# Patient Record
Sex: FEMALE | Race: WHITE | Employment: OTHER | URBAN - METROPOLITAN AREA
[De-identification: names, ages, dates, MRNs, and addresses within clinical notes are randomized per-mention and may not be internally consistent; named-entity substitution may affect disease eponyms.]

---

## 2017-06-01 ENCOUNTER — APPOINTMENT (OUTPATIENT)
Dept: GENERAL RADIOLOGY | Age: 82
DRG: 644 | End: 2017-06-01
Attending: EMERGENCY MEDICINE
Payer: MEDICARE

## 2017-06-01 ENCOUNTER — APPOINTMENT (OUTPATIENT)
Dept: CT IMAGING | Age: 82
DRG: 644 | End: 2017-06-01
Attending: EMERGENCY MEDICINE
Payer: MEDICARE

## 2017-06-01 ENCOUNTER — HOSPITAL ENCOUNTER (INPATIENT)
Age: 82
LOS: 4 days | Discharge: HOME HEALTH | DRG: 644 | End: 2017-06-05
Attending: EMERGENCY MEDICINE | Admitting: INTERNAL MEDICINE
Payer: MEDICARE

## 2017-06-01 DIAGNOSIS — J96.01 ACUTE RESPIRATORY FAILURE WITH HYPOXIA (HCC): ICD-10-CM

## 2017-06-01 DIAGNOSIS — R53.1 WEAKNESS: ICD-10-CM

## 2017-06-01 DIAGNOSIS — R53.81 DEBILITY: ICD-10-CM

## 2017-06-01 DIAGNOSIS — M54.50 ACUTE MIDLINE LOW BACK PAIN WITHOUT SCIATICA: ICD-10-CM

## 2017-06-01 DIAGNOSIS — K59.09 OTHER CONSTIPATION: ICD-10-CM

## 2017-06-01 DIAGNOSIS — S32.010S COMPRESSION FRACTURE OF L1 LUMBAR VERTEBRA, SEQUELA: ICD-10-CM

## 2017-06-01 DIAGNOSIS — M25.551 RIGHT HIP PAIN: ICD-10-CM

## 2017-06-01 DIAGNOSIS — N39.0 URINARY TRACT INFECTION WITHOUT HEMATURIA, SITE UNSPECIFIED: Primary | ICD-10-CM
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
## 2017-06-01 PROBLEM — K59.00 CONSTIPATION: Status: ACTIVE | Noted: 2017-06-01

## 2017-06-01 PROBLEM — S32.010A COMPRESSION FRACTURE OF L1 LUMBAR VERTEBRA (HCC): Status: ACTIVE | Noted: 2017-06-01

## 2017-06-01 LAB
ALBUMIN SERPL BCP-MCNC: 3.3 G/DL (ref 3.5–5)
ALBUMIN/GLOB SERPL: 0.8 {RATIO} (ref 1.1–2.2)
ALP SERPL-CCNC: 83 U/L (ref 45–117)
ALT SERPL-CCNC: 18 U/L (ref 12–78)
ANION GAP BLD CALC-SCNC: 9 MMOL/L (ref 5–15)
APPEARANCE UR: ABNORMAL
AST SERPL W P-5'-P-CCNC: 16 U/L (ref 15–37)
ATRIAL RATE: 69 BPM
BACTERIA URNS QL MICRO: ABNORMAL /HPF
BASOPHILS # BLD AUTO: 0 K/UL (ref 0–0.1)
BASOPHILS # BLD: 0 % (ref 0–1)
BILIRUB SERPL-MCNC: 0.9 MG/DL (ref 0.2–1)
BILIRUB UR QL: NEGATIVE
BNP SERPL-MCNC: 5151 PG/ML (ref 0–450)
BUN SERPL-MCNC: 18 MG/DL (ref 6–20)
BUN/CREAT SERPL: 23 (ref 12–20)
CALCIUM SERPL-MCNC: 8.6 MG/DL (ref 8.5–10.1)
CALCULATED P AXIS, ECG09: 43 DEGREES
CALCULATED R AXIS, ECG10: 28 DEGREES
CALCULATED T AXIS, ECG11: 49 DEGREES
CHLORIDE SERPL-SCNC: 89 MMOL/L (ref 97–108)
CK MB CFR SERPL CALC: 3.9 % (ref 0–2.5)
CK MB SERPL-MCNC: 1.8 NG/ML (ref 5–25)
CK SERPL-CCNC: 46 U/L (ref 26–192)
CO2 SERPL-SCNC: 26 MMOL/L (ref 21–32)
COLOR UR: ABNORMAL
CREAT SERPL-MCNC: 0.77 MG/DL (ref 0.55–1.02)
DIAGNOSIS, 93000: NORMAL
EOSINOPHIL # BLD: 0 K/UL (ref 0–0.4)
EOSINOPHIL NFR BLD: 0 % (ref 0–7)
EPITH CASTS URNS QL MICRO: ABNORMAL /LPF
ERYTHROCYTE [DISTWIDTH] IN BLOOD BY AUTOMATED COUNT: 13.3 % (ref 11.5–14.5)
GLOBULIN SER CALC-MCNC: 4.3 G/DL (ref 2–4)
GLUCOSE SERPL-MCNC: 122 MG/DL (ref 65–100)
GLUCOSE UR STRIP.AUTO-MCNC: NEGATIVE MG/DL
HCT VFR BLD AUTO: 34.5 % (ref 35–47)
HGB BLD-MCNC: 11.8 G/DL (ref 11.5–16)
HGB UR QL STRIP: ABNORMAL
INR PPP: 1.7 (ref 0.9–1.1)
KETONES UR QL STRIP.AUTO: ABNORMAL MG/DL
LEUKOCYTE ESTERASE UR QL STRIP.AUTO: ABNORMAL
LYMPHOCYTES # BLD AUTO: 13 % (ref 12–49)
LYMPHOCYTES # BLD: 1.2 K/UL (ref 0.8–3.5)
MAGNESIUM SERPL-MCNC: 2.4 MG/DL (ref 1.6–2.4)
MCH RBC QN AUTO: 30.3 PG (ref 26–34)
MCHC RBC AUTO-ENTMCNC: 34.2 G/DL (ref 30–36.5)
MCV RBC AUTO: 88.7 FL (ref 80–99)
MONOCYTES # BLD: 0.7 K/UL (ref 0–1)
MONOCYTES NFR BLD AUTO: 8 % (ref 5–13)
NEUTS SEG # BLD: 7.2 K/UL (ref 1.8–8)
NEUTS SEG NFR BLD AUTO: 79 % (ref 32–75)
NITRITE UR QL STRIP.AUTO: NEGATIVE
OTHER,OTHU: ABNORMAL
P-R INTERVAL, ECG05: 190 MS
PH UR STRIP: 6 [PH] (ref 5–8)
PLATELET # BLD AUTO: 139 K/UL (ref 150–400)
POTASSIUM SERPL-SCNC: 3.7 MMOL/L (ref 3.5–5.1)
PROT SERPL-MCNC: 7.6 G/DL (ref 6.4–8.2)
PROT UR STRIP-MCNC: 30 MG/DL
PROTHROMBIN TIME: 17 SEC (ref 9–11.1)
Q-T INTERVAL, ECG07: 438 MS
QRS DURATION, ECG06: 86 MS
QTC CALCULATION (BEZET), ECG08: 469 MS
RBC # BLD AUTO: 3.89 M/UL (ref 3.8–5.2)
RBC #/AREA URNS HPF: ABNORMAL /HPF (ref 0–5)
SODIUM SERPL-SCNC: 124 MMOL/L (ref 136–145)
SP GR UR REFRACTOMETRY: 1.02 (ref 1–1.03)
TROPONIN I SERPL-MCNC: <0.04 NG/ML
UA: UC IF INDICATED,UAUC: ABNORMAL
UROBILINOGEN UR QL STRIP.AUTO: 1 EU/DL (ref 0.2–1)
VENTRICULAR RATE, ECG03: 69 BPM
WBC # BLD AUTO: 9.2 K/UL (ref 3.6–11)
WBC URNS QL MICRO: >100 /HPF (ref 0–4)

## 2017-06-01 PROCEDURE — 74011250636 HC RX REV CODE- 250/636: Performed by: EMERGENCY MEDICINE

## 2017-06-01 PROCEDURE — 74011250636 HC RX REV CODE- 250/636: Performed by: INTERNAL MEDICINE

## 2017-06-01 PROCEDURE — 93005 ELECTROCARDIOGRAM TRACING: CPT

## 2017-06-01 PROCEDURE — 87186 SC STD MICRODIL/AGAR DIL: CPT | Performed by: EMERGENCY MEDICINE

## 2017-06-01 PROCEDURE — 82550 ASSAY OF CK (CPK): CPT | Performed by: EMERGENCY MEDICINE

## 2017-06-01 PROCEDURE — 65270000029 HC RM PRIVATE

## 2017-06-01 PROCEDURE — G8979 MOBILITY GOAL STATUS: HCPCS

## 2017-06-01 PROCEDURE — 83735 ASSAY OF MAGNESIUM: CPT | Performed by: EMERGENCY MEDICINE

## 2017-06-01 PROCEDURE — 74022 RADEX COMPL AQT ABD SERIES: CPT

## 2017-06-01 PROCEDURE — 84484 ASSAY OF TROPONIN QUANT: CPT | Performed by: EMERGENCY MEDICINE

## 2017-06-01 PROCEDURE — 74011250637 HC RX REV CODE- 250/637: Performed by: INTERNAL MEDICINE

## 2017-06-01 PROCEDURE — 96375 TX/PRO/DX INJ NEW DRUG ADDON: CPT

## 2017-06-01 PROCEDURE — 96365 THER/PROPH/DIAG IV INF INIT: CPT

## 2017-06-01 PROCEDURE — 72131 CT LUMBAR SPINE W/O DYE: CPT

## 2017-06-01 PROCEDURE — 74011000258 HC RX REV CODE- 258: Performed by: INTERNAL MEDICINE

## 2017-06-01 PROCEDURE — 74011000250 HC RX REV CODE- 250: Performed by: INTERNAL MEDICINE

## 2017-06-01 PROCEDURE — 97530 THERAPEUTIC ACTIVITIES: CPT

## 2017-06-01 PROCEDURE — 87086 URINE CULTURE/COLONY COUNT: CPT | Performed by: EMERGENCY MEDICINE

## 2017-06-01 PROCEDURE — 97116 GAIT TRAINING THERAPY: CPT

## 2017-06-01 PROCEDURE — 74011000258 HC RX REV CODE- 258: Performed by: EMERGENCY MEDICINE

## 2017-06-01 PROCEDURE — 85610 PROTHROMBIN TIME: CPT | Performed by: EMERGENCY MEDICINE

## 2017-06-01 PROCEDURE — G8978 MOBILITY CURRENT STATUS: HCPCS

## 2017-06-01 PROCEDURE — 87077 CULTURE AEROBIC IDENTIFY: CPT | Performed by: EMERGENCY MEDICINE

## 2017-06-01 PROCEDURE — 83880 ASSAY OF NATRIURETIC PEPTIDE: CPT | Performed by: EMERGENCY MEDICINE

## 2017-06-01 PROCEDURE — 81001 URINALYSIS AUTO W/SCOPE: CPT | Performed by: EMERGENCY MEDICINE

## 2017-06-01 PROCEDURE — 97161 PT EVAL LOW COMPLEX 20 MIN: CPT

## 2017-06-01 PROCEDURE — 80053 COMPREHEN METABOLIC PANEL: CPT | Performed by: EMERGENCY MEDICINE

## 2017-06-01 PROCEDURE — 85025 COMPLETE CBC W/AUTO DIFF WBC: CPT | Performed by: EMERGENCY MEDICINE

## 2017-06-01 PROCEDURE — 99285 EMERGENCY DEPT VISIT HI MDM: CPT

## 2017-06-01 PROCEDURE — 36415 COLL VENOUS BLD VENIPUNCTURE: CPT | Performed by: EMERGENCY MEDICINE

## 2017-06-01 RX ORDER — AMITRIPTYLINE HYDROCHLORIDE 10 MG/1
10 TABLET, FILM COATED ORAL
Status: DISCONTINUED | OUTPATIENT
Start: 2017-06-01 | End: 2017-06-05 | Stop reason: HOSPADM

## 2017-06-01 RX ORDER — LATANOPROST 50 UG/ML
SOLUTION/ DROPS OPHTHALMIC EVERY EVENING
Status: DISCONTINUED | OUTPATIENT
Start: 2017-06-01 | End: 2017-06-05 | Stop reason: HOSPADM

## 2017-06-01 RX ORDER — NAPROXEN 250 MG/1
250 TABLET ORAL 2 TIMES DAILY WITH MEALS
Status: DISCONTINUED | OUTPATIENT
Start: 2017-06-01 | End: 2017-06-02

## 2017-06-01 RX ORDER — SODIUM CHLORIDE 0.9 % (FLUSH) 0.9 %
5-10 SYRINGE (ML) INJECTION AS NEEDED
Status: DISCONTINUED | OUTPATIENT
Start: 2017-06-01 | End: 2017-06-05 | Stop reason: HOSPADM

## 2017-06-01 RX ORDER — POLYETHYLENE GLYCOL 3350 17 G/17G
17 POWDER, FOR SOLUTION ORAL DAILY
Status: DISCONTINUED | OUTPATIENT
Start: 2017-06-02 | End: 2017-06-02 | Stop reason: SDUPTHER

## 2017-06-01 RX ORDER — SODIUM CHLORIDE 0.9 % (FLUSH) 0.9 %
5-10 SYRINGE (ML) INJECTION EVERY 8 HOURS
Status: DISCONTINUED | OUTPATIENT
Start: 2017-06-01 | End: 2017-06-05 | Stop reason: HOSPADM

## 2017-06-01 RX ORDER — AMOXICILLIN 250 MG
1 CAPSULE ORAL DAILY
Status: DISCONTINUED | OUTPATIENT
Start: 2017-06-02 | End: 2017-06-02

## 2017-06-01 RX ORDER — POLYETHYLENE GLYCOL 3350 17 G/17G
17 POWDER, FOR SOLUTION ORAL DAILY
Status: DISCONTINUED | OUTPATIENT
Start: 2017-06-02 | End: 2017-06-05 | Stop reason: HOSPADM

## 2017-06-01 RX ORDER — WARFARIN SODIUM 5 MG/1
5 TABLET ORAL ONCE
Status: COMPLETED | OUTPATIENT
Start: 2017-06-01 | End: 2017-06-01

## 2017-06-01 RX ORDER — DORZOLAMIDE HCL 20 MG/ML
2 SOLUTION/ DROPS OPHTHALMIC 2 TIMES DAILY
Status: DISCONTINUED | OUTPATIENT
Start: 2017-06-01 | End: 2017-06-03

## 2017-06-01 RX ORDER — FACIAL-BODY WIPES
10 EACH TOPICAL DAILY PRN
Status: DISCONTINUED | OUTPATIENT
Start: 2017-06-01 | End: 2017-06-05 | Stop reason: HOSPADM

## 2017-06-01 RX ORDER — ONDANSETRON 2 MG/ML
4 INJECTION INTRAMUSCULAR; INTRAVENOUS
Status: DISCONTINUED | OUTPATIENT
Start: 2017-06-01 | End: 2017-06-05 | Stop reason: HOSPADM

## 2017-06-01 RX ORDER — LEVETIRACETAM 500 MG/1
500 TABLET ORAL 2 TIMES DAILY
Status: DISCONTINUED | OUTPATIENT
Start: 2017-06-01 | End: 2017-06-05 | Stop reason: HOSPADM

## 2017-06-01 RX ORDER — AMIODARONE HYDROCHLORIDE 200 MG/1
200 TABLET ORAL EVERY OTHER DAY
Status: DISCONTINUED | OUTPATIENT
Start: 2017-06-03 | End: 2017-06-02

## 2017-06-01 RX ORDER — CARVEDILOL 12.5 MG/1
12.5 TABLET ORAL 2 TIMES DAILY WITH MEALS
Status: DISCONTINUED | OUTPATIENT
Start: 2017-06-01 | End: 2017-06-05 | Stop reason: HOSPADM

## 2017-06-01 RX ORDER — KETOROLAC TROMETHAMINE 30 MG/ML
15 INJECTION, SOLUTION INTRAMUSCULAR; INTRAVENOUS
Status: COMPLETED | OUTPATIENT
Start: 2017-06-01 | End: 2017-06-01

## 2017-06-01 RX ORDER — ONDANSETRON 2 MG/ML
4 INJECTION INTRAMUSCULAR; INTRAVENOUS
Status: COMPLETED | OUTPATIENT
Start: 2017-06-01 | End: 2017-06-01

## 2017-06-01 RX ORDER — VALSARTAN 40 MG/1
80 TABLET ORAL DAILY
Status: DISCONTINUED | OUTPATIENT
Start: 2017-06-02 | End: 2017-06-02

## 2017-06-01 RX ORDER — OXYCODONE AND ACETAMINOPHEN 5; 325 MG/1; MG/1
1 TABLET ORAL
Status: DISCONTINUED | OUTPATIENT
Start: 2017-06-01 | End: 2017-06-02

## 2017-06-01 RX ORDER — FUROSEMIDE 10 MG/ML
20 INJECTION INTRAMUSCULAR; INTRAVENOUS DAILY
Status: DISCONTINUED | OUTPATIENT
Start: 2017-06-02 | End: 2017-06-02

## 2017-06-01 RX ORDER — LIDOCAINE 50 MG/G
1 PATCH TOPICAL EVERY 24 HOURS
Status: DISCONTINUED | OUTPATIENT
Start: 2017-06-01 | End: 2017-06-02

## 2017-06-01 RX ORDER — FUROSEMIDE 10 MG/ML
40 INJECTION INTRAMUSCULAR; INTRAVENOUS
Status: COMPLETED | OUTPATIENT
Start: 2017-06-01 | End: 2017-06-01

## 2017-06-01 RX ADMIN — CEFTRIAXONE 1 G: 1 INJECTION, POWDER, FOR SOLUTION INTRAMUSCULAR; INTRAVENOUS at 18:38

## 2017-06-01 RX ADMIN — AMITRIPTYLINE HYDROCHLORIDE 10 MG: 10 TABLET, FILM COATED ORAL at 20:54

## 2017-06-01 RX ADMIN — CEFTRIAXONE 1 G: 1 INJECTION, POWDER, FOR SOLUTION INTRAMUSCULAR; INTRAVENOUS at 11:29

## 2017-06-01 RX ADMIN — Medication 10 ML: at 20:59

## 2017-06-01 RX ADMIN — KETOROLAC TROMETHAMINE 15 MG: 30 INJECTION, SOLUTION INTRAMUSCULAR at 10:19

## 2017-06-01 RX ADMIN — LEVETIRACETAM 500 MG: 500 TABLET, FILM COATED ORAL at 18:38

## 2017-06-01 RX ADMIN — Medication 10 ML: at 18:39

## 2017-06-01 RX ADMIN — CARVEDILOL 12.5 MG: 12.5 TABLET, FILM COATED ORAL at 18:38

## 2017-06-01 RX ADMIN — FUROSEMIDE 40 MG: 10 INJECTION, SOLUTION INTRAMUSCULAR; INTRAVENOUS at 13:43

## 2017-06-01 RX ADMIN — ONDANSETRON HYDROCHLORIDE 4 MG: 2 INJECTION, SOLUTION INTRAMUSCULAR; INTRAVENOUS at 10:01

## 2017-06-01 RX ADMIN — SODIUM CHLORIDE 5 MG: 9 INJECTION INTRAMUSCULAR; INTRAVENOUS; SUBCUTANEOUS at 20:54

## 2017-06-01 RX ADMIN — WARFARIN SODIUM 5 MG: 5 TABLET ORAL at 18:39

## 2017-06-01 RX ADMIN — ONDANSETRON HYDROCHLORIDE 4 MG: 2 INJECTION, SOLUTION INTRAMUSCULAR; INTRAVENOUS at 19:02

## 2017-06-01 NOTE — PROGRESS NOTES
Problem: Mobility Impaired (Adult and Pediatric)  Goal: *Acute Goals and Plan of Care (Insert Text)  Physical Therapy Goals  Initiated 6/1/2017  1. Patient will move from supine to sit and sit to supine , scoot up and down and roll side to side in bed with minimal assistance/contact guard assist within 7 day(s). 2. Patient will transfer from bed to chair and chair to bed with modified independence using the least restrictive device within 7 day(s). 3. Patient will perform sit to stand with modified independence within 7 day(s). 4. Patient will ambulate with modified independence for 100 feet with the least restrictive device within 7 day(s). 5. Patient will ascend/descend 5 stairs with handrail(s) with minimal assistance/contact guard assist within 7 day(s). PHYSICAL THERAPY EMERGENCY DEPARTMENT EVALUATION WITH RECOMMENDED ADMISSION  Patient: Giles Osgood (57 y.o. female)  Date: 6/1/2017  Primary Diagnosis: Compression fracture of L1 lumbar vertebra (HCC)  Fall at home  UTI (urinary tract infection)  Acute respiratory failure with hypoxia (HCC)  Constipation        Precautions:  Fall, Back (L1 fracture)  ASSESSMENT :  Based on the objective data described below, the patient presents with increased back pain, generalized weakness, impaired balance, and decreased activity tolerance all limiting patients functional mobility. Patient with 10/10 pain during transitional movements, bed mobility and sit<>stand transfers, but pain tolerable during ambulation. During ambulation noted patient to have increased dyspnea and SpO2 desaturated to 86% on RA. Unable to recover with rest on RA and required returning to 2L nasal O2. Not on supplemental O2 at baseline. Safety concerns with patient returning due to pain management and O2 needs/decline. Patient would benefit from admission for continued medical management/workup and continued skilled PT. Rounded with ED physician who is in agreement.      Admission for this patient is recommended with continued acute therapy. PLAN :  Frequency/Duration: Pending admission, the patient will be followed by physical therapy 5 times a week to address goals. If admitted, Recommendations and Planned Interventions:  [X]           Bed Mobility Training             [ ]    Neuromuscular Re-Education  [X]           Transfer Training                   [ ]    Orthotic/Prosthetic Training  [X]           Gait Training                         [ ]    Modalities  [X]           Therapeutic Exercises           [ ]    Edema Management/Control  [X]           Therapeutic Activities            [X]    Patient and Family Training/Education  [ ]           Other (comment):     Discharge Recommendations:   [ ]   Home with family  [ ]   Skilled nursing facility  [X]   Admission to hospital with [facility] PT likely needed  [ ]   Inpatient rehab referral  [ ]   Outpatient physical therapy referral  [ ]   Other:     Further Equipment Recommendations for Discharge: TBD  [ ]   Rolling walker with 5\" wheels  [ ]   Crutches   [ ]   Lupie Batres   [ ]   Wheelchair   [ ]   Other:       COMMUNICATION/EDUCATION:   Communication/Collaboration:  [X]   Fall prevention education was provided and the patient/caregiver indicated understanding. [X]   Patient/family have participated as able and agree with findings and recommendations. [ ]   Patient is unable to participate in plan of care at this time. Findings and recommendations were discussed with: ED physician and Registered Nurse       SUBJECTIVE:   Patient stated I am okay until I move.       OBJECTIVE DATA SUMMARY:   HISTORY:    Past Medical History:   Diagnosis Date    Alopecia      Arthritis      Benign essential HTN      Benign neoplasm of vulva      Bronchitis      CAD (coronary artery disease)      Cancer ([facility] [address]      Cholelithiases      Colon cancer ([facility] [address]      Constipation      Dyspareunia      Glaucoma      History of mixed connective tissue disease      Hx of viral pericarditis      Hyperactivity of bladder      Hypercholesterolemia      Insomnia      Macular degeneration      Neck pain      Palpitations      Psoriasis      PVT (paroxysmal ventricular tachycardia) (HCC)      RA (rheumatoid arthritis) (HCC)      Rectocele      Renal cyst      Seizure disorder (HCC)      Sinusitis      [facility]'s disease ([facility] [address]      Thrombophlebitis      TIA (transient ischemic attack)      Vaginal vault prolapse, posthysterectomy      Varicose veins       Past Surgical History:   Procedure Laterality Date    HX BREAST LUMPECTOMY         left breast    HX CATARACT REMOVAL        HX COLECTOMY   1955     partial    HX HEMORRHOIDECTOMY        HX HYSTERECTOMY        HX SHOULDER ARTHROSCOPY         Prior Level of Function/Home Situation: prior independence to modified independence, use of rollator walker at baseline. History of falls, lives with family who assists as needed   Personal factors and/or comorbidities impacting plan of care:      Home Situation  Home Environment: Private residence  # Steps to Enter: 5  Rails to Enter: Yes  Hand Rails : Bilateral  One/Two Story Residence: Two story, live on 1st floor  Living Alone: No  Support Systems: Child(jaiden)  Patient Expects to be Discharged to[de-identified] Private residence  Current DME Used/Available at Home: Raised toilet seat, Walker, rollator (bed rails)  Tub or Shower Type: Shower     EXAMINATION/PRESENTATION/DECISION MAKING:   Critical Behavior:   Patient alert      Hearing: Auditory  Auditory Impairment: Hard of hearing, bilateral, Hearing aid(s)  Hearing Aids/Status: With patient  Range Of Motion:  AROM: Generally decreased, functional (back pain limiting ROM)     Strength:    Strength: Generally decreased, functional     Tone & Sensation:   Tone: Normal  Sensation: Intact        Coordination:  Coordination: Within functional limits     Functional Mobility:  Bed Mobility:  Rolling: Moderate assistance; Additional time  Supine to Sit: Moderate assistance; Additional time  Sit to Supine: Moderate assistance; Additional time  Scooting: Minimum assistance; Additional time  Transfers:  Sit to Stand: Minimum assistance; Additional time  Stand to Sit: Minimum assistance; Additional time     Balance:   Sitting: Impaired  Sitting - Static: Good (unsupported)  Sitting - Dynamic: Fair (occasional) (due to back pain)  Standing: Impaired  Standing - Static: Fair  Standing - Dynamic : Fair  Ambulation/Gait Training:  Distance (ft): 80 Feet (ft)  Assistive Device: Walker, rollator;Gait belt  Ambulation - Level of Assistance: Contact guard assistance  Gait Abnormalities: Decreased step clearance (mild forward flexed posture)  Base of Support: Widened                             Patient initially slow due to pain but able to improve carlos. Increased pain with distance. Dyspnea with activity and noted SpO2 decreased. Special Tests:  Barthel Index:      Bathin  Bladder: 5  Bowels: 10  Groomin  Dressin  Feedin  Mobility: 0  Stairs: 0  Toilet Use: 5  Transfer (Bed to Chair and Back): 5  Total: 35      Barthel and G-code impairment scale:  Percentage of impairment CH  0% CI  1-19% CJ  20-39% CK  40-59% CL  60-79% CM  80-99% CN  100%   Barthel Score 0-100 100 99-80 79-60 59-40 20-39 1-19    0   Barthel Score 0-20 20 17-19 13-16 9-12 5-8 1-4 0      The Barthel ADL Index: Guidelines  1. The index should be used as a record of what a patient does, not as a record of what a patient could do. 2. The main aim is to establish degree of independence from any help, physical or verbal, however minor and for whatever reason. 3. The need for supervision renders the patient not independent. 4. A patient's performance should be established using the best available evidence. Asking the patient, friends/relatives and nurses are the usual sources, but direct observation and common sense are also important. However direct testing is not needed.   5. Usually the patient's performance over the preceding 24-48 hours is important, but occasionally longer periods will be relevant. 6. Middle categories imply that the patient supplies over 50 per cent of the effort. 7. Use of aids to be independent is allowed. Tracy Pham., Barthel, D.W. (3980). Functional evaluation: the Barthel Index. [facility] (14)2. KASHIF Flood, Abigail Awad, [facility] Betzaida [facility], [address] [facility] (1999). Measuring the change indisability after inpatient rehabilitation; comparison of the responsiveness of the Barthel Index and Functional [place] Measure. Journal of Neurology, Neurosurgery, and Psychiatry, 66(4), 219-971. NARCISA Conn, CARISSA Pascual, & Abhishek Mccoy M.A. (2004.) Assessment of post-stroke quality of life in cost-effectiveness studies: The usefulness of the Barthel Index and the EuroQoL-5D. Quality of Life Research, 13, 427-43                 In compliance with CMSs Claims Based Outcome Reporting, the following G-code set was chosen for this patient based on their primary functional limitation being treated: The outcome measure chosen to determine the severity of the functional limitation was the Barthel Index with a score of 35/100 which was correlated with the impairment scale.       · Mobility - Walking and Moving Around:               - CURRENT STATUS:    CL - 60%-79% impaired, limited or restricted               - GOAL STATUS:           CK - 40%-59% impaired, limited or restricted               - D/C STATUS:              ---------------To be determined---------------       Physical Therapy Evaluation Charge Determination   History Examination Presentation Decision-Making   LOW Complexity : Zero comorbidities / personal factors that will impact the outcome / POC LOW Complexity : 1-2 Standardized tests and measures addressing body structure, function, activity limitation and / or participation in recreation  LOW Complexity : Stable, uncomplicated  Other outcome measures Barthel Index LOW       Based on the above components, the patient evaluation is determined to be of the following complexity level: LOW            Pain:  Pain Scale 1: Numeric (0 - 10)  Pain Intensity 1: 0     Activity Tolerance:   SpO2 decreased to 86% on RA during activity   Please refer to the flowsheet for vital signs taken during this treatment.   After treatment:   [ ]   Patient left in no apparent distress sitting up in chair  [X]   Patient left in no apparent distress in bed  [X]   Call bell left within reach  [X]   Nursing notified  [X]   Caregiver present  [ ]   Bed alarm activated     Thank you for this referral.  Meredith Christian, PT, DPT   Time Calculation: 28 mins

## 2017-06-01 NOTE — PROGRESS NOTES
Paged on call physician at [facility] [phone], provided callback number [phone]. Dr. [facility] states he will see her tomorrow. [address] [place] [address] Paged Dr. Berta Peralta regarding patient being nauseous when she gets up and the patient wanting miralax. Received a telephone order for miralax 17 g, and Zofran 4 mg IV every 6 hours PRN.

## 2017-06-01 NOTE — PROGRESS NOTES
Pharmacy Initial Dosing of Warfarin    Pharmacy consulted to dose warfarin for this  80 y.o. female ordered warfarin for AF    Goal INR (2-3, 2.5-3.5, 3-4): 2-3    Concurrent anticoagulants & Platelet Inhibitors: None  Home Warfarin Dose: 5 mg day  Major Interacting Medications (Dose/Frequency): Amiodarone    INR (0.9-1.1) > 5 discuss with MD:   Recent Labs      06/01/17   0957   INR  1.7*   HGB  11.8   PLT  139*   ALB  3.3*   SGOT  16   ALT  18   TBILI  0.9     Warfarin Sensitivity & Sensitivity Factors Present: Normal sensitivity       Impression/Plan: Warfarin 5 mg tonight then daily dose by INR     Pharmacy will follow daily and adjust the dose as appropriate.     Thanks for the consult    Vidal Mattson, [facility]

## 2017-06-01 NOTE — IP AVS SNAPSHOT
[address] [facility]. 
[phone] Patient: Gavin Gaitan MRN: [id number] You are allergic to the following Allergen Reactions Codeine Unknown (comments) Pcn (Penicillins) Unknown (comments) Tetanus Vaccines And Toxoid Unknown (comments) Vibramycin (Doxycycline Calcium) Unknown (comments) Recent Documentation Height Weight BMI OB Status Smoking Status 1.626 m 68.5 kg 25.92 kg/m2 Hysterectomy Never Smoker Emergency Contacts Name Discharge Info Relation Home Work Mobile Ruchi Manriquez  Child [2] [phone] About your hospitalization You were admitted on:  2017 You last received care in the:  [facility] 3 ORTHOPEDICS You were discharged on:  2017 Unit phone number:  [phone] Why you were hospitalized Your primary diagnosis was:  Compression Fracture Of L1 Lumbar Vertebra (Hcc) Your diagnoses also included:  Uti (Urinary Tract Infection), Constipation, Acute Respiratory Failure With Hypoxia (Hcc), Fall At Home, Back Pain, Right Hip Pain, Falls, Weakness, Debility, Hyponatremia Providers Seen During Your Hospitalizations Provider Role Specialty Primary office phone Pallavi Cope DO Attending Provider Emergency Medicine [phone] Jennifer Taylor MD Attending Provider Internal Medicine [phone] Your Primary Care Physician (PCP) Primary Care Physician Office Phone Office Fax Charo Cano [phone] Follow-up Information Follow up With Details Comments Contact Info [address] This is your home health provider. If you do not hear from them with in 24 hours please contact them [phone].  
[facility] [place] [address] [facility] 
 
phone [phone] Your Appointments  Thursday Sabine 15, 2017  8:40 AM EDT  
 Follow Up with Bette Cárdenas MD  
[facility] [address] [facility]) [address] [facility], 
[address] [place] [address], [address] [place]  
[phone] Current Discharge Medication List  
  
START taking these medications Dose & Instructions Dispensing Information Comments Morning Noon Evening Bedtime  
 nitrofurantoin (macrocrystal-monohydrate) 100 mg capsule Commonly known as:  MACROBID Your last dose was: Your next dose is:    
   
   
 Dose:  100 mg Take 1 Cap by mouth two (2) times a day. Quantity:  6 Cap Refills:  0  
     
   
   
   
  
 oxyCODONE IR 5 mg immediate release tablet Commonly known as:  Michaell Lunch Your last dose was: Your next dose is:    
   
   
 Dose:  5 mg Take 1 Tab by mouth every four (4) hours as needed. Max Daily Amount: 30 mg.  
 Quantity:  30 Tab Refills:  0 CONTINUE these medications which have NOT CHANGED Dose & Instructions Dispensing Information Comments Morning Noon Evening Bedtime  
 amiodarone 200 mg tablet Commonly known as:  CORDARONE Your last dose was: Your next dose is:    
   
   
 Dose:  200 mg Take 200 mg by mouth every other day. Refills:  0 AMITRIPTYLINE PO Your last dose was: Your next dose is: Take  by mouth. Alternate nightly 10 mg & 15 mg Refills:  0 Azo-Standard 95 mg tab Generic drug:  phenazopyridine Your last dose was: Your next dose is: Take  by mouth. Refills:  0  
     
   
   
   
  
 carvedilol 12.5 mg tablet Commonly known as:  Pennie Bosworth Your last dose was: Your next dose is:    
   
   
 Dose:  12.5 mg Take 1 Tab by mouth two (2) times daily (with meals). Quantity:  60 Tab Refills:  5 COZAAR 50 mg tablet Generic drug:  losartan Your last dose was: Your next dose is: Take  by mouth daily. Refills:  0 Iron 325 mg (65 mg iron) tablet Generic drug:  ferrous sulfate Your last dose was: Your next dose is: Take  by mouth Daily (before breakfast). Refills:  0  
     
   
   
   
  
 levETIRAcetam 500 mg tablet Commonly known as:  KEPPRA Your last dose was: Your next dose is: TAKE ONE TABLET BY MOUTH TWICE DAILY. PATIENT TO KEEP 6/15/16 FOR FURTHER FILLS Quantity:  180 Tab Refills:  5 LUMIGAN 0.03 % ophthalmic drops Generic drug:  bimatoprost  
   
Your last dose was: Your next dose is:    
   
   
 Dose:  1 Drop Administer 1 Drop to both eyes every evening. Refills:  0  
     
   
   
   
  
 multivitamin tablet Commonly known as:  ONE A DAY Your last dose was: Your next dose is:    
   
   
 Dose:  1 Tab Take 1 Tab by mouth two (2) times a day. Refills:  0  
     
   
   
   
  
 TRUSOPT 2 % ophthalmic solution Generic drug:  dorzolamide Your last dose was: Your next dose is:    
   
   
 Dose:  2 Drop Administer 2 Drops to both eyes two (2) times a day. Refills:  0  
     
   
   
   
  
 warfarin 2.5 mg tablet Commonly known as:  COUMADIN Your last dose was: Your next dose is:    
   
   
 Dose:  5 mg Take 5 mg by mouth daily (with dinner). Dr Darrel Julian to be new cardiologist  Indications: Afib Refills:  0 STOP taking these medications   
 hydroCHLOROthiazide 25 mg tablet Commonly known as:  HYDRODIURIL Where to Get Your Medications Information on where to get these meds will be given to you by the nurse or doctor. ! Ask your nurse or doctor about these medications  
  nitrofurantoin (macrocrystal-monohydrate) 100 mg capsule  
 oxyCODONE IR 5 mg immediate release tablet Discharge Instructions Compression Fracture of the Spine: Care Instructions Your Care Instructions A compression fracture happens when the front part of a spinal bone breaks and collapses. A fall or other accident can cause it. A minor injury or moving the wrong way can cause a break if you have thin or brittle bones (osteoporosis). These types of breaks will heal in 8 to 10 weeks. You will need rest and pain medicines. Your doctor may recommend physical therapy. Some doctors recommend that certain people with compression fractures wear braces. Your doctor also may treat thin or brittle bones. You may need surgery if you have lasting pain or if the bone presses on the spinal cord or nerves. You heal best when you take good care of yourself. Eat a variety of healthy foods, and don't smoke. Follow-up care is a key part of your treatment and safety. Be sure to make and go to all appointments, and call your doctor if you are having problems. It's also a good idea to know your test results and keep a list of the medicines you take. How can you care for yourself at home? · Be safe with medicines. Read and follow all instructions on the label. ¨ If the doctor gave you a prescription medicine for pain, take it as prescribed. ¨ If you are not taking a prescription pain medicine, ask your doctor if you can take an over-the-counter medicine. · Talk to your doctor about how to make your bones stronger. You may need medicines or a change in what you eat. · Be active only as directed by your doctor. When should you call for help? Call 911 anytime you think you may need emergency care. For example, call if: 
· You are unable to move an arm or a leg at all. Call your doctor now or seek immediate medical care if: 
· You have new or worse symptoms in your arms, chest, legs, belly, or buttocks. Symptoms may include: ¨ Numbness or tingling. ¨ Weakness. ¨ Pain. · You lose bladder or bowel control. · You have belly pain, bloating, vomiting, or nausea. Watch closely for changes in your health, and be sure to contact your doctor if: 
· You are not getting better as expected. Where can you learn more? Go to http://zackery-jessica.info/. Enter P445 in the search box to learn more about \"Compression Fracture of the Spine: Care Instructions. \" Current as of: August 10, 2016 Content Version: 11.2 © 5678-1965 [employer]. Care instructions adapted under license by [employer] (which disclaims liability or warranty for this information). If you have questions about a medical condition or this instruction, always ask your healthcare professional. [place] [address] any warranty or liability for your use of this information. Discharge Instructions Attachments/References WARFARIN: LONG-TERM USE (ENGLISH) Discharge Orders None ACO Transitions of Care Introducing [employer] [address] Paola Harmon offers a voluntary care coordination program to provide high quality service and care to [facility] fee-for-service beneficiaries. Kristopher Jack was designed to help you enhance your health and well-being through the following services: ? Transitions of Care  support for individuals who are transitioning from one care setting to another (example: Hospital to home). ? Chronic and Complex Care Coordination  support for individuals and caregivers of those with serious or chronic illnesses or with more than one chronic (ongoing) condition and those who take a number of different medications. If you meet specific medical criteria, a [address] [place] [address] Rd may call you directly to coordinate your care with your primary care physician and your other care providers.  
 
For questions about the [facility] programs, please, contact your physicians office. For general questions or additional information about Accountable Care Organizations: 
Please visit www.medicare.gov/acos. html or call 1-800-MEDICARE (0-326.659.9390) TTY users should call 1-221.994.1568. [employer] Announcement We are excited to announce that we are making your provider's discharge notes available to you in [employer]. You will see these notes when they are completed and signed by the physician that discharged you from your recent hospital stay. If you have any questions or concerns about any information you see in [employer], please call the Health Information Department where you were seen or reach out to your Primary Care Provider for more information about your plan of care. Introducing [facility]! Dear Heidy Rawls: Thank you for requesting a [employer] account. Our records indicate that you already have an active [employer] account. You can access your account anytime at [email or web address] Did you know that you can access your hospital and ER discharge instructions at any time in [employer]? You can also review all of your test results from your hospital stay or ER visit. Additional Information If you have questions, please visit the Frequently Asked Questions section of the [employer] website at [email or web address] Remember, [employer] is NOT to be used for urgent needs. For medical emergencies, dial 911. Now available from your iPhone and Android! General Information Please provide this summary of care documentation to your next provider. Patient Signature:  ____________________________________________________________ Date:  ____________________________________________________________  
  
[employer] Provider Signature:  ____________________________________________________________ Date:  ____________________________________________________________ More Information Taking Warfarin Safely: Care Instructions Your Care Instructions Warfarin is a medicine that you take to prevent blood clots. It is often called a blood thinner. Doctors give warfarin (such as Coumadin) to reduce the risk of blood clots. You may be at risk for blood clots if you have atrial fibrillation or deep vein thrombosis. Some other health problems may also put you at risk. Warfarin slows the amount of time it takes for your blood to clot. It can cause bleeding problems. Even if you've been taking warfarin for a while, it's important to know how to take it safely. Foods and other medicines can affect the way warfarin works. Some can make warfarin work too well. This can cause bleeding problems. And some can make it work poorly, so that it does not prevent blood clots very well. You will need regular blood tests to check how long it takes for your blood to form a clot. This test is called a PT or prothrombin time test. The result of the test is called an INR level. Depending on the test results, your doctor or anticoagulation clinic may adjust your dose of warfarin. Follow-up care is a key part of your treatment and safety. Be sure to make and go to all appointments, and call your doctor if you are having problems. It's also a good idea to know your test results and keep a list of the medicines you take. How can you care for yourself at home? Take warfarin safely · Take your warfarin at the same time each day. · If you miss a dose of warfarin, don't take an extra dose to make up for it. Your doctor can tell you exactly what to do so you don't take too much or too little. · Wear medical alert jewelry that lets others know that you take warfarin. You can buy this at most drugstores. · Don't take warfarin if you are pregnant or planning to get pregnant. Talk to your doctor about how you can prevent getting pregnant while you are taking it. · Don't change your dose or stop taking warfarin unless your doctor tells you to. Effects of medicines and food on warfarin · Don't start or stop taking any medicines, vitamins, or natural remedies unless you first talk to your doctor. Many medicines can affect how warfarin works. These include aspirin and other pain relievers, over-the-counter medicines, multivitamins, dietary supplements, and herbal products. · Tell all of your doctors and pharmacists that you take warfarin. Some prescription medicines can affect how warfarin works. · Keep the amount of vitamin K in your diet about the same from day to day. Do not suddenly eat a lot more or a lot less food that is rich in vitamin K than you usually do. Vitamin K affects how warfarin works and how your blood clots. Talk with your doctor before making big changes in your diet. Foods that have a lot of vitamin K include cooked green vegetables, such as: ¨ Kale, spinach, turnip greens, yesica greens, Swiss chard, and mustard greens. ¨ Hill sprouts, broccoli, and asparagus. · Limit your use of alcohol. Avoid bleeding by preventing falls and injuries · Wear slippers or shoes with nonskid soles. · Remove throw rugs and clutter. · Rearrange furniture and electrical cords to keep them out of walking paths. · Keep stairways, porches, and outside walkways well lit. Use night-lights in hallways and bathrooms. · Be extra careful when you work with sharp tools or knives. When should you call for help? Call 911 anytime you think you may need emergency care. For example, call if: 
· You have a sudden, severe headache that is different from past headaches. Call your doctor now or seek immediate medical care if: 
· You have any abnormal bleeding, such as: 
¨ Nosebleeds. ¨ Vaginal bleeding that is different (heavier, more frequent, at a different time of the month) than what you are used to. ¨ Bloody or black stools, or rectal bleeding. ¨ Bloody or pink urine. Watch closely for changes in your health, and be sure to contact your doctor if you have any problems. Where can you learn more? Go to http://zackery-jessica.info/. Enter L369 in the search box to learn more about \"Taking Warfarin Safely: Care Instructions. \" Current as of: November 15, 2016 Content Version: 11.2 © 3012-8853 [employer]. Care instructions adapted under license by [employer] (which disclaims liability or warranty for this information). If you have questions about a medical condition or this instruction, always ask your healthcare professional. [place] [address] any warranty or liability for your use of this information.

## 2017-06-01 NOTE — ED NOTES
Patient resting in bed at this time. States that PT came into see her and that her o2 sat was dropping on room air with ambulation. Will notify MD. Patients daughter states that patients HR went up to 147 earlier.  Strip printed will notify MD.

## 2017-06-01 NOTE — ED NOTES
Patient states that she fell on Sunday evening and that her lower back has hurt ever since. But patient states that the main reason she is she felt her self go into afib last night and again this morning. Patient and her daughter states that she does have  History of afib.

## 2017-06-01 NOTE — ED NOTES
Bedside shift change report given to Sawyer Smith.  (oncoming nurse) by Bharti Adhikari (offgoing nurse). Report included the following information SBAR and ED Summary.

## 2017-06-01 NOTE — ED NOTES
TRANSFER - OUT REPORT:    Verbal report given to Neema Francis (name) on Macy Gone  being transferred to [place] [id number](unit) for routine progression of care       Report consisted of patients Situation, Background, Assessment and   Recommendations(SBAR). Information from the following report(s) SBAR and ED Summary was reviewed with the receiving nurse. Lines:   Peripheral IV 06/01/17 Left Antecubital (Active)   Site Assessment Clean, dry, & intact 6/1/2017 10:03 AM   Phlebitis Assessment 0 6/1/2017 10:03 AM   Infiltration Assessment 0 6/1/2017 10:03 AM   Dressing Status Clean, dry, & intact 6/1/2017 10:03 AM   Hub Color/Line Status Pink 6/1/2017 10:03 AM        Opportunity for questions and clarification was provided.       Patient transported with:   O2 @ 2 liters

## 2017-06-01 NOTE — H&P
Hospitalist Admission Note    NAME: Gavin Gaitan   :  1924   MRN:  [id number]     Date/Time:  2017 5:29 PM    Patient PCP: Gino Valentine MD Cardio= Dr Casandra Gamboa  ________________________________________________________________________    My assessment of this patient's clinical condition and my plan of care is as follows. Assessment / Plan:  Acute Resp failure with Hypoxia POA- not on oxygen at home  Likely due to Bilateral Pleural effusions POA- only trace on CXR  Paroxsymal AFib/SVT POA- had runs of Palpitations yesterday as per pt, now resolved  INR= 1.7    Admit to telemetry bed for now  Cont amiodarone Q 48 hrs as at home  Cont Coumadin as no intervention planned as per pt's wishes  IV lasix trial, Is & Os  Cardiology consult Dr Mar Spivey    Probable UTI POA    Follow Urine Cx  Empiric Rocephin for now    L1 compression Fracture POA  S/p fall at home on   Chronic constipation    Pain management consult as per family request  Will try lidoderm patch  NSAIDs preferred over opioids as pt is constipated  Percocet prn for severe pain only  Aggressive bowel regimen  PT/OT eval   CM consult for DC planning    Seizure disorder- has remained seizure free for years now  Glaucoma  Iron deficiency Anemia    Cont keppra as per Neurology plan as per pt  Cont eyedrops- pt may take her own if wishes  Holding PO Iron due to constipation , Hb OK          Code Status: DNR as per pt's wishes in ER  Surrogate Decision Maker: Daughter Meredith # [phone] (works at nephrology Dr Beau Zamudios office)    DVT Prophylaxis: on coumadin  GI Prophylaxis: not indicated    Baseline: pt lives with daughter at home        Subjective:   CHIEF COMPLAINT: Generalized weakness x 1 day    HISTORY OF PRESENT ILLNESS:     Gavin Gaitan is a 80 y.o.  female who presents with above complains from home ambulatory.   Pt presents with CC of Worsening Generalized weakness x 1 day  H/o palpitations x 2 episodes yesterday- now gone, h/o PAFib/PSVT in past- is on Amiodarone & coumadin as per Dr Foley Sample (cardio)  H/o Fall on Sunday at home- low back pain since- found to have L1 compression fracture on CT today in ER & Xray as OP few days ago  H/o constipation x 2 days  H/o associated nausea    Pt was found to have ? UTI on UA in ER , with L1 compression Frac on CT, has declined any aggressive intervention for the pain & opted for pain management & PT trial at this time. We were asked to admit for work up and evaluation of the above problems.      Past Medical History:   Diagnosis Date    Alopecia     Arthritis     Benign essential HTN     Benign neoplasm of vulva     Bronchitis     CAD (coronary artery disease)     Cancer ([facility] [address]     Cholelithiases     Colon cancer (HCC)     Constipation     Dyspareunia     Glaucoma     History of mixed connective tissue disease     Hx of viral pericarditis     Hyperactivity of bladder     Hypercholesterolemia     Insomnia     Macular degeneration     Neck pain     Palpitations     Psoriasis     PVT (paroxysmal ventricular tachycardia) (HCC)     RA (rheumatoid arthritis) (HCC)     Rectocele     Renal cyst     Seizure disorder (HCC)     Sinusitis     Sjogren's disease ([facility] [address]     Thrombophlebitis     TIA (transient ischemic attack)     Vaginal vault prolapse, posthysterectomy     Varicose veins         Past Surgical History:   Procedure Laterality Date    HX BREAST LUMPECTOMY      left breast    HX CATARACT REMOVAL      HX COLECTOMY  1955    partial    HX HEMORRHOIDECTOMY      HX HYSTERECTOMY      HX SHOULDER ARTHROSCOPY         Social History   Substance Use Topics    Smoking status: Never Smoker    Smokeless tobacco: Not on file    Alcohol use No        Family History   Problem Relation Age of Onset    Cancer Father     Inflammatory Bowel Dz Mother     Heart Disease Brother     Diabetes Brother      Allergies   Allergen Reactions    Codeine Unknown (comments)    Pcn [Penicillins] Unknown (comments)    Tetanus Vaccines And Toxoid Unknown (comments)    Vibramycin [Doxycycline Calcium] Unknown (comments)        Prior to Admission medications    Medication Sig Start Date End Date Taking? Authorizing Provider   levETIRAcetam (KEPPRA) 500 mg tablet TAKE ONE TABLET BY MOUTH TWICE DAILY. PATIENT TO KEEP 6/15/16 FOR FURTHER FILLS 6/15/16   Henok Gtz MD   AMITRIPTYLINE HCL (AMITRIPTYLINE PO) Take  by mouth. Alternate nightly 10 mg & 15 mg    Historical Provider   phenazopyridine (AZO-STANDARD) 95 mg tab Take  by mouth. Historical Provider   hydrochlorothiazide (HYDRODIURIL) 25 mg tablet Take 25 mg by mouth as needed. Historical Provider   ferrous sulfate (IRON) 325 mg (65 mg iron) tablet Take  by mouth Daily (before breakfast). Historical Provider   carvedilol (COREG) 12.5 mg tablet Take 1 Tab by mouth two (2) times daily (with meals). 10/3/12   Meredith Zepeda MD   losartan (COZAAR) 50 mg tablet Take  by mouth daily. Historical Provider   dorzolamide (TRUSOPT) 2 % ophthalmic solution Administer 2 Drops to both eyes two (2) times a day. Historical Provider   bimatoprost (LUMIGAN) 0.03 % ophthalmic drops Administer 1 Drop to both eyes every evening. Historical Provider   warfarin (COUMADIN) 2.5 mg tablet Take 5 mg by mouth daily (with dinner). Dr Cortés Sue to be new cardiologist  Indications: Afib    Historical Provider   amiodarone (CORDARONE) 200 mg tablet Take 200 mg by mouth every other day.     Historical Provider       REVIEW OF SYSTEMS:       Total of 12 systems reviewed as follows:       POSITIVE= underlined text  Negative = text not underlined  General:  fever, chills, sweats, generalized weakness, weight loss/gain,      loss of appetite   Eyes:    blurred vision, eye pain, loss of vision, double vision  ENT:    rhinorrhea, pharyngitis   Respiratory:   cough, sputum production, SOB, SANTOS, wheezing, pleuritic pain   Cardiology:   chest pain, palpitations yesterday, orthopnea, PND, edema, syncope   Gastrointestinal:  abdominal pain , Nausea , diarrhea, dysphagia, constipation, bleeding   Genitourinary:  frequency, urgency, dysuria, hematuria, incontinence   Muskuloskeletal :  arthralgia, myalgia, low back pain s/p fall at home  Hematology:  easy bruising, nose or gum bleeding, lymphadenopathy   Dermatological: rash, ulceration, pruritis, color change / jaundice  Endocrine:   hot flashes or polydipsia   Neurological:  headache, dizziness, confusion, focal weakness, paresthesia,     Speech difficulties, memory loss, gait difficulty  Psychological: Feelings of anxiety, depression, agitation    Objective:   VITALS:    Visit Vitals    /63    Pulse 69    Temp 97.5 °F (36.4 °C)    Resp 21    Ht 5' 4\" (1.626 m)    Wt 68 kg (150 lb)    SpO2 96%    BMI 25.75 kg/m2       PHYSICAL EXAM:    General:    Alert, cooperative, no distress, appears stated age. HEENT: Atraumatic, anicteric sclerae, pink conjunctivae     No oral ulcers, mucosa moist, throat clear, dentition fair  Neck:  Supple, symmetrical,  thyroid: non tender  Lungs:   Bibasilar Crackles noted +  Chest wall:  No tenderness  No Accessory muscle use. Heart:   Regular  rhythm,  No  murmur   No edema  Abdomen:   Soft, non-tender. Not distended. Bowel sounds normal  Back/Extremities: No cyanosis. No clubbing,  , Lower back tenderness noted +    Skin turgor normal, Capillary refill normal, Radial dial pulse 2+  Skin:     Not pale. Not Jaundiced  No rashes   Psych:  Good insight. Not depressed. Not anxious or agitated. Neurologic: EOMs intact. No facial asymmetry. No aphasia or slurred speech. Symmetrical strength, Sensation grossly intact.  Alert and oriented X 4.     _______________________________________________________________________  Care Plan discussed with:    Comments   Patient x    Family  x Daughter at bedside in ER   RN x    Care Manager Consultant:      _______________________________________________________________________  Expected  Disposition:   Home with Family x   HH/PT/OT/RN x   SNF/LTC ? LOW    ________________________________________________________________________  TOTAL TIME:  72 Minutes    Critical Care Provided     Minutes non procedure based      Comments    x Reviewed previous records   >50% of visit spent in counseling and coordination of care x Discussion with patient and/or family and questions answered       ________________________________________________________________________  Signed: Vin August MD    Procedures: see electronic medical records for all procedures/Xrays and details which were not copied into this note but were reviewed prior to creation of Plan.     LAB DATA REVIEWED:    Recent Results (from the past 24 hour(s))   EKG, 12 LEAD, INITIAL    Collection Time: 06/01/17  8:16 AM   Result Value Ref Range    Ventricular Rate 69 BPM    Atrial Rate 69 BPM    P-R Interval 190 ms    QRS Duration 86 ms    Q-T Interval 438 ms    QTC Calculation (Bezet) 469 ms    Calculated P Axis 43 degrees    Calculated R Axis 28 degrees    Calculated T Axis 49 degrees    Diagnosis       Normal sinus rhythm  Minimal voltage criteria for LVH, may be normal variant  When compared with ECG of 26-OCT-2016 20:54,  WV interval has decreased  Confirmed by Mary Gupta ([id number]) on 6/1/2017 3:98:69 AM     METABOLIC PANEL, COMPREHENSIVE    Collection Time: 06/01/17  9:57 AM   Result Value Ref Range    Sodium 124 (L) 136 - 145 mmol/L    Potassium 3.7 3.5 - 5.1 mmol/L    Chloride 89 (L) 97 - 108 mmol/L    CO2 26 21 - 32 mmol/L    Anion gap 9 5 - 15 mmol/L    Glucose 122 (H) 65 - 100 mg/dL    BUN 18 6 - 20 MG/DL    Creatinine 0.77 0.55 - 1.02 MG/DL    BUN/Creatinine ratio 23 (H) 12 - 20      GFR est AA >60 >60 ml/min/1.73m2    GFR est non-AA >60 >60 ml/min/1.73m2    Calcium 8.6 8.5 - 10.1 MG/DL    Bilirubin, total 0.9 0.2 - 1.0 MG/DL    ALT (SGPT) 18 12 - 78 U/L    AST (SGOT) 16 15 - 37 U/L    Alk. phosphatase 83 45 - 117 U/L    Protein, total 7.6 6.4 - 8.2 g/dL    Albumin 3.3 (L) 3.5 - 5.0 g/dL    Globulin 4.3 (H) 2.0 - 4.0 g/dL    A-G Ratio 0.8 (L) 1.1 - 2.2     CK W/ CKMB & INDEX    Collection Time: 06/01/17  9:57 AM   Result Value Ref Range    CK 46 26 - 192 U/L    CK - MB 1.8 <3.6 NG/ML    CK-MB Index 3.9 (H) 0 - 2.5     CBC WITH AUTOMATED DIFF    Collection Time: 06/01/17  9:57 AM   Result Value Ref Range    WBC 9.2 3.6 - 11.0 K/uL    RBC 3.89 3.80 - 5.20 M/uL    HGB 11.8 11.5 - 16.0 g/dL    HCT 34.5 (L) 35.0 - 47.0 %    MCV 88.7 80.0 - 99.0 FL    MCH 30.3 26.0 - 34.0 PG    MCHC 34.2 30.0 - 36.5 g/dL    RDW 13.3 11.5 - 14.5 %    PLATELET 241 (L) 363 - 400 K/uL    NEUTROPHILS 79 (H) 32 - 75 %    LYMPHOCYTES 13 12 - 49 %    MONOCYTES 8 5 - 13 %    EOSINOPHILS 0 0 - 7 %    BASOPHILS 0 0 - 1 %    ABS. NEUTROPHILS 7.2 1.8 - 8.0 K/UL    ABS. LYMPHOCYTES 1.2 0.8 - 3.5 K/UL    ABS. MONOCYTES 0.7 0.0 - 1.0 K/UL    ABS. EOSINOPHILS 0.0 0.0 - 0.4 K/UL    ABS.  BASOPHILS 0.0 0.0 - 0.1 K/UL   TROPONIN I    Collection Time: 06/01/17  9:57 AM   Result Value Ref Range    Troponin-I, Qt. <0.04 <0.05 ng/mL   MAGNESIUM    Collection Time: 06/01/17  9:57 AM   Result Value Ref Range    Magnesium 2.4 1.6 - 2.4 mg/dL   PROTHROMBIN TIME + INR    Collection Time: 06/01/17  9:57 AM   Result Value Ref Range    INR 1.7 (H) 0.9 - 1.1      Prothrombin time 17.0 (H) 9.0 - 11.1 sec   PRO-BNP    Collection Time: 06/01/17  9:57 AM   Result Value Ref Range    NT pro-BNP 5151 (H) 0 - 450 PG/ML   URINALYSIS W/ REFLEX CULTURE    Collection Time: 06/01/17  9:58 AM   Result Value Ref Range    Color DARK YELLOW      Appearance TURBID (A) CLEAR      Specific gravity 1.023 1.003 - 1.030      pH (UA) 6.0 5.0 - 8.0      Protein 30 (A) NEG mg/dL    Glucose NEGATIVE  NEG mg/dL    Ketone TRACE (A) NEG mg/dL    Bilirubin NEGATIVE  NEG      Blood SMALL (A) NEG      Urobilinogen 1.0 0.2 - 1.0 EU/dL    Nitrites NEGATIVE  NEG      Leukocyte Esterase LARGE (A) NEG      WBC >100 (H) 0 - 4 /hpf    RBC 5-10 0 - 5 /hpf    Epithelial cells FEW FEW /lpf    Bacteria 3+ (A) NEG /hpf    UA:UC IF INDICATED URINE CULTURE ORDERED (A) CNI      Other: Renal Epithelial cells Present

## 2017-06-02 PROBLEM — M54.9 BACK PAIN: Status: ACTIVE | Noted: 2017-06-02

## 2017-06-02 PROBLEM — M25.551 RIGHT HIP PAIN: Status: ACTIVE | Noted: 2017-06-02

## 2017-06-02 PROBLEM — R53.1 WEAKNESS: Status: ACTIVE | Noted: 2017-06-02
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
## 2017-06-02 PROBLEM — R53.81 DEBILITY: Status: ACTIVE | Noted: 2017-06-02

## 2017-06-02 LAB
ANION GAP BLD CALC-SCNC: 4 MMOL/L (ref 5–15)
ANION GAP BLD CALC-SCNC: 6 MMOL/L (ref 5–15)
BUN SERPL-MCNC: 21 MG/DL (ref 6–20)
BUN SERPL-MCNC: 21 MG/DL (ref 6–20)
BUN/CREAT SERPL: 26 (ref 12–20)
BUN/CREAT SERPL: 29 (ref 12–20)
CALCIUM SERPL-MCNC: 8.1 MG/DL (ref 8.5–10.1)
CALCIUM SERPL-MCNC: 8.2 MG/DL (ref 8.5–10.1)
CHLORIDE SERPL-SCNC: 89 MMOL/L (ref 97–108)
CHLORIDE SERPL-SCNC: 90 MMOL/L (ref 97–108)
CHLORIDE UR-SCNC: 20 MMOL/L
CO2 SERPL-SCNC: 27 MMOL/L (ref 21–32)
CO2 SERPL-SCNC: 32 MMOL/L (ref 21–32)
CORTIS SERPL-MCNC: 15.9 UG/DL
CREAT SERPL-MCNC: 0.72 MG/DL (ref 0.55–1.02)
CREAT SERPL-MCNC: 0.82 MG/DL (ref 0.55–1.02)
CREAT UR-MCNC: 79.6 MG/DL
ERYTHROCYTE [DISTWIDTH] IN BLOOD BY AUTOMATED COUNT: 13.3 % (ref 11.5–14.5)
GLUCOSE BLD STRIP.AUTO-MCNC: 121 MG/DL (ref 65–100)
GLUCOSE SERPL-MCNC: 105 MG/DL (ref 65–100)
GLUCOSE SERPL-MCNC: 98 MG/DL (ref 65–100)
HCT VFR BLD AUTO: 31.7 % (ref 35–47)
HGB BLD-MCNC: 10.9 G/DL (ref 11.5–16)
INR PPP: 2.1 (ref 0.9–1.1)
MCH RBC QN AUTO: 30.2 PG (ref 26–34)
MCHC RBC AUTO-ENTMCNC: 34.4 G/DL (ref 30–36.5)
MCV RBC AUTO: 87.8 FL (ref 80–99)
OSMOLALITY SERPL: 272 MOSM/KG H2O
OSMOLALITY UR: 388 MOSM/KG H2O
PLATELET # BLD AUTO: 142 K/UL (ref 150–400)
POTASSIUM SERPL-SCNC: 3.6 MMOL/L (ref 3.5–5.1)
POTASSIUM SERPL-SCNC: 4.6 MMOL/L (ref 3.5–5.1)
POTASSIUM UR-SCNC: 26 MMOL/L
PROTHROMBIN TIME: 21.8 SEC (ref 9–11.1)
RBC # BLD AUTO: 3.61 M/UL (ref 3.8–5.2)
SERVICE CMNT-IMP: ABNORMAL
SODIUM SERPL-SCNC: 123 MMOL/L (ref 136–145)
SODIUM SERPL-SCNC: 125 MMOL/L (ref 136–145)
SODIUM UR-SCNC: 13 MMOL/L
TSH SERPL DL<=0.05 MIU/L-ACNC: 6.44 UIU/ML (ref 0.36–3.74)
URATE SERPL-MCNC: 2.9 MG/DL (ref 2.6–6)
WBC # BLD AUTO: 6.4 K/UL (ref 3.6–11)

## 2017-06-02 PROCEDURE — 74011250637 HC RX REV CODE- 250/637: Performed by: INTERNAL MEDICINE

## 2017-06-02 PROCEDURE — 65270000029 HC RM PRIVATE

## 2017-06-02 PROCEDURE — 84300 ASSAY OF URINE SODIUM: CPT | Performed by: INTERNAL MEDICINE

## 2017-06-02 PROCEDURE — 74011250637 HC RX REV CODE- 250/637: Performed by: PHYSICAL MEDICINE & REHABILITATION

## 2017-06-02 PROCEDURE — 97116 GAIT TRAINING THERAPY: CPT

## 2017-06-02 PROCEDURE — G8988 SELF CARE GOAL STATUS: HCPCS | Performed by: OCCUPATIONAL THERAPIST

## 2017-06-02 PROCEDURE — 82436 ASSAY OF URINE CHLORIDE: CPT | Performed by: INTERNAL MEDICINE

## 2017-06-02 PROCEDURE — 83930 ASSAY OF BLOOD OSMOLALITY: CPT | Performed by: INTERNAL MEDICINE

## 2017-06-02 PROCEDURE — 74011000250 HC RX REV CODE- 250: Performed by: INTERNAL MEDICINE

## 2017-06-02 PROCEDURE — 80048 BASIC METABOLIC PNL TOTAL CA: CPT | Performed by: INTERNAL MEDICINE

## 2017-06-02 PROCEDURE — G8987 SELF CARE CURRENT STATUS: HCPCS | Performed by: OCCUPATIONAL THERAPIST

## 2017-06-02 PROCEDURE — 74011250636 HC RX REV CODE- 250/636: Performed by: INTERNAL MEDICINE

## 2017-06-02 PROCEDURE — 82962 GLUCOSE BLOOD TEST: CPT

## 2017-06-02 PROCEDURE — 84550 ASSAY OF BLOOD/URIC ACID: CPT | Performed by: INTERNAL MEDICINE

## 2017-06-02 PROCEDURE — 97530 THERAPEUTIC ACTIVITIES: CPT | Performed by: OCCUPATIONAL THERAPIST

## 2017-06-02 PROCEDURE — 97165 OT EVAL LOW COMPLEX 30 MIN: CPT | Performed by: OCCUPATIONAL THERAPIST

## 2017-06-02 PROCEDURE — 77010033678 HC OXYGEN DAILY

## 2017-06-02 PROCEDURE — 82533 TOTAL CORTISOL: CPT | Performed by: INTERNAL MEDICINE

## 2017-06-02 PROCEDURE — 82570 ASSAY OF URINE CREATININE: CPT | Performed by: INTERNAL MEDICINE

## 2017-06-02 PROCEDURE — 85610 PROTHROMBIN TIME: CPT | Performed by: INTERNAL MEDICINE

## 2017-06-02 PROCEDURE — 83935 ASSAY OF URINE OSMOLALITY: CPT | Performed by: INTERNAL MEDICINE

## 2017-06-02 PROCEDURE — 36415 COLL VENOUS BLD VENIPUNCTURE: CPT | Performed by: INTERNAL MEDICINE

## 2017-06-02 PROCEDURE — 76450000000

## 2017-06-02 PROCEDURE — 84443 ASSAY THYROID STIM HORMONE: CPT | Performed by: INTERNAL MEDICINE

## 2017-06-02 PROCEDURE — 84133 ASSAY OF URINE POTASSIUM: CPT | Performed by: INTERNAL MEDICINE

## 2017-06-02 PROCEDURE — 85027 COMPLETE CBC AUTOMATED: CPT | Performed by: INTERNAL MEDICINE

## 2017-06-02 RX ORDER — OXYCODONE AND ACETAMINOPHEN 5; 325 MG/1; MG/1
1 TABLET ORAL
Status: DISCONTINUED | OUTPATIENT
Start: 2017-06-02 | End: 2017-06-04

## 2017-06-02 RX ORDER — BISMUTH SUBSALICYLATE 262 MG
1 TABLET,CHEWABLE ORAL 2 TIMES DAILY
COMMUNITY
End: 2017-10-15

## 2017-06-02 RX ORDER — AMIODARONE HYDROCHLORIDE 200 MG/1
200 TABLET ORAL 2 TIMES DAILY
Status: DISCONTINUED | OUTPATIENT
Start: 2017-06-02 | End: 2017-06-03

## 2017-06-02 RX ORDER — POTASSIUM CHLORIDE 1.5 G/1.77G
40 POWDER, FOR SOLUTION ORAL
Status: COMPLETED | OUTPATIENT
Start: 2017-06-02 | End: 2017-06-02

## 2017-06-02 RX ORDER — NITROFURANTOIN 25; 75 MG/1; MG/1
100 CAPSULE ORAL 2 TIMES DAILY
Status: DISCONTINUED | OUTPATIENT
Start: 2017-06-02 | End: 2017-06-05 | Stop reason: HOSPADM

## 2017-06-02 RX ORDER — AMOXICILLIN 250 MG
1 CAPSULE ORAL 2 TIMES DAILY
Status: DISCONTINUED | OUTPATIENT
Start: 2017-06-02 | End: 2017-06-05 | Stop reason: HOSPADM

## 2017-06-02 RX ORDER — SODIUM CHLORIDE 9 MG/ML
75 INJECTION, SOLUTION INTRAVENOUS CONTINUOUS
Status: DISCONTINUED | OUTPATIENT
Start: 2017-06-02 | End: 2017-06-03

## 2017-06-02 RX ORDER — VALSARTAN 80 MG/1
80 TABLET ORAL DAILY
Status: DISCONTINUED | OUTPATIENT
Start: 2017-06-02 | End: 2017-06-05 | Stop reason: HOSPADM

## 2017-06-02 RX ADMIN — LATANOPROST: 50 SOLUTION OPHTHALMIC at 18:59

## 2017-06-02 RX ADMIN — DOCUSATE SODIUM AND SENNOSIDES 1 TABLET: 8.6; 5 TABLET, FILM COATED ORAL at 09:35

## 2017-06-02 RX ADMIN — LACTULOSE 30 G: 20 SOLUTION ORAL at 13:29

## 2017-06-02 RX ADMIN — SODIUM CHLORIDE 75 ML/HR: 900 INJECTION, SOLUTION INTRAVENOUS at 17:22

## 2017-06-02 RX ADMIN — AMITRIPTYLINE HYDROCHLORIDE 10 MG: 10 TABLET, FILM COATED ORAL at 23:09

## 2017-06-02 RX ADMIN — CARVEDILOL 12.5 MG: 12.5 TABLET, FILM COATED ORAL at 17:23

## 2017-06-02 RX ADMIN — LEVETIRACETAM 500 MG: 500 TABLET, FILM COATED ORAL at 09:35

## 2017-06-02 RX ADMIN — Medication 10 ML: at 13:29

## 2017-06-02 RX ADMIN — SENNOSIDES AND DOCUSATE SODIUM 1 TABLET: 8.6; 5 TABLET ORAL at 17:24

## 2017-06-02 RX ADMIN — OXYCODONE HYDROCHLORIDE AND ACETAMINOPHEN 1 TABLET: 5; 325 TABLET ORAL at 11:54

## 2017-06-02 RX ADMIN — AMIODARONE HYDROCHLORIDE 200 MG: 200 TABLET ORAL at 09:36

## 2017-06-02 RX ADMIN — POLYETHYLENE GLYCOL 3350 17 G: 17 POWDER, FOR SOLUTION ORAL at 09:34

## 2017-06-02 RX ADMIN — ONDANSETRON HYDROCHLORIDE 4 MG: 2 INJECTION, SOLUTION INTRAMUSCULAR; INTRAVENOUS at 19:02

## 2017-06-02 RX ADMIN — WARFARIN SODIUM 3 MG: 2 TABLET ORAL at 17:22

## 2017-06-02 RX ADMIN — Medication 10 ML: at 06:43

## 2017-06-02 RX ADMIN — POTASSIUM CHLORIDE 40 MEQ: 1.5 POWDER, FOR SOLUTION ORAL at 11:49

## 2017-06-02 RX ADMIN — OXYCODONE HYDROCHLORIDE AND ACETAMINOPHEN 1 TABLET: 5; 325 TABLET ORAL at 23:09

## 2017-06-02 RX ADMIN — NITROFURANTOIN MONOHYDRATE/MACROCRYSTALLINE 100 MG: 25; 75 CAPSULE ORAL at 17:23

## 2017-06-02 RX ADMIN — LEVETIRACETAM 500 MG: 500 TABLET, FILM COATED ORAL at 17:23

## 2017-06-02 RX ADMIN — OXYCODONE HYDROCHLORIDE AND ACETAMINOPHEN 1 TABLET: 5; 325 TABLET ORAL at 03:21

## 2017-06-02 RX ADMIN — Medication 10 ML: at 23:09

## 2017-06-02 RX ADMIN — OXYCODONE HYDROCHLORIDE AND ACETAMINOPHEN 1 TABLET: 5; 325 TABLET ORAL at 19:02

## 2017-06-02 RX ADMIN — CARVEDILOL 12.5 MG: 12.5 TABLET, FILM COATED ORAL at 09:36

## 2017-06-02 RX ADMIN — AMIODARONE HYDROCHLORIDE 200 MG: 200 TABLET ORAL at 17:29

## 2017-06-02 RX ADMIN — VALSARTAN 80 MG: 80 TABLET, FILM COATED ORAL at 11:48

## 2017-06-02 NOTE — PROGRESS NOTES
Na slightly better  125  Fluid restriction 3318-3997 ml per day    Waiting for urine studies. No need for 3 % nacl   If urine osmo low. Give nacl  If urine osmo high give tolvaptan.     Shawna Hogan MD

## 2017-06-02 NOTE — CARDIO/PULMONARY
Cardiopulmonary Rehab Nursing Entry:    Chart reviewed secondary to in-basket referral.  Pt 81 yo admitted with acute respiratory failure, probable UTI, a-fib with long history of episodic atrial fibrillation. Per cardiologist entry small pleural effusion on CT not appearing to be in heart failure. CHF teaching deferred.

## 2017-06-02 NOTE — PROGRESS NOTES
Pharmacy Initial Dosing of Warfarin    Pharmacy consulted to dose warfarin for this  80 y.o. female ordered warfarin for AF    Goal INR (2-3, 2.5-3.5, 3-4): 2-3    Concurrent anticoagulants & Platelet Inhibitors: None  Home Warfarin Dose: 5 mg PO daily  Major Interacting Medications (Dose/Frequency): Amiodarone (home med) can increase INR    INR (0.9-1.1) > 5 discuss with MD:   Recent Labs      06/02/17   1057  06/02/17   0324  06/01/17   0957   INR   --   2.1*  1.7*   HGB   --   10.9*  11.8   PLT   --   142*  139*   ALB   --    --   3.3*   SGOT   --    --   16   ALT   --    --   18   TBILI   --    --   0.9   TSH  6.44*   --    --      Warfarin Sensitivity & Sensitivity Factors Present: Normal sensitivity       Impression/Plan: INR up to 2.1 from 1.7 after 5mg last night. Amiodarone dose has also been increased by cardiology. Will give 3mg tonight. Pharmacy will follow daily and adjust the dose as appropriate.     Thanks for the consult    Mendy Silva, JUAND

## 2017-06-02 NOTE — PROGRESS NOTES
Dr. Dev Adams on call for dr. Vincenzo Dumont notified of urine osmolality of 388 and urine sodium of 13. Call back number [phone] given. No new orders given at this time.

## 2017-06-02 NOTE — PROGRESS NOTES
Dr. Jaymie Borjas notified per earlier verbal request for new lab results of blood and urine. No new orders at this time. MD stated he would like to be notified of urine osmolality results. If results have not been processed by end of shift, will pass off MD's wishes for notification to night shift RN.

## 2017-06-02 NOTE — PROGRESS NOTES
Dual skin assessment performed with Yina Ash. Sutures behind R knee, 2 bruises on sacrum noted. Scattered bruises on upper extremities.

## 2017-06-02 NOTE — PROGRESS NOTES
Occupational Therapy Goals  Initiated 6/2/2017  1. Patient will perform grooming standing at sink with independence within 7 day(s). 2.  Patient will perform tub transfer with supervision/set-up within 7 day(s). 3.  Patient will perform lower body dressing with supervision/set-up within 7 day(s). 4.  Patient will perform toilet transfers with modified independence within 7 day(s). 5.  Patient will perform all aspects of toileting with modified independence within 7 day(s). Occupational Therapy EVALUATION  Patient: Akbar De Leon (89 y.o. female)  Date: 6/2/2017  Primary Diagnosis: Compression fracture of L1 lumbar vertebra (HCC)  Fall at home  UTI (urinary tract infection)  Acute respiratory failure with hypoxia (HCC)  Constipation        Precautions:  Fall, Back (L1 fracture)    ASSESSMENT :  Based on the objective data described below, the patient presents with lumbar pain, GW, decreased activity tolerance, decreased safety awareness and decreased balance which is impairing her functional independence. She is below her independent to mod I baseline, now supervision to mod A for ADLs, and is CGA for transfers and ambulation with her rollator. Patient will benefit from skilled intervention to address the above impairments.   Patients rehabilitation potential is considered to be Good  Factors which may influence rehabilitation potential include:   []             None noted  []             Mental ability/status  []             Medical condition  []             Home/family situation and support systems  [x]             Safety awareness  []             Pain tolerance/management  []             Other:      PLAN :  Recommendations and Planned Interventions:  [x]               Self Care Training                  []        Therapeutic Activities  [x]               Functional Mobility Training    []        Cognitive Retraining  []               Therapeutic Exercises           [x]        Endurance Activities  [x] Balance Training                   []        Neuromuscular Re-Education  []               Visual/Perceptual Training     [x]   Home Safety Training  [x]               Patient Education                 [x]        Family Training/Education  [x]               Other (comment):    Frequency/Duration: Patient will be followed by occupational therapy 4 times a week to address goals. Discharge Recommendations: Home Health OT, PT and supervision  Further Equipment Recommendations for Discharge: none       OBJECTIVE DATA SUMMARY:     Past Medical History:   Diagnosis Date    Alopecia     Arthritis     Benign essential HTN     Benign neoplasm of vulva     Bronchitis     CAD (coronary artery disease)     [facility] ([facility] [address]     Cholelithiases     Colon cancer ([facility] [address]     Constipation     Dyspareunia     Glaucoma     History of mixed connective tissue disease     Hx of viral pericarditis     Hyperactivity of bladder     Hypercholesterolemia     Insomnia     Macular degeneration     Neck pain     Palpitations     Psoriasis     PVT (paroxysmal ventricular tachycardia) (HCC)     RA (rheumatoid arthritis) (HCC)     Rectocele     Renal cyst     Seizure disorder (HCC)     Sinusitis     Sjogren's disease ([facility] [address]     Thrombophlebitis     TIA (transient ischemic attack)     Vaginal vault prolapse, posthysterectomy     Varicose veins      Past Surgical History:   Procedure Laterality Date    HX BREAST LUMPECTOMY      left breast    HX CATARACT REMOVAL      HX COLECTOMY  1955    partial    HX HEMORRHOIDECTOMY      HX HYSTERECTOMY      HX SHOULDER ARTHROSCOPY       Prior Level of Function/Home Situation: independent to mod I for ADLs and functional mobility, ambulating with rollator.   Expanded or extensive additional review of patient history:     Home Situation  Home Environment: Private residence  # Steps to Enter: 5  Rails to Enter: Yes  Hand Rails : Bilateral  One/Two Story Residence: Two story, live on 1st floor  Living Alone: No  Support Systems: Child(jaiden)  Patient Expects to be Discharged to[de-identified] Private residence  Current DME Used/Available at Home: Lunette Leghorn, rollator, Raised toilet seat, Grab bars  Tub or Shower Type: Tub/shower  [x]  Right hand dominant   []  Left hand dominant  Cognitive/Behavioral Status:  Neurologic State: Alert  Orientation Level: Oriented X4  Cognition: Follows commands        Safety/Judgement: Decreased awareness of need for safety    Vision/Perceptual:    Impaired vision 2/2 macular degeneration. Range of Motion:  AROM: Generally decreased, functional     Strength:  Strength: Generally decreased, functional  Coordination:  Coordination: Within functional limits          Tone & Sensation:  Tone: Normal  Balance:  Sitting: Intact  Standing: Impaired  Standing - Static: Good (with rollator)  Standing - Dynamic : Fair  Functional Mobility and Transfers for ADLs:        Transfers:  Sit to Stand: Contact guard assistance; Additional time     Bed to Chair: Contact guard assistance          Toilet Transfer : Contact guard assistance              ADL Assessment:  Feeding: Independent    Oral Facial Hygiene/Grooming: Supervision    Bathing: Minimum assistance    Upper Body Dressing: Supervision;Setup    Lower Body Dressing: Moderate assistance    Toileting: Contact guard assistance                Functional Measure:  Barthel Index:    Bathin  Bladder: 5  Bowels: 10  Groomin  Dressin  Feeding: 10  Mobility: 0  Stairs: 0  Toilet Use: 5  Transfer (Bed to Chair and Back): 10  Total: 50       Barthel and G-code impairment scale:  Percentage of impairment CH  0% CI  1-19% CJ  20-39% CK  40-59% CL  60-79% CM  80-99% CN  100%   Barthel Score 0-100 100 99-80 79-60 59-40 20-39 1-19   0   Barthel Score 0-20 20 17-19 13-16 9-12 5-8 1-4 0      The Barthel ADL Index: Guidelines  1. The index should be used as a record of what a patient does, not as a record of what a patient could do.   2. The main aim is to establish degree of independence from any help, physical or verbal, however minor and for whatever reason. 3. The need for supervision renders the patient not independent. 4. A patient's performance should be established using the best available evidence. Asking the patient, friends/relatives and nurses are the usual sources, but direct observation and common sense are also important. However direct testing is not needed. 5. Usually the patient's performance over the preceding 24-48 hours is important, but occasionally longer periods will be relevant. 6. Middle categories imply that the patient supplies over 50 per cent of the effort. 7. Use of aids to be independent is allowed. Daljit Sotelo., Barthel, DCarsonW. (0820). Functional evaluation: the Barthel Index. [facility] [address] [facility] (14)2. KASHIF Castro, Andrea Jaime., Debbie Piña., [place], [address] [place] [address] (1999). Measuring the change indisability after inpatient rehabilitation; comparison of the responsiveness of the Barthel Index and Functional [place] Measure. Journal of Neurology, Neurosurgery, and Psychiatry, 66(4), 498-170. Jhon Roberts, N.J.A, CARISSA Pascual, & Regina Hankins MCRYS. (2004.) Assessment of post-stroke quality of life in cost-effectiveness studies: The usefulness of the Barthel Index and the EuroQoL-5D. Quality of Life Research, 13, 427-43       In compliance with CMSs Claims Based Outcome Reporting, the following G-code set was chosen for this patient based on their primary functional limitation being treated: The outcome measure chosen to determine the severity of the functional limitation was the Barthel Index with a score of 50/100 which was correlated with the impairment scale. ?  Self Care:     - CURRENT STATUS: CK - 40%-59% impaired, limited or restricted    - GOAL STATUS:  CJ - 20%-39% impaired, limited or restricted    - D/C STATUS:  ---------------To be determined--------------- ADL Intervention and task modifications:  Initiated bathroom mobility, transfer, toileting, standing grooming and safety training     Pain:  Pain Scale 1: Numeric (0 - 10)  Pain Intensity 1: 3  Pain Location 1: Back  Pain Orientation 1: Posterior  Pain Description 1: Aching  Pain Intervention(s) 1: Repositioned; Rest    After treatment:   [x] Patient left in no apparent distress sitting up in chair  [] Patient left in no apparent distress in bed  [x] Call bell left within reach  [x] Nursing notified  [] Caregiver present  [] Bed alarm activated    COMMUNICATION/EDUCATION:   The patients plan of care was discussed with: Physical Therapist.  [x] Home safety education was provided and the patient/caregiver indicated understanding. [x] Patient/family have participated as able in goal setting and plan of care. [x] Patient/family agree to work toward stated goals and plan of care. [] Patient understands intent and goals of therapy, but is neutral about his/her participation. [] Patient is unable to participate in goal setting and plan of care. This patients plan of care is appropriate for delegation to [facility].     Thank you for this referral.  Shai Bose, OTR/L  Time Calculation: 19 mins

## 2017-06-02 NOTE — ROUTINE PROCESS
Bedside and Verbal shift change report given to [employer] (oncoming nurse) by Yohannes Mclaughlin RN (offgoing nurse). Report included the following information SBAR, Kardex, Intake/Output, MAR and Recent Results.

## 2017-06-02 NOTE — CONSULTS
[address]    [facility]       Name:  Trung Wing   MR#:  [id number]   :  1924   Account #:  [de-identified]    Date of Consultation:  2017   Date of Adm:  2017       DATE OF CONSULTATION:     HISTORY OF PRESENT ILLNESS: The patient is a 20-year-old lady   seen in reference to recurrent atrial fibrillation and \"congestive heart   failure. \" She presented to the emergency room with atrial fibrillation   with rapid ventricular response. She had no chest pain. She has had   dyspnea on exertion. She has not felt well. She has had some nausea   particularly when she gets up. She has a long history of episodic atrial fibrillation and has been on   anticoagulation for this. She did fall and had a laceration in the right retropatellar area. This   was sutured. She has not fainted. SOCIAL HISTORY: She is cared for by her daughter who is very   attentive. PAST MEDICAL HISTORY: Positive for atrial fibrillation. Positive for   seizure disorder . Positive for macular degeneration. Positive for   hypertension. There is some history of lupus. FAMILY HISTORY: Mother  at 58. Father  of cancer at 54. One brother  cardiac causes at 80. One still living. HOME MEDICATIONS: I believe include the following      1. Levetiracetam 500 mg twice daily for seizures. 2. Carvedilol 25 mg half tablet twice daily. 3. Amiodarone 200 mg every other day. 4. Stool softener. 5. MaxiVision. 6. Losartan 50 mg daily for hypertension   7. Coumadin 2.5 mg daily with most recent INR 2.1.   8. Tylenol. REVIEW OF SYSTEMS: She has some dyspnea on exertion. She is   relatively sedentary. In general she has felt well without fever, loss of   appetite until recent illness. No genitourinary complaints, although   there has been some question of urinary tract infection.  No difficulty   with heartburn, indigestion, blood in the stools or black stools. PHYSICAL EXAMINATION   VITAL SIGNS: Weight in September 2016 was 148. HEAD, EARS, EYES, NOSE AND THROAT: No neck vein distention. No bruits. No adenopathy. LUNGS: Clear. HEART: Reveals regular rhythm. ABDOMEN: Nontender. EXTREMITIES: No edema. EKG: Regular sinus rhythm. Nonspecific ST changes (report and no   real change since 09/27/2016.)    PROBLEMS    1. Atrial fibrillation with rapid ventricular response - now in sinus   rhythm. 2. \"Congestive heart failure. \"   3. History of hypertension. 4. History of seizure disorder - remote history. DISCUSSION AND RECOMMENDATIONS: This lady has been   adequately anticoagulated. At this point, I would check her for   orthostatic changes. She does have small pleural effusions by CT of   the abdomen. This may be related to a prior history of heart failure. She does not appear to be in heart failure at this time. At this point, I would increase her amiodarone to 200 mg twice daily to   try to control her atrial fibrillation. I do not believe she is in heart failure   and do not believe that additional diuretics are needed. I would also   check her orthostatics to see if we can find out why she becomes   nauseated when she stands up. If falling becomes a recurrent issue, then certainly Coumadin will need   to be addressed. Thank you for asking me to see this pleasant lady.         MD FARA Mckinney / MS   D:  06/02/2017   10:57   T:  06/02/2017   11:18   Job #:  [id number]

## 2017-06-02 NOTE — ROUTINE PROCESS
Bedside and Verbal shift change report given to Lidia (oncoming nurse) by Kori Peguero RN (offgoing nurse). Report included the following information SBAR, Kardex, ED Summary, OR Summary, Procedure Summary, Intake/Output, MAR, Accordion, Recent Results, Med Rec Status and Cardiac Rhythm NSR.

## 2017-06-02 NOTE — PROGRESS NOTES
CM met with patient for CM initial assessment and to discuss discharge planning needs. Confirmed contact information and emergency contact information. Pt daughter was present at pt room at the time of evaluation. Patient stated that she is living with her daughter in a two story house however pt lives in first level has access to bedroom and bathroom. Pt owns a rollator. As per pt daughter Pt is independent with ADLs and IADLS with minimum family support except driving. Pt patient states she has never used HH/SNF services in the past. However they would like to start personal care services through [employer]  at [address] [place] [address] prior to inpatient admission. As per ED PT eval recommended HHPT. Pt daughter will transport pt upon discharge in a private vehicle. CM will continuously assist pt with d/c planning. 11.02 AM  CM spoke with pt daughter and she stated that they would like to use [facility] [phone], Fax [phone] for pt HHPT. CM will send referral to Elizabeth Torres upon [facility] order availability. AQUILINO episode send to pt PCP office [facility] through [facility]. Care Management Interventions  PCP Verified by CM: Yes (pt met PCP in last year. however pt uses urgentcare for  immediate HC needs)  Mode of Transport at Discharge: Self (pt daughter will transport her upon discharge)  Transition of Care Consult (CM Consult): [address]: No  Reason Outside [place]: Patient already serviced by other home care/hospice agency (Pt requested for [employer]. [phone] )  Discharge Durable Medical Equipment:  (pt owns rollator,bed side rails,monitor,fall call bell)  Health Maintenance Reviewed: Yes  Physical Therapy Consult: Yes  Occupational Therapy Consult: Yes  Speech Therapy Consult: No  Current Support Network: Lives with Caregiver (pt lives with daughter in a two story home however pt  lives in first floor.  5 steps at entrance to get in with double rails attached.)  Confirm Follow Up Transport: Family  Plan discussed with Pt/Family/Caregiver: Yes  Freedom of Choice Offered: Yes  Discharge Location  Discharge Placement: Home with home health    Marvin Leach  Ext [phone]

## 2017-06-02 NOTE — CONSULTS
[address]   [address] [place] [address]       Name:  Keira Fairchild   MR#:  [id number]   :  1924   Account #:  [de-identified]    Date of Consultation:  2017   Date of Adm:  2017       REFERRING PHYSICIAN: Dr. Jaye Gallego. REASON FOR CONSULTATION: Hyponatremia. HISTORY OF PRESENT ILLNESS: The patient is a 80-year-old    female with history of hypertension, atrial fibrillation, seizure   disorder, who has been admitted with UTI in atrial fibrillation. The   patient was found to have a low sodium. Sodium level was 124 on   admission, has dropped to 123 today. Her BUN was 21 and creatinine   0.72 with potassium of 3.6. 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    history from family. The patient had a fall on  and she has   been diagnosed with lumbar spine fracture. She has been having a   significant amount of pain. The patient has been using tramadol and   Tylenol for pain. She has not been using any nonsteroidal anti-  inflammatory drugs. She has been on Cozaar for her hypertension   along with HCTZ, although she has been using HCTZ on a p.r.n. basis   as per Kely Hitchcock. The patient has not taken any HCTZ for the last 3   weeks. The patient has been drinking 8 cups of liquid day, although   she is not eating much for the last 6 days. She has constipation, no   bowel movement for the last few days. The patient denies any   vomiting. She has been diagnosed with UTI. She has a history of hip   osteoarthritis and she is having significant pain from that also along   with her back pain. The patient has been seen by Dr. Iram Meyers. She has   fibrillation with RVR and her medication doses have been adjusted per   Dr. Iram Meyers. She is not in any CHF. The patient has been asked having   some nausea along with her constipation.     PAST MEDICAL HISTORY: Arthritis, hypertension, bronchitis,   coronary artery disease, , colon cancer, hyperactive   bladder, macular degeneration, seizure disorder. PAST SURGICAL HISTORY: Left breast lumpectomy, cataract   surgery, partial colectomy, hemorrhoidectomy, hysterectomy, shoulder   arthroscopy. SOCIAL HISTORY: Nonsmoker. No alcohol abuse. Lives with   daughter. She is a . FAMILY HISTORY: Positive for heart disease to the brother, diabetes   in brother, and cancer to father. ALLERGIES:   1. CODEINE   2. TETANUS. 3. PENICILLIN. REVIEW OF SYSTEMS: As per HPI. Complains of poor appetite,   constipation, palpitation, nausea, back pain and hip pain. Denies any headache, dizziness. No chest pain, no shortness of breath with activity. A total of 11   systems reviewed, they are essentially negative. PHYSICAL EXAMINATION   VITAL SIGNS: Temperature 98.1, pulse 72, blood pressure 157/66,   respiratory rate 18/minute. GENERAL: Sitting up in the chair, not in apparent distress, alert,   awake, oriented x3. HEENT: Oral mucosa is dry. NECK: Supple. No palpable mass. EYES: No conjunctival pallor or scleral icterus   NECK: Supple. No mass. LUNGS: Clear to auscultation bilaterally. Decreased air entry at the   base. No wheezing. CARDIAC: Normal S1, S2, irregularly irregular. No rub. ABDOMEN: Soft, nontender. Bowel sounds present. No palpable   mass. EXTREMITIES: No edema. NEUROLOGIC: Nonfocal.   SKIN: No rash. JOINTS: No effusion or tenderness. BACK: Tenderness in the lower part. NEUROLOGICAL: Normal. Speech normal. Nonfocal.   PSYCHOLOGICAL: Normal affect and mood. LABORATORY AND DIAGNOSTIC DATA: Sodium 123, potassium   3.6, BUN 21, creatinine 0.72, calcium 8.1, UA positive for leukocyte   esterase, WBC more than 100, hemoglobin 10. 9. X-ray abdomen and   chest: Pleural effusion. No lung mass. IMPRESSION:   1. Hyponatremia, likely chronic multiple possible etiology SIADH due to   pain and nausea is most likely etiology.  Other possible etiologies:   HCTZ use, although she has not used hydrochlorothiazide for the last   3 months. Other possibility is dehydration from poor p.o. intake for the   last 6 days. Hyponatremia due to keppra is also possible etiology. 2. Atrial fibrillation. 3. Back pain and hip pain. 4. History of hypertension. 5. Hypokalemia. 6. History of seizure disorder on Keppra. 7. UTI. PLAN:   1. Check serum osmolarity, urine osmolarity, urine sodium and   potassium. Depending on her urine osmolarity, we will decide if she   needs any IV fluids or not. If she has very high urine osmolarity   with normal urine sodium, she most likely will have excess ADH and   will give her tolvaptan. Her BUN and creatinine ratio is slightly   elevated, although as per daughter, she has been drinking about 8   cups full liquids today with minimal amount of solid intake. Continue   Rocephin for her UTI. Okay to continue with Cozaar for hypertension. Did not give her any NSAIDs. 2. Hold HCTZ. 3. Repeat BMP now and after 6 hours. 4. Treatment plan discussed with the patient and her daughter, Martha Adhikari.          MD Jennifer Oro / Kirby.Push   D:  06/02/2017   10:02   T:  06/02/2017   10:43   Job #:  [id number]

## 2017-06-02 NOTE — CONSULTS
Salty Ryann     Valdez Whitmore  MGX:[id number]   :1924   -                  [id number]    Pt seen and examined. hypona   Likely due to siadh due to pain/nausea +/- tevin Whitmore, [address] [facility]  [facility]  [facility], [address], [address]  Phone - [phone]         Fax - [phone] [facility] Office  [address] [place] [address]  Phone - [phone]        Fax - [phone]     [email or web address]

## 2017-06-02 NOTE — CONSULTS
Palliative Medicine Consult  [place]: [phone] ([phone])    Patient Name: Mg Mcnally  YOB: 1924    Date of Initial Consult: 06/02/2017  Reason for Consult: pain mgmt  Requesting Provider: Dr. Surendra Dove   Primary Care Physician: Lei Amado MD      SUMMARY:   Mg Mcnally is a 80 y.o. woman with a past history of PAF, PVT, HTN, CAD, h/o colon CA, mixed connective tissue dz, RA, glaucoma, macular degeneration, h/o seizure d/o, h/o TIA, hypercholesterolemia who was admitted on 6/1/2017 from home for generalized weakness and acute hypoxic respiratory failure    Current medical issues leading to Palliative Medicine involvement include: pain mgmt     PALLIATIVE DIAGNOSES:   1. Back pain  2. Constipation  3. H/o falls  4. Weakness  5. Debility     PLAN:   1. Back pain- currently ordered is oxycodone-acetaminophen 5-325 mg tabs #1 PO q6h prn severe pain. This was ordered at [phone] on 06/01/2017. Pt has taken 2 doses (0300, 1100 today). -change oxycodone-acetaminophen 5-325 mg tabs #1 PO q6h prn severe pain to oxycodone-acetaminophen 5-325 mg tabs #1 PO q4h prn moderate or severe pain  -d/c lidocaine patch order given dx of seizure d/o  -start K-pad    2. Constipation- pt reports having chronic constipation  -change senna-docusate #1 PO once daily to senna-docusate #1 PO bid    3. Discussed Rubia pt's bedside RN. Time and input appreciated. 4. Discussed Dr. Andie Luciano, pt's primary medical team attending. Time and input appreciated. 5. Communicated plan of care with: Palliative IDT     GOALS OF CARE / TREATMENT PREFERENCES:   [====Goals of Care====]  GOALS OF CARE:  Patient / health care proxy stated goals: better pain control  Other goals of care not discussed    TREATMENT PREFERENCES:   Code Status: DNR  Pt's code status is DO NOT ATTEMPT RESUSCITATION (DNAR) / ALLOW NATURAL DEATH (AND).     Advance Care Planning:  Advance Care Planning 6/2/2017   Patient's Healthcare Decision Maker is: Verbal statement (Legal Next of Kin remains as decision maker)   Primary Decision Maker Name Nadine Farrell   Primary Decision Maker Phone Number [phone]   Primary Decision Maker Relationship to Patient Adult child   Confirm Advance Directive Yes, on file     AMD on file. Reviewed. Other:    The palliative care team has discussed with patient / health care proxy about goals of care / treatment preferences for patient.  [====Goals of Care====]     HISTORY:     History obtained from: pt, chart    CHIEF COMPLAINT: low back pain, weakness    HPI/SUBJECTIVE:    The patient is:   [x] Verbal and participatory  [] Non-participatory due to:     Pt is a 81 y/o WF who presented to our ED w/ generalized weakness x 1d. She came in from home ambulatory. Pt reported having 2 episodes of palpitations. Not present at time of arrival to our ED. Pt had a mechanical fall on Sun. Low back pain since then. Pt is confirmed to have L1 compression fx on CT in the ED (also noted on XR from a few days before). Pt also reported to have constipation x2d as well as recent h/o nausea.     As noted in admission H&P, pt declined aggressive intervention for compression fx and wished for medical pain mgmt and PT trial    Clinical Pain Assessment (nonverbal scale for severity on nonverbal patients):   [++++ Clinical Pain Assessment++++]  [++++Pain Severity++++]: Pain: 3  [++++Pain Character++++]: aching  [++++Pain Duration++++]: constant  [++++Pain Effect++++]: functional  [++++Pain Factors++++]: percocet does well to control pain  [++++Pain Frequency++++]: constant but intensity varies  [++++Pain Location++++]: R hip and low back  [++++ Clinical Pain Assessment++++]     FUNCTIONAL ASSESSMENT:     Palliative Performance Scale (PPS):  PPS: 70       PSYCHOSOCIAL/SPIRITUAL SCREENING:     Advance Care Planning:  Advance Care Planning 6/2/2017   Patient's Healthcare Decision Maker is: Verbal statement (Legal Next of Kin remains as decision maker) Primary Decision Maker Name Christiana Soto   Primary Decision Maker Phone Number [phone]   Primary Decision Maker Relationship to Patient Adult child   Confirm Advance Directive Yes, on file        Any spiritual / Jain concerns:  [] Yes /  [x] No    Caregiver Burnout:  [] Yes /  [x] No /  [] No Caregiver Present      Anticipatory grief assessment:   [x] Normal  / [] Maladaptive       ESAS Anxiety: Anxiety: 0    ESAS Depression: Depression: 0        REVIEW OF SYSTEMS:     Positive and pertinent negative findings in ROS are noted above in HPI. The following systems were [x] reviewed / [] unable to be reviewed as noted in HPI  Other findings are noted below. Systems: constitutional, ears/nose/mouth/throat, respiratory, gastrointestinal, genitourinary, musculoskeletal, integumentary, neurologic, psychiatric, endocrine. Positive findings noted below. Modified ESAS Completed by: provider   Fatigue: 0 Drowsiness: 0   Depression: 0 Pain: 3   Anxiety: 0 Nausea: 0   Anorexia: 0 Dyspnea: 0     Constipation: Yes               PHYSICAL EXAM:     From RN flowsheet:  Wt Readings from Last 3 Encounters:   06/02/17 152 lb 1.9 oz (69 kg)   07/10/16 148 lb 13 oz (67.5 kg)   06/15/16 145 lb 3.2 oz (65.9 kg)     Blood pressure 167/62, pulse 73, temperature 98.1 °F (36.7 °C), resp. rate 18, height 5' 4\" (1.626 m), weight 152 lb 1.9 oz (69 kg), SpO2 92 %.     Pain Scale 1: Numeric (0 - 10)  Pain Intensity 1: 3     Pain Location 1: Back  Pain Orientation 1: Posterior  Pain Description 1: Aching  Pain Intervention(s) 1: Repositioned, Rest    General:  WDWN, in NAD  HEENT: NC/AT, MMM  CV: RRR, nl S1 and S2, no BLE edema  Pulm: crackles in bilateral bases, good air exchange, no increased WOB  GI: abd SNTND, +bowel sounds throughout  MSK: TTP over lower back  Skin: warm, dry  Psych: no obvious signs of anxiety or agitation  Appropriate  Neurologic: grossly afocal  Able to ambulate slowly from BR to bedside chair  Awake, alert HISTORY:     Active Problems:    UTI (urinary tract infection) (6/1/2017)      Constipation (6/1/2017)      Compression fracture of L1 lumbar vertebra (HCC) (6/1/2017)      Acute respiratory failure with hypoxia (HCC) (6/1/2017)      Fall at home (6/1/2017)      Past Medical History:   Diagnosis Date    Alopecia     Arthritis     Benign essential HTN     Benign neoplasm of vulva     Bronchitis     CAD (coronary artery disease)     Cancer (HCC)     Cholelithiases     Colon cancer (HCC)     Constipation     Dyspareunia     Glaucoma     History of mixed connective tissue disease     Hx of viral pericarditis     Hyperactivity of bladder     Hypercholesterolemia     Insomnia     Macular degeneration     Neck pain     Palpitations     Psoriasis     PVT (paroxysmal ventricular tachycardia) (HCC)     RA (rheumatoid arthritis) (HCC)     Rectocele     Renal cyst     Seizure disorder ([facility])     Sinusitis     Sjogren's disease ([facility] [address]     Thrombophlebitis     TIA (transient ischemic attack)     Vaginal vault prolapse, posthysterectomy     Varicose veins       Past Surgical History:   Procedure Laterality Date    HX BREAST LUMPECTOMY      left breast    HX CATARACT REMOVAL      HX COLECTOMY  1955    partial    HX HEMORRHOIDECTOMY      HX HYSTERECTOMY      HX SHOULDER ARTHROSCOPY        Family History   Problem Relation Age of Onset    Cancer Father     Inflammatory Bowel Dz Mother     Heart Disease Brother     Diabetes Brother       History reviewed, no pertinent family history.   Social History   Substance Use Topics    Smoking status: Never Smoker    Smokeless tobacco: Not on file    Alcohol use No     Allergies   Allergen Reactions    Codeine Unknown (comments)    Pcn [Penicillins] Unknown (comments)    Tetanus Vaccines And Toxoid Unknown (comments)    Vibramycin [Doxycycline Calcium] Unknown (comments)      Current Facility-Administered Medications   Medication Dose Route Frequency    amiodarone (CORDARONE) tablet 200 mg  200 mg Oral BID    lactulose (CHRONULAC) solution 30 g  30 g Oral DAILY PRN    valsartan (DIOVAN) tablet 80 mg  80 mg Oral DAILY    oxyCODONE-acetaminophen (PERCOCET) 5-325 mg per tablet 1 Tab  1 Tab Oral Q4H PRN    senna-docusate (PERICOLACE) 8.6-50 mg per tablet 1 Tab  1 Tab Oral BID    nitrofurantoin (macrocrystal-monohydrate) (MACROBID) capsule 100 mg  100 mg Oral BID    warfarin (COUMADIN) tablet 3 mg  3 mg Oral ONCE    amitriptyline (ELAVIL) tablet 10 mg  10 mg Oral QHS    latanoprost (XALATAN) 0.005 % ophthalmic solution   Both Eyes QPM    carvedilol (COREG) tablet 12.5 mg  12.5 mg Oral BID WITH MEALS    dorzolamide (TRUSOPT) 2 % ophthalmic solution 2 Drop  2 Drop Both Eyes BID    levETIRAcetam (KEPPRA) tablet 500 mg  500 mg Oral BID    bisacodyl (DULCOLAX) suppository 10 mg  10 mg Rectal DAILY PRN    sodium chloride (NS) flush 5-10 mL  5-10 mL IntraVENous Q8H    sodium chloride (NS) flush 5-10 mL  5-10 mL IntraVENous PRN    WARFARIN INFORMATION NOTE (COUMADIN)   Other QPM    polyethylene glycol (MIRALAX) packet 17 g  17 g Oral DAILY    ondansetron (ZOFRAN) injection 4 mg  4 mg IntraVENous Q6H PRN    prochlorperazine (COMPAZINE) with saline injection 5 mg  5 mg IntraVENous Q6H PRN          LAB AND IMAGING FINDINGS:     Lab Results   Component Value Date/Time    WBC 6.4 06/02/2017 03:24 AM    HGB 10.9 06/02/2017 03:24 AM    PLATELET 395 02/61/6979 03:24 AM     Lab Results   Component Value Date/Time    Sodium 125 06/02/2017 10:57 AM    Potassium 4.6 06/02/2017 10:57 AM    Chloride 89 06/02/2017 10:57 AM    CO2 32 06/02/2017 10:57 AM    BUN 21 06/02/2017 10:57 AM    Creatinine 0.82 06/02/2017 10:57 AM    Calcium 8.2 06/02/2017 10:57 AM    Magnesium 2.4 06/01/2017 09:57 AM      Lab Results   Component Value Date/Time    AST (SGOT) 16 06/01/2017 09:57 AM    Alk.  phosphatase 83 06/01/2017 09:57 AM    Protein, total 7.6 06/01/2017 09:57 AM Albumin 3.3 06/01/2017 09:57 AM    Globulin 4.3 06/01/2017 09:57 AM     Lab Results   Component Value Date/Time    INR 2.1 06/02/2017 03:24 AM    Prothrombin time 21.8 06/02/2017 03:24 AM      No results found for: IRON, FE, TIBC, IBCT, PSAT, FERR   No results found for: PH, PCO2, PO2  No components found for: [place]   Lab Results   Component Value Date/Time    CK 46 06/01/2017 09:57 AM    CK - MB 1.8 06/01/2017 09:57 AM                Total time:   Counseling / coordination time, spent as noted above:   > 50% counseling / coordination?:     Prolonged service was provided for  []30 min   []75 min in face to face time in the presence of the patient, spent as noted above. Time Start:   Time End:   Note: this can only be billed with 88677 (initial) or 92181 (follow up). If multiple start / stop times, list each separately.

## 2017-06-02 NOTE — CDMP QUERY
1)The diagnosis of acute respiratory failure has been documented for your patient. Currently, the documentation does not meet criteria for this diagnosis, and may be challenged by an external reviewer. Please remember to include the clinical indicators to support this diagnosis. Current Documentation: RR 22-30  with O2 sats 90%  and O2 sats 86% x 1 on room air  with no RR documented at that time. ---------------------------------------------------------------------  Acute Respiratory Failure indicators include:   - Respirations <12 or >25   - Air hunger   - Use of accessory muscles of respiration   - Inability to speak in full sentences   - Cyanosis   AND  - Pulse ox <90% RA or <95% on O2   - pH <7.35 or >7.45   - pO2 < 60 mm Hg (or 10mm below COPD patient's baseline)   - pCO2 >50mm Hg (or 10mm above COPD patient's baseline)     Please clarify and document your clinical opinion in the progress notes and discharge summary including the definitive and/or presumptive diagnosis, (suspected or probable), related to the above clinical findings.  Please include clinical findings supporting your diagnosis    Thank you for your time   [address]  Desk:   [phone]   Other:  [phone]

## 2017-06-02 NOTE — PROGRESS NOTES
Problem: Mobility Impaired (Adult and Pediatric)  Goal: *Acute Goals and Plan of Care (Insert Text)  Physical Therapy Goals  Initiated 6/1/2017  1. Patient will move from supine to sit and sit to supine , scoot up and down and roll side to side in bed with minimal assistance/contact guard assist within 7 day(s). 2. Patient will transfer from bed to chair and chair to bed with modified independence using the least restrictive device within 7 day(s). 3. Patient will perform sit to stand with modified independence within 7 day(s). 4. Patient will ambulate with modified independence for 100 feet with the least restrictive device within 7 day(s). 5. Patient will ascend/descend 5 stairs with handrail(s) with minimal assistance/contact guard assist within 7 day(s). PHYSICAL THERAPY TREATMENT  Patient: Mg Mcnally (17 y.o. female)  Date: 6/2/2017  Diagnosis: Compression fracture of L1 lumbar vertebra (HCC)  Fall at home  UTI (urinary tract infection)  Acute respiratory failure with hypoxia (HCC)  Constipation <principal problem not specified>       Precautions: Fall, Back (L1 fracture)      ASSESSMENT:  Patient received supine in bed and agreeable to therapy. Patient reported improved pain in low back since receiving pain medication. Patient tolerated session well and making good progress towards goals. Patient required less assistance for bed mobility and sit<>stand. Patient continues to require increased time to complete transfers and CGA. Patient demonstrated safe techniques managing rollator and reaching for arm rests prior to return to seated position. Patient ambulated increased gait distance with RW with CGA. Patient will continue to benefit from PT to progress mobility as tolerated and reach highest level of independence. D/C rec: home with HHPT. Patient is encouraged to remain OOB and ambulate with RN staff to prevent deconditioning and promote increased mobility.    Progression toward goals:  [X] Improving appropriately and progressing toward goals  [ ]    Improving slowly and progressing toward goals  [ ]    Not making progress toward goals and plan of care will be adjusted       PLAN:  Patient continues to benefit from skilled intervention to address the above impairments. Continue treatment per established plan of care. Discharge Recommendations:  Home Health  Further Equipment Recommendations for Discharge:  Pt owns equipment needed       SUBJECTIVE:   Patient stated I am feeling better and having less pain since I started getting pain meds.       OBJECTIVE DATA SUMMARY:         Functional Mobility Training:  Bed Mobility:     Supine to Sit: Stand-by asssistance; Adaptive equipment (use of bed rails to assist)      Transfers:  Sit to Stand: Contact guard assistance; Additional time  Stand to Sit: Contact guard assistance; Additional time          Balance:  Sitting: Intact  Standing: Impaired  Standing - Static: Good (with rollator)  Standing - Dynamic : Fair  Ambulation/Gait Training:  Distance (ft): 100 Feet (ft)  Assistive Device: Gait belt;Walker, rollator  Ambulation - Level of Assistance: Contact guard assistance        Gait Abnormalities: Decreased step clearance (mild forward flexed trunk)        Base of Support: Widened     Speed/Samantha: Pace decreased (<100 feet/min)  Step Length: Right shortened;Left shortened        Pain:  Pain Scale 1: Numeric (0 - 10)  Pain Intensity 1: 3  Pain Location 1: Back  Pain Orientation 1: Posterior  Pain Description 1: Aching  Pain Intervention(s) 1: Repositioned; Rest  Activity Tolerance:   Good. VSS. SpO2 :95% on room air during activity  Please refer to the flowsheet for vital signs taken during this treatment.   After treatment:   [X]    Patient left in no apparent distress sitting up in chair  [ ]    Patient left in no apparent distress in bed  [X]    Call bell left within reach  [X]    Nursing notified  [X]    Caregiver present  [ ]    Bed alarm activated COMMUNICATION/COLLABORATION:   The patients plan of care was discussed with: Occupational Therapist and Registered Nurse     Jose Martinez PT, DPT   Time Calculation: 10 mins

## 2017-06-02 NOTE — PROGRESS NOTES
Hospitalist Progress Note    NAME: Sotero Barrientos   :  1924   MRN:  [id number]   LOS:   1      Assessment / Plan:  Hyponatremia  -Na 124 on admission, initially received Lasix due to concern for pulmonary edema/effusion, however Na decreased to 123  -appreciate nephrology evaluation - possibly SIADH, f/u urine studies  -consider hypovolemic patient has not been eating well  -hold HCTZ    L1 compression fracture  -patient opted for conservative management  -continue pain medication PRN, lidoderm patch, PT  -family requested pain management assistance    Paroxysmal Afib, SVT, poa  -tachycardia resolved  -amiodarone increased per Dr. Mary Pierce    Enterococcus UTI  -will switch to nitrofurantoin    Acute respiratory failure - ruled out patient had episode of hypoxia which improved with O2, no off of O2  Small pleural effusion - unclear etiology, per Dr. Mary Pierce patient has a history of CHF but does not currently appear overloaded    Chronic constipation    Seizure d/o  -keppra    Iron def anemia  -holding iron due to constipation    Glaucoma    Code status: DNR  Prophylaxis: Coumadin  Recommended Disposition:  PT, OT, RN     Subjective:     Chief Complaint: back pain  Up and walking, feeling well, breathing fine  Has not been eating well last few days        Review of Systems:  Symptom Y/N Comments  Symptom Y/N Comments   Fever/Chills    Chest Pain     Poor Appetite    Edema     Cough    Abdominal Pain     Sputum    Joint Pain     SOB/SANTOS    Pruritis/Rash     Nausea/vomit    Tolerating PT/OT     Diarrhea    Tolerating Diet     Constipation    Other       Could NOT obtain due to:      Objective:     VITALS:   Last 24hrs VS reviewed since prior progress note.  Most recent are:  Patient Vitals for the past 24 hrs:   Temp Pulse Resp BP SpO2   17 1115 98.1 °F (36.7 °C) 73 18 167/62 92 %   17 0754 98.1 °F (36.7 °C) 72 18 157/66 96 %   17 0322 98.1 °F (36.7 °C) 74 16 147/67 95 %   17 2343 97.8 °F (36.6 °C) 74 18 129/60 96 %   06/01/17 1940 97.7 °F (36.5 °C) 75 20 137/52 100 %   06/01/17 1724 97.6 °F (36.4 °C) 72 21 140/68 99 %   06/01/17 1430 - 69 21 170/63 96 %   06/01/17 1415 97.5 °F (36.4 °C) - - - -   06/01/17 1400 - 69 19 135/62 97 %   06/01/17 1345 - 93 19 176/75 98 %   06/01/17 1330 - 95 18 165/83 97 %   06/01/17 1300 - 96 17 172/75 98 %   06/01/17 1245 - 96 19 159/79 97 %   06/01/17 1239 - 97 - 173/80 97 %   06/01/17 1238 - 94 19 173/80 95 %   06/01/17 1237 - - - - 91 %   06/01/17 1235 - 97 - - (!) 86 %       Intake/Output Summary (Last 24 hours) at 06/02/17 1232  Last data filed at 06/02/17 1214   Gross per 24 hour   Intake              290 ml   Output              790 ml   Net             -500 ml        PHYSICAL EXAM:  General: Alert, cooperative, no acute distress    EENT:  EOMI. Anicteric sclerae. Mucous membranes moist  Resp:  ?faint crackles at bases L>R no wheezing  No accessory muscle use  CV:  Regular rhythm, no edema  GI:  Soft, non distended, non tender. +Bowel sounds  Neurologic:  Alert and oriented X 3, normal speech  Psych:   Good insight, not anxious nor agitated  Skin:  No rashes, no jaundice  ________________________________________________________________________  Care Plan discussed with:    Comments   Patient x    Family  x    RN x    Care Manager     Consultant  x                      Multidiciplinary team rounds were held today with , nursing, pharmacist and clinical coordinator. Patient's plan of care was discussed; medications were reviewed and discharge planning was addressed. ________________________________________________________________________  Total NON critical care TIME:  35   Minutes  ________________________________________________________________________  Florence Mckeon MD     Procedures: see electronic medical records for all procedures/Xrays and details which were not copied into this note but were reviewed prior to creation of Plan.       LABS:  I reviewed today's most current labs and imaging studies.   Pertinent labs include:  Recent Labs      06/02/17 0324 06/01/17   0957   WBC  6.4  9.2   HGB  10.9*  11.8   HCT  31.7*  34.5*   PLT  142*  139*     Recent Labs      06/02/17   1057  06/02/17 0324 06/01/17   0957   NA  125*  123*  124*   K  4.6  3.6  3.7   CL  89*  90*  89*   CO2  32  27  26   GLU  105*  98  122*   BUN  21*  21*  18   CREA  0.82  0.72  0.77   CA  8.2*  8.1*  8.6   MG   --    --   2.4   ALB   --    --   3.3*   TBILI   --    --   0.9   SGOT   --    --   16   ALT   --    --   18   INR   --   2.1*  1.7*       Signed: Inder Waldrop MD

## 2017-06-03 LAB
ALBUMIN SERPL BCP-MCNC: 2.6 G/DL (ref 3.5–5)
ANION GAP BLD CALC-SCNC: 7 MMOL/L (ref 5–15)
BACTERIA SPEC CULT: ABNORMAL
BUN SERPL-MCNC: 15 MG/DL (ref 6–20)
BUN/CREAT SERPL: 26 (ref 12–20)
CALCIUM SERPL-MCNC: 7.7 MG/DL (ref 8.5–10.1)
CC UR VC: ABNORMAL
CHLORIDE SERPL-SCNC: 95 MMOL/L (ref 97–108)
CO2 SERPL-SCNC: 25 MMOL/L (ref 21–32)
CREAT SERPL-MCNC: 0.57 MG/DL (ref 0.55–1.02)
GLUCOSE BLD STRIP.AUTO-MCNC: 123 MG/DL (ref 65–100)
GLUCOSE SERPL-MCNC: 106 MG/DL (ref 65–100)
INR PPP: 2.6 (ref 0.9–1.1)
PHOSPHATE SERPL-MCNC: 2.8 MG/DL (ref 2.6–4.7)
POTASSIUM SERPL-SCNC: 4.3 MMOL/L (ref 3.5–5.1)
PROTHROMBIN TIME: 27.3 SEC (ref 9–11.1)
SERVICE CMNT-IMP: ABNORMAL
SERVICE CMNT-IMP: ABNORMAL
SODIUM SERPL-SCNC: 127 MMOL/L (ref 136–145)

## 2017-06-03 PROCEDURE — 74011250637 HC RX REV CODE- 250/637: Performed by: INTERNAL MEDICINE

## 2017-06-03 PROCEDURE — 80069 RENAL FUNCTION PANEL: CPT | Performed by: INTERNAL MEDICINE

## 2017-06-03 PROCEDURE — 85610 PROTHROMBIN TIME: CPT | Performed by: INTERNAL MEDICINE

## 2017-06-03 PROCEDURE — 65270000029 HC RM PRIVATE

## 2017-06-03 PROCEDURE — 74011250637 HC RX REV CODE- 250/637: Performed by: PHYSICAL MEDICINE & REHABILITATION

## 2017-06-03 PROCEDURE — 74011250636 HC RX REV CODE- 250/636: Performed by: INTERNAL MEDICINE

## 2017-06-03 PROCEDURE — 36415 COLL VENOUS BLD VENIPUNCTURE: CPT | Performed by: INTERNAL MEDICINE

## 2017-06-03 PROCEDURE — 74011000250 HC RX REV CODE- 250: Performed by: INTERNAL MEDICINE

## 2017-06-03 PROCEDURE — 82962 GLUCOSE BLOOD TEST: CPT

## 2017-06-03 PROCEDURE — 51798 US URINE CAPACITY MEASURE: CPT

## 2017-06-03 RX ORDER — WARFARIN 2 MG/1
2 TABLET ORAL ONCE
Status: COMPLETED | OUTPATIENT
Start: 2017-06-03 | End: 2017-06-03

## 2017-06-03 RX ORDER — AMIODARONE HYDROCHLORIDE 200 MG/1
200 TABLET ORAL DAILY
Status: DISCONTINUED | OUTPATIENT
Start: 2017-06-04 | End: 2017-06-05 | Stop reason: HOSPADM

## 2017-06-03 RX ORDER — SORBITOL SOLUTION 70 %
30 SOLUTION, ORAL MISCELLANEOUS DAILY PRN
Status: DISCONTINUED | OUTPATIENT
Start: 2017-06-03 | End: 2017-06-05 | Stop reason: HOSPADM

## 2017-06-03 RX ADMIN — Medication 10 ML: at 05:55

## 2017-06-03 RX ADMIN — WARFARIN SODIUM 2 MG: 2 TABLET ORAL at 17:19

## 2017-06-03 RX ADMIN — CARVEDILOL 12.5 MG: 12.5 TABLET, FILM COATED ORAL at 08:05

## 2017-06-03 RX ADMIN — BISACODYL 10 MG: 10 SUPPOSITORY RECTAL at 14:14

## 2017-06-03 RX ADMIN — OXYCODONE HYDROCHLORIDE AND ACETAMINOPHEN 1 TABLET: 5; 325 TABLET ORAL at 21:27

## 2017-06-03 RX ADMIN — LEVETIRACETAM 500 MG: 500 TABLET, FILM COATED ORAL at 08:05

## 2017-06-03 RX ADMIN — Medication 10 ML: at 21:35

## 2017-06-03 RX ADMIN — SORBITOL SOLUTION (BULK) 30 ML: 70 SOLUTION at 09:56

## 2017-06-03 RX ADMIN — CARVEDILOL 12.5 MG: 12.5 TABLET, FILM COATED ORAL at 17:19

## 2017-06-03 RX ADMIN — NITROFURANTOIN MONOHYDRATE/MACROCRYSTALLINE 100 MG: 25; 75 CAPSULE ORAL at 17:18

## 2017-06-03 RX ADMIN — SODIUM CHLORIDE 5 MG: 9 INJECTION INTRAMUSCULAR; INTRAVENOUS; SUBCUTANEOUS at 15:12

## 2017-06-03 RX ADMIN — AMIODARONE HYDROCHLORIDE 200 MG: 200 TABLET ORAL at 08:05

## 2017-06-03 RX ADMIN — OXYCODONE HYDROCHLORIDE AND ACETAMINOPHEN 1 TABLET: 5; 325 TABLET ORAL at 04:00

## 2017-06-03 RX ADMIN — VALSARTAN 80 MG: 80 TABLET, FILM COATED ORAL at 08:05

## 2017-06-03 RX ADMIN — LACTULOSE 30 G: 20 SOLUTION ORAL at 13:01

## 2017-06-03 RX ADMIN — SODIUM CHLORIDE 75 ML/HR: 900 INJECTION, SOLUTION INTRAVENOUS at 05:54

## 2017-06-03 RX ADMIN — NITROFURANTOIN MONOHYDRATE/MACROCRYSTALLINE 100 MG: 25; 75 CAPSULE ORAL at 08:05

## 2017-06-03 RX ADMIN — POLYETHYLENE GLYCOL 3350 17 G: 17 POWDER, FOR SOLUTION ORAL at 08:05

## 2017-06-03 RX ADMIN — AMITRIPTYLINE HYDROCHLORIDE 10 MG: 10 TABLET, FILM COATED ORAL at 21:35

## 2017-06-03 RX ADMIN — SENNOSIDES AND DOCUSATE SODIUM 1 TABLET: 8.6; 5 TABLET ORAL at 08:05

## 2017-06-03 RX ADMIN — ONDANSETRON HYDROCHLORIDE 4 MG: 2 INJECTION, SOLUTION INTRAMUSCULAR; INTRAVENOUS at 14:09

## 2017-06-03 RX ADMIN — LEVETIRACETAM 500 MG: 500 TABLET, FILM COATED ORAL at 17:18

## 2017-06-03 RX ADMIN — OXYCODONE HYDROCHLORIDE AND ACETAMINOPHEN 1 TABLET: 5; 325 TABLET ORAL at 17:27

## 2017-06-03 RX ADMIN — LATANOPROST: 50 SOLUTION OPHTHALMIC at 17:21

## 2017-06-03 NOTE — PROGRESS NOTES
Pharmacy Daily Dosing of Warfarin  Indication (A Fib, CVA, DVT, PE, Ortho Surgery, Heart Valve): Atrial fibrillation  Goal INR (2-3, 2.5-3.5, 3-4): 2-3      Average Daily Warfarin Dose: 5 mg daily  Concurrent Anticoagulants/Antiplatelets none  Major Interacting Medications (Dose/Frequency): Amiodarone    INR (0.9-1.1) > 5 or Platelets (< 35T): discuss with MD:  Recent Labs      06/03/17   0513  06/02/17   0324  06/01/17   0957   INR  2.6*  2.1*  1.7*   HGB   --   10.9*  11.8   PLT   --   142*  139*     Date     INR        Plan  6/2        2.1         3 mg  6/3        2.6         2 mg    Impression/Plan:   - Will give a lower dose as patient had a significant increase in INR with 3 mg  - Also on amiodarone which could increase INR  - Will give 2 mg this evening and dose conservatively      Pharmacy will follow daily and adjust the dose as appropriate.     Thanks for the consult  DIALLO Mayorga Dosing and Monitoring Guidelines

## 2017-06-03 NOTE — PROGRESS NOTES
Progress Note      6/3/2017 11:02 AM  NAME: Trudy Montemayor   MRN:  [id number]   Admit Diagnosis: Compression fracture of L1 lumbar vertebra ([facility] [address]  Fall at home  UTI (urinary tract infection)  Acute respiratory failure with hypoxia ([facility] [address]  Constipation     Problem List:     Low sodium  PAF back in sinus        Assessment/Plan:     Sodium improving. Back in sinus mild yon. Cont warfarin  Decrease amio back to daily  - Cont coreg  - cont diovan  Hydration per renal, watch volume status. [x]       High complexity decision making was performed in this patient at high risk for decompensation with multiple organ involvement. Subjective:     Trudy Montemayor denies chest pain, dyspnea. Discussed with RN events overnight. Review of Systems:    Symptom Y/N Comments  Symptom Y/N Comments   Fever/Chills N   Chest Pain N    Poor Appetite N   Edema N    Cough N   Abdominal Pain N    Sputum N   Joint Pain N    SOB/SANTOS N   Pruritis/Rash N    Nausea/vomit N   Tolerating PT/OT Y    Diarrhea N   Tolerating Diet Y    Constipation N   Other       Could NOT obtain due to:      Objective:      Physical Exam:    Last 24hrs VS reviewed since prior progress note. Most recent are:    Visit Vitals    /56    Pulse 64    Temp 97.5 °F (36.4 °C)    Resp 18    Ht 5' 4\" (1.626 m)    Wt 69 kg (152 lb 1.9 oz)    SpO2 99%    BMI 26.11 kg/m2       Intake/Output Summary (Last 24 hours) at 06/03/17 1102  Last data filed at 06/03/17 0909   Gross per 24 hour   Intake          1433.75 ml   Output             1250 ml   Net           183.75 ml        General Appearance: Well developed, well nourished, alert & oriented x 3,    no acute distress. Ears/Nose/Mouth/Throat: Hearing grossly normal.  Neck: Supple. Chest: Lungs clear to auscultation bilaterally. Cardiovascular: Regular rate and rhythm, S1S2 normal, no murmur. Abdomen: Soft, non-tender, bowel sounds are active. Extremities: No edema bilaterally.   Skin: Warm and dry.    PMH/ reviewed - no change compared to H&P    Data Review    Telemetry: normal sinus rhythm     Lab Data Personally Reviewed:    Recent Labs      06/02/17   0324  06/01/17   0957   WBC  6.4  9.2   HGB  10.9*  11.8   HCT  31.7*  34.5*   PLT  142*  139*     Recent Labs      06/03/17   0513  06/02/17   0324  06/01/17   0957   INR  2.6*  2.1*  1.7*   PTP  27.3*  21.8*  17.0*      Recent Labs      06/03/17   0513  06/02/17   1057  06/02/17   0324  06/01/17   0957   NA  127*  125*  123*  124*   K  4.3  4.6  3.6  3.7   CL  95*  89*  90*  89*   CO2  25  32  27  26   BUN  15  21*  21*  18   CREA  0.57  0.82  0.72  0.77   GLU  106*  105*  98  122*   CA  7.7*  8.2*  8.1*  8.6   MG   --    --    --   2.4     Recent Labs      06/01/17   0957   CPK  46   CKNDX  3.9*   TROIQ  <0.04     Lab Results   Component Value Date/Time    Cholesterol, total 196 09/12/2012 12:00 AM    HDL Cholesterol 64 09/12/2012 12:00 AM    LDL, calculated 104 09/12/2012 12:00 AM    Triglyceride 142 09/12/2012 12:00 AM       Recent Labs      06/03/17   0513  06/01/17   0957   SGOT   --   16   AP   --   83   TP   --   7.6   ALB  2.6*  3.3*   GLOB   --   4.3*     No results for input(s): PH, PCO2, PO2 in the last 72 hours.     Medications Personally Reviewed:    Current Facility-Administered Medications   Medication Dose Route Frequency    sorbitol 70 % solution 30 mL  30 mL Oral DAILY PRN    [START ON 6/4/2017] amiodarone (CORDARONE) tablet 200 mg  200 mg Oral DAILY    lactulose (CHRONULAC) solution 30 g  30 g Oral DAILY PRN    valsartan (DIOVAN) tablet 80 mg  80 mg Oral DAILY    oxyCODONE-acetaminophen (PERCOCET) 5-325 mg per tablet 1 Tab  1 Tab Oral Q4H PRN    senna-docusate (PERICOLACE) 8.6-50 mg per tablet 1 Tab  1 Tab Oral BID    nitrofurantoin (macrocrystal-monohydrate) (MACROBID) capsule 100 mg  100 mg Oral BID    0.9% sodium chloride infusion  75 mL/hr IntraVENous CONTINUOUS    amitriptyline (ELAVIL) tablet 10 mg  10 mg Oral QHS    latanoprost (XALATAN) 0.005 % ophthalmic solution   Both Eyes QPM    carvedilol (COREG) tablet 12.5 mg  12.5 mg Oral BID WITH MEALS    levETIRAcetam (KEPPRA) tablet 500 mg  500 mg Oral BID    bisacodyl (DULCOLAX) suppository 10 mg  10 mg Rectal DAILY PRN    sodium chloride (NS) flush 5-10 mL  5-10 mL IntraVENous Q8H    sodium chloride (NS) flush 5-10 mL  5-10 mL IntraVENous PRN    WARFARIN INFORMATION NOTE (COUMADIN)   Other QPM    polyethylene glycol (MIRALAX) packet 17 g  17 g Oral DAILY    ondansetron (ZOFRAN) injection 4 mg  4 mg IntraVENous Q6H PRN    prochlorperazine (COMPAZINE) with saline injection 5 mg  5 mg IntraVENous Q6H PRN         Shady Velez MD

## 2017-06-03 NOTE — PROGRESS NOTES
Hospitalist Progress Note    NAME: Jelena Ulrich   :  1924   MRN:  [id number]   LOS:   2      Assessment / Plan:  Hyponatremia  -sodium increasing to 127 today  -nephrology initially placed patient on fluids initially, urine osm high, will hold fluids, fluid restriction  -appreciate nephrology evaluation - possibly SIADH-consider hypovolemic patient has not been eating well  -hold HCTZ    L1 compression fracture  -patient opted for conservative management  -continue pain medication PRN, PT  -family requested pain management assistance    Paroxysmal Afib, SVT, poa  Elevated INR from warfarin  -tachycardia resolved  -amiodarone per cardiology    Enterococcus UTI  -nitrofurantoin    Acute respiratory failure - ruled out patient had episode of hypoxia which improved with O2, no off of O2  Small pleural effusion - unclear etiology, per Dr. Shanelle Easley patient has a history of CHF but does not currently appear overloaded    Chronic constipation    Seizure d/o  -keppra    Iron def anemia  -holding iron due to constipation    Glaucoma    Code status: DNR  Prophylaxis: Coumadin  Recommended Disposition:  PT, OT, RN     Subjective:     Chief Complaint: back pain  Doing okay, complaining of constipation    Review of Systems:  Symptom Y/N Comments  Symptom Y/N Comments   Fever/Chills    Chest Pain     Poor Appetite    Edema     Cough    Abdominal Pain     Sputum    Joint Pain     SOB/SANTOS    Pruritis/Rash     Nausea/vomit    Tolerating PT/OT     Diarrhea    Tolerating Diet     Constipation    Other       Could NOT obtain due to:      Objective:     VITALS:   Last 24hrs VS reviewed since prior progress note.  Most recent are:  Patient Vitals for the past 24 hrs:   Temp Pulse Resp BP SpO2   17 1138 97.8 °F (36.6 °C) 63 18 120/63 98 %   17 0728 97.5 °F (36.4 °C) 64 18 147/56 99 %   17 0404 98 °F (36.7 °C) 76 18 156/76 93 %   17 2305 97.5 °F (36.4 °C) 73 18 175/85 92 %   17 2013 97.5 °F (36.4 °C) 72 18 153/67 93 %   06/02/17 1600 98.5 °F (36.9 °C) 69 18 138/54 94 %   06/02/17 1534 - - - 150/70 -   06/02/17 1533 - - - 140/70 -       Intake/Output Summary (Last 24 hours) at 06/03/17 1215  Last data filed at 06/03/17 0909   Gross per 24 hour   Intake          1433.75 ml   Output             1000 ml   Net           433.75 ml        PHYSICAL EXAM:  General: Alert, cooperative, no acute distress    EENT:  EOMI. Anicteric sclerae. Mucous membranes moist  Resp:  ?faint crackles at bases L>R no wheezing  No accessory muscle use  CV:  Regular rhythm, no edema  GI:  Soft, non distended, non tender. +Bowel sounds  Neurologic:  Alert and oriented X 3, normal speech  Psych:   Good insight, not anxious nor agitated  Skin:  No rashes, no jaundice  ________________________________________________________________________  Care Plan discussed with:    Comments   Patient x    Family  x    RN x    Care Manager     Consultant  x                      Multidiciplinary team rounds were held today with , nursing, pharmacist and clinical coordinator. Patient's plan of care was discussed; medications were reviewed and discharge planning was addressed. ________________________________________________________________________  Total NON critical care TIME:  35   Minutes  ________________________________________________________________________  Jennifer Taylor MD     Procedures: see electronic medical records for all procedures/Xrays and details which were not copied into this note but were reviewed prior to creation of Plan. LABS:  I reviewed today's most current labs and imaging studies.   Pertinent labs include:  Recent Labs      06/02/17   0324  06/01/17   0957   WBC  6.4  9.2   HGB  10.9*  11.8   HCT  31.7*  34.5*   PLT  142*  139*     Recent Labs      06/03/17   0513  06/02/17   1057  06/02/17   0324  06/01/17   0957   NA  127*  125*  123*  124*   K  4.3  4.6  3.6  3.7   CL  95*  89*  90*  89*   CO2  25  32  27  26 GLU  106*  105*  98  122*   BUN  15  21*  21*  18   CREA  0.57  0.82  0.72  0.77   CA  7.7*  8.2*  8.1*  8.6   MG   --    --    --   2.4   PHOS  2.8   --    --    --    ALB  2.6*   --    --   3.3*   TBILI   --    --    --   0.9   SGOT   --    --    --   16   ALT   --    --    --   18   INR  2.6*   --   2.1*  1.7*       Signed: Sanam Pérez MD

## 2017-06-03 NOTE — ROUTINE PROCESS
Bedside and Verbal shift change report given to Jorge Carlton (oncoming nurse) by Mekhi Ramos RN (offgoing nurse). Report included the following information SBAR, Kardex, Intake/Output, MAR and Recent Results.

## 2017-06-03 NOTE — PROGRESS NOTES
Bedside and Verbal shift change report given to YOLANDA Barkley (oncoming nurse) by Cinthya Rosas (offgoing nurse). Report included the following information SBAR, Kardex, ED Summary, Procedure Summary, Intake/Output, MAR, Recent Results and Med Rec Status.

## 2017-06-04 LAB
ALBUMIN SERPL BCP-MCNC: 3 G/DL (ref 3.5–5)
ANION GAP BLD CALC-SCNC: 5 MMOL/L (ref 5–15)
BUN SERPL-MCNC: 14 MG/DL (ref 6–20)
BUN/CREAT SERPL: 19 (ref 12–20)
CALCIUM SERPL-MCNC: 8.4 MG/DL (ref 8.5–10.1)
CHLORIDE SERPL-SCNC: 95 MMOL/L (ref 97–108)
CO2 SERPL-SCNC: 27 MMOL/L (ref 21–32)
CREAT SERPL-MCNC: 0.73 MG/DL (ref 0.55–1.02)
ERYTHROCYTE [DISTWIDTH] IN BLOOD BY AUTOMATED COUNT: 13.3 % (ref 11.5–14.5)
GLUCOSE SERPL-MCNC: 96 MG/DL (ref 65–100)
HCT VFR BLD AUTO: 32.4 % (ref 35–47)
HGB BLD-MCNC: 11.2 G/DL (ref 11.5–16)
INR PPP: 3.3 (ref 0.9–1.1)
MCH RBC QN AUTO: 30.9 PG (ref 26–34)
MCHC RBC AUTO-ENTMCNC: 34.6 G/DL (ref 30–36.5)
MCV RBC AUTO: 89.5 FL (ref 80–99)
PHOSPHATE SERPL-MCNC: 3.1 MG/DL (ref 2.6–4.7)
PLATELET # BLD AUTO: 149 K/UL (ref 150–400)
POTASSIUM SERPL-SCNC: 5.2 MMOL/L (ref 3.5–5.1)
PROTHROMBIN TIME: 34.5 SEC (ref 9–11.1)
RBC # BLD AUTO: 3.62 M/UL (ref 3.8–5.2)
SODIUM SERPL-SCNC: 127 MMOL/L (ref 136–145)
WBC # BLD AUTO: 7.4 K/UL (ref 3.6–11)

## 2017-06-04 PROCEDURE — 80069 RENAL FUNCTION PANEL: CPT | Performed by: INTERNAL MEDICINE

## 2017-06-04 PROCEDURE — 36415 COLL VENOUS BLD VENIPUNCTURE: CPT | Performed by: INTERNAL MEDICINE

## 2017-06-04 PROCEDURE — 85610 PROTHROMBIN TIME: CPT | Performed by: INTERNAL MEDICINE

## 2017-06-04 PROCEDURE — 74011250637 HC RX REV CODE- 250/637: Performed by: INTERNAL MEDICINE

## 2017-06-04 PROCEDURE — 65270000029 HC RM PRIVATE

## 2017-06-04 PROCEDURE — 85027 COMPLETE CBC AUTOMATED: CPT | Performed by: INTERNAL MEDICINE

## 2017-06-04 PROCEDURE — 74011250637 HC RX REV CODE- 250/637: Performed by: PHYSICAL MEDICINE & REHABILITATION

## 2017-06-04 RX ORDER — ACETAMINOPHEN 500 MG
1000 TABLET ORAL
Status: DISCONTINUED | OUTPATIENT
Start: 2017-06-04 | End: 2017-06-05 | Stop reason: HOSPADM

## 2017-06-04 RX ORDER — OXYCODONE HYDROCHLORIDE 5 MG/1
2.5 TABLET ORAL
Status: DISCONTINUED | OUTPATIENT
Start: 2017-06-04 | End: 2017-06-05 | Stop reason: HOSPADM

## 2017-06-04 RX ADMIN — NITROFURANTOIN MONOHYDRATE/MACROCRYSTALLINE 100 MG: 25; 75 CAPSULE ORAL at 08:12

## 2017-06-04 RX ADMIN — OXYCODONE HYDROCHLORIDE AND ACETAMINOPHEN 1 TABLET: 5; 325 TABLET ORAL at 02:02

## 2017-06-04 RX ADMIN — AMITRIPTYLINE HYDROCHLORIDE 10 MG: 10 TABLET, FILM COATED ORAL at 22:30

## 2017-06-04 RX ADMIN — LEVETIRACETAM 500 MG: 500 TABLET, FILM COATED ORAL at 17:55

## 2017-06-04 RX ADMIN — LEVETIRACETAM 500 MG: 500 TABLET, FILM COATED ORAL at 08:12

## 2017-06-04 RX ADMIN — Medication 10 ML: at 06:26

## 2017-06-04 RX ADMIN — VALSARTAN 80 MG: 80 TABLET, FILM COATED ORAL at 08:13

## 2017-06-04 RX ADMIN — SENNOSIDES AND DOCUSATE SODIUM 1 TABLET: 8.6; 5 TABLET ORAL at 17:55

## 2017-06-04 RX ADMIN — CARVEDILOL 12.5 MG: 12.5 TABLET, FILM COATED ORAL at 08:12

## 2017-06-04 RX ADMIN — ACETAMINOPHEN 1000 MG: 500 TABLET ORAL at 23:29

## 2017-06-04 RX ADMIN — NITROFURANTOIN MONOHYDRATE/MACROCRYSTALLINE 100 MG: 25; 75 CAPSULE ORAL at 17:55

## 2017-06-04 RX ADMIN — ACETAMINOPHEN 1000 MG: 500 TABLET ORAL at 17:55

## 2017-06-04 RX ADMIN — AMIODARONE HYDROCHLORIDE 200 MG: 200 TABLET ORAL at 08:13

## 2017-06-04 RX ADMIN — CARVEDILOL 12.5 MG: 12.5 TABLET, FILM COATED ORAL at 17:55

## 2017-06-04 RX ADMIN — Medication 10 ML: at 13:13

## 2017-06-04 RX ADMIN — LATANOPROST 1 DROP: 50 SOLUTION OPHTHALMIC at 19:03

## 2017-06-04 RX ADMIN — Medication 10 ML: at 23:31

## 2017-06-04 NOTE — PROGRESS NOTES
Na stable at 127  Cont fluid restriction  Daily Na levels  Will f/u in AM.        Sandra Mccarthy MD  [facility]   [address] , [facility]  Phone - [phone]   Fax - [phone]  www. [facility]

## 2017-06-04 NOTE — PROGRESS NOTES
Pharmacy Daily Dosing of Warfarin  Indication (A Fib, CVA, DVT, PE, Ortho Surgery, Heart Valve): Atrial fibrillation  Goal INR (2-3, 2.5-3.5, 3-4): 2-3      Average Daily Warfarin Dose: 5 mg daily  Concurrent Anticoagulants/Antiplatelets none  Major Interacting Medications (Dose/Frequency): Amiodarone    INR (0.9-1.1) > 5 or Platelets (< 86Y): discuss with MD:  Recent Labs      06/04/17 0514  06/03/17   0513 06/02/17   0324   INR  3.3*  2.6*  2.1*   HGB  11.2*   --   10.9*   PLT  149*   --   142*     Date     INR        Plan  6/2        2.1         3 mg  6/3        2.6         2 mg  6/4        3.3         HOLD    Impression/Plan:   - INR > 3 - [facility] will follow daily and adjust the dose as appropriate.     Thanks for the consult  JUAN ColemanD

## 2017-06-04 NOTE — ROUTINE PROCESS
Bedside and Verbal shift change report given to Portia Edouard (oncoming nurse) by Dianna Grullon (offgoing nurse). Report included the following information SBAR, Kardex, Intake/Output, MAR and Recent Results.

## 2017-06-04 NOTE — PROGRESS NOTES
Hospitalist Progress Note    NAME: Caitlin Darling   :  1924   MRN:  [id number]   LOS:   3      Assessment / Plan:  Hyponatremia  -sodium stabe at 127 today  -stopped fluids yesterday  -if Na stable tomorrow will d/c  -appreciate nephrology evaluation - possibly SIADH-consider hypovolemic patient has not been eating well  -continue fluid restrict  -hold HCTZ, will hold on discharge    L1 compression fracture  -patient opted for conservative management  -will need HHPT  -feeling drowsy on narcotic, will start tylenol PRN with half dose of roxicodone    Paroxysmal Afib, SVT, poa  Elevated INR from warfarin  -tachycardia resolved  -amiodarone per cardiology    Enterococcus UTI  -nitrofurantoin    Acute respiratory failure - ruled out patient had episode of hypoxia which improved with O2, no off of O2  Small pleural effusion - unclear etiology, per Dr. Short  patient has a history of CHF but does not currently appear overloaded    Chronic constipation    Seizure d/o  -keppra    Iron def anemia  -holding iron due to constipation    Glaucoma    Code status: DNR  Prophylaxis: Coumadin  Recommended Disposition:  PT, OT, RN     Subjective:     Chief Complaint: back pain  Feeling sleepy, thinks pain meds are making her drowsy and not active  Had BM yesterday    Review of Systems:  Symptom Y/N Comments  Symptom Y/N Comments   Fever/Chills    Chest Pain     Poor Appetite    Edema     Cough    Abdominal Pain     Sputum    Joint Pain     SOB/SANTOS    Pruritis/Rash     Nausea/vomit    Tolerating PT/OT     Diarrhea    Tolerating Diet     Constipation    Other       Could NOT obtain due to:      Objective:     VITALS:   Last 24hrs VS reviewed since prior progress note.  Most recent are:  Patient Vitals for the past 24 hrs:   Temp Pulse Resp BP SpO2   17 0755 97.7 °F (36.5 °C) 93 16 140/80 95 %   17 0454 97.3 °F (36.3 °C) 72 16 154/64 94 %   17 2306 97.5 °F (36.4 °C) 92 18 172/83 93 %   17 1951 97.4 °F (36.3 °C) 71 16 149/59 95 %   06/03/17 1617 97.4 °F (36.3 °C) 70 18 129/57 93 %   06/03/17 1138 97.8 °F (36.6 °C) 63 18 120/63 98 %       Intake/Output Summary (Last 24 hours) at 06/04/17 1134  Last data filed at 06/04/17 2142   Gross per 24 hour   Intake                0 ml   Output              650 ml   Net             -650 ml        PHYSICAL EXAM:  General: Alert, cooperative, no acute distress    EENT:  EOMI. Anicteric sclerae. Mucous membranes moist  Resp:  ?faint crackles at bases L>R no wheezing  No accessory muscle use  CV:  Regular rhythm, no edema  GI:  Soft, less distended today, non tender. +Bowel sounds  Neurologic:  Alert and oriented X 3, normal speech  Psych:   Good insight, not anxious nor agitated  Skin:  No rashes, no jaundice  ________________________________________________________________________  Care Plan discussed with:    Comments   Patient x    Family  x    RN     Care Manager     Consultant                        Multidiciplinary team rounds were held today with , nursing, pharmacist and clinical coordinator. Patient's plan of care was discussed; medications were reviewed and discharge planning was addressed. ________________________________________________________________________  Total NON critical care TIME:  20   Minutes  ________________________________________________________________________  Wayne Anderson MD     Procedures: see electronic medical records for all procedures/Xrays and details which were not copied into this note but were reviewed prior to creation of Plan. LABS:  I reviewed today's most current labs and imaging studies.   Pertinent labs include:  Recent Labs      06/04/17   0514  06/02/17   0324   WBC  7.4  6.4   HGB  11.2*  10.9*   HCT  32.4*  31.7*   PLT  149*  142*     Recent Labs      06/04/17   0514  06/03/17   0513  06/02/17   1057  06/02/17   0324   NA  127*  127*  125*  123*   K  5.2*  4.3  4.6  3.6   CL  95*  95*  89*  90*   CO2  27  25 32  27   GLU  96  106*  105*  98   BUN  14  15  21*  21*   CREA  0.73  0.57  0.82  0.72   CA  8.4*  7.7*  8.2*  8.1*   PHOS  3.1  2.8   --    --    ALB  3.0*  2.6*   --    --    INR  3.3*  2.6*   --   2.1*       Signed: Sadiq Segal MD

## 2017-06-04 NOTE — PROGRESS NOTES
CM saw that Pt would need HH when ready to discharge. CM saw Nate's note that Pt/family selected [facility]. CM sent referral to [facility] via [employer]. CM had a hard time uploading the order for [place] into [employer]. CM faxed the order directly to [facility] and put a note in [employer]. CM needs to follow up with [facility] to make sure that they have everything they need when Pt is ready to discharge. CM noted no discharge orders yet. CM will continue to monitor discharge plan.       Manny, [address]

## 2017-06-04 NOTE — PROGRESS NOTES
Progress Note      6/4/2017 11:02 AM  NAME: Hannah Cody   MRN:  [id number]   Admit Diagnosis: Compression fracture of L1 lumbar vertebra ([facility]  Fall at home  UTI (urinary tract infection)  Acute respiratory failure with hypoxia ([facility]  Constipation     Problem List:     Low sodium  PAF back in sinus        Assessment/Plan:     Sodium stable. Back in sinus mild yon. Cont warfarin  - cont amiodarone  - Cont coreg  - cont diovan  Fluids stopped. [x]       High complexity decision making was performed in this patient at high risk for decompensation with multiple organ involvement. Subjective:     Hannah Cody denies chest pain, dyspnea. Discussed with RN events overnight. Review of Systems:    Symptom Y/N Comments  Symptom Y/N Comments   Fever/Chills N   Chest Pain N    Poor Appetite N   Edema N    Cough N   Abdominal Pain N    Sputum N   Joint Pain N    SOB/SANTOS N   Pruritis/Rash N    Nausea/vomit N   Tolerating PT/OT Y    Diarrhea N   Tolerating Diet Y    Constipation N   Other       Could NOT obtain due to:      Objective:      Physical Exam:    Last 24hrs VS reviewed since prior progress note. Most recent are:    Visit Vitals    /80 (BP 1 Location: Right arm, BP Patient Position: Post activity)  Comment (BP Patient Position): just back from br, seated in chair    Pulse 93    Temp 97.7 °F (36.5 °C)    Resp 16    Ht 5' 4\" (1.626 m)    Wt 70 kg (154 lb 5.2 oz)    SpO2 95%    BMI 26.49 kg/m2       Intake/Output Summary (Last 24 hours) at 06/04/17 1107  Last data filed at 06/04/17 0508   Gross per 24 hour   Intake                0 ml   Output              650 ml   Net             -650 ml        General Appearance: Well developed, well nourished, alert & oriented x 3,    no acute distress. Ears/Nose/Mouth/Throat: Hearing grossly normal.  Neck: Supple. Chest: Lungs clear to auscultation bilaterally. Cardiovascular: Regular rate and rhythm, S1S2 normal, no murmur.   Abdomen: Soft, non-tender, bowel sounds are active. Extremities: No edema bilaterally. Skin: Warm and dry. PMH/SH reviewed - no change compared to H&P    Data Review    Telemetry: normal sinus rhythm     Lab Data Personally Reviewed:    Recent Labs      06/04/17   0514  06/02/17   0324   WBC  7.4  6.4   HGB  11.2*  10.9*   HCT  32.4*  31.7*   PLT  149*  142*     Recent Labs      06/04/17   0514  06/03/17   0513  06/02/17   0324   INR  3.3*  2.6*  2.1*   PTP  34.5*  27.3*  21.8*      Recent Labs      06/04/17   0514  06/03/17   0513  06/02/17   1057   NA  127*  127*  125*   K  5.2*  4.3  4.6   CL  95*  95*  89*   CO2  27  25  32   BUN  14  15  21*   CREA  0.73  0.57  0.82   GLU  96  106*  105*   CA  8.4*  7.7*  8.2*     No results for input(s): CPK, CKNDX, TROIQ in the last 72 hours. No lab exists for component: CPKMB  Lab Results   Component Value Date/Time    Cholesterol, total 196 09/12/2012 12:00 AM    HDL Cholesterol 64 09/12/2012 12:00 AM    LDL, calculated 104 09/12/2012 12:00 AM    Triglyceride 142 09/12/2012 12:00 AM       Recent Labs      06/04/17   0514  06/03/17   0513   ALB  3.0*  2.6*     No results for input(s): PH, PCO2, PO2 in the last 72 hours.     Medications Personally Reviewed:    Current Facility-Administered Medications   Medication Dose Route Frequency    acetaminophen (TYLENOL) tablet 1,000 mg  1,000 mg Oral Q6H PRN    oxyCODONE IR (ROXICODONE) tablet 2.5 mg  2.5 mg Oral Q4H PRN    sorbitol 70 % solution 30 mL  30 mL Oral DAILY PRN    amiodarone (CORDARONE) tablet 200 mg  200 mg Oral DAILY    lactulose (CHRONULAC) solution 30 g  30 g Oral DAILY PRN    valsartan (DIOVAN) tablet 80 mg  80 mg Oral DAILY    senna-docusate (PERICOLACE) 8.6-50 mg per tablet 1 Tab  1 Tab Oral BID    nitrofurantoin (macrocrystal-monohydrate) (MACROBID) capsule 100 mg  100 mg Oral BID    amitriptyline (ELAVIL) tablet 10 mg  10 mg Oral QHS    latanoprost (XALATAN) 0.005 % ophthalmic solution   Both Eyes QPM    carvedilol (COREG) tablet 12.5 mg  12.5 mg Oral BID WITH MEALS    levETIRAcetam (KEPPRA) tablet 500 mg  500 mg Oral BID    bisacodyl (DULCOLAX) suppository 10 mg  10 mg Rectal DAILY PRN    sodium chloride (NS) flush 5-10 mL  5-10 mL IntraVENous Q8H    sodium chloride (NS) flush 5-10 mL  5-10 mL IntraVENous PRN    WARFARIN INFORMATION NOTE (COUMADIN)   Other QPM    polyethylene glycol (MIRALAX) packet 17 g  17 g Oral DAILY    ondansetron (ZOFRAN) injection 4 mg  4 mg IntraVENous Q6H PRN    prochlorperazine (COMPAZINE) with saline injection 5 mg  5 mg IntraVENous Q6H PRN         Lex Mccartney MD

## 2017-06-05 VITALS
OXYGEN SATURATION: 90 % | SYSTOLIC BLOOD PRESSURE: 144 MMHG | WEIGHT: 151.01 LBS | HEART RATE: 72 BPM | RESPIRATION RATE: 16 BRPM | HEIGHT: 64 IN | DIASTOLIC BLOOD PRESSURE: 68 MMHG | TEMPERATURE: 97.6 F | BODY MASS INDEX: 25.78 KG/M2

## 2017-06-05 PROBLEM — E87.1 HYPONATREMIA: Status: ACTIVE | Noted: 2017-06-05

## 2017-06-05 LAB
ALBUMIN SERPL BCP-MCNC: 2.9 G/DL (ref 3.5–5)
ANION GAP BLD CALC-SCNC: 7 MMOL/L (ref 5–15)
BUN SERPL-MCNC: 16 MG/DL (ref 6–20)
BUN/CREAT SERPL: 23 (ref 12–20)
CALCIUM SERPL-MCNC: 8.3 MG/DL (ref 8.5–10.1)
CHLORIDE SERPL-SCNC: 97 MMOL/L (ref 97–108)
CO2 SERPL-SCNC: 25 MMOL/L (ref 21–32)
CREAT SERPL-MCNC: 0.7 MG/DL (ref 0.55–1.02)
GLUCOSE SERPL-MCNC: 106 MG/DL (ref 65–100)
INR PPP: 2.2 (ref 0.9–1.1)
OSMOLALITY UR: 353 MOSM/KG H2O
PHOSPHATE SERPL-MCNC: 2.7 MG/DL (ref 2.6–4.7)
POTASSIUM SERPL-SCNC: 4.2 MMOL/L (ref 3.5–5.1)
POTASSIUM UR-SCNC: 32 MMOL/L
PROTHROMBIN TIME: 22.2 SEC (ref 9–11.1)
SODIUM SERPL-SCNC: 129 MMOL/L (ref 136–145)
SODIUM UR-SCNC: 61 MMOL/L

## 2017-06-05 PROCEDURE — 80069 RENAL FUNCTION PANEL: CPT | Performed by: INTERNAL MEDICINE

## 2017-06-05 PROCEDURE — 74011250637 HC RX REV CODE- 250/637: Performed by: INTERNAL MEDICINE

## 2017-06-05 PROCEDURE — 36415 COLL VENOUS BLD VENIPUNCTURE: CPT | Performed by: INTERNAL MEDICINE

## 2017-06-05 PROCEDURE — 84300 ASSAY OF URINE SODIUM: CPT | Performed by: INTERNAL MEDICINE

## 2017-06-05 PROCEDURE — 74011250637 HC RX REV CODE- 250/637: Performed by: PHYSICAL MEDICINE & REHABILITATION

## 2017-06-05 PROCEDURE — 84133 ASSAY OF URINE POTASSIUM: CPT | Performed by: INTERNAL MEDICINE

## 2017-06-05 PROCEDURE — 83935 ASSAY OF URINE OSMOLALITY: CPT | Performed by: INTERNAL MEDICINE

## 2017-06-05 PROCEDURE — 85610 PROTHROMBIN TIME: CPT | Performed by: INTERNAL MEDICINE

## 2017-06-05 RX ORDER — OXYCODONE HYDROCHLORIDE 5 MG/1
5 TABLET ORAL
Qty: 30 TAB | Refills: 0 | Status: SHIPPED | OUTPATIENT
Start: 2017-06-05 | End: 2017-06-09 | Stop reason: SDUPTHER

## 2017-06-05 RX ORDER — NITROFURANTOIN 25; 75 MG/1; MG/1
100 CAPSULE ORAL 2 TIMES DAILY
Qty: 6 CAP | Refills: 0 | Status: SHIPPED | OUTPATIENT
Start: 2017-06-05 | End: 2017-06-09 | Stop reason: ALTCHOICE

## 2017-06-05 RX ORDER — WARFARIN SODIUM 5 MG/1
5 TABLET ORAL ONCE
Status: DISCONTINUED | OUTPATIENT
Start: 2017-06-05 | End: 2017-06-05 | Stop reason: HOSPADM

## 2017-06-05 RX ADMIN — AMIODARONE HYDROCHLORIDE 200 MG: 200 TABLET ORAL at 08:43

## 2017-06-05 RX ADMIN — NITROFURANTOIN MONOHYDRATE/MACROCRYSTALLINE 100 MG: 25; 75 CAPSULE ORAL at 08:43

## 2017-06-05 RX ADMIN — POLYETHYLENE GLYCOL 3350 17 G: 17 POWDER, FOR SOLUTION ORAL at 08:46

## 2017-06-05 RX ADMIN — Medication 10 ML: at 06:17

## 2017-06-05 RX ADMIN — CARVEDILOL 12.5 MG: 12.5 TABLET, FILM COATED ORAL at 08:43

## 2017-06-05 RX ADMIN — ACETAMINOPHEN 1000 MG: 500 TABLET ORAL at 08:43

## 2017-06-05 RX ADMIN — OXYCODONE HYDROCHLORIDE 2.5 MG: 5 TABLET ORAL at 08:43

## 2017-06-05 RX ADMIN — LEVETIRACETAM 500 MG: 500 TABLET, FILM COATED ORAL at 08:43

## 2017-06-05 RX ADMIN — SENNOSIDES AND DOCUSATE SODIUM 1 TABLET: 8.6; 5 TABLET ORAL at 08:43

## 2017-06-05 RX ADMIN — OXYCODONE HYDROCHLORIDE 2.5 MG: 5 TABLET ORAL at 01:59

## 2017-06-05 RX ADMIN — VALSARTAN 80 MG: 80 TABLET, FILM COATED ORAL at 08:43

## 2017-06-05 NOTE — PROGRESS NOTES
Pharmacy Daily Dosing of Warfarin  Indication (A Fib, CVA, DVT, PE, Ortho Surgery, Heart Valve): Atrial fibrillation  Goal INR (2-3, 2.5-3.5, 3-4): 2-3      Average Daily Warfarin Dose: 5 mg daily  Concurrent Anticoagulants/Antiplatelets none  Major Interacting Medications (Dose/Frequency): Amiodarone    INR (0.9-1.1) > 5 or Platelets (< 37L): discuss with MD:  Recent Labs      06/05/17 0524 06/04/17 0514 06/03/17   0513   INR  2.2*  3.3*  2.6*   HGB   --   11.2*   --    PLT   --   149*   --      Date     INR        Plan  6/2        2.1         3 mg  6/3        2.6         2 mg  6/4        3.3         HOLD  6/5        2.2         5mg    Impression/Plan:   - To be discharged, being sent home on 5mg po daily (2.5mg tabs). - Will also give 5mg today. Pharmacy will follow daily and adjust the dose as appropriate.     Thanks for the consult  ABDOUL Zendejas [facility]

## 2017-06-05 NOTE — PROGRESS NOTES
Reviewed discharge instructions, prescriptions, and side effects with patient and her daughter. Reviewed wound care, follow-up instructions, and medication instructions. Answered all questions and provided contact information for future questions. Took IV out per policy, Catheter tip intact. Going home in a car with daughter.

## 2017-06-05 NOTE — PROGRESS NOTES
CM made a follow up call at [address] [facility] regarding pt referral send through [facility] by weekend  Hedda Baumgarten . CM informed that pt need HH/PT/OT. They accepted pt through phone and said intake staff will accept pt.provided pt address and insurance info. provider info updated to pt AVS.Pt daughter will transport pt upon discharge. 10.15 AM   CM met pt and gave [facility] 2nd letter and copy attached to pt bedside chart. Notified pt daughter Awanda Duane regarding pt discharge plan. Awanda Duane informed that she worked [employer] and will able to transport pt by 4 PM.    Destinee Nunes  Ext [phone]

## 2017-06-05 NOTE — DISCHARGE SUMMARY
Hospitalist Discharge Summary     Patient ID:  Emma Mckeon  [id number]  70 y.o.  11/16/1924    PCP on record: Milka Chandler MD    Admit date: 6/1/2017  Discharge date: 6/5/2017       DISCHARGE DIAGNOSES  DISCHARGE SUMMARY/HOSPITAL COURSE: for full details see H&P, daily progress notes, labs, consult notes. 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 Pastor Pierson patient has a history of CHF but does not currently appear overloaded     Chronic constipation     Seizure d/o  -keppra     Iron def anemia  -holding iron due to constipation     Glaucoma      CONSULTATIONS:  IP CONSULT TO PALLIATIVE CARE - PROVIDER  IP CONSULT TO NEPHROLOGY  IP CONSULT TO CARDIOLOGY    Excerpted HPI from H&P of Nunu Leger MD:  Emma Mckeon is a 80 y.o.  female who presents with above complains from home ambulatory. Pt presents with CC of Worsening Generalized weakness x 1 day  H/o palpitations x 2 episodes yesterday- now gone, h/o PAFib/PSVT in past- is on Amiodarone & coumadin as per Dr Pastor Pierson (cardio)  H/o Fall on Sunday at home- low back pain since- found to have L1 compression fracture on CT today in ER & Xray as OP few days ago  H/o constipation x 2 days  H/o associated nausea     Pt was found to have ? UTI on UA in ER , with L1 compression Frac on CT, has declined any aggressive intervention for the pain & opted for pain management & PT trial at this time.       _______________________________________________________________________  Patient seen and examined by me on discharge day. Pertinent Findings:  Gen:    Not in distress, lying in recliner  Neuro:  Mildly drowsy, but normal speech, oriented  _______________________________________________________________________  DISCHARGE MEDICATIONS:   Current Discharge Medication List      START taking these medications    Details   nitrofurantoin, macrocrystal-monohydrate, (MACROBID) 100 mg capsule Take 1 Cap by mouth two (2) times a day. Qty: 6 Cap, Refills: 0      oxyCODONE IR (ROXICODONE) 5 mg immediate release tablet Take 1 Tab by mouth every four (4) hours as needed. Max Daily Amount: 30 mg.  Qty: 30 Tab, Refills: 0         CONTINUE these medications which have NOT CHANGED    Details   multivitamin (ONE A DAY) tablet Take 1 Tab by mouth two (2) times a day. levETIRAcetam (KEPPRA) 500 mg tablet TAKE ONE TABLET BY MOUTH TWICE DAILY. 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 Alternate nightly 10 mg & 15 mg      carvedilol (COREG) 12.5 mg tablet Take 1 Tab by mouth two (2) times daily (with meals). Qty: 60 Tab, Refills: 5      losartan (COZAAR) 50 mg tablet Take  by mouth daily. dorzolamide (TRUSOPT) 2 % ophthalmic solution Administer 2 Drops to both eyes two (2) times a day. warfarin (COUMADIN) 2.5 mg tablet Take 5 mg by mouth daily (with dinner). Dr Chika Donovan to be new cardiologist  Indications: Afib      amiodarone (CORDARONE) 200 mg tablet Take 200 mg by mouth every other day. phenazopyridine (AZO-STANDARD) 95 mg tab Take  by mouth. ferrous sulfate (IRON) 325 mg (65 mg iron) tablet Take  by mouth Daily (before breakfast).       bimatoprost (LUMIGAN) 0.03 % ophthalmic drops Administer 1 Drop to both eyes every evening. STOP taking these medications       hydrochlorothiazide (HYDRODIURIL) 25 mg tablet Comments:   Reason for Stopping:               My Recommended Diet, Activity, Wound Care, and follow-up labs are listed in the patient's Discharge Insturctions which I have personally completed and reviewed. _______________________________________________________________________  DISPOSITION:    Home with Family:    Home with HH/PT/OT/RN: x   SNF/LTC:    LOW:    OTHER:        Condition at Discharge:  Stable  _______________________________________________________________________  Follow up with:   PCP : Tracey Galindo MD  Follow-up Information     Follow up With Details Comments [address] [place] [address] On 6/5/2017 This is your home health provider.  If you do not hear from them with in 24 hours please contact them [phone].   [facility] [place], [address] [facility]    phone [phone]              Total time in minutes spent coordinating this discharge (includes going over instructions, follow-up, prescriptions, and preparing report for sign off to her PCP) :  40 minutes    Signed:  Alix Mejia MD

## 2017-06-05 NOTE — PROGRESS NOTES
Nephrology Progress Note     Salty Ryann       www. 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 Care Plan discussed with: patient and daughter and DR. [facility]  Review of Systems: Pertinent items are noted in HPI. Objective:   Vitals:    06/05/17 0010 06/05/17 0400 06/05/17 0728 06/05/17 0851   BP: 110/62 164/80 175/64    Pulse: 68 96 79    Resp: 18 16 18    Temp: 98.2 °F (36.8 °C) 98.2 °F (36.8 °C) 97.7 °F (36.5 °C)    SpO2: 92% 92% 91%    Weight:    68.5 kg (151 lb 0.2 oz)   Height:           Intake/Output Summary (Last 24 hours) at 06/05/17 0921  Last data filed at 06/05/17 0848   Gross per 24 hour   Intake              880 ml   Output              600 ml   Net              280 ml     Physical Exam:   GEN: NAD  NECK- Supple, no thyromegaly  RESP: Clear b/l, no wheezing, No accessory muscle use  CVS: RRR,S1,S2   NEURO: non focal, normal speech  SKIN: No Rash, No Jaundice  EXT: No Edema     Chart reviewed. Pertinent Notes reviewed.      Medications list  reviewed             Data Review :  Recent Labs      06/05/17   0524  06/04/17   0514  06/03/17   0513  06/02/17   1057   NA  129*  127*  127*  125*   K  4.2  5.2*  4.3  4.6   CL  97  95*  95*  89*   CO2  25  27  25  32   GLU  106*  96  106*  105*   BUN  16  14  15  21*   CREA  0.70  0.73  0.57  0.82   CA  8.3*  8.4*  7.7*  8.2*   PHOS  2.7  3.1  2.8   -- ALB  2.9*  3.0*  2.6*   --    INR  2.2*  3.3*  2.6*   --      Recent Labs      06/04/17   0514   WBC  7.4   HGB  11.2*   HCT  32.4*   PLT  149*     Current Facility-Administered Medications   Medication    acetaminophen (TYLENOL) tablet 1,000 mg    oxyCODONE IR (ROXICODONE) tablet 2.5 mg    sorbitol 70 % solution 30 mL    amiodarone (CORDARONE) tablet 200 mg    lactulose (CHRONULAC) solution 30 g    valsartan (DIOVAN) tablet 80 mg    senna-docusate (PERICOLACE) 8.6-50 mg per tablet 1 Tab    nitrofurantoin (macrocrystal-monohydrate) (MACROBID) capsule 100 mg    amitriptyline (ELAVIL) tablet 10 mg    latanoprost (XALATAN) 0.005 % ophthalmic solution    carvedilol (COREG) tablet 12.5 mg    levETIRAcetam (KEPPRA) tablet 500 mg    bisacodyl (DULCOLAX) suppository 10 mg    sodium chloride (NS) flush 5-10 mL    sodium chloride (NS) flush 5-10 mL    WARFARIN INFORMATION NOTE (COUMADIN)    polyethylene glycol (MIRALAX) packet 17 g    ondansetron (ZOFRAN) injection 4 mg    prochlorperazine (COMPAZINE) with saline injection 5 mg       Jose Perez MD  [address] [facility]  www. [email or web address]  [address], [address]  [address] [facility] [address], [address]  Phone - [phone]         Fax - [phone]  [facility] Office  [address] [address]  Phone - [phone]        Fax - [phone]

## 2017-06-05 NOTE — ROUTINE PROCESS
Bedside and Verbal shift change report given to Samara WHITTINGTON (oncoming nurse) by Darien Carter RN (offgoing nurse). Report included the following information SBAR, Kardex, Intake/Output, MAR, Recent Results and Med Rec Status.

## 2017-06-05 NOTE — PROGRESS NOTES
New PCP follow-up has been scheduled with Dr. Regina Rivera for June 6 at 1:00pm.  Appointment cancelled.

## 2017-06-05 NOTE — DISCHARGE INSTRUCTIONS
Compression Fracture of the Spine: Care Instructions  Your Care Instructions    A compression fracture happens when the front part of a spinal bone breaks and collapses. A fall or other accident can cause it. A minor injury or moving the wrong way can cause a break if you have thin or brittle bones (osteoporosis). These types of breaks will heal in 8 to 10 weeks. You will need rest and pain medicines. Your doctor may recommend physical therapy. Some doctors recommend that certain people with compression fractures wear braces. Your doctor also may treat thin or brittle bones. You may need surgery if you have lasting pain or if the bone presses on the spinal cord or nerves. You heal best when you take good care of yourself. Eat a variety of healthy foods, and don't smoke. Follow-up care is a key part of your treatment and safety. Be sure to make and go to all appointments, and call your doctor if you are having problems. It's also a good idea to know your test results and keep a list of the medicines you take. How can you care for yourself at home? · Be safe with medicines. Read and follow all instructions on the label. ¨ If the doctor gave you a prescription medicine for pain, take it as prescribed. ¨ If you are not taking a prescription pain medicine, ask your doctor if you can take an over-the-counter medicine. · Talk to your doctor about how to make your bones stronger. You may need medicines or a change in what you eat. · Be active only as directed by your doctor. When should you call for help? Call 911 anytime you think you may need emergency care. For example, call if:  · You are unable to move an arm or a leg at all. Call your doctor now or seek immediate medical care if:  · You have new or worse symptoms in your arms, chest, legs, belly, or buttocks. Symptoms may include:  ¨ Numbness or tingling. ¨ Weakness. ¨ Pain. · You lose bladder or bowel control.   · You have belly pain, bloating, vomiting, or nausea. Watch closely for changes in your health, and be sure to contact your doctor if:  · You are not getting better as expected. Where can you learn more? Go to http://zackery-jessica.info/. Enter P445 in the search box to learn more about \"Compression Fracture of the Spine: Care Instructions. \"  Current as of: August 10, 2016  Content Version: 11.2  © 8555-5008 [employer]. Care instructions adapted under license by [employer] (which disclaims liability or warranty for this information). If you have questions about a medical condition or this instruction, always ask your healthcare professional. [address] any warranty or liability for your use of this information.

## 2017-06-06 ENCOUNTER — TELEPHONE (OUTPATIENT)
Dept: INTERNAL MEDICINE CLINIC | Age: 82
End: 2017-06-06

## 2017-06-06 NOTE — TELEPHONE ENCOUNTER
NOTE - Patient's most recent Office Visit with Dr. Tanner Jiménez was on 10/2/14 to establish care; this was the patient's only office visit with Dr. Tanner Jiménez.

## 2017-06-06 NOTE — TELEPHONE ENCOUNTER
Daughter, Veronica Turpin requesting a call yet this morning about getting a HFU scheduled with Dr. Marissa Lancasetr.  Pt was discharged from [facility] on 6-6-17

## 2017-06-08 ENCOUNTER — TELEPHONE (OUTPATIENT)
Dept: INTERNAL MEDICINE CLINIC | Age: 82
End: 2017-06-08

## 2017-06-09 ENCOUNTER — HOSPITAL ENCOUNTER (OUTPATIENT)
Dept: LAB | Age: 82
Discharge: HOME | End: 2017-06-09
Payer: MEDICARE

## 2017-06-09 ENCOUNTER — OFFICE VISIT (OUTPATIENT)
Dept: INTERNAL MEDICINE CLINIC | Age: 82
End: 2017-06-09

## 2017-06-09 VITALS
RESPIRATION RATE: 18 BRPM | OXYGEN SATURATION: 99 % | SYSTOLIC BLOOD PRESSURE: 105 MMHG | HEIGHT: 64 IN | DIASTOLIC BLOOD PRESSURE: 65 MMHG | BODY MASS INDEX: 25.27 KG/M2 | WEIGHT: 148 LBS | TEMPERATURE: 97.9 F | HEART RATE: 69 BPM

## 2017-06-09 DIAGNOSIS — M85.88 OSTEOPENIA OF SPINE: ICD-10-CM

## 2017-06-09 DIAGNOSIS — R73.03 PRE-DIABETES: ICD-10-CM

## 2017-06-09 DIAGNOSIS — Z78.0 POSTMENOPAUSAL: ICD-10-CM

## 2017-06-09 DIAGNOSIS — T40.2X5A THERAPEUTIC OPIOID INDUCED CONSTIPATION: Primary | ICD-10-CM

## 2017-06-09 DIAGNOSIS — E87.1 HYPONATREMIA: ICD-10-CM

## 2017-06-09 DIAGNOSIS — K59.03 THERAPEUTIC OPIOID INDUCED CONSTIPATION: Primary | ICD-10-CM

## 2017-06-09 DIAGNOSIS — E87.5 HIGH POTASSIUM: ICD-10-CM

## 2017-06-09 DIAGNOSIS — I48.0 PAF (PAROXYSMAL ATRIAL FIBRILLATION) (HCC): ICD-10-CM

## 2017-06-09 DIAGNOSIS — E11.9 CONTROLLED TYPE 2 DIABETES MELLITUS WITHOUT COMPLICATION, WITHOUT LONG-TERM CURRENT USE OF INSULIN (HCC): ICD-10-CM

## 2017-06-09 DIAGNOSIS — S32.019D CLOSED FRACTURE OF FIRST LUMBAR VERTEBRA WITH ROUTINE HEALING, UNSPECIFIED FRACTURE MORPHOLOGY, SUBSEQUENT ENCOUNTER: ICD-10-CM

## 2017-06-09 DIAGNOSIS — R79.89 LOW VITAMIN D LEVEL: ICD-10-CM

## 2017-06-09 DIAGNOSIS — G40.209 COMPLEX PARTIAL SEIZURE WITH IMPAIRMENT OF CONSCIOUSNESS (HCC): ICD-10-CM

## 2017-06-09 PROBLEM — R53.81 DEBILITY: Status: RESOLVED | Noted: 2017-06-02 | Resolved: 2017-06-09

## 2017-06-09 PROBLEM — N39.0 UTI (URINARY TRACT INFECTION): Status: RESOLVED | Noted: 2017-06-01 | Resolved: 2017-06-09
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
## 2017-06-09 PROBLEM — R53.1 WEAKNESS: Status: RESOLVED | Noted: 2017-06-02 | Resolved: 2017-06-09

## 2017-06-09 PROBLEM — J96.01 ACUTE RESPIRATORY FAILURE WITH HYPOXIA (HCC): Status: RESOLVED | Noted: 2017-06-01 | Resolved: 2017-06-09

## 2017-06-09 PROCEDURE — 36415 COLL VENOUS BLD VENIPUNCTURE: CPT

## 2017-06-09 PROCEDURE — 80048 BASIC METABOLIC PNL TOTAL CA: CPT

## 2017-06-09 PROCEDURE — 83036 HEMOGLOBIN GLYCOSYLATED A1C: CPT

## 2017-06-09 PROCEDURE — 82306 VITAMIN D 25 HYDROXY: CPT

## 2017-06-09 RX ORDER — OXYCODONE HYDROCHLORIDE 5 MG/1
5 TABLET ORAL
Qty: 100 TAB | Refills: 0 | Status: SHIPPED | OUTPATIENT
Start: 2017-06-09 | End: 2017-07-04

## 2017-06-09 RX ORDER — CARVEDILOL 25 MG/1
12.5 TABLET ORAL DAILY
COMMUNITY
Start: 2017-04-13 | End: 2017-10-26

## 2017-06-09 RX ORDER — NALOXONE HYDROCHLORIDE 1 MG/ML
2 INJECTION INTRAMUSCULAR; INTRAVENOUS; SUBCUTANEOUS
Qty: 1 SYRINGE | Refills: 0 | Status: SHIPPED | OUTPATIENT
Start: 2017-06-09 | End: 2017-08-09

## 2017-06-09 RX ORDER — LATANOPROST 50 UG/ML
1 SOLUTION/ DROPS OPHTHALMIC
COMMUNITY
Start: 2017-06-05 | End: 2022-01-01

## 2017-06-09 NOTE — PROGRESS NOTES
Ms. Gavin Gaitan is a new patient who is here to establish care. CC:  Establish Care (new patient) and Hospital Follow Up (l1-1 fracture dc from hospital 6/5/17)  L1 fracture      HPI:    Ms. Jolanta Mensah is a pleasant 80-year-old woman with a history of atrial fibrillation, seizure disorder, colon cancer status post resection many years ago, iron deficiency anemia, and recent L1 fracture discharged from the hospital on June 5, 2017 presenting to establish care and follow-up on hospital visit       Fadi Bowles in the house house and witnessed by daughter no loss of consciousness. Patient missed her step on sitting down. Resulting in L1 compression fracture -I have reviewed the hospital course  -patient opted for conservative management but now is considering a kyphoplasty  -Currently on 5mg oxycodone taking it every 4 hours. discussed adding Tylenol to magnify pain control   -Receiving home health currently   -Using walker at all times  Stays at home-with care family    Reviewed CT of spine    1. L1 vertebral body fracture with mild loss of vertebral body height and  approximately 6 mm of osseous retropulsion. Seizure hx: Dr Clyde Blair  manages -last seizure was over 20 years- on keppra    HTN: Has been on Cozaar and coreg. Denies chest pain or shortness of breath. Blood pressure is slightly low today at 105/65. Atrial fibrillation: has ha diagnosed several years ago. . Dr Kavita Villalpando is cardiologist - on coreg and amiodarone. At times has high pulse occasionally 106 is max per daughter who is a nurse  - INR 2 tablets daily except Thursday ( 2.5mg)   - recheck on July 3rd     Eye: glaucoma followed by Dr Lior Myers    Hyponatremia: Noted 129 at discharge. Suspected SIADH  Dr Nighat Witt is nephrologist will is managing this evaluated patient in the hospital  -improved to 129 on discharge and repeat on 06/07 Na is 136 however potassium was 5.6. Was taking in carnation X3 daily and was limiting water intake.    No longer on fluid restriction per nephrologist    Constipation: already had constipation before fall. Currently having daily bowel movements but requiring 4 Colaces per day.   Also taking MiraLAX every morning  Also on iron for iron deficiency anemia  Walks daily with walker                Review of systems:  Constitutional: negative for fever, chills, weight loss, night sweats   Eyes : Has macular degeneration and glaucoma with decreased vision bilaterally  Nose and Throat: negative for tinnitus, sore throat   Cardiovascular: negative for chest pain, palpitations and shortness of breath  Respiratory: negative for shortness of breath, cough and wheezing   Gastroinstestinal: negative for abdominal pain, nausea, vomiting, diarrhea, constipation, and blood in the stool  Musculoskeletal: See HPI  Genitourinary: negative for dysuria, nocturia, polyuria and hematuria   Neurologic: Negative for focal weakness, numbness or incoordination  Skin: Laceration in the back of right leg had stitches already removed  Hematologic: negative for easy bruising      Past Medical History:   Diagnosis Date    Alopecia     Arthritis     Benign essential HTN     Benign neoplasm of vulva     CAD (coronary artery disease)     Cholelithiases     Colon cancer ([address]     Constipation     Dyspareunia     Glaucoma     History of mixed connective tissue disease     Hx of viral pericarditis     Hyperactivity of bladder     Hypercholesterolemia     Insomnia     Macular degeneration     Neck pain     Psoriasis     PVT (paroxysmal ventricular tachycardia) (HCC)     Rectocele     Renal cyst     Seizure disorder ([address]     Sjogren's disease ([address]     Thrombophlebitis     TIA (transient ischemic attack)     Vaginal vault prolapse, posthysterectomy     Varicose veins         Past Surgical History:   Procedure Laterality Date    HX BREAST LUMPECTOMY      left breast    HX CATARACT REMOVAL      HX COLECTOMY  1955    partial    HX HEMORRHOIDECTOMY      HX HYSTERECTOMY      HX SHOULDER ARTHROSCOPY         Allergies   Allergen Reactions    Codeine Unknown (comments)    Pcn [Penicillins] Unknown (comments)    Tetanus Vaccines And Toxoid Unknown (comments)    Vibramycin [Doxycycline Calcium] Unknown (comments)       Current Outpatient Prescriptions on File Prior to Visit   Medication Sig Dispense Refill    multivitamin (ONE A DAY) tablet Take 1 Tab by mouth two (2) times a day.  levETIRAcetam (KEPPRA) 500 mg tablet TAKE ONE TABLET BY MOUTH TWICE DAILY. PATIENT TO KEEP 6/15/16 FOR FURTHER FILLS 180 Tab 5    AMITRIPTYLINE HCL (AMITRIPTYLINE PO) Take  by mouth. Alternate nightly 10 mg & 15 mg      ferrous sulfate (IRON) 325 mg (65 mg iron) tablet Take  by mouth Daily (before breakfast).  losartan (COZAAR) 50 mg tablet Take  by mouth daily.  warfarin (COUMADIN) 2.5 mg tablet Take 5 mg by mouth daily (with dinner). Dr Blade Pittman to be new cardiologist  Indications: Afib      amiodarone (CORDARONE) 200 mg tablet Take 200 mg by mouth every other day. No current facility-administered medications on file prior to visit. family history includes Cancer in her father; Diabetes in her brother; Heart Disease in her brother; Inflammatory Bowel Dz in her mother. Social History     Social History    Marital status:      Spouse name: N/A    Number of children: N/A    Years of education: N/A     Occupational History    Not on file.      Social History Main Topics    Smoking status: Never Smoker    Smokeless tobacco: Not on file    Alcohol use No    Drug use: No    Sexual activity: No     Other Topics Concern    Not on file     Social History Narrative       Visit Vitals    /65 (BP 1 Location: Right arm, BP Patient Position: Sitting)    Pulse 69    Temp 97.9 °F (36.6 °C) (Oral)    Resp 18    Ht 5' 4\" (1.626 m)    Wt 148 lb (67.1 kg)    SpO2 99%    BMI 25.4 kg/m2     General:  Well appearing female no acute distress  HEENT:   PERRL,normal conjunctiva. External ear and canals normal, TMs normal.  Hearing normal to voice. Nose without edema or discharge, normal septum. Lips, teeth, gums normal.  Oropharynx: no erythema, no exudates, no lesions, normal tongue. Neck:  Supple. Thyroid normal size, nontender, without nodules. No carotid bruit. No masses or lymphadenopathy  Respiratory: no respiratory distress,  no wheezing, no rhonchi, no rales. No chest wall tenderness. Cardiovascular:  RRR, normal S1S2, no murmur. Gastrointestinal: normal bowel sounds, soft, nontender, without masses. No hepatosplenomegaly. Extremities +2 pulses, no edema, normal sensation   Musculoskeletal:  Normal gait. Normal digits and nails. Normal strength and tone, no atrophy, and no abnormal movement. Pain to palpation on L1  Skin:  No rash, no lesions, no ulcers. Skin warm, normal turgor, without induration or nodules. Neuro:  A and OX4, fluent speech, cranial nerves normal 2-12. Sensation normal to light touch.   DTR symmetrical  Psych:  Normal affect      Lab Results   Component Value Date/Time    WBC 7.4 06/04/2017 05:14 AM    HGB 11.2 06/04/2017 05:14 AM    HCT 32.4 06/04/2017 05:14 AM    PLATELET 259 93/19/0657 05:14 AM    MCV 89.5 06/04/2017 05:14 AM     Lab Results   Component Value Date/Time    Sodium 129 06/05/2017 05:24 AM    Potassium 4.2 06/05/2017 05:24 AM    Chloride 97 06/05/2017 05:24 AM    CO2 25 06/05/2017 05:24 AM    Anion gap 7 06/05/2017 05:24 AM    Glucose 106 06/05/2017 05:24 AM    BUN 16 06/05/2017 05:24 AM    Creatinine 0.70 06/05/2017 05:24 AM    BUN/Creatinine ratio 23 06/05/2017 05:24 AM    GFR est AA >60 06/05/2017 05:24 AM    GFR est non-AA >60 06/05/2017 05:24 AM    Calcium 8.3 06/05/2017 05:24 AM     Lab Results   Component Value Date/Time    Cholesterol, total 196 09/12/2012 12:00 AM    HDL Cholesterol 64 09/12/2012 12:00 AM    LDL, calculated 104 09/12/2012 12:00 AM    SELENA, calculated 28 09/12/2012 12:00 AM    Triglyceride 142 09/12/2012 12:00 AM     Lab Results   Component Value Date/Time    TSH 6.44 06/02/2017 10:57 AM     Lab Results   Component Value Date/Time    Hemoglobin A1c 6.0 07/01/2013 09:38 AM     Lab Results   Component Value Date/Time    VITAMIN D, 25-HYDROXY 24.8 09/12/2012 12:00 AM                   Assessment and Plan:        Ms. Ramakrishna Gates is a pleasant 80-year-old woman with a history of atrial fibrillation, seizure disorder, colon cancer status post resection many years ago, iron deficiency anemia, and recent L1 fracture discharged from the hospital on June 5, 2017 presenting to establish care and follow-up on hospitalization      Closed fracture of first lumbar vertebra with routine healing, unspecified fracture morphology, subsequent encounter  -Reviewed CT scan showing L1 fracture  -Currently requiring oxycodone 5 mg every 4 hours. I have discussed with patient states use of oxycodone and refilled prescription. Advised to not exceed 5 pills a day  - oxyCODONE IR (ROXICODONE) 5 mg immediate release tablet; Take 1 Tab by mouth every four (4) hours as needed. Max Daily Amount: 30 mg. Dispense: 100 Tab; Refill: 0  - REFERRAL TO ORTHOPEDIC SURGERY for possible kyphoplasty  - DEXA BONE DENSITY STUDY AXIAL; Future to evaluate for possible osteoporosis reports previous osteopenia-  -Checking vitamin D level today  - naloxone (NARCAN) 1 mg/mL injection; 2 mL by IntraMUSCular route once as needed for up to 1 dose. Dispense: 1 Syringe; Refill: 0  -Patient runs out of oxycodone okay to refill medication in 1 month    Therapeutic opioid induced constipation  -Currently taking docusate 4-5 times a day and MiraLAX daily. Advised to continue this regimen once Relistor is approved by insurance stent stop docusate and MiraLAX. - methylnaltrexone (RELISTOR) 150 mg tab tablet; Take 2 Tabs by mouth Daily (before breakfast). Dispense: 180 Tab;  Refill: 2     Complex partial seizure with impairment of consciousness ([facility]  -Stable  -Followed by Dr. Beatrice Pedro  -On Keppra     PAF (paroxysmal atrial fibrillation) ([facility]  -Today patient is sinus rhythm  -On Coreg amiodarone and warfarin   -PAF and INR is followed by Dr. Josue Gomez       Hyponatremia  -Noted in the hospital attributed to dehydration and SIADH due to pain  -Last sodium was 136  -Followed by nephrologist Dr. Ruth Calderon who liberated her p.o. intake       Pre-diabetes versus diabetes with neuropathy  -Obtain hemoglobin A1c.  -Currently patient is not on any medication for diabetes     Glaucoma and macular degeneration:   -  followed by Dr Tanvir Forde  -Requested records     High potassium  -Potassium is 5.6 noted on lab daughter brought in from June 8  - METABOLIC PANEL, BASIC    I spent greater than 40 minutes minutes with patient and daughter obtaining history physical exam reviewing medical records and counseling patient on plan of care    Follow-up Disposition:  Return in about 3 months (around 9/9/2017) for back fracture, and osteopenia.      Marta Torres MD

## 2017-06-09 NOTE — MR AVS SNAPSHOT
Visit Information Date & Time Provider Department Dept. Phone Encounter #  
 6/9/2017  8:00 AM Madhavi Lord [address] [place] [address],[address] [phone] [id number] Follow-up Instructions Return in about 3 months (around 9/9/2017) for back fracture, and osteopenia. Your Appointments 6/15/2017  8:40 AM  
Follow Up with Bette Cárdenas MD  
[facility] [address]) Appt Note: f/u pd  $0cp, adt,6/15/16  
 [address], [phone]  
[address] [facility], [address] [place] [address], [address] [phone] Upcoming Health Maintenance Date Due  
 FOOT EXAM Q1 1934 MICROALBUMIN Q1 1934 EYE EXAM RETINAL OR DILATED Q1 1934 DTaP/Tdap/Td series (1 - Tdap) 11/16/1945 ZOSTER VACCINE AGE 60> 11/16/1984 GLAUCOMA SCREENING Q2Y 11/16/1989 OSTEOPOROSIS SCREENING (DEXA) 11/16/1989 MEDICARE YEARLY EXAM 11/16/1989 Pneumococcal 65+ Low/Medium Risk (2 of 2 - PCV13) 10/2/2015 LIPID PANEL Q1 7/7/2017 INFLUENZA AGE 9 TO ADULT 8/1/2017 HEMOGLOBIN A1C Q6M 12/9/2017 Allergies as of 6/9/2017  Review Complete On: 6/9/2017 By: Babar Terrazas Severity Noted Reaction Type Reactions Codeine  07/11/2012    Unknown (comments) Pcn [Penicillins]  07/11/2012    Unknown (comments) Tetanus Vaccines And Toxoid  07/11/2012    Unknown (comments) Vibramycin [Doxycycline Calcium]  07/11/2012    Unknown (comments) Current Immunizations  Reviewed on 9/13/2012 Name Date Influenza Vaccine 10/2/2014 Influenza Vaccine Split 9/13/2012 Pneumococcal Polysaccharide (PPSV-23) 10/2/2014 Not reviewed this visit You Were Diagnosed With   
  
 Codes Comments Therapeutic opioid induced constipation    -  Primary ICD-10-CM: K59.03, T40.2X5A 
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 
ICD-9-CM: 790.29 High potassium     ICD-10-CM: E87.5 ICD-9-CM: 276.7 Osteopenia of spine     ICD-10-CM: M85.88 ICD-9-CM: 733.90 Postmenopausal     ICD-10-CM: Z78.0 ICD-9-CM: V49.81 Controlled type 2 diabetes mellitus without complication, without long-term current use of insulin ([facility]     ICD-10-CM: E11.9 ICD-9-CM: 250.00 Vitals BP Pulse Temp Resp Height(growth percentile) Weight(growth percentile) 105/65 (BP 1 Location: Right arm, BP Patient Position: Sitting) 69 97.9 °F (36.6 °C) (Oral) 18 5' 4\" (1.626 m) 148 lb (67.1 kg) SpO2 BMI OB Status Smoking Status 99% 25.4 kg/m2 Hysterectomy Never Smoker BMI and BSA Data Body Mass Index Body Surface Area  
 25.4 kg/m 2 1.74 m 2 Preferred Pharmacy Pharmacy Name Phone [facility] [address] [place], [place] - [address] [place] [address] [place] [phone] Your Updated Medication List  
  
   
This list is accurate as of: 6/9/17  8:54 AM.  Always use your most recent med list.  
  
  
  
  
 amiodarone 200 mg tablet Commonly known as:  CORDARONE Take 200 mg by mouth every other day. AMITRIPTYLINE PO Take  by mouth. Alternate nightly 10 mg & 15 mg  
  
 carvedilol 25 mg tablet Commonly known as:  COREG  
  
 COZAAR 50 mg tablet Generic drug:  losartan Take  by mouth daily. Iron 325 mg (65 mg iron) tablet Generic drug:  ferrous sulfate Take  by mouth Daily (before breakfast). latanoprost 0.005 % ophthalmic solution Commonly known as:  XALATAN  
  
 levETIRAcetam 500 mg tablet Commonly known as:  KEPPRA TAKE ONE TABLET BY MOUTH TWICE DAILY. PATIENT TO KEEP 6/15/16 FOR FURTHER FILLS  
  
 methylnaltrexone 150 mg Tab tablet Commonly known as:  RELISTOR Take 2 Tabs by mouth Daily (before breakfast). multivitamin tablet Commonly known as:  ONE A DAY Take 1 Tab by mouth two (2) times a day.  
  
 naloxone 1 mg/mL injection Commonly known as:  NARCAN  
2 mL by IntraMUSCular route once as needed for up to 1 dose. oxyCODONE IR 5 mg immediate release tablet Commonly known as:  Rajwinder Neighbor Take 1 Tab by mouth every four (4) hours as needed. Max Daily Amount: 30 mg.  
  
 warfarin 2.5 mg tablet Commonly known as:  COUMADIN Take 5 mg by mouth daily (with dinner). Dr Juan Ho to be new cardiologist  Indications: Afib Prescriptions Printed Refills  
 oxyCODONE IR (ROXICODONE) 5 mg immediate release tablet 0 Sig: Take 1 Tab by mouth every four (4) hours as needed. Max Daily Amount: 30 mg.  
 Class: Print Route: Oral  
  
Prescriptions Sent to Pharmacy Refills  
 methylnaltrexone (RELISTOR) 150 mg tab tablet 2 Sig: Take 2 Tabs by mouth Daily (before breakfast). Class: Normal  
 Pharmacy: [id number] [place] [address] Ph #: [phone] Route: Oral  
 naloxone (NARCAN) 1 mg/mL injection 0 Si mL by IntraMUSCular route once as needed for up to 1 dose. Class: Normal  
 Pharmacy: [id number] [place] [address] Ph #: [phone] Route: IntraMUSCular We Performed the Following HEMOGLOBIN A1C WITH EAG [08414 CPT(R)] METABOLIC PANEL, BASIC [95450 CPT(R)] REFERRAL TO ORTHOPEDIC SURGERY [REF62 Custom] Comments:  
 Please evaluate patient for L1 fracture for possible kyphoplasty VITAMIN D, 25 HYDROXY [id number] CPT(R)] Follow-up Instructions Return in about 3 months (around 9/9/2017) for back fracture, and osteopenia. To-Do List   
 06/09/2017 Imaging:  DEXA BONE DENSITY STUDY AXIAL Referral Information Referral ID Referred By Referred To  
  
 [id number] BLAZEJerzy STdallas Melendez   
   [facility] [address] [place] [address] [address] [facility] Phone: [phone] Visits Status Start Date End Date 1 New Request 6/9/17 6/9/18 If your referral has a status of pending review or denied, additional information will be sent to support the outcome of this decision. Patient Instructions Learning About Prescribed Opioid Medicine for Chronic Pain Introduction Opioids are medicines used to relieve moderate to severe pain. They may be used for pain that may go on for a long time, such as for cancer or arthritis. Opioids relieve pain by changing the way your body feels pain and the way you feel about pain. They don't cure a health problem. But they do help you manage the pain. Your doctor will talk with you about how they fit into your overall treatment plan. Doctors follow a strict set of rules for giving opioids to their patients who have long-term (chronic) pain. This is because these are powerful medicines. They can cause problems if they are not used correctly. They can also be misused. Getting a prescription for an opioid may seem hard to do. You may feel like your doctor doesn't trust you. Your doctor understands this. But the rules are the same for everyone. They help make sure that the medicine will be used safely. What happens before you get a prescription? Your doctor may talk with you about your pain history. He or she may ask about your family history. You may have a physical exam. You may also see a pain specialist. This helps your doctor decide if an opioid is right for you. Your doctor will also review your history of opioid use.  Most states have a program that tracks prescriptions. If your state does, your doctor will check it to see if you've had prescriptions for opioids before. You may have a urine test to check for opioids in your system. If you're a woman, you may have a pregnancy test. Opioids are not safe to take if you are pregnant. When your doctor prescribes the medicine, he or she will discuss your treatment plan with you. That includes the risks and benefits of opioid medicines. Your doctor will tell you how much pain relief and improved ability to function you can expect. The goal is to take the lowest dose that improves your pain for the shortest amount of time. Your doctor will also talk with you about other things you can do to help relieve pain. Your doctor may suggest different medicines or other options, such as steroid injections, ice or heat, physical therapy, or ways to reduce stress. You and your doctor will talk about your responsibilities. You'll both sign a written agreement. You will agree to take your medicine exactly as your doctor tells you to. Rubio Ibarra also agree to be careful with it and not to share it with others. What happens after you start to use your medicine? Regular follow-up visits to your doctor will help you and your doctor make sure that the medicine is the right treatment for your pain. At every visit, your doctor will check these things: · Is your pain being controlled? · Are you better able to function and be active? · Are you having any side effects, like constipation, nausea, or being too sleepy? · Are the non-opioid pain control measures working? Are there other things you can try? · Are there any signs that you are misusing your medicine? While you are taking an opioid, you may have drug tests and prescription history checks from time to time. Follow-up care is a key part of your treatment and safety.  Be sure to make and go to all appointments, and call your doctor if you are having problems. It's also a good idea to know your test results and keep a list of the medicines you take. Where can you learn more? Go to http://zackery-jessica.info/. Enter D374 in the search box to learn more about \"Learning About Prescribed Opioid Medicine for Chronic Pain. \" Current as of: November 15, 2016 Content Version: 11.2 © 1049-7709 [employer]. Care instructions adapted under license by [employer] (which disclaims liability or warranty for this information). If you have questions about a medical condition or this instruction, always ask your healthcare professional. [address] any warranty or liability for your use of this information. Continue the current bowel regimen until relistor gets approved. Relistor take 2-3 pills with every meal to help move bowels Introducing [facility] SERVICES! Dear Zainab Varma: Thank you for requesting a [employer] account. Our records indicate that you already have an active [employer] account. You can access your account anytime at [email or web address] Did you know that you can access your hospital and ER discharge instructions at any time in [employer]? You can also review all of your test results from your hospital stay or ER visit. Additional Information If you have questions, please visit the Frequently Asked Questions section of the [employer] website at [email or web address]/. Remember, [employer] is NOT to be used for urgent needs. For medical emergencies, dial 911. Now available from your iPhone and Android! Please provide this summary of care documentation to your next provider. Your primary care clinician is listed as WINNIE Hernandez. If you have any questions after today's visit, please call [phone].

## 2017-06-09 NOTE — PROGRESS NOTES
Chief Complaint   Patient presents with   [facility].     new patient   [facility] Follow Up     l-1 fracture dc from hospital 6/5/17     1. Have you been to the ER, urgent care clinic since your last visit? Hospitalized since your last visit? Yes When: 5/28/17 Where: ED [facility] Reason for visit: FAll, and heart issue    2. Have you seen or consulted any other health care providers outside of the [facility] since your last visit? Include any pap smears or colon screening.  No

## 2017-06-09 NOTE — PATIENT INSTRUCTIONS
Learning About Prescribed Opioid Medicine for Chronic Pain  Introduction  Opioids are medicines used to relieve moderate to severe pain. They may be used for pain that may go on for a long time, such as for cancer or arthritis. Opioids relieve pain by changing the way your body feels pain and the way you feel about pain. They don't cure a health problem. But they do help you manage the pain. Your doctor will talk with you about how they fit into your overall treatment plan. Doctors follow a strict set of rules for giving opioids to their patients who have long-term (chronic) pain. This is because these are powerful medicines. They can cause problems if they are not used correctly. They can also be misused. Getting a prescription for an opioid may seem hard to do. You may feel like your doctor doesn't trust you. Your doctor understands this. But the rules are the same for everyone. They help make sure that the medicine will be used safely. What happens before you get a prescription? Your doctor may talk with you about your pain history. He or she may ask about your family history. You may have a physical exam. You may also see a pain specialist. This helps your doctor decide if an opioid is right for you. Your doctor will also review your history of opioid use. Most states have a program that tracks prescriptions. If your state does, your doctor will check it to see if you've had prescriptions for opioids before. You may have a urine test to check for opioids in your system. If you're a woman, you may have a pregnancy test. Opioids are not safe to take if you are pregnant. When your doctor prescribes the medicine, he or she will discuss your treatment plan with you. That includes the risks and benefits of opioid medicines. Your doctor will tell you how much pain relief and improved ability to function you can expect.  The goal is to take the lowest dose that improves your pain for the shortest amount of time. Your doctor will also talk with you about other things you can do to help relieve pain. Your doctor may suggest different medicines or other options, such as steroid injections, ice or heat, physical therapy, or ways to reduce stress. You and your doctor will talk about your responsibilities. You'll both sign a written agreement. You will agree to take your medicine exactly as your doctor tells you to. Travis White also agree to be careful with it and not to share it with others. What happens after you start to use your medicine? Regular follow-up visits to your doctor will help you and your doctor make sure that the medicine is the right treatment for your pain. At every visit, your doctor will check these things:  · Is your pain being controlled? · Are you better able to function and be active? · Are you having any side effects, like constipation, nausea, or being too sleepy? · Are the non-opioid pain control measures working? Are there other things you can try? · Are there any signs that you are misusing your medicine? While you are taking an opioid, you may have drug tests and prescription history checks from time to time. Follow-up care is a key part of your treatment and safety. Be sure to make and go to all appointments, and call your doctor if you are having problems. It's also a good idea to know your test results and keep a list of the medicines you take. Where can you learn more? Go to http://zackery-jessica.info/. Enter D374 in the search box to learn more about \"Learning About Prescribed Opioid Medicine for Chronic Pain. \"  Current as of: November 15, 2016  Content Version: 11.2  © 3188-2091 [employer]. Care instructions adapted under license by [employer] (which disclaims liability or warranty for this information).  If you have questions about a medical condition or this instruction, always ask your healthcare professional. Cecilia Diaz disclaims any warranty or liability for your use of this information. Continue the current bowel regimen until relistor gets approved.  Relistor take 2-3 pills with every meal to help move bowels

## 2017-06-10 LAB
25(OH)D3+25(OH)D2 SERPL-MCNC: 24.9 NG/ML (ref 30–100)
BUN SERPL-MCNC: 22 MG/DL (ref 10–36)
BUN/CREAT SERPL: 25 (ref 12–28)
CALCIUM SERPL-MCNC: 9.1 MG/DL (ref 8.7–10.3)
CHLORIDE SERPL-SCNC: 94 MMOL/L (ref 96–106)
CO2 SERPL-SCNC: 23 MMOL/L (ref 18–29)
CREAT SERPL-MCNC: 0.88 MG/DL (ref 0.57–1)
EST. AVERAGE GLUCOSE BLD GHB EST-MCNC: 114 MG/DL
GLUCOSE SERPL-MCNC: 101 MG/DL (ref 65–99)
HBA1C MFR BLD: 5.6 % (ref 4.8–5.6)
POTASSIUM SERPL-SCNC: 5.2 MMOL/L (ref 3.5–5.2)
SODIUM SERPL-SCNC: 135 MMOL/L (ref 134–144)

## 2017-06-12 NOTE — TELEPHONE ENCOUNTER
Returned call to Lex Moreno. Verbal order given per Dr. Day Campbell. Per Lex Moreno, patient is being discharged from nursing this week as she does not need it. Patient's daughter is giving nursing care.

## 2017-06-12 NOTE — TELEPHONE ENCOUNTER
Briseida Hoyos would like verbal orders for nursing and home therapy.  Please call copied and forwarded to   Dr. Basilia Ponce nurse for review

## 2017-06-13 ENCOUNTER — TELEPHONE (OUTPATIENT)
Dept: INTERNAL MEDICINE CLINIC | Age: 82
End: 2017-06-13

## 2017-06-13 NOTE — TELEPHONE ENCOUNTER
Patients daughter Philipp Rodriguez called and said she needed to speak with someone regarding what she is supposed to do about an appointment for the Orthopaedists for her mothers back because she is in a lot of pain. She has locked herself out of [employer] so any messages sent thru there she will not see. Please give her a call sometime today in regards to this at [phone].

## 2017-06-13 NOTE — PROGRESS NOTES
Vitamin D is low 24.9 - patient should take vitamin D3 2000 units daily over the counter   Kidney function is showing slight dehydration - increase water intake  Hemoglobin A1C is normal - no diabetes

## 2017-06-14 ENCOUNTER — TELEPHONE (OUTPATIENT)
Dept: INTERNAL MEDICINE CLINIC | Age: 82
End: 2017-06-14

## 2017-06-14 ENCOUNTER — DOCUMENTATION ONLY (OUTPATIENT)
Dept: INTERNAL MEDICINE CLINIC | Age: 82
End: 2017-06-14

## 2017-06-14 DIAGNOSIS — N30.01 ACUTE CYSTITIS WITH HEMATURIA: Primary | ICD-10-CM

## 2017-06-14 NOTE — TELEPHONE ENCOUNTER
Returned call to patient's daughter, Francesca Brock on [employer]. Left message for return call.

## 2017-06-14 NOTE — PROGRESS NOTES
6/134/17 Called [facility] @ [phone] due to fax from office on Naloxone Hcl 2 mg/2ml. Amy pharmacist state no PA needed,patient able to fill if needed with co.pay $30.00. Sugest nasal spray for patient if preferred.

## 2017-06-14 NOTE — TELEPHONE ENCOUNTER
Occupational therapist calling to receive verbal orders for pt. She wants to see her 2 times a week for 2 weeks and once a week for 4 weeks and this would be starting next week. Please call back lupe back with the verbal order.  [phone]

## 2017-06-15 ENCOUNTER — OFFICE VISIT (OUTPATIENT)
Dept: NEUROLOGY | Age: 82
End: 2017-06-15

## 2017-06-15 VITALS
HEART RATE: 66 BPM | WEIGHT: 144 LBS | BODY MASS INDEX: 24.59 KG/M2 | SYSTOLIC BLOOD PRESSURE: 100 MMHG | OXYGEN SATURATION: 96 % | DIASTOLIC BLOOD PRESSURE: 50 MMHG | HEIGHT: 64 IN

## 2017-06-15 DIAGNOSIS — G40.209 COMPLEX PARTIAL SEIZURE WITH IMPAIRMENT OF CONSCIOUSNESS (HCC): ICD-10-CM

## 2017-06-15 RX ORDER — LEVETIRACETAM 500 MG/1
TABLET ORAL
Qty: 180 TAB | Refills: 5 | Status: SHIPPED | OUTPATIENT
Start: 2017-06-15 | End: 2018-06-12 | Stop reason: SDUPTHER

## 2017-06-15 NOTE — LETTER
6/15/2017 9:42 AM 
 
Patient:  Adriel Patrick YOB: 1924 Date of Visit: 6/15/2017 Dear No Recipients: Thank you for referring Ms. Adriel Patrick to me for evaluation/treatment. Below are the relevant portions of my assessment and plan of care. Consult Subjective:  
 
Adriel Patrick is a 80 y. o.right-handed  female seen for evaluation of seizures at the request of Dr Ira Hunt. Patient has also had a recent fall 1 or 2 weeks ago, and sustained a compression fracture to the lumbar spine which is very painful and she is going to see orthopedics about possible kyphoplasty tomorrow. She is on Coumadin will probably have to stop this first.  She is getting somewhat confused on her oxycodone, and I suggested to the daughter they reduce the dose to half a pill may be spaced out with some Tylenol. She cannot take anti-inflammatories because of her kidney disease. She has not had any further seizures in the last year since seen and is currently on Keppra 500 mg twice a day. She had no side effects from the medications, and is tolerating them well. . Her medications were refilled today a new prescription sent in for patient. She's had no other new neurological problems, no new focal weakness, her memory seems fairly good for her age of 80, she does have decreased vision and slightly unsteady gait because of the vision problem of macular degeneration and hip arthritis, and uses a walker at times when her daughters not at home. The patient had new onset of seizures in  and had apparently normal imaging of the brain and an EEG that showed a \"short circuit\" in her brain. She had no other clear cause for her seizures. There was no trauma, infection, stroke,  injury or family history of similar problems. She had several seizures and was treated with Neurontin and eventually started on Keppra 500 mg b.i.d which has controlled her seizures for years.  They didn't want to use Dilantin because of its side effects. She has remained free of seizures for 20+ years and is seizure-free since her last office visit 12 months ago and has had no side effects on the medication. We talked about coming off the medication and what needed to be done, the patient would prefer to stay on the medication because she is stable and having no problems and doesn't want to risk recurrent seizures her daughter agrees. No seizure since last visit in June 2016. Patient's seizures were mainly manifest by brief spells of staring off and having slight confusion for several minutes afterwards but no generalized tonic-clonic seizures were seen. 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 Patient has a new complaint of increasing unsteadiness in her gait and difficulty walking. He has slight coldness in her feet and loss of feeling in her feet. She had increased difficulty with right hip pain and radicular pain in the right leg. She had ankle problems and right hip problems and has taken several anti-inflammatories and steroids with mild improvement only. She does have a heel spur but cannot give an injection because they're afraid it might cause rupture of the Achilles tendon. She has had arthritis in her hip and gets a shot there in the as needed. She also had recent neck pain this January, that responded to physical therapy and conservative management. Patient has a history of multiple medical problems including atrial fibrillation and is on chronic Coumadin therapy. She also has degenerative arthritis, osteoporosis and osteoarthritis, glaucoma. She also has macular degeneration and an unsteady gait because she can't see and has more arthritis. Past Medical History:  
Diagnosis Date  Alopecia  Arthritis  Benign essential HTN  Benign neoplasm of vulva  CAD (coronary artery disease)  Cholelithiases  Colon cancer ([facility] [address]  Constipation  Dyspareunia  Glaucoma  History of mixed connective tissue disease  Hx of viral pericarditis  Hyperactivity of bladder  Hypercholesterolemia  Insomnia  Macular degeneration  Neck pain  Psoriasis  PVT (paroxysmal ventricular tachycardia) (HCC)  Rectocele  Renal cyst   
 Seizure disorder ([address]  Sjogren's disease ([facility] [address]  Thrombophlebitis  TIA (transient ischemic attack)  Vaginal vault prolapse, posthysterectomy  Varicose veins Past Surgical History:  
Procedure Laterality Date  HX BREAST LUMPECTOMY    
 left breast  
 HX CATARACT REMOVAL    
 HX COLECTOMY  1955  
 partial  
 HX HEMORRHOIDECTOMY  HX HYSTERECTOMY  HX SHOULDER ARTHROSCOPY Family History Problem Relation Age of Onset  Cancer Father  Inflammatory Bowel Dz Mother  Heart Disease Brother  Diabetes Brother Social History Substance Use Topics  Smoking status: Never Smoker  Smokeless tobacco: Not on file  Alcohol use No  
   
Current Outpatient Prescriptions Medication Sig Dispense Refill  DOCUSATE CALCIUM (STOOL SOFTENER PO) Take  by mouth.  ACETAMINOPHEN (TYLENOL PO) Take  by mouth.  ACETAMINOPHEN/CHLORPHENIRAMINE (CORICIDIN PO) Take  by mouth.  levETIRAcetam (KEPPRA) 500 mg tablet TAKE ONE TABLET BY MOUTH TWICE DAILY. PATIENT TO KEEP 6/15/16 FOR FURTHER FILLS 180 Tab 5  
 latanoprost (XALATAN) 0.005 % ophthalmic solution  carvedilol (COREG) 25 mg tablet  oxyCODONE IR (ROXICODONE) 5 mg immediate release tablet Take 1 Tab by mouth every four (4) hours as needed. Max Daily Amount: 30 mg. 100 Tab 0  
 naloxone (NARCAN) 1 mg/mL injection 2 mL by IntraMUSCular route once as needed for up to 1 dose. 1 Syringe 0  
 multivitamin (ONE A DAY) tablet Take 1 Tab by mouth two (2) times a day.  AMITRIPTYLINE HCL (AMITRIPTYLINE PO) Take  by mouth.  Alternate nightly 10 mg & 15 mg    
  losartan (COZAAR) 50 mg tablet Take  by mouth daily.  warfarin (COUMADIN) 2.5 mg tablet Take 5 mg by mouth daily (with dinner). Dr Bobby Sequeira to be new cardiologist  Indications: Afib    
 amiodarone (CORDARONE) 200 mg tablet Take 200 mg by mouth every other day. Allergies Allergen Reactions  Codeine Unknown (comments)  Pcn [Penicillins] Unknown (comments)  Tetanus Vaccines And Toxoid Unknown (comments)  Vibramycin [Doxycycline Calcium] Unknown (comments) Review of Systems: A comprehensive review of systems was negative except for: Constitutional: positive for fatigue and malaise Eyes: positive for glaucoma, visual disturbance and macular degeneration Cardiovascular: positive for dyspnea, palpitations, irregular heart beats, exertional chest pressure/discomfort Musculoskeletal: positive for myalgias, arthralgias, stiff joints, back pain and muscle weakness Neurological: positive for dizziness, seizures, coordination problems, gait problems and weakness Behvioral/Psych: positive for anxiety and depression Objective: I 
 
 
NEUROLOGICAL EXAM: 
 
Appearance: The patient is well developed, well nourished, provides a fair history and is in no acute distress. Mental Status: Oriented to time, place and person and the president. Her mental status is amazing for 80year-old. Mood and affect depressed. Cranial Nerves:   Intact visual fields but her visual acuity is markedly decreased but she does have count fingers vision because of her macular degeneration. Fundi are poorly seen. Pupils minimally react due to previous surgery, EOM's full, no nystagmus, no ptosis. Facial sensation is normal. Corneal reflexes are not tested. Facial movement is symmetric. Hearing is reduced bilaterally. Palate is midline with normal sternocleidomastoid and trapezius muscles are normal. Tongue is midline. Neck supple without bruits or meningismus Motor:  4/5 strength in upper and lower proximal and distal muscles. reduced bulk and tone. No fasciculations. Reflexes:   Deep tendon reflexes 1+/4 and symmetrical. No Babinski or clonus present Sensory:   Abnormal to touch, pinprick and vibration and temperature in the feet bilaterally. Gait:  Abnormal gait due to arthritis and poor vision and patient has to use assistive devices. Patient not ambulated today because of her back pain. Tremor:   No tremor noted. Cerebellar:  Mildly abnormal Romberg and tandem cerebellar signs present. Neurovascular:  Normal heart sounds and regular rhythm, peripheral pulses decreased, and no carotid bruits. Assessment:  
 
1 Plan:  
 
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 Well controlled on Keppra Ataxia of gait due to a combination of arthritis, poor vision and senile gait apraxia and possible neuropathy Possible neuropathy of unclear cause, rule out treatable causes, probably causing mild sensory ataxia Multiple medical problems including a fib on chronic Coumadin therapy as above. New compression fracture that is very painful in the lumbar spine, to see orthopedics tomorrow for kyphoplasty Severe visual loss secondary to macular degeneration Discussed with patient and daughter again, the possibility of coming off her medication but all agreed to stay on the medicine We'll continue the Keppra refills given the patient Patient advised to continue her vitamins and vitamin D every day and her B12 and other supplements and medications as prescribed. She was encouraged to remain mentally and physically active. We will see her again in 12 months time or earlier if needed. They will call if any problem. Signed By: Ruperto Vasquez MD   
 Sabine 15, 2017 This note will not be viewable in [address] If you have questions, please do not hesitate to call me.   I look forward to following Ms. Shanon Ross along with you. Sincerely, Gustavo Pina MD

## 2017-06-15 NOTE — PATIENT INSTRUCTIONS

## 2017-06-15 NOTE — MR AVS SNAPSHOT
Visit Information Date & Time Provider Department Dept. Phone Encounter #  
 6/15/2017  8:40 AM Xenia Dorman MD Neurology Clinic at [facility] [phone] [id number] Follow-up Instructions Return in about 1 year (around 6/15/2018). Your Appointments 9/28/2017  8:00 AM  
ROUTINE CARE with Amarilis Costa MD  
[facility]) Appt Note: 3 month f/u  
 [place] [address] [phone]  
[address]  [address] [address]  
  
    
 6/14/2018  9:40 AM  
Follow Up with Xenia Dorman MD  
Neurology Clinic at [facility]) Appt Note: f/u seizure,jrb 6/15/17  
 [address] [place] [address], 
[address], [address] [address]  
[address], [address], [address] [address] Upcoming Health Maintenance Date Due  
 FOOT EXAM Q1 1934 MICROALBUMIN Q1 1934 EYE EXAM RETINAL OR DILATED Q1 1934 DTaP/Tdap/Td series (1 - Tdap) 11/16/1945 ZOSTER VACCINE AGE 60> 11/16/1984 GLAUCOMA SCREENING Q2Y 11/16/1989 OSTEOPOROSIS SCREENING (DEXA) 11/16/1989 MEDICARE YEARLY EXAM 11/16/1989 Pneumococcal 65+ Low/Medium Risk (2 of 2 - PCV13) 10/2/2015 LIPID PANEL Q1 7/7/2017 INFLUENZA AGE 9 TO ADULT 8/1/2017 HEMOGLOBIN A1C Q6M 12/9/2017 Allergies as of 6/15/2017  Review Complete On: 6/15/2017 By: Xenia Dorman MD  
  
 Severity Noted Reaction Type Reactions Codeine  07/11/2012    Unknown (comments) Pcn [Penicillins]  07/11/2012    Unknown (comments) Tetanus Vaccines And Toxoid  07/11/2012    Unknown (comments) Vibramycin [Doxycycline Calcium]  07/11/2012    Unknown (comments) Current Immunizations  Reviewed on 9/13/2012 Name Date Influenza Vaccine 10/2/2014 Influenza Vaccine Split 9/13/2012 Pneumococcal Polysaccharide (PPSV-23) 10/2/2014 Not reviewed this visit You Were Diagnosed With   
  
 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  
 100/50 66 5' 4\" (1.626 m) 144 lb (65.3 kg) 96% 24.72 kg/m2 OB Status Smoking Status Hysterectomy Never Smoker BMI and BSA Data Body Mass Index Body Surface Area 24.72 kg/m 2 1.72 m 2 Preferred Pharmacy Pharmacy Name Phone [facility] [place], [place] - [address] [place], [place] [address] Manual [phone] Your Updated Medication List  
  
   
This list is accurate as of: 6/15/17  9:34 AM.  Always use your most recent med list.  
  
  
  
  
 amiodarone 200 mg tablet Commonly known as:  CORDARONE Take 200 mg by mouth every other day. AMITRIPTYLINE PO Take  by mouth. Alternate nightly 10 mg & 15 mg  
  
 carvedilol 25 mg tablet Commonly known as:  COREG  
  
 CORICIDIN PO Take  by mouth. COZAAR 50 mg tablet Generic drug:  losartan Take  by mouth daily. latanoprost 0.005 % ophthalmic solution Commonly known as:  XALATAN  
  
 levETIRAcetam 500 mg tablet Commonly known as:  KEPPRA TAKE ONE TABLET BY MOUTH TWICE DAILY. PATIENT TO KEEP 6/15/16 FOR FURTHER FILLS  
  
 multivitamin tablet Commonly known as:  ONE A DAY Take 1 Tab by mouth two (2) times a day.  
  
 naloxone 1 mg/mL injection Commonly known as:  NARCAN  
2 mL by IntraMUSCular route once as needed for up to 1 dose. oxyCODONE IR 5 mg immediate release tablet Commonly known as:  Demar Nj Take 1 Tab by mouth every four (4) hours as needed. Max Daily Amount: 30 mg. STOOL SOFTENER PO Take  by mouth. TYLENOL PO Take  by mouth.  
  
 warfarin 2.5 mg tablet Commonly known as:  COUMADIN  
 Take 5 mg by mouth daily (with dinner). Dr Cynthia Brown to be new cardiologist  Indications: Afib Prescriptions Sent to Pharmacy Refills  
 levETIRAcetam (KEPPRA) 500 mg tablet 5 Sig: TAKE ONE TABLET BY MOUTH TWICE DAILY. PATIENT TO KEEP 6/15/16 FOR FURTHER FILLS Class: Normal  
 Pharmacy: [address], [address] [place] [address] Ph #: [phone] Follow-up Instructions Return in about 1 year (around 6/15/2018). Patient Instructions A Healthy Lifestyle: Care Instructions Your Care Instructions A healthy lifestyle can help you feel good, stay at a healthy weight, and have plenty of energy for both work and play. A healthy lifestyle is something you can share with your whole family. A healthy lifestyle also can lower your risk for serious health problems, such as high blood pressure, heart disease, and diabetes. You can follow a few steps listed below to improve your health and the health of your family. Follow-up care is a key part of your treatment and safety. Be sure to make and go to all appointments, and call your doctor if you are having problems. Its also a good idea to know your test results and keep a list of the medicines you take. How can you care for yourself at home? · Do not eat too much sugar, fat, or fast foods. You can still have dessert and treats now and then. The goal is moderation. · Start small to improve your eating habits. Pay attention to portion sizes, drink less juice and soda pop, and eat more fruits and vegetables. ¨ Eat a healthy amount of food. A 3-ounce serving of meat, for example, is about the size of a deck of cards. Fill the rest of your plate with vegetables and whole grains. ¨ Limit the amount of soda and sports drinks you have every day. Drink more water when you are thirsty. ¨ Eat at least 5 servings of fruits and vegetables every day.  It may seem like a lot, but it is not hard to reach this goal. A serving or helping is 1 piece of fruit, 1 cup of vegetables, or 2 cups of leafy, raw vegetables. Have an apple or some carrot sticks as an afternoon snack instead of a candy bar. Try to have fruits and/or vegetables at every meal. 
· Make exercise part of your daily routine. You may want to start with simple activities, such as walking, bicycling, or slow swimming. Try to be active 30 to 60 minutes every day. You do not need to do all 30 to 60 minutes all at once. For example, you can exercise 3 times a day for 10 or 20 minutes. Moderate exercise is safe for most people, but it is always a good idea to talk to your doctor before starting an exercise program. 
· Keep moving. Tonjasd Cook the lawn, work in the garden, or doggyloot. Take the stairs instead of the elevator at work. · If you smoke, quit. People who smoke have an increased risk for heart attack, stroke, cancer, and other lung illnesses. Quitting is hard, but there are ways to boost your chance of quitting tobacco for good. ¨ Use nicotine gum, patches, or lozenges. ¨ Ask your doctor about stop-smoking programs and medicines. ¨ Keep trying. In addition to reducing your risk of diseases in the future, you will notice some benefits soon after you stop using tobacco. If you have shortness of breath or asthma symptoms, they will likely get better within a few weeks after you quit. · Limit how much alcohol you drink. Moderate amounts of alcohol (up to 2 drinks a day for men, 1 drink a day for women) are okay. But drinking too much can lead to liver problems, high blood pressure, and other health problems. Family health If you have a family, there are many things you can do together to improve your health. · Eat meals together as a family as often as possible. · Eat healthy foods. This includes fruits, vegetables, lean meats and dairy, and whole grains. · Include your family in your fitness plan. Most people think of activities such as jogging or tennis as the way to fitness, but there are many ways you and your family can be more active. Anything that makes you breathe hard and gets your heart pumping is exercise. Here are some tips: 
¨ Walk to do errands or to take your child to school or the bus. ¨ Go for a family bike ride after dinner instead of watching TV. Where can you learn more? Go to http://zackery-jessica.info/. Enter C570 in the search box to learn more about \"A Healthy Lifestyle: Care Instructions. \" Current as of: July 26, 2016 Content Version: 11.2 © 9118-4724 [employer]. Care instructions adapted under license by [employer] (which disclaims liability or warranty for this information). If you have questions about a medical condition or this instruction, always ask your healthcare professional. [place] [address] any warranty or liability for your use of this information. Introducing [facility] [place] [facility]! Dear Salima Her: Thank you for requesting a [employer] account. Our records indicate that you have previously registered for a [employer] account but its currently inactive. Please call our [employer] support line at [phone]. Additional Information If you have questions, please visit the Frequently Asked Questions section of the [employer] website at [email or web address] Remember, [employer] is NOT to be used for urgent needs. For medical emergencies, dial 911. Now available from your iPhone and Android! Please provide this summary of care documentation to your next provider. Your primary care clinician is listed as WINNIE Hernandez. If you have any questions after today's visit, please call [phone].

## 2017-06-15 NOTE — PROGRESS NOTES
Call to pt, spoke with daughter, Bonita Cadet verified. Results reviewed, she had no further questions or concerns.

## 2017-06-15 NOTE — PROGRESS NOTES
Consult    Subjective:     Rani Taylor is a 80 y. o.right-handed  female seen for evaluation of seizures at the request of Dr Chriss Andrew. Patient has also had a recent fall 1 or 2 weeks ago, and sustained a compression fracture to the lumbar spine which is very painful and she is going to see orthopedics about possible kyphoplasty tomorrow. She is on Coumadin will probably have to stop this first.  She is getting somewhat confused on her oxycodone, and I suggested to the daughter they reduce the dose to half a pill may be spaced out with some Tylenol. She cannot take anti-inflammatories because of her kidney disease. She has not had any further seizures in the last year since seen and is currently on Keppra 500 mg twice a day. She had no side effects from the medications, and is tolerating them well. . Her medications were refilled today a new prescription sent in for patient. She's had no other new neurological problems, no new focal weakness, her memory seems fairly good for her age of 80, she does have decreased vision and slightly unsteady gait because of the vision problem of macular degeneration and hip arthritis, and uses a walker at times when her daughters not at home. The patient had new onset of seizures in  and had apparently normal imaging of the brain and an EEG that showed a \"short circuit\" in her brain. She had no other clear cause for her seizures. There was no trauma, infection, stroke,  injury or family history of similar problems. She had several seizures and was treated with Neurontin and eventually started on Keppra 500 mg b.i.d which has controlled her seizures for years. They didn't want to use Dilantin because of its side effects. She has remained free of seizures for 20+ years and is seizure-free since her last office visit 12 months ago and has had no side effects on the medication.  We talked about coming off the medication and what needed to be done, the patient would prefer to stay on the medication because she is stable and having no problems and doesn't want to risk recurrent seizures her daughter agrees. No seizure since last visit in June 2016. Patient's seizures were mainly manifest by brief spells of staring off and having slight confusion for several minutes afterwards but no generalized tonic-clonic seizures were seen. 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 Patient has a new complaint of increasing unsteadiness in her gait and difficulty walking. He has slight coldness in her feet and loss of feeling in her feet. She had increased difficulty with right hip pain and radicular pain in the right leg. She had ankle problems and right hip problems and has taken several anti-inflammatories and steroids with mild improvement only. She does have a heel spur but cannot give an injection because they're afraid it might cause rupture of the Achilles tendon. She has had arthritis in her hip and gets a shot there in the as needed. She also had recent neck pain this January, that responded to physical therapy and conservative management. Patient has a history of multiple medical problems including atrial fibrillation and is on chronic Coumadin therapy. She also has degenerative arthritis, osteoporosis and osteoarthritis, glaucoma. She also has macular degeneration and an unsteady gait because she can't see and has more arthritis.         Past Medical History:   Diagnosis Date    Alopecia     Arthritis     Benign essential HTN     Benign neoplasm of vulva     CAD (coronary artery disease)     Cholelithiases     Colon cancer (HCC)     Constipation     Dyspareunia     Glaucoma     History of mixed connective tissue disease     Hx of viral pericarditis     Hyperactivity of bladder     Hypercholesterolemia     Insomnia     Macular degeneration     Neck pain     Psoriasis     PV[facility]ox[facility] tachycardia) ([facility])     Rectocele     Renal cyst  Seizure disorder ([facility] [address]     Sjogren's disease ([facility] [address]     Thrombophlebitis     TIA (transient ischemic attack)     Vaginal vault prolapse, posthysterectomy     Varicose veins       Past Surgical History:   Procedure Laterality Date    HX BREAST LUMPECTOMY      left breast    HX CATARACT REMOVAL      HX COLECTOMY  1955    partial    HX HEMORRHOIDECTOMY      HX HYSTERECTOMY      HX SHOULDER ARTHROSCOPY       Family History   Problem Relation Age of Onset    Cancer Father     Inflammatory Bowel Dz Mother     Heart Disease Brother     Diabetes Brother       Social History   Substance Use Topics    Smoking status: Never Smoker    Smokeless tobacco: Not on file    Alcohol use No       Current Outpatient Prescriptions   Medication Sig Dispense Refill    DOCUSATE CALCIUM (STOOL SOFTENER PO) Take  by mouth.  ACETAMINOPHEN (TYLENOL PO) Take  by mouth.  ACETAMINOPHEN/CHLORPHENIRAMINE (CORICIDIN PO) Take  by mouth.  levETIRAcetam (KEPPRA) 500 mg tablet TAKE ONE TABLET BY MOUTH TWICE DAILY. PATIENT TO KEEP 6/15/16 FOR FURTHER FILLS 180 Tab 5    latanoprost (XALATAN) 0.005 % ophthalmic solution       carvedilol (COREG) 25 mg tablet       oxyCODONE IR (ROXICODONE) 5 mg immediate release tablet Take 1 Tab by mouth every four (4) hours as needed. Max Daily Amount: 30 mg. 100 Tab 0    naloxone (NARCAN) 1 mg/mL injection 2 mL by IntraMUSCular route once as needed for up to 1 dose. 1 Syringe 0    multivitamin (ONE A DAY) tablet Take 1 Tab by mouth two (2) times a day.  AMITRIPTYLINE HCL (AMITRIPTYLINE PO) Take  by mouth. Alternate nightly 10 mg & 15 mg      losartan (COZAAR) 50 mg tablet Take  by mouth daily.  warfarin (COUMADIN) 2.5 mg tablet Take 5 mg by mouth daily (with dinner). Dr Anamika Valdez to be new cardiologist  Indications: Afib      amiodarone (CORDARONE) 200 mg tablet Take 200 mg by mouth every other day.           Allergies   Allergen Reactions    Codeine Unknown (comments)    Pcn [Penicillins] Unknown (comments)    Tetanus Vaccines And Toxoid Unknown (comments)    Vibramycin [Doxycycline Calcium] Unknown (comments)        Review of Systems:  A comprehensive review of systems was negative except for: Constitutional: positive for fatigue and malaise  Eyes: positive for glaucoma, visual disturbance and macular degeneration  Cardiovascular: positive for dyspnea, palpitations, irregular heart beats, exertional chest pressure/discomfort  Musculoskeletal: positive for myalgias, arthralgias, stiff joints, back pain and muscle weakness  Neurological: positive for dizziness, seizures, coordination problems, gait problems and weakness  Behvioral/Psych: positive for anxiety and depression     Objective:     I      NEUROLOGICAL EXAM:    Appearance: The patient is well developed, well nourished, provides a fair history and is in no acute distress. Mental Status: Oriented to time, place and person and the president. Her mental status is amazing for 80year-old. Mood and affect depressed. Cranial Nerves:   Intact visual fields but her visual acuity is markedly decreased but she does have count fingers vision because of her macular degeneration. Fundi are poorly seen. Pupils minimally react due to previous surgery, EOM's full, no nystagmus, no ptosis. Facial sensation is normal. Corneal reflexes are not tested. Facial movement is symmetric. Hearing is reduced bilaterally. Palate is midline with normal sternocleidomastoid and trapezius muscles are normal. Tongue is midline. Neck supple without bruits or meningismus    Motor:  4/5 strength in upper and lower proximal and distal muscles. reduced bulk and tone. No fasciculations. Reflexes:   Deep tendon reflexes 1+/4 and symmetrical. No Babinski or clonus present   Sensory:   Abnormal to touch, pinprick and vibration and temperature in the feet bilaterally.    Gait:  Abnormal gait due to arthritis and poor vision and patient has to use assistive devices. Patient not ambulated today because of her back pain. Tremor:   No tremor noted. Cerebellar:  Mildly abnormal Romberg and tandem cerebellar signs present. Neurovascular:  Normal heart sounds and regular rhythm, peripheral pulses decreased, and no carotid bruits. Assessment:     1  Plan:     History of seizures of probable partial complex and secondary generalized type. Well controlled on Keppra  Ataxia of gait due to a combination of arthritis, poor vision and senile gait apraxia and possible neuropathy  Possible neuropathy of unclear cause, rule out treatable causes, probably causing mild sensory ataxia  Multiple medical problems including a fib on chronic Coumadin therapy as above. New compression fracture that is very painful in the lumbar spine, to see orthopedics tomorrow for kyphoplasty  Severe visual loss secondary to macular degeneration  Discussed with patient and daughter again, the possibility of coming off her medication but all agreed to stay on the medicine  We'll continue the Keppra refills given the patient  Patient advised to continue her vitamins and vitamin D every day and her B12 and other supplements and medications as prescribed. She was encouraged to remain mentally and physically active. We will see her again in 12 months time or earlier if needed. They will call if any problem. Signed By: Tyler Bethea MD     Sabine 15, 2017       This note will not be viewable in [address]

## 2017-06-16 ENCOUNTER — TELEPHONE (OUTPATIENT)
Dept: INTERNAL MEDICINE CLINIC | Age: 82
End: 2017-06-16

## 2017-06-16 NOTE — TELEPHONE ENCOUNTER
Joanne Blackburn(Physical Therapist with [facility]) stated the pt's orders were for twice a wk, but was only able to see pt once this wk due to multiple doctors appts, and will resume twice a wk next wk.  Best contact number .        Message received & copied from [facility]

## 2017-06-27 RX ORDER — NITROFURANTOIN 25; 75 MG/1; MG/1
100 CAPSULE ORAL 2 TIMES DAILY
Qty: 14 CAP | Refills: 0 | Status: SHIPPED | OUTPATIENT
Start: 2017-06-27 | End: 2017-08-03

## 2017-06-27 NOTE — TELEPHONE ENCOUNTER
I reviewed her previous culture data and have prescribed Macrobid for her to take for 1 week   Please advise patient to stay hydrated  Order for urinalysis and culture placed

## 2017-06-27 NOTE — TELEPHONE ENCOUNTER
Daughter, Kely Hitchcock states pt woke with burning while voiding. Sterile specimen collected, was dipped for positive Sece=391, blood +1     Can you send Kely Hitchcock an order for a culture and she can send that off? Please call in script to [facility] on file.       Fax #[phone]  (works at [employer])

## 2017-06-29 ENCOUNTER — HOSPITAL ENCOUNTER (OUTPATIENT)
Dept: MRI IMAGING | Age: 82
Discharge: HOME | End: 2017-06-29
Attending: PHYSICAL MEDICINE & REHABILITATION
Payer: MEDICARE

## 2017-06-29 DIAGNOSIS — M54.50 ACUTE LOW BACK PAIN: ICD-10-CM

## 2017-06-29 DIAGNOSIS — S32.010A COMPRESSION FRACTURE OF L1 LUMBAR VERTEBRA (HCC): ICD-10-CM

## 2017-06-29 PROCEDURE — 72148 MRI LUMBAR SPINE W/O DYE: CPT

## 2017-06-29 NOTE — TELEPHONE ENCOUNTER
----- Message from Matt Haas RN sent at 6/7/2017 11:15 AM EDT -----  Regarding: appointment  Please contact patient to schedule new patient appointment; last office visit date was 10/2/14 therefore she is considered a new patient. See most recent telephone encounter; patient/family member contacted office in order to schedule a hospital follow-up appointment. She will not be Transitions of Care as she is now considered a new patient to this practice due to her most recent office visit being 10/2/14.     Also please ask patient/family/caregiver who patient has been seeing for primary care since her most recent office visit with Dr. May Nevarez    Thanks

## 2017-07-04 ENCOUNTER — HOSPITAL ENCOUNTER (EMERGENCY)
Age: 82
Discharge: HOME | End: 2017-07-04
Attending: EMERGENCY MEDICINE
Payer: MEDICARE

## 2017-07-04 ENCOUNTER — APPOINTMENT (OUTPATIENT)
Dept: CT IMAGING | Age: 82
End: 2017-07-04
Attending: EMERGENCY MEDICINE
Payer: MEDICARE

## 2017-07-04 VITALS
BODY MASS INDEX: 24.07 KG/M2 | TEMPERATURE: 97.7 F | RESPIRATION RATE: 16 BRPM | WEIGHT: 141 LBS | HEART RATE: 91 BPM | HEIGHT: 64 IN | DIASTOLIC BLOOD PRESSURE: 72 MMHG | SYSTOLIC BLOOD PRESSURE: 153 MMHG | OXYGEN SATURATION: 95 %

## 2017-07-04 DIAGNOSIS — S00.83XA CONTUSION OF OTHER PART OF HEAD, INITIAL ENCOUNTER: ICD-10-CM
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
## 2017-07-04 DIAGNOSIS — I95.1 ORTHOSTATIC HYPOTENSION: ICD-10-CM

## 2017-07-04 LAB
ALBUMIN SERPL BCP-MCNC: 3.3 G/DL (ref 3.5–5)
ALBUMIN/GLOB SERPL: 0.9 {RATIO} (ref 1.1–2.2)
ALP SERPL-CCNC: 101 U/L (ref 45–117)
ALT SERPL-CCNC: 18 U/L (ref 12–78)
ANION GAP BLD CALC-SCNC: 9 MMOL/L (ref 5–15)
APPEARANCE UR: CLEAR
AST SERPL W P-5'-P-CCNC: 15 U/L (ref 15–37)
BACTERIA URNS QL MICRO: NEGATIVE /HPF
BASOPHILS # BLD AUTO: 0 K/UL (ref 0–0.1)
BASOPHILS # BLD: 0 % (ref 0–1)
BILIRUB SERPL-MCNC: 0.5 MG/DL (ref 0.2–1)
BILIRUB UR QL: NEGATIVE
BUN SERPL-MCNC: 19 MG/DL (ref 6–20)
BUN/CREAT SERPL: 23 (ref 12–20)
CALCIUM SERPL-MCNC: 8.2 MG/DL (ref 8.5–10.1)
CHLORIDE SERPL-SCNC: 99 MMOL/L (ref 97–108)
CO2 SERPL-SCNC: 23 MMOL/L (ref 21–32)
COLOR UR: NORMAL
CREAT SERPL-MCNC: 0.82 MG/DL (ref 0.55–1.02)
EOSINOPHIL # BLD: 0.1 K/UL (ref 0–0.4)
EOSINOPHIL NFR BLD: 1 % (ref 0–7)
EPITH CASTS URNS QL MICRO: NORMAL /LPF
ERYTHROCYTE [DISTWIDTH] IN BLOOD BY AUTOMATED COUNT: 15 % (ref 11.5–14.5)
GLOBULIN SER CALC-MCNC: 3.5 G/DL (ref 2–4)
GLUCOSE BLD STRIP.AUTO-MCNC: 107 MG/DL (ref 65–100)
GLUCOSE SERPL-MCNC: 100 MG/DL (ref 65–100)
GLUCOSE UR STRIP.AUTO-MCNC: NEGATIVE MG/DL
HCT VFR BLD AUTO: 29.2 % (ref 35–47)
HGB BLD-MCNC: 10.1 G/DL (ref 11.5–16)
HGB UR QL STRIP: NEGATIVE
HYALINE CASTS URNS QL MICRO: NORMAL /LPF (ref 0–5)
INR PPP: 4.4 (ref 0.9–1.1)
KETONES UR QL STRIP.AUTO: NEGATIVE MG/DL
LEUKOCYTE ESTERASE UR QL STRIP.AUTO: NEGATIVE
LYMPHOCYTES # BLD AUTO: 15 % (ref 12–49)
LYMPHOCYTES # BLD: 1.1 K/UL (ref 0.8–3.5)
MAGNESIUM SERPL-MCNC: 2.2 MG/DL (ref 1.6–2.4)
MCH RBC QN AUTO: 30.9 PG (ref 26–34)
MCHC RBC AUTO-ENTMCNC: 34.6 G/DL (ref 30–36.5)
MCV RBC AUTO: 89.3 FL (ref 80–99)
MONOCYTES # BLD: 0.6 K/UL (ref 0–1)
MONOCYTES NFR BLD AUTO: 8 % (ref 5–13)
NEUTS SEG # BLD: 5.7 K/UL (ref 1.8–8)
NEUTS SEG NFR BLD AUTO: 76 % (ref 32–75)
NITRITE UR QL STRIP.AUTO: NEGATIVE
PH UR STRIP: 6 [PH] (ref 5–8)
PLATELET # BLD AUTO: 149 K/UL (ref 150–400)
POTASSIUM SERPL-SCNC: 3.6 MMOL/L (ref 3.5–5.1)
PROT SERPL-MCNC: 6.8 G/DL (ref 6.4–8.2)
PROT UR STRIP-MCNC: NEGATIVE MG/DL
PROTHROMBIN TIME: 47 SEC (ref 9–11.1)
RBC # BLD AUTO: 3.27 M/UL (ref 3.8–5.2)
RBC #/AREA URNS HPF: NORMAL /HPF (ref 0–5)
SERVICE CMNT-IMP: ABNORMAL
SODIUM SERPL-SCNC: 131 MMOL/L (ref 136–145)
SP GR UR REFRACTOMETRY: 1.01 (ref 1–1.03)
UROBILINOGEN UR QL STRIP.AUTO: 0.2 EU/DL (ref 0.2–1)
WBC # BLD AUTO: 7.5 K/UL (ref 3.6–11)
WBC URNS QL MICRO: NORMAL /HPF (ref 0–4)

## 2017-07-04 PROCEDURE — 94762 N-INVAS EAR/PLS OXIMTRY CONT: CPT

## 2017-07-04 PROCEDURE — 82962 GLUCOSE BLOOD TEST: CPT

## 2017-07-04 PROCEDURE — 36415 COLL VENOUS BLD VENIPUNCTURE: CPT | Performed by: EMERGENCY MEDICINE

## 2017-07-04 PROCEDURE — 96360 HYDRATION IV INFUSION INIT: CPT

## 2017-07-04 PROCEDURE — 70450 CT HEAD/BRAIN W/O DYE: CPT

## 2017-07-04 PROCEDURE — 85610 PROTHROMBIN TIME: CPT | Performed by: EMERGENCY MEDICINE

## 2017-07-04 PROCEDURE — 74011250636 HC RX REV CODE- 250/636: Performed by: EMERGENCY MEDICINE

## 2017-07-04 PROCEDURE — 77030011943

## 2017-07-04 PROCEDURE — 83735 ASSAY OF MAGNESIUM: CPT | Performed by: EMERGENCY MEDICINE

## 2017-07-04 PROCEDURE — 81001 URINALYSIS AUTO W/SCOPE: CPT | Performed by: EMERGENCY MEDICINE

## 2017-07-04 PROCEDURE — 51701 INSERT BLADDER CATHETER: CPT

## 2017-07-04 PROCEDURE — 85025 COMPLETE CBC W/AUTO DIFF WBC: CPT | Performed by: EMERGENCY MEDICINE

## 2017-07-04 PROCEDURE — 80053 COMPREHEN METABOLIC PANEL: CPT | Performed by: EMERGENCY MEDICINE

## 2017-07-04 PROCEDURE — 93005 ELECTROCARDIOGRAM TRACING: CPT

## 2017-07-04 PROCEDURE — 99285 EMERGENCY DEPT VISIT HI MDM: CPT

## 2017-07-04 RX ORDER — SODIUM CHLORIDE 0.9 % (FLUSH) 0.9 %
5-10 SYRINGE (ML) INJECTION EVERY 8 HOURS
Status: DISCONTINUED | OUTPATIENT
Start: 2017-07-04 | End: 2017-07-04 | Stop reason: HOSPADM

## 2017-07-04 RX ORDER — SODIUM CHLORIDE 0.9 % (FLUSH) 0.9 %
5-10 SYRINGE (ML) INJECTION AS NEEDED
Status: DISCONTINUED | OUTPATIENT
Start: 2017-07-04 | End: 2017-07-04 | Stop reason: HOSPADM

## 2017-07-04 RX ADMIN — SODIUM CHLORIDE 500 ML: 900 INJECTION, SOLUTION INTRAVENOUS at 08:23

## 2017-07-04 NOTE — ED NOTES
Patient reports going to the bathroom this morning with no success. She then reports walking back to the chair feeling weak and fell to he floor on her knees. Patient reports hitting her head as a result.

## 2017-07-04 NOTE — ED NOTES
Dr. Rubin Giron reviewed discharge instructions with the patient. The patient verbalized understanding. All questions and concerns were addressed. The patient declined a wheelchair and is discharged ambulatory in the care of family members with instructions and prescriptions in hand. Pt is alert and oriented x 4. Respirations are clear and unlabored.

## 2017-07-04 NOTE — DISCHARGE INSTRUCTIONS
Orthostatic Hypotension: Care Instructions  Your Care Instructions  Orthostatic hypotension is a quick drop in blood pressure. It happens when you get up from sitting or lying down. You may feel faint, lightheaded, or dizzy. When a person sits up or stands up, the body changes the way it pumps blood. This can slow the flow of blood to the brain for a very short time. And that can make you feel lightheaded. Many medicines can cause this problem, especially in older people. Lack of fluids (dehydration) or illnesses such as diabetes or heart disease also can cause it. Follow-up care is a key part of your treatment and safety. Be sure to make and go to all appointments, and call your doctor if you are having problems. It's also a good idea to know your test results and keep a list of the medicines you take. How can you care for yourself at home? · Tell your doctor about any problems you have with your medicines. · If your doctor prescribes medicine to help prevent a low blood pressure problem, take it exactly as prescribed. Call your doctor if you think you are having a problem with your medicine. · Drink plenty of fluids, enough so that your urine is light yellow or clear like water. Choose water and other caffeine-free clear liquids. If you have kidney, heart, or liver disease and have to limit fluids, talk with your doctor before you increase the amount of fluids you drink. · Limit or avoid alcohol and caffeine. · Get up slowly from bed or after sitting for a long time. If you are in bed, roll to your side and swing your legs over the edge of the bed and onto the floor. Push your body up to a sitting position. Wait for a while before you slowly stand up. If you are dizzy or lightheaded, sit or lie down. When should you call for help? Call 911 anytime you think you may need emergency care. For example, call if:  · You passed out (lost consciousness).   Watch closely for changes in your health, and be sure to contact your doctor if:  · You do not get better as expected. Where can you learn more? Go to http://zackery-jessica.info/. Enter F915 in the search box to learn more about \"Orthostatic Hypotension: Care Instructions. \"  Current as of: April 3, 2017  Content Version: 11.3  © 2026-2670 [employer]. Care instructions adapted under license by [employer] (which disclaims liability or warranty for this information). If you have questions about a medical condition or this instruction, always ask your healthcare professional. [address] any warranty or liability for your use of this information.

## 2017-07-04 NOTE — ED PROVIDER NOTES
HPI Comments: Thao Mayr is a 80 y.o. female, pmhx significant for HTN, cholelithiasis, colon CA, PVT, CAD, viral pericarditis, seizure disorder, TIA, thrombophlebitis, who presents ambulatory to the ED for evaluation of a witnessed GLF x this morning. Pt is without compliant of pain upon arrival to the ED, just stating that she cannot get comfortable. She states reports that she was going to sit in her chair early this morning when her \"legs became rubber\", causing her to fall on her knees and roll forward. Pt's daughter witnessed her fall and became concerned when she hit her head as she is currently on coumadin, so she brought her into the ED [facility] ED for evaluation. Pt states that this is the 6th time she has fallen in 5 years. Pt was seen by her cardiologist yesterday, who took her off of her losartan. Her last dose was last night. Of note, pt has been taking Nitrofurantoin for a UTI. Her daughter tested her urine 3 weeks ago and found bacteria, so she called into Dr. Rashi Steele who rx'd her the abx. She specifically denies dizziness, fevers, chills, nausea, vomiting, chest pain, shortness of breath, headache, rash, diarrhea, sweating or weight loss. PCP: Saumya Pond MD      Social Hx: -tobacco, -EtOH, -Illicit Drugs  FHx: no pertinent family hx   Medication Allergies: codeine, penicillins, vibramycin        There are no other complaints, changes, or physical findings at this time. The history is provided by the patient and a relative.         Past Medical History:   Diagnosis Date    Alopecia     Arthritis     Benign essential HTN     Benign neoplasm of vulva     CAD (coronary artery disease)     Cholelithiases     Colon cancer (HCC)     Constipation     Dyspareunia     Glaucoma     History of mixed connective tissue disease     Hx of viral pericarditis     Hyperactivity of bladder     Hypercholesterolemia     Insomnia     Macular degeneration     Neck pain     Psoriasis     PVT (paroxysmal ventricular tachycardia) (HCC)     Rectocele     Renal cyst     Seizure disorder (HCC)     [facility]ogren's disease ([facility] [address]     Thrombophlebitis     TIA (transient ischemic attack)     Vaginal vault prolapse, posthysterectomy     Varicose veins        Past Surgical History:   Procedure Laterality Date    HX BREAST LUMPECTOMY      left breast    HX CATARACT REMOVAL      HX COLECTOMY  1955    partial    HX HEMORRHOIDECTOMY      HX HYSTERECTOMY      HX SHOULDER ARTHROSCOPY           Family History:   Problem Relation Age of Onset    Cancer Father     Inflammatory Bowel Dz Mother     Heart Disease Brother     Diabetes Brother        Social History     Social History    Marital status:      Spouse name: N/A    Number of children: N/A    Years of education: N/A     Occupational History    Not on file. Social History Main Topics    Smoking status: Never Smoker    Smokeless tobacco: Never Used    Alcohol use No    Drug use: No    Sexual activity: No     Other Topics Concern    Not on file     Social History Narrative         ALLERGIES: Codeine; Pcn [penicillins]; Tetanus vaccines and toxoid; and Vibramycin [doxycycline calcium]    Review of Systems   Constitutional: Negative. Negative for activity change, appetite change, chills, fatigue, fever and unexpected weight change.        + GLF   HENT: Negative. Negative for congestion, hearing loss, rhinorrhea, sneezing and voice change. Eyes: Negative. Negative for pain and visual disturbance. Respiratory: Negative. Negative for apnea, cough, choking, chest tightness and shortness of breath. Cardiovascular: Negative. Negative for chest pain and palpitations. Gastrointestinal: Negative. Negative for abdominal distention, abdominal pain, blood in stool, diarrhea, nausea and vomiting. Genitourinary: Negative. Negative for difficulty urinating, flank pain, frequency and urgency.         No discharge   Musculoskeletal: Negative. Negative for arthralgias, back pain, myalgias and neck stiffness. Skin: Negative. Negative for color change and rash. Neurological: Negative. Negative for dizziness, seizures, syncope, speech difficulty, weakness, numbness and headaches. Hematological: Negative for adenopathy. Psychiatric/Behavioral: Negative. Negative for agitation, behavioral problems, dysphoric mood and suicidal ideas. The patient is not nervous/anxious. All other systems reviewed and are negative. Vitals:    07/04/17 0915 07/04/17 0930 07/04/17 0945 07/04/17 1000   BP: 162/80 131/75 153/77 137/84   Pulse: 94 77 90 90   Resp: 18 26 17 18   Temp:       SpO2: 93% 96% 93% 93%   Weight:       Height:                Physical Exam   Constitutional: She is oriented to person, place, and time. She appears well-developed and well-nourished. No distress. HENT:   Head: Normocephalic and atraumatic. Mouth/Throat: Oropharynx is clear and moist. No oropharyngeal exudate. Eyes: Conjunctivae and EOM are normal. Pupils are equal, round, and reactive to light. Right eye exhibits no discharge. Left eye exhibits no discharge. Neck: Normal range of motion. Neck supple. Cardiovascular: Normal rate, regular rhythm and intact distal pulses. Exam reveals no gallop and no friction rub. No murmur heard. Pulmonary/Chest: Effort normal and breath sounds normal. No respiratory distress. She has no wheezes. She has no rales. She exhibits no tenderness. Abdominal: Soft. Bowel sounds are normal. She exhibits no distension and no mass. There is no tenderness. There is no rebound and no guarding. Musculoskeletal: Normal range of motion. She exhibits no edema. Lymphadenopathy:     She has no cervical adenopathy. Neurological: She is alert and oriented to person, place, and time. No cranial nerve deficit. Coordination normal.   Skin: Skin is warm and dry. No rash noted. No erythema. Psychiatric: She has a normal mood and affect. Nursing note and vitals reviewed. MDM  Number of Diagnoses or Management Options  Diagnosis management comments: DDx: UTI, ACS, arrhythmia, medication overdose, dehydration. Will assess basic labs, cardiac labs, EKG, CXR, UA and orthostatics        Amount and/or Complexity of Data Reviewed  Clinical lab tests: ordered and reviewed  Tests in the radiology section of CPT®: ordered and reviewed  Tests in the medicine section of CPT®: reviewed and ordered  Obtain history from someone other than the patient: (Daughter )  Review and summarize past medical records: yes  Independent visualization of images, tracings, or specimens: yes    Patient Progress  Patient progress: stable    ED Course       Procedures  Chief Complaint   Patient presents with   [facility] Fall     Pt's daughter reports pt had a ground level fall and hit her head today. Pt denies getting dizzy before she fell. Pt reports her knees got weak and gave out when she fell. Pt reports hitting her head but denies losing consciousness. Pt is on coumadin,    Fatigue     Pt reports feeling weak since yesterday. Pt's daughter states yesterday in the cardiologist's office, she was weak and could barely stand. Pt's daughter states the cardiologist states he discontinued pt's losartan completely yesterday. 7:54 AM  The patients presenting problems have been discussed, and they are in agreement with the care plan formulated and outlined with them. I have encouraged them to ask questions as they arise throughout their visit.     MEDICATIONS GIVEN:  Medications   sodium chloride (NS) flush 5-10 mL (not administered)   sodium chloride (NS) flush 5-10 mL (not administered)   sodium chloride 0.9 % bolus infusion 500 mL (0 mL IntraVENous IV Completed 7/4/17 0859)       LABS REVIEWED:  Recent Results (from the past 24 hour(s))   GLUCOSE, POC    Collection Time: 07/04/17  7:02 AM   Result Value Ref Range    Glucose (POC) 107 (H) 65 - 100 mg/dL    Performed by Eddie Fitzpatrick    EKG, 12 LEAD, INITIAL    Collection Time: 07/04/17  7:05 AM   Result Value Ref Range    Ventricular Rate 71 BPM    Atrial Rate 72 BPM    P-R Interval 216 ms    QRS Duration 70 ms    Q-T Interval 412 ms    QTC Calculation (Bezet) 447 ms    Calculated P Axis 50 degrees    Calculated R Axis 28 degrees    Calculated T Axis 48 degrees    Diagnosis       Sinus rhythm with 1st degree AV block with premature atrial complexes  When compared with ECG of 01-JUN-2017 08:16,  premature atrial complexes are now present     CBC WITH AUTOMATED DIFF    Collection Time: 07/04/17  7:26 AM   Result Value Ref Range    WBC 7.5 3.6 - 11.0 K/uL    RBC 3.27 (L) 3.80 - 5.20 M/uL    HGB 10.1 (L) 11.5 - 16.0 g/dL    HCT 29.2 (L) 35.0 - 47.0 %    MCV 89.3 80.0 - 99.0 FL    MCH 30.9 26.0 - 34.0 PG    MCHC 34.6 30.0 - 36.5 g/dL    RDW 15.0 (H) 11.5 - 14.5 %    PLATELET 234 (L) 045 - 400 K/uL    NEUTROPHILS 76 (H) 32 - 75 %    LYMPHOCYTES 15 12 - 49 %    MONOCYTES 8 5 - 13 %    EOSINOPHILS 1 0 - 7 %    BASOPHILS 0 0 - 1 %    ABS. NEUTROPHILS 5.7 1.8 - 8.0 K/UL    ABS. LYMPHOCYTES 1.1 0.8 - 3.5 K/UL    ABS. MONOCYTES 0.6 0.0 - 1.0 K/UL    ABS. EOSINOPHILS 0.1 0.0 - 0.4 K/UL    ABS. BASOPHILS 0.0 0.0 - 0.1 K/UL   METABOLIC PANEL, COMPREHENSIVE    Collection Time: 07/04/17  7:26 AM   Result Value Ref Range    Sodium 131 (L) 136 - 145 mmol/L    Potassium 3.6 3.5 - 5.1 mmol/L    Chloride 99 97 - 108 mmol/L    CO2 23 21 - 32 mmol/L    Anion gap 9 5 - 15 mmol/L    Glucose 100 65 - 100 mg/dL    BUN 19 6 - 20 MG/DL    Creatinine 0.82 0.55 - 1.02 MG/DL    BUN/Creatinine ratio 23 (H) 12 - 20      GFR est AA >60 >60 ml/min/1.73m2    GFR est non-AA >60 >60 ml/min/1.73m2    Calcium 8.2 (L) 8.5 - 10.1 MG/DL    Bilirubin, total 0.5 0.2 - 1.0 MG/DL    ALT (SGPT) 18 12 - 78 U/L    AST (SGOT) 15 15 - 37 U/L    Alk.  phosphatase 101 45 - 117 U/L    Protein, total 6.8 6.4 - 8.2 g/dL    Albumin 3.3 (L) 3.5 - 5.0 g/dL    Globulin 3.5 2.0 - 4.0 g/dL A-G Ratio 0.9 (L) 1.1 - 2.2     MAGNESIUM    Collection Time: 07/04/17  7:26 AM   Result Value Ref Range    Magnesium 2.2 1.6 - 2.4 mg/dL   PROTHROMBIN TIME + INR    Collection Time: 07/04/17  7:26 AM   Result Value Ref Range    INR 4.4 (H) 0.9 - 1.1      Prothrombin time 47.0 (H) 9.0 - 11.1 sec   URINALYSIS W/MICROSCOPIC    Collection Time: 07/04/17  7:26 AM   Result Value Ref Range    Color YELLOW/STRAW      Appearance CLEAR CLEAR      Specific gravity 1.013 1.003 - 1.030      pH (UA) 6.0 5.0 - 8.0      Protein NEGATIVE  NEG mg/dL    Glucose NEGATIVE  NEG mg/dL    Ketone NEGATIVE  NEG mg/dL    Bilirubin NEGATIVE  NEG      Blood NEGATIVE  NEG      Urobilinogen 0.2 0.2 - 1.0 EU/dL    Nitrites NEGATIVE  NEG      Leukocyte Esterase NEGATIVE  NEG      WBC 0-4 0 - 4 /hpf    RBC 0-5 0 - 5 /hpf    Epithelial cells FEW FEW /lpf    Bacteria NEGATIVE  NEG /hpf    Hyaline cast 2-5 0 - 5 /lpf       VITAL SIGNS:  Patient Vitals for the past 12 hrs:   Temp Pulse Resp BP SpO2   07/04/17 1000 - 90 18 137/84 93 %   07/04/17 0945 - 90 17 153/77 93 %   07/04/17 0930 - 77 26 131/75 96 %   07/04/17 0915 - 94 18 162/80 93 %   07/04/17 0900 - 96 16 162/86 97 %   07/04/17 0845 - 95 17 153/83 94 %   07/04/17 0830 - 94 19 140/73 94 %   07/04/17 0820 - 73 19 153/79 95 %   07/04/17 0800 - 90 20 - 94 %   07/04/17 0729 - 94 - (!) 77/47 -   07/04/17 0728 - 92 - 145/73 -   07/04/17 0727 - 91 - 147/69 -   07/04/17 0648 97.7 °F (36.5 °C) 80 20 156/70 95 %       RADIOLOGY RESULTS:  The following have been ordered and reviewed:  CT Results  (Last 48 hours)               07/04/17 0752  CT HEAD WO CONT Final result    Impression:  IMPRESSION: No evidence of acute intracranial abnormality. Moderate white matter   disease likely to chronic small vessel ischemic disease. Narrative:  EXAM:  CT HEAD WO CONT       INDICATION:   fall head injury       COMPARISON: None.        TECHNIQUE: Unenhanced CT of the head was performed using 5 mm images. Brain and   bone windows were generated. CT dose reduction was achieved through use of a   standardized protocol tailored for this examination and automatic exposure   control for dose modulation. FINDINGS:   The ventricles and sulci are normal in size, shape and configuration and   midline. There is moderate white matter disease likely to chronic small vessel   ischemic disease. Incidental bilateral basal ganglia calcifications are   present. . There is no intracranial hemorrhage, extra-axial collection, mass,   mass effect or midline shift. The basilar cisterns are open. No acute infarct   is identified. The bone windows demonstrate no abnormalities. The visualized   portions of the paranasal sinuses and mastoid air cells are clear. EKG interpretation: (Preliminary)  Rhythm: sinus rhythm with 1st degree AV block with PAC's; and regular . Rate (approx.): 71 bpm; Axis: normal; P wave: normal; QRS interval: normal ; ST/T wave: normal;     PROGRESS NOTES:  7:59 AM  I have just reevaluated the patient. I have reviewed Her vital signs and determined there is currently no worsening in their condition or physical exam.  Results have been reviewed with them and their questions have been answered. We will continue to review further results as they come available. Written by Micki Heaton as dictated by Luz Roche. Rubin Giron MD    8:19 AM  Pt was orthostatic, so will give a 500cc bolus of fluids. Written by Micki Heaton as dictated by Luz Roche. Rubin Giron MD    10:19 AM  Pt is ambulatory and has urinated multiple times. Written by Micki Heaton as dictated by Luz Roche. Rubin Giron MD    DIAGNOSIS:    1. Fall, initial encounter    2. Contusion of other part of head, initial encounter    3.  Orthostatic hypotension        PLAN:  Follow-up Information     Follow up With Details Comments Contact Oren Claude, MD Call in 2 days As needed, If symptoms worsen [address] [facility]  [phone]          Current Discharge Medication List            ED COURSE: The patients hospital course has been uncomplicated. Thank you for allowing us to provide you with excellent care today. We hope we addressed all of your concerns and needs. We strive to provide excellent quality care in the Emergency Department. Please rate us as excellent, as anything less than excellent does not meet our expectations. If you feel that you have not received excellent quality care or timely care, please ask to speak to the nurse manager. Please choose us in the future for your continued health care needs. The exam and treatment you received in the Emergency Department were for an urgent problem and are not intended as complete care. It is important that you follow-up with a doctor, nurse practitioner, or physician assistant to:  (1) confirm your diagnosis,  (2) re-evaluation of changes in your illness and treatment, and  (3) for ongoing care. If your symptoms become worse or you do not improve as expected and you are unable to reach your usual health care provider, you should return to the Emergency Department. We are available 24 hours a day. Take this sheet with you when you go to your follow-up visit. If you have any problem arranging the follow-up visit, contact [address] [place] [address] 21 [phone])    Make an appointment with your Primary Care doctor for follow up of this visit. Return to the ER if you are unable to be seen in the time recommended on your discharge instructions. This note is prepared by Lynette Bland acting as scribe for [employer] IncManuel Huber MD : The scribe's documentation has been prepared under my direction and personally reviewed by me in its entirety.  I confirm that the note above accurately reflects all work, treatment, procedures, and medical decision making performed by me.

## 2017-07-04 NOTE — ED NOTES
Pt's daughter states pt has hx of a-fib, but yesterday at the cardiologist's office, pt was in a-fib. Pt in NSR upon arrival to ED room. Pt states she has an L1 compression fracture. Pt to triage via wheelchair. Per pt's daughter, pt normally uses a walker. Pt is A&O x 4 upon arrival to triage.

## 2017-07-05 LAB
ATRIAL RATE: 72 BPM
CALCULATED P AXIS, ECG09: 50 DEGREES
CALCULATED R AXIS, ECG10: 28 DEGREES
CALCULATED T AXIS, ECG11: 48 DEGREES
DIAGNOSIS, 93000: NORMAL
P-R INTERVAL, ECG05: 216 MS
Q-T INTERVAL, ECG07: 412 MS
QRS DURATION, ECG06: 70 MS
QTC CALCULATION (BEZET), ECG08: 447 MS
VENTRICULAR RATE, ECG03: 71 BPM

## 2017-08-04 ENCOUNTER — HOSPITAL ENCOUNTER (INPATIENT)
Age: 82
LOS: 4 days | Discharge: HOME HEALTH | DRG: 309 | End: 2017-08-09
Attending: EMERGENCY MEDICINE | Admitting: INTERNAL MEDICINE
Payer: MEDICARE

## 2017-08-04 ENCOUNTER — APPOINTMENT (OUTPATIENT)
Dept: GENERAL RADIOLOGY | Age: 82
DRG: 309 | End: 2017-08-04
Attending: EMERGENCY MEDICINE
Payer: MEDICARE

## 2017-08-04 DIAGNOSIS — I48.91 ATRIAL FIBRILLATION WITH RVR (HCC): Primary | ICD-10-CM

## 2017-08-04 DIAGNOSIS — R79.1 SUPRATHERAPEUTIC INR: ICD-10-CM

## 2017-08-04 LAB
ALBUMIN SERPL BCP-MCNC: 3.3 G/DL (ref 3.5–5)
ALBUMIN/GLOB SERPL: 0.9 {RATIO} (ref 1.1–2.2)
ALP SERPL-CCNC: 104 U/L (ref 45–117)
ALT SERPL-CCNC: 21 U/L (ref 12–78)
ANION GAP BLD CALC-SCNC: 7 MMOL/L (ref 5–15)
AST SERPL W P-5'-P-CCNC: 17 U/L (ref 15–37)
BASOPHILS # BLD AUTO: 0 K/UL (ref 0–0.1)
BASOPHILS # BLD: 0 % (ref 0–1)
BILIRUB SERPL-MCNC: 0.3 MG/DL (ref 0.2–1)
BUN SERPL-MCNC: 21 MG/DL (ref 6–20)
BUN/CREAT SERPL: 19 (ref 12–20)
CALCIUM SERPL-MCNC: 8.5 MG/DL (ref 8.5–10.1)
CHLORIDE SERPL-SCNC: 99 MMOL/L (ref 97–108)
CK SERPL-CCNC: 32 U/L (ref 26–192)
CO2 SERPL-SCNC: 26 MMOL/L (ref 21–32)
CREAT SERPL-MCNC: 1.1 MG/DL (ref 0.55–1.02)
EOSINOPHIL # BLD: 0.1 K/UL (ref 0–0.4)
EOSINOPHIL NFR BLD: 2 % (ref 0–7)
ERYTHROCYTE [DISTWIDTH] IN BLOOD BY AUTOMATED COUNT: 14.8 % (ref 11.5–14.5)
GLOBULIN SER CALC-MCNC: 3.8 G/DL (ref 2–4)
GLUCOSE SERPL-MCNC: 124 MG/DL (ref 65–100)
HCT VFR BLD AUTO: 33 % (ref 35–47)
HGB BLD-MCNC: 11 G/DL (ref 11.5–16)
LYMPHOCYTES # BLD AUTO: 27 % (ref 12–49)
LYMPHOCYTES # BLD: 1.9 K/UL (ref 0.8–3.5)
MCH RBC QN AUTO: 30.6 PG (ref 26–34)
MCHC RBC AUTO-ENTMCNC: 33.3 G/DL (ref 30–36.5)
MCV RBC AUTO: 91.9 FL (ref 80–99)
MONOCYTES # BLD: 0.4 K/UL (ref 0–1)
MONOCYTES NFR BLD AUTO: 5 % (ref 5–13)
NEUTS SEG # BLD: 4.7 K/UL (ref 1.8–8)
NEUTS SEG NFR BLD AUTO: 66 % (ref 32–75)
PLATELET # BLD AUTO: 166 K/UL (ref 150–400)
POTASSIUM SERPL-SCNC: 4.2 MMOL/L (ref 3.5–5.1)
PROT SERPL-MCNC: 7.1 G/DL (ref 6.4–8.2)
RBC # BLD AUTO: 3.59 M/UL (ref 3.8–5.2)
SODIUM SERPL-SCNC: 132 MMOL/L (ref 136–145)
TROPONIN I SERPL-MCNC: <0.04 NG/ML
WBC # BLD AUTO: 7.1 K/UL (ref 3.6–11)

## 2017-08-04 PROCEDURE — 80053 COMPREHEN METABOLIC PANEL: CPT | Performed by: EMERGENCY MEDICINE

## 2017-08-04 PROCEDURE — 84484 ASSAY OF TROPONIN QUANT: CPT | Performed by: EMERGENCY MEDICINE

## 2017-08-04 PROCEDURE — 71020 XR CHEST PA LAT: CPT

## 2017-08-04 PROCEDURE — 96374 THER/PROPH/DIAG INJ IV PUSH: CPT

## 2017-08-04 PROCEDURE — 96361 HYDRATE IV INFUSION ADD-ON: CPT

## 2017-08-04 PROCEDURE — 36415 COLL VENOUS BLD VENIPUNCTURE: CPT | Performed by: EMERGENCY MEDICINE

## 2017-08-04 PROCEDURE — 99285 EMERGENCY DEPT VISIT HI MDM: CPT

## 2017-08-04 PROCEDURE — 85610 PROTHROMBIN TIME: CPT | Performed by: EMERGENCY MEDICINE

## 2017-08-04 PROCEDURE — 82550 ASSAY OF CK (CPK): CPT | Performed by: EMERGENCY MEDICINE

## 2017-08-04 PROCEDURE — 85025 COMPLETE CBC W/AUTO DIFF WBC: CPT | Performed by: EMERGENCY MEDICINE

## 2017-08-04 PROCEDURE — 94761 N-INVAS EAR/PLS OXIMETRY MLT: CPT

## 2017-08-04 PROCEDURE — 93005 ELECTROCARDIOGRAM TRACING: CPT

## 2017-08-04 NOTE — IP AVS SNAPSHOT
[address] [facility] 
[phone] Patient: Parul Lovett MRN: [id number] :[id number] Current Discharge Medication List  
  
CONTINUE these medications which have CHANGED Dose & Instructions Dispensing Information Comments Morning Noon Evening Bedtime  
 amiodarone 200 mg tablet Commonly known as:  CORDARONE What changed:  when to take this Your last dose was: Your next dose is:    
   
   
 Dose:  200 mg Take 1 Tab by mouth two (2) times a day. Quantity:  60 Tab Refills:  0 CONTINUE these medications which have NOT CHANGED Dose & Instructions Dispensing Information Comments Morning Noon Evening Bedtime AMITRIPTYLINE PO Your last dose was: Your next dose is: Take  by mouth. nightly 10 mg to 15 mg Refills:  0  
     
   
   
   
  
 carvedilol 25 mg tablet Commonly known as:  Nataliia Finder Your last dose was: Your next dose is:    
   
   
 Dose:  12.5 mg  
12.5 mg two (2) times daily (with meals). Refills:  0  
     
   
   
   
  
 latanoprost 0.005 % ophthalmic solution Commonly known as:  Kinjal Cam Your last dose was: Your next dose is:    
   
   
  Refills:  0  
     
   
   
   
  
 levETIRAcetam 500 mg tablet Commonly known as:  KEPPRA Your last dose was: Your next dose is: TAKE ONE TABLET BY MOUTH TWICE DAILY. PATIENT TO KEEP 6/15/16 FOR FURTHER FILLS Quantity:  180 Tab Refills:  5  
     
   
   
   
  
 multivitamin tablet Commonly known as:  ONE A DAY Your last dose was: Your next dose is:    
   
   
 Dose:  1 Tab Take 1 Tab by mouth two (2) times a day. Refills:  0 STOOL SOFTENER PO Your last dose was: Your next dose is:    
   
   
 Dose:  100 mg Take 100 mg by mouth two (2) times a day. Refills:  0 TYLENOL PO Your last dose was: Your next dose is:    
   
   
 Dose:  300 mg Take 300 mg by mouth two (2) times a day. Refills:  0  
     
   
   
   
  
 warfarin 2.5 mg tablet Commonly known as:  COUMADIN Your last dose was: Your next dose is:    
   
   
 Dose:  5 mg Take 5 mg by mouth daily (with dinner). Dr Konrad Patrick to be new cardiologist ,2 tabs except tues and thurs  Indications: Afib Refills:  0 STOP taking these medications   
 naloxone 1 mg/mL injection Commonly known as:  [employer] Where to Get Your Medications Information on where to get these meds will be given to you by the nurse or doctor. ! Ask your nurse or doctor about these medications  
  amiodarone 200 mg tablet

## 2017-08-04 NOTE — IP AVS SNAPSHOT
[address] 
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  
    
 Vibramycin (Doxycycline Calcium) Rash Recent Documentation Height Weight BMI OB Status Smoking Status 1.626 m 65 kg 24.6 kg/m2 Hysterectomy Never Smoker Emergency Contacts Name Discharge Info Relation Home Work Mobile Ruchi Manriquez DISCHARGE CAREGIVER [3] Child [2] [phone] About your hospitalization You were admitted on:  2017 You last received care in the:  [facility] [place] [facility]UL[facility] You were discharged on:  2017 Unit phone number:  [phone] Why you were hospitalized Your primary diagnosis was:  Not on File Your diagnoses also included:  Atrial Fibrillation With Rvr (Hcc) Providers Seen During Your Hospitalizations Provider Role Specialty Primary office phone Jacob Campos MD Attending Provider Emergency Medicine [phone] Radha Peck MD Attending Provider Internal Medicine [phone] Pradeep Cruz MD Attending Provider Hospitalist [phone] Your Primary Care Physician (PCP) Primary Care Physician Office Phone Office Fax Mesa Amadou [phone] Follow-up Information Follow up With Details Comments Contact Info Toyin Goel MD On 8/15/2017 11:00am  hospital follow-up  Please have INR checked and results sent to Dr. Malorie Baker [facility] [address] [phone]  MD Dr. Sidra Newell office will contact the patient with a follow-up appointment [address] [address] [phone] Your Appointments Tuesday August 15, 2017 11:00 AM T HOSPITAL FOLLOW-UP with Ancelmo Lord [address]) [address] [place] [address]  
[phone] Current Discharge Medication List  
  
CONTINUE these medications which have CHANGED Dose & Instructions Dispensing Information Comments Morning Noon Evening Bedtime  
 amiodarone 200 mg tablet Commonly known as:  CORDARONE What changed:  when to take this Your last dose was: Your next dose is:    
   
   
 Dose:  200 mg Take 1 Tab by mouth two (2) times a day. Quantity:  60 Tab Refills:  0 CONTINUE these medications which have NOT CHANGED Dose & Instructions Dispensing Information Comments Morning Noon Evening Bedtime AMITRIPTYLINE PO Your last dose was: Your next dose is: Take  by mouth. nightly 10 mg to 15 mg Refills:  0  
     
   
   
   
  
 carvedilol 25 mg tablet Commonly known as:  [place] Your last dose was: Your next dose is:    
   
   
 Dose:  12.5 mg  
12.5 mg two (2) times daily (with meals). Refills:  0  
     
   
   
   
  
 latanoprost 0.005 % ophthalmic solution Commonly known as:  [place] Your last dose was: Your next dose is:    
   
   
  Refills:  0  
     
   
   
   
  
 levETIRAcetam 500 mg tablet Commonly known as:  KEPPRA Your last dose was: Your next dose is: TAKE ONE TABLET BY MOUTH TWICE DAILY. PATIENT TO KEEP 6/15/16 FOR FURTHER FILLS Quantity:  180 Tab Refills:  5  
     
   
   
   
  
 multivitamin tablet Commonly known as:  ONE A DAY Your last dose was: Your next dose is:    
   
   
 Dose:  1 Tab Take 1 Tab by mouth two (2) times a day. Refills:  0 STOOL SOFTENER PO Your last dose was: Your next dose is:    
   
   
 Dose:  100 mg Take 100 mg by mouth two (2) times a day. Refills:  0  
     
   
   
   
  
 TYLENOL PO Your last dose was: Your next dose is:    
   
   
 Dose:  300 mg Take 300 mg by mouth two (2) times a day. Refills:  0  
     
   
   
   
  
 warfarin 2.5 mg tablet Commonly known as:  COUMADIN Your last dose was: Your next dose is:    
   
   
 Dose:  5 mg Take 5 mg by mouth daily (with dinner). Dr Carli Adams to be new cardiologist ,2 tabs except tues and thurs  Indications: Afib Refills:  0 STOP taking these medications   
 naloxone 1 mg/mL injection Commonly known as:  [employer] Where to Get Your Medications Information on where to get these meds will be given to you by the nurse or doctor. ! Ask your nurse or doctor about these medications  
  amiodarone 200 mg tablet Discharge Instructions Atrial Fibrillation: Care Instructions Your Care Instructions Atrial fibrillation is an irregular and often fast heartbeat. Treating this condition is important for several reasons. It can cause blood clots, which can travel from your heart to your brain and cause a stroke. If you have a fast heartbeat, you may feel lightheaded, dizzy, and weak. An irregular heartbeat can also increase your risk for heart failure. Atrial fibrillation is often the result of another heart condition, such as high blood pressure or coronary artery disease. Making changes to improve your heart condition will help you stay healthy and active. Follow-up care is a key part of your treatment and safety. Be sure to make and go to all appointments, and call your doctor if you are having problems. It's also a good idea to know your test results and keep a list of the medicines you take. How can you care for yourself at home? Medicines · Take your medicines exactly as prescribed. Call your doctor if you think you are having a problem with your medicine. You will get more details on the specific medicines your doctor prescribes. · If your doctor has given you a blood thinner to prevent a stroke, be sure you get instructions about how to take your medicine safely. Blood thinners can cause serious bleeding problems. · Do not take any vitamins, over-the-counter drugs, or herbal products without talking to your doctor first. 
Lifestyle changes · Do not smoke. Smoking can increase your chance of a stroke and heart attack. If you need help quitting, talk to your doctor about stop-smoking programs and medicines. These can increase your chances of quitting for good. · Eat a heart-healthy diet. · Stay at a healthy weight. Lose weight if you need to. · Limit alcohol to 2 drinks a day for men and 1 drink a day for women. Too much alcohol can cause health problems. · Avoid colds and flu. Get a pneumococcal vaccine shot. If you have had one before, ask your doctor whether you need another dose. Get a flu shot every year. If you must be around people with colds or flu, wash your hands often. Activity · If your doctor recommends it, get more exercise. Walking is a good choice. Bit by bit, increase the amount you walk every day. Try for at least 30 minutes on most days of the week. You also may want to swim, bike, or do other activities. Your doctor may suggest that you join a cardiac rehabilitation program so that you can have help increasing your physical activity safely. · Start light exercise if your doctor says it is okay. Even a small amount will help you get stronger, have more energy, and manage stress. Walking is an easy way to get exercise. Start out by walking a little more than you did in the hospital. Gradually increase the amount you walk. · When you exercise, watch for signs that your heart is working too hard. You are pushing too hard if you cannot talk while you are exercising. If you become short of breath or dizzy or have chest pain, sit down and rest immediately. · Check your pulse regularly. Place two fingers on the artery at the palm side of your wrist, in line with your thumb. If your heartbeat seems uneven or fast, talk to your doctor. When should you call for help? Call 911 anytime you think you may need emergency care. For example, call if: 
· You have symptoms of a heart attack. These may include: ¨ Chest pain or pressure, or a strange feeling in the chest. 
¨ Sweating. ¨ Shortness of breath. ¨ Nausea or vomiting. ¨ Pain, pressure, or a strange feeling in the back, neck, jaw, or upper belly or in one or both shoulders or arms. ¨ Lightheadedness or sudden weakness. ¨ A fast or irregular heartbeat. After you call 911, the  may tell you to chew 1 adult-strength or 2 to 4 low-dose aspirin. Wait for an ambulance. Do not try to drive yourself. · You have symptoms of a stroke. These may include: 
¨ Sudden numbness, tingling, weakness, or loss of movement in your face, arm, or leg, especially on only one side of your body. ¨ Sudden vision changes. ¨ Sudden trouble speaking. ¨ Sudden confusion or trouble understanding simple statements. ¨ Sudden problems with walking or balance. ¨ A sudden, severe headache that is different from past headaches. · You passed out (lost consciousness). Call your doctor now or seek immediate medical care if: 
· You have new or increased shortness of breath. · You feel dizzy or lightheaded, or you feel like you may faint. · Your heart rate becomes irregular. · You can feel your heart flutter in your chest or skip heartbeats. Tell your doctor if these symptoms are new or worse. Watch closely for changes in your health, and be sure to contact your doctor if you have any problems. Where can you learn more? Go to http://zackery-jessica.info/. Enter U020 in the search box to learn more about \"Atrial Fibrillation: Care Instructions. \" Current as of: September 21, 2016 Content Version: 11.3 © 3118-1525 [employer]. Care instructions adapted under license by [employer] (which disclaims liability or warranty for this information). If you have questions about a medical condition or this instruction, always ask your healthcare professional. Daryl Gallardo any warranty or liability for your use of this information. DC instructions 
-Continue coumadin as instructed by Cardiology, Dr. Ciera Mejia. Repeat INR check on Monday. 
-Increase amiodarone to twice daily 
-Resume HH PT Discharge Instructions Attachments/References WARFARIN: LONG-TERM USE (ENGLISH) Discharge Orders None Introducing [facility] SERVICES! Dear Trisha Mike: Thank you for requesting a [employer] account. Our records indicate that you already have an active [employer] account. You can access your account anytime at [email or web address] Did you know that you can access your hospital and ER discharge instructions at any time in [employer]? You can also review all of your test results from your hospital stay or ER visit. Additional Information If you have questions, please visit the Frequently Asked Questions section of the [employer] website at [email or web address] Remember, [employer] is NOT to be used for urgent needs. For medical emergencies, dial 911. Now available from your iPhone and Android! General Information Please provide this summary of care documentation to your next provider. Patient Signature:  ____________________________________________________________ Date:  ____________________________________________________________  
  
[facility]  Provider Signature: ____________________________________________________________ Date:  ____________________________________________________________ More Information Taking Warfarin Safely: Care Instructions Your Care Instructions Warfarin is a medicine that you take to prevent blood clots. It is often called a blood thinner. Doctors give warfarin (such as Coumadin) to reduce the risk of blood clots. You may be at risk for blood clots if you have atrial fibrillation or deep vein thrombosis. Some other health problems may also put you at risk. Warfarin slows the amount of time it takes for your blood to clot. It can cause bleeding problems. Even if you've been taking warfarin for a while, it's important to know how to take it safely. Foods and other medicines can affect the way warfarin works. Some can make warfarin work too well. This can cause bleeding problems. And some can make it work poorly, so that it does not prevent blood clots very well. You will need regular blood tests to check how long it takes for your blood to form a clot. This test is called a PT or prothrombin time test. The result of the test is called an INR level. Depending on the test results, your doctor or anticoagulation clinic may adjust your dose of warfarin. Follow-up care is a key part of your treatment and safety. Be sure to make and go to all appointments, and call your doctor if you are having problems. It's also a good idea to know your test results and keep a list of the medicines you take. How can you care for yourself at home? Take warfarin safely · Take your warfarin at the same time each day. · If you miss a dose of warfarin, don't take an extra dose to make up for it. Your doctor can tell you exactly what to do so you don't take too much or too little. · Wear medical alert jewelry that lets others know that you take warfarin. You can buy this at most [employer]. · Don't take warfarin if you are pregnant or planning to get pregnant. Talk to your doctor about how you can prevent getting pregnant while you are taking it. · Don't change your dose or stop taking warfarin unless your doctor tells you to. Effects of medicines and food on warfarin · Don't start or stop taking any medicines, vitamins, or natural remedies unless you first talk to your doctor. Many medicines can affect how warfarin works. These include aspirin and other pain relievers, over-the-counter medicines, multivitamins, dietary supplements, and herbal products. · Tell all of your doctors and pharmacists that you take warfarin. Some prescription medicines can affect how warfarin works. · Keep the amount of vitamin K in your diet about the same from day to day. Do not suddenly eat a lot more or a lot less food that is rich in vitamin K than you usually do. Vitamin K affects how warfarin works and how your blood clots. Talk with your doctor before making big changes in your diet. Foods that have a lot of vitamin K include cooked green vegetables, such as: ¨ Kale, spinach, turnip greens, yesica greens, Swiss chard, and mustard greens. ¨ [place] sprouts, broccoli, and asparagus. · Limit your use of alcohol. Avoid bleeding by preventing falls and injuries · Wear slippers or shoes with nonskid soles. · Remove throw rugs and clutter. · Rearrange furniture and electrical cords to keep them out of walking paths. · Keep stairways, porches, and outside walkways well lit. Use night-lights in hallways and bathrooms. · Be extra careful when you work with sharp tools or knives. When should you call for help? Call 911 anytime you think you may need emergency care. For example, call if: 
· You have a sudden, severe headache that is different from past headaches. Call your doctor now or seek immediate medical care if: 
· You have any abnormal bleeding, such as: 
¨ Nosebleeds. ¨ Vaginal bleeding that is different (heavier, more frequent, at a different time of the month) than what you are used to. ¨ Bloody or black stools, or rectal bleeding. ¨ Bloody or pink urine. Watch closely for changes in your health, and be sure to contact your doctor if you have any problems. Where can you learn more? Go to http://zackery-jessica.info/. Enter W316 in the search box to learn more about \"Taking Warfarin Safely: Care Instructions. \" Current as of: November 15, 2016 Content Version: 11.3 © 2088-4896 [employer]. Care instructions adapted under license by [employer] (which disclaims liability or warranty for this information). If you have questions about a medical condition or this instruction, always ask your healthcare professional. [address] any warranty or liability for your use of this information.

## 2017-08-05 ENCOUNTER — APPOINTMENT (OUTPATIENT)
Dept: CT IMAGING | Age: 82
DRG: 309 | End: 2017-08-05
Attending: EMERGENCY MEDICINE
Payer: MEDICARE

## 2017-08-05 PROBLEM — I48.91 ATRIAL FIBRILLATION WITH RVR (HCC): Status: ACTIVE | Noted: 2017-08-05

## 2017-08-05 LAB
APPEARANCE UR: ABNORMAL
ATRIAL RATE: 277 BPM
BACTERIA URNS QL MICRO: NEGATIVE /HPF
BILIRUB UR QL: NEGATIVE
CALCULATED R AXIS, ECG10: 44 DEGREES
CALCULATED T AXIS, ECG11: 62 DEGREES
COLOR UR: ABNORMAL
DIAGNOSIS, 93000: NORMAL
EPITH CASTS URNS QL MICRO: ABNORMAL /LPF
GLUCOSE UR STRIP.AUTO-MCNC: NEGATIVE MG/DL
HGB UR QL STRIP: ABNORMAL
HYALINE CASTS URNS QL MICRO: ABNORMAL /LPF (ref 0–5)
INR PPP: 5.1 (ref 0.9–1.1)
KETONES UR QL STRIP.AUTO: NEGATIVE MG/DL
LEUKOCYTE ESTERASE UR QL STRIP.AUTO: ABNORMAL
NITRITE UR QL STRIP.AUTO: NEGATIVE
PH UR STRIP: 5 [PH] (ref 5–8)
PROT UR STRIP-MCNC: NEGATIVE MG/DL
PROTHROMBIN TIME: 54.3 SEC (ref 9–11.1)
Q-T INTERVAL, ECG07: 332 MS
QRS DURATION, ECG06: 86 MS
QTC CALCULATION (BEZET), ECG08: 441 MS
RBC #/AREA URNS HPF: ABNORMAL /HPF (ref 0–5)
SP GR UR REFRACTOMETRY: 1.01 (ref 1–1.03)
UA: UC IF INDICATED,UAUC: ABNORMAL
UROBILINOGEN UR QL STRIP.AUTO: 0.2 EU/DL (ref 0.2–1)
VENTRICULAR RATE, ECG03: 106 BPM
WBC URNS QL MICRO: ABNORMAL /HPF (ref 0–4)

## 2017-08-05 PROCEDURE — 74011000250 HC RX REV CODE- 250: Performed by: INTERNAL MEDICINE

## 2017-08-05 PROCEDURE — 70450 CT HEAD/BRAIN W/O DYE: CPT

## 2017-08-05 PROCEDURE — 74011000250 HC RX REV CODE- 250: Performed by: EMERGENCY MEDICINE

## 2017-08-05 PROCEDURE — 74011250636 HC RX REV CODE- 250/636: Performed by: EMERGENCY MEDICINE

## 2017-08-05 PROCEDURE — 74011250637 HC RX REV CODE- 250/637: Performed by: INTERNAL MEDICINE

## 2017-08-05 PROCEDURE — 81001 URINALYSIS AUTO W/SCOPE: CPT | Performed by: EMERGENCY MEDICINE

## 2017-08-05 PROCEDURE — B246ZZZ ULTRASONOGRAPHY OF RIGHT AND LEFT HEART: ICD-10-PCS | Performed by: INTERNAL MEDICINE

## 2017-08-05 PROCEDURE — 87086 URINE CULTURE/COLONY COUNT: CPT | Performed by: EMERGENCY MEDICINE

## 2017-08-05 PROCEDURE — 65660000000 HC RM CCU STEPDOWN

## 2017-08-05 RX ORDER — ACETAMINOPHEN 325 MG/1
300 TABLET ORAL 2 TIMES DAILY
Status: DISCONTINUED | OUTPATIENT
Start: 2017-08-05 | End: 2017-08-09 | Stop reason: HOSPADM

## 2017-08-05 RX ORDER — SODIUM CHLORIDE 0.9 % (FLUSH) 0.9 %
5-10 SYRINGE (ML) INJECTION EVERY 8 HOURS
Status: DISCONTINUED | OUTPATIENT
Start: 2017-08-05 | End: 2017-08-09 | Stop reason: HOSPADM

## 2017-08-05 RX ORDER — LATANOPROST 50 UG/ML
1 SOLUTION/ DROPS OPHTHALMIC EVERY EVENING
Status: DISCONTINUED | OUTPATIENT
Start: 2017-08-05 | End: 2017-08-09 | Stop reason: HOSPADM

## 2017-08-05 RX ORDER — SODIUM CHLORIDE 0.9 % (FLUSH) 0.9 %
5-10 SYRINGE (ML) INJECTION AS NEEDED
Status: DISCONTINUED | OUTPATIENT
Start: 2017-08-05 | End: 2017-08-09 | Stop reason: HOSPADM

## 2017-08-05 RX ORDER — DOCUSATE SODIUM 100 MG/1
100 CAPSULE, LIQUID FILLED ORAL 2 TIMES DAILY
Status: DISCONTINUED | OUTPATIENT
Start: 2017-08-05 | End: 2017-08-09 | Stop reason: HOSPADM

## 2017-08-05 RX ORDER — CARVEDILOL 12.5 MG/1
12.5 TABLET ORAL 2 TIMES DAILY WITH MEALS
Status: DISCONTINUED | OUTPATIENT
Start: 2017-08-05 | End: 2017-08-09 | Stop reason: HOSPADM

## 2017-08-05 RX ORDER — LEVETIRACETAM 500 MG/1
500 TABLET ORAL 2 TIMES DAILY
Status: DISCONTINUED | OUTPATIENT
Start: 2017-08-05 | End: 2017-08-09 | Stop reason: HOSPADM

## 2017-08-05 RX ORDER — AMIODARONE HYDROCHLORIDE 200 MG/1
200 TABLET ORAL DAILY
Status: DISCONTINUED | OUTPATIENT
Start: 2017-08-05 | End: 2017-08-06

## 2017-08-05 RX ORDER — AMITRIPTYLINE HYDROCHLORIDE 10 MG/1
15 TABLET, FILM COATED ORAL
Status: DISCONTINUED | OUTPATIENT
Start: 2017-08-05 | End: 2017-08-09 | Stop reason: HOSPADM

## 2017-08-05 RX ORDER — POLYETHYLENE GLYCOL 3350 17 G/17G
17 POWDER, FOR SOLUTION ORAL DAILY
Status: DISCONTINUED | OUTPATIENT
Start: 2017-08-05 | End: 2017-08-07

## 2017-08-05 RX ORDER — ACETAMINOPHEN 325 MG/1
325 TABLET ORAL
Status: DISCONTINUED | OUTPATIENT
Start: 2017-08-05 | End: 2017-08-09 | Stop reason: HOSPADM

## 2017-08-05 RX ORDER — DILTIAZEM HYDROCHLORIDE 5 MG/ML
5 INJECTION INTRAVENOUS
Status: COMPLETED | OUTPATIENT
Start: 2017-08-05 | End: 2017-08-05

## 2017-08-05 RX ORDER — THERA TABS 400 MCG
1 TAB ORAL DAILY
Status: DISCONTINUED | OUTPATIENT
Start: 2017-08-05 | End: 2017-08-09 | Stop reason: HOSPADM

## 2017-08-05 RX ORDER — ONDANSETRON 2 MG/ML
4 INJECTION INTRAMUSCULAR; INTRAVENOUS
Status: DISCONTINUED | OUTPATIENT
Start: 2017-08-05 | End: 2017-08-09 | Stop reason: HOSPADM

## 2017-08-05 RX ADMIN — DOCUSATE SODIUM 100 MG: 100 CAPSULE, LIQUID FILLED ORAL at 08:48

## 2017-08-05 RX ADMIN — LEVETIRACETAM 500 MG: 500 TABLET ORAL at 09:33

## 2017-08-05 RX ADMIN — Medication 10 ML: at 23:42

## 2017-08-05 RX ADMIN — DILTIAZEM HYDROCHLORIDE 5 MG: 5 INJECTION INTRAVENOUS at 04:13

## 2017-08-05 RX ADMIN — LEVETIRACETAM 500 MG: 500 TABLET ORAL at 17:17

## 2017-08-05 RX ADMIN — CARVEDILOL 12.5 MG: 12.5 TABLET, FILM COATED ORAL at 08:49

## 2017-08-05 RX ADMIN — LATANOPROST 1 DROP: 50 SOLUTION OPHTHALMIC at 17:26

## 2017-08-05 RX ADMIN — DEXTROSE MONOHYDRATE 10 MG/HR: 50 INJECTION, SOLUTION INTRAVENOUS at 08:51

## 2017-08-05 RX ADMIN — THERA TABS 1 TABLET: TAB at 09:33

## 2017-08-05 RX ADMIN — DOCUSATE SODIUM 100 MG: 100 CAPSULE, LIQUID FILLED ORAL at 17:17

## 2017-08-05 RX ADMIN — DEXTROSE MONOHYDRATE 10 MG/HR: 50 INJECTION, SOLUTION INTRAVENOUS at 04:20

## 2017-08-05 RX ADMIN — ACETAMINOPHEN 325 MG: 325 TABLET, FILM COATED ORAL at 09:33

## 2017-08-05 RX ADMIN — Medication 10 ML: at 17:20

## 2017-08-05 RX ADMIN — AMITRIPTYLINE HYDROCHLORIDE 15 MG: 10 TABLET, FILM COATED ORAL at 23:41

## 2017-08-05 RX ADMIN — SODIUM CHLORIDE 500 ML: 900 INJECTION, SOLUTION INTRAVENOUS at 03:59

## 2017-08-05 RX ADMIN — DEXTROSE MONOHYDRATE 5 MG/HR: 50 INJECTION, SOLUTION INTRAVENOUS at 23:41

## 2017-08-05 RX ADMIN — AMIODARONE HYDROCHLORIDE 200 MG: 200 TABLET ORAL at 08:49

## 2017-08-05 NOTE — ED NOTES
Care assumed of patient @ this time & intro with rounding done, with report from ___Carolyn Arreola, RN________________. Patient resting quietly on stretcher without verbal complaints.

## 2017-08-05 NOTE — CONSULTS
Consult    NAME: Khadar Hi   :  1924   MRN:  [id number]     Date/Time:  2017 1:49 PM    Patient PCP: Toro Resendiz MD  ________________________________________________________________________     Assessment:     1. Elderly, Compression fracture, recurrent afib with RVR, increased INR, dyspnea  2. PAF on anticoagulation  3. HTN  4. Hx of low sodium  5. Hx sz disorder  6. Attentive daughter  9. DNR  8. Cardiologist:  Dr. Daniel Quezada:     Afib with RVR. Hold warfarin for now. Cont amiodarone, coreg, low dose cardizem ggt. CXR suggests pulm edema, on edema on exam, but some dyspnea. Check BNP, prn lasix if bp allows, currently low. Check Echo. Consult Dr. Saurabh Copeland on Monday. 1. Hold warfarin for now  2. Cont amiodarone 200mg daily  3. Decrease cardizem ggt to 5 due to low bp  4. Cont coreg  5. PRN lasix          [x]        High complexity decision making was performed        Subjective:   CHIEF COMPLAINT: palpitations    HISTORY OF PRESENT ILLNESS:       Khadar Hi is a 80 y.o.  female who presents with Palpitations. She has a history of paroxysmal afib but has been in afib persistently for the last 5 days. Dr. Marie Taylor increased her amiodarone but still this did not make a difference. Patient denies and CP or SOB. She did have a Kyphoplasty planned but this delayed due to IR wanting a repeat MRI. After this coumadin was restarted (and repeat MRI canceled with plans for Dr. Saurabh Copeland to do the Kyphoplasty instead of IR). This was suppose to take place on 17. Patient had a BP of 88 systolic before leaving home. Cardiology was consulted and recommended diltiazem gtt. She has had acceptable control at a rate of 10 mg/kg. She states that she does feel better at this time. The only other new medication she has had is Myrbetriq which was recently started for overactive bladder. No chest pain. Some dyspnea. No clear orthopnea, but does sleep in a recliner. No syncope.  No other complaints. We were asked to consult for work up and evaluation of the above problems. Past Medical History:   Diagnosis Date    Alopecia     Arrhythmia     atrial fib,?svt    Arthritis     Atrial fibrillation (HCC)     Benign essential HTN     Benign neoplasm of vulva     CAD (coronary artery disease)     Cholelithiases     Colon cancer (HCC)     Constipation     Dyspareunia     Glaucoma     History of mixed connective tissue disease     Hx of viral pericarditis     Hyperactivity of bladder     Hypercholesterolemia     Ill-defined condition     macular degeneration    Insomnia     Macular degeneration     Neck pain     Psoriasis     Rectocele     Renal cyst     Seizure disorder (HCC)     Sjogren's disease ([facility] [address]     Thrombophlebitis     TIA (transient ischemic attack)     Vaginal vault prolapse, posthysterectomy     Varicose veins       Past Surgical History:   Procedure Laterality Date    HX BREAST LUMPECTOMY      left breast    HX CATARACT REMOVAL      HX COLECTOMY  1955    partial    HX HEMORRHOIDECTOMY      HX HYSTERECTOMY      HX SHOULDER ARTHROSCOPY      left     Allergies   Allergen Reactions    Codeine Nausea Only    Pcn [Penicillins] Unknown (comments)     Cant remember reaction as a child    Tetanus Vaccines And Toxoid Unknown (comments)     Does not take tetanus due to tb hx    Vibramycin [Doxycycline Calcium] Rash      Meds:  See below  Social History   Substance Use Topics    Smoking status: Never Smoker    Smokeless tobacco: Never Used    Alcohol use No      Family History   Problem Relation Age of Onset    Cancer Father     Inflammatory Bowel Dz Mother     Heart Disease Brother     Diabetes Brother        REVIEW OF SYSTEMS:     []         Unable to obtain  ROS due to ---   [x]         Total of 12 systems reviewed as follows:     Total of 12 systems reviewed as follows:       POSITIVE= Bold text  Negative = normal text  General:  fever, chills, sweats, generalized weakness, weight loss/gain,      loss of appetite   Eyes:    blurred vision, eye pain, loss of vision, double vision  ENT:    rhinorrhea, pharyngitis   Respiratory:   cough, sputum production, SOB, SANTOS, wheezing, pleuritic pain   Cardiology:   chest pain, palpitations, orthopnea, PND, edema, syncope   Gastrointestinal:  abdominal pain , N/V, diarrhea, dysphagia, constipation, bleeding   Genitourinary:  frequency, urgency, dysuria, hematuria, incontinence   Muskuloskeletal :  arthralgia, myalgia, back pain  Hematology:  easy bruising, nose or gum bleeding, lymphadenopathy   Dermatological: rash, ulceration, pruritis, color change / jaundice  Endocrine:   hot flashes or polydipsia   Neurological:  headache, dizziness, confusion, focal weakness, paresthesia,     Speech difficulties, memory loss, gait difficulty  Psychological: Feelings of anxiety, depression, agitation    Objective:      Physical Exam:    Last 24hrs VS reviewed since prior progress note. Most recent are:    Visit Vitals    BP (!) 90/34 (BP 1 Location: Right arm, BP Patient Position: At rest)    Pulse 67    Temp 97.4 °F (36.3 °C)    Resp 20    Ht 5' 4\" (1.626 m)    Wt 64.4 kg (142 lb)    SpO2 93%    BMI 24.37 kg/m2       Intake/Output Summary (Last 24 hours) at 08/05/17 1349  Last data filed at 08/05/17 1300   Gross per 24 hour   Intake           507.67 ml   Output                0 ml   Net           507.67 ml        General Appearance: Well developed, elderly, alert & oriented x 3,    no acute distress. Ears/Nose/Mouth/Throat: Pupils equal and round, Hearing grossly normal.  Neck: Supple. JVP within normal limits. Carotids good upstrokes, with no bruit. Chest: Lungs clear to auscultation bilaterally. Cardiovascular: Irregular rate and rhythm, S1S2 normal, no murmur, rubs, gallops. Abdomen: Soft, non-tender, bowel sounds are active. No organomegaly. Extremities: No edema bilaterally.  Femoral pulses +2, Distal Pulses +1.  Skin: Warm and dry. Neuro: CN II-XII grossly intact, Strength and sensation grossly intact. Data:      Prior to Admission medications    Medication Sig Start Date End Date Taking? Authorizing Provider   DOCUSATE CALCIUM (STOOL SOFTENER PO) Take 100 mg by mouth two (2) times a day. Historical Provider   ACETAMINOPHEN (TYLENOL PO) Take 300 mg by mouth two (2) times a day. Historical Provider   levETIRAcetam (KEPPRA) 500 mg tablet TAKE ONE TABLET BY MOUTH TWICE DAILY. PATIENT TO KEEP 6/15/16 FOR FURTHER FILLS 6/15/17   Christian Henriquez MD   latanoprost (XALATAN) 0.005 % ophthalmic solution  17   Historical Provider   carvedilol (COREG) 25 mg tablet 12.5 mg two (2) times daily (with meals). 17   Historical Provider   naloxone (NARCAN) 1 mg/mL injection 2 mL by IntraMUSCular route once as needed for up to 1 dose. 17   Raiza Neves MD   multivitamin (ONE A DAY) tablet Take 1 Tab by mouth two (2) times a day. Historical Provider   AMITRIPTYLINE HCL (AMITRIPTYLINE PO) Take  by mouth. Alternate nightly 10 mg & 15 mg    Historical Provider   warfarin (COUMADIN) 2.5 mg tablet Take 5 mg by mouth daily (with dinner). Dr Vandana Chang to be new cardiologist ,2 tabs except tues and thurs  Indications: Afib    Historical Provider   amiodarone (CORDARONE) 200 mg tablet Take 200 mg by mouth daily. Historical Provider       Recent Results (from the past 24 hour(s))   EKG, 12 LEAD, INITIAL    Collection Time: 17  9:55 PM   Result Value Ref Range    Ventricular Rate 106 BPM    Atrial Rate 277 BPM    QRS Duration 86 ms    Q-T Interval 332 ms    QTC Calculation (Bezet) 441 ms    Calculated R Axis 44 degrees    Calculated T Axis 62 degrees    Diagnosis       Atrial flutter with variable AV block  When compared with ECG of 2017 07:05,  Atrial flutter has replaced Sinus rhythm  Vent.  rate has increased BY  35 BPM     CBC WITH AUTOMATED DIFF    Collection Time: 17 10:19 PM   Result Value Ref Range    WBC 7.1 3.6 - 11.0 K/uL    RBC 3.59 (L) 3.80 - 5.20 M/uL    HGB 11.0 (L) 11.5 - 16.0 g/dL    HCT 33.0 (L) 35.0 - 47.0 %    MCV 91.9 80.0 - 99.0 FL    MCH 30.6 26.0 - 34.0 PG    MCHC 33.3 30.0 - 36.5 g/dL    RDW 14.8 (H) 11.5 - 14.5 %    PLATELET 902 777 - 805 K/uL    NEUTROPHILS 66 32 - 75 %    LYMPHOCYTES 27 12 - 49 %    MONOCYTES 5 5 - 13 %    EOSINOPHILS 2 0 - 7 %    BASOPHILS 0 0 - 1 %    ABS. NEUTROPHILS 4.7 1.8 - 8.0 K/UL    ABS. LYMPHOCYTES 1.9 0.8 - 3.5 K/UL    ABS. MONOCYTES 0.4 0.0 - 1.0 K/UL    ABS. EOSINOPHILS 0.1 0.0 - 0.4 K/UL    ABS. BASOPHILS 0.0 0.0 - 0.1 K/UL   METABOLIC PANEL, COMPREHENSIVE    Collection Time: 08/04/17 10:19 PM   Result Value Ref Range    Sodium 132 (L) 136 - 145 mmol/L    Potassium 4.2 3.5 - 5.1 mmol/L    Chloride 99 97 - 108 mmol/L    CO2 26 21 - 32 mmol/L    Anion gap 7 5 - 15 mmol/L    Glucose 124 (H) 65 - 100 mg/dL    BUN 21 (H) 6 - 20 MG/DL    Creatinine 1.10 (H) 0.55 - 1.02 MG/DL    BUN/Creatinine ratio 19 12 - 20      GFR est AA 56 (L) >60 ml/min/1.73m2    GFR est non-AA 46 (L) >60 ml/min/1.73m2    Calcium 8.5 8.5 - 10.1 MG/DL    Bilirubin, total 0.3 0.2 - 1.0 MG/DL    ALT (SGPT) 21 12 - 78 U/L    AST (SGOT) 17 15 - 37 U/L    Alk.  phosphatase 104 45 - 117 U/L    Protein, total 7.1 6.4 - 8.2 g/dL    Albumin 3.3 (L) 3.5 - 5.0 g/dL    Globulin 3.8 2.0 - 4.0 g/dL    A-G Ratio 0.9 (L) 1.1 - 2.2     CK W/ REFLX CKMB    Collection Time: 08/04/17 10:19 PM   Result Value Ref Range    CK 32 26 - 192 U/L   TROPONIN I    Collection Time: 08/04/17 10:19 PM   Result Value Ref Range    Troponin-I, Qt. <0.04 <0.05 ng/mL   PROTHROMBIN TIME + INR    Collection Time: 08/04/17 10:20 PM   Result Value Ref Range    INR 5.1 (HH) 0.9 - 1.1      Prothrombin time 54.3 (H) 9.0 - 11.1 sec   URINALYSIS W/ REFLEX CULTURE    Collection Time: 08/05/17  1:09 AM   Result Value Ref Range    Color YELLOW/STRAW      Appearance CLOUDY (A) CLEAR      Specific gravity 1.011 1.003 - 1.030 pH (UA) 5.0 5.0 - 8.0      Protein NEGATIVE  NEG mg/dL    Glucose NEGATIVE  NEG mg/dL    Ketone NEGATIVE  NEG mg/dL    Bilirubin NEGATIVE  NEG      Blood SMALL (A) NEG      Urobilinogen 0.2 0.2 - 1.0 EU/dL    Nitrites NEGATIVE  NEG      Leukocyte Esterase LARGE (A) NEG      WBC  0 - 4 /hpf    RBC 5-10 0 - 5 /hpf    Epithelial cells FEW FEW /lpf    Bacteria NEGATIVE  NEG /hpf    UA:UC IF INDICATED URINE CULTURE ORDERED (A) CNI      Hyaline cast 0-2 0 - 5 /lpf

## 2017-08-05 NOTE — ED NOTES
Assumed care of patient who presents to ED with c/o irregular HR. Patient shows A-fib with RVR on cardiac monitor. She c/o fatigue and pressure in her her head. Patient is alert and oriented x 4 and denies CP and SOB. BP stable at this time. Daughter at bedside and call bell in reach. Will continue to monitor.

## 2017-08-05 NOTE — PROGRESS NOTES
[address] SHIFT NURSING NOTE    SHIFT SUMMARY:  0730: Primary Nurse Keith Neumann, RN and Cyndee Gaitan, RN performed a dual skin assessment on this patient No impairment noted  Magdaleno score is 19.  0800: PTA meds incomplete, unable to provide list of home meds  1050: Patient had a 2.6 second pause. Patient resting quietly in bed with no complaint voiced. 1100: Paged Dr. Randall Gay awaiting call  [phone]: Dr. Randall Gay called and inform RN - aware of consult          Admission Date 8/4/2017   Admission Diagnosis Atrial fibrillation with RVR ([facility] [address]   Consults IP CONSULT TO CARDIOLOGY        Consults   [] PT   [] OT   [] Speech   [] Palliative      [] Hospice    [] Case Management   [] None   Cardiac Monitoring   [x] Yes   [] No     Antibiotics   [] Yes   [x] No   GI Prophylaxis  (Ex: Protonix, Pepcid, etc,.)   [] Yes   [x] No          DVT Prophylaxis   SCDs:             Zackery stockings:         [x] Medication (Ex: Lovenox, Eliquis, Brilinta, Coumadin,  Heparin, etc..)   [] Contraindicated   [] No VTE needed       Urinary Catheter             LDAs               Peripheral IV 08/05/17 Right Hand (Active)   Site Assessment Clean, dry, & intact 8/5/2017  7:00 AM   Phlebitis Assessment 0 8/5/2017  7:00 AM   Infiltration Assessment 0 8/5/2017  7:00 AM   Dressing Status Clean, dry, & intact 8/5/2017  7:00 AM   Dressing Type Transparent 8/5/2017  7:00 AM   Hub Color/Line Status Pink; Infusing;Flushed 8/5/2017  7:00 AM                      I/Os   Intake/Output Summary (Last 24 hours) at 08/05/17 1111  Last data filed at 08/05/17 0700   Gross per 24 hour   Intake            27.67 ml   Output                0 ml   Net            27.67 ml         Activity Level Activity Level: Up with Assistance     Activity Assistance: Partial (one person)   Diet Active Orders   Diet    DIET CARDIAC Regular      Purposeful Rounding every 1-2 hour?    [x] Yes    Corey Score  Total Score: 4   Bed Alarm (If score 3 or >)   [x] Yes    [] Refused (See signed refusal form in chart)   Magdaleno Score  Magdaleno Score: 19       Magdaleno Score (if score 14 or less)   [] PMT consult   [] Nutrition consult   [] Wound Care consult      []  Specialty bed         Influenza Vaccine Received Flu Vaccine for Current Season (usually Sept-March): Not Flu Season               Needs prior to discharge:   Home O2 required:    [] Yes   [x] No     If yes, how much O2 required? Other:    Last Bowel Movement Date: 08/04/17 (per pt)   Readmission Risk Assessment Tool Score High Risk            25       Total Score        3 Has Seen PCP in Last 6 Months (Yes=3, No=0)    4 IP Visits Last 12 Months (1-3=4, 4=9, >4=11)    5 Pt. Coverage (Medicare=5 , Medicaid, or Self-Pay=4)    13 Charlson Comorbidity Score (Age + Comorbid Conditions)        Criteria that do not apply:    . Living with Significant Other. Assisted Living. LTAC. SNF.  or   Rehab    Patient Length of Stay (>5 days = 3)       Expected Length of Stay - - -   Actual Length of Stay 0

## 2017-08-05 NOTE — ED NOTES
Dr. Jossy Cardoso in to update patient and family on test results and plan for admission. Patient in NAD but remains in A-fib. VSS.

## 2017-08-05 NOTE — PROGRESS NOTES
08/05/17 1531   Vital Signs   Temp 97.2 °F (36.2 °C)   Temp Source Oral   Pulse (Heart Rate) 60   Heart Rate Source Monitor   Resp Rate 24   O2 Sat (%) 96 %   Level of Consciousness Alert   BP 99/44   MAP (Calculated) (!) 62   BP 1 Method Automatic   BP 1 Location Left arm   BP Patient Position At rest   MEWS Score 3   Oxygen Therapy   O2 Device Room air     Patient resting quietly in bed with no complaint voiced. Daughter at bedside.

## 2017-08-05 NOTE — PROGRESS NOTES
TRANSFER - IN REPORT:    [facility]  being received from ED (unit) for routine progression of care      Report consisted of patients Situation, Background, Assessment and   Recommendations(SBAR). Information from the following report(s) SBAR, Kardex, Intake/Output, MAR, Recent Results and Cardiac Rhythm AFIB was reviewed with the receiving nurse. Opportunity for questions and clarification was provided.

## 2017-08-05 NOTE — ED PROVIDER NOTES
HPI Comments: Parul Lovett is a 80 y.o. female with PMhx significant for HTN, CAD, hypercholesterolemia, bladder hyperactivity, glaucoma, macular degeneration, TIA, atrial fibrillation, and arrhythmia who presents ambulatory to the ED with cc of persistent headache on the top of her head and a-fib with RVR which started after starting Myrbetriq 9 days ago. She describes her headache as pressure and indicates she has pain behind her eyes. Pt stopped taking the medication, but her a-fib worsened since 7/31. She also c/o urinary frequency, palpitations, and fatigue. Pt also notes that Dr. Robi Tovar increased her Amniodarone on 8/1 from 0.5 to 1 200 mg tablet daily. Her family indicates that her a-fib usually resolves itself, but has not on this occasion. She most recently took 150 mg of Tylenol at 1500 today. Pt also takes Coumadin and her last INR was 4.4 during her most recent ED visit on 7/4 when pt was seen for a ground level fall. She has been off of Coumadin for 4 days anticipating a kyphoplasty which was cancelled. Her daughter notes that pt currently has an L1 fracture. She denies vision changes, difficulty eating or drinking, or feeling like her heart is racing upon exam.     Social Hx: - Tobacco, - EtOH, - Illicit Drugs    PCP: Barbara Sifuentes MD  Cardiology: Dr. Kiah Herrera    There are no other complaints, changes or physical findings at this time. The history is provided by the patient and a relative.         Past Medical History:   Diagnosis Date    Alopecia     Arrhythmia     atrial fib,?svt    Arthritis     Atrial fibrillation (HCC)     Benign essential HTN     Benign neoplasm of vulva     CAD (coronary artery disease)     Cholelithiases     Colon cancer (HCC)     Constipation     Dyspareunia     Glaucoma     History of mixed connective tissue disease     Hx of viral pericarditis     Hyperactivity of bladder     Hypercholesterolemia     Ill-defined condition     macular degeneration    Insomnia     Macular degeneration     Neck pain     Psoriasis     Rectocele     Renal cyst     Seizure disorder (HCC)     Sjogren's disease (HCC)     Thrombophlebitis     TIA (transient ischemic attack)     Vaginal vault prolapse, posthysterectomy     Varicose veins        Past Surgical History:   Procedure Laterality Date    HX BREAST LUMPECTOMY      left breast    HX CATARACT REMOVAL      HX COLECTOMY  1955    partial    HX HEMORRHOIDECTOMY      HX HYSTERECTOMY      HX SHOULDER ARTHROSCOPY      left         Family History:   Problem Relation Age of Onset    Cancer Father     Inflammatory Bowel Dz Mother     Heart Disease Brother     Diabetes Brother        Social History     Social History    Marital status:      Spouse name: N/A    Number of children: N/A    Years of education: N/A     Occupational History    Not on file. Social History Main Topics    Smoking status: Never Smoker    Smokeless tobacco: Never Used    Alcohol use No    Drug use: No    Sexual activity: No     Other Topics Concern    Not on file     Social History Narrative         ALLERGIES: Codeine; Pcn [penicillins]; Tetanus vaccines and toxoid; and Vibramycin [doxycycline calcium]    Review of Systems   Constitutional: Positive for fatigue. Negative for activity change, appetite change and fever. - difficulty eating or drinking   HENT: Negative for congestion, rhinorrhea and sore throat. Eyes: Negative for visual disturbance. Respiratory: Negative. Negative for cough, shortness of breath and wheezing. Cardiovascular: Positive for palpitations. Negative for chest pain and leg swelling.        + irregular pulse, - feeling like heart is racing   Gastrointestinal: Negative. Negative for abdominal distention, abdominal pain, constipation, diarrhea, nausea and vomiting. Endocrine: Negative. Genitourinary: Positive for frequency.  Negative for difficulty urinating, dysuria, menstrual problem, vaginal bleeding and vaginal discharge. Musculoskeletal: Negative. Negative for arthralgias, joint swelling and myalgias. Skin: Negative. Negative for rash. Neurological: Positive for headaches. Negative for dizziness, weakness and light-headedness. Psychiatric/Behavioral: Negative. Patient Vitals for the past 12 hrs:   Temp Pulse Resp BP SpO2   08/05/17 0410 - (!) 108 18 140/64 96 %   08/05/17 0300 - (!) 107 19 147/78 94 %   08/05/17 0230 - (!) 106 24 128/66 92 %   08/05/17 0200 - (!) 104 19 143/68 95 %   08/05/17 0154 - (!) 105 18 141/76 96 %   08/05/17 0024 - (!) 112 19 119/69 96 %   08/05/17 0004 - (!) 110 15 - 95 %   08/05/17 0002 - (!) 110 15 130/75 95 %   08/04/17 2151 98 °F (36.7 °C) 82 18 107/61 95 %       Physical Exam   Constitutional: She is oriented to person, place, and time. She appears well-developed and well-nourished. Non toxic. NAD. HENT:   Head: Atraumatic. Eyes: EOM are normal.   Cardiovascular: Normal heart sounds and intact distal pulses. An irregularly irregular rhythm present. Tachycardia present. Exam reveals no gallop and no friction rub. No murmur heard. Tachycardia (low 100s to 120s)   Pulmonary/Chest: Effort normal and breath sounds normal. No respiratory distress. She has no wheezes. She has no rales. She exhibits no tenderness. Abdominal: Soft. Bowel sounds are normal. She exhibits no distension and no mass. There is no tenderness. There is no rebound and no guarding. Musculoskeletal: Normal range of motion. She exhibits no edema or tenderness. Neurological: She is oriented to person, place, and time. Skin: Skin is warm. Psychiatric: She has a normal mood and affect. Nursing note and vitals reviewed. MDM  Number of Diagnoses or Management Options  Diagnosis management comments:   DDx: Afib RVR with unclear ideology. Known L1 fracture with upcoming kyphoplasty.   Will obtain basic blood work and urine to determine ideology. Will obtain head CT due to pt being on and off anticoagulant and is symptomatic. Amount and/or Complexity of Data Reviewed  Clinical lab tests: ordered and reviewed  Tests in the radiology section of CPT®: ordered and reviewed  Tests in the medicine section of CPT®: ordered and reviewed  Obtain history from someone other than the patient: yes (Patient's family)  Review and summarize past medical records: yes  Discuss the patient with other providers: yes (Cardiology, Hospitalist)  Independent visualization of images, tracings, or specimens: yes    Critical Care  Total time providing critical care: > 105 minutes    ED Course       Procedures   EKG interpretation: (Preliminary) 2155  Rhythm: atrial flutter with variable AV block; and irregular. Rate (approx.): 106; Axis: normal; VA interval: unavailable; QRS interval: normal ; ST/T wave: normal  Written by Cailin Mcmullen ED Scribe as dictated by Herma Olszewski, MD    CONSULT NOTE:  2:59 AM  Herma Olszewski, MD spoke with Dr. Madhu Hernandez,  Specialty: Cardiology  Discussed patient's hx, disposition, and available diagnostic and imaging results. Reviewed care plans. Consultant agrees with plans as outlined. He suggests to admit to hospitalist and to add on Diltiazem. He recommends holding Coumadin since pt is a supratherapeutic level. CONSULT NOTE:  3:26 AM  Herma Olszewski, MD spoke with Dr. Ave Woo,  Specialty: Hospitalist  Discussed patient's hx, disposition, and available diagnostic and imaging results. Reviewed care plans. Consultant agrees with plans as outlined. Hospitalist will admit. PROGRESS NOTE:  4:47 AM  Pt heart rate is starting a downward trend on Diltiazem drip.     CRITICAL CARE NOTE :    6:42 AM      IMPENDING DETERIORATION -Cardiovascular    ASSOCIATED RISK FACTORS - Trauma and Dysrhythmia    MANAGEMENT- Bedside Assessment and Supervision of Care    INTERPRETATION -  Xrays, ECG and Blood Pressure    INTERVENTIONS - hemodynamic mngmt, Dilt drip    CASE REVIEW - Hospitalist, Medical Sub-Specialist, Nursing and Family    TREATMENT RESPONSE -Improved    PERFORMED BY - Self        NOTES   :      I have spent >110 minutes of critical care time involved in lab review, consultations with specialist, family decision- making, bedside attention and documentation. During this entire length of time I was immediately available to the patient . Lonnie Reece MD    LABORATORY TESTS:  Recent Results (from the past 12 hour(s))   EKG, 12 LEAD, INITIAL    Collection Time: 08/04/17  9:55 PM   Result Value Ref Range    Ventricular Rate 106 BPM    Atrial Rate 277 BPM    QRS Duration 86 ms    Q-T Interval 332 ms    QTC Calculation (Bezet) 441 ms    Calculated R Axis 44 degrees    Calculated T Axis 62 degrees    Diagnosis       Atrial flutter with variable AV block  When compared with ECG of 04-JUL-2017 07:05,  Atrial flutter has replaced Sinus rhythm  Vent. rate has increased BY  35 BPM     CBC WITH AUTOMATED DIFF    Collection Time: 08/04/17 10:19 PM   Result Value Ref Range    WBC 7.1 3.6 - 11.0 K/uL    RBC 3.59 (L) 3.80 - 5.20 M/uL    HGB 11.0 (L) 11.5 - 16.0 g/dL    HCT 33.0 (L) 35.0 - 47.0 %    MCV 91.9 80.0 - 99.0 FL    MCH 30.6 26.0 - 34.0 PG    MCHC 33.3 30.0 - 36.5 g/dL    RDW 14.8 (H) 11.5 - 14.5 %    PLATELET 127 454 - 733 K/uL    NEUTROPHILS 66 32 - 75 %    LYMPHOCYTES 27 12 - 49 %    MONOCYTES 5 5 - 13 %    EOSINOPHILS 2 0 - 7 %    BASOPHILS 0 0 - 1 %    ABS. NEUTROPHILS 4.7 1.8 - 8.0 K/UL    ABS. LYMPHOCYTES 1.9 0.8 - 3.5 K/UL    ABS. MONOCYTES 0.4 0.0 - 1.0 K/UL    ABS. EOSINOPHILS 0.1 0.0 - 0.4 K/UL    ABS.  BASOPHILS 0.0 0.0 - 0.1 K/UL   METABOLIC PANEL, COMPREHENSIVE    Collection Time: 08/04/17 10:19 PM   Result Value Ref Range    Sodium 132 (L) 136 - 145 mmol/L    Potassium 4.2 3.5 - 5.1 mmol/L    Chloride 99 97 - 108 mmol/L    CO2 26 21 - 32 mmol/L    Anion gap 7 5 - 15 mmol/L    Glucose 124 (H) 65 - 100 mg/dL    BUN 21 (H) 6 - 20 MG/DL Creatinine 1.10 (H) 0.55 - 1.02 MG/DL    BUN/Creatinine ratio 19 12 - 20      GFR est AA 56 (L) >60 ml/min/1.73m2    GFR est non-AA 46 (L) >60 ml/min/1.73m2    Calcium 8.5 8.5 - 10.1 MG/DL    Bilirubin, total 0.3 0.2 - 1.0 MG/DL    ALT (SGPT) 21 12 - 78 U/L    AST (SGOT) 17 15 - 37 U/L    Alk. phosphatase 104 45 - 117 U/L    Protein, total 7.1 6.4 - 8.2 g/dL    Albumin 3.3 (L) 3.5 - 5.0 g/dL    Globulin 3.8 2.0 - 4.0 g/dL    A-G Ratio 0.9 (L) 1.1 - 2.2     CK W/ REFLX CKMB    Collection Time: 08/04/17 10:19 PM   Result Value Ref Range    CK 32 26 - 192 U/L   TROPONIN I    Collection Time: 08/04/17 10:19 PM   Result Value Ref Range    Troponin-I, Qt. <0.04 <0.05 ng/mL   PROTHROMBIN TIME + INR    Collection Time: 08/04/17 10:20 PM   Result Value Ref Range    INR 5.1 (HH) 0.9 - 1.1      Prothrombin time 54.3 (H) 9.0 - 11.1 sec   URINALYSIS W/ REFLEX CULTURE    Collection Time: 08/05/17  1:09 AM   Result Value Ref Range    Color YELLOW/STRAW      Appearance CLOUDY (A) CLEAR      Specific gravity 1.011 1.003 - 1.030      pH (UA) 5.0 5.0 - 8.0      Protein NEGATIVE  NEG mg/dL    Glucose NEGATIVE  NEG mg/dL    Ketone NEGATIVE  NEG mg/dL    Bilirubin NEGATIVE  NEG      Blood SMALL (A) NEG      Urobilinogen 0.2 0.2 - 1.0 EU/dL    Nitrites NEGATIVE  NEG      Leukocyte Esterase LARGE (A) NEG      WBC  0 - 4 /hpf    RBC 5-10 0 - 5 /hpf    Epithelial cells FEW FEW /lpf    Bacteria NEGATIVE  NEG /hpf    UA:UC IF INDICATED URINE CULTURE ORDERED (A) CNI      Hyaline cast 0-2 0 - 5 /lpf       IMAGING RESULTS:  CT HEAD WO CONT   Final Result   INDICATION:   headache, afib     EXAM:  HEAD CT WITHOUT CONTRAST     COMPARISON:  July 4, 2017     TECHNIQUE:  Routine noncontrast axial head CT was performed.   Sagittal and  coronal reconstructions were generated.       CT dose reduction was achieved through use of a standardized protocol tailored  for this examination and automatic exposure control for dose modulation.     FINDINGS:     Ventricles:  Midline, no hydrocephalus. Intracranial Hemorrhage:  None. Brain Parenchyma/Brainstem:  Chronic white matter disease throughout the  supratentorial brain. Basal Cisterns:  Normal.   Paranasal Sinuses:  Visualized sinuses are clear. Additional Comments:  N/A.     IMPRESSION  IMPRESSION:     No acute process. XR CHEST PA LAT   Final Result   INDICATION:   irregular HR     COMPARISON: June 1, 2017     FINDINGS:     Frontal and lateral views of the chest demonstrate cardiomegaly. The lungs are  hyperinflated. There is pulmonary vascular congestion. The osseous structures  are unremarkable.     IMPRESSION  IMPRESSION:  Hyperinflated lungs with pulmonary vascular congestion. MEDICATIONS GIVEN:  Medications   dilTIAZem (CARDIZEM) 100 mg in dextrose 5% (MBP/ADV) 100 mL infusion (10 mg/hr IntraVENous New Bag 8/5/17 3279)   sodium chloride 0.9 % bolus infusion 500 mL (500 mL IntraVENous New Bag 8/5/17 7353)   dilTIAZem (CARDIZEM) injection 5 mg (5 mg IntraVENous Given 8/5/17 0909)       IMPRESSION:  1. Atrial fibrillation with RVR ([address]    2. Supratherapeutic INR        PLAN:  1. Admit to Hospitalist    ADMIT NOTE:  3:36 AM  Patient is being admitted to the hospital by Dr. Lionel Barber. The results of their tests and reasons for their admission have been discussed with them and/or available family. They convey agreement and understanding for the need to be admitted and for their admission diagnosis. Consultation has been made with the inpatient physician specialist for hospitalization. Attestation Note:  This note is prepared by [facility], acting as Scribe for MD Don Elkins MD: The scribe's documentation has been prepared under my direction and personally reviewed by me in its entirety. I confirm that the note above accurately reflects all work, treatment, procedures, and medical decision making performed by me.

## 2017-08-05 NOTE — ED NOTES
Bedside shift change report given to Magno Thomson RN (oncoming nurse) by Kusum Dial RN (offgoing nurse). Report included the following information SBAR, ED Summary, Procedure Summary, Intake/Output, MAR, Recent Results and Cardiac Rhythm A-fib with RVR.

## 2017-08-05 NOTE — H&P
Hospitalist Admission Note    NAME: Edmar West   :  1924   MRN:  [id number]     Date/Time:  2017 6:33 AM    Patient PCP: MD Risa  ________________________________________________________________________    My assessment of this patient's clinical condition and my plan of care is as follows. Assessment / Plan:  Atrial Fibrillation with RVR  Hypertension  -continue Diltiazem gtt, fixed rate of 10 mg/hr, admit to Inpatient, [facility]PC, telemetry  -continue Coreg with hold parameters  -continue Amiodarone  -Await formal Cardiology recommendations  -check thyroid function studies    Supra-therapeutic INR on Coumadin  -INR 5.0; only mild hemorrhoidal bleeding per history and Hgb stable, hold coumadin at this time and monitor INR daily, monitor Hgb as well  -pharmacy consult to dose coumadin    CKD stage III:   -renal function at baseline, continue to monitor    L1 compression fracture  -Dr. Jeet Stallworth was suppose to do on  but suspect this could be delayed with INR and pending anticoagulation decisions now that patient in afib; suspect that transitioning to Lovenox when INR subtherapeutic would be the best option     Overactive bladder  -discontinue Myrbetriq as family seems to think this medication may be contributing to patients afib and per review of the adverse effects tachycardia can be caused in approximately 2% of cases    Seizure disorder  -continue keppra    Code Status: has DDNR  Surrogate Decision Maker: sandy    DVT Prophylaxis: NI  GI Prophylaxis: not indicated    Baseline: Lives at home        Subjective:   CHIEF COMPLAINT: Palpitations    HISTORY OF PRESENT ILLNESS:     Edmar West is a 80 y.o.  female who presents with Palpitations. She has a history of paroxysmal afib but has been in afib persistently for the last 5 days. Dr. Jose Wilson increased her amiodarone but still this did not make a difference. Patient denies and CP or SOB.  She did have a Kyphoplasty planned but this delayed due to IR wanting a repeat MRI. After this coumadin was restarted (and repeat MRI canceled with plans for Dr. Reno Mas to do the Kyphoplasty instead of IR). This was suppose to take place on 8/8/17. Patient had a BP of 88 systolic before leaving home. Cardiology was consulted and recommended diltiazem gtt. She has had acceptable control at a rate of 10 mg/kg. She states that she does feel better at this time. The only other new medication she has had is Myrbetriq which was recently started for overactive bladder. We were asked to admit for work up and evaluation of the above problems.      Past Medical History:   Diagnosis Date    Alopecia     Arrhythmia     atrial fib,?svt    Arthritis     Atrial fibrillation (HCC)     Benign essential HTN     Benign neoplasm of vulva     CAD (coronary artery disease)     Cholelithiases     Colon cancer (HCC)     Constipation     Dyspareunia     Glaucoma     History of mixed connective tissue disease     Hx of viral pericarditis     Hyperactivity of bladder     Hypercholesterolemia     Ill-defined condition     macular degeneration    Insomnia     Macular degeneration     Neck pain     Psoriasis     Rectocele     Renal cyst     Seizure disorder (HCC)     [facility]jogren's disease ([facility] [address]     Thrombophlebitis     TIA (transient ischemic attack)     Vaginal vault prolapse, posthysterectomy     Varicose veins         Past Surgical History:   Procedure Laterality Date    HX BREAST LUMPECTOMY      left breast    HX CATARACT REMOVAL      HX COLECTOMY  1955    partial    HX HEMORRHOIDECTOMY      HX HYSTERECTOMY      HX SHOULDER ARTHROSCOPY      left       Social History   Substance Use Topics    Smoking status: Never Smoker    Smokeless tobacco: Never Used    Alcohol use No        Family History   Problem Relation Age of Onset    Cancer Father     Inflammatory Bowel Dz Mother     Heart Disease Brother     Diabetes Brother Allergies   Allergen Reactions    Codeine Nausea Only    Pcn [Penicillins] Unknown (comments)     Cant remember reaction as a child    Tetanus Vaccines And Toxoid Unknown (comments)     Does not take tetanus due to tb hx    Vibramycin [Doxycycline Calcium] Rash        Prior to Admission medications    Medication Sig Start Date End Date Taking? Authorizing Provider   DOCUSATE CALCIUM (STOOL SOFTENER PO) Take 100 mg by mouth two (2) times a day. Historical Provider   ACETAMINOPHEN (TYLENOL PO) Take 300 mg by mouth two (2) times a day. Historical Provider   levETIRAcetam (KEPPRA) 500 mg tablet TAKE ONE TABLET BY MOUTH TWICE DAILY. PATIENT TO KEEP 6/15/16 FOR FURTHER FILLS 6/15/17   Lester Leggett MD   latanoprost (XALATAN) 0.005 % ophthalmic solution  6/5/17   Historical Provider   carvedilol (COREG) 25 mg tablet 12.5 mg two (2) times daily (with meals). 4/13/17   Historical Provider   naloxone (NARCAN) 1 mg/mL injection 2 mL by IntraMUSCular route once as needed for up to 1 dose. 6/9/17   Raiza Frye MD   multivitamin (ONE A DAY) tablet Take 1 Tab by mouth two (2) times a day. Historical Provider   AMITRIPTYLINE HCL (AMITRIPTYLINE PO) Take  by mouth. Alternate nightly 10 mg & 15 mg    Historical Provider   warfarin (COUMADIN) 2.5 mg tablet Take 5 mg by mouth daily (with dinner). Dr Burton Li to be new cardiologist ,2 tabs except tues and thurs  Indications: Afib    Historical Provider   amiodarone (CORDARONE) 200 mg tablet Take 200 mg by mouth daily.     Historical Provider       REVIEW OF SYSTEMS:     Total of 12 systems reviewed as follows:       POSITIVE= underlined text  Negative = text not underlined  General:  fever, chills, sweats, generalized weakness, weight loss/gain,      loss of appetite   Eyes:    blurred vision, eye pain, loss of vision, double vision  ENT:    rhinorrhea, pharyngitis   Respiratory:   cough, sputum production, SOB, SANTOS, wheezing, pleuritic pain   Cardiology: chest pain, palpitations, orthopnea, PND, edema, syncope   Gastrointestinal:  abdominal pain , N/V, diarrhea, dysphagia, constipation, mild bleeding from hemorrhoids when she wipes   Genitourinary:  frequency, urgency, dysuria, hematuria, incontinence   Muskuloskeletal :  arthralgia, myalgia, back pain (controlled with tylenol)  Hematology:  easy bruising, nose or gum bleeding, lymphadenopathy   Dermatological: rash, ulceration, pruritis, color change / jaundice  Endocrine:   hot flashes or polydipsia   Neurological:  headache, dizziness, confusion, focal weakness, paresthesia,     Speech difficulties, memory loss, gait difficulty  Psychological: Feelings of anxiety, depression, agitation    Objective:   VITALS:    Visit Vitals    /62    Pulse 92    Temp 98 °F (36.7 °C)    Resp 23    Ht 5' 4\" (1.626 m)    Wt 64.4 kg (142 lb)    SpO2 92%    BMI 24.37 kg/m2       PHYSICAL EXAM:    General:    Alert, cooperative, no distress, appears stated age. HEENT: Atraumatic, anicteric sclerae, pink conjunctivae     No oral ulcers, mucosa dry, throat clear, dentition fair  Neck:  Supple, symmetrical,  thyroid: non tender  Lungs:   Clear to auscultation bilaterally. No Wheezing or Rhonchi. No rales. Chest wall:  No tenderness  No Accessory muscle use. Heart:   Regular  rhythm,  No  murmur   No edema  Abdomen:   Soft, non-tender. Not distended. Bowel sounds normal  Extremities: No cyanosis. No clubbing,      Skin turgor normal, Capillary refill normal, Radial dial pulse 2+  Skin:     Not pale. Not Jaundiced  No rashes   Psych:  Good insight. Not depressed. Not anxious or agitated. Neurologic: EOMs intact. No facial asymmetry. No aphasia or slurred speech. Symmetrical strength, Sensation grossly intact.  Alert and oriented X 4.     _______________________________________________________________________  Care Plan discussed with:    Comments   Patient x    Family  x    RN x    Care Manager Consultant:      _______________________________________________________________________  Expected  Disposition:   Home with Family    HH/PT/OT/RN x   SNF/LTC    LOW    ________________________________________________________________________  TOTAL TIME:  61 Minutes    Critical Care Provided     Minutes non procedure based      Comments    x Reviewed previous records   >50% of visit spent in counseling and coordination of care x Discussion with patient and/or family and questions answered       ________________________________________________________________________  Signed: Mack Gomez MD    Procedures: see electronic medical records for all procedures/Xrays and details which were not copied into this note but were reviewed prior to creation of Plan. LAB DATA REVIEWED:    Recent Results (from the past 24 hour(s))   EKG, 12 LEAD, INITIAL    Collection Time: 08/04/17  9:55 PM   Result Value Ref Range    Ventricular Rate 106 BPM    Atrial Rate 277 BPM    QRS Duration 86 ms    Q-T Interval 332 ms    QTC Calculation (Bezet) 441 ms    Calculated R Axis 44 degrees    Calculated T Axis 62 degrees    Diagnosis       Atrial flutter with variable AV block  When compared with ECG of 04-JUL-2017 07:05,  Atrial flutter has replaced Sinus rhythm  Vent. rate has increased BY  35 BPM     CBC WITH AUTOMATED DIFF    Collection Time: 08/04/17 10:19 PM   Result Value Ref Range    WBC 7.1 3.6 - 11.0 K/uL    RBC 3.59 (L) 3.80 - 5.20 M/uL    HGB 11.0 (L) 11.5 - 16.0 g/dL    HCT 33.0 (L) 35.0 - 47.0 %    MCV 91.9 80.0 - 99.0 FL    MCH 30.6 26.0 - 34.0 PG    MCHC 33.3 30.0 - 36.5 g/dL    RDW 14.8 (H) 11.5 - 14.5 %    PLATELET 029 359 - 278 K/uL    NEUTROPHILS 66 32 - 75 %    LYMPHOCYTES 27 12 - 49 %    MONOCYTES 5 5 - 13 %    EOSINOPHILS 2 0 - 7 %    BASOPHILS 0 0 - 1 %    ABS. NEUTROPHILS 4.7 1.8 - 8.0 K/UL    ABS. LYMPHOCYTES 1.9 0.8 - 3.5 K/UL    ABS. MONOCYTES 0.4 0.0 - 1.0 K/UL    ABS. EOSINOPHILS 0.1 0.0 - 0.4 K/UL    ABS. BASOPHILS 0.0 0.0 - 0.1 K/UL   METABOLIC PANEL, COMPREHENSIVE    Collection Time: 08/04/17 10:19 PM   Result Value Ref Range    Sodium 132 (L) 136 - 145 mmol/L    Potassium 4.2 3.5 - 5.1 mmol/L    Chloride 99 97 - 108 mmol/L    CO2 26 21 - 32 mmol/L    Anion gap 7 5 - 15 mmol/L    Glucose 124 (H) 65 - 100 mg/dL    BUN 21 (H) 6 - 20 MG/DL    Creatinine 1.10 (H) 0.55 - 1.02 MG/DL    BUN/Creatinine ratio 19 12 - 20      GFR est AA 56 (L) >60 ml/min/1.73m2    GFR est non-AA 46 (L) >60 ml/min/1.73m2    Calcium 8.5 8.5 - 10.1 MG/DL    Bilirubin, total 0.3 0.2 - 1.0 MG/DL    ALT (SGPT) 21 12 - 78 U/L    AST (SGOT) 17 15 - 37 U/L    Alk.  phosphatase 104 45 - 117 U/L    Protein, total 7.1 6.4 - 8.2 g/dL    Albumin 3.3 (L) 3.5 - 5.0 g/dL    Globulin 3.8 2.0 - 4.0 g/dL    A-G Ratio 0.9 (L) 1.1 - 2.2     CK W/ REFLX CKMB    Collection Time: 08/04/17 10:19 PM   Result Value Ref Range    CK 32 26 - 192 U/L   TROPONIN I    Collection Time: 08/04/17 10:19 PM   Result Value Ref Range    Troponin-I, Qt. <0.04 <0.05 ng/mL   PROTHROMBIN TIME + INR    Collection Time: 08/04/17 10:20 PM   Result Value Ref Range    INR 5.1 (HH) 0.9 - 1.1      Prothrombin time 54.3 (H) 9.0 - 11.1 sec   URINALYSIS W/ REFLEX CULTURE    Collection Time: 08/05/17  1:09 AM   Result Value Ref Range    Color YELLOW/STRAW      Appearance CLOUDY (A) CLEAR      Specific gravity 1.011 1.003 - 1.030      pH (UA) 5.0 5.0 - 8.0      Protein NEGATIVE  NEG mg/dL    Glucose NEGATIVE  NEG mg/dL    Ketone NEGATIVE  NEG mg/dL    Bilirubin NEGATIVE  NEG      Blood SMALL (A) NEG      Urobilinogen 0.2 0.2 - 1.0 EU/dL    Nitrites NEGATIVE  NEG      Leukocyte Esterase LARGE (A) NEG      WBC  0 - 4 /hpf    RBC 5-10 0 - 5 /hpf    Epithelial cells FEW FEW /lpf    Bacteria NEGATIVE  NEG /hpf    UA:UC IF INDICATED URINE CULTURE ORDERED (A) CNI      Hyaline cast 0-2 0 - 5 /lpf

## 2017-08-05 NOTE — PROGRESS NOTES
Pharmacy Initial Dosing of Warfarin    Pharmacy consulted to dose warfarin for this  80 y.o. female ordered warfarin for thromboembolism prevention secondary to atrial fibrillation. Goal INR (2-3, 2.5-3.5, 3-4): 2-3      Concurrent anticoagulants & Platelet Inhibitors: None    Home Warfarin Dose:   5mg on Sunday, Monday, Wednesday, Friday and Saturday  2.5mg on Tuesday and Thursday    Major Interacting Medications (Dose/Frequency):   Amiodarone 200mg daily    INR > 5 discuss with MD:   Recent Labs      08/04/17 2220 08/04/17 2219   INR  5.1*   --    HGB   --   11.0*   PLT   --   166   ALB   --   3.3*   SGOT   --   17   ALT   --   21   TBILI   --   0.3            Impression/Plan: Hold tonight's dose due to elevated INR. Continue to monitor. Pharmacy will follow daily and adjust the dose as appropriate. Thanks for the consult    Scout Mcclain PharmD      INR ordered daily? Yes   CBC ordered every other day? Yes   Dose entered? Not applicable   Progress note entered? Yes   Warfarin information note entered? Yes   Ivent updated?  Yes

## 2017-08-05 NOTE — ED NOTES
Assisted patient to [facility] with help from her daughter. Urine sample obtained.  Patient transfers with 1 assist.

## 2017-08-05 NOTE — ROUTINE PROCESS
TRANSFER - OUT REPORT:    Verbal report given to YOLANDA Rico on Adam Osuna  being transferred to 2214 for routine progression of care       Report consisted of patients Situation, Background, Assessment and   Recommendations(SBAR). Information from the following report(s) SBAR, Kardex and ED Summary was reviewed with the receiving nurse. Opportunity for questions and clarification was provided.       Patient transported with:   Monitor  Registered Nurse  Tech

## 2017-08-06 LAB
ALBUMIN SERPL BCP-MCNC: 3.1 G/DL (ref 3.5–5)
ALBUMIN/GLOB SERPL: 0.9 {RATIO} (ref 1.1–2.2)
ALP SERPL-CCNC: 96 U/L (ref 45–117)
ALT SERPL-CCNC: 17 U/L (ref 12–78)
ANION GAP BLD CALC-SCNC: 10 MMOL/L (ref 5–15)
AST SERPL W P-5'-P-CCNC: 14 U/L (ref 15–37)
BACTERIA SPEC CULT: NORMAL
BILIRUB SERPL-MCNC: 0.6 MG/DL (ref 0.2–1)
BNP SERPL-MCNC: 384 PG/ML (ref 0–100)
BUN SERPL-MCNC: 16 MG/DL (ref 6–20)
BUN/CREAT SERPL: 24 (ref 12–20)
CALCIUM SERPL-MCNC: 8.3 MG/DL (ref 8.5–10.1)
CC UR VC: NORMAL
CHLORIDE SERPL-SCNC: 101 MMOL/L (ref 97–108)
CO2 SERPL-SCNC: 23 MMOL/L (ref 21–32)
CREAT SERPL-MCNC: 0.68 MG/DL (ref 0.55–1.02)
ERYTHROCYTE [DISTWIDTH] IN BLOOD BY AUTOMATED COUNT: 14.8 % (ref 11.5–14.5)
GLOBULIN SER CALC-MCNC: 3.6 G/DL (ref 2–4)
GLUCOSE SERPL-MCNC: 97 MG/DL (ref 65–100)
HCT VFR BLD AUTO: 30 % (ref 35–47)
HGB BLD-MCNC: 10.1 G/DL (ref 11.5–16)
INR PPP: 4.9 (ref 0.9–1.1)
MCH RBC QN AUTO: 30.7 PG (ref 26–34)
MCHC RBC AUTO-ENTMCNC: 33.7 G/DL (ref 30–36.5)
MCV RBC AUTO: 91.2 FL (ref 80–99)
PLATELET # BLD AUTO: 145 K/UL (ref 150–400)
POTASSIUM SERPL-SCNC: 4 MMOL/L (ref 3.5–5.1)
PROT SERPL-MCNC: 6.7 G/DL (ref 6.4–8.2)
PROTHROMBIN TIME: 51.4 SEC (ref 9–11.1)
RBC # BLD AUTO: 3.29 M/UL (ref 3.8–5.2)
SERVICE CMNT-IMP: NORMAL
SODIUM SERPL-SCNC: 134 MMOL/L (ref 136–145)
T4 FREE SERPL-MCNC: 1.6 NG/DL (ref 0.8–1.5)
TSH SERPL DL<=0.05 MIU/L-ACNC: 4.82 UIU/ML (ref 0.36–3.74)
WBC # BLD AUTO: 6.7 K/UL (ref 3.6–11)

## 2017-08-06 PROCEDURE — 74011250637 HC RX REV CODE- 250/637: Performed by: INTERNAL MEDICINE

## 2017-08-06 PROCEDURE — 36415 COLL VENOUS BLD VENIPUNCTURE: CPT | Performed by: INTERNAL MEDICINE

## 2017-08-06 PROCEDURE — 74011250636 HC RX REV CODE- 250/636: Performed by: INTERNAL MEDICINE

## 2017-08-06 PROCEDURE — 74011000250 HC RX REV CODE- 250: Performed by: INTERNAL MEDICINE

## 2017-08-06 PROCEDURE — 84443 ASSAY THYROID STIM HORMONE: CPT | Performed by: INTERNAL MEDICINE

## 2017-08-06 PROCEDURE — 85610 PROTHROMBIN TIME: CPT | Performed by: INTERNAL MEDICINE

## 2017-08-06 PROCEDURE — 84439 ASSAY OF FREE THYROXINE: CPT | Performed by: INTERNAL MEDICINE

## 2017-08-06 PROCEDURE — 74011000258 HC RX REV CODE- 258: Performed by: INTERNAL MEDICINE

## 2017-08-06 PROCEDURE — 83880 ASSAY OF NATRIURETIC PEPTIDE: CPT | Performed by: INTERNAL MEDICINE

## 2017-08-06 PROCEDURE — 93306 TTE W/DOPPLER COMPLETE: CPT

## 2017-08-06 PROCEDURE — 65660000000 HC RM CCU STEPDOWN

## 2017-08-06 PROCEDURE — 80053 COMPREHEN METABOLIC PANEL: CPT | Performed by: INTERNAL MEDICINE

## 2017-08-06 PROCEDURE — 85027 COMPLETE CBC AUTOMATED: CPT | Performed by: INTERNAL MEDICINE

## 2017-08-06 RX ORDER — AMIODARONE HYDROCHLORIDE 200 MG/1
200 TABLET ORAL 2 TIMES DAILY
Status: DISCONTINUED | OUTPATIENT
Start: 2017-08-06 | End: 2017-08-09 | Stop reason: HOSPADM

## 2017-08-06 RX ORDER — FUROSEMIDE 10 MG/ML
10 INJECTION INTRAMUSCULAR; INTRAVENOUS ONCE
Status: COMPLETED | OUTPATIENT
Start: 2017-08-06 | End: 2017-08-06

## 2017-08-06 RX ADMIN — CARVEDILOL 12.5 MG: 12.5 TABLET, FILM COATED ORAL at 17:31

## 2017-08-06 RX ADMIN — ACETAMINOPHEN 325 MG: 325 TABLET, FILM COATED ORAL at 12:53

## 2017-08-06 RX ADMIN — AMIODARONE HYDROCHLORIDE 200 MG: 200 TABLET ORAL at 10:22

## 2017-08-06 RX ADMIN — Medication 10 ML: at 06:45

## 2017-08-06 RX ADMIN — DOCUSATE SODIUM 100 MG: 100 CAPSULE, LIQUID FILLED ORAL at 17:31

## 2017-08-06 RX ADMIN — AMIODARONE HYDROCHLORIDE 200 MG: 200 TABLET ORAL at 17:32

## 2017-08-06 RX ADMIN — ACETAMINOPHEN 325 MG: 325 TABLET, FILM COATED ORAL at 22:00

## 2017-08-06 RX ADMIN — ACETAMINOPHEN 325 MG: 325 TABLET, FILM COATED ORAL at 10:22

## 2017-08-06 RX ADMIN — FUROSEMIDE 10 MG: 10 INJECTION, SOLUTION INTRAMUSCULAR; INTRAVENOUS at 12:43

## 2017-08-06 RX ADMIN — LEVETIRACETAM 500 MG: 500 TABLET ORAL at 17:31

## 2017-08-06 RX ADMIN — Medication 10 ML: at 22:00

## 2017-08-06 RX ADMIN — CARVEDILOL 12.5 MG: 12.5 TABLET, FILM COATED ORAL at 10:22

## 2017-08-06 RX ADMIN — CEFTRIAXONE 1 G: 1 INJECTION, POWDER, FOR SOLUTION INTRAMUSCULAR; INTRAVENOUS at 17:30

## 2017-08-06 RX ADMIN — LEVETIRACETAM 500 MG: 500 TABLET ORAL at 10:22

## 2017-08-06 RX ADMIN — ACETAMINOPHEN 325 MG: 325 TABLET, FILM COATED ORAL at 00:08

## 2017-08-06 RX ADMIN — LATANOPROST 1 DROP: 50 SOLUTION OPHTHALMIC at 17:45

## 2017-08-06 RX ADMIN — POLYETHYLENE GLYCOL 3350 17 G: 17 POWDER, FOR SOLUTION ORAL at 10:23

## 2017-08-06 RX ADMIN — AMITRIPTYLINE HYDROCHLORIDE 15 MG: 10 TABLET, FILM COATED ORAL at 22:00

## 2017-08-06 RX ADMIN — Medication 10 ML: at 12:57

## 2017-08-06 RX ADMIN — Medication 10 ML: at 17:30

## 2017-08-06 RX ADMIN — DEXTROSE MONOHYDRATE 5 MG/HR: 50 INJECTION, SOLUTION INTRAVENOUS at 12:55

## 2017-08-06 RX ADMIN — DOCUSATE SODIUM 100 MG: 100 CAPSULE, LIQUID FILLED ORAL at 10:22

## 2017-08-06 RX ADMIN — THERA TABS 1 TABLET: TAB at 10:22

## 2017-08-06 NOTE — PROGRESS NOTES
Progress Note      8/6/2017 10:52 AM  NAME: Anna Hunter   MRN:  [id number]   Admit Diagnosis: Atrial fibrillation with RVR ([facility] [address]                          Assessment:                 1. Elderly, Compression fracture, recurrent afib with RVR, increased INR, dyspnea  2. PAF on anticoagulation  3. HTN  4. Hx of low sodium  5. Hx sz disorder  6. Attentive daughter  9. DNR  8. Cardiologist:  Dr. Sharath Marcus                            Plan:                  Afib with RVR. Hold warfarin for now. Cont amiodarone, coreg, low dose cardizem ggt. CXR suggests pulm edema, on edema on exam, but some dyspnea. Check BNP. Check Echo. Consult Dr. Joanne Landeros on Monday.     1. Hold warfarin for now  2. Cont amiodarone 200mg daily  3. Decrease cardizem ggt to 5 due to low bp  4. Cont coreg  5. Will give lasix 10mg iv x 1 on Sunday, follow bmp          [x]        High complexity decision making was performed         Subjective:     Anna Hunter denies chest pain, +dyspnea. Discussed with RN events overnight. Review of Systems:    Symptom Y/N Comments  Symptom Y/N Comments   Fever/Chills N   Chest Pain N    Poor Appetite N   Edema N    Cough N   Abdominal Pain N    Sputum N   Joint Pain N    SOB/SANTOS Y   Pruritis/Rash N    Nausea/vomit N   Tolerating PT/OT Y    Diarrhea N   Tolerating Diet Y    Constipation N   Other       Could NOT obtain due to:      Objective:      Physical Exam:    Last 24hrs VS reviewed since prior progress note.  Most recent are:    Visit Vitals    /75 (BP 1 Location: Left arm, BP Patient Position: At rest)    Pulse 91    Temp 98 °F (36.7 °C)    Resp 18    Ht 5' 4\" (1.626 m)    Wt 64.4 kg (142 lb)    SpO2 93%    BMI 24.37 kg/m2       Intake/Output Summary (Last 24 hours) at 08/06/17 1052  Last data filed at 08/06/17 1036   Gross per 24 hour   Intake           617.92 ml   Output             1450 ml   Net          -832.08 ml        General Appearance: Well developed, well nourished, alert & oriented x 3,    no acute distress. Ears/Nose/Mouth/Throat: Hearing grossly normal.  Neck: Supple. Chest: Lungs bibasilar crackles to auscultation bilaterally. Cardiovascular: Regular rate and rhythm, S1S2 normal, no murmur. Abdomen: Soft, non-tender, bowel sounds are active. Extremities: No edema bilaterally. Skin: Warm and dry. PMH/SH reviewed - no change compared to H&P    Data Review    Telemetry: afib    Lab Data Personally Reviewed:    Recent Labs      08/06/17 0551  08/04/17 2219   WBC  6.7  7.1   HGB  10.1*  11.0*   HCT  30.0*  33.0*   PLT  145*  166     Recent Labs      08/06/17   0551  08/04/17   2220   INR  4.9*  5.1*   PTP  51.4*  54.3*      Recent Labs      08/06/17   0551  08/04/17   2219   NA  134*  132*   K  4.0  4.2   CL  101  99   CO2  23  26   BUN  16  21*   CREA  0.68  1.10*   GLU  97  124*   CA  8.3*  8.5     Recent Labs      08/04/17   2219   TROIQ  <0.04     Lab Results   Component Value Date/Time    Cholesterol, total 196 09/12/2012 12:00 AM    HDL Cholesterol 64 09/12/2012 12:00 AM    LDL, calculated 104 09/12/2012 12:00 AM    Triglyceride 142 09/12/2012 12:00 AM       Recent Labs      08/06/17   0551  08/04/17   2219   SGOT  14*  17   AP  96  104   TP  6.7  7.1   ALB  3.1*  3.3*   GLOB  3.6  3.8     No results for input(s): PH, PCO2, PO2 in the last 72 hours.     Medications Personally Reviewed:    Current Facility-Administered Medications   Medication Dose Route Frequency    furosemide (LASIX) injection 10 mg  10 mg IntraVENous ONCE    dilTIAZem (CARDIZEM) 100 mg in dextrose 5% (MBP/ADV) 100 mL infusion  5 mg/hr IntraVENous CONTINUOUS    acetaminophen (TYLENOL) tablet 325 mg  325 mg Oral BID    amiodarone (CORDARONE) tablet 200 mg  200 mg Oral DAILY    amitriptyline (ELAVIL) tablet 15 mg  15 mg Oral QHS    carvedilol (COREG) tablet 12.5 mg  12.5 mg Oral BID WITH MEALS    docusate sodium (COLACE) capsule 100 mg  100 mg Oral BID    latanoprost (XALATAN) 0.005 % ophthalmic solution 1 Drop  1 Drop Both Eyes QPM    levETIRAcetam (KEPPRA) tablet 500 mg  500 mg Oral BID    therapeutic multivitamin (THERAGRAN) tablet 1 Tab  1 Tab Oral DAILY    sodium chloride (NS) flush 5-10 mL  5-10 mL IntraVENous Q8H    sodium chloride (NS) flush 5-10 mL  5-10 mL IntraVENous PRN    acetaminophen (TYLENOL) tablet 325 mg  325 mg Oral Q4H PRN    ondansetron (ZOFRAN) injection 4 mg  4 mg IntraVENous Q4H PRN    Warfarin- Hold 8/5  1 Each Other DAILY    polyethylene glycol (MIRALAX) packet 17 g  17 g Oral DAILY         Jennifer Hernandez MD

## 2017-08-06 NOTE — PROGRESS NOTES
0730  Report received from Meda Spatz, [place]. SBAR, Kardex, ED Summary, Procedure Summary, Intake/Output, MAR, Accordion, Recent Results, Med Rec Status and Cardiac Rhythm a-fib - a-flullter were discussed. Breann Chiu     0800  Critical lab  INR 4.9  Dr. Khoa Sheehan notified  No new orders    1630  Patients HR has ranged from low 100's to 130's which is trending up from last night. Patient is asymptomatic.  /75. Rhythm is a-fib. Will notify Dr. Smitha Scott. Dr. Smitha Scott ordered Cardizem drip increase to 7.5 mg/hr and increase amiodarone to 200mg BID        [facility] SHIFT NURSING NOTE    Bedside shift change report given to Geeta (oncoming nurse) by Vitaliy Ray (offgoing nurse). Report included the following information SBAR, Kardex, ED Summary, OR Summary, Procedure Summary, Intake/Output, MAR, Recent Results, Med Rec Status and Cardiac Rhythm a-fib. SHIFT SUMMARY:  Patient ambulated in hallway today with daughter. Felt comfort from back pain once recliner offered.         Admission Date 8/4/2017   Admission Diagnosis Atrial fibrillation with RVR ([address]   Consults IP CONSULT TO CARDIOLOGY  IP CONSULT TO ORTHOPEDIC SURGERY        Consults   [] PT   [] OT   [] Speech   [] Palliative      [] Hospice    [] Case Management   [] None   Cardiac Monitoring   [x] Yes   [] No     Antibiotics   [x] Yes   [] No   GI Prophylaxis  (Ex: Protonix, Pepcid, etc,.)   [x] Yes   [] No          DVT Prophylaxis   SCDs:             Zackery stockings:         [] Medication (Ex: Lovenox, Eliquis, Brilinta, Coumadin,  Heparin, etc..)   [] Contraindicated   [] No VTE needed       Urinary Catheter       External Female Catheter 08/05/17-Urine Output (mL): 700 ml     LDAs               Peripheral IV 08/05/17 Right Hand (Active)   Site Assessment Clean, dry, & intact 8/6/2017  8:00 AM   Phlebitis Assessment 0 8/6/2017  8:00 AM   Infiltration Assessment 0 8/6/2017  8:00 AM   Dressing Status Clean, dry, & intact 8/6/2017  8:00 AM   Dressing Type Tape;Transparent 8/6/2017  8:00 AM   Hub Color/Line Status Pink; Infusing 8/6/2017  8:00 AM          External Female Catheter 08/05/17 (Active)   Site Assessment Clean, dry, & intact 8/6/2017  8:00 AM   Repositioned Yes 8/6/2017  3:20 AM   Perineal Care Yes 8/6/2017  3:20 AM   Wick Changed Yes 8/6/2017  3:20 AM   Suction Canister/Tubing Changed No 8/6/2017  3:20 AM   Urine Output (mL) 700 ml 8/6/2017  3:20 AM                I/Os   Intake/Output Summary (Last 24 hours) at 08/06/17 2012  Last data filed at 08/06/17 1036   Gross per 24 hour   Intake              320 ml   Output              700 ml   Net             -380 ml         Activity Level Activity Level: Up with Assistance     Activity Assistance: Partial (one person)   Diet Active Orders   Diet    DIET CARDIAC Regular      Purposeful Rounding every 1-2 hour? [x] Yes    Corey Score  Total Score: 4   Bed Alarm (If score 3 or >)   [x] Yes    [] Refused (See signed refusal form in chart)   Magdaleno Score  Magdlaeno Score: 18       Magdaleno Score (if score 14 or less)   [] PMT consult   [] Nutrition consult   [] Wound Care consult      []  Specialty bed         Influenza Vaccine Received Flu Vaccine for Current Season (usually Sept-March): Not Flu Season               Needs prior to discharge:   Home O2 required:    [] Yes   [x] No     If yes, how much O2 required? Other:    Last Bowel Movement Date: 08/04/17   Readmission Risk Assessment Tool Score High Risk            25       Total Score        3 Has Seen PCP in Last 6 Months (Yes=3, No=0)    4 IP Visits Last 12 Months (1-3=4, 4=9, >4=11)    5 Pt. Coverage (Medicare=5 , Medicaid, or Self-Pay=4)    13 Charlson Comorbidity Score (Age + Comorbid Conditions)        Criteria that do not apply:    . Living with Significant Other. Assisted Living. LTAC. SNF.  or   Rehab    Patient Length of Stay (>5 days = 3)       Expected Length of Stay - - -   Actual Length of Stay 1

## 2017-08-06 NOTE — PROGRESS NOTES
[address] SHIFT NURSING NOTE    Bedside and Verbal shift change report given to Breann  (oncoming nurse) by Tsering Yang  (offgoing nurse). Report included the following information Kardex. SHIFT SUMMARY:         Admission Date 8/4/2017   Admission Diagnosis Atrial fibrillation with RVR ([address]   Consults IP CONSULT TO CARDIOLOGY        Consults   [] PT   [] OT   [] Speech   [] Palliative      [] Hospice    [] Case Management   [] None   Cardiac Monitoring   [] Yes   [] No     Antibiotics   [] Yes   [] No   GI Prophylaxis  (Ex: Protonix, Pepcid, etc,.)   [] Yes   [] No          DVT Prophylaxis   SCDs:             Zackery stockings:         [] Medication (Ex: Lovenox, Eliquis, Brilinta, Coumadin,  Heparin, etc..)   [] Contraindicated   [] No VTE needed       Urinary Catheter       External Female Catheter 08/05/17-Urine Output (mL): 700 ml     LDAs               Peripheral IV 08/05/17 Right Hand (Active)   Site Assessment Clean, dry, & intact 8/6/2017  3:20 AM   Phlebitis Assessment 0 8/6/2017  3:20 AM   Infiltration Assessment 0 8/6/2017  3:20 AM   Dressing Status Clean, dry, & intact 8/6/2017  3:20 AM   Dressing Type Tape;Transparent 8/6/2017  3:20 AM   Hub Color/Line Status Pink; Infusing;Patent 8/6/2017  3:20 AM          External Female Catheter 08/05/17 (Active)   Site Assessment Clean, dry, & intact 8/6/2017  3:20 AM   Repositioned Yes 8/6/2017  3:20 AM   Perineal Care Yes 8/6/2017  3:20 AM   Wick Changed Yes 8/6/2017  3:20 AM   Suction Canister/Tubing Changed No 8/6/2017  3:20 AM   Urine Output (mL) 700 ml 8/6/2017  3:20 AM                I/Os   Intake/Output Summary (Last 24 hours) at 08/06/17 0814  Last data filed at 08/06/17 0320   Gross per 24 hour   Intake           537.92 ml   Output             1450 ml   Net          -912.08 ml         Activity Level Activity Level:  Up with Assistance     Activity Assistance: Partial (one person)   Diet Active Orders   Diet    DIET CARDIAC Regular      Purposeful Rounding every 1-2 hour?   [x] Yes    Corey Score  Total Score: 3   Bed Alarm (If score 3 or >)   [] Yes    [] Refused (See signed refusal form in chart)   Magdaleno Score  Magdaleno Score: 20       Magdaleno Score (if score 14 or less)   [] PMT consult   [] Nutrition consult   [] Wound Care consult      []  Specialty bed         Influenza Vaccine Received Flu Vaccine for Current Season (usually Sept-March): Not Flu Season               Needs prior to discharge:   Home O2 required:    [] Yes   [x] No     If yes, how much O2 required? Other:    Last Bowel Movement Date: 08/04/17   Readmission Risk Assessment Tool Score High Risk            25       Total Score        3 Has Seen PCP in Last 6 Months (Yes=3, No=0)    4 IP Visits Last 12 Months (1-3=4, 4=9, >4=11)    5 Pt. Coverage (Medicare=5 , Medicaid, or Self-Pay=4)    13 Charlson Comorbidity Score (Age + Comorbid Conditions)        Criteria that do not apply:    . Living with Significant Other. Assisted Living. LTAC. SNF.  or   Rehab    Patient Length of Stay (>5 days = 3)       Expected Length of Stay - - -   Actual Length of Stay 1

## 2017-08-06 NOTE — PROGRESS NOTES
MEWS 3 d/t increased HR. Patient being treated for a-fib and is on a cardizem drip.   Will continue to monitor closely

## 2017-08-06 NOTE — PROGRESS NOTES
Hospitalist Progress Note    NAME: Tung Ruiz   :  1924   MRN:  [id number]       Interim Hospital Summary: 80 y.o. female whom presented on 2017 with      Assessment / Plan:    Atrial Fibrillation with RVR  Hypertension  -continue Diltiazem gtt, cont wean per cardiology  -continue Coreg with hold parameters  -continue Amiodarone  -check thyroid function studies  - card following pt-     Supra-therapeutic INR on Coumadin  -INR  4.9 no need to reverse /no bleeding Hgb stable, hold coumadin at this time and monitor INR daily, monitor Hgb as well  -pharmacy consult to dose coumadin     UTI rocephin   Follow cx  Tsh bit  Up will recheck as outpt-  CKD stage III: electrolytes ok  -renal function at baseline, continue to monitor     L1 compression fracture  -Dr. Luci Sherwood was suppose to do on  but suspect this could be delayed with INR and pending anticoagulation decisions now that patient in afib; suspect that transitioning to Lovenox when INR subtherapeutic would be the best option      Overactive bladder  -discontinue Myrbetriq as family seems to think this medication may be contributing to patients afib and per review of the adverse effects tachycardia can be caused in approximately 2% of cases     Seizure disorder  -continue keppra     Code Status: has DDNR  Surrogate Decision Maker: sandy     DVT Prophylaxis: NI  GI Prophylaxis: not indicated     Baseline: Lives at home  Dispo will need rehab will need help from cm           Subjective:     Chief Complaint / Reason for Physician Visit voices no c/o in bed   Discussed with RN events overnight.      Review of Systems:  Symptom Y/N Comments  Symptom Y/N Comments   Fever/Chills    Chest Pain     Poor Appetite    Edema     Cough    Abdominal Pain     Sputum    Joint Pain     SOB/SANTOS    Pruritis/Rash     Nausea/vomit    Tolerating PT/OT     Diarrhea    Tolerating Diet     Constipation    Other       Could NOT obtain due to:      Objective:     VITALS: Last 24hrs VS reviewed since prior progress note. Most recent are:  Patient Vitals for the past 24 hrs:   Temp Pulse Resp BP SpO2   08/06/17 1136 97.5 °F (36.4 °C) 64 18 107/60 95 %   08/06/17 1134 - (!) 111 - - -   08/06/17 0717 98 °F (36.7 °C) 91 18 109/75 93 %   08/06/17 0500 97.8 °F (36.6 °C) 99 19 130/70 95 %   08/05/17 2342 97.8 °F (36.6 °C) 89 19 122/66 96 %   08/05/17 2005 97.8 °F (36.6 °C) 67 20 92/53 94 %   08/05/17 1700 - 63 - 98/63 -   08/05/17 1531 97.2 °F (36.2 °C) 60 24 99/44 96 %       Intake/Output Summary (Last 24 hours) at 08/06/17 1324  Last data filed at 08/06/17 1036   Gross per 24 hour   Intake           377.92 ml   Output             1450 ml   Net         -1072.08 ml        PHYSICAL EXAM:  General: Frail elderly female  Alert, cooperative, no acute distress    EENT:  EOMI. Anicteric sclerae. MMM  Resp:  CTA bilaterally, no wheezing or rales. No accessory muscle use  CV:  Regular  rhythm,  No edema  GI:  Soft, Non distended, Non tender.  +Bowel sounds  Neurologic:  Alert and oriented X 1, normal speech,   Psych:    Not anxious nor agitated  Skin:  No rashes. No jaundice    Reviewed most current lab test results and cultures  YES  Reviewed most current radiology test results   YES  Review and summation of old records today    NO  Reviewed patient's current orders and MAR    YES  PMH/ reviewed - no change compared to H&P  ________________________________________________________________________  Care Plan discussed with:    Comments   Patient x    Family      RN x    Care Manager     Consultant                        Multidiciplinary team rounds were held today with , nursing, pharmacist and clinical coordinator. Patient's plan of care was discussed; medications were reviewed and discharge planning was addressed.      ________________________________________________________________________  Total NON critical care TIME:  25   Minutes  x  Total CRITICAL CARE TIME Spent:   Minutes non procedure based      Comments   >50% of visit spent in counseling and coordination of care x    ________________________________________________________________________  Latanya Hernandez MD     Procedures: see electronic medical records for all procedures/Xrays and details which were not copied into this note but were reviewed prior to creation of Plan. LABS:  I reviewed today's most current labs and imaging studies.   Pertinent labs include:  Recent Labs      08/06/17   0551  08/04/17 2219   WBC  6.7  7.1   HGB  10.1*  11.0*   HCT  30.0*  33.0*   PLT  145*  166     Recent Labs      08/06/17   0551  08/04/17   2220  08/04/17   2219   NA  134*   --   132*   K  4.0   --   4.2   CL  101   --   99   CO2  23   --   26   GLU  97   --   124*   BUN  16   --   21*   CREA  0.68   --   1.10*   CA  8.3*   --   8.5   ALB  3.1*   --   3.3*   TBILI  0.6   --   0.3   SGOT  14*   --   17   ALT  17   --   21   INR  4.9*  5.1*   --        Signed: Latanya Hernandez MD

## 2017-08-07 LAB
ANION GAP BLD CALC-SCNC: 10 MMOL/L (ref 5–15)
BUN SERPL-MCNC: 16 MG/DL (ref 6–20)
BUN/CREAT SERPL: 18 (ref 12–20)
CALCIUM SERPL-MCNC: 8.3 MG/DL (ref 8.5–10.1)
CHLORIDE SERPL-SCNC: 99 MMOL/L (ref 97–108)
CO2 SERPL-SCNC: 24 MMOL/L (ref 21–32)
CREAT SERPL-MCNC: 0.88 MG/DL (ref 0.55–1.02)
GLUCOSE SERPL-MCNC: 109 MG/DL (ref 65–100)
INR PPP: 3.9 (ref 0.9–1.1)
POTASSIUM SERPL-SCNC: 4.6 MMOL/L (ref 3.5–5.1)
PROTHROMBIN TIME: 41 SEC (ref 9–11.1)
SODIUM SERPL-SCNC: 133 MMOL/L (ref 136–145)

## 2017-08-07 PROCEDURE — 74011250636 HC RX REV CODE- 250/636: Performed by: INTERNAL MEDICINE

## 2017-08-07 PROCEDURE — 74011250637 HC RX REV CODE- 250/637: Performed by: INTERNAL MEDICINE

## 2017-08-07 PROCEDURE — 80048 BASIC METABOLIC PNL TOTAL CA: CPT | Performed by: INTERNAL MEDICINE

## 2017-08-07 PROCEDURE — 36415 COLL VENOUS BLD VENIPUNCTURE: CPT | Performed by: INTERNAL MEDICINE

## 2017-08-07 PROCEDURE — 85610 PROTHROMBIN TIME: CPT | Performed by: INTERNAL MEDICINE

## 2017-08-07 PROCEDURE — 97116 GAIT TRAINING THERAPY: CPT

## 2017-08-07 PROCEDURE — 97535 SELF CARE MNGMENT TRAINING: CPT | Performed by: OCCUPATIONAL THERAPIST

## 2017-08-07 PROCEDURE — 74011000250 HC RX REV CODE- 250: Performed by: INTERNAL MEDICINE

## 2017-08-07 PROCEDURE — 97162 PT EVAL MOD COMPLEX 30 MIN: CPT

## 2017-08-07 PROCEDURE — 97165 OT EVAL LOW COMPLEX 30 MIN: CPT | Performed by: OCCUPATIONAL THERAPIST

## 2017-08-07 PROCEDURE — 65660000000 HC RM CCU STEPDOWN

## 2017-08-07 PROCEDURE — 74011000258 HC RX REV CODE- 258: Performed by: INTERNAL MEDICINE

## 2017-08-07 RX ORDER — FACIAL-BODY WIPES
10 EACH TOPICAL ONCE
Status: COMPLETED | OUTPATIENT
Start: 2017-08-07 | End: 2017-08-07

## 2017-08-07 RX ORDER — SORBITOL SOLUTION 70 %
30 SOLUTION, ORAL MISCELLANEOUS ONCE
Status: DISPENSED | OUTPATIENT
Start: 2017-08-07 | End: 2017-08-08

## 2017-08-07 RX ORDER — ZOLPIDEM TARTRATE 5 MG/1
5 TABLET ORAL
Status: DISCONTINUED | OUTPATIENT
Start: 2017-08-07 | End: 2017-08-09 | Stop reason: HOSPADM

## 2017-08-07 RX ORDER — POLYETHYLENE GLYCOL 3350 17 G/17G
17 POWDER, FOR SOLUTION ORAL 2 TIMES DAILY
Status: DISCONTINUED | OUTPATIENT
Start: 2017-08-07 | End: 2017-08-09 | Stop reason: HOSPADM

## 2017-08-07 RX ADMIN — LEVETIRACETAM 500 MG: 500 TABLET ORAL at 17:09

## 2017-08-07 RX ADMIN — Medication 10 ML: at 15:45

## 2017-08-07 RX ADMIN — LATANOPROST 1 DROP: 50 SOLUTION OPHTHALMIC at 17:10

## 2017-08-07 RX ADMIN — CARVEDILOL 12.5 MG: 12.5 TABLET, FILM COATED ORAL at 16:32

## 2017-08-07 RX ADMIN — BISACODYL 10 MG: 10 SUPPOSITORY RECTAL at 09:49

## 2017-08-07 RX ADMIN — ZOLPIDEM TARTRATE 5 MG: 5 TABLET ORAL at 21:31

## 2017-08-07 RX ADMIN — CARVEDILOL 12.5 MG: 12.5 TABLET, FILM COATED ORAL at 09:18

## 2017-08-07 RX ADMIN — POLYETHYLENE GLYCOL 3350 17 G: 17 POWDER, FOR SOLUTION ORAL at 09:18

## 2017-08-07 RX ADMIN — AMIODARONE HYDROCHLORIDE 200 MG: 200 TABLET ORAL at 17:09

## 2017-08-07 RX ADMIN — LEVETIRACETAM 500 MG: 500 TABLET ORAL at 09:18

## 2017-08-07 RX ADMIN — CEFTRIAXONE 1 G: 1 INJECTION, POWDER, FOR SOLUTION INTRAMUSCULAR; INTRAVENOUS at 15:45

## 2017-08-07 RX ADMIN — ACETAMINOPHEN 325 MG: 325 TABLET, FILM COATED ORAL at 09:18

## 2017-08-07 RX ADMIN — DOCUSATE SODIUM 100 MG: 100 CAPSULE, LIQUID FILLED ORAL at 17:09

## 2017-08-07 RX ADMIN — POLYETHYLENE GLYCOL 3350 17 G: 17 POWDER, FOR SOLUTION ORAL at 18:53

## 2017-08-07 RX ADMIN — DOCUSATE SODIUM 100 MG: 100 CAPSULE, LIQUID FILLED ORAL at 09:18

## 2017-08-07 RX ADMIN — Medication 10 ML: at 21:31

## 2017-08-07 RX ADMIN — AMIODARONE HYDROCHLORIDE 200 MG: 200 TABLET ORAL at 09:18

## 2017-08-07 RX ADMIN — THERA TABS 1 TABLET: TAB at 09:18

## 2017-08-07 RX ADMIN — DEXTROSE MONOHYDRATE 5 MG/HR: 50 INJECTION, SOLUTION INTRAVENOUS at 12:51

## 2017-08-07 RX ADMIN — DEXTROSE MONOHYDRATE 5 MG/HR: 50 INJECTION, SOLUTION INTRAVENOUS at 09:16

## 2017-08-07 RX ADMIN — AMITRIPTYLINE HYDROCHLORIDE 15 MG: 10 TABLET, FILM COATED ORAL at 21:31

## 2017-08-07 RX ADMIN — ACETAMINOPHEN 325 MG: 325 TABLET, FILM COATED ORAL at 21:31

## 2017-08-07 NOTE — PROGRESS NOTES
Hospitalist Progress Note    NAME: Jaylene Raines   :  1924   MRN:  [id number]       Interim Hospital Summary: 80 y.o. female whom presented on 2017 with      Assessment / Plan:    Atrial Fibrillation with RVR  Hypertension  -continue Diltiazem gtt, cont wean per cardiology  -continue Coreg with hold parameters  -continue Amiodarone  -check thyroid function studies  - card planning ablation today      Supra-therapeutic INR on Coumadin  -INR  3.9  no need to reverse /no bleeding Hgb stable, hold coumadin at this time and monitor INR daily, monitor Hgb as well  -pharmacy consult to dose coumadin      UTI rocephin   Follow cx  Tsh bit  Up will recheck as outpt-  CKD stage III: electrolytes ok  -renal function at baseline, continue to monitor      L1 compression fracture  -Dr. Mami Nam was suppose to do on  but suspect this could be delayed with INR and pending anticoagulation decisions now that patient in afib; suspect that transitioning to Lovenox when INR subtherapeutic would be the best option      Constipation miralax/colace/dulc supp     -  Overactive bladder  -discontinue Myrbetriq as family seems to think this medication may be contributing to patients afib and per review of the adverse effects tachycardia can be caused in approximately 2% of cases      Seizure disorder  -continue keppra      Code Status: has DDNR  Surrogate Decision Maker: sandy      DVT Prophylaxis: NI  GI Prophylaxis: not indicated      Baseline: Lives at home  Dispo will need rehab will need help from cm                  Subjective:     Chief Complaint / Reason for Physician Visit c/o constipation   . Discussed with RN events overnight.      Review of Systems:  Symptom Y/N Comments  Symptom Y/N Comments   Fever/Chills    Chest Pain     Poor Appetite    Edema     Cough    Abdominal Pain     Sputum    Joint Pain     SOB/SANTOS    Pruritis/Rash     Nausea/vomit    Tolerating PT/OT     Diarrhea    Tolerating Diet     Constipation x Other       Could NOT obtain due to:      Objective:     VITALS:   Last 24hrs VS reviewed since prior progress note. Most recent are:  Patient Vitals for the past 24 hrs:   Temp Pulse Resp BP SpO2   08/07/17 0817 98.1 °F (36.7 °C) 68 18 97/62 94 %   08/07/17 0318 97.3 °F (36.3 °C) 89 20 137/73 96 %   08/06/17 2315 97.3 °F (36.3 °C) 86 18 95/47 95 %   08/06/17 2140 - - - 90/72 -   08/06/17 1838 97.9 °F (36.6 °C) 72 - (!) 89/54 97 %   08/06/17 1624 - (!) 133 - - -   08/06/17 1622 - (!) 128 - - -   08/06/17 1615 - (!) 128 - - -   08/06/17 1516 97.9 °F (36.6 °C) (!) 118 18 118/75 92 %   08/06/17 1136 97.5 °F (36.4 °C) 64 18 107/60 95 %   08/06/17 1134 - (!) 111 - - -       Intake/Output Summary (Last 24 hours) at 08/07/17 0928  Last data filed at 08/06/17 2015   Gross per 24 hour   Intake              960 ml   Output              500 ml   Net              460 ml        PHYSICAL EXAM:  General: WD, WN. Alert, cooperative, no acute distress    EENT:  EOMI. Anicteric sclerae. MMM  Resp:  CTA bilaterally, no wheezing or rales. No accessory muscle use  CV:  Irregular  rhythm,  No edema  GI:  Soft, Non distended, Non tender.  +Bowel sounds  Neurologic:  Alert and oriented X 3, normal speech,   Psych:   Good insight. Not anxious nor agitated  Skin:  No rashes. No jaundice    Reviewed most current lab test results and cultures  YES  Reviewed most current radiology test results   YES  Review and summation of old records today    NO  Reviewed patient's current orders and MAR    YES  PMH/SH reviewed - no change compared to H&P  ________________________________________________________________________  Care Plan discussed with:    Comments   Patient x    Family      RN x    Care Manager     Consultant                        Multidiciplinary team rounds were held today with , nursing, pharmacist and clinical coordinator.   Patient's plan of care was discussed; medications were reviewed and discharge planning was addressed. ________________________________________________________________________  Total NON critical care TIME:  25   Minutes    Total CRITICAL CARE TIME Spent:   Minutes non procedure based      Comments   >50% of visit spent in counseling and coordination of care     ________________________________________________________________________  Shai Felipe MD     Procedures: see electronic medical records for all procedures/Xrays and details which were not copied into this note but were reviewed prior to creation of Plan. LABS:  I reviewed today's most current labs and imaging studies.   Pertinent labs include:  Recent Labs      08/06/17   0551  08/04/17 2219   WBC  6.7  7.1   HGB  10.1*  11.0*   HCT  30.0*  33.0*   PLT  145*  166     Recent Labs      08/07/17   0326  08/06/17   0551  08/04/17   2220  08/04/17   2219   NA  133*  134*   --   132*   K  4.6  4.0   --   4.2   CL  99  101   --   99   CO2  24  23   --   26   GLU  109*  97   --   124*   BUN  16  16   --   21*   CREA  0.88  0.68   --   1.10*   CA  8.3*  8.3*   --   8.5   ALB   --   3.1*   --   3.3*   TBILI   --   0.6   --   0.3   SGOT   --   14*   --   17   ALT   --   17   --   21   INR  3.9*  4.9*  5.1*   --        Signed: Shai Felipe MD

## 2017-08-07 NOTE — PROGRESS NOTES
Cardiology Progress Note  8/7/2017 8:06 AM  Admit Date: 8/4/2017  Admit Diagnosis/CC: Atrial fibrillation with RVR (HCC)  Subjective:   Patient reports:  Chest Pain:  [x] none,  consistent with [] non-cardiac  [] atypical  []  anginal chest pain             [x] none now    []  on-going  Dyspnea: [x] none  [] at rest  [] with exertion  [] improved  [] unchanged [] worsening  PND:       [x] none  [] overnight   [] current  Orthopnea: [x] none  [] improved  [] unchanged  [] worsening  Presyncope: [x] none  [] improved  [] unchanged  [] worsening  Ambulated in hallway without symptoms  [] Yes  Ambulated in room without symptoms  [x] Yes  ROS(2+other systems)   Hematuria: [] Yes  [x] No.   Dysuria: [] Yes  [x] No                                           Cough:       [] Yes  [x] No.   Sputum: [] Yes  [x] No                                            Hematochezia: [] Yes  [x] No.   Melena: [] Yes  [x] No                                            No change in family and social history from H&P/Consult note.     Current Facility-Administered Medications   Medication Dose Route Frequency    cefTRIAXone (ROCEPHIN) 1 g in 0.9% sodium chloride (MBP/ADV) 50 mL  1 g IntraVENous Q24H    amiodarone (CORDARONE) tablet 200 mg  200 mg Oral BID    dilTIAZem (CARDIZEM) 100 mg in dextrose 5% (MBP/ADV) 100 mL infusion  5 mg/hr IntraVENous CONTINUOUS    acetaminophen (TYLENOL) tablet 325 mg  325 mg Oral BID    amitriptyline (ELAVIL) tablet 15 mg  15 mg Oral QHS    carvedilol (COREG) tablet 12.5 mg  12.5 mg Oral BID WITH MEALS    docusate sodium (COLACE) capsule 100 mg  100 mg Oral BID    latanoprost (XALATAN) 0.005 % ophthalmic solution 1 Drop  1 Drop Both Eyes QPM    levETIRAcetam (KEPPRA) tablet 500 mg  500 mg Oral BID    therapeutic multivitamin (THERAGRAN) tablet 1 Tab  1 Tab Oral DAILY    sodium chloride (NS) flush 5-10 mL  5-10 mL IntraVENous Q8H    sodium chloride (NS) flush 5-10 mL  5-10 mL IntraVENous PRN    acetaminophen (TYLENOL) tablet 325 mg  325 mg Oral Q4H PRN    ondansetron (ZOFRAN) injection 4 mg  4 mg IntraVENous Q4H PRN    Warfarin- Hold   1 Each Other DAILY    polyethylene glycol (MIRALAX) packet 17 g  17 g Oral DAILY       Objective:    Physical Exam:  Last VS:   Visit Vitals    /73 (BP 1 Location: Left arm, BP Patient Position: Post activity; Sitting)    Pulse 89    Temp 97.3 °F (36.3 °C)    Resp 20    Ht 5' 4\" (1.626 m)    Wt 64.4 kg (142 lb)    SpO2 96%    BMI 24.37 kg/m2    Temp (24hrs), Av.6 °F (36.4 °C), Min:97.3 °F (36.3 °C), Max:97.9 °F (36.6 °C)    24 hr VS reviewed. General Appearance:   [x] well developed, well nourished,  [x] NAD. [] agitated   [] lethargic but arousable  [] obtunded   ENT, Palate:    [x] WNL   [] dry palate/MM        Respiratory:    [x] CTA bilateral  [] rales  [] rhonchi  [] normal resp effort      [] similar to yesterday   [] worse    [] improved   Cardiovascular:   [x] RRR   [] Irregular rate and rhythm   [x] Normal S1, S2   [x] No gallop or rub. [] no murmur   [x] no new murmur  [] murmur c/w:   [x] no edema  RLE: []1+ []2+ [] 3+;  LLE: []1+ []2+ []3+      [] edema similar to yesterday   [] worse    [] improved   [x] normal JVP   [] Elevated JVP    [x] JVP similar to yesterday   [] worse    [] improved   [x] carotid upstroke unchanged   [x] abd aorta not palpated   [x] no stigmata of peripheral emboli   GI:    [x] abd soft, non-distented,bowel sounds present, no                     organomegaly appreciated   Skin:  Neuro:    Cath Site:  [x] warm and dry [] cold extremities   [x] A/O x 3, grossly non-focal      [] intact w/o hematoma or bruit; distal pulse unchanged.               Data Review:   Labs:    Recent Results (from the past 24 hour(s))   PROTHROMBIN TIME + INR    Collection Time: 17  3:26 AM   Result Value Ref Range    INR 3.9 (H) 0.9 - 1.1      Prothrombin time 41.0 (H) 9.0 - 16.8 sec   METABOLIC PANEL, BASIC    Collection Time: 08/07/17  3:26 AM   Result Value Ref Range    Sodium 133 (L) 136 - 145 mmol/L    Potassium 4.6 3.5 - 5.1 mmol/L    Chloride 99 97 - 108 mmol/L    CO2 24 21 - 32 mmol/L    Anion gap 10 5 - 15 mmol/L    Glucose 109 (H) 65 - 100 mg/dL    BUN 16 6 - 20 MG/DL    Creatinine 0.88 0.55 - 1.02 MG/DL    BUN/Creatinine ratio 18 12 - 20      GFR est AA >60 >60 ml/min/1.73m2    GFR est non-AA >60 >60 ml/min/1.73m2    Calcium 8.3 (L) 8.5 - 10.1 MG/DL       Provided Telemetry: [x] sinus  [] chronic afib   [] paroxysmal afib  [] NSVT      Assessment:     Active Problems:    Atrial fibrillation with RVR ([facility]) (8/5/2017)        Plan:     Atrial Fibrillation - Flutter  improved   Continue current meds  Has a few rales. Better. [address] vs. Rate control . Will speak to daughter. For all other plans, see orders.    [x] High complexity decision making was performed

## 2017-08-07 NOTE — PROGRESS NOTES
[address] SHIFT NURSING NOTE    Bedside and Verbal shift change report given to Lamonte nobles (oncoming nurse) by Juarez Solomon (offgoing nurse). Report included the following information SBAR, Kardex, ED Summary, Procedure Summary, Intake/Output, MAR, Recent Results and Cardiac Rhythm AFib. SHIFT SUMMARY:   1430: notified dr parrish's rn of pt's most current BP of 84/55. RN to notify dr Liliana Michael and call me back. 1220: spoke to dr parrish's rn, who states that dr Liliana Michael gave order to d/c cardizem drip. 644 pm: spoke to pharmacist carlos Gannon states there are no adverse effects is elavil and [place] are given at the same time.

## 2017-08-07 NOTE — ROUTINE PROCESS

## 2017-08-07 NOTE — PROGRESS NOTES
Report received from [facility] [employer], RN. SBAR, Kardex, ED Summary, Procedure Summary, Intake/Output, MAR, Accordion and Recent Results were discussed. 2008  MD Quan  paged regarding pt's BP 87/51. Orders received to decrease cardizem gtt to 5ml/hr.  Monitor BP and HR closely    Kyle Quiles

## 2017-08-07 NOTE — PROGRESS NOTES
Pt seen and examined this PM.  C/o back and hip pain. Worse with transfers.   INR 3.9    Plan:  - Repeat L-spine MRI  - NPO at midnight for possible kyphoplasty tomorrow should INR go below 1.5

## 2017-08-07 NOTE — PROGRESS NOTES
Occupational Therapy EVALUATION/discharge  Patient: Yuval Ribeiro (59 y.o. female)  Date: 8/7/2017  Primary Diagnosis: Atrial fibrillation with RVR ([address]        Precautions:  Fall    ASSESSMENT:   Based on the objective data described below, the patient presents with impaired vision due to macular degeneration and glaucoma, [place], decreased endurance and safety following admission for A-Fib with RVR. She is currently at her ADL baseline requiring SBA for safety with amb using her rollator and set-up for ADLs. She has an aide at home M-F from 10a-3p and then assist from her daughter and son-in-law in the evenings and weekends. Further skilled acute occupational therapy is not indicated at this time. Pt's aide states she was recently discharge from [facility] OT and was scheduled for the last [facility] PT session today. Discharge Recommendations: None for OT  Further Equipment Recommendations for Discharge: TBD      SUBJECTIVE:   Patient stated I feel ok.     OBJECTIVE DATA SUMMARY:   HISTORY:   Past Medical History:   Diagnosis Date    Alopecia     Arrhythmia     atrial fib,?svt    Arthritis     Atrial fibrillation (HCC)     Benign essential HTN     Benign neoplasm of vulva     CAD (coronary artery disease)     Cholelithiases     Colon cancer (HCC)     Constipation     Dyspareunia     Glaucoma     History of mixed connective tissue disease     Hx of viral pericarditis     Hyperactivity of bladder     Hypercholesterolemia     Ill-defined condition     macular degeneration    Insomnia     Macular degeneration     Neck pain     Psoriasis     Rectocele     Renal cyst     Seizure disorder ([address]     Sjogren's disease ([address]     Thrombophlebitis     TIA (transient ischemic attack)     Vaginal vault prolapse, posthysterectomy     Varicose veins      Past Surgical History:   Procedure Laterality Date    HX BREAST LUMPECTOMY      left breast    HX CATARACT REMOVAL      HX COLECTOMY  1955    partial    HX HEMORRHOIDECTOMY      HX HYSTERECTOMY      HX SHOULDER ARTHROSCOPY      left       Prior Level of Function/Home Situation: Requires SBA for safety with amb using her rollator and set-up for ADLs. She has an aide at home M-F from 10a-3p and then assist from her daughter and son-in-law in the evenings and weekends. Expanded or extensive additional review of patient history: obtained from pt and aide    Home Situation  Home Environment: Private residence  # Steps to Enter: 4  Rails to Enter: Yes  Hand Rails : Bilateral  One/Two Story Residence: Two story, live on 1st floor  Living Alone: No  Support Systems: Child(jaiden), Family member(s), Home care staff (Aide M-F 10a-3p)  Patient Expects to be Discharged to[de-identified] Private residence  Current DME Used/Available at Home: Blood pressure cuff, Commode, bedside, Grab bars, Raised toilet seat, Shower chair, Walker, rollator  Tub or Shower Type: Shower  [x]  Right hand dominant   []  Left hand dominant    EXAMINATION OF PERFORMANCE DEFICITS:  Cognitive/Behavioral Status:  Neurologic State: Alert; Appropriate for age  Orientation Level: Oriented X4  Cognition: Appropriate for age attention/concentration; Follows commands  Perception: Appears intact  Perseveration: No perseveration noted  Safety/Judgement: Awareness of environment; Fall prevention; Insight into deficits    Hearing: Auditory  Auditory Impairment: Hard of hearing, bilateral  Hearing Aids/Status: Bilateral, With patient    Vision/Perceptual:    Acuity: Impaired near vision; Impaired far vision (macular degeneration and glaucoma)    Corrective Lenses: Glasses (magnifying machine at home)    Range of Motion:  AROM: Generally decreased, functional  PROM: Generally decreased, functional                      Strength:  Strength: Generally decreased, functional                Coordination:  Coordination: Generally decreased, functional (due to imp vision)  Fine Motor Skills-Upper: Left Impaired;Right Impaired (due to imp vision)    Gross Motor Skills-Upper: Left Intact; Right Intact    Tone & Sensation:  Tone: Normal  Sensation: Intact                      Balance:  Sitting: Intact  Standing: Intact; With support (rollator)    Functional Mobility and Transfers for ADLs:  Bed Mobility:       Transfers:  Sit to Stand: Stand-by asssistance  Stand to Sit: Stand-by asssistance  Toilet Transfer : Stand-by asssistance; Additional time (using rollator and grab bar)    ADL Assessment:  Feeding: Setup; Additional time (due to imp vision and unfamiliar environment)    Oral Facial Hygiene/Grooming: Setup; Additional time (seated- due to imp vision and unfamiliar environment)    Bathing: Stand-by assistance; Additional time;Assist x1 (seated)    Upper Body Dressing: Setup; Additional time (due to imp vision and unfamiliar environment)    Lower Body Dressing: Stand-by assistance; Additional time;Assist x1 (SBA for safety with standing- crossed leg to reach feet)    Toileting: Stand by assistance; Additional time;Assist x1 (SBA for safety with standing)                ADL Intervention and task modifications:  Patient was educated on the benefits of maintaining activity tolerance, functional mobility, and independence with self care tasks during acute stay. Encouraged patient to be out of bed for all meals, perform daily ADLs (as approved by RN/MD regarding bathing etc), performing functional mobility to/from bathroom, and increasing time OOB daily with assist. Patient educated about the importance of maintaining activity tolerance to ensure safe return home and to baseline. Patient verbalized understanding of education. Cognitive Retraining  Safety/Judgement: Awareness of environment; Fall prevention; Insight into deficits    Functional Measure:  Barthel Index:    Bathin  Bladder: 5  Bowels: 10  Groomin  Dressin  Feedin  Mobility: 10  Stairs: 0  Toilet Use: 5  Transfer (Bed to Chair and Back): 10  Total: 50       Barthel and G-code impairment scale:  Percentage of impairment CH  0% CI  1-19% CJ  20-39% CK  40-59% CL  60-79% CM  80-99% CN  100%   Barthel Score 0-100 100 99-80 79-60 59-40 20-39 1-19   0   Barthel Score 0-20 20 17-19 13-16 9-12 5-8 1-4 0      The Barthel ADL Index: Guidelines  1. The index should be used as a record of what a patient does, not as a record of what a patient could do. 2. The main aim is to establish degree of independence from any help, physical or verbal, however minor and for whatever reason. 3. The need for supervision renders the patient not independent. 4. A patient's performance should be established using the best available evidence. Asking the patient, friends/relatives and nurses are the usual sources, but direct observation and common sense are also important. However direct testing is not needed. 5. Usually the patient's performance over the preceding 24-48 hours is important, but occasionally longer periods will be relevant. 6. Middle categories imply that the patient supplies over 50 per cent of the effort. 7. Use of aids to be independent is allowed. Kristopher Clark., Barthel, DCarsonW. (6900). Functional evaluation: the Barthel Index. [facility] (14)2. Patt Ulrich bayron KASHIF Shaw, Jacinda Elkins., Daisy Sahni., Laura, [facility] (1999). Measuring the change indisability after inpatient rehabilitation; comparison of the responsiveness of the Barthel Index and Functional [place] Measure. Journal of Neurology, Neurosurgery, and Psychiatry, 66(4), 496-569. Jason Molina, N.J.A, CARISSA Pascual, & Larry Mosley, MCarsonA. (2004.) Assessment of post-stroke quality of life in cost-effectiveness studies: The usefulness of the Barthel Index and the EuroQoL-5D. Quality of Life Research, 13, 131-33       G codes: In compliance with CMSs Claims Based Outcome Reporting, the following G-code set was chosen for this patient based on their primary functional limitation being treated:     The outcome measure chosen to determine the severity of the functional limitation was the Barthel Index with a score of 50/100 which was correlated with the impairment scale. ? Self Care:     - CURRENT STATUS: CK - 40%-59% impaired, limited or restricted    - GOAL STATUS: CK - 40%-59% impaired, limited or restricted    - D/C STATUS:  CK - 40%-59% impaired, limited or restricted       Occupational Therapy Evaluation Charge Determination   History Examination Decision-Making   LOW Complexity : Brief history review  LOW Complexity : 1-3 performance deficits relating to physical, cognitive , or psychosocial skils that result in activity limitations and / or participation restrictions  LOW Complexity : No comorbidities that affect functional and no verbal or physical assistance needed to complete eval tasks       Based on the above components, the patient evaluation is determined to be of the following complexity level: LOW   Pain:Pain Scale 1: Numeric (0 - 10)  Pain Intensity 1: 0              Activity Tolerance:   Fair  Please refer to the flowsheet for vital signs taken during this treatment. After treatment:   [x]  Patient left in no apparent distress sitting up in chair  []  Patient left in no apparent distress in bed  [x]  Call bell left within reach  [x]  Nursing notified  [x]  Caregiver present  [x]  Bed alarm activated    COMMUNICATION/EDUCATION:   Communication/Collaboration:  [x]      Home safety education was provided and the patient/caregiver indicated understanding. [x]      Patient/family have participated as able and agree with findings and recommendations. []      Patient is unable to participate in plan of care at this time.   Findings and recommendations were discussed with: Registered Nurse    Marybeth Severs, OT  Time Calculation: 24 mins

## 2017-08-07 NOTE — PROGRESS NOTES
Problem: Mobility Impaired (Adult and Pediatric)  Goal: *Acute Goals and Plan of Care (Insert Text)  Physical Therapy Goals  Initiated 8/7/2017  1. Patient will move from supine to sit and sit to supine in bed with independence within 7 day(s). 2. Patient will transfer from bed to chair and chair to bed with modified independence using the least restrictive device within 7 day(s). 3. Patient will perform sit to stand with modified independence within 7 day(s). 4. Patient will ambulate with modified independence for 200 feet with the least restrictive device within 7 day(s). 5. Patient will ascend/descend 4 stairs with 1 handrail(s) with modified independence within 7 day(s). PHYSICAL THERAPY EVALUATION  Patient: Demian Hampton (39 y.o. female)  Date: 8/7/2017  Primary Diagnosis: Atrial fibrillation with RVR ([facility] [address]        Precautions:   Fall      ASSESSMENT :  Based on the objective data described below, the patient presents with good overall strength and ROM but SANTOS, balance deficits, and limited endurance compared to her baseline. This patient lives with her daughter at baseline and uses a rollator for mobility. She has a home nurse daily from 10-3. She could walk laps around her home prior to admission but required a seated rest break and standing rest break x2 d/t SANTOS during evaluation. Anticipate good progress towards goals but she would benefit from HHPT when discharged. .     Patient will benefit from skilled intervention to address the above impairments.   Patients rehabilitation potential is considered to be Good  Factors which may influence rehabilitation potential include:   [X]         None noted  [ ]         Mental ability/status  [ ]         Medical condition  [ ]         Home/family situation and support systems  [ ]         Safety awareness  [ ]         Pain tolerance/management  [ ]         Other:        PLAN :  Recommendations and Planned Interventions:  [X]           Bed Mobility Training [X]    Neuromuscular Re-Education  [X]           Transfer Training                   [ ]    Orthotic/Prosthetic Training  [X]           Gait Training                         [ ]    Modalities  [X]           Therapeutic Exercises           [ ]    Edema Management/Control  [X]           Therapeutic Activities            [ ]    Patient and Family Training/Education  [ ]           Other (comment):     Frequency/Duration: Patient will be followed by physical therapy  5 times a week to address goals. Discharge Recommendations: Home Health  Further Equipment Recommendations for Discharge: None       SUBJECTIVE:   Patient stated I'm short winded.       OBJECTIVE DATA SUMMARY:   HISTORY:    Past Medical History:   Diagnosis Date    Alopecia      Arrhythmia       atrial fib,?svt    Arthritis      Atrial fibrillation (HCC)      Benign essential HTN      Benign neoplasm of vulva      CAD (coronary artery disease)      Cholelithiases      Colon cancer (HCC)      Constipation      Dyspareunia      Glaucoma      History of mixed connective tissue disease      Hx of viral pericarditis      Hyperactivity of bladder      Hypercholesterolemia      Ill-defined condition       macular degeneration    Insomnia      Macular degeneration      Neck pain      Psoriasis      Rectocele      Renal cyst      Seizure disorder (HCC)      [facility]ren's [facility] [address] [address]      Thrombophlebitis      TIA (transient ischemic attack)      Vaginal vault prolapse, posthysterectomy      Varicose veins       Past Surgical History:   Procedure Laterality Date    HX BREAST LUMPECTOMY         left breast    HX CATARACT REMOVAL        HX COLECTOMY   1955     partial    HX HEMORRHOIDECTOMY        HX HYSTERECTOMY        HX SHOULDER ARTHROSCOPY         left     Prior Level of Function/Home Situation: modified independent with rollator, see above  Personal factors and/or comorbidities impacting plan of care: legally blind     Home Situation  Home Environment: Private residence  # Steps to Enter: 4  Rails to Enter: Yes  Hand Rails : Bilateral  One/Two Story Residence: Two story, live on 1st floor  Living Alone: No  Support Systems: Child(jaiden), Family member(s), Home care staff (Aide M-JOSE 10a-3p)  Patient Expects to be Discharged to[de-identified] Private residence  Current DME Used/Available at Home: Blood pressure cuff, Commode, bedside, Grab bars, Raised toilet seat, Shower chair, Walker, rollator  Tub or Shower Type: Shower     EXAMINATION/PRESENTATION/DECISION MAKING:   Critical Behavior:  Neurologic State: Alert, Appropriate for age  Orientation Level: Oriented X4  Cognition: Appropriate for age attention/concentration, Follows commands  Safety/Judgement: Awareness of environment, Fall prevention, Insight into deficits  Hearing: Auditory  Auditory Impairment: Hard of hearing, bilateral  Hearing Aids/Status: Bilateral, With patient  Skin:  Intact as observed  Edema: =  Range Of Motion:  AROM: Generally decreased, functional           PROM: Generally decreased, functional           Strength:    Strength: Generally decreased, functional                    Tone & Sensation:   Tone: Normal              Sensation: Intact               Coordination:  Coordination: Generally decreased, functional (due to imp vision)  Vision:   Acuity: Impaired near vision; Impaired far vision (macular degeneration and glaucoma)  Corrective Lenses: Glasses (magnifying machine at home)  Functional Mobility:  Bed Mobility:              Transfers:  Sit to Stand: Stand-by asssistance  Stand to Sit: Stand-by asssistance                       Balance:   Sitting: Intact  Standing: Intact; With support (rollator)  Ambulation/Gait Training:  Distance (ft): 140 Feet (ft) (with 2 min seated rest break to rollator)  Assistive Device: Gait belt;Walker, rollator  Ambulation - Level of Assistance: Stand-by asssistance     Gait Description (WDL): Exceptions to WDL  Gait Abnormalities: Decreased step clearance;Trunk sway increased (kyphotic posture)        Base of Support: Narrowed     Speed/Samantha: Fluctuations              2 standing and 1 seated rest break d/t fatigue and SANTOS  Therapeutic Exercises:         Functional Measure:  Tinetti test:      Sitting Balance: 1  Arises: 1  Attempts to Rise: 2  Immediate Standing Balance: 2  Standing Balance: 2  Nudged: 1  Eyes Closed: 1  Turn 360 Degrees - Continuous/Discontinuous: 0  Turn 360 Degrees - Steady/Unsteady: 1  Sitting Down: 1  Balance Score: 12  Indication of Gait: 1  R Step Length/Height: 1  L Step Length/Height: 1  R Foot Clearance: 1  L Foot Clearance: 1  Step Symmetry: 1  Step Continuity: 1  Path: 1  Trunk: 0  Walking Time: 1  Gait Score: 9  Total Score: 21         Tinetti Test and G-code impairment scale:  Percentage of Impairment CH     0%    CI     1-19% CJ     20-39% CK     40-59% CL     60-79% CM     80-99% CN      100%   Tinetti  Score 0-28 28 23-27 17-22 12-16 6-11 1-5 0          Tinetti Tool Score Risk of Falls  <19 = High Fall Risk  19-24 = Moderate Fall Risk  25-28 = Low Fall Risk  Tinetti ME. Performance-Oriented Assessment of Mobility Problems in Elderly Patients. [facility] 66; [id number]. (Scoring Description: PT Bulletin Feb. 10, 1993)     Older adults: Shad Kelsey et al, 2009; n = 1000 [facility] elderly evaluated with ABC, BLESSING, ADL, and IADL)  · Mean BLESSING score for males aged 69-68 years = 26.21(3.40)  · Mean BLESSING score for females age 69-68 years = 25.16(4.30)  · Mean BLESSING score for males over 80 years = 23.29(6.02)  · Mean BLESSING score for females over 80 years = 17.20(8.32)            G codes: In compliance with CMSs Claims Based Outcome Reporting, the following G-code set was chosen for this patient based on their primary functional limitation being treated: The outcome measure chosen to determine the severity of the functional limitation was the Tinetti with a score of 21/28 which was correlated with the impairment scale.       · Mobility - Walking and Moving Around:               - CURRENT STATUS:    CJ - 20%-39% impaired, limited or restricted               - GOAL STATUS:           CI - 1%-19% impaired, limited or restricted               - D/C STATUS:                       ---------------To be determined---------------      Physical Therapy Evaluation Charge Determination   History Examination Presentation Decision-Making   MEDIUM  Complexity : 1-2 comorbidities / personal factors will impact the outcome/ POC  LOW Complexity : 1-2 Standardized tests and measures addressing body structure, function, activity limitation and / or participation in recreation  LOW Complexity : Stable, uncomplicated  LOW Complexity : FOTO score of       Based on the above components, the patient evaluation is determined to be of the following complexity level: LOW      Pain:  Pain Scale 1: Numeric (0 - 10)  Pain Intensity 1: 0              Activity Tolerance:   SpO2 93%, HR 87 bPM  Please refer to the flowsheet for vital signs taken during this treatment. After treatment:   [X]         Patient left in no apparent distress sitting up in chair  [ ]         Patient left in no apparent distress in bed  [X]         Call bell left within reach  [X]         Nursing notified  [X]         Caregiver present  [ ]         Bed alarm activated      COMMUNICATION/EDUCATION:   The patients plan of care was discussed with: Registered Nurse.  [X]         Fall prevention education was provided and the patient/caregiver indicated understanding. [X]         Patient/family have participated as able in goal setting and plan of care. [X]         Patient/family agree to work toward stated goals and plan of care. [ ]         Patient understands intent and goals of therapy, but is neutral about his/her participation. [ ]         Patient is unable to participate in goal setting and plan of care.      Thank you for this referral.  Philippe Haji, PT, DPT   Time Calculation: 28 mins

## 2017-08-08 ENCOUNTER — APPOINTMENT (OUTPATIENT)
Dept: MRI IMAGING | Age: 82
DRG: 309 | End: 2017-08-08
Attending: ORTHOPAEDIC SURGERY
Payer: MEDICARE

## 2017-08-08 LAB
ANION GAP BLD CALC-SCNC: 8 MMOL/L (ref 5–15)
ATRIAL RATE: 78 BPM
BUN SERPL-MCNC: 20 MG/DL (ref 6–20)
BUN/CREAT SERPL: 26 (ref 12–20)
CALCIUM SERPL-MCNC: 8.3 MG/DL (ref 8.5–10.1)
CALCULATED P AXIS, ECG09: 82 DEGREES
CALCULATED R AXIS, ECG10: 71 DEGREES
CALCULATED T AXIS, ECG11: 83 DEGREES
CHLORIDE SERPL-SCNC: 97 MMOL/L (ref 97–108)
CO2 SERPL-SCNC: 25 MMOL/L (ref 21–32)
CREAT SERPL-MCNC: 0.78 MG/DL (ref 0.55–1.02)
DIAGNOSIS, 93000: NORMAL
ERYTHROCYTE [DISTWIDTH] IN BLOOD BY AUTOMATED COUNT: 14.6 % (ref 11.5–14.5)
GLUCOSE SERPL-MCNC: 97 MG/DL (ref 65–100)
HCT VFR BLD AUTO: 28.4 % (ref 35–47)
HGB BLD-MCNC: 9.6 G/DL (ref 11.5–16)
INR PPP: 2.3 (ref 0.9–1.1)
MCH RBC QN AUTO: 30.8 PG (ref 26–34)
MCHC RBC AUTO-ENTMCNC: 33.8 G/DL (ref 30–36.5)
MCV RBC AUTO: 91 FL (ref 80–99)
P-R INTERVAL, ECG05: 212 MS
PLATELET # BLD AUTO: 138 K/UL (ref 150–400)
POTASSIUM SERPL-SCNC: 4.2 MMOL/L (ref 3.5–5.1)
PROTHROMBIN TIME: 23.9 SEC (ref 9–11.1)
Q-T INTERVAL, ECG07: 410 MS
QRS DURATION, ECG06: 82 MS
QTC CALCULATION (BEZET), ECG08: 467 MS
RBC # BLD AUTO: 3.12 M/UL (ref 3.8–5.2)
SODIUM SERPL-SCNC: 130 MMOL/L (ref 136–145)
VENTRICULAR RATE, ECG03: 78 BPM
WBC # BLD AUTO: 5.2 K/UL (ref 3.6–11)

## 2017-08-08 PROCEDURE — 65660000000 HC RM CCU STEPDOWN

## 2017-08-08 PROCEDURE — 74011250637 HC RX REV CODE- 250/637: Performed by: INTERNAL MEDICINE

## 2017-08-08 PROCEDURE — 85610 PROTHROMBIN TIME: CPT | Performed by: INTERNAL MEDICINE

## 2017-08-08 PROCEDURE — 77030018729 HC ELECTRD DEFIB PAD CARD -B

## 2017-08-08 PROCEDURE — 97110 THERAPEUTIC EXERCISES: CPT

## 2017-08-08 PROCEDURE — 72148 MRI LUMBAR SPINE W/O DYE: CPT

## 2017-08-08 PROCEDURE — 85027 COMPLETE CBC AUTOMATED: CPT | Performed by: INTERNAL MEDICINE

## 2017-08-08 PROCEDURE — 97116 GAIT TRAINING THERAPY: CPT

## 2017-08-08 PROCEDURE — 93005 ELECTROCARDIOGRAM TRACING: CPT

## 2017-08-08 PROCEDURE — 80048 BASIC METABOLIC PNL TOTAL CA: CPT | Performed by: INTERNAL MEDICINE

## 2017-08-08 PROCEDURE — 92960 CARDIOVERSION ELECTRIC EXT: CPT | Performed by: INTERNAL MEDICINE

## 2017-08-08 PROCEDURE — 74011250636 HC RX REV CODE- 250/636

## 2017-08-08 PROCEDURE — 36415 COLL VENOUS BLD VENIPUNCTURE: CPT | Performed by: INTERNAL MEDICINE

## 2017-08-08 PROCEDURE — 99152 MOD SED SAME PHYS/QHP 5/>YRS: CPT

## 2017-08-08 RX ORDER — FENTANYL CITRATE 50 UG/ML
INJECTION, SOLUTION INTRAMUSCULAR; INTRAVENOUS
Status: COMPLETED
Start: 2017-08-08 | End: 2017-08-08

## 2017-08-08 RX ORDER — FENTANYL CITRATE 50 UG/ML
25-50 INJECTION, SOLUTION INTRAMUSCULAR; INTRAVENOUS
Status: DISCONTINUED | OUTPATIENT
Start: 2017-08-08 | End: 2017-08-08 | Stop reason: ALTCHOICE

## 2017-08-08 RX ORDER — WARFARIN 2.5 MG/1
2.5 TABLET ORAL ONCE
Status: COMPLETED | OUTPATIENT
Start: 2017-08-08 | End: 2017-08-08

## 2017-08-08 RX ORDER — MIDAZOLAM HYDROCHLORIDE 1 MG/ML
.5-2 INJECTION, SOLUTION INTRAMUSCULAR; INTRAVENOUS
Status: DISCONTINUED | OUTPATIENT
Start: 2017-08-08 | End: 2017-08-08 | Stop reason: ALTCHOICE

## 2017-08-08 RX ORDER — MIDAZOLAM HYDROCHLORIDE 1 MG/ML
INJECTION, SOLUTION INTRAMUSCULAR; INTRAVENOUS
Status: COMPLETED
Start: 2017-08-08 | End: 2017-08-08

## 2017-08-08 RX ADMIN — MIDAZOLAM HYDROCHLORIDE 1 MG: 1 INJECTION, SOLUTION INTRAMUSCULAR; INTRAVENOUS at 12:15

## 2017-08-08 RX ADMIN — Medication 10 ML: at 21:18

## 2017-08-08 RX ADMIN — WARFARIN SODIUM 2.5 MG: 2.5 TABLET ORAL at 16:42

## 2017-08-08 RX ADMIN — FENTANYL CITRATE 25 MCG: 50 INJECTION, SOLUTION INTRAMUSCULAR; INTRAVENOUS at 12:14

## 2017-08-08 RX ADMIN — LATANOPROST 1 DROP: 50 SOLUTION OPHTHALMIC at 16:44

## 2017-08-08 RX ADMIN — LEVETIRACETAM 500 MG: 500 TABLET ORAL at 08:48

## 2017-08-08 RX ADMIN — AMITRIPTYLINE HYDROCHLORIDE 15 MG: 10 TABLET, FILM COATED ORAL at 21:17

## 2017-08-08 RX ADMIN — LEVETIRACETAM 500 MG: 500 TABLET ORAL at 16:42

## 2017-08-08 RX ADMIN — Medication 10 ML: at 08:48

## 2017-08-08 RX ADMIN — AMIODARONE HYDROCHLORIDE 200 MG: 200 TABLET ORAL at 08:47

## 2017-08-08 RX ADMIN — Medication 10 ML: at 05:59

## 2017-08-08 RX ADMIN — MIDAZOLAM HYDROCHLORIDE 1 MG: 1 INJECTION, SOLUTION INTRAMUSCULAR; INTRAVENOUS at 12:18

## 2017-08-08 RX ADMIN — AMIODARONE HYDROCHLORIDE 200 MG: 200 TABLET ORAL at 16:42

## 2017-08-08 RX ADMIN — CARVEDILOL 12.5 MG: 12.5 TABLET, FILM COATED ORAL at 08:00

## 2017-08-08 RX ADMIN — ACETAMINOPHEN 325 MG: 325 TABLET, FILM COATED ORAL at 21:17

## 2017-08-08 RX ADMIN — DOCUSATE SODIUM 100 MG: 100 CAPSULE, LIQUID FILLED ORAL at 16:42

## 2017-08-08 RX ADMIN — ACETAMINOPHEN 325 MG: 325 TABLET, FILM COATED ORAL at 14:33

## 2017-08-08 NOTE — ROUTINE PROCESS
Bedside and Verbal shift change report given to Jessica Baker RN (oncoming nurse) by Karyle Skiff, RN (offgoing nurse). Report included the following information SBAR, Kardex, Intake/Output and MAR.

## 2017-08-08 NOTE — PROGRESS NOTES
Problem: Mobility Impaired (Adult and Pediatric)  Goal: *Acute Goals and Plan of Care (Insert Text)  Physical Therapy Goals  Initiated 8/7/2017  1. Patient will move from supine to sit and sit to supine in bed with independence within 7 day(s). 2. Patient will transfer from bed to chair and chair to bed with modified independence using the least restrictive device within 7 day(s). 3. Patient will perform sit to stand with modified independence within 7 day(s). 4. Patient will ambulate with modified independence for 200 feet with the least restrictive device within 7 day(s). 5. Patient will ascend/descend 4 stairs with 1 handrail(s) with modified independence within 7 day(s). PHYSICAL THERAPY TREATMENT  Patient: Socrates Boggs (75 y.o. female)  Date: 8/8/2017  Diagnosis: Pathologic compression fracture of lumbar vertebra, with delayed healing, subsequent encounter [M48.56XG] <principal problem not specified>  Procedure(s) (LRB):  L1 KYPHOPLASTY (N/A)    Precautions: Fall      ASSESSMENT:  Patient making gains, exhibiting improvements in gait tolerance although she remains fatigued over a distance. With rest, seated on her rollator, she is able to complete tasks with little concern for gait mechanics and decent gait speed. She utilized her rollator safely with exception of one sit to chair without use of brakes. Completed repeated sit<>stand at conclusion to good effect with emphasis on completion and fatigue of 8/10 RPE concluding session at bedside chair as received. Supportive daughter present throughout. Recommend return to home with caregiver, family, [facility] support. Progression toward goals:  [X]    Improving appropriately and progressing toward goals  [ ]    Improving slowly and progressing toward goals  [ ]    Not making progress toward goals and plan of care will be adjusted       PLAN:  Patient continues to benefit from skilled intervention to address the above impairments.   Continue treatment per established plan of care. Discharge Recommendations:  Home Health  Further Equipment Recommendations for Discharge:  none       SUBJECTIVE:   Patient stated you know what you want.       OBJECTIVE DATA SUMMARY:   Critical Behavior:  Neurologic State: Alert, Appropriate for age  Orientation Level: Oriented X4  Cognition: Appropriate decision making, Appropriate for age attention/concentration, Appropriate safety awareness, Follows commands  Safety/Judgement: Awareness of environment, Fall prevention, Insight into deficits  Functional Mobility Training:  Bed Mobility:  Rolling:  (at chair and concluded at such )                 Transfers:  Sit to Stand: Supervision  Stand to Sit: Supervision                             Balance:  Sitting: Intact; Without support  Standing: Intact; With support (of rollator)  Ambulation/Gait Training:  Distance (ft): 300 Feet (ft)  Assistive Device: Walker, rollator;Gait belt  Ambulation - Level of Assistance: Supervision        Gait Abnormalities: Decreased step clearance (kyphotic posture)              Speed/Samantha: Other (comment) (x1 seated rest break on rollator of 2 minutes 2/2 fatigue)                                VC's to maintain upward gaze   Therapeutic Exercises:   x23 sit<>stand with seated rest breaks at 7 (performed in 30 seconds for 30\" test), 12, 17 reps with emphasis on completion rather than speed from bedside couch without use of UE      Pain:  Pain Scale 1: Numeric (0 - 10)  Pain Intensity 1: 3  Pain Location 1: Back  Pain Orientation 1: Lower  Pain Description 1: Aching  Pain Intervention(s) 1: Meditation  Activity Tolerance:   Improving; reports SANTOS at times although VSS      Please refer to the flowsheet for vital signs taken during this treatment.   After treatment:   [X]    Patient left in no apparent distress sitting up in chair  [ ]    Patient left in no apparent distress in bed  [X]    Call bell left within reach  [X]    Nursing notified  [X]    Caregiver present  [ ]    Bed alarm activated      COMMUNICATION/COLLABORATION:   The patients plan of care was discussed with: Registered Nurse     Rashaun Flanagan, PT, DPT    Time Calculation: 30 mins

## 2017-08-08 NOTE — PROGRESS NOTES
26- pt with a MEWS of three. Pt is tachycardic. She is being cardioverted today. Will continue to monitor pt.

## 2017-08-08 NOTE — ROUTINE PROCESS
TRANSFER - IN REPORT:    Verbal report received from Diane Sam, [facility] (name) on Fortino Meza  being received from Stress Lab (unit) for routine progression of care      Report consisted of patients Situation, Background, Assessment and   Recommendations(SBAR). Information from the following report(s) Procedure Summary and Intake/Output was reviewed with the receiving nurse. Opportunity for questions and clarification was provided. Assessment completed upon patients arrival to unit and care assumed.

## 2017-08-08 NOTE — PROGRESS NOTES
TRANSFER - OUT REPORT:    Verbal report given to Дмитрий Traylorant on Stephy Matthews  being transferred back to [facility] for routine progression of care post Cardioversion      Report consisted of patients Situation, Background, Assessment and   Recommendations(SBAR). Information from the following report(s) Procedure Summary was reviewed with the receiving nurse. Lines:   Peripheral IV 08/07/17 Left Antecubital (Active)   Site Assessment Clean, dry, & intact 8/8/2017  7:00 AM   Phlebitis Assessment 0 8/8/2017  7:00 AM   Infiltration Assessment 0 8/8/2017  7:00 AM   Dressing Status Clean, dry, & intact 8/8/2017  7:00 AM   Dressing Type Tape;Transparent 8/8/2017  7:00 AM   Hub Color/Line Status Blue;Flushed 8/8/2017  7:00 AM        Opportunity for questions and clarification was provided.       Patient transported with:   O2 @ 2 liters

## 2017-08-08 NOTE — PROGRESS NOTES
Pt sedated with 25 mcg Iv Fentanyl and 2 mg IV Versed for Elective Cardioversion. Pt given 1 synch shock at 200 joules. Rhythm converted to sinus.

## 2017-08-08 NOTE — PROGRESS NOTES
Pharmacy Daily Dosing of Warfarin    Indication: A fib  Goal INR: 2-3      Average Daily Warfarin Dose: 5 mg M/W/F/Sat/Sun and 2.5 mg on Tue/Thu  Concurrent Anticoagulants/Antiplatelets:  Major Interacting Medications (Dose/Frequency): Amiodarone 200 mg BID    INR (0.9-1.1) > 5 or Platelets (< 76V): discuss with MD:  Recent Labs      08/08/17   0349  08/07/17   0326  08/06/17   0551   INR  2.3*  3.9*  4.9*   HGB  9.6*   --   10.1*   PLT  138*   --   145*       Impression/Plan: Restarting warfarin after cardioversion. As the patient came in supra-therapeutic I will order 2.5 mg x 1 dose today. Pharmacy will follow daily and adjust the dose as appropriate.     Thanks for the consult  DIALLO Barboza

## 2017-08-08 NOTE — PROGRESS NOTES
Bedside shift change report given to Missy Healy (oncoming nurse) by Jose F Wise RN (offgoing nurse). Report included the following information SBAR.

## 2017-08-08 NOTE — PROGRESS NOTES
Pt arrived to Non-Invasive Lab. Pt identified with 2 patient identifiers. Cardioversion procedure reviewed with patient and questions answered.   ASA score 2  Mallampati score 2  per MD.

## 2017-08-08 NOTE — PROGRESS NOTES
Pt seen and examined this AM.  Pain improving. Kyphoplasty cancelled for today. INR still elevated. Will undergo cardiac procedure today. MRI pending for tonight, if fx is healed she'll not need a kyphoplasty. Will re-check tomorrow.

## 2017-08-09 VITALS
RESPIRATION RATE: 18 BRPM | WEIGHT: 143.3 LBS | OXYGEN SATURATION: 95 % | DIASTOLIC BLOOD PRESSURE: 58 MMHG | BODY MASS INDEX: 24.46 KG/M2 | HEIGHT: 64 IN | TEMPERATURE: 97.9 F | SYSTOLIC BLOOD PRESSURE: 112 MMHG | HEART RATE: 84 BPM

## 2017-08-09 LAB
ANION GAP BLD CALC-SCNC: 7 MMOL/L (ref 5–15)
BUN SERPL-MCNC: 22 MG/DL (ref 6–20)
BUN/CREAT SERPL: 26 (ref 12–20)
CALCIUM SERPL-MCNC: 8.8 MG/DL (ref 8.5–10.1)
CHLORIDE SERPL-SCNC: 99 MMOL/L (ref 97–108)
CO2 SERPL-SCNC: 26 MMOL/L (ref 21–32)
CREAT SERPL-MCNC: 0.85 MG/DL (ref 0.55–1.02)
ERYTHROCYTE [DISTWIDTH] IN BLOOD BY AUTOMATED COUNT: 14.6 % (ref 11.5–14.5)
GLUCOSE SERPL-MCNC: 94 MG/DL (ref 65–100)
HCT VFR BLD AUTO: 29.2 % (ref 35–47)
HGB BLD-MCNC: 9.8 G/DL (ref 11.5–16)
INR PPP: 1.6 (ref 0.9–1.1)
MCH RBC QN AUTO: 30.6 PG (ref 26–34)
MCHC RBC AUTO-ENTMCNC: 33.6 G/DL (ref 30–36.5)
MCV RBC AUTO: 91.3 FL (ref 80–99)
PLATELET # BLD AUTO: 153 K/UL (ref 150–400)
POTASSIUM SERPL-SCNC: 4.4 MMOL/L (ref 3.5–5.1)
PROTHROMBIN TIME: 16.3 SEC (ref 9–11.1)
RBC # BLD AUTO: 3.2 M/UL (ref 3.8–5.2)
SODIUM SERPL-SCNC: 132 MMOL/L (ref 136–145)
WBC # BLD AUTO: 6.4 K/UL (ref 3.6–11)

## 2017-08-09 PROCEDURE — 85610 PROTHROMBIN TIME: CPT | Performed by: INTERNAL MEDICINE

## 2017-08-09 PROCEDURE — 97110 THERAPEUTIC EXERCISES: CPT

## 2017-08-09 PROCEDURE — 97116 GAIT TRAINING THERAPY: CPT

## 2017-08-09 PROCEDURE — 85027 COMPLETE CBC AUTOMATED: CPT | Performed by: INTERNAL MEDICINE

## 2017-08-09 PROCEDURE — 74011250637 HC RX REV CODE- 250/637: Performed by: INTERNAL MEDICINE

## 2017-08-09 PROCEDURE — 5A2204Z RESTORATION OF CARDIAC RHYTHM, SINGLE: ICD-10-PCS | Performed by: INTERNAL MEDICINE

## 2017-08-09 PROCEDURE — 36415 COLL VENOUS BLD VENIPUNCTURE: CPT | Performed by: INTERNAL MEDICINE

## 2017-08-09 PROCEDURE — 80048 BASIC METABOLIC PNL TOTAL CA: CPT | Performed by: INTERNAL MEDICINE

## 2017-08-09 RX ORDER — WARFARIN SODIUM 5 MG/1
5 TABLET ORAL ONCE
Status: DISCONTINUED | OUTPATIENT
Start: 2017-08-09 | End: 2017-08-09 | Stop reason: HOSPADM

## 2017-08-09 RX ORDER — AMIODARONE HYDROCHLORIDE 200 MG/1
200 TABLET ORAL 2 TIMES DAILY
Qty: 60 TAB | Refills: 0 | Status: SHIPPED | OUTPATIENT
Start: 2017-08-09 | End: 2017-08-30 | Stop reason: ALTCHOICE

## 2017-08-09 RX ADMIN — AMIODARONE HYDROCHLORIDE 200 MG: 200 TABLET ORAL at 09:47

## 2017-08-09 RX ADMIN — CARVEDILOL 12.5 MG: 12.5 TABLET, FILM COATED ORAL at 09:48

## 2017-08-09 RX ADMIN — POLYETHYLENE GLYCOL 3350 17 G: 17 POWDER, FOR SOLUTION ORAL at 09:47

## 2017-08-09 RX ADMIN — Medication 10 ML: at 04:26

## 2017-08-09 RX ADMIN — THERA TABS 1 TABLET: TAB at 09:48

## 2017-08-09 RX ADMIN — LEVETIRACETAM 500 MG: 500 TABLET ORAL at 09:48

## 2017-08-09 RX ADMIN — ACETAMINOPHEN 325 MG: 325 TABLET, FILM COATED ORAL at 09:48

## 2017-08-09 RX ADMIN — ACETAMINOPHEN 325 MG: 325 TABLET, FILM COATED ORAL at 15:13

## 2017-08-09 RX ADMIN — DOCUSATE SODIUM 100 MG: 100 CAPSULE, LIQUID FILLED ORAL at 09:48

## 2017-08-09 NOTE — PROGRESS NOTES
[address] SHIFT NURSING NOTE    Bedside and Verbal shift change report given to Jose Stack RN (oncoming nurse) by Manjula Pham RN (offgoing nurse). Report included the following information SBAR, Kardex, Intake/Output, MAR, Accordion, Recent Results and Cardiac Rhythm NSR w/ 1st degree AVB. SHIFT SUMMARY:         Admission Date 8/4/2017   Admission Diagnosis Pathologic compression fracture of lumbar vertebra, with delayed healing, subsequent encounter [M48.56XG]   Consults IP CONSULT TO CARDIOLOGY  IP CONSULT TO ORTHOPEDIC SURGERY        Consults   [x] PT   [] OT   [] Speech   [] Palliative      [] Hospice    [] Case Management   [] None   Cardiac Monitoring   [x] Yes   [] No     Antibiotics   [] Yes   [x] No   GI Prophylaxis  (Ex: Protonix, Pepcid, etc,.)   [] Yes   [x] No          DVT Prophylaxis   SCDs:             Zackery stockings:         [x] Medication (Ex: Lovenox, Eliquis, Brilinta, Coumadin,  Heparin, etc..)   [] Contraindicated   [] No VTE needed       Urinary Catheter       [REMOVED] External Female Catheter 08/05/17-Urine Output (mL): 700 ml     LDAs               Peripheral IV 08/07/17 Left Antecubital (Active)   Site Assessment Clean, dry, & intact 8/8/2017  2:40 PM   Phlebitis Assessment 0 8/8/2017  2:40 PM   Infiltration Assessment 0 8/8/2017  2:40 PM   Dressing Status Clean, dry, & intact 8/8/2017  2:40 PM   Dressing Type Tape;Transparent 8/8/2017  2:40 PM   Hub Color/Line Status Blue;Flushed 8/8/2017  2:40 PM                      I/Os   Intake/Output Summary (Last 24 hours) at 08/09/17 0728  Last data filed at 08/08/17 1836   Gross per 24 hour   Intake              580 ml   Output                0 ml   Net              580 ml         Activity Level Activity Level: Up with Assistance     Activity Assistance: Partial (one person)   Diet Active Orders   Diet    DIET REGULAR      Purposeful Rounding every 1-2 hour?    [x] Yes    Corey Score  Total Score: 4   Bed Alarm (If score 3 or >)   [x] Yes    [] Refused (See signed refusal form in chart)   Magdaleno Score  Magdaleno Score: 19       Magdaleno Score (if score 14 or less)   [] PMT consult   [] Nutrition consult   [] Wound Care consult      []  Specialty bed         Influenza Vaccine Received Flu Vaccine for Current Season (usually Sept-March): Not Flu Season               Needs prior to discharge:   Home O2 required:    [] Yes   [x] No     If yes, how much O2 required? Other:    Last Bowel Movement Date: 08/08/17   Readmission Risk Assessment Tool Score High Risk            25       Total Score        3 Has Seen PCP in Last 6 Months (Yes=3, No=0)    4 IP Visits Last 12 Months (1-3=4, 4=9, >4=11)    5 Pt. Coverage (Medicare=5 , Medicaid, or Self-Pay=4)    13 Charlson Comorbidity Score (Age + Comorbid Conditions)        Criteria that do not apply:    . Living with Significant Other. Assisted Living. LTAC. SNF.  or   Rehab    Patient Length of Stay (>5 days = 3)       Expected Length of Stay 2d 14h   Actual Length of Stay 4

## 2017-08-09 NOTE — PROGRESS NOTES
[address] SHIFT NURSING NOTE      SHIFT SUMMARY:   Discharge instructions reviewed with pt and her daughter jason to their satisfaction. Copy given to pt and one on pt's paper chart. No new Rxs. IV and tele removed. Pt d/c'd to home.

## 2017-08-09 NOTE — DISCHARGE INSTRUCTIONS
Atrial Fibrillation: Care Instructions  Your Care Instructions    Atrial fibrillation is an irregular and often fast heartbeat. Treating this condition is important for several reasons. It can cause blood clots, which can travel from your heart to your brain and cause a stroke. If you have a fast heartbeat, you may feel lightheaded, dizzy, and weak. An irregular heartbeat can also increase your risk for heart failure. Atrial fibrillation is often the result of another heart condition, such as high blood pressure or coronary artery disease. Making changes to improve your heart condition will help you stay healthy and active. Follow-up care is a key part of your treatment and safety. Be sure to make and go to all appointments, and call your doctor if you are having problems. It's also a good idea to know your test results and keep a list of the medicines you take. How can you care for yourself at home? Medicines  · Take your medicines exactly as prescribed. Call your doctor if you think you are having a problem with your medicine. You will get more details on the specific medicines your doctor prescribes. · If your doctor has given you a blood thinner to prevent a stroke, be sure you get instructions about how to take your medicine safely. Blood thinners can cause serious bleeding problems. · Do not take any vitamins, over-the-counter drugs, or herbal products without talking to your doctor first.  Lifestyle changes  · Do not smoke. Smoking can increase your chance of a stroke and heart attack. If you need help quitting, talk to your doctor about stop-smoking programs and medicines. These can increase your chances of quitting for good. · Eat a heart-healthy diet. · Stay at a healthy weight. Lose weight if you need to. · Limit alcohol to 2 drinks a day for men and 1 drink a day for women. Too much alcohol can cause health problems. · Avoid colds and flu. Get a pneumococcal vaccine shot.  If you have had one before, ask your doctor whether you need another dose. Get a flu shot every year. If you must be around people with colds or flu, wash your hands often. Activity  · If your doctor recommends it, get more exercise. Walking is a good choice. Bit by bit, increase the amount you walk every day. Try for at least 30 minutes on most days of the week. You also may want to swim, bike, or do other activities. Your doctor may suggest that you join a cardiac rehabilitation program so that you can have help increasing your physical activity safely. · Start light exercise if your doctor says it is okay. Even a small amount will help you get stronger, have more energy, and manage stress. Walking is an easy way to get exercise. Start out by walking a little more than you did in the hospital. Gradually increase the amount you walk. · When you exercise, watch for signs that your heart is working too hard. You are pushing too hard if you cannot talk while you are exercising. If you become short of breath or dizzy or have chest pain, sit down and rest immediately. · Check your pulse regularly. Place two fingers on the artery at the palm side of your wrist, in line with your thumb. If your heartbeat seems uneven or fast, talk to your doctor. When should you call for help? Call 911 anytime you think you may need emergency care. For example, call if:  · You have symptoms of a heart attack. These may include:  ¨ Chest pain or pressure, or a strange feeling in the chest.  ¨ Sweating. ¨ Shortness of breath. ¨ Nausea or vomiting. ¨ Pain, pressure, or a strange feeling in the back, neck, jaw, or upper belly or in one or both shoulders or arms. ¨ Lightheadedness or sudden weakness. ¨ A fast or irregular heartbeat. After you call 911, the  may tell you to chew 1 adult-strength or 2 to 4 low-dose aspirin. Wait for an ambulance. Do not try to drive yourself. · You have symptoms of a stroke.  These may include:  ¨ Sudden numbness, tingling, weakness, or loss of movement in your face, arm, or leg, especially on only one side of your body. ¨ Sudden vision changes. ¨ Sudden trouble speaking. ¨ Sudden confusion or trouble understanding simple statements. ¨ Sudden problems with walking or balance. ¨ A sudden, severe headache that is different from past headaches. · You passed out (lost consciousness). Call your doctor now or seek immediate medical care if:  · You have new or increased shortness of breath. · You feel dizzy or lightheaded, or you feel like you may faint. · Your heart rate becomes irregular. · You can feel your heart flutter in your chest or skip heartbeats. Tell your doctor if these symptoms are new or worse. Watch closely for changes in your health, and be sure to contact your doctor if you have any problems. Where can you learn more? Go to http://zackery-jessica.info/. Enter U020 in the search box to learn more about \"Atrial Fibrillation: Care Instructions. \"  Current as of: September 21, 2016  Content Version: 11.3  © 4597-3999 [employer]. Care instructions adapted under license by [employer] (which disclaims liability or warranty for this information). If you have questions about a medical condition or this instruction, always ask your healthcare professional. [place] [address] any warranty or liability for your use of this information. DC instructions  -Continue coumadin as instructed by Cardiology, Dr. Svetlana Cox.  Repeat INR check on Monday.  -Increase amiodarone to twice daily  -Resume HH PT

## 2017-08-09 NOTE — PROGRESS NOTES
Pt admitted from home where she lives with her daughter and caregivers who together provide 24 hour supervision. Pt is active with her PCP. Her daughter is a RN and very active in pt's care. She draws pt's INR and routine lab work and sends to MD's. Pt is open to [facility] and wants to remain with this company. Referral sent via [facility] to [facility] for resumption of care and pt accepted to continue services. Follow up appointments made. Pt's daughter will transport pt home. Care Management Interventions  PCP Verified by CM: Yes  Mode of Transport at Discharge:  Other (see comment)  Transition of Care Consult (CM Consult): Discharge Planning, [facility])  [address]: No  Reason Outside [place]: Patient already serviced by other home care/hospice agency [facility])  Discharge Durable Medical Equipment: No  Physical Therapy Consult: Yes  Occupational Therapy Consult: Yes  Speech Therapy Consult: No  Current Support Network: Relative's Home, Has Personal Caregivers  Confirm Follow Up Transport: Family  Plan discussed with Pt/Family/Caregiver: Yes  Freedom of Choice Offered: Yes  Discharge Location  Discharge Placement: Home with home health [facility])    [place], [facility]

## 2017-08-09 NOTE — DISCHARGE SUMMARY
Hospitalist Discharge Summary     Patient ID:  Simon Ruiz  [id number]  80 y.o.  11/16/1924    PCP on record: Fouzia Granado MD    Admit date: 8/4/2017  Discharge date and time: 8/9/2017      DISCHARGE DIAGNOSIS:    Atrial Fibrillation with RVR s/p [facility] 8/8/17 with Dr. Gabriel Gutierrez- NSR on discharge  Hypertension  Supra-therapeutic INR on Coumadin (5.1 on admission)  L1 compression fracture  Elevated TSH  CKD stage III  Constipation   Overactive bladder  Seizure disorder      CONSULTATIONS:  IP CONSULT TO CARDIOLOGY  IP CONSULT TO ORTHOPEDIC SURGERY    Excerpted HPI from H&P of Stephanie Rivers MD:  Simon Ruiz is a 80 y.o.  female who presents with Palpitations. She has a history of paroxysmal afib but has been in afib persistently for the last 5 days. Dr. Gabriel Gutierrez increased her amiodarone but still this did not make a difference. Patient denies and CP or SOB. She did have a Kyphoplasty planned but this delayed due to IR wanting a repeat MRI. After this coumadin was restarted (and repeat MRI canceled with plans for Dr. Bridget Maldonado to do the Kyphoplasty instead of IR). This was suppose to take place on 8/8/17. Patient had a BP of 88 systolic before leaving home. Cardiology was consulted and recommended diltiazem gtt. She has had acceptable control at a rate of 10 mg/kg. She states that she does feel better at this time. The only other new medication she has had is Myrbetriq which was recently started for overactive bladder.    ______________________________________________________________________  DISCHARGE SUMMARY/HOSPITAL COURSE:  for full details see H&P, daily progress notes, labs, consult notes. Atrial Fibrillation with RVR s/p [facility] 8/8/17 with Dr. Gabriel Gutierrez- NSR on discharge  Hypertension  -Telemetry  -continue Coreg and amiodarone increased to bid on discharge  -Coumadin dosing per patients cardiologist, Dr. Gabriel Gutierrez.  To resume home dose on discharge and INR check on Monday.      Supra-therapeutic INR on Coumadin (5.1 on admission)  -no bleeding Hgb stable  -pharmacy consult to dose coumadin, held initially with plan for possible kypho which eventually did not need. -INR 2.3 day of [facility]  -INR 1.6 on day of discharge.  -Coumadin dosing as above. -repeat INR check on Monday per cardiology.       L1 compression fracture  -MRI with \"Unchanged appearance of the L1 burst fracture with retropulsion into the  canal. This indents the cord but no new cord edema or collapse is demonstrated\"  -Dr. Jackson Ga following while inpatient  -no kypho indicated per ortho  -patient without significant pain.  -home with [place].     UTI rule out, urine cx mixed ronald  -discontinued ceftriaxone     Elevated TSH  -4.82  -recheck as outpatient in 4-6 weeks.     CKD stage III  -renal function at baseline, continue to monitor      Constipation   -bowel regimen      Overactive bladder  -discontinue Myrbetriq      Seizure disorder  -continue keppra        _______________________________________________________________________  Patient seen and examined by me on discharge day. Pertinent Findings:  Gen:    Poor vision. Not in distress  Chest: Clear lungs  CVS:   Regular rhythm. No edema  Abd:  Soft, not distended, not tender  Neuro:  Alert, oriented x 3  _______________________________________________________________________  DISCHARGE MEDICATIONS:   Discharge Medication List as of 8/9/2017  3:57 PM      CONTINUE these medications which have CHANGED    Details   amiodarone (CORDARONE) 200 mg tablet Take 1 Tab by mouth two (2) times a day., Print, Disp-60 Tab, R-0         CONTINUE these medications which have NOT CHANGED    Details   DOCUSATE CALCIUM (STOOL SOFTENER PO) Take 100 mg by mouth two (2) times a day., Historical Med      ACETAMINOPHEN (TYLENOL PO) Take 300 mg by mouth two (2) times a day., Historical Med      levETIRAcetam (KEPPRA) 500 mg tablet TAKE ONE TABLET BY MOUTH TWICE DAILY.  PATIENT TO KEEP 6/15/16 FOR FURTHER FILLS, Normal, Disp-180 Tab, R-5      latanoprost (XALATAN) 0.005 % ophthalmic solution Historical Med      carvedilol (COREG) 25 mg tablet 12.5 mg two (2) times daily (with meals). , Historical Med      multivitamin (ONE A DAY) tablet Take 1 Tab by mouth two (2) times a day., Historical Med      AMITRIPTYLINE HCL (AMITRIPTYLINE PO) Take  by mouth. nightly 10 mg to 15 mg, Historical Med      warfarin (COUMADIN) 2.5 mg tablet Take 5 mg by mouth daily (with dinner). Dr Ryan Houston to be new cardiologist ,2 tabs except tues and thurs  Indications: Afib, Historical Med         STOP taking these medications       naloxone (NARCAN) 1 mg/mL injection Comments:   Reason for Stopping:               My Recommended Diet, Activity, Wound Care, and follow-up labs are listed in the patient's Discharge Insturctions which I have personally completed and reviewed. _______________________________________________________________________  DISPOSITION:    Home with Family:    Home with HH/PT/OT/RN: x   SNF/LTC:    LOW:    OTHER:        Condition at Discharge:  Stable  _______________________________________________________________________  Follow up with:   PCP : MD Risa  Follow-up Information     Follow up With Details Comments Black Anderson MD On 8/15/2017 11:00am  hospital follow-up  Please have INR checked and results sent to Dr. Tba NOVA [phone] [facility] MD Dr. Elizabeth Pimentel office will contact the patient with a follow-up appointment [phone] [place] [phone].   [facility] [address]  [phone]              Total time in minutes spent coordinating this discharge (includes going over instructions, follow-up, prescriptions, and preparing report for sign off to her PCP) :  40 minutes    Signed:  Tom Hardwick MD

## 2017-08-09 NOTE — PROGRESS NOTES
Nutrition Services      Nutrition Screen:  Wt Readings from Last 10 Encounters:   08/09/17 65 kg (143 lb 4.8 oz)   08/08/17 64.4 kg (141 lb 15.6 oz)   07/04/17 64 kg (141 lb)   06/15/17 65.3 kg (144 lb)   06/09/17 67.1 kg (148 lb)   06/05/17 68.5 kg (151 lb 0.2 oz)   07/10/16 67.5 kg (148 lb 13 oz)   06/15/16 65.9 kg (145 lb 3.2 oz)   01/15/15 64 kg (141 lb)   10/02/14 64.3 kg (141 lb 12.8 oz)     Body mass index is 24.6 kg/(m^2). Supplements:                        _____ ordered ______  declined. __ __  Pt is nutritionally stable at this time, will rescreen in 7 days. _x __    Pt is at nutritional risk and will be rescreened in 3-5 days. __ __  Pt is at moderate or high nutritional risk, will refer to RD for assessment.        Zach Liu  Dietetic Technician, Registered

## 2017-08-09 NOTE — PROCEDURES
[address] [facility], [address]   [address] [facility]  [address]       Name:  Charla Reddy   MR#:  [id number]   :  1924   Account #:  [de-identified]        Date of Adm:  2017       PROCEDURE: Direct current cardioversion. INDICATION: Recent onset atrial fibrillation. DESCRIPTION OF PROCEDURE: Following Versed and fentanyl, the   patient received a single 100 joule synchronized shock with restoration   of sinus rhythm. SUMMARY: Successful direct current cardioversion. COMPLICATIONS: None. : Dr. Melinda Nino. ASSISTANT: Delfin Pemberton.         Bry Chi MD      ST / Lorin Counter   D:  2017   08:49   T:  2017   14:14   Job #:  [id number]

## 2017-08-09 NOTE — PROGRESS NOTES
Pharmacy Daily Dosing of Warfarin    Indication: A fib  Goal INR: 2-3      Average Daily Warfarin Dose: 5 mg M/W/F/Sat/Sun and 2.5 mg on Tue/Thu  Concurrent Anticoagulants/Antiplatelets:  Major Interacting Medications (Dose/Frequency): Amiodarone 200 mg BID    INR (0.9-1.1) > 5 or Platelets (< 13M): discuss with MD:  Recent Labs      08/09/17   0422  08/08/17   0349  08/07/17   0326   INR  1.6*  2.3*  3.9*   HGB  9.8*  9.6*   --    PLT  153  138*   --        Impression/Plan:   Coumadin 5mg PO one time ordered for tonight's dose. Pharmacy will continue to monitor. Pharmacy will follow daily and adjust the dose as appropriate.     Thanks for the consult  Lon Ny Dosing and Monitoring Guidelines

## 2017-08-09 NOTE — PROGRESS NOTES
Problem: Mobility Impaired (Adult and Pediatric)  Goal: *Acute Goals and Plan of Care (Insert Text)  Physical Therapy Goals  Initiated 8/7/2017  1. Patient will move from supine to sit and sit to supine in bed with independence within 7 day(s). 2. Patient will transfer from bed to chair and chair to bed with modified independence using the least restrictive device within 7 day(s). 3. Patient will perform sit to stand with modified independence within 7 day(s). 4. Patient will ambulate with modified independence for 200 feet with the least restrictive device within 7 day(s). 5. Patient will ascend/descend 4 stairs with 1 handrail(s) with modified independence within 7 day(s). PHYSICAL THERAPY TREATMENT  Patient: Jaylene Raines (71 y.o. female)  Date: 8/9/2017  Diagnosis: Pathologic compression fracture of lumbar vertebra, with delayed healing, subsequent encounter [M48.56XG]      Procedure(s) (LRB): L1 KYPHOPLASTY (N/A)        Precautions: Fall  Chart, physical therapy assessment, plan of care and goals were reviewed. ASSESSMENT: pt tolerated tx very well, no LOB or SOB, good motivation, did well with ther-ex and transfers, needed one sitting rest break, does very well with rollator, vc's for safety. Progression toward goals:  [X]      Improving appropriately and progressing toward goals  [ ]      Improving slowly and progressing toward goals  [ ]      Not making progress toward goals and plan of care will be adjusted       PLAN:  Patient continues to benefit from skilled intervention to address the above impairments. Continue treatment per established plan of care.   Discharge Recommendations:  Home Health  Further Equipment Recommendations for Discharge:  Has rollator       OBJECTIVE DATA SUMMARY:      Critical Behavior:  Neurologic State: Alert  Orientation Level: Oriented X4  Cognition: Appropriate decision making, Appropriate for age attention/concentration, Appropriate safety awareness, Follows commands  Safety/Judgement: Awareness of environment, Fall prevention, Insight into deficits      Functional Mobility Training:  Bed Mobility: pt sitting in chair on arrival     Transfers:  Sit to Stand: Supervision  Stand to Sit: Supervision  Interventions: Verbal cues  Level of Assistance: Supervision      Balance:  Sitting: Intact; Without support  Standing: Intact; With support      Ambulation/Gait Training:  Distance (ft): 200 Feet (ft)  Assistive Device: Gait belt;Walker, rollator  Ambulation - Level of Assistance: Supervision  Gait Abnormalities: Decreased step clearance  Right Side Weight Bearing: Full  Left Side Weight Bearing: Full  Base of Support: Narrowed  Stance:  (equal)  Speed/Samantha: Pace decreased (<100 feet/min)  Step Length: Left shortened;Right shortened     Therapeutic Exercises:   sitting  EXERCISE   Sets   Reps   Active Active Assist   Passive   Comments   Ankle pumps 1 10 [X] [ ] [ ] bilat   Heel raises 1 10 [X] [ ] [ ] \"   Toe tap 1 10 [X] [ ] [ ] \"   Knee ext 1 10 [X] [ ] [ ] \"   Hip flex 1 10 [X] [ ] [ ] \"      Pain:  Pain Scale 1: Numeric (0 - 10)  Pain Intensity 1: 4  Pain Location 1: Back  Pain Orientation 1: Posterior  Pain Description 1: Aching  Pain Intervention(s) 1: Medication (see MAR)      Activity Tolerance: good     After treatment:   [X] Patient left in no apparent distress sitting up in chair  [ ] Patient left in no apparent distress in bed  [X] Call bell left within reach  [X] Nursing notified  [ ] Caregiver present  [X] Bed alarm activated      COMMUNICATION/COLLABORATION:   The patients plan of care was discussed with: Registered Nurse     Fozia Baez PTA   Time Calculation: 25 mins

## 2017-08-09 NOTE — PROGRESS NOTES
PCP f/u with Dr. Shyann Patel for Aug 15 at 11:00am.  Dr. Kian Burt office will call the patient with a f/u

## 2017-08-10 ENCOUNTER — TELEPHONE (OUTPATIENT)
Dept: INTERNAL MEDICINE CLINIC | Age: 82
End: 2017-08-10

## 2017-08-10 ENCOUNTER — TELEPHONE (OUTPATIENT)
Dept: CASE MANAGEMENT | Age: 82
End: 2017-08-10

## 2017-08-10 NOTE — TELEPHONE ENCOUNTER
Received call from [facility] inquiring about home health orders. Orders were written for PT/OT only. Verified that labs are to be drawn by daughter who is an RN and sends to the physician. Ellie Michel will see pt and resume services.     Lennie Schilder, MSW

## 2017-08-10 NOTE — TELEPHONE ENCOUNTER
Pt has an appt at the end of the month. She is also going to see the physician that took care of her in the hospital.  Does pt really need the hospital f/u on 8-15-17 ? Please call daughter,   Can you move the 9-17-17  appt to 9-14-17? Pt has an appt that day at 11 am with Dr. Andrea Serrato across the street. Please call Ruby Ford.

## 2017-08-11 NOTE — TELEPHONE ENCOUNTER
Spoke with patient's daughter, Olivia Mancuso. Patient is following up with Dr. Melquiades Jeffries. He is managing her anticoag therapy. Repeat INR on 8/14. PT/INR with TSH and labs 8/21/17. Per Dr. Marcial Habermann, patient does not need hospital f/u with her as long as patient is feeling well and following up with Dr. Andrea Mills. Matias Greer verbalized understanding. Also, could not reschedule 9/28/17 appointment to 9/14/17. Dr. Marcial Habermann will be out of office. Original appointment kept.

## 2017-08-24 ENCOUNTER — OFFICE VISIT (OUTPATIENT)
Dept: INTERNAL MEDICINE CLINIC | Age: 82
End: 2017-08-24

## 2017-08-24 VITALS
HEIGHT: 64 IN | BODY MASS INDEX: 23.49 KG/M2 | RESPIRATION RATE: 18 BRPM | WEIGHT: 137.6 LBS | SYSTOLIC BLOOD PRESSURE: 136 MMHG | HEART RATE: 69 BPM | TEMPERATURE: 98.2 F | DIASTOLIC BLOOD PRESSURE: 72 MMHG | OXYGEN SATURATION: 95 %

## 2017-08-24 DIAGNOSIS — R09.02 HYPOXIA: ICD-10-CM

## 2017-08-24 DIAGNOSIS — R06.09 DYSPNEA ON EXERTION: Primary | ICD-10-CM

## 2017-08-24 DIAGNOSIS — I50.23 ACUTE ON CHRONIC SYSTOLIC CONGESTIVE HEART FAILURE (HCC): ICD-10-CM

## 2017-08-24 DIAGNOSIS — R09.89 OTHER DYSPNEA AND RESPIRATORY ABNORMALITY: Primary | ICD-10-CM

## 2017-08-24 DIAGNOSIS — E07.9 DISORDER OF THYROID: ICD-10-CM

## 2017-08-24 DIAGNOSIS — R79.89 ABNORMAL TSH: ICD-10-CM

## 2017-08-24 DIAGNOSIS — R06.09 OTHER DYSPNEA AND RESPIRATORY ABNORMALITY: Primary | ICD-10-CM

## 2017-08-24 RX ORDER — FUROSEMIDE 40 MG/1
40 TABLET ORAL DAILY
Qty: 30 TAB | Refills: 1 | Status: SHIPPED | OUTPATIENT
Start: 2017-08-24 | End: 2017-10-15

## 2017-08-24 NOTE — PATIENT INSTRUCTIONS
Take lasix 40mg for 3 days then back to PRN   X ray today   Labs next week - lab slip given   If dyspnea worsens go to ER

## 2017-08-24 NOTE — MR AVS SNAPSHOT
Visit Information Date & Time Provider Department Dept. Phone Encounter #  
 8/24/2017  4:00 PM RisaMadhavi [address] [place] [address],[address] [phone] [id number] Follow-up Instructions Return in about 4 weeks (around 9/21/2017) for dyspnea and hypoxia. Your Appointments 9/28/2017  8:00 AM  
ROUTINE CARE with MD Risa  
[facility] [address]) Appt Note: 3 month f/u  
 [facility] [address] [facility]  
[phone]  
  
   
 [facility] [address] [facility] [address] [facility]  
  
    
 6/13/2018  8:00 AM  
Follow Up with Blair Shetty MD  
[facility] at [facility] [address]) Appt Note: f/u seizure,jrb 6/15/17; f/u seizure,jrb 6/15/17  
 [address] [place] [address], 
[facility], [address] [address]  
[address] [facility], [address] [place] [address], [address] [place] [address] [address] [address] Upcoming Health Maintenance Date Due  
 FOOT EXAM Q1 1934 MICROALBUMIN Q1 1934 EYE EXAM RETINAL OR DILATED Q1 1934 DTaP/Tdap/Td series (1 - Tdap) 11/16/1945 ZOSTER VACCINE AGE 60> 9/16/1984 GLAUCOMA SCREENING Q2Y 11/16/1989 OSTEOPOROSIS SCREENING (DEXA) 11/16/1989 MEDICARE YEARLY EXAM 11/16/1989 Pneumococcal 65+ Low/Medium Risk (2 of 2 - PCV13) 10/2/2015 LIPID PANEL Q1 7/7/2017 INFLUENZA AGE 9 TO ADULT 8/1/2017 HEMOGLOBIN A1C Q6M 12/9/2017 Allergies as of 8/24/2017  Review Complete On: 8/24/2017 By: Sanford Proper Severity Noted Reaction Type Reactions Codeine  07/11/2012    Nausea Only Pcn [Penicillins]  07/11/2012    Unknown (comments) Cant remember reaction as a child Tetanus Vaccines And Toxoid  07/11/2012    Unknown (comments) Does not take tetanus due to tb hx  
 Vibramycin [Doxycycline Calcium]  07/11/2012    Rash Current Immunizations  Reviewed on 9/13/2012 Name Date Influenza Vaccine 10/2/2014 Influenza Vaccine Split 9/13/2012 Pneumococcal Polysaccharide (PPSV-23) 10/2/2014 Not reviewed this visit You Were Diagnosed With   
  
 Codes Comments Dyspnea on exertion    -  Primary ICD-10-CM: R06.09 
ICD-9-CM: 786.09 Hypoxia     ICD-10-CM: R09.02 
ICD-9-CM: 799.02 Acute on chronic systolic congestive heart failure (HCC)     ICD-10-CM: J10.26 ICD-9-CM: 428.23, 428.0 Abnormal TSH     ICD-10-CM: R79.89 ICD-9-CM: 790.6 Disorder of thyroid     ICD-10-CM: E07.9 ICD-9-CM: 246. 9 Vitals BP Pulse Temp Resp Height(growth percentile) Weight(growth percentile) 136/72 (BP 1 Location: Right arm, BP Patient Position: Sitting) 69 98.2 °F (36.8 °C) (Oral) 18 5' 4\" (1.626 m) 137 lb 9.6 oz (62.4 kg) SpO2 BMI OB Status Smoking Status 95% 23.62 kg/m2 Hysterectomy Never Smoker BMI and BSA Data Body Mass Index Body Surface Area  
 23.62 kg/m 2 1.68 m 2 Preferred Pharmacy Pharmacy Name Phone [facility] [address] [place] [address] - [address] [place] [address] [place] Numbers [phone] Your Updated Medication List  
  
   
This list is accurate as of: 8/24/17  4:51 PM.  Always use your most recent med list.  
  
  
  
  
 amiodarone 200 mg tablet Commonly known as:  CORDARONE Take 1 Tab by mouth two (2) times a day. AMITRIPTYLINE PO Take  by mouth. nightly 10 mg to 15 mg  
  
 carvedilol 25 mg tablet Commonly known as:  COREG  
12.5 mg two (2) times daily (with meals). furosemide 40 mg tablet Commonly known as:  LASIX Take 1 Tab by mouth daily. latanoprost 0.005 % ophthalmic solution Commonly known as:  XALATAN  
  
 levETIRAcetam 500 mg tablet Commonly known as:  KEPPRA TAKE ONE TABLET BY MOUTH TWICE DAILY. PATIENT TO KEEP 6/15/16 FOR FURTHER FILLS  
  
 multivitamin tablet Commonly known as:  ONE A DAY Take 1 Tab by mouth two (2) times a day.   
  
 STOOL SOFTENER PO  
 Take 100 mg by mouth two (2) times a day. TYLENOL PO Take 300 mg by mouth two (2) times a day. warfarin 2.5 mg tablet Commonly known as:  COUMADIN Take 5 mg by mouth daily (with dinner). Dr Carli Adams to be new cardiologist ,2 tabs except tues and thurs  Indications: Afib Prescriptions Sent to Pharmacy Refills  
 furosemide (LASIX) 40 mg tablet 1 Sig: Take 1 Tab by mouth daily. Class: Normal  
 Pharmacy: [address] [place] [address], [address] [place] [address] #: [phone] Route: Oral  
  
We Performed the Following CBC WITH AUTOMATED DIFF [07552 CPT(R)] CBC WITH AUTOMATED DIFF [65115 CPT(R)] METABOLIC PANEL, COMPREHENSIVE [76013 CPT(R)] METABOLIC PANEL, COMPREHENSIVE [43038 CPT(R)] T3, FREE C5379250 CPT(R)] T3, FREE C6222308 CPT(R)] TSH AND FREE T4 [75113 CPT(R)] TSH AND FREE T4 [45413 CPT(R)] Follow-up Instructions Return in about 4 weeks (around 9/21/2017) for dyspnea and hypoxia. Patient Instructions Take lasix 40mg for 3 days then back to PRN  
X ray today Labs next week - lab slip given If dyspnea worsens go to ER Introducing [facility]! Dear Stan Powell: Thank you for requesting a [employer] App account. Our records indicate that you already have an active [employer] App account. You can access your account anytime at [email or web address] Did you know that you can access your hospital and ER discharge instructions at any time in [employer] App? You can also review all of your test results from your hospital stay or ER visit. Additional Information If you have questions, please visit the Frequently Asked Questions section of the [employer] App website at [email or web address] Remember, [employer] App is NOT to be used for urgent needs. For medical emergencies, dial 911. Now available from your iPhone and Android! Please provide this summary of care documentation to your next provider. Your primary care clinician is listed as WINNIE Hernandez. If you have any questions after today's visit, please call [phone].

## 2017-08-24 NOTE — PROGRESS NOTES
CC: Shortness of Breath (x2 weeks - while walking patient goes down in the lower 80%, while at rest 95%)      HPI:    She is a 80 y.o. female who presents for evaluation of hypoxia. Ms Carlos Munson hs a hx of atrial fibrillation, seizure disorder, colon cancer status post resection many years ago. And recent L1 fracture in June 2017 with hospitalization in August with afib with RVR and underwent [facility] and amiodarone dose was increased. During hospitalization her TSH was 4.9 and repeat in Dr Luiza Marks office was 6.03.    For the past 2 weeks she complained of increased dyspnea and noted that her oxygen saturation is 85 with exertion and 94 % at rest. She denies cough, swelling in her leg is minimal \" I feel short breath after speaking\"  Taking lasix PRN and not daily \" daughter is worried about dehydration\" \" taking a quarter of a pill\"   Also family notes hypoxia during sleep 85% when checked  Denies fever  Appetite is decreased  Son is present in the room  She denies PND and chest pain   Reviewed TTE in the hospital and EF 45%   INR being followed by Dr Genaro Amezquita office  Denies bleeding        ROS:  Ongoing back pain after fracture of LI   ROS is negative other than HPI     Past Medical History:   Diagnosis Date    Alopecia     Arrhythmia     atrial fib,?svt    Arthritis     Atrial fibrillation ([address]     Benign essential HTN     Benign neoplasm of vulva     CAD (coronary artery disease)     Cholelithiases     Colon cancer ([address]     Constipation     Dyspareunia     Glaucoma     History of mixed connective tissue disease     Hx of viral pericarditis     Hyperactivity of bladder     Hypercholesterolemia     Ill-defined condition     macular degeneration    Insomnia     Macular degeneration     Neck pain     Psoriasis     Rectocele     Renal cyst     Seizure disorder ([address]     Sjogren's disease ([address]     Thrombophlebitis     TIA (transient ischemic attack)     Vaginal vault prolapse, posthysterectomy  Varicose veins        Current Outpatient Prescriptions on File Prior to Visit   Medication Sig Dispense Refill    amiodarone (CORDARONE) 200 mg tablet Take 1 Tab by mouth two (2) times a day. 60 Tab 0    DOCUSATE CALCIUM (STOOL SOFTENER PO) Take 100 mg by mouth two (2) times a day.  ACETAMINOPHEN (TYLENOL PO) Take 300 mg by mouth two (2) times a day.  levETIRAcetam (KEPPRA) 500 mg tablet TAKE ONE TABLET BY MOUTH TWICE DAILY. PATIENT TO KEEP 6/15/16 FOR FURTHER FILLS 180 Tab 5    latanoprost (XALATAN) 0.005 % ophthalmic solution       carvedilol (COREG) 25 mg tablet 12.5 mg two (2) times daily (with meals).  multivitamin (ONE A DAY) tablet Take 1 Tab by mouth two (2) times a day.  AMITRIPTYLINE HCL (AMITRIPTYLINE PO) Take  by mouth. nightly 10 mg to 15 mg      warfarin (COUMADIN) 2.5 mg tablet Take 5 mg by mouth daily (with dinner). Dr Patti Perez to be new cardiologist ,2 tabs except tues and thurs  Indications: Afib       No current facility-administered medications on file prior to visit. Past Surgical History:   Procedure Laterality Date    HX BREAST LUMPECTOMY      left breast    HX CATARACT REMOVAL      HX COLECTOMY  1955    partial    HX HEMORRHOIDECTOMY      HX HYSTERECTOMY      HX SHOULDER ARTHROSCOPY      left       Family History   Problem Relation Age of Onset    Cancer Father     Inflammatory Bowel Dz Mother     Heart Disease Brother     Diabetes Brother      Reviewed and no changes     Social History     Social History    Marital status:      Spouse name: N/A    Number of children: N/A    Years of education: N/A     Occupational History    Not on file.      Social History Main Topics    Smoking status: Never Smoker    Smokeless tobacco: Never Used    Alcohol use No    Drug use: No    Sexual activity: No     Other Topics Concern    Not on file     Social History Narrative            Visit Vitals    /72 (BP 1 Location: Right arm, BP Patient Position: Sitting)    Pulse 69    Temp 98.2 °F (36.8 °C) (Oral)    Resp 18    Ht 5' 4\" (1.626 m)    Wt 137 lb 9.6 oz (62.4 kg)    SpO2 95%    BMI 23.62 kg/m2       Physical Examination:   85% oxygen sats with ambulation 95% at rest   General - Well appearing female  HEENT - PERRL, TM no erythema/opacification, normal nasal turbinates, oropharynx no erythema or exudate, MMM  Neck - supple, no bruits, no TMG, no LAD  Pulm - RR is 20, speaking in full sentences, no accessory muscle use, few rales on bases B/L   Cardio - RRR, normal S1 S2, no murmur gallops or rubs  Abd - soft, nontender, no masses, no HSM  Extrem -trace edema, +2 distal pulses  Psych - normal affect, appropriate mood    Lab Results   Component Value Date/Time    WBC 6.4 08/09/2017 04:22 AM    HGB 9.8 08/09/2017 04:22 AM    HCT 29.2 08/09/2017 04:22 AM    PLATELET 471 20/03/7298 04:22 AM    MCV 91.3 08/09/2017 04:22 AM     Lab Results   Component Value Date/Time    Sodium 132 08/09/2017 04:22 AM    Potassium 4.4 08/09/2017 04:22 AM    Chloride 99 08/09/2017 04:22 AM    CO2 26 08/09/2017 04:22 AM    Anion gap 7 08/09/2017 04:22 AM    Glucose 94 08/09/2017 04:22 AM    BUN 22 08/09/2017 04:22 AM    Creatinine 0.85 08/09/2017 04:22 AM    BUN/Creatinine ratio 26 08/09/2017 04:22 AM    GFR est AA >60 08/09/2017 04:22 AM    GFR est non-AA >60 08/09/2017 04:22 AM    Calcium 8.8 08/09/2017 04:22 AM     Lab Results   Component Value Date/Time    Cholesterol, total 196 09/12/2012 12:00 AM    HDL Cholesterol 64 09/12/2012 12:00 AM    LDL, calculated 104 09/12/2012 12:00 AM    VLDL, calculated 28 09/12/2012 12:00 AM    Triglyceride 142 09/12/2012 12:00 AM     Lab Results   Component Value Date/Time    TSH 4.82 08/06/2017 05:51 AM     No results found for: PSA, Andrea Dru, [id number], [id number], PSALT  Lab Results   Component Value Date/Time    Hemoglobin A1c 5.6 06/09/2017 09:07 AM     Lab Results   Component Value Date/Time VITAMIN D, 25-HYDROXY 24.9 06/09/2017 09:07 AM       Lab Results   Component Value Date/Time    ALT (SGPT) 17 08/06/2017 05:51 AM    AST (SGOT) 14 08/06/2017 05:51 AM    Alk. phosphatase 96 08/06/2017 05:51 AM    Bilirubin, total 0.6 08/06/2017 05:51 AM      Reviewed labs from Dr Ame Mariee office on 08/22   Creatinine   TSH is 6  INR is 5.5 - Dr Ame Mariee adjusting coumadin     Assessment/Plan:    1. Dyspnea on exertion/hypoxia:   -differential: CHF exacerbation, PNA, amio lung toxicity, atelectasis  - will obtain chest x ray and if normal will order non contrast CT chest  - trial of lasix 40mg daily for 3 days and will touch base on weekend ( I am on call)   - setting up oxygen for home to use with ambulation and at night   - furosemide (LASIX) 40 mg tablet; Take 1 Tab by mouth daily. Dispense: 30 Tab; Refill: 1  - CBC WITH AUTOMATED DIFF  - TSH AND FREE T4  - METABOLIC PANEL, COMPREHENSIVE  - CBC WITH AUTOMATED DIFF  - METABOLIC PANEL, COMPREHENSIVE  - XR CHEST PA LAT; Future      2. CHF EF 45% : Echo in the setting of afib  - on c oreg , amio and advised to take lasix 40mg dialy for 3 days   - furosemide (LASIX) 40 mg tablet; Take 1 Tab by mouth daily. Dispense: 30 Tab; Refill: 1  - METABOLIC PANEL, COMPREHENSIVE  - CBC WITH AUTOMATED DIFF  - METABOLIC PANEL, COMPREHENSIVE    3. Afib with RVR s/p [facility] on 08/08 - sinus today  - on high dose amiodarone  - on coreg   - on warfarin with high INR likely due to amio  4. Abnormal TSH  - slightly abnormal - repeat and check T4 and T3   - TSH AND FREE T4  - T3, FREE    5. L1 fracture: did not discuss today     Advised that if increased dyspnea should go to ER. Xander phone number     [phone]    Follow-up Disposition:  Return in about 4 weeks (around 9/21/2017) for dyspnea and hypoxia. Antonio Hinton MD     I received Dr Lyndsey Tilley noted which had advised for Ms Nicholas Dowell to take lasix 1/2 a pill to a full pill daily.

## 2017-08-24 NOTE — PROGRESS NOTES
Chief Complaint   Patient presents with    Shortness of Breath     x2 weeks - while walking patient goes down in the lower 80%, while at rest 95%     1. Have you been to the ER, urgent care clinic since your last visit? Hospitalized since your last visit? Yes When: 8/4/17 Where: [facility] Reason for visit: Atrial Fibrillation    2. Have you seen or consulted any other health care providers outside of the [address] [place] [address] since your last visit? Include any pap smears or colon screening.  No

## 2017-08-25 ENCOUNTER — TELEPHONE (OUTPATIENT)
Dept: INTERNAL MEDICINE CLINIC | Age: 82
End: 2017-08-25

## 2017-08-25 ENCOUNTER — HOSPITAL ENCOUNTER (OUTPATIENT)
Dept: CT IMAGING | Age: 82
Discharge: HOME | End: 2017-08-25
Attending: INTERNAL MEDICINE
Payer: MEDICARE

## 2017-08-25 DIAGNOSIS — R06.09 DYSPNEA ON EXERTION: ICD-10-CM

## 2017-08-25 DIAGNOSIS — R09.02 HYPOXIA: ICD-10-CM

## 2017-08-25 PROCEDURE — 71250 CT THORAX DX C-: CPT

## 2017-08-25 NOTE — TELEPHONE ENCOUNTER
SOB without exertion  Resting  91-88%   R 23  118/56 HR 55    Ambulation  O2 dropped to 80% but quickly came back up to 96 with pursed lip breathing. Rajat Olson is concerned about crackles in patient's lungs. Advised patient was seen in office yesterday by Dr. Xiomara De Los Santos. [facility]       Fax- [phone]

## 2017-08-25 NOTE — TELEPHONE ENCOUNTER
[facility] will be faxing a form over with information they need in order to process O2 order.   Please be on the look out he ask as this is time sensitive

## 2017-08-25 NOTE — TELEPHONE ENCOUNTER
#[phone] Dylon Putnam wants to give you vitals from today without exercise. O2 88-91  resp 23  B/p 118/56  Still hearing crackles and they are louder than on 8-23-17  SOB, not comfortable with exercises today. Abilio Jones is asking that you please call her to discuss pt. Thanks.

## 2017-08-25 NOTE — TELEPHONE ENCOUNTER
Spoke with Domenico Anguiano. She is on her way back to [place] now. Will be able to take patient this afternoon. Called coordination of care. Spoke with Chasidy  Scheduled CT of chest WO contrast for today at [facility] at 3pm. Arrive at 2:30pm  MOB 1. Check in with outpatient registration. Domenico Anguiano aware of appointment and confirmed she will take patient.

## 2017-08-25 NOTE — TELEPHONE ENCOUNTER
Called and left a message for patient's daughter. Dr Orlin Garcia wants patient to have a CT Chest WO Cont done today. Need to know if they can take patient today.

## 2017-08-26 DIAGNOSIS — I50.23 ACUTE ON CHRONIC SYSTOLIC CONGESTIVE HEART FAILURE (HCC): Primary | ICD-10-CM

## 2017-08-26 NOTE — PROGRESS NOTES
Called patient's daughter Kayla Fletcher)  to discuss abnormal CT lung results. Since the visit on Thursday she has been taking lasix 40mg daily and has noted improvement in dyspnea. Pulse ox is 94% RA. Appetite is good and no fever and no delirium. Discussed that PNA is unlikely given no cough and no fever and normal appetite. Suspect opacities are fluid related to CHF EF 40% versus possible amiodarone toxicity ( she is on high dose amio) - I am referring Ms Nusrat Banuelos to the pulmonologist and will request an appointment ASAP. I also discussed with daughter at length that if Ms Frank Julien symptoms worsen she must go to ER and if develops hypoxia she must go to ER. Will add pro BNP tp labs that will be done on Monday - Mona Kelsy to fax on Monday to lab cor.

## 2017-08-28 ENCOUNTER — TELEPHONE (OUTPATIENT)
Dept: INTERNAL MEDICINE CLINIC | Age: 82
End: 2017-08-28

## 2017-08-28 ENCOUNTER — HOSPITAL ENCOUNTER (OUTPATIENT)
Dept: LAB | Age: 82
Discharge: HOME | End: 2017-08-28
Payer: MEDICARE

## 2017-08-28 PROCEDURE — 84481 FREE ASSAY (FT-3): CPT

## 2017-08-28 PROCEDURE — 84443 ASSAY THYROID STIM HORMONE: CPT

## 2017-08-28 PROCEDURE — 83880 ASSAY OF NATRIURETIC PEPTIDE: CPT

## 2017-08-28 PROCEDURE — 80053 COMPREHEN METABOLIC PANEL: CPT

## 2017-08-28 PROCEDURE — 85025 COMPLETE CBC W/AUTO DIFF WBC: CPT

## 2017-08-28 NOTE — TELEPHONE ENCOUNTER
Adelina Valdovinos pt daughter is requesting a call from Dr. Eduarda Umaña. Per  She wants to be paged one she calls best contact is [phone] please fax her lab tickets fax [phone] please include the BMP.        Message received & copied from [facility]

## 2017-08-28 NOTE — TELEPHONE ENCOUNTER
Faxed additional information as requested to [address]      Advised patient's daughter, Benny, that this was faxed with urgent request.

## 2017-08-28 NOTE — PROGRESS NOTES
O2 sat testing completed in office:    O2 sat Room air at rest: 95%  O2 sat Room air with Exercise:  82% after 3 minutes on exertion. Ambulation with O2 at 2L/min via nasal canula:  92%  Recovery Test, with O2 at 2L/min via nasal canula at rest:  95%    Oxygen therapy is needed to treat Acute on chronic systolic congestive heart failure per Dr. Marcial Habermann.

## 2017-08-28 NOTE — TELEPHONE ENCOUNTER
Patient's daughter, Mila Farnsworth, states she's calling to advise that no Oxygen has been received for the patient still. Please call.  Thank you

## 2017-08-28 NOTE — TELEPHONE ENCOUNTER
Patient has appointment with Dr. Andrea Mills on 8/31/17. Faxed BNP order and referral to pulmonologist to [facility] at [phone]. She also states she faxed us a log of patient's O2 sat and BP readings.

## 2017-08-28 NOTE — TELEPHONE ENCOUNTER
Patient's daughter, Gabi Collins, called back again in reference to lab tickets needed & orders. Please see previous encounter. Benny states she's still waiting. Advised/reminded Benny of 24 hr turn around on return calls. Please call.  Thank you

## 2017-08-29 ENCOUNTER — TELEPHONE (OUTPATIENT)
Dept: INTERNAL MEDICINE CLINIC | Age: 82
End: 2017-08-29

## 2017-08-29 LAB
ALBUMIN SERPL-MCNC: 3.9 G/DL (ref 3.2–4.6)
ALBUMIN/GLOB SERPL: 1.3 {RATIO} (ref 1.2–2.2)
ALP SERPL-CCNC: 94 IU/L (ref 39–117)
ALT SERPL-CCNC: 15 IU/L (ref 0–32)
AST SERPL-CCNC: 19 IU/L (ref 0–40)
BASOPHILS # BLD AUTO: 0 X10E3/UL (ref 0–0.2)
BASOPHILS NFR BLD AUTO: 0 %
BILIRUB SERPL-MCNC: 0.3 MG/DL (ref 0–1.2)
BUN SERPL-MCNC: 23 MG/DL (ref 10–36)
BUN/CREAT SERPL: 23 (ref 12–28)
CALCIUM SERPL-MCNC: 9.1 MG/DL (ref 8.7–10.3)
CHLORIDE SERPL-SCNC: 95 MMOL/L (ref 96–106)
CO2 SERPL-SCNC: 27 MMOL/L (ref 18–29)
CREAT SERPL-MCNC: 0.98 MG/DL (ref 0.57–1)
EOSINOPHIL # BLD AUTO: 0.2 X10E3/UL (ref 0–0.4)
EOSINOPHIL NFR BLD AUTO: 5 %
ERYTHROCYTE [DISTWIDTH] IN BLOOD BY AUTOMATED COUNT: 14.9 % (ref 12.3–15.4)
GLOBULIN SER CALC-MCNC: 3 G/DL (ref 1.5–4.5)
GLUCOSE SERPL-MCNC: 88 MG/DL (ref 65–99)
HCT VFR BLD AUTO: 31.3 % (ref 34–46.6)
HGB BLD-MCNC: 9.9 G/DL (ref 11.1–15.9)
IMM GRANULOCYTES # BLD: 0 X10E3/UL (ref 0–0.1)
IMM GRANULOCYTES NFR BLD: 0 %
LYMPHOCYTES # BLD AUTO: 1.6 X10E3/UL (ref 0.7–3.1)
LYMPHOCYTES NFR BLD AUTO: 29 %
MCH RBC QN AUTO: 29.7 PG (ref 26.6–33)
MCHC RBC AUTO-ENTMCNC: 31.6 G/DL (ref 31.5–35.7)
MCV RBC AUTO: 94 FL (ref 79–97)
MONOCYTES # BLD AUTO: 0.5 X10E3/UL (ref 0.1–0.9)
MONOCYTES NFR BLD AUTO: 9 %
NEUTROPHILS # BLD AUTO: 3 X10E3/UL (ref 1.4–7)
NEUTROPHILS NFR BLD AUTO: 57 %
NT-PROBNP SERPL-MCNC: 1107 PG/ML (ref 0–738)
PLATELET # BLD AUTO: 217 X10E3/UL (ref 150–379)
POTASSIUM SERPL-SCNC: 5.1 MMOL/L (ref 3.5–5.2)
PROT SERPL-MCNC: 6.9 G/DL (ref 6–8.5)
RBC # BLD AUTO: 3.33 X10E6/UL (ref 3.77–5.28)
SODIUM SERPL-SCNC: 139 MMOL/L (ref 134–144)
T3FREE SERPL-MCNC: 2 PG/ML (ref 2–4.4)
T4 FREE SERPL-MCNC: 1.69 NG/DL (ref 0.82–1.77)
TSH SERPL DL<=0.005 MIU/L-ACNC: 6.88 UIU/ML (ref 0.45–4.5)
WBC # BLD AUTO: 5.3 X10E3/UL (ref 3.4–10.8)

## 2017-08-29 NOTE — TELEPHONE ENCOUNTER
Spoke with Geeta  O2 being delivered 8/29/17. O2 96-97 at rest.  When talking O2 drops to 90-91%. Sitting /64 HR 67    Standing BP  88/40 O2 88  HR62    Geeta states she placed patient in Recovery position for 8-10 min. BP came up to 90/40. Final sitting BP 98/44. Patient states she does not feel like shes in any distress. Poor skin turgor noted by [place] nurse.     Any recommendations

## 2017-08-29 NOTE — TELEPHONE ENCOUNTER
Geeta//[facility] states she's with patient now that is having issues with Extreme Low Blood Pressure & Heart rate concerns along with very dizzy & Lightheadedness & needs to discuss plan of care & medication. Please call.  Thank you

## 2017-08-29 NOTE — TELEPHONE ENCOUNTER
Gillian Gregory At home [facility] #[phone] states when she went to eval pt for OT she will be doing no further visits as what pt needs is being covered by PT

## 2017-08-30 NOTE — PROGRESS NOTES
Anemia is stable, kidney function is stable  Change lasix to 40mg every other day    TSH is slightly elevated but stable - the T4 and T3 are normal   We will continue to monitor this and repeat at appointment on 09/28

## 2017-08-30 NOTE — TELEPHONE ENCOUNTER
MD Keerthi Vargas, LPN        Caller: Unspecified (Yesterday,  9:35 AM)                     Change the lasix to every other day. Can we schedule her to be seen this week?

## 2017-09-12 NOTE — PROGRESS NOTES
Verified two patient identifiers, name and . Patient provided with lab results.  No questions at this time

## 2017-09-26 ENCOUNTER — TELEPHONE (OUTPATIENT)
Dept: INTERNAL MEDICINE CLINIC | Age: 82
End: 2017-09-26

## 2017-09-26 NOTE — TELEPHONE ENCOUNTER
Spoke with Josr Wilkerson with [facility] PT.    Advised per verbal order from julita Mendez to get orders from Dr. Sharri Jeffers for patient's BP.

## 2017-09-26 NOTE — TELEPHONE ENCOUNTER
[address] [facility] #[phone]  states pt has Low b/p,  78/40 non symptomatic    Is Dr. Tito Dick willing to give readings to Dr. Ashley Jones and allow them to get orders from Dr. Nancy Campbell?  (cardiologist)     You may leave a vm if needed

## 2017-10-15 ENCOUNTER — APPOINTMENT (OUTPATIENT)
Dept: GENERAL RADIOLOGY | Age: 82
DRG: 242 | End: 2017-10-15
Attending: EMERGENCY MEDICINE
Payer: MEDICARE

## 2017-10-15 ENCOUNTER — HOSPITAL ENCOUNTER (INPATIENT)
Age: 82
LOS: 11 days | Discharge: HOME HEALTH | DRG: 242 | End: 2017-10-26
Attending: EMERGENCY MEDICINE | Admitting: INTERNAL MEDICINE
Payer: MEDICARE

## 2017-10-15 DIAGNOSIS — N30.00 ACUTE CYSTITIS WITHOUT HEMATURIA: ICD-10-CM

## 2017-10-15 DIAGNOSIS — R78.81 POSITIVE BLOOD CULTURES: ICD-10-CM

## 2017-10-15 DIAGNOSIS — I48.91 ATRIAL FIBRILLATION WITH RVR (HCC): Primary | ICD-10-CM

## 2017-10-15 PROBLEM — N39.0 UTI (URINARY TRACT INFECTION): Status: ACTIVE | Noted: 2017-10-15

## 2017-10-15 LAB
ALBUMIN SERPL-MCNC: 2.7 G/DL (ref 3.5–5)
ALBUMIN/GLOB SERPL: 0.6 {RATIO} (ref 1.1–2.2)
ALP SERPL-CCNC: 80 U/L (ref 45–117)
ALT SERPL-CCNC: 18 U/L (ref 12–78)
ANION GAP SERPL CALC-SCNC: 8 MMOL/L (ref 5–15)
APPEARANCE UR: CLEAR
AST SERPL-CCNC: 18 U/L (ref 15–37)
BACTERIA URNS QL MICRO: NEGATIVE /HPF
BASOPHILS # BLD: 0 K/UL (ref 0–0.1)
BASOPHILS NFR BLD: 0 % (ref 0–1)
BILIRUB SERPL-MCNC: 0.3 MG/DL (ref 0.2–1)
BILIRUB UR QL: NEGATIVE
BUN SERPL-MCNC: 29 MG/DL (ref 6–20)
BUN/CREAT SERPL: 34 (ref 12–20)
CALCIUM SERPL-MCNC: 8.4 MG/DL (ref 8.5–10.1)
CHLORIDE SERPL-SCNC: 103 MMOL/L (ref 97–108)
CK SERPL-CCNC: 34 U/L (ref 26–192)
CO2 SERPL-SCNC: 25 MMOL/L (ref 21–32)
COLOR UR: ABNORMAL
CREAT SERPL-MCNC: 0.85 MG/DL (ref 0.55–1.02)
EOSINOPHIL # BLD: 0.1 K/UL (ref 0–0.4)
EOSINOPHIL NFR BLD: 1 % (ref 0–7)
EPITH CASTS URNS QL MICRO: ABNORMAL /LPF
ERYTHROCYTE [DISTWIDTH] IN BLOOD BY AUTOMATED COUNT: 14.2 % (ref 11.5–14.5)
GLOBULIN SER CALC-MCNC: 4.2 G/DL (ref 2–4)
GLUCOSE SERPL-MCNC: 109 MG/DL (ref 65–100)
GLUCOSE UR STRIP.AUTO-MCNC: NEGATIVE MG/DL
HCT VFR BLD AUTO: 28.6 % (ref 35–47)
HGB BLD-MCNC: 9.3 G/DL (ref 11.5–16)
HGB UR QL STRIP: NEGATIVE
HYALINE CASTS URNS QL MICRO: ABNORMAL /LPF (ref 0–5)
INR PPP: 1.3 (ref 0.9–1.1)
KETONES UR QL STRIP.AUTO: ABNORMAL MG/DL
LACTATE SERPL-SCNC: 0.9 MMOL/L (ref 0.4–2)
LEUKOCYTE ESTERASE UR QL STRIP.AUTO: ABNORMAL
LYMPHOCYTES # BLD: 1.1 K/UL (ref 0.8–3.5)
LYMPHOCYTES NFR BLD: 17 % (ref 12–49)
MAGNESIUM SERPL-MCNC: 2.4 MG/DL (ref 1.6–2.4)
MCH RBC QN AUTO: 28.6 PG (ref 26–34)
MCHC RBC AUTO-ENTMCNC: 32.5 G/DL (ref 30–36.5)
MCV RBC AUTO: 88 FL (ref 80–99)
MONOCYTES # BLD: 0.7 K/UL (ref 0–1)
MONOCYTES NFR BLD: 11 % (ref 5–13)
NEUTS SEG # BLD: 4.6 K/UL (ref 1.8–8)
NEUTS SEG NFR BLD: 71 % (ref 32–75)
NITRITE UR QL STRIP.AUTO: NEGATIVE
PH UR STRIP: 5.5 [PH] (ref 5–8)
PLATELET # BLD AUTO: 150 K/UL (ref 150–400)
POTASSIUM SERPL-SCNC: 3.9 MMOL/L (ref 3.5–5.1)
PROT SERPL-MCNC: 6.9 G/DL (ref 6.4–8.2)
PROT UR STRIP-MCNC: NEGATIVE MG/DL
PROTHROMBIN TIME: 13.5 SEC (ref 9–11.1)
RBC # BLD AUTO: 3.25 M/UL (ref 3.8–5.2)
RBC #/AREA URNS HPF: ABNORMAL /HPF (ref 0–5)
SODIUM SERPL-SCNC: 136 MMOL/L (ref 136–145)
SP GR UR REFRACTOMETRY: 1.01 (ref 1–1.03)
TROPONIN I SERPL-MCNC: <0.04 NG/ML
UROBILINOGEN UR QL STRIP.AUTO: 0.2 EU/DL (ref 0.2–1)
WBC # BLD AUTO: 6.4 K/UL (ref 3.6–11)
WBC URNS QL MICRO: ABNORMAL /HPF (ref 0–4)

## 2017-10-15 PROCEDURE — 80053 COMPREHEN METABOLIC PANEL: CPT | Performed by: EMERGENCY MEDICINE

## 2017-10-15 PROCEDURE — 85025 COMPLETE CBC W/AUTO DIFF WBC: CPT | Performed by: EMERGENCY MEDICINE

## 2017-10-15 PROCEDURE — 65660000000 HC RM CCU STEPDOWN

## 2017-10-15 PROCEDURE — 36415 COLL VENOUS BLD VENIPUNCTURE: CPT | Performed by: EMERGENCY MEDICINE

## 2017-10-15 PROCEDURE — 96361 HYDRATE IV INFUSION ADD-ON: CPT

## 2017-10-15 PROCEDURE — 74011000258 HC RX REV CODE- 258: Performed by: INTERNAL MEDICINE

## 2017-10-15 PROCEDURE — 96365 THER/PROPH/DIAG IV INF INIT: CPT

## 2017-10-15 PROCEDURE — 74011250636 HC RX REV CODE- 250/636: Performed by: INTERNAL MEDICINE

## 2017-10-15 PROCEDURE — 85610 PROTHROMBIN TIME: CPT | Performed by: EMERGENCY MEDICINE

## 2017-10-15 PROCEDURE — 83605 ASSAY OF LACTIC ACID: CPT | Performed by: EMERGENCY MEDICINE

## 2017-10-15 PROCEDURE — 71010 XR CHEST PORT: CPT

## 2017-10-15 PROCEDURE — 87040 BLOOD CULTURE FOR BACTERIA: CPT | Performed by: EMERGENCY MEDICINE

## 2017-10-15 PROCEDURE — 77010033678 HC OXYGEN DAILY

## 2017-10-15 PROCEDURE — 99285 EMERGENCY DEPT VISIT HI MDM: CPT

## 2017-10-15 PROCEDURE — 84484 ASSAY OF TROPONIN QUANT: CPT | Performed by: EMERGENCY MEDICINE

## 2017-10-15 PROCEDURE — 96376 TX/PRO/DX INJ SAME DRUG ADON: CPT

## 2017-10-15 PROCEDURE — 81001 URINALYSIS AUTO W/SCOPE: CPT | Performed by: EMERGENCY MEDICINE

## 2017-10-15 PROCEDURE — 74011000250 HC RX REV CODE- 250: Performed by: INTERNAL MEDICINE

## 2017-10-15 PROCEDURE — 74011000250 HC RX REV CODE- 250: Performed by: EMERGENCY MEDICINE

## 2017-10-15 PROCEDURE — 74011250636 HC RX REV CODE- 250/636: Performed by: EMERGENCY MEDICINE

## 2017-10-15 PROCEDURE — 74011000258 HC RX REV CODE- 258: Performed by: EMERGENCY MEDICINE

## 2017-10-15 PROCEDURE — 96367 TX/PROPH/DG ADDL SEQ IV INF: CPT

## 2017-10-15 PROCEDURE — 83735 ASSAY OF MAGNESIUM: CPT | Performed by: EMERGENCY MEDICINE

## 2017-10-15 PROCEDURE — 82550 ASSAY OF CK (CPK): CPT | Performed by: EMERGENCY MEDICINE

## 2017-10-15 PROCEDURE — 93005 ELECTROCARDIOGRAM TRACING: CPT

## 2017-10-15 PROCEDURE — 74011250637 HC RX REV CODE- 250/637: Performed by: INTERNAL MEDICINE

## 2017-10-15 RX ORDER — AMITRIPTYLINE HYDROCHLORIDE 10 MG/1
10-15 TABLET, FILM COATED ORAL
Status: DISCONTINUED | OUTPATIENT
Start: 2017-10-15 | End: 2017-10-26 | Stop reason: HOSPADM

## 2017-10-15 RX ORDER — ACETAMINOPHEN 325 MG/1
162.5 TABLET ORAL
COMMUNITY
End: 2018-09-18

## 2017-10-15 RX ORDER — CARVEDILOL 6.25 MG/1
6.25 TABLET ORAL EVERY EVENING
Status: DISCONTINUED | OUTPATIENT
Start: 2017-10-15 | End: 2017-10-17

## 2017-10-15 RX ORDER — SODIUM CHLORIDE 0.9 % (FLUSH) 0.9 %
5-10 SYRINGE (ML) INJECTION EVERY 8 HOURS
Status: DISCONTINUED | OUTPATIENT
Start: 2017-10-15 | End: 2017-10-24 | Stop reason: SDUPTHER

## 2017-10-15 RX ORDER — LANOLIN ALCOHOL/MO/W.PET/CERES
1 CREAM (GRAM) TOPICAL 2 TIMES DAILY WITH MEALS
Status: DISCONTINUED | OUTPATIENT
Start: 2017-10-15 | End: 2017-10-26 | Stop reason: HOSPADM

## 2017-10-15 RX ORDER — CARVEDILOL 12.5 MG/1
12.5 TABLET ORAL 2 TIMES DAILY WITH MEALS
Status: DISCONTINUED | OUTPATIENT
Start: 2017-10-15 | End: 2017-10-15

## 2017-10-15 RX ORDER — WARFARIN 2.5 MG/1
5 TABLET ORAL
COMMUNITY
End: 2018-06-12 | Stop reason: CLARIF

## 2017-10-15 RX ORDER — SODIUM CHLORIDE 0.9 % (FLUSH) 0.9 %
5-10 SYRINGE (ML) INJECTION AS NEEDED
Status: DISCONTINUED | OUTPATIENT
Start: 2017-10-15 | End: 2017-10-26 | Stop reason: HOSPADM

## 2017-10-15 RX ORDER — LEVETIRACETAM 500 MG/1
500 TABLET ORAL 2 TIMES DAILY
Status: DISCONTINUED | OUTPATIENT
Start: 2017-10-15 | End: 2017-10-26 | Stop reason: HOSPADM

## 2017-10-15 RX ORDER — CARVEDILOL 25 MG/1
25 TABLET ORAL EVERY EVENING
COMMUNITY
End: 2017-10-26

## 2017-10-15 RX ORDER — CEPHALEXIN 500 MG/1
500 CAPSULE ORAL 4 TIMES DAILY
COMMUNITY
End: 2017-10-26

## 2017-10-15 RX ORDER — LANOLIN ALCOHOL/MO/W.PET/CERES
325 CREAM (GRAM) TOPICAL 2 TIMES DAILY
COMMUNITY
End: 2022-01-01

## 2017-10-15 RX ORDER — BISACODYL 5 MG
5 TABLET, DELAYED RELEASE (ENTERIC COATED) ORAL DAILY PRN
Status: DISCONTINUED | OUTPATIENT
Start: 2017-10-15 | End: 2017-10-26 | Stop reason: HOSPADM

## 2017-10-15 RX ORDER — WARFARIN 2.5 MG/1
5 TABLET ORAL
COMMUNITY
End: 2021-01-01 | Stop reason: ALTCHOICE

## 2017-10-15 RX ORDER — DILTIAZEM HYDROCHLORIDE 5 MG/ML
5 INJECTION INTRAVENOUS
Status: COMPLETED | OUTPATIENT
Start: 2017-10-15 | End: 2017-10-15

## 2017-10-15 RX ORDER — FUROSEMIDE 20 MG/1
20 TABLET ORAL
COMMUNITY
End: 2017-10-26

## 2017-10-15 RX ORDER — WARFARIN 2.5 MG/1
2.5 TABLET ORAL ONCE
Status: COMPLETED | OUTPATIENT
Start: 2017-10-15 | End: 2017-10-15

## 2017-10-15 RX ORDER — CARVEDILOL 12.5 MG/1
12.5 TABLET ORAL
Status: DISCONTINUED | OUTPATIENT
Start: 2017-10-16 | End: 2017-10-17

## 2017-10-15 RX ORDER — ACETAMINOPHEN 325 MG/1
650 TABLET ORAL
Status: DISCONTINUED | OUTPATIENT
Start: 2017-10-15 | End: 2017-10-24 | Stop reason: SDUPTHER

## 2017-10-15 RX ORDER — LATANOPROST 50 UG/ML
1 SOLUTION/ DROPS OPHTHALMIC
Status: DISCONTINUED | OUTPATIENT
Start: 2017-10-15 | End: 2017-10-26 | Stop reason: HOSPADM

## 2017-10-15 RX ORDER — NITROFURANTOIN (MACROCRYSTALS) 100 MG/1
100 CAPSULE ORAL 2 TIMES DAILY
COMMUNITY
End: 2017-10-26

## 2017-10-15 RX ADMIN — DILTIAZEM HYDROCHLORIDE 5 MG: 5 INJECTION INTRAVENOUS at 10:49

## 2017-10-15 RX ADMIN — Medication 10 ML: at 17:57

## 2017-10-15 RX ADMIN — LEVETIRACETAM 500 MG: 500 TABLET, FILM COATED ORAL at 18:00

## 2017-10-15 RX ADMIN — DILTIAZEM HYDROCHLORIDE 5 MG: 5 INJECTION INTRAVENOUS at 13:14

## 2017-10-15 RX ADMIN — AMITRIPTYLINE HYDROCHLORIDE 10 MG: 10 TABLET, FILM COATED ORAL at 23:03

## 2017-10-15 RX ADMIN — WARFARIN SODIUM 2.5 MG: 2.5 TABLET ORAL at 23:04

## 2017-10-15 RX ADMIN — CARVEDILOL 6.25 MG: 6.25 TABLET, FILM COATED ORAL at 17:53

## 2017-10-15 RX ADMIN — SODIUM CHLORIDE 250 ML: 900 INJECTION, SOLUTION INTRAVENOUS at 14:04

## 2017-10-15 RX ADMIN — DEXTROSE MONOHYDRATE 2.5 MG/HR: 50 INJECTION, SOLUTION INTRAVENOUS at 13:59

## 2017-10-15 RX ADMIN — CEFTRIAXONE 1 G: 1 INJECTION, POWDER, FOR SOLUTION INTRAMUSCULAR; INTRAVENOUS at 15:55

## 2017-10-15 RX ADMIN — CEFTRIAXONE 1 G: 1 INJECTION, POWDER, FOR SOLUTION INTRAMUSCULAR; INTRAVENOUS at 13:15

## 2017-10-15 RX ADMIN — LATANOPROST 1 DROP: 50 SOLUTION OPHTHALMIC at 23:19

## 2017-10-15 RX ADMIN — SODIUM CHLORIDE 500 ML: 900 INJECTION, SOLUTION INTRAVENOUS at 10:47

## 2017-10-15 RX ADMIN — Medication 10 ML: at 23:04

## 2017-10-15 RX ADMIN — FERROUS SULFATE TAB 325 MG (65 MG ELEMENTAL FE) 325 MG: 325 (65 FE) TAB at 18:59

## 2017-10-15 NOTE — ED PROVIDER NOTES
[facility] [address]  EMERGENCY DEPARTMENT HISTORY AND PHYSICAL EXAM       Date of Service: 10/15/2017   Patient Name: Artur Skelton   YOB: 1924  Medical Record Number: [id number]    History of Presenting Illness     No chief complaint on file. History Provided By:  patient and daughter    Additional History:   Artur Skelton is a 80 y.o. female with PMhx significant for CAD, hypercholesterolemia, and a fib who presents ambulatory to the ED with cc of palpitations since 1900 last night. She reports additional symptoms of SOB without O2, and non productive cough. Pt notes that the palpitations suddenly started, and have been constant since. Per daughter, when pt has episodes of a fib normally after a small period of time pt will go back to normal. However, in August pt had to be cardioverted because she would not get out of a fib. Daughter reports that prior to that episode of constant a fib, pt had a UTI and was put on antibiotics. Per daughter, 3 days ago pt was diagnosed with a UTI and was placed on nitrofurantoin and keflex which she is still taking. Daughter reports that blood cultures were done, and came back positive. The sensitivity will be back today, but they have not yet received a call. Pt notes that she normally uses O2 at night, and occasionally during the day. She reports that she is on coumadin. Per daughter, pt's pulmonologist said that pt has interstitial changes not pneumonia. Pt denies chest pain, dysuria, or abdominal pain. Social Hx: - Tobacco, - EtOH, - Illicit Drugs    There are no other complaints, changes or physical findings at this time.     Primary Care Provider: Daniel Hughes MD   Specialist: Cardiologist: Dr. Kingsley Sesay; Pulmonologist: Dr. Terrance Salcedo    Past History     Past Medical History:   Past Medical History:   Diagnosis Date    Alopecia     Arrhythmia     atrial fib,?svt    Arthritis     Atrial fibrillation ([facility] [address]  Benign essential HTN     Benign neoplasm of vulva     CAD (coronary artery disease)     Cholelithiases     Colon cancer (HCC)     Constipation     Dyspareunia     Glaucoma     History of mixed connective tissue disease     Hx of viral pericarditis     Hyperactivity of bladder     Hypercholesterolemia     Ill-defined condition     macular degeneration    Insomnia     Macular degeneration     Neck pain     Psoriasis     Rectocele     Renal cyst     Seizure disorder (HCC)     Sjogren's disease ([facility] [address]     Thrombophlebitis     TIA (transient ischemic attack)     Vaginal vault prolapse, posthysterectomy     Varicose veins         Past Surgical History:   Past Surgical History:   Procedure Laterality Date    HX BREAST LUMPECTOMY      left breast    HX CATARACT REMOVAL      HX COLECTOMY  1955    partial    HX HEMORRHOIDECTOMY      HX HYSTERECTOMY      HX SHOULDER ARTHROSCOPY      left        Family History:   Family History   Problem Relation Age of Onset    Cancer Father     Inflammatory Bowel Dz Mother     Heart Disease Brother     Diabetes Brother         Social History:   Social History   Substance Use Topics    Smoking status: Never Smoker    Smokeless tobacco: Never Used    Alcohol use No        Allergies: Allergies   Allergen Reactions    Codeine Nausea Only    Pcn [Penicillins] Unknown (comments)     Cant remember reaction as a child    Tetanus Vaccines And Toxoid Unknown (comments)     Does not take tetanus due to tb hx    Vibramycin [Doxycycline Calcium] Rash         Review of Systems   Review of Systems   Constitutional: Negative for chills. HENT: Negative. Negative for congestion. Eyes: Positive for itching. Respiratory: Positive for cough and shortness of breath. Cardiovascular: Positive for palpitations. Negative for chest pain. Gastrointestinal: Negative. Negative for abdominal pain, nausea and vomiting. Endocrine: Negative for heat intolerance. Genitourinary: Negative. Negative for dysuria. Musculoskeletal: Negative. Negative for back pain. Skin: Negative. Allergic/Immunologic: Negative for immunocompromised state. Neurological: Negative. Negative for dizziness. Hematological: Does not bruise/bleed easily. Psychiatric/Behavioral: Negative. All other systems reviewed and are negative. Physical Exam  Physical Exam   Constitutional: She is oriented to person, place, and time. She appears well-developed and well-nourished. No distress. No acute distress   HENT:   Head: Normocephalic and atraumatic. Eyes: EOM are normal.   Neck: Normal range of motion. Neck supple. Cardiovascular: Normal heart sounds. An irregularly irregular rhythm present. Tachycardia present. Pulmonary/Chest: Effort normal. No respiratory distress. She has rales. Bilateral rales in the bases   Abdominal: Soft. Bowel sounds are normal. She exhibits no mass. There is no tenderness. Musculoskeletal: Normal range of motion. She exhibits no edema. Neurological: She is alert and oriented to person, place, and time. Coordination normal.   Skin: Skin is warm and dry. Psychiatric: She has a normal mood and affect. Nursing note and vitals reviewed. Medical Decision Making   I am the first provider for this patient. I reviewed the vital signs, available nursing notes, past medical history, past surgical history, family history and social history. Old Medical Records: Old medical records.      Provider Notes:   DDx: a fib, a flutter, electrolyte abnormality, dehydration, UTI, sepsis      Critical Care Time:   CRITICAL CARE NOTE:    11:47 AM      IMPENDING DETERIORATION -Respiratory, Cardiovascular and Metabolic    ASSOCIATED RISK FACTORS - Hypotension, Hypoxia, Dysrhythmia, Metabolic changes and Dehydration    MANAGEMENT- Bedside Assessment and Supervision of Care    INTERPRETATION -  Xrays, ECG and Blood Pressure    INTERVENTIONS - hemodynamic mngmt and Metobolic interventions    CASE REVIEW - Hospitalist, Medical Sub-Specialist, Nursing and Family    TREATMENT RESPONSE -Improved    PERFORMED BY - Self      NOTES   :      I have spent 60 minutes of critical care time involved in lab review, consultations with specialist, family decision- making, bedside attention and documentation. During this entire length of time I was immediately available to the patient . Pj Bernstein MD    ED Course:  9:44 AM   Initial assessment performed. The patients presenting problems have been discussed, and they are in agreement with the care plan formulated and outlined with them. I have encouraged them to ask questions as they arise throughout their visit. Progress Notes:   11:25 AM  Will order CXR. Pt is still having a flutter. Per lab results from [facility], pt's blood cultures were positive for E. Coli. It was 2 out of 2 bottles on the first one, and the second one was 1 out of 2 bottles. The E. Coli is sensitive for ampicillin, ampicillin/sulbactam, ceftriaxone, gentamicin, meropenem, tobramycin, trimeth/sulfa, piperacillin/tazobactam. The E. Coli is resistant to ciprofloxacin, and levofloxacin. Written by HEIDI Olivo, as dictated by Pj Bernstein MD.    CONSULT NOTE:   1:28 PM  Pj Bernstein MD spoke with Dr. Jolanta Mckenna,   Specialty: Hospitalist  Discussed pt's hx, disposition, and available diagnostic and imaging results. Reviewed care plans. Consultant will evaluate pt for admission. Written by HEIDI Olivo, as dictated by Pj Bernstein MD.    CONSULT NOTE:  1:31 PM  Pj Bernstein MD spoke with Dr. Saúl Hammond  Specialty: Cardiology  Discussed pt's hx, disposition, and available diagnostic and imaging results. Reviewed care plans. Consultant agrees with plans as outlined. Dr. Lali Mcgarry will consult on the pt.   Written by HEIDI Olivo, as dictated by Pj Bernstein MD.    Diagnostic Study Results     Labs -      Recent Results (from the past 12 hour(s))   EKG, 12 LEAD, INITIAL    Collection Time: 10/15/17  9:05 AM   Result Value Ref Range    Ventricular Rate 107 BPM    Atrial Rate 308 BPM    QRS Duration 82 ms    Q-T Interval 342 ms    QTC Calculation (Bezet) 456 ms    Calculated P Axis 84 degrees    Calculated R Axis 26 degrees    Calculated T Axis 17 degrees    Diagnosis       Atrial flutter with variable AV block  Abnormal ECG  When compared with ECG of 08-AUG-2017 12:38,  Atrial flutter has replaced Sinus rhythm         Radiologic Studies -  The following have been ordered and reviewed:  No orders to display     CT Results  (Last 48 hours)    None        CXR Results  (Last 48 hours)    None          Vital Signs-Reviewed the patient's vital signs. Patient Vitals for the past 12 hrs:   Temp Pulse Resp BP SpO2   10/15/17 0938 - (!) 107 24 - 96 %   10/15/17 0930 - (!) 116 22 112/74 96 %   10/15/17 0915 97.7 °F (36.5 °C) (!) 113 23 116/74 93 %       Medications Given in the ED:  Medications   sodium chloride 0.9 % bolus infusion 250 mL (not administered)   dilTIAZem (CARDIZEM) injection 5 mg (not administered)   sodium chloride 0.9 % bolus infusion 500 mL (not administered)       Pulse Oximetry Analysis - Normal 06% on O2     Cardiac Monitor:   Rate: 107  Rhythm: Atrial Flutter     EKG interpretation: (Preliminary)  9:05 AM  Rhythm: atrial flutter with variable AV block; and irregular. Rate (approx.): 107; Axis: normal; QRS interval: normal ; ST/T wave: normal.  Written by Nelda Piña ED Scribe, as dictated by Alexandr Whiteside MD    Diagnosis   Clinical Impression:   1. Atrial fibrillation with RVR ([address]    2. Acute cystitis without hematuria    3. Positive blood cultures         Plan:  1: Admit to hospitalist    Disposition Note:  ADMIT NOTE:  1:28 PM  Patient is being admitted to the hospital by Dr. Ovalles Reasons.  The results of their tests and reasons for their admission have been discussed with the patient and/or available family. They convey agreement and understanding for the need to be admitted and for their admission diagnosis. _______________________________   Attestations: This note is prepared by Lisa Abebe, acting as Scribe for MD Babar Stone MD: The scribe's documentation has been prepared under my direction and personally reviewed by me in its entirety.  I confirm that the note above accurately reflects all work, treatment, procedures, and medical decision making performed by me.  _______________________________

## 2017-10-15 NOTE — ED TRIAGE NOTES
Pt here for c/o heart palpitations that started at 7pm last night suddenly. Took coreg and 1/2 tylenol with no effect, now she is here. Pt went to urgent care Thursday night for fever and fatigue, pnuemonia ruled out and UTI diagnosed. Placed on nitrofuratuin and keflex, still on this medication. Blood cultures came back (+) and sensitivity will be back this morning.

## 2017-10-15 NOTE — ED NOTES
Pt denies heart palpitations at present. HR's in the 90's and 120's.   Pt states \"it is not bothering me\"

## 2017-10-15 NOTE — IP AVS SNAPSHOT
[address] [facility] 
[phone] Patient: Sabirna Austin MRN: [id number] My Medications STOP taking these medications AZO PO  
   
  
 carvedilol 25 mg tablet Commonly known as:  COREG  
   
  
 cephALEXin 500 mg capsule Commonly known as:  KEFLEX  
   
  
 LASIX 20 mg tablet Generic drug:  furosemide  
   
  
 nitrofurantoin 100 mg capsule Commonly known as:  MACRODANTIN  
   
  
  
TAKE these medications as instructed Instructions Each Dose to Equal  
 Morning Noon Evening Bedtime AMITRIPTYLINE PO Take 10-15 mg by mouth nightly. 10-15 mg  
    
   
   
   
  
  
 * COUMADIN 2.5 mg tablet Generic drug:  warfarin Take 2.5 mg by mouth three (3) days a week. Patient's medication list states that she takes 2.5 mg , and 5 mg  and Thursday. 2.5 mg  
    
   
   
   
  
 * COUMADIN 2.5 mg tablet Generic drug:  warfarin Take 5 mg by mouth four (4) days a week. Patient's medication list states that she takes 2.5 mg , and 5 mg  and Thursday. 5 mg EYE VITAMIN AND MINERALS PO Take 1 Tab by mouth two (2) times a day. 1 Tab Iron 325 mg (65 mg iron) tablet Generic drug:  ferrous sulfate Take 325 mg by mouth two (2) times a day. 325 mg  
    
  
   
   
  
   
  
 latanoprost 0.005 % ophthalmic solution Commonly known as:  Mary Carmen Elkins Administer 1 Drop to both eyes nightly. 1 Drop  
    
   
   
   
  
  
 levETIRAcetam 500 mg tablet Commonly known as:  KEPPRA TAKE ONE TABLET BY MOUTH TWICE DAILY. PATIENT TO KEEP 6/15/16 FOR FURTHER FILLS  
     
  
   
   
  
   
  
 STOOL SOFTENER PO Take 100 mg by mouth daily. 100 mg  
    
  
   
   
   
  
 TYLENOL 325 mg tablet Generic drug:  acetaminophen Take 162.5 mg by mouth nightly. Patient takes 1/2 of a 325 mg tablet every evening at bedtime. 162.5 mg  
    
   
   
   
  
  
 * Notice: This list has 2 medication(s) that are the same as other medications prescribed for you. Read the directions carefully, and ask your doctor or other care provider to review them with you.

## 2017-10-15 NOTE — PROGRESS NOTES
Pharmacy Clarification of the Prior to Admission Medication Regimen Retrospective to the Admission Medication Reconciliation    The patient was interviewed regarding clarification of the prior to admission medication regimen. Family members were present in room and obtained permission from patient to discuss drug regimen with visitor(s) present. Patient and family members were questioned regarding use of any other inhalers, topical products, over the counter medications, herbal medications, vitamin products or ophthalmic/nasal/otic medication use. Patient had medication list, a copy was made an reviewed with the family members    Information Obtained From: Patient, Family, RX Query, Medication List    Recommendations/Findings: The following amendments were made to the patient's active medication list on file at [facility]:     1) Additions: NONE    2) Removals: NONE    3) Changes:   AMITRIPTYLINE HCL (AMITRIPTYLINE PO) (Old regimen: Take by mouth nightly /New regimen: Take 10-15 mg PO QHS)   DOCUSATE CALCIUM (STOOL SOFTENER PO) (Old regimen: Take 100 mg PO BID /New regimen: Take 100 mg PO QD)   acetaminophen (TYLENOL) 325 mg tablet (Old regimen: Take 300 mg PO BID /New regimen: Take 1/2 of a 325 mg tab QHS)   VIT A/C/E AC/ZNOX/CUPRIC OXIDE (EYE VITAMIN AND MINERALS PO) (Old regimen: Take PO BID /New regimen: Take 1 PO BID)   latanoprost (XALATAN) 0.005 % ophthalmic solution (Old regimen: NONE /New regimen: 1 drop in both eyes QHS)   PHENAZOPYRIDINE HCL (AZO PO) (Old regimen: Take PO QD /New regimen: Take 1 PO QD)    4) Pertinent Pharmacy Findings:   Identified High Alert Medication Information  o Current Anticoagulants:  - Name: Warfarin       PTA medication list was corrected to the following:     Prior to Admission Medications   Prescriptions Last Dose Informant Patient Reported? Taking?    AMITRIPTYLINE HCL (AMITRIPTYLINE PO) 10/14/2017 at Unknown time Family Member Yes Yes   Sig: Take 10-15 mg by mouth nightly. DOCUSATE CALCIUM (STOOL SOFTENER PO) 10/15/2017 at Unknown time Family Member Yes Yes   Sig: Take 100 mg by mouth daily. PHENAZOPYRIDINE HCL (AZO PO) 10/15/2017 at Unknown time Family Member Yes Yes   Sig: Take 1 Tab by mouth daily. VIT A/C/E AC/ZNOX/CUPRIC OXIDE (EYE VITAMIN AND MINERALS PO) 10/15/2017 at Unknown time Family Member Yes Yes   Sig: Take 1 Tab by mouth two (2) times a day. acetaminophen (TYLENOL) 325 mg tablet 10/14/2017 at Unknown time Family Member Yes Yes   Sig: Take 162.5 mg by mouth nightly. Patient takes 1/2 of a 325 mg tablet every evening at bedtime. carvedilol (COREG) 25 mg tablet 10/15/2017 at Unknown time Family Member Yes Yes   Si.5 mg daily. Patient's medication list states that she takes 12.5 mg QAM and 25 mg QPM   carvedilol (COREG) 25 mg tablet 10/14/2017 at Unknown time Family Member Yes Yes   Sig: Take 25 mg by mouth every evening. Patient's medication list states that she takes 12.5 mg QAM and 25 mg QPM   cephALEXin (KEFLEX) 500 mg capsule 10/15/2017 at Unknown time Family Member Yes Yes   Sig: Take 500 mg by mouth four (4) times daily. ferrous sulfate (IRON) 325 mg (65 mg iron) tablet 10/15/2017 at Unknown time Family Member Yes Yes   Sig: Take 325 mg by mouth two (2) times a day. furosemide (LASIX) 20 mg tablet 10/13/2017 at Unknown time Family Member Yes Yes   Sig: Take 20 mg by mouth three (3) days a week. Patient takes this  and Friday   latanoprost (XALATAN) 0.005 % ophthalmic solution 10/14/2017 at Unknown time Family Member Yes Yes   Sig: Administer 1 Drop to both eyes nightly. levETIRAcetam (KEPPRA) 500 mg tablet 10/15/2017 at Unknown time Family Member No Yes   Sig: TAKE ONE TABLET BY MOUTH TWICE DAILY. PATIENT TO KEEP 6/15/16 FOR FURTHER FILLS   nitrofurantoin (MACRODANTIN) 100 mg capsule 10/15/2017 at Unknown time Family Member Yes Yes   Sig: Take 100 mg by mouth two (2) times a day.    warfarin (COUMADIN) 2.5 mg tablet 10/15/2017 at Unknown time Family Member Yes Yes   Sig: Take 2.5 mg by mouth three (3) days a week. Patient's medication list states that she takes 2.5 mg Monday Wednesday Friday, and 5 mg Sunday Saturday Tuesday and Thursday. warfarin (COUMADIN) 2.5 mg tablet 10/14/2017 at Unknown time Family Member Yes Yes   Sig: Take 5 mg by mouth four (4) days a week. Patient's medication list states that she takes 2.5 mg Monday Wednesday Friday, and 5 mg Sunday Saturday Tuesday and Thursday.       Facility-Administered Medications: None          Thank you,  Todd So

## 2017-10-15 NOTE — ED NOTES
Two unsuccessful IV attempts made; labs drawn and sent. #20 placed at left wrist without difficulty. Pt tolerated well, daughter at bedside.

## 2017-10-15 NOTE — ED NOTES
Pt assisted to bedside commode. Voided 200cc. Pt became SOB 02 sats 96 on 2L N/C and hr 115. Assisted back to bed.  Now HR 91 and 97% 2 L N/C

## 2017-10-15 NOTE — IP AVS SNAPSHOT
[address] [facility] 
[phone] Patient: Shamir Appiah MRN: [id number] About your hospitalization You were admitted on:  October 15, 2017 You last received care in the:  [facility] PROGRESSIVE CARE You were discharged on:  2017 Why you were hospitalized Your primary diagnosis was:  Not on File Your diagnoses also included:  Uti (Urinary Tract Infection), A-Fib (Hcc) Things You Need To Do (next 8 weeks) Follow up with Maryuri Banerjee MD  
The nurse will call your daughter to schedule this appointment Phone:  [phone] Where:  [address], [address] [address] Follow up with [facility] with pcp You are to go and have your inr checked on Monday Oct 30th at 1100am at your pcp office. Where:  [phone] Monday Oct 30, 2017 ANTICOAG NURSE with JUAN NewtonD at 11:00 AM  
Where:  [facility])  HOSPITAL FOLLOW-UP with Sukumar Maravilla MD at 11:00 AM  
Where:  [facility]) Discharge Orders None A check pepe indicates which time of day the medication should be taken. My Medications STOP taking these medications AZO PO  
   
  
 carvedilol 25 mg tablet Commonly known as:  COREG  
   
  
 cephALEXin 500 mg capsule Commonly known as:  KEFLEX  
   
  
 LASIX 20 mg tablet Generic drug:  furosemide  
   
  
 nitrofurantoin 100 mg capsule Commonly known as:  MACRODANTIN  
   
  
  
TAKE these medications as instructed Instructions Each Dose to Equal  
 Morning Noon Evening Bedtime AMITRIPTYLINE PO Take 10-15 mg by mouth nightly. 10-15 mg  
    
   
   
   
  
  
 * COUMADIN 2.5 mg tablet Generic drug:  warfarin Take 2.5 mg by mouth three (3) days a week. Patient's medication list states that she takes 2.5 mg Monday Wednesday Friday, and 5 mg Sunday Saturday Tuesday and Thursday. 2.5 mg  
    
   
   
   
  
 * COUMADIN 2.5 mg tablet Generic drug:  warfarin Take 5 mg by mouth four (4) days a week. Patient's medication list states that she takes 2.5 mg Monday Wednesday Friday, and 5 mg Sunday Saturday Tuesday and Thursday. 5 mg EYE VITAMIN AND MINERALS PO Take 1 Tab by mouth two (2) times a day. 1 Tab Iron 325 mg (65 mg iron) tablet Generic drug:  ferrous sulfate Take 325 mg by mouth two (2) times a day. 325 mg  
    
  
   
   
  
   
  
 latanoprost 0.005 % ophthalmic solution Commonly known as:  Mary Carmen Hollowayk Administer 1 Drop to both eyes nightly. 1 Drop  
    
   
   
   
  
  
 levETIRAcetam 500 mg tablet Commonly known as:  KEPPRA TAKE ONE TABLET BY MOUTH TWICE DAILY. PATIENT TO KEEP 6/15/16 FOR FURTHER FILLS  
     
  
   
   
  
   
  
 STOOL SOFTENER PO Take 100 mg by mouth daily. 100 mg  
    
  
   
   
   
  
 TYLENOL 325 mg tablet Generic drug:  acetaminophen Take 162.5 mg by mouth nightly. Patient takes 1/2 of a 325 mg tablet every evening at bedtime. 162.5 mg  
    
   
   
   
  
  
 * Notice: This list has 2 medication(s) that are the same as other medications prescribed for you. Read the directions carefully, and ask your doctor or other care provider to review them with you. Discharge Instructions DISCHARGE INSTRUCTIONS FOR PATIENTS WITH PACEMAKERS You had a Medtronic dual chamber pacemaker implanted by Dr. Brijesh Broderick on 10/23 to make sure the heart rate stays OK, followed by an AV node ablation for definitive rate control (rather than using medication which was ineffective) of your atrial fibrillation.   The AV node ablation makes your heart dependent on the pacemaker to beat at a decent heart rate. 1. Remember to call for an appointment in 2-4 weeks to check healing and implant programming with Dr. Iwrin Darling, your cardiologist. 
2. [address] are available from your pharmacist to wear at all times if you choose to wear one. 3. Carry your ID card for pacemaker with you at all times. This card will be given to you in the hospital or mailed to you. 4. The pacemaker will bulge slightly under your skin. The bulge will decrease in size over the next few weeks. Please notify the doctor's office if you notice any of the following around your site: A.  A bruise that does not go away. B.  Soreness or yellow, green, or brown drainage from the site. C. Any swelling from the site. D. If you have a fever of 100 degrees or higher that lasts for a few days. INCISION CARE 1.  Leave skin glue over your site until it starts to fall off, usually in a few weeks. 2.  You may shower after 3 days as long as your incision isnt submerged or directly sprayed upon until well healed. 3.  For comfort, wear loose fitting clothing. 4.  Report any signs of infection, fever, pain, swelling, redness, oozing, or heat at site especially if these symptoms increase after the first 3 to 4 days. ACTIVITY PRECAUTIONS 1. Avoid rough contact with the implant site. 2. No driving for 14 days. 3. Avoid lifting your arm over your head, carrying anything on the affected side, or lifting over 10 pounds for 30 days. For the first 2 days only bend your arm at the elbow. 4. Any extreme activity such as golf, weight lifting or exercise biking should be restricted for 60 days. 5. Do not carry objects by holding them against your implant site. 6.  No shooting rifles or any type of gun with the affected shoulder permanently. SPECIAL PRECAUTIONS 1.   You should avoid all strong magnetic fields, such as arc welding, large transformers, large motors. 2.  You may NOT have an MRI which uses a strong magnet to take pictures. 3.  Treatments or surgery that requires diathermy or electrocautery should be discussed with your doctor before scheduled. 4. Avoid radio frequency transmitters, including radar. 5. Advise dentist or other medical personnel you see that you have a pacemaker. 6.  Cell phones and microwave oven use is okay. 7.  If you plan to move or take a trip to a new area, the doctor's office will give you a name of a doctor to contact for any problems. ANTIBIOTIC THERAPY During the first 8 weeks after your pacemaker insertion, you may need antibiotics before any dental work or certain tests or operations. Let the dentist or doctor who is caring for you know that you have had an implanted device. POST-ABLATION DISCHARGE INSTRUCTIONS: 
 
Do not drive, operate any machinery, or sign any legal documents for 24 hours after your procedure. You must have someone to drive you home. You may take a shower 24 hours after your cardiac procedure. Be sure to get the dressing wet and then remove it; gently wash the area with warm soapy water. Pat dry and leave open to air. To help prevent infections, be sure to keep the cath site clean and dry. No lotions, creams, powders, ointments, etc. in the cath site for approximately 1 week. Do not take a tub bath, get in a hot tub or swimming pool for approximately 5 days or until the cath site is completely healed. No strenuous activity or heavy lifting over 20 lbs. for 7 days. After your procedure, some bruising or discomfort is common during the healing process. Tylenol, 1-2 tablets every 6 hours as needed, is recommended if you experience any discomfort.   If you experience any signs or symptoms of infection such as fever, chills, or poorly healing incision, persistent tenderness or swelling in the groin, redness and/or warmth to the touch, numbness, significant tingling or pain at the groin site or affected extremity, rash, drainage from the site, or if the leg feels tight or swollen, call your physician right away. If bleeding at the site occurs, take a clean gauze pad and apply direct pressure to the groin just above the puncture site, and call your physician right away. If your procedure involved ablation therapy, you may feel some mild or vague chest discomfort due to delivery of heat therapy to the heart muscle. This should resolve in 1-2 days. If it gets worse or is associated with shortness of breath, dizziness, loss of consciousness, call your physician right away or call 911 if emergency medical care is needed. [facility] Announcement We are excited to announce that we are making your provider's discharge notes available to you in [facility]. You will see these notes when they are completed and signed by the physician that discharged you from your recent hospital stay. If you have any questions or concerns about any information you see in [facility], please call the Health Information Department where you were seen or reach out to your Primary Care Provider for more information about your plan of care. Introducing [facility]! Dear [place]: Thank you for requesting a [facility] account. Our records indicate that you already have an active [facility] account. You can access your account anytime at [email or web address] Did you know that you can access your hospital and ER discharge instructions at any time in [employer]? You can also review all of your test results from your hospital stay or ER visit. Additional Information If you have questions, please visit the Frequently Asked Questions section of the [employer] website at [email or web address]/. Remember, [facility] is NOT to be used for urgent needs. For medical emergencies, dial 911. Now available from your iPhone and Android! Providers Seen During Your Hospitalization Provider Specialty Primary office phone Tricia Correa MD Emergency Medicine [phone] Neal Melendrez MD Internal Medicine [phone] Natanael Gould MD Internal Medicine [phone] Ashley Oropeza MD Internal Medicine [phone] Immunizations Administered for This Admission Name Date Influenza Vaccine (Quad) PF 10/19/2017 Your Primary Care Physician (PCP) Primary Care Physician Office Phone Office Fax Kelly Krause [phone] You are allergic to the following Allergen Reactions Codeine Nausea Only Pcn (Penicillins) Unknown (comments) Cant remember reaction as a child Tetanus Vaccines And Toxoid Unknown (comments) Does not take tetanus due to tb hx  
    
 Vibramycin (Doxycycline Calcium) Rash Recent Documentation Height Weight Breastfeeding? BMI OB Status Smoking Status 1.6 m 64.3 kg No 25.11 kg/m2 Hysterectomy Never Smoker Emergency Contacts Name Discharge Info Relation Home Work Mobile Ruchi Manriquez DISCHARGE CAREGIVER [3] Child [2] [phone] Patient Belongings The following personal items are in your possession at time of discharge: 
  Dental Appliances: Partials  Visual Aid: Glasses, With patient   Hearing Aids/Status: At home, Bilateral  Home Medications: None   Jewelry: Ring (One on left hand, one on right hand.)  Clothing: At bedside    Other Valuables: Personal toiletries (Pull-up depends.)  Personal Items Sent to Safe: Bilateral hearing aids home with daughter, Porfirio Broderick. Discharge Instructions Attachments/References WARFARIN: LONG-TERM USE (ENGLISH) Patient Handouts Taking Warfarin Safely: Care Instructions Your Care Instructions Warfarin is a medicine that you take to prevent blood clots.  It is often called a blood thinner. Doctors give warfarin (such as Coumadin) to reduce the risk of blood clots. You may be at risk for blood clots if you have atrial fibrillation or deep vein thrombosis. Some other health problems may also put you at risk. Warfarin slows the amount of time it takes for your blood to clot. It can cause bleeding problems. Even if you've been taking warfarin for a while, it's important to know how to take it safely. Foods and other medicines can affect the way warfarin works. Some can make warfarin work too well. This can cause bleeding problems. And some can make it work poorly, so that it does not prevent blood clots very well. You will need regular blood tests to check how long it takes for your blood to form a clot. This test is called a PT or prothrombin time test. The result of the test is called an INR level. Depending on the test results, your doctor or anticoagulation clinic may adjust your dose of warfarin. Follow-up care is a key part of your treatment and safety. Be sure to make and go to all appointments, and call your doctor if you are having problems. It's also a good idea to know your test results and keep a list of the medicines you take. How can you care for yourself at home? Take warfarin safely · Take your warfarin at the same time each day. · If you miss a dose of warfarin, don't take an extra dose to make up for it. Your doctor can tell you exactly what to do so you don't take too much or too little. · Wear medical alert jewelry that lets others know that you take warfarin. You can buy this at most drugstores. · Don't take warfarin if you are pregnant or planning to get pregnant. Talk to your doctor about how you can prevent getting pregnant while you are taking it. · Don't change your dose or stop taking warfarin unless your doctor tells you to. Effects of medicines and food on warfarin · Don't start or stop taking any medicines, vitamins, or natural remedies unless you first talk to your doctor. Many medicines can affect how warfarin works. These include aspirin and other pain relievers, over-the-counter medicines, multivitamins, dietary supplements, and herbal products. · Tell all of your doctors and pharmacists that you take warfarin. Some prescription medicines can affect how warfarin works. · Keep the amount of vitamin K in your diet about the same from day to day. Do not suddenly eat a lot more or a lot less food that is rich in vitamin K than you usually do. Vitamin K affects how warfarin works and how your blood clots. Talk with your doctor before making big changes in your diet. Foods that have a lot of vitamin K include cooked green vegetables, such as: ¨ Kale, spinach, turnip greens, yesica greens, Swiss chard, and mustard greens. ¨ [place] sprouts, broccoli, and asparagus. · Limit your use of alcohol. Avoid bleeding by preventing falls and injuries · Wear slippers or shoes with nonskid soles. · Remove throw rugs and clutter. · Rearrange furniture and electrical cords to keep them out of walking paths. · Keep stairways, porches, and outside walkways well lit. Use night-lights in hallways and bathrooms. · Be extra careful when you work with sharp tools or knives. When should you call for help? Call 911 anytime you think you may need emergency care. For example, call if: 
· You have a sudden, severe headache that is different from past headaches. Call your doctor now or seek immediate medical care if: 
· You have any abnormal bleeding, such as: 
¨ Nosebleeds. ¨ Vaginal bleeding that is different (heavier, more frequent, at a different time of the month) than what you are used to. ¨ Bloody or black stools, or rectal bleeding. ¨ Bloody or pink urine. Watch closely for changes in your health, and be sure to contact your doctor if you have any problems. Where can you learn more? Go to http://shannan.info/. Enter G116 in the search box to learn more about \"Taking Warfarin Safely: Care Instructions. \" Current as of: November 15, 2016 Content Version: 11.3 © 2006-2017 [employer]. Care instructions adapted under license by [employer] (which disclaims liability or warranty for this information). If you have questions about a medical condition or this instruction, always ask your healthcare professional. [address] any warranty or liability for your use of this information. Please provide this summary of care documentation to your next provider. Signatures-by signing, you are acknowledging that this After Visit Summary has been reviewed with you and you have received a copy. Patient Signature:  ____________________________________________________________ Date:  ____________________________________________________________  
  
Ruby Sleight Provider Signature:  ____________________________________________________________ Date:  ____________________________________________________________

## 2017-10-15 NOTE — CONSULTS
CARDIOLOGY Consult Note (for Dr. Daisy Davis)    Patient ID:  Patient: Carlos Alberto Brink  MRN: [id number]  Age: 80 y.o.  : 1924    Date of  Admission: 10/15/2017  9:02 AM   PCP:  MD Risa   Usual cardiologist:  Madelyn Eng MD    Assessment: 1. Atrial fibrillation, recurrent after cardioversion and persistent. Chronic warfarin anticoagulation, INR is <2 on admission. 2. UTI diagnosis, on antibiotic. 3. Positive blood cultures, result not known to me. 4. Abnormal chest CT 2017.  5. Anemia. 6. DNR. Plan:     1. Agree with carvedilol with as needed diltiazem atop this. 2. For now, aim for lenient (HR 's) rate control and careful (INR ~2) anticoagulation strategy. I don't know if she'll stay out of arrhythmia effectively longterm. Maybe can try propafenone  bid along with AV michael agent as long as at least 6 weeks out from amiodarone stoppage. If she did start this drug, she'd have to get the first 5 doses as an INPATIENT under monitoring. Similarly, can try sotalol in place of the carvedilol, also requiring 6+ mo from amiodarone stoppage and IP initiation. Dr. Daisy Davis will be informed of her admission. []       High complexity decision making was performed in this patient at high risk for decompensation with multiple organ involvement. Carlos Alberto Brink is a 80 y.o. female told to come here with a recent positive blood culture, currently being treated for UTI. She was found to be in Afib/flutter with RVR. Was cardioverted in 2017, on amiodarone, but then amiodarone stopped after about 2 years of use due to some abnormal lung findings on CT. She is currently on warfarin for anticoagulation but her INR on admission is low. She says that she felt sudden onset of racing palpitations yesterday. This didn't go away.     Per the ER:  Alexia Etienne is a 80 y.o. female with PMhx significant for CAD, hypercholesterolemia, and a fib who presents ambulatory to the ED with cc of palpitations since 1900 last night. She reports additional symptoms of SOB without O2, and non productive cough. Pt notes that the palpitations suddenly started, and have been constant since. Per daughter, when pt has episodes of a fib normally after a small period of time pt will go back to normal. However, in August pt had to be cardioverted because she would not get out of a fib. Daughter reports that prior to that episode of constant a fib, pt had a UTI and was put on antibiotics. Per daughter, 3 days ago pt was diagnosed with a UTI and was placed on nitrofurantoin and keflex which she is still taking. Daughter reports that blood cultures were done, and came back positive. The sensitivity will be back today, but they have not yet received a call. Pt notes that she normally uses O2 at night, and occasionally during the day. She reports that she is on coumadin. Per daughter, pt's pulmonologist said that pt has interstitial changes not pneumonia. Pt denies chest pain, dysuria, or abdominal pain. \"        Past Medical History:   Diagnosis Date    Alopecia     Arrhythmia     atrial fib,?svt    Arthritis     Atrial fibrillation (HCC)     Benign essential HTN     Benign neoplasm of vulva     CAD (coronary artery disease)     Cholelithiases     Colon cancer (HCC)     Constipation     Dyspareunia     Glaucoma     History of mixed connective tissue disease     Hx of viral pericarditis     Hyperactivity of bladder     Hypercholesterolemia     Ill-defined condition     macular degeneration    Insomnia     Macular degeneration     Neck pain     Psoriasis     Rectocele     Renal cyst     Seizure disorder (HCC)     Sjogren's disease ([facility] [address]     Thrombophlebitis     TIA (transient ischemic attack)     Vaginal vault prolapse, posthysterectomy     Varicose veins         Past Surgical History:   Procedure Laterality Date    HX BREAST LUMPECTOMY      left breast    HX CATARACT REMOVAL      HX COLECTOMY  1955    partial    HX HEMORRHOIDECTOMY      HX HYSTERECTOMY      HX SHOULDER ARTHROSCOPY      left       Social History   Substance Use Topics    Smoking status: Never Smoker    Smokeless tobacco: Never Used    Alcohol use No        Family History   Problem Relation Age of Onset    Cancer Father     Inflammatory Bowel Dz Mother     Heart Disease Brother     Diabetes Brother         Allergies   Allergen Reactions    Codeine Nausea Only    Pcn [Penicillins] Unknown (comments)     Cant remember reaction as a child    Tetanus Vaccines And Toxoid Unknown (comments)     Does not take tetanus due to tb hx    Vibramycin [Doxycycline Calcium] Rash          Current Facility-Administered Medications   Medication Dose Route Frequency    dilTIAZem (CARDIZEM) 100 mg in dextrose 5% (MBP/ADV) 100 mL infusion  2.5 mg/hr IntraVENous TITRATE    sodium chloride (NS) flush 5-10 mL  5-10 mL IntraVENous Q8H    sodium chloride (NS) flush 5-10 mL  5-10 mL IntraVENous PRN    acetaminophen (TYLENOL) tablet 650 mg  650 mg Oral Q4H PRN    bisacodyl (DULCOLAX) tablet 5 mg  5 mg Oral DAILY PRN    cefTRIAXone (ROCEPHIN) 1 g in 0.9% sodium chloride (MBP/ADV) 50 mL  1 g IntraVENous Q24H    ferrous sulfate tablet 325 mg  1 Tab Oral BID WITH MEALS    [START ON 10/16/2017] carvedilol (COREG) tablet 12.5 mg  12.5 mg Oral QAM RT    carvedilol (COREG) tablet 6.25 mg  6.25 mg Oral QPM    . PHARMACY TO SUBSTITUTE PER PROTOCOL    Per Protocol    latanoprost (XALATAN) 0.005 % ophthalmic solution 1 Drop  1 Drop Both Eyes QHS    levETIRAcetam (KEPPRA) tablet 500 mg  500 mg Oral BID     Current Outpatient Prescriptions   Medication Sig    warfarin (COUMADIN) 2.5 mg tablet Take 2.5 mg by mouth three (3) days a week. Patient's medication list states that she takes 2.5 mg Monday Wednesday Friday, and 5 mg Sunday Saturday Tuesday and Thursday.     warfarin (COUMADIN) 2.5 mg tablet Take 5 mg by mouth four (4) days a week. Patient's medication list states that she takes 2.5 mg Monday Wednesday Friday, and 5 mg Sunday Saturday Tuesday and Thursday.  VIT A/C/E AC/ZNOX/CUPRIC OXIDE (EYE VITAMIN AND MINERALS PO) Take 1 Tab by mouth two (2) times a day.  ferrous sulfate (IRON) 325 mg (65 mg iron) tablet Take 325 mg by mouth two (2) times a day.  furosemide (LASIX) 20 mg tablet Take 20 mg by mouth three (3) days a week. Patient takes this Monday Wednesday and Friday    cephALEXin (KEFLEX) 500 mg capsule Take 500 mg by mouth four (4) times daily.  nitrofurantoin (MACRODANTIN) 100 mg capsule Take 100 mg by mouth two (2) times a day.  PHENAZOPYRIDINE HCL (AZO PO) Take 1 Tab by mouth daily.  acetaminophen (TYLENOL) 325 mg tablet Take 162.5 mg by mouth nightly. Patient takes 1/2 of a 325 mg tablet every evening at bedtime.  carvedilol (COREG) 25 mg tablet Take 25 mg by mouth every evening. Patient's medication list states that she takes 12.5 mg QAM and 25 mg QPM    DOCUSATE CALCIUM (STOOL SOFTENER PO) Take 100 mg by mouth daily.  levETIRAcetam (KEPPRA) 500 mg tablet TAKE ONE TABLET BY MOUTH TWICE DAILY. PATIENT TO KEEP 6/15/16 FOR FURTHER FILLS    latanoprost (XALATAN) 0.005 % ophthalmic solution Administer 1 Drop to both eyes nightly.  carvedilol (COREG) 25 mg tablet 12.5 mg daily. Patient's medication list states that she takes 12.5 mg QAM and 25 mg QPM    AMITRIPTYLINE HCL (AMITRIPTYLINE PO) Take 10-15 mg by mouth nightly. Review of Symptoms:    Constitutional: Negative for chills. HENT: Negative. Negative for congestion. Eyes: Positive for itching. Respiratory: Positive for cough and shortness of breath. Cardiovascular: Positive for palpitations. Negative for chest pain. Gastrointestinal: Negative. Negative for abdominal pain, nausea and vomiting. Endocrine: Negative for heat intolerance. Genitourinary: Negative. Negative for dysuria. Musculoskeletal: Negative. Negative for back pain. Skin: Negative. Allergic/Immunologic: Negative for immunocompromised state. Neurological: Negative. Negative for dizziness. Hematological: Does not bruise/bleed easily. Psychiatric/Behavioral: Negative.     All other systems reviewed and are negative.        Objective:      Physical Exam:  Temp (24hrs), Av.8 °F (36.6 °C), Min:97.7 °F (36.5 °C), Max:97.8 °F (36.6 °C)    Patient Vitals for the past 8 hrs:   Pulse   10/15/17 1445 99   10/15/17 1430 90   10/15/17 1415 90   10/15/17 1408 96   10/15/17 1400 99   10/15/17 1345 93   10/15/17 1341 (!) 104   10/15/17 1330 (!) 106   10/15/17 1320 100   10/15/17 1315 100   10/15/17 1314 (!) 104   10/15/17 1300 97   10/15/17 1259 (!) 107   10/15/17 1250 (!) 110   10/15/17 1245 (!) 101   10/15/17 1230 (!) 104   10/15/17 1215 (!) 101   10/15/17 1200 (!) 101   10/15/17 1145 (!) 104   10/15/17 1130 (!) 108   10/15/17 1115 (!) 107   10/15/17 1100 (!) 103   10/15/17 1053 (!) 112   10/15/17 1049 98   10/15/17 1048 (!) 108   10/15/17 1047 (!) 117   10/15/17 1045 (!) 108   10/15/17 1030 (!) 117   10/15/17 1015 (!) 120   10/15/17 1000 (!) 108   10/15/17 0945 (!) 114   10/15/17 0938 (!) 107   10/15/17 0930 (!) 116   10/15/17 0915 (!) 113    Patient Vitals for the past 8 hrs:   Resp   10/15/17 1445 23   10/15/17 1430 19   10/15/17 1415 17   10/15/17 1408 19   10/15/17 1400 23   10/15/17 1345 19   10/15/17 1341 20   10/15/17 1330 19   10/15/17 1320 19   10/15/17 1315 22   10/15/17 1300 20   10/15/17 1259 18   10/15/17 1250 19   10/15/17 1245 19   10/15/17 1230 19   10/15/17 1215 20   10/15/17 1200 23   10/15/17 1145 17   10/15/17 1130 21   10/15/17 1115 17   10/15/17 1100 20   10/15/17 1053 22   10/15/17 1048 16   10/15/17 1047 22   10/15/17 1045 19   10/15/17 1030 23   10/15/17 1015 17   10/15/17 1000 21   10/15/17 0945 18   10/15/17 0938 24   10/15/17 0930 22   10/15/17 0915 23    Patient Vitals for the past 8 hrs:   BP   10/15/17 1445 127/82 10/15/17 1430 124/79   10/15/17 1415 124/61   10/15/17 1400 118/79   10/15/17 1345 122/73   10/15/17 1330 111/68   10/15/17 1315 117/60   10/15/17 1314 113/59   10/15/17 1245 115/69   10/15/17 1230 117/59   10/15/17 1215 107/67   10/15/17 1200 116/76   10/15/17 1145 125/65   10/15/17 1130 123/76   10/15/17 1115 116/69   10/15/17 1100 111/61   10/15/17 1049 112/78   10/15/17 1045 112/78   10/15/17 1030 116/77   10/15/17 1015 112/66   10/15/17 1000 112/74   10/15/17 0945 109/64   10/15/17 0930 112/74   10/15/17 0915 116/74      No intake or output data in the 24 hours ending 10/15/17 1632    Nondiaphoretic, not in acute distress. Supple, no palpable thyromegaly. No scleral icterus, mucous membranes moist, conjuctivae pink, no xanthelasma. Unlabored, clear to auscultation bilaterally, symmetric air movement. Irregular rate and rhythm, no murmur, pericardial rub, knock, or gallop. No peripheral edema. Palpable radial pulses bilaterally. Abdomen, soft, nontender, nondistended. Extremities without cyanosis or clubbing. Skin warm and dry. Neuro grossly nonfocal.  No tremor. Awake and appropriate. CARDIOGRAPHICS and STUDIES, I reviewed:    Telemetry:  Atrial flutter with RVR. ECG:  Atypical atrial flutter with variable block 107 bpm.    Echo 8/2017:  LEFT VENTRICLE: Size was normal. Systolic function was mildly reduced. Ejection fraction was estimated to be 45 %. Of note patient was in afib at  time of study. There was mild diffuse hypokinesis. RIGHT VENTRICLE: The ventricle was mildly dilated. Systolic function was  mildly reduced. LEFT ATRIUM: The atrium was mildly dilated. RIGHT ATRIUM: The atrium was mildly dilated. MITRAL VALVE: There was mild thickening. DOPPLER: There was moderate  regurgitation. AORTIC VALVE: The valve was probably trileaflet. DOPPLER: There was no  stenosis. There was no regurgitation. TRICUSPID VALVE: Normal valve structure.  DOPPLER: There was mild  regurgitation. Pulmonary artery systolic pressure: 40 mmHg. PULMONIC VALVE: Not well visualized, but normal Doppler findings. AORTA: The root exhibited normal size. PERICARDIUM: There was no pericardial effusion. Labs:  Recent Labs      10/15/17   1028   CPK  34   TROIQ  <0.04     Lab Results   Component Value Date/Time    Cholesterol, total 196 09/12/2012 12:00 AM    HDL Cholesterol 64 09/12/2012 12:00 AM    LDL, calculated 104 09/12/2012 12:00 AM    Triglyceride 142 09/12/2012 12:00 AM     Recent Labs      10/15/17   1205   INR  1.3*   PTP  13.5*      Recent Labs      10/15/17   1028   NA  136   K  3.9   CL  103   CO2  25   BUN  29*   CREA  0.85   GLU  109*   CA  8.4*   ALB  2.7*   WBC  6.4   HGB  9.3*   HCT  28.6*   PLT  150     Recent Labs      10/15/17   1028   SGOT  18   AP  80   TP  6.9   ALB  2.7*   GLOB  4.2*     No components found for: GLPOC  No results for input(s): PH, PCO2, PO2 in the last 72 hours.         Tisha Kwok MD  10/15/2017

## 2017-10-15 NOTE — H&P
Hospitalist Admission Note    NAME: Joshua Hobson   :  1924   MRN:  [id number]     Date/Time:  10/15/2017 2:48 PM    Patient PCP: Maday Smith MD  ________________________________________________________________________    My assessment of this patient's clinical condition and my plan of care is as follows. Assessment / Plan:    Atrial Fibrillation with RVR  Hypertension  -continue Diltiazem gtt   -continue Coreg with hold parameters  -off amiodarone 2 to pulmonary issues  -Await formal Cardiology recommendations  -check thyroid if not done recently    - on coumadin     UTI outside records reviewed   cont  rocephin    Sub-therapeutic INR on Coumadin  -pharmacy consult to dose coumadin     CKD stage III:   -renal function at baseline, continue to monitor     L1 compression fracture  -pain meds-       Seizure disorder  -continue keppra     Code Status: has DDNR  Surrogate Decision Maker: sandy     DVT Prophylaxis: NI  GI Prophylaxis: not indicated     Baseline: Lives at home              Subjective:   CHIEF COMPLAINT: rapid heart beat/uti     HISTORY OF PRESENT ILLNESS:     Joshua Hobson is a 80 y.o. PMhx significant for CAD, hypercholesterolemia, and a fib who presents ambulatory to the ED with cc of palpitations since 1900 last night. pt has  Been treated for uti recently blood cx + ecoli,  Per daughter, 3 days ago pt was diagnosed with a UTI and was placed on nitrofurantoin and keflex which she is still taking. She reports that she is on coumadin. Per daughter, pt's pulmonologist said that pt has interstitial changes not pneumonia. Pt denies chest pain, dysuria, or abdominal pain. no abd pain  No n/v,.         We were asked to admit for work up and evaluation of the above problems.      Past Medical History:   Diagnosis Date    Alopecia     Arrhythmia     atrial fib,?svt    Arthritis     Atrial fibrillation (HCC)     Benign essential HTN     Benign neoplasm of vulva     CAD (coronary artery disease)     Cholelithiases     Colon cancer (HCC)     Constipation     Dyspareunia     Glaucoma     History of mixed connective tissue disease     Hx of viral pericarditis     Hyperactivity of bladder     Hypercholesterolemia     Ill-defined condition     macular degeneration    Insomnia     Macular degeneration     Neck pain     Psoriasis     Rectocele     Renal cyst     Seizure disorder (HCC)     [facility]ogren's disease ([facility] [address]     Thrombophlebitis     TIA (transient ischemic attack)     Vaginal vault prolapse, posthysterectomy     Varicose veins         Past Surgical History:   Procedure Laterality Date    HX BREAST LUMPECTOMY      left breast    HX CATARACT REMOVAL      HX COLECTOMY  1955    partial    HX HEMORRHOIDECTOMY      HX HYSTERECTOMY      HX SHOULDER ARTHROSCOPY      left       Social History   Substance Use Topics    Smoking status: Never Smoker    Smokeless tobacco: Never Used    Alcohol use No        Family History   Problem Relation Age of Onset    Cancer Father     Inflammatory Bowel Dz Mother     Heart Disease Brother     Diabetes Brother      Allergies   Allergen Reactions    Codeine Nausea Only    Pcn [Penicillins] Unknown (comments)     Cant remember reaction as a child    Tetanus Vaccines And Toxoid Unknown (comments)     Does not take tetanus due to tb hx    Vibramycin [Doxycycline Calcium] Rash        Prior to Admission medications    Medication Sig Start Date End Date Taking? Authorizing Provider   warfarin (COUMADIN) 2.5 mg tablet Take 2.5 mg by mouth three (3) days a week. Yes Yeny Begum MD   warfarin (COUMADIN) 2.5 mg tablet Take 5 mg by mouth four (4) days a week. Yes Yeny Begum MD   VIT A/C/E AC/ZNOX/CUPRIC OXIDE (EYE VITAMIN AND MINERALS PO) Take  by mouth two (2) times a day. Yes Yeny Begum MD   ferrous sulfate (IRON) 325 mg (65 mg iron) tablet Take  by mouth two (2) times a day.    Yes Yeny Begum MD   furosemide (LASIX) 20 mg tablet Take 20 mg by mouth three (3) days a week. Yes Yeny Begum MD   cephALEXin (KEFLEX) 500 mg capsule Take 500 mg by mouth four (4) times daily. Yes Yeny Begum MD   nitrofurantoin (MACRODANTIN) 100 mg capsule Take 100 mg by mouth two (2) times a day. Yes Yeny Begum MD   PHENAZOPYRIDINE HCL (AZO PO) Take  by mouth daily. Yes Yeny Begum MD   DOCUSATE CALCIUM (STOOL SOFTENER PO) Take 100 mg by mouth two (2) times a day. Yes Historical Provider   levETIRAcetam (KEPPRA) 500 mg tablet TAKE ONE TABLET BY MOUTH TWICE DAILY. PATIENT TO KEEP 6/15/16 FOR FURTHER FILLS 6/15/17  Yes Kvng Petty MD   latanoprost (XALATAN) 0.005 % ophthalmic solution  6/5/17  Yes Historical Provider   carvedilol (COREG) 25 mg tablet 12.5 mg two (2) times daily (with meals). 4/13/17  Yes Historical Provider   AMITRIPTYLINE HCL (AMITRIPTYLINE PO) Take  by mouth. nightly 10 mg to 15 mg   Yes Historical Provider   ACETAMINOPHEN (TYLENOL PO) Take 300 mg by mouth two (2) times a day. Historical Provider       REVIEW OF SYSTEMS:     I am not able to complete the review of systems because:    The patient is intubated and sedated    The patient has altered mental status due to his acute medical problems    The patient has baseline aphasia from prior stroke(s)    The patient has baseline dementia and is not reliable historian    The patient is in acute medical distress and unable to provide information           Total of 12 systems reviewed as follows:       POSITIVE= underlined text  Negative = text not underlined  General:  fever, chills, sweats, generalized weakness, weight loss/gain,      loss of appetite   Eyes:    blurred vision, eye pain, loss of vision, double vision  ENT:    rhinorrhea, pharyngitis   Respiratory:   cough, sputum production, chronic SOB, SANTOS, wheezing, pleuritic pain   Cardiology:   chest pain, palpitations, orthopnea, PND, edema, syncope   Gastrointestinal:  abdominal pain , N/V, diarrhea, dysphagia, constipation, bleeding   Genitourinary:  frequency, urgency, dysuria, hematuria, incontinence   Muskuloskeletal :  arthralgia, myalgia, back pain  Hematology:  easy bruising, nose or gum bleeding, lymphadenopathy   Dermatological: rash, ulceration, pruritis, color change / jaundice  Endocrine:   hot flashes or polydipsia   Neurological:  headache, dizziness, confusion, focal weakness, paresthesia,     Speech difficulties, memory loss, gait difficulty  Psychological: Feelings of anxiety, depression, agitation    Objective:   VITALS:    Visit Vitals    /79    Pulse 90    Temp 97.8 °F (36.6 °C)    Resp 19    Ht 5' 3\" (1.6 m)    Wt 70.1 kg (154 lb 8.7 oz)    SpO2 96%    BMI 27.38 kg/m2       PHYSICAL EXAM:    General:    Alert, cooperative, no distress, appears stated age. HEENT: Atraumatic, anicteric sclerae, pink conjunctivae     No oral ulcers, mucosa moist, throat clear, dentition fair  Neck:  Supple, symmetrical,  thyroid: non tender  Lungs:   decrease bs  bilaterally. No Wheezing occ  Rhonchi. occ rales. Chest wall:  No tenderness  No Accessory muscle use. Heart:   Regular  rhythm,  No  murmur   No edema no calf ttp jonn  Abdomen:   Soft, non-tender. Not distended. Bowel sounds normal  Extremities: No cyanosis. No clubbing,      Skin turgor normal, Capillary refill normal, Radial dial pulse 2+  Skin:     Not pale. Not Jaundiced  No rashes   Psych:  Good insight. Not depressed. Not anxious or agitated. Neurologic: EOMs intact. No facial asymmetry. No aphasia or slurred speech. Symmetrical strength, Sensation grossly intact.  Alert and oriented X 4.     _______________________________________________________________________  Care Plan discussed with:    Comments   Patient x    Family  x    RN x    Care Manager                    Consultant:  sabino preston   _______________________________________________________________________  Expected  Disposition:   Home with Family x   HH/PT/OT/RN    SNF/LTC LOW    ________________________________________________________________________  TOTAL TIME:  60  Minutes    Critical Care Provided     Minutes non procedure based      Comments    x Reviewed previous records   >50% of visit spent in counseling and coordination of care x Discussion with patient and/or family and questions answered       ________________________________________________________________________  Signed: Vamsi Vergara MD    Procedures: see electronic medical records for all procedures/Xrays and details which were not copied into this note but were reviewed prior to creation of Plan. LAB DATA REVIEWED:    Recent Results (from the past 24 hour(s))   EKG, 12 LEAD, INITIAL    Collection Time: 10/15/17  9:05 AM   Result Value Ref Range    Ventricular Rate 107 BPM    Atrial Rate 308 BPM    QRS Duration 82 ms    Q-T Interval 342 ms    QTC Calculation (Bezet) 456 ms    Calculated P Axis 84 degrees    Calculated R Axis 26 degrees    Calculated T Axis 17 degrees    Diagnosis       Atrial flutter with variable AV block  Abnormal ECG  When compared with ECG of 08-AUG-2017 12:38,  Atrial flutter has replaced Sinus rhythm     CBC WITH AUTOMATED DIFF    Collection Time: 10/15/17 10:28 AM   Result Value Ref Range    WBC 6.4 3.6 - 11.0 K/uL    RBC 3.25 (L) 3.80 - 5.20 M/uL    HGB 9.3 (L) 11.5 - 16.0 g/dL    HCT 28.6 (L) 35.0 - 47.0 %    MCV 88.0 80.0 - 99.0 FL    MCH 28.6 26.0 - 34.0 PG    MCHC 32.5 30.0 - 36.5 g/dL    RDW 14.2 11.5 - 14.5 %    PLATELET 945 104 - 137 K/uL    NEUTROPHILS 71 32 - 75 %    LYMPHOCYTES 17 12 - 49 %    MONOCYTES 11 5 - 13 %    EOSINOPHILS 1 0 - 7 %    BASOPHILS 0 0 - 1 %    ABS. NEUTROPHILS 4.6 1.8 - 8.0 K/UL    ABS. LYMPHOCYTES 1.1 0.8 - 3.5 K/UL    ABS. MONOCYTES 0.7 0.0 - 1.0 K/UL    ABS. EOSINOPHILS 0.1 0.0 - 0.4 K/UL    ABS.  BASOPHILS 0.0 0.0 - 0.1 K/UL   METABOLIC PANEL, COMPREHENSIVE    Collection Time: 10/15/17 10:28 AM   Result Value Ref Range    Sodium 136 136 - 145 mmol/L    Potassium 3.9 3.5 - 5.1 mmol/L    Chloride 103 97 - 108 mmol/L    CO2 25 21 - 32 mmol/L    Anion gap 8 5 - 15 mmol/L    Glucose 109 (H) 65 - 100 mg/dL    BUN 29 (H) 6 - 20 MG/DL    Creatinine 0.85 0.55 - 1.02 MG/DL    BUN/Creatinine ratio 34 (H) 12 - 20      GFR est AA >60 >60 ml/min/1.73m2    GFR est non-AA >60 >60 ml/min/1.73m2    Calcium 8.4 (L) 8.5 - 10.1 MG/DL    Bilirubin, total 0.3 0.2 - 1.0 MG/DL    ALT (SGPT) 18 12 - 78 U/L    AST (SGOT) 18 15 - 37 U/L    Alk.  phosphatase 80 45 - 117 U/L    Protein, total 6.9 6.4 - 8.2 g/dL    Albumin 2.7 (L) 3.5 - 5.0 g/dL    Globulin 4.2 (H) 2.0 - 4.0 g/dL    A-G Ratio 0.6 (L) 1.1 - 2.2     MAGNESIUM    Collection Time: 10/15/17 10:28 AM   Result Value Ref Range    Magnesium 2.4 1.6 - 2.4 mg/dL   CK    Collection Time: 10/15/17 10:28 AM   Result Value Ref Range    CK 34 26 - 192 U/L   TROPONIN I    Collection Time: 10/15/17 10:28 AM   Result Value Ref Range    Troponin-I, Qt. <0.04 <0.05 ng/mL   LACTIC ACID    Collection Time: 10/15/17 10:28 AM   Result Value Ref Range    Lactic acid 0.9 0.4 - 2.0 MMOL/L   URINALYSIS W/ RFLX MICROSCOPIC    Collection Time: 10/15/17 11:32 AM   Result Value Ref Range    Color YELLOW/STRAW      Appearance CLEAR CLEAR      Specific gravity 1.013 1.003 - 1.030      pH (UA) 5.5 5.0 - 8.0      Protein NEGATIVE  NEG mg/dL    Glucose NEGATIVE  NEG mg/dL    Ketone TRACE (A) NEG mg/dL    Bilirubin NEGATIVE  NEG      Blood NEGATIVE  NEG      Urobilinogen 0.2 0.2 - 1.0 EU/dL    Nitrites NEGATIVE  NEG      Leukocyte Esterase TRACE (A) NEG      WBC 5-10 0 - 4 /hpf    RBC 0-5 0 - 5 /hpf    Epithelial cells FEW FEW /lpf    Bacteria NEGATIVE  NEG /hpf    Hyaline cast 0-2 0 - 5 /lpf   PROTHROMBIN TIME + INR    Collection Time: 10/15/17 12:05 PM   Result Value Ref Range    INR 1.3 (H) 0.9 - 1.1      Prothrombin time 13.5 (H) 9.0 - 11.1 sec

## 2017-10-15 NOTE — PROGRESS NOTES
Pharmacy Daily Dosing of Warfarin    Indication: Paroxysmal Atrial Fibrillation    Goal INR: 2-3    PTA Warfarin Dose:   2.5mg tablets  2.5mg MWF  5mg T/Th/Sa/Sn    Last dose = 5mg taken morning of admission      Concurrent anticoagulants: none    Concurrent antiplatelet: none    Major Interacting Medications    Drugs that may increase INR: none   Drugs that may decrease INR: none    Conditions that may increase/decrease INR (CHF, C. diff, cirrhosis, thyroid disorder, hypoalbuminemia): Alb 2.7    Labs:  Recent Labs      10/15/17   1205  10/15/17   1028   INR  1.3*   --    HGB   --   9.3*   PLT   --   150   SGOT   --   18   TBILI   --   0.3     Albumin   Date/Time Value Ref Range Status   10/15/2017 10:28 AM 2.7 (L) 3.5 - 5.0 g/dL Final         Date    INR    Dose  10/15    1.3        5mg (PTA today) + 2.5mg = 7.5mg       Impression/Plan:   - Took Warfarin 5mg prior to admission today  - INR 1.3 on admission  - Will order warfarin 2.5mg PO x 1 dose to total 7.5mg given today (\"booster\")  - Daily INR  - CBC w/o diff QOD     Pharmacy will follow daily and adjust the dose as appropriate. Thanks  Trae Rodriguez, [facility]  [email or web address]/Warfarin%[email or web address]. pdf

## 2017-10-15 NOTE — ED NOTES
Repeat coag tube obtained and sent. Pt assisted to [facility] by RN and pts daughter. Pt tolerated well, states she did not feel as well when moving and sitting up. Daughter reports this has been going on all week. Sats to 88% when moving, recovers to 93 % when back to bed. Resting without complaint.

## 2017-10-15 NOTE — ED NOTES
Bedside shift change report given to [address] [place] (oncoming nurse) by Hannah Granda RN (offgoing nurse). Report included the following information SBAR, ED Summary and MAR.

## 2017-10-16 LAB
ALBUMIN SERPL-MCNC: 2.6 G/DL (ref 3.5–5)
ALBUMIN/GLOB SERPL: 0.7 {RATIO} (ref 1.1–2.2)
ALP SERPL-CCNC: 72 U/L (ref 45–117)
ALT SERPL-CCNC: 15 U/L (ref 12–78)
ANION GAP SERPL CALC-SCNC: 7 MMOL/L (ref 5–15)
AST SERPL-CCNC: 14 U/L (ref 15–37)
ATRIAL RATE: 308 BPM
BASOPHILS # BLD: 0 K/UL (ref 0–0.1)
BASOPHILS NFR BLD: 0 % (ref 0–1)
BILIRUB SERPL-MCNC: 0.3 MG/DL (ref 0.2–1)
BUN SERPL-MCNC: 27 MG/DL (ref 6–20)
BUN/CREAT SERPL: 33 (ref 12–20)
CALCIUM SERPL-MCNC: 8.1 MG/DL (ref 8.5–10.1)
CALCULATED P AXIS, ECG09: 84 DEGREES
CALCULATED R AXIS, ECG10: 26 DEGREES
CALCULATED T AXIS, ECG11: 17 DEGREES
CHLORIDE SERPL-SCNC: 106 MMOL/L (ref 97–108)
CO2 SERPL-SCNC: 24 MMOL/L (ref 21–32)
CREAT SERPL-MCNC: 0.83 MG/DL (ref 0.55–1.02)
DIAGNOSIS, 93000: NORMAL
EOSINOPHIL # BLD: 0.2 K/UL (ref 0–0.4)
EOSINOPHIL NFR BLD: 3 % (ref 0–7)
ERYTHROCYTE [DISTWIDTH] IN BLOOD BY AUTOMATED COUNT: 14.5 % (ref 11.5–14.5)
GLOBULIN SER CALC-MCNC: 3.9 G/DL (ref 2–4)
GLUCOSE SERPL-MCNC: 119 MG/DL (ref 65–100)
HCT VFR BLD AUTO: 26.5 % (ref 35–47)
HGB BLD-MCNC: 8.6 G/DL (ref 11.5–16)
INR PPP: 1.6 (ref 0.9–1.1)
LYMPHOCYTES # BLD: 1.5 K/UL (ref 0.8–3.5)
LYMPHOCYTES NFR BLD: 25 % (ref 12–49)
MAGNESIUM SERPL-MCNC: 2.3 MG/DL (ref 1.6–2.4)
MCH RBC QN AUTO: 29 PG (ref 26–34)
MCHC RBC AUTO-ENTMCNC: 32.5 G/DL (ref 30–36.5)
MCV RBC AUTO: 89.2 FL (ref 80–99)
MONOCYTES # BLD: 0.6 K/UL (ref 0–1)
MONOCYTES NFR BLD: 11 % (ref 5–13)
NEUTS SEG # BLD: 3.5 K/UL (ref 1.8–8)
NEUTS SEG NFR BLD: 61 % (ref 32–75)
PLATELET # BLD AUTO: 156 K/UL (ref 150–400)
POTASSIUM SERPL-SCNC: 3.8 MMOL/L (ref 3.5–5.1)
PROT SERPL-MCNC: 6.5 G/DL (ref 6.4–8.2)
PROTHROMBIN TIME: 16.7 SEC (ref 9–11.1)
Q-T INTERVAL, ECG07: 342 MS
QRS DURATION, ECG06: 82 MS
QTC CALCULATION (BEZET), ECG08: 456 MS
RBC # BLD AUTO: 2.97 M/UL (ref 3.8–5.2)
SODIUM SERPL-SCNC: 137 MMOL/L (ref 136–145)
VENTRICULAR RATE, ECG03: 107 BPM
WBC # BLD AUTO: 5.8 K/UL (ref 3.6–11)

## 2017-10-16 PROCEDURE — 74011000258 HC RX REV CODE- 258: Performed by: INTERNAL MEDICINE

## 2017-10-16 PROCEDURE — 85025 COMPLETE CBC W/AUTO DIFF WBC: CPT | Performed by: INTERNAL MEDICINE

## 2017-10-16 PROCEDURE — G8988 SELF CARE GOAL STATUS: HCPCS | Performed by: OCCUPATIONAL THERAPIST

## 2017-10-16 PROCEDURE — 74011250636 HC RX REV CODE- 250/636: Performed by: INTERNAL MEDICINE

## 2017-10-16 PROCEDURE — 74011000250 HC RX REV CODE- 250: Performed by: INTERNAL MEDICINE

## 2017-10-16 PROCEDURE — 36415 COLL VENOUS BLD VENIPUNCTURE: CPT | Performed by: INTERNAL MEDICINE

## 2017-10-16 PROCEDURE — 83735 ASSAY OF MAGNESIUM: CPT | Performed by: INTERNAL MEDICINE

## 2017-10-16 PROCEDURE — 97535 SELF CARE MNGMENT TRAINING: CPT | Performed by: OCCUPATIONAL THERAPIST

## 2017-10-16 PROCEDURE — 65660000000 HC RM CCU STEPDOWN

## 2017-10-16 PROCEDURE — 74011250637 HC RX REV CODE- 250/637: Performed by: INTERNAL MEDICINE

## 2017-10-16 PROCEDURE — G8987 SELF CARE CURRENT STATUS: HCPCS | Performed by: OCCUPATIONAL THERAPIST

## 2017-10-16 PROCEDURE — 97161 PT EVAL LOW COMPLEX 20 MIN: CPT

## 2017-10-16 PROCEDURE — 76450000000

## 2017-10-16 PROCEDURE — 85610 PROTHROMBIN TIME: CPT | Performed by: INTERNAL MEDICINE

## 2017-10-16 PROCEDURE — 80053 COMPREHEN METABOLIC PANEL: CPT | Performed by: INTERNAL MEDICINE

## 2017-10-16 PROCEDURE — 97165 OT EVAL LOW COMPLEX 30 MIN: CPT | Performed by: OCCUPATIONAL THERAPIST

## 2017-10-16 RX ORDER — WARFARIN SODIUM 5 MG/1
5 TABLET ORAL ONCE
Status: COMPLETED | OUTPATIENT
Start: 2017-10-16 | End: 2017-10-16

## 2017-10-16 RX ORDER — FUROSEMIDE 20 MG/1
20 TABLET ORAL
Status: DISCONTINUED | OUTPATIENT
Start: 2017-10-18 | End: 2017-10-17

## 2017-10-16 RX ORDER — DOCUSATE SODIUM 100 MG/1
100 CAPSULE, LIQUID FILLED ORAL DAILY
Status: DISCONTINUED | OUTPATIENT
Start: 2017-10-17 | End: 2017-10-26 | Stop reason: HOSPADM

## 2017-10-16 RX ADMIN — CEFTRIAXONE 1 G: 1 INJECTION, POWDER, FOR SOLUTION INTRAMUSCULAR; INTRAVENOUS at 16:31

## 2017-10-16 RX ADMIN — FERROUS SULFATE TAB 325 MG (65 MG ELEMENTAL FE) 325 MG: 325 (65 FE) TAB at 09:18

## 2017-10-16 RX ADMIN — LEVETIRACETAM 500 MG: 500 TABLET, FILM COATED ORAL at 18:42

## 2017-10-16 RX ADMIN — AMITRIPTYLINE HYDROCHLORIDE 10 MG: 10 TABLET, FILM COATED ORAL at 20:51

## 2017-10-16 RX ADMIN — DEXTROSE MONOHYDRATE 5 MG/HR: 50 INJECTION, SOLUTION INTRAVENOUS at 06:43

## 2017-10-16 RX ADMIN — FERROUS SULFATE TAB 325 MG (65 MG ELEMENTAL FE) 325 MG: 325 (65 FE) TAB at 18:42

## 2017-10-16 RX ADMIN — ACETAMINOPHEN 650 MG: 325 TABLET ORAL at 01:38

## 2017-10-16 RX ADMIN — Medication 10 ML: at 13:39

## 2017-10-16 RX ADMIN — WARFARIN SODIUM 5 MG: 5 TABLET ORAL at 18:42

## 2017-10-16 RX ADMIN — Medication 10 ML: at 22:45

## 2017-10-16 RX ADMIN — CARVEDILOL 12.5 MG: 12.5 TABLET, FILM COATED ORAL at 09:18

## 2017-10-16 RX ADMIN — CARVEDILOL 6.25 MG: 6.25 TABLET, FILM COATED ORAL at 18:42

## 2017-10-16 RX ADMIN — LATANOPROST 1 DROP: 50 SOLUTION OPHTHALMIC at 20:52

## 2017-10-16 RX ADMIN — Medication 10 ML: at 08:05

## 2017-10-16 RX ADMIN — ACETAMINOPHEN 170 MG: 325 TABLET ORAL at 13:38

## 2017-10-16 RX ADMIN — LEVETIRACETAM 500 MG: 500 TABLET, FILM COATED ORAL at 09:18

## 2017-10-16 NOTE — PROGRESS NOTES
Pharmacy Daily Dosing of Warfarin    Indication: Paroxysmal Atrial Fibrillation    Goal INR: 2-3    PTA Warfarin Dose:   2.5mg tablets  2.5mg MWF  5mg T/Th/Sa/Sn    Last dose = 5mg taken morning of admission      Concurrent anticoagulants: none    Concurrent antiplatelet: none    Major Interacting Medications    Drugs that may increase INR: none   Drugs that may decrease INR: none    Conditions that may increase/decrease INR (CHF, C. diff, cirrhosis, thyroid disorder, hypoalbuminemia): Alb 2.7    Labs:  Recent Labs      10/16/17   0423  10/15/17   1205  10/15/17   1028   INR  1.6*  1.3*   --    HGB  8.6*   --   9.3*   PLT  156   --   150   SGOT  14*   --   18   TBILI  0.3   --   0.3     Albumin   Date/Time Value Ref Range Status   10/16/2017 04:23 AM 2.6 (L) 3.5 - 5.0 g/dL Final     Date    INR    Dose  10/15    1.3        5mg (PTA today) + 2.5mg = 7.5mg   10/16      1.6        5 mg      Impression/Plan:   - INR 1.6 (subtherapeutic) from 1.3  - Will order warfarin 5 mg PO tonight  - CBC w/o diff QOD     Pharmacy will follow daily and adjust the dose as appropriate. Thanks  Tila Hicks PHARMD  [email or web address]/Warfarin%[email or web address]. pdf

## 2017-10-16 NOTE — PROGRESS NOTES
Pt is a 81 y/o  female admitted with a-fib and UTI. Pt resides with her daughter, other family members, and has caregivers in the home since May Mon-Fri from 10-3PM.  Pt stated after 3:00PM her family arrive home to assist in her care. Pt stated she was just discharged from her [facility] provider on Friday but can not recall the [facility] agency name. No previous rehab services. She has access to a rollator, BSC, shower chair, pulleys on the door. Pt has worked with PT and they have no recommendations for her. CM will continue to follow and assist with additional discharge needs. Care Management Interventions  PCP Verified by CM: Yes  Mode of Transport at Discharge: Other (see comment) (Pt daughter to transort at discharge)  Transition of Care Consult (CM Consult): Discharge Planning (Pt stated just discharged from [facility] on Friday, no previous rehab experiences)  Discharge Durable Medical Equipment:  (Pt has a rollator, pulleys on the door, has shower chair, and BSC , has lift chair )  Physical Therapy Consult: Yes  Occupational Therapy Consult: Yes  Current Support Network: Relative's Home (Pt resides in a one story home [facility] 4 steps to enter with rails.   Caregivers from 10-3pm Mon -Fri then family arrive home)  Confirm Follow Up Transport: Family (Pt daughter provides transportation to her appts )  Discharge Location  Discharge Placement:  (TBD)    Alvin Hollis, MSW  Ext [phone]

## 2017-10-16 NOTE — ED NOTES
Bedside shift change report given to Juan Miguel Ryder RN (oncoming nurse) by Shashank Corona RN (offgoing nurse). Report included the following information SBAR, ED Summary and MAR.

## 2017-10-16 NOTE — PROGRESS NOTES
Problem: Mobility Impaired (Adult and Pediatric)  Goal: *Acute Goals and Plan of Care (Insert Text)  Physical Therapy Goals  Initiated 10/16/2017  1. Patient will move from supine to sit and sit to supine and roll side to side in bed with independence within 7 day(s). 2. Patient will transfer from bed to chair and chair to bed with modified independence using the least restrictive device within 7 day(s). 3. Patient will perform sit to stand with modified independence within 7 day(s). 4. Patient will ambulate with modified independence for 350 feet with the least restrictive device within 7 day(s). 5. Patient will ascend/descend 4 stairs with 2 handrail(s) with minimal assistance/contact guard assist within 7 day(s). PHYSICAL THERAPY EVALUATION  Patient: Artur Skelton (79 y.o. female)  Date: 10/16/2017  Primary Diagnosis: A-fib (HCC)  UTI (urinary tract infection)        Precautions: DNR         ASSESSMENT :  Based on the objective data described below, the patient presents with decreased activity tolerance, generalized weakness, fair standing balance and functional mobility slightly below her baseline level of Mod I following admission for A-fib and UTI. Pt very pleasant and eager to work with therapy to walk in the hallways. Pt has assistance from caregivers from 10-3pm and otherwise has family support at home. She uses a rollator at baseline. Today pt demonstrated good balance in sitting and fair balance ambulating with RW. O2 saturations >96% throughout gait training. Forward flexed posture and flexed neck which pt reports is due to her macular degeneration(wants to be able to see what's on the floor). Pt has great family support and will be able to return home once discharged with family/caregiver support. She has had two rounds of HHPT in the past. Feel pt is close to her baseline and will not require additional therapy at discharge.  Recommend pt up to chair for meals, ambulating to bathroom with RW and A x 1 and short distances in the hallway, as able. Pt is eager to continue walking during the day. Patient will benefit from skilled intervention to address the above impairments. Patients rehabilitation potential is considered to be Good  Factors which may influence rehabilitation potential include:   [ ]         None noted  [ ]         Mental ability/status  [ ]         Medical condition  [ ]         Home/family situation and support systems  [ ]         Safety awareness  [ ]         Pain tolerance/management  [ ]         Other:        PLAN :  Recommendations and Planned Interventions:  [ ]           Bed Mobility Training             [ ]    Neuromuscular Re-Education  [ ]           Transfer Training                   [ ]    Orthotic/Prosthetic Training  [ ]           Gait Training                         [ ]    Modalities  [ ]           Therapeutic Exercises           [ ]    Edema Management/Control  [ ]           Therapeutic Activities            [ ]    Patient and Family Training/Education  [ ]           Other (comment):     Frequency/Duration: Patient will be followed by physical therapy  3 times a week to address goals. Discharge Recommendations: None  Further Equipment Recommendations for Discharge: None       SUBJECTIVE:   Patient stated I want to stay active so I don;t loose my strength.       OBJECTIVE DATA SUMMARY:   HISTORY:    Past Medical History:   Diagnosis Date    Alopecia      Arrhythmia       atrial fib,?svt    Arthritis      Atrial fibrillation (HCC)      Benign essential HTN      Benign neoplasm of vulva      CAD (coronary artery disease)      Cholelithiases      Colon cancer (HCC)      Constipation      Dyspareunia      Glaucoma      History of mixed connective tissue disease      Hx of viral pericarditis      Hyperactivity of bladder      Hypercholesterolemia      Ill-defined condition       macular degeneration    Insomnia      Macular degeneration      Neck pain  Psoriasis      Rectocele      Renal cyst      Seizure disorder (HCC)      Sjogren's disease (HCC)      Thrombophlebitis      TIA (transient ischemic attack)      Vaginal vault prolapse, posthysterectomy      Varicose veins       Past Surgical History:   Procedure Laterality Date    HX BREAST LUMPECTOMY         left breast    HX CATARACT REMOVAL        HX COLECTOMY   1955     partial    HX HEMORRHOIDECTOMY        HX HYSTERECTOMY        HX SHOULDER ARTHROSCOPY         left     Prior Level of Function/Home Situation: Mod I/Supervision for mobility. Family/caregivers assist with meal prep and cleaning, tub transfers  Personal factors and/or comorbidities impacting plan of care: PMH     Home Situation  Home Environment: Private residence  # Steps to Enter: 4  Rails to Enter: Yes  One/Two Story Residence: One story  Living Alone: No  Support Systems: Family member(s), Friends \ neighbors, Home care staff  Patient Expects to be Discharged to[de-identified] Private residence  Current DME Used/Available at Home: Deryl Phy, rollator, CPAP     EXAMINATION/PRESENTATION/DECISION MAKING:   Critical Behavior:  Neurologic State: Alert  Orientation Level: Oriented X4        Hearing: Auditory  Auditory Impairment: Hearing aid(s)  Hearing Aids/Status: At home, Bilateral     Range Of Motion:  AROM: Within functional limits           PROM: Within functional limits           Strength:    Strength: Generally decreased, functional                    Tone & Sensation:   Tone: Normal              Sensation: Intact               Coordination:  Coordination: Within functional limits  Vision:      Functional Mobility:  Bed Mobility:              Transfers:  Sit to Stand: Contact guard assistance  Stand to Sit: Contact guard assistance                       Balance:   Sitting: Intact  Standing: Intact; With support  Ambulation/Gait Training:  Distance (ft): 120 Feet (ft)  Assistive Device: Gait belt;Walker, rolling  Ambulation - Level of Assistance: Contact guard assistance        Gait Abnormalities: Decreased step clearance        Base of Support: Widened     Speed/Samantha: Slow;Pace decreased (<100 feet/min)  Step Length: Left shortened;Right shortened              Functional Measure:  Timed up and go:      Timed Get Up And Go Test: 14      Timed Up and Go and G-code impairment scale:  Percentage of Impairment CH     0%    CI     1-19% CJ     20-39% CK     40-59% CL     60-79% CM     80-99% CN      100%   Timed   Score 0-56 10 11-12 13-14 15-16 17-18 19 20          < than 10 seconds=Normal  Greater then 13.5 seconds (in elderly)=Increased fall risk   Adrian VAZ, Jasmina Vicente. Predicting the probability for falls in community dwelling older adults using the Timed Up and Go Test. Phys Ther. 2000;80:896-903. G codes: In compliance with CMSs Claims Based Outcome Reporting, the following G-code set was chosen for this patient based on their primary functional limitation being treated: The outcome measure chosen to determine the severity of the functional limitation was the TUG with a score of 14 seconds which was correlated with the impairment scale.       · Mobility - Walking and Moving Around:               - CURRENT STATUS:    CJ - 20%-39% impaired, limited or restricted               - GOAL STATUS:           CI - 1%-19% impaired, limited or restricted               - D/C STATUS:                       ---------------To be determined---------------      Physical Therapy Evaluation Charge Determination   History Examination Presentation Decision-Making   MEDIUM  Complexity : 1-2 comorbidities / personal factors will impact the outcome/ POC  MEDIUM Complexity : 3 Standardized tests and measures addressing body structure, function, activity limitation and / or participation in recreation  LOW Complexity : Stable, uncomplicated  LOW Complexity : FOTO score of       Based on the above components, the patient evaluation is determined to be of the following complexity level: LOW      Pain:  Pain Scale 1: Numeric (0 - 10)  Pain Intensity 1: 0              Activity Tolerance:   Good  Please refer to the flowsheet for vital signs taken during this treatment. After treatment:   [X]         Patient left in no apparent distress sitting up in chair  [ ]         Patient left in no apparent distress in bed  [X]         Call bell left within reach  [X]         Nursing notified  [ ]         Caregiver present  [ ]         Bed alarm activated      COMMUNICATION/EDUCATION:   The patients plan of care was discussed with: Registered Nurse.  [X]         Fall prevention education was provided and the patient/caregiver indicated understanding. [X]         Patient/family have participated as able in goal setting and plan of care. [X]         Patient/family agree to work toward stated goals and plan of care. [ ]         Patient understands intent and goals of therapy, but is neutral about his/her participation. [ ]         Patient is unable to participate in goal setting and plan of care.      Thank you for this referral.  Rosalio Turcios, PT   Time Calculation: 13 mins

## 2017-10-16 NOTE — PROGRESS NOTES
Problem: Falls - Risk of  Goal: *Absence of Falls  Document Corey Fall Risk and appropriate interventions in the flowsheet.    Outcome: Progressing Towards Goal  Fall Risk Interventions:  Mobility Interventions: Assess mobility with egress test, Bed/chair exit alarm, Communicate number of staff needed for ambulation/transfer, OT consult for ADLs, Patient to call before getting OOB, PT Consult for mobility concerns, PT Consult for assist device competence, Strengthening exercises (ROM-active/passive), Utilize walker, cane, or other assitive device           Medication Interventions: Assess postural VS orthostatic hypotension, Bed/chair exit alarm, Evaluate medications/consider consulting pharmacy, Teach patient to arise slowly, Patient to call before getting OOB

## 2017-10-16 NOTE — PROGRESS NOTES
Hospitalist Progress Note    NAME: [facility]   :  1924   MRN:  [id number]       Assessment / Plan:  Atrial Fibrillation with RVR with associated weakness  Hr below 100 today  Hypertension  -continue Diltiazem gtt   -continue Coreg with hold parameters  -off amiodarone 2 to pulmonary issuesm ( was seen BY Dr. Kurtis Chaves 1 month ago)  -Await formal Cardiology recommendations  -check thyroid if not done recently    - on coumadin      UTI outside records reviewed   cont  rocephin     Sub-therapeutic INR on Coumadin  -pharmacy consult to dose coumadin      CKD stage III:   -renal function at baseline, continue to monitor      L1 compression fracture  -pain meds-       Seizure disorder  -continue keppra      Code Status: has DDNR  Surrogate Decision Maker: sandy      DVT Prophylaxis: NI  GI Prophylaxis: not indicated      Baseline: Lives at home         Body mass index is 27.38 kg/(m^2). Subjective:     Chief Complaint / Reason for Physician Visit    \"10/16 \"  I feel better,  I felt so week and my heart rate was  139  Discussed with RN events overnight. Review of Systems:  Symptom Y/N Comments  Symptom Y/N Comments   Fever/Chills n   Chest Pain n    Poor Appetite    Edema n    Cough y T\"ickle in throat\"  Abdominal Pain     Sputum    Joint Pain     SOB/SANTOS n   Pruritis/Rash     Nausea/vomit n   Tolerating PT/OT     Diarrhea n   Tolerating Diet n    Constipation    Other       Could NOT obtain due to:      Objective:     VITALS:   Last 24hrs VS reviewed since prior progress note.  Most recent are:  Patient Vitals for the past 24 hrs:   Temp Pulse Resp BP SpO2   10/16/17 0646 97.9 °F (36.6 °C) 68 16 117/73 96 %   10/16/17 0602 - 98 19 - 92 %   10/16/17 0600 - - - 110/71 -   10/16/17 0530 - 99 20 109/59 91 %   10/16/17 0430 - 100 22 113/66 92 %   10/16/17 0300 - (!) 106 18 98/60 90 %   10/15/17 2000 - 100 23 104/62 93 %   10/15/17 190 - (!) 101 17 - 90 %   10/15/17 1901 - - - 101/84 -   10/15/17 1753 - 93 - 111/80 -   10/15/17 1600 - (!) 105 26 123/78 96 %   10/15/17 1445 - 99 23 127/82 94 %   10/15/17 1430 - 90 19 124/79 96 %   10/15/17 1415 - 90 17 124/61 96 %   10/15/17 1408 - 96 19 - 96 %   10/15/17 1400 - 99 23 118/79 96 %   10/15/17 1345 - 93 19 122/73 96 %   10/15/17 1341 - (!) 104 20 - 96 %   10/15/17 1330 - (!) 106 19 111/68 96 %   10/15/17 1320 - 100 19 - 94 %   10/15/17 1315 - 100 22 117/60 96 %   10/15/17 1314 97.8 °F (36.6 °C) (!) 104 - 113/59 -   10/15/17 1300 - 97 20 - 96 %   10/15/17 1259 - (!) 107 18 - 97 %   10/15/17 1250 - (!) 110 19 - 98 %   10/15/17 1245 - (!) 101 19 115/69 96 %   10/15/17 1230 - (!) 104 19 117/59 97 %   10/15/17 1215 - (!) 101 20 107/67 97 %   10/15/17 1200 - (!) 101 23 116/76 97 %   10/15/17 1145 - (!) 104 17 125/65 97 %   10/15/17 1130 - (!) 108 21 123/76 96 %   10/15/17 1115 - (!) 107 17 116/69 96 %   10/15/17 1100 - (!) 103 20 111/61 97 %   10/15/17 1053 - (!) 112 22 - 96 %   10/15/17 1049 - 98 - 112/78 -   10/15/17 1048 - (!) 108 16 - 92 %   10/15/17 1047 - (!) 117 22 - 96 %   10/15/17 1045 - (!) 108 19 112/78 96 %   10/15/17 1030 - (!) 117 23 116/77 96 %     No intake or output data in the 24 hours ending 10/16/17 1023     PHYSICAL EXAM:  General: WD, WN. Alert, cooperative, no acute distress    EENT:  EOMI. Anicteric sclerae. MMM  Resp:  CTA bilaterally, no wheezing or rales. No accessory muscle use  CV:  Regular  rhythm,  No edema  GI:  Soft, Non distended, Non tender.  +Bowel sounds  Neurologic:  Alert and oriented X 3, normal speech,   Psych:   Good insight. Not anxious nor agitated  Skin:  No rashes.   No jaundice    Reviewed most current lab test results and cultures  YES  Reviewed most current radiology test results   YES  Review and summation of old records today    NO  Reviewed patient's current orders and MAR    YES  PMH/SH reviewed - no change compared to H&P  ________________________________________________________________________  Care Plan discussed with:    Comments   Patient y    Family      RN y    Care Manager y    Consultant                        Multidiciplinary team rounds were held today with , nursing, pharmacist and clinical coordinator. Patient's plan of care was discussed; medications were reviewed and discharge planning was addressed. ________________________________________________________________________  Total NON critical care TIME:  30  Minutes    Total CRITICAL CARE TIME Spent:   Minutes non procedure based      Comments   >50% of visit spent in counseling and coordination of care     ________________________________________________________________________  Vineet Mason MD     Procedures: see electronic medical records for all procedures/Xrays and details which were not copied into this note but were reviewed prior to creation of Plan. LABS:  I reviewed today's most current labs and imaging studies.   Pertinent labs include:  Recent Labs      10/16/17   0423  10/15/17   1028   WBC  5.8  6.4   HGB  8.6*  9.3*   HCT  26.5*  28.6*   PLT  156  150     Recent Labs      10/16/17   0423  10/15/17   1205  10/15/17   1028   NA  137   --   136   K  3.8   --   3.9   CL  106   --   103   CO2  24   --   25   GLU  119*   --   109*   BUN  27*   --   29*   CREA  0.83   --   0.85   CA  8.1*   --   8.4*   MG  2.3   --   2.4   ALB  2.6*   --   2.7*   TBILI  0.3   --   0.3   SGOT  14*   --   18   ALT  15   --   18   INR  1.6*  1.3*   --        Signed: Vineet Mason MD

## 2017-10-16 NOTE — PROGRESS NOTES
Cardiology Progress Note  10/16/2017 9:05 AM  Admit Date: 10/15/2017  Admit Diagnosis/CC: A-fib [facility])  UTI (urinary tract infection)  Subjective:   Patient reports:  Chest Pain:  [x] none,  consistent with [] non-cardiac  [] atypical  []  anginal chest pain             [x] none now    []  on-going  Dyspnea: [x] none  [] at rest  [] with exertion  [] improved  [] unchanged [] worsening  PND:       [x] none  [] overnight   [] current  Orthopnea: [x] none  [] improved  [] unchanged  [] worsening  Presyncope: [x] none  [] improved  [] unchanged  [] worsening  Ambulated in hallway without symptoms  [] Yes  Ambulated in room without symptoms  [x] Yes  ROS(2+other systems)   Hematuria: [] Yes  [x] No.   Dysuria: [] Yes  [x] No                                           Cough:       [] Yes  [x] No.   Sputum: [] Yes  [x] No                                            Hematochezia: [] Yes  [x] No.   Melena: [] Yes  [x] No                                            No change in family and social history from H&P/Consult note.     Current Facility-Administered Medications   Medication Dose Route Frequency    dilTIAZem (CARDIZEM) 100 mg in dextrose 5% (MBP/ADV) 100 mL infusion  2.5 mg/hr IntraVENous TITRATE    sodium chloride (NS) flush 5-10 mL  5-10 mL IntraVENous Q8H    sodium chloride (NS) flush 5-10 mL  5-10 mL IntraVENous PRN    acetaminophen (TYLENOL) tablet 650 mg  650 mg Oral Q4H PRN    bisacodyl (DULCOLAX) tablet 5 mg  5 mg Oral DAILY PRN    cefTRIAXone (ROCEPHIN) 1 g in 0.9% sodium chloride (MBP/ADV) 50 mL  1 g IntraVENous Q24H    ferrous sulfate tablet 325 mg  1 Tab Oral BID WITH MEALS    carvedilol (COREG) tablet 12.5 mg  12.5 mg Oral QAM RT    carvedilol (COREG) tablet 6.25 mg  6.25 mg Oral QPM    amitriptyline (ELAVIL) tablet 10-15 mg  10-15 mg Oral QHS    latanoprost (XALATAN) 0.005 % ophthalmic solution 1 Drop  1 Drop Both Eyes QHS    levETIRAcetam (KEPPRA) tablet 500 mg  500 mg Oral BID    WARFARIN INFORMATION NOTE (COUMADIN)   Other QPM       Objective:    Physical Exam:  Last VS:   Visit Vitals    /73    Pulse 68    Temp 97.9 °F (36.6 °C)    Resp 16    Ht 5' 3\" (1.6 m)    Wt 70.1 kg (154 lb 8.7 oz)    SpO2 96%    Breastfeeding No    BMI 27.38 kg/m2    Temp (24hrs), Av.8 °F (36.6 °C), Min:97.7 °F (36.5 °C), Max:97.9 °F (36.6 °C)    24 hr VS reviewed. General Appearance:   [x] well developed, well nourished,  [x] NAD. [] agitated   [] lethargic but arousable  [] obtunded   ENT, Palate:    [x] WNL   [] dry palate/MM        Respiratory:    [x] CTA bilateral  [] rales  [] rhonchi  [] normal resp effort      [] similar to yesterday   [] worse    [] improved   Cardiovascular:   [x] RRR   [] Irregular rate and rhythm   [x] Normal S1, S2   [x] No gallop or rub. [] no murmur   [x] no new murmur  [] murmur c/w:   [x] no edema  RLE: []1+ []2+ [] 3+;  LLE: []1+ []2+ []3+      [] edema similar to yesterday   [] worse    [] improved   [x] normal JVP   [] Elevated JVP    [x] JVP similar to yesterday   [] worse    [] improved   [x] carotid upstroke unchanged   [x] abd aorta not palpated   [x] no stigmata of peripheral emboli   GI:    [x] abd soft, non-distented,bowel sounds present, no                     organomegaly appreciated   Skin:  Neuro:    Cath Site:  [x] warm and dry [] cold extremities   [x] A/O x 3, grossly non-focal      [] intact w/o hematoma or bruit; distal pulse unchanged.               Data Review:   Labs:    Recent Results (from the past 24 hour(s))   CBC WITH AUTOMATED DIFF    Collection Time: 10/15/17 10:28 AM   Result Value Ref Range    WBC 6.4 3.6 - 11.0 K/uL    RBC 3.25 (L) 3.80 - 5.20 M/uL    HGB 9.3 (L) 11.5 - 16.0 g/dL    HCT 28.6 (L) 35.0 - 47.0 %    MCV 88.0 80.0 - 99.0 FL    MCH 28.6 26.0 - 34.0 PG    MCHC 32.5 30.0 - 36.5 g/dL    RDW 14.2 11.5 - 14.5 %    PLATELET 325 609 - 557 K/uL    NEUTROPHILS 71 32 - 75 %    LYMPHOCYTES 17 12 - 49 %    MONOCYTES 11 5 - 13 % EOSINOPHILS 1 0 - 7 %    BASOPHILS 0 0 - 1 %    ABS. NEUTROPHILS 4.6 1.8 - 8.0 K/UL    ABS. LYMPHOCYTES 1.1 0.8 - 3.5 K/UL    ABS. MONOCYTES 0.7 0.0 - 1.0 K/UL    ABS. EOSINOPHILS 0.1 0.0 - 0.4 K/UL    ABS. BASOPHILS 0.0 0.0 - 0.1 K/UL   METABOLIC PANEL, COMPREHENSIVE    Collection Time: 10/15/17 10:28 AM   Result Value Ref Range    Sodium 136 136 - 145 mmol/L    Potassium 3.9 3.5 - 5.1 mmol/L    Chloride 103 97 - 108 mmol/L    CO2 25 21 - 32 mmol/L    Anion gap 8 5 - 15 mmol/L    Glucose 109 (H) 65 - 100 mg/dL    BUN 29 (H) 6 - 20 MG/DL    Creatinine 0.85 0.55 - 1.02 MG/DL    BUN/Creatinine ratio 34 (H) 12 - 20      GFR est AA >60 >60 ml/min/1.73m2    GFR est non-AA >60 >60 ml/min/1.73m2    Calcium 8.4 (L) 8.5 - 10.1 MG/DL    Bilirubin, total 0.3 0.2 - 1.0 MG/DL    ALT (SGPT) 18 12 - 78 U/L    AST (SGOT) 18 15 - 37 U/L    Alk.  phosphatase 80 45 - 117 U/L    Protein, total 6.9 6.4 - 8.2 g/dL    Albumin 2.7 (L) 3.5 - 5.0 g/dL    Globulin 4.2 (H) 2.0 - 4.0 g/dL    A-G Ratio 0.6 (L) 1.1 - 2.2     MAGNESIUM    Collection Time: 10/15/17 10:28 AM   Result Value Ref Range    Magnesium 2.4 1.6 - 2.4 mg/dL   CK    Collection Time: 10/15/17 10:28 AM   Result Value Ref Range    CK 34 26 - 192 U/L   TROPONIN I    Collection Time: 10/15/17 10:28 AM   Result Value Ref Range    Troponin-I, Qt. <0.04 <0.05 ng/mL   CULTURE, BLOOD, PAIRED    Collection Time: 10/15/17 10:28 AM   Result Value Ref Range    Special Requests: NO SPECIAL REQUESTS      Culture result: NO GROWTH AFTER 19 HOURS     LACTIC ACID    Collection Time: 10/15/17 10:28 AM   Result Value Ref Range    Lactic acid 0.9 0.4 - 2.0 MMOL/L   URINALYSIS W/ RFLX MICROSCOPIC    Collection Time: 10/15/17 11:32 AM   Result Value Ref Range    Color YELLOW/STRAW      Appearance CLEAR CLEAR      Specific gravity 1.013 1.003 - 1.030      pH (UA) 5.5 5.0 - 8.0      Protein NEGATIVE  NEG mg/dL    Glucose NEGATIVE  NEG mg/dL    Ketone TRACE (A) NEG mg/dL    Bilirubin NEGATIVE  NEG Blood NEGATIVE  NEG      Urobilinogen 0.2 0.2 - 1.0 EU/dL    Nitrites NEGATIVE  NEG      Leukocyte Esterase TRACE (A) NEG      WBC 5-10 0 - 4 /hpf    RBC 0-5 0 - 5 /hpf    Epithelial cells FEW FEW /lpf    Bacteria NEGATIVE  NEG /hpf    Hyaline cast 0-2 0 - 5 /lpf   PROTHROMBIN TIME + INR    Collection Time: 10/15/17 12:05 PM   Result Value Ref Range    INR 1.3 (H) 0.9 - 1.1      Prothrombin time 13.5 (H) 9.0 - 47.1 sec   METABOLIC PANEL, COMPREHENSIVE    Collection Time: 10/16/17  4:23 AM   Result Value Ref Range    Sodium 137 136 - 145 mmol/L    Potassium 3.8 3.5 - 5.1 mmol/L    Chloride 106 97 - 108 mmol/L    CO2 24 21 - 32 mmol/L    Anion gap 7 5 - 15 mmol/L    Glucose 119 (H) 65 - 100 mg/dL    BUN 27 (H) 6 - 20 MG/DL    Creatinine 0.83 0.55 - 1.02 MG/DL    BUN/Creatinine ratio 33 (H) 12 - 20      GFR est AA >60 >60 ml/min/1.73m2    GFR est non-AA >60 >60 ml/min/1.73m2    Calcium 8.1 (L) 8.5 - 10.1 MG/DL    Bilirubin, total 0.3 0.2 - 1.0 MG/DL    ALT (SGPT) 15 12 - 78 U/L    AST (SGOT) 14 (L) 15 - 37 U/L    Alk. phosphatase 72 45 - 117 U/L    Protein, total 6.5 6.4 - 8.2 g/dL    Albumin 2.6 (L) 3.5 - 5.0 g/dL    Globulin 3.9 2.0 - 4.0 g/dL    A-G Ratio 0.7 (L) 1.1 - 2.2     MAGNESIUM    Collection Time: 10/16/17  4:23 AM   Result Value Ref Range    Magnesium 2.3 1.6 - 2.4 mg/dL   CBC WITH AUTOMATED DIFF    Collection Time: 10/16/17  4:23 AM   Result Value Ref Range    WBC 5.8 3.6 - 11.0 K/uL    RBC 2.97 (L) 3.80 - 5.20 M/uL    HGB 8.6 (L) 11.5 - 16.0 g/dL    HCT 26.5 (L) 35.0 - 47.0 %    MCV 89.2 80.0 - 99.0 FL    MCH 29.0 26.0 - 34.0 PG    MCHC 32.5 30.0 - 36.5 g/dL    RDW 14.5 11.5 - 14.5 %    PLATELET 368 700 - 582 K/uL    NEUTROPHILS 61 32 - 75 %    LYMPHOCYTES 25 12 - 49 %    MONOCYTES 11 5 - 13 %    EOSINOPHILS 3 0 - 7 %    BASOPHILS 0 0 - 1 %    ABS. NEUTROPHILS 3.5 1.8 - 8.0 K/UL    ABS. LYMPHOCYTES 1.5 0.8 - 3.5 K/UL    ABS. MONOCYTES 0.6 0.0 - 1.0 K/UL    ABS. EOSINOPHILS 0.2 0.0 - 0.4 K/UL    ABS. BASOPHILS 0.0 0.0 - 0.1 K/UL   PROTHROMBIN TIME + INR    Collection Time: 10/16/17  4:23 AM   Result Value Ref Range    INR 1.6 (H) 0.9 - 1.1      Prothrombin time 16.7 (H) 9.0 - 11.1 sec       Provided Telemetry: [x] sinus  [] chronic afib   [] paroxysmal afib  [] NSVT      Assessment:     Active Problems:    UTI (urinary tract infection) (10/15/2017)      A-fib ([facility] [address] (10/15/2017)        Plan:     Atrial Fibrillation - Flutter  improved   Continue current meds  D/W Dr. Everett Meredith. Will likely need anticoagulation and rate control long term. She is OK with that. For all other plans, see orders.    [x] High complexity decision making was performed

## 2017-10-16 NOTE — PROGRESS NOTES
Problem: Self Care Deficits Care Plan (Adult)  Goal: *Acute Goals and Plan of Care (Insert Text)  Occupational Therapy Goals:  Initiated 10/16/2017  1. Patient will perform grooming standing with modified independence within 7 days. 2. Patient will perform toileting with modified independence within 7 days. 3. Patient will perform lower body dressing with modified independence within 7 days. 4. Patient will perform bathing with SBA within 7 days. 5. Patient will transfer from toilet with modified independence using the least restrictive device and appropriate durable medical equipment within 7 days. OCCUPATIONAL THERAPY EVALUATION  Patient: Kvng Abbott (20 y.o. female)  Date: 10/16/2017  Primary Diagnosis: A-fib [facility])  UTI (urinary tract infection)        Precautions:   DNR      ASSESSMENT :  Based on the objective data described below, the patient presents with SOB on exertion with stable O2 sats. Pt was on commode upon arrival and was finishing bath with nursing tech. CGA for sit to stand from toilet and for dynamic balance in standing to pull depends over hips. Min hand held assist .     Patient will benefit from skilled intervention to address the above impairments.   Patients rehabilitation potential is considered to be Fair  Factors which may influence rehabilitation potential include:   [ ]             None noted  [ ]             Mental ability/status  [ ]             Medical condition  [ ]             Home/family situation and support systems  [ ]             Safety awareness  [ ]             Pain tolerance/management  [ ]             Other:        PLAN :  Recommendations and Planned Interventions:  [ ]               Self Care Training                  [ ]        Therapeutic Activities  [ ]               Functional Mobility Training    [ ]        Cognitive Retraining  [ ]               Therapeutic Exercises           [ ]        Endurance Activities  [ ]               Balance Training [ ]        Neuromuscular Re-Education  [ ]               Visual/Perceptual Training     [ ]   Home Safety Training  [ ]               Patient Education                 [ ]        Family Training/Education  [ ]               Other (comment):     Frequency/Duration: Patient will be followed by occupational therapy 3 times a week to address goals. Discharge Recommendations: Home Health with assist at previous     Further Equipment Recommendations for Discharge: TBD       SUBJECTIVE:   Patient stated I get help with my bath. I have had a caregiver since I fell at the beginning of the year.       OBJECTIVE DATA SUMMARY:   HISTORY:   Past Medical History:   Diagnosis Date    Alopecia      Arrhythmia       atrial fib,?svt    Arthritis      Atrial fibrillation (HCC)      Benign essential HTN      Benign neoplasm of vulva      CAD (coronary artery disease)      Cholelithiases      Colon cancer (HCC)      Constipation      Dyspareunia      Glaucoma      History of mixed connective tissue disease      Hx of viral pericarditis      Hyperactivity of bladder      Hypercholesterolemia      Ill-defined condition       macular degeneration    Insomnia      Macular degeneration      Neck pain      Psoriasis      Rectocele      Renal cyst      Seizure disorder (HCC)      [facility]ogren's disease ([facility] [address]      Thrombophlebitis      TIA (transient ischemic attack)      Vaginal vault prolapse, posthysterectomy      Varicose veins       Past Surgical History:   Procedure Laterality Date    HX BREAST LUMPECTOMY         left breast    HX CATARACT REMOVAL        HX COLECTOMY   1955     partial    HX HEMORRHOIDECTOMY        HX HYSTERECTOMY        HX SHOULDER ARTHROSCOPY         left        Prior Level of Function/Home Situation: ambulated with rollator walker; assist with bathing in shower on shower chair; performed all other ADLs without assist; has paid caregiver for IADLs and bathing in the home M-F 10-3; family is with pt otherwise; O2 at night and PRN during the day at times  Expanded or extensive additional review of patient history:      Home Situation  Home Environment: Private residence  # Steps to Enter: 4  Rails to Enter: Yes  One/Two Story Residence: One story  Living Alone: No  Support Systems: Family member(s), Friends \ neighbors, Home care staff  Patient Expects to be Discharged to[de-identified] Private residence  Current DME Used/Available at Home: Rayetta Danker, rollator, Oxygen, portable, Commode, bedside, Grab bars, Shower chair (2 liters NC at night and PRN)  Tub or Shower Type: Shower  [X]  Right hand dominant             [ ]  Left hand dominant     EXAMINATION OF PERFORMANCE DEFICITS:  Cognitive/Behavioral Status:  Neurologic State: Alert  Orientation Level: Oriented X4  Cognition: Follows commands  Perception: Appears intact  Perseveration: No perseveration noted  Safety/Judgement: Awareness of environment; Fall prevention;Home safety           Hearing: Auditory  Auditory Impairment: Hearing aid(s)  Hearing Aids/Status: At home, Bilateral     Vision/Perceptual:    Tracking: Able to track stimulus in all quadrants w/o difficulty                      Acuity: Impaired near vision          Range of Motion:     AROM: Within functional limits  PROM: Within functional limits                       Strength:     Strength: Generally decreased, functional                 Coordination:  Coordination: Within functional limits  Fine Motor Skills-Upper: Left Intact; Right Intact    Gross Motor Skills-Upper: Left Intact; Right Intact     Tone & Sensation:     Tone: Normal  Sensation: Intact                       Balance:  Sitting: Intact  Standing: Impaired  Standing - Static: Constant support;Good  Standing - Dynamic : Fair     Functional Mobility and Transfers for ADLs:  Bed Mobility:  Rolling:  (seated on commode upon arrival)  Scooting: Supervision     Transfers:  Sit to Stand: Contact guard assistance  Stand to Sit: Contact guard assistance  Bed to Chair: Minimum assistance (without assist devices; CGA with)  Toilet Transfer : Minimum assistance     ADL Assessment:  Feeding: Independent     Oral Facial Hygiene/Grooming: Contact guard assistance (standing)     Bathing: Minimum assistance     Upper Body Dressing: Setup     Lower Body Dressing: Minimum assistance     Toileting: Contact guard assistance                 Cognitive Retraining  Safety/Judgement: Awareness of environment; Fall prevention;Home safety        Functional Measure:  Barthel Index:      Bathin  Bladder: 5  Bowels: 10  Groomin  Dressin  Feeding: 10  Mobility: 10  Stairs: 0  Toilet Use: 5  Transfer (Bed to Chair and Back): 10  Total: 60         Barthel and G-code impairment scale:  Percentage of impairment CH  0% CI  1-19% CJ  20-39% CK  40-59% CL  60-79% CM  80-99% CN  100%   Barthel Score 0-100 100 99-80 79-60 59-40 20-39 1-19    0   Barthel Score 0-20 20 17-19 13-16 9-12 5-8 1-4 0      The Barthel ADL Index: Guidelines  1. The index should be used as a record of what a patient does, not as a record of what a patient could do. 2. The main aim is to establish degree of independence from any help, physical or verbal, however minor and for whatever reason. 3. The need for supervision renders the patient not independent. 4. A patient's performance should be established using the best available evidence. Asking the patient, friends/relatives and nurses are the usual sources, but direct observation and common sense are also important. However direct testing is not needed. 5. Usually the patient's performance over the preceding 24-48 hours is important, but occasionally longer periods will be relevant. 6. Middle categories imply that the patient supplies over 50 per cent of the effort. 7. Use of aids to be independent is allowed. Kristyn Pérez., Barthel, D.W. (1627). Functional evaluation: the Barthel Index. [facility] (14)2.   KASHIF John, Arcenio Andrade Pearly Dunn (1999). Measuring the change indisability after inpatient rehabilitation; comparison of the responsiveness of the Barthel Index and Functional Edgar Measure. Journal of Neurology, Neurosurgery, and Psychiatry, 66(4), 303-601. NARCISA Pryor, CARISSA Pascual, & Carissa Wray M.A. (2004.) Assessment of post-stroke quality of life in cost-effectiveness studies: The usefulness of the Barthel Index and the EuroQoL-5D. Quality of Life Research, 13, 361-70            G codes: In compliance with CMSs Claims Based Outcome Reporting, the following G-code set was chosen for this patient based on their primary functional limitation being treated: The outcome measure chosen to determine the severity of the functional limitation was the barthel with a score of 60/100 which was correlated with the impairment scale. · Self Care:               - CURRENT STATUS:    CJ - 20%-39% impaired, limited or restricted               - GOAL STATUS:           CI - 1%-19% impaired, limited or restricted               - D/C STATUS:                       ---------------To be determined---------------      Occupational Therapy Evaluation Charge Determination   History Examination Decision-Making   LOW Complexity : Brief history review  LOW Complexity : 1-3 performance deficits relating to physical, cognitive , or psychosocial skils that result in activity limitations and / or participation restrictions  MEDIUM Complexity : Patient may present with comorbidities that affect occupational performnce.  Miniml to moderate modification of tasks or assistance (eg, physical or verbal ) with assesment(s) is necessary to enable patient to complete evaluation       Based on the above components, the patient evaluation is determined to be of the following complexity level: LOW   Pain:  Pain Scale 1: Numeric (0 - 10)  Pain Intensity 1: 0              Activity Tolerance: Please refer to the flowsheet for vital signs taken during this treatment. After treatment:   [X] Patient left in no apparent distress sitting up in chair  [ ] Patient left in no apparent distress in bed  [X] Call bell left within reach  [X] Nursing notified  [ ] Caregiver present  [ ] Bed alarm activated      COMMUNICATION/EDUCATION:   The patients plan of care was discussed with: Physical Therapist, Registered Nurse and patient. [ ] Home safety education was provided and the patient/caregiver indicated understanding. [X] Patient have participated as able in goal setting and plan of care. [X] Patient agree to work toward stated goals and plan of care. [ ] Patient understands intent and goals of therapy, but is neutral about his/her participation. [ ] Patient is unable to participate in goal setting and plan of care. This patients plan of care is appropriate for delegation to [facility].      Thank you for this referral.  Avery Lemus OTR/L  Time Calculation: 23 mins

## 2017-10-16 NOTE — ED NOTES
TRANSFER - OUT REPORT:    Verbal report given to YOLANDA Gauthier(name) on Sabrina Austin  being transferred to PCU(unit) for routine progression of care       Report consisted of patients Situation, Background, Assessment and   Recommendations(SBAR). Information from the following report(s) SBAR, ED Summary and MAR was reviewed with the receiving nurse. Lines:   Peripheral IV 10/15/17 Left Wrist (Active)   Site Assessment Clean, dry, & intact 10/15/2017 10:39 AM   Phlebitis Assessment 0 10/15/2017 10:39 AM   Infiltration Assessment 0 10/15/2017 10:39 AM   Dressing Status Clean, dry, & intact 10/15/2017 10:39 AM        Opportunity for questions and clarification was provided.       Patient transported with:   Monitor  Patient-specific medications from Pharmacy  Registered Nurse

## 2017-10-16 NOTE — PROGRESS NOTES
PCU SHIFT NURSING NOTE      Bedside shift change report given to Disha (oncoming nurse) by Sherry Oneal (offgoing nurse). Report included the following information SBAR, Kardex, Procedure Summary, MAR, Recent Results and Cardiac Rhythm afib rate controled . Shift Summary:  3197 Bedside Report received from Lakesha Mcmanus, [address] [place] [address]. SBAR, Kardex, Intake/Output, MAR, Recent Results or Cardiac Rhythm afib were discussed. Rachelle Castillo RN assumed care of the pt.  0830 Primary Nurse Bharti Baez RN and Oc Watson RN performed a dual skin assessment on this patient No impairment noted. She does have some old, healed scarring and a pink, tender vertebrae. Magdaleno score is 20  0900 pt up and amb in [place] with PT and walker, soco well. Dr Irwin Darling has been in to see pt.  [phone] Dr Pollo Aleman in to see pt. When pt went to bathroom, small amount of sea, red blood towards the front of the toilet with clear yellow urine  1430 pt up and amb in [place] with her rollator and cgax1, soco well. Mod SOB without hypoxia  1800 pt up and amb the length of hallway and back with rollator and cgax1, steady gait, soco well. Admission Date 10/15/2017   Admission Diagnosis A-fib ([address]  UTI (urinary tract infection)   Consults IP CONSULT TO HOSPITALIST  IP CONSULT TO CARDIOLOGY  IP CONSULT TO CARDIOLOGY        Consults   [x]PT   [x]OT   []Speech   []Case Management      [] Palliative      Cardiac Monitoring Order   [x]Yes   []No     IV drips   [x]Yes    Drip: dilt                           Dose:5  Drip:                            Dose:  Drip:                            Dose:   []No     GI Prophylaxis   [x]Yes   []No         DVT Prophylaxis   SCDs:             Zackery stockings:         [x] Medication   []Contraindicated   []None      Activity Level Activity Level: Up with Assistance     Activity Assistance: Partial (one person)   Purposeful Rounding every 1-2 hour?    [x]Yes   Olguin Score  Total Score: 3   Bed Alarm (If score 3 or >)   [x]Yes   [] Refused (See signed refusal form in chart)   Magdaleno Score  Magdaleno Score: 20   Magdaleno Score (if score 14 or less)   []PMT consult   []Wound Care consult      []Specialty bed   [] Nutrition consult          Needs prior to discharge:   Home O2 required:    []Yes   [x]No    If yes, how much O2 required? Other:    Last Bowel Movement: Last Bowel Movement Date: 10/16/17      Influenza Vaccine Received Flu Vaccine for Current Season (usually Sept-March): No    Patient/Guardian Refused (Notify MD): No   Pneumonia Vaccine           Diet Active Orders   Diet    DIET CARDIAC Regular      LDAs               Peripheral IV 10/15/17 Left Wrist (Active)   Site Assessment Clean, dry, & intact 10/16/2017  8:15 AM   Phlebitis Assessment 0 10/16/2017  8:15 AM   Infiltration Assessment 0 10/16/2017  8:15 AM   Dressing Status Clean, dry, & intact 10/16/2017  8:15 AM   Dressing Type Transparent;Tape 10/16/2017  8:15 AM   Hub Color/Line Status Pink;Flushed; Infusing 10/16/2017  8:15 AM                      Urinary Catheter      Intake & Output   Date 10/15/17 0700 - 10/16/17 0659 10/16/17 0700 - 10/17/17 0659   Shift 4407-6211 8657-0512 24 Hour Total 9124-8535 3308-1773 24 Hour Total   I  N  T  A  K  E   P.O.    220  220      P. O.    220  220    I.V.  (mL/kg/hr)    141.6  141. 6      Cardizem Volume    91.6  91.6      Volume (cefTRIAXone (ROCEPHIN) 1 g in 0.9% sodium chloride (MBP/ADV) 50 mL)    50  50    Shift Total  (mL/kg)    361.6  (5.2)  361.6  (5.2)   O  U  T  P  U  T   Shift Total  (mL/kg)         NET    361.6  361.6   Weight (kg) 70.1 70.1 70.1 70.1 70.1 70.1         Readmission Risk Assessment Tool Score Medium Risk            19       Total Score        3 Has Seen PCP in Last 6 Months (Yes=3, No=0)    4 IP Visits Last 12 Months (1-3=4, 4=9, >4=11)    5 Pt. Coverage (Medicare=5 , Medicaid, or Self-Pay=4)    7 Charlson Comorbidity Score (Age + Comorbid Conditions)        Criteria that do not apply:    . Living with Significant Other.  Assisted Living. LTAC. SNF.  or   Rehab    Patient Length of Stay (>5 days = 3)       Expected Length of Stay 2d 12h   Actual Length of Stay 1              Calixto Galvan RN

## 2017-10-16 NOTE — PROGRESS NOTES
Attempted to see pt for therapy services. Pt was eating breakfast independently.   Will follow up at a later time for eval.

## 2017-10-17 LAB
ATRIAL RATE: 300 BPM
CALCULATED R AXIS, ECG10: 25 DEGREES
CALCULATED T AXIS, ECG11: 46 DEGREES
DIAGNOSIS, 93000: NORMAL
INR PPP: 1.5 (ref 0.9–1.1)
PROTHROMBIN TIME: 15.4 SEC (ref 9–11.1)
Q-T INTERVAL, ECG07: 374 MS
QRS DURATION, ECG06: 86 MS
QTC CALCULATION (BEZET), ECG08: 455 MS
VENTRICULAR RATE, ECG03: 89 BPM

## 2017-10-17 PROCEDURE — 74011250637 HC RX REV CODE- 250/637: Performed by: INTERNAL MEDICINE

## 2017-10-17 PROCEDURE — 74011000250 HC RX REV CODE- 250: Performed by: INTERNAL MEDICINE

## 2017-10-17 PROCEDURE — 93041 RHYTHM ECG TRACING: CPT

## 2017-10-17 PROCEDURE — 85610 PROTHROMBIN TIME: CPT | Performed by: INTERNAL MEDICINE

## 2017-10-17 PROCEDURE — 97116 GAIT TRAINING THERAPY: CPT | Performed by: PHYSICAL THERAPIST

## 2017-10-17 PROCEDURE — 65660000000 HC RM CCU STEPDOWN

## 2017-10-17 PROCEDURE — 36415 COLL VENOUS BLD VENIPUNCTURE: CPT | Performed by: INTERNAL MEDICINE

## 2017-10-17 RX ORDER — WARFARIN 7.5 MG/1
7.5 TABLET ORAL ONCE
Status: COMPLETED | OUTPATIENT
Start: 2017-10-17 | End: 2017-10-17

## 2017-10-17 RX ORDER — POLYETHYLENE GLYCOL 3350 17 G/17G
17 POWDER, FOR SOLUTION ORAL DAILY PRN
Status: DISCONTINUED | OUTPATIENT
Start: 2017-10-17 | End: 2017-10-26 | Stop reason: HOSPADM

## 2017-10-17 RX ORDER — PROPAFENONE HYDROCHLORIDE 150 MG/1
150 TABLET, FILM COATED ORAL EVERY 8 HOURS
Status: DISCONTINUED | OUTPATIENT
Start: 2017-10-17 | End: 2017-10-19

## 2017-10-17 RX ADMIN — Medication 10 ML: at 06:39

## 2017-10-17 RX ADMIN — PROPAFENONE HYDROCHLORIDE 150 MG: 150 TABLET, FILM COATED ORAL at 21:37

## 2017-10-17 RX ADMIN — Medication 10 ML: at 17:15

## 2017-10-17 RX ADMIN — FERROUS SULFATE TAB 325 MG (65 MG ELEMENTAL FE) 325 MG: 325 (65 FE) TAB at 08:42

## 2017-10-17 RX ADMIN — ACETAMINOPHEN 650 MG: 325 TABLET ORAL at 20:32

## 2017-10-17 RX ADMIN — CARVEDILOL 12.5 MG: 12.5 TABLET, FILM COATED ORAL at 08:36

## 2017-10-17 RX ADMIN — Medication 10 ML: at 21:35

## 2017-10-17 RX ADMIN — LEVETIRACETAM 500 MG: 500 TABLET, FILM COATED ORAL at 17:17

## 2017-10-17 RX ADMIN — PROPAFENONE HYDROCHLORIDE 150 MG: 150 TABLET, FILM COATED ORAL at 09:05

## 2017-10-17 RX ADMIN — FERROUS SULFATE TAB 325 MG (65 MG ELEMENTAL FE) 325 MG: 325 (65 FE) TAB at 17:15

## 2017-10-17 RX ADMIN — MULTIPLE VITAMINS W/ MINERALS TAB 1 TABLET: TAB at 08:56

## 2017-10-17 RX ADMIN — DOCUSATE SODIUM 100 MG: 100 CAPSULE, LIQUID FILLED ORAL at 08:36

## 2017-10-17 RX ADMIN — LEVETIRACETAM 500 MG: 500 TABLET, FILM COATED ORAL at 08:35

## 2017-10-17 RX ADMIN — AMITRIPTYLINE HYDROCHLORIDE 10 MG: 10 TABLET, FILM COATED ORAL at 20:32

## 2017-10-17 RX ADMIN — DEXTROSE MONOHYDRATE 5 MG/HR: 50 INJECTION, SOLUTION INTRAVENOUS at 02:53

## 2017-10-17 RX ADMIN — WARFARIN SODIUM 7.5 MG: 7.5 TABLET ORAL at 17:17

## 2017-10-17 RX ADMIN — PROPAFENONE HYDROCHLORIDE 150 MG: 150 TABLET, FILM COATED ORAL at 17:15

## 2017-10-17 RX ADMIN — LATANOPROST 1 DROP: 50 SOLUTION OPHTHALMIC at 21:36

## 2017-10-17 NOTE — PROGRESS NOTES
Hospitalist Progress Note    NAME: Marsha Ackerman   :  1924   MRN:  [id number]       Assessment / Plan:  Atrial Fibrillation with RVR with associated weakness  Hypertension  -Diltiazem gtt being transitioned to off and Rhythmol being started  -no longer on Coreg with BP being the lower limit of normal  -off amiodarone secondary to pulmonary issues ( was seen by Dr. Aldo Gutierrez 1 month ago)  -INR 1.5, on coumadin, Appreciate pharmacy dosing     UTI outside records reviewed   -discontinue rocephin as UA on 10/15 benign and no UCx sent      CKD stage III:   -renal function at baseline, continue to monitor      L1 compression fracture  -tylenol prn       Seizure disorder  -continue keppra      Macular Degeneration    Code Status: has DDNR  Surrogate Decision Maker: sandy      DVT Prophylaxis: NI  GI Prophylaxis: not indicated      Baseline: Lives at home         Body mass index is 27.38 kg/(m^2). Subjective:     Chief Complaint / Reason for Physician Visit: follow-up afib with RVR  Patient on Diltiazem gtt when I saw her this am  HR in the low 100's to 110's at rest  Denies palpitations and CP    Review of Systems:  Symptom Y/N Comments  Symptom Y/N Comments   Fever/Chills n   Chest Pain n    Poor Appetite    Edema     Cough    Abdominal Pain     Sputum    Joint Pain     SOB/SANTOS n   Pruritis/Rash     Nausea/vomit n   Tolerating PT/OT     Diarrhea    Tolerating Diet n    Constipation    Other       Could NOT obtain due to:      Objective:     VITALS:   Last 24hrs VS reviewed since prior progress note.  Most recent are:  Patient Vitals for the past 24 hrs:   Temp Pulse Resp BP SpO2   10/17/17 0746 98 °F (36.7 °C) - - - -   10/17/17 0400 97.8 °F (36.6 °C) 89 16 109/72 92 %   10/17/17 0000 97.8 °F (36.6 °C) 89 16 109/72 92 %   10/16/17 2000 98.2 °F (36.8 °C) 100 16 99/47 95 %   10/16/17 1450 97.6 °F (36.4 °C) (!) 106 18 104/71 96 %   10/16/17 1223 97.3 °F (36.3 °C) 87 22 100/64 97 %       Intake/Output Summary (Last 24 hours) at 10/17/17 0931  Last data filed at 10/17/17 0600   Gross per 24 hour   Intake           458.75 ml   Output                0 ml   Net           458.75 ml        PHYSICAL EXAM:  General: WD, WN. Alert, cooperative, no acute distress    EENT:  EOMI. Anicteric sclerae. MMM  Resp:  CTA bilaterally, no wheezing or rales. No accessory muscle use  CV:  Irregular with mild tachycardia,  trace BLE edema  GI:  Soft, Non distended, Non tender.  +Bowel sounds  Neurologic:  Alert and oriented X 3, normal speech,   Psych:   Good insight. Not anxious nor agitated  Skin:  No rashes. No jaundice    Reviewed most current lab test results and cultures  YES  Reviewed most current radiology test results   YES  Review and summation of old records today    NO  Reviewed patient's current orders and MAR    YES  PMH/ reviewed - no change compared to H&P  ________________________________________________________________________  Care Plan discussed with:    Comments   Patient y    Family      RN y    Care Manager     Consultant                        Multidiciplinary team rounds were held today with , nursing, pharmacist and clinical coordinator. Patient's plan of care was discussed; medications were reviewed and discharge planning was addressed. ________________________________________________________________________  Total NON critical care TIME:  25  Minutes    Total CRITICAL CARE TIME Spent:   Minutes non procedure based      Comments   >50% of visit spent in counseling and coordination of care     ________________________________________________________________________  Evangelist Lopez MD     Procedures: see electronic medical records for all procedures/Xrays and details which were not copied into this note but were reviewed prior to creation of Plan. LABS:  I reviewed today's most current labs and imaging studies.   Pertinent labs include:  Recent Labs      10/16/17   2780  10/15/17   1028   WBC 5.8  6.4   HGB  8.6*  9.3*   HCT  26.5*  28.6*   PLT  156  150     Recent Labs      10/17/17   0438  10/16/17   0423  10/15/17   1205  10/15/17   1028   NA   --   137   --   136   K   --   3.8   --   3.9   CL   --   106   --   103   CO2   --   24   --   25   GLU   --   119*   --   109*   BUN   --   27*   --   29*   CREA   --   0.83   --   0.85   CA   --   8.1*   --   8.4*   MG   --   2.3   --   2.4   ALB   --   2.6*   --   2.7*   TBILI   --   0.3   --   0.3   SGOT   --   14*   --   18   ALT   --   15   --   18   INR  1.5*  1.6*  1.3*   --        Signed: Brandon Simpson MD

## 2017-10-17 NOTE — PROGRESS NOTES
Cardiology Progress Note  10/17/2017 8:47 AM  Admit Date: 10/15/2017  Admit Diagnosis/CC: A-fib [facility])  UTI (urinary tract infection)  Subjective:   Patient reports:  Chest Pain:  [x] none,  consistent with [] non-cardiac  [] atypical  []  anginal chest pain             [x] none now    []  on-going  Dyspnea: [x] none  [] at rest  [] with exertion  [] improved  [] unchanged [] worsening  PND:       [x] none  [] overnight   [] current  Orthopnea: [x] none  [] improved  [] unchanged  [] worsening  Presyncope: [x] none  [] improved  [] unchanged  [] worsening  Ambulated in hallway without symptoms  [] Yes  Ambulated in room without symptoms  [x] Yes  ROS(2+other systems)   Hematuria: [] Yes  [x] No.   Dysuria: [] Yes  [x] No                                           Cough:       [] Yes  [x] No.   Sputum: [] Yes  [x] No                                            Hematochezia: [] Yes  [x] No.   Melena: [] Yes  [x] No                                            No change in family and social history from H&P/Consult note.     Current Facility-Administered Medications   Medication Dose Route Frequency    polyethylene glycol (MIRALAX) packet 17 g  17 g Oral DAILY PRN    propafenone (RYTHMOL) tablet 150 mg  150 mg Oral Q8H    vitamins  A,C,E-zinc-copper (I-LEROY PROTECT) tablet 1 Tab  1 Tab Oral DAILY    docusate sodium (COLACE) capsule 100 mg  100 mg Oral DAILY    sodium chloride (NS) flush 5-10 mL  5-10 mL IntraVENous Q8H    sodium chloride (NS) flush 5-10 mL  5-10 mL IntraVENous PRN    acetaminophen (TYLENOL) tablet 650 mg  650 mg Oral Q4H PRN    bisacodyl (DULCOLAX) tablet 5 mg  5 mg Oral DAILY PRN    cefTRIAXone (ROCEPHIN) 1 g in 0.9% sodium chloride (MBP/ADV) 50 mL  1 g IntraVENous Q24H    ferrous sulfate tablet 325 mg  1 Tab Oral BID WITH MEALS    amitriptyline (ELAVIL) tablet 10-15 mg  10-15 mg Oral QHS    latanoprost (XALATAN) 0.005 % ophthalmic solution 1 Drop  1 Drop Both Eyes QHS    levETIRAcetam (KEPPRA) tablet 500 mg  500 mg Oral BID    WARFARIN INFORMATION NOTE (COUMADIN)   Other QPM       Objective:    Physical Exam:  Last VS:   Visit Vitals    /72 (BP 1 Location: Right arm, BP Patient Position: At rest)    Pulse 89    Temp 98 °F (36.7 °C)    Resp 16    Ht 5' 3\" (1.6 m)    Wt 70.1 kg (154 lb 8.7 oz)    SpO2 92%    Breastfeeding No    BMI 27.38 kg/m2    Temp (24hrs), Av.8 °F (36.6 °C), Min:97.3 °F (36.3 °C), Max:98.2 °F (36.8 °C)    24 hr VS reviewed. General Appearance:   [x] well developed, well nourished,  [x] NAD. [] agitated   [] lethargic but arousable  [] obtunded   ENT, Palate:    [x] WNL   [] dry palate/MM        Respiratory:    [x] CTA bilateral  [] rales  [] rhonchi  [] normal resp effort      [] similar to yesterday   [] worse    [] improved   Cardiovascular:   [x] RRR   [] Irregular rate and rhythm   [x] Normal S1, S2   [x] No gallop or rub. [] no murmur   [x] no new murmur  [] murmur c/w:   [x] no edema  RLE: []1+ []2+ [] 3+;  LLE: []1+ []2+ []3+      [] edema similar to yesterday   [] worse    [] improved   [x] normal JVP   [] Elevated JVP    [x] JVP similar to yesterday   [] worse    [] improved   [x] carotid upstroke unchanged   [x] abd aorta not palpated   [x] no stigmata of peripheral emboli   GI:    [x] abd soft, non-distented,bowel sounds present, no                     organomegaly appreciated   Skin:  Neuro:    Cath Site:  [x] warm and dry [] cold extremities   [x] A/O x 3, grossly non-focal      [] intact w/o hematoma or bruit; distal pulse unchanged.               Data Review:   Labs:    Recent Results (from the past 24 hour(s))   PROTHROMBIN TIME + INR    Collection Time: 10/17/17  4:38 AM   Result Value Ref Range    INR 1.5 (H) 0.9 - 1.1      Prothrombin time 15.4 (H) 9.0 - 11.1 sec       Provided Telemetry: [] sinus  [] chronic afib   [x] paroxysmal afib  [] NSVT      Assessment:     Active Problems:    UTI (urinary tract infection) (10/15/2017)      A-fib ([facility] (10/15/2017)        Plan:     Atrial Fibrillation - Flutter  unchanged   Change Cardizem to PO    D/W Dr. Bishop Figueredo. Will start propafenone and D/C Dilt. R is sub therapeutic. For all other plans, see orders.    [x] High complexity decision making was performed

## 2017-10-17 NOTE — PROGRESS NOTES
Problem: Mobility Impaired (Adult and Pediatric)  Goal: *Acute Goals and Plan of Care (Insert Text)  Physical Therapy Goals  Initiated 10/16/2017  1. Patient will move from supine to sit and sit to supine and roll side to side in bed with independence within 7 day(s). 2. Patient will transfer from bed to chair and chair to bed with modified independence using the least restrictive device within 7 day(s). 3. Patient will perform sit to stand with modified independence within 7 day(s). 4. Patient will ambulate with modified independence for 350 feet with the least restrictive device within 7 day(s). 5. Patient will ascend/descend 4 stairs with 2 handrail(s) with minimal assistance/contact guard assist within 7 day(s). PHYSICAL THERAPY TREATMENT  Patient: Carlos Alberto Brink (60 y.o. female)  Date: 10/17/2017  Diagnosis: A-fib [facility])  UTI (urinary tract infection) <principal problem not specified>       Precautions: DNR      ASSESSMENT:  Patient with increased fatigue, decreased activity tolerance today. Patient up in recliner upon arrival.  Sit to stand with SBA. Assisted patient into bathroom; supervision for toilet transfers. Patient ambulated 80' with rollator and CGA. Patient steady ambulating but stands too far away from rollator with flexed posture. Patient requires one seated rest break. Up in chair at end of session. HR 92, sats 97% prior to ambulating. Upon returning to room, HR as high as 151, but consistently 120's/130's. No further PT recommended at discharge. Progression toward goals:  [X]    Improving appropriately and progressing toward goals  [ ]    Improving slowly and progressing toward goals  [ ]    Not making progress toward goals and plan of care will be adjusted       PLAN:  Patient continues to benefit from skilled intervention to address the above impairments. Continue treatment per established plan of care.   Discharge Recommendations:  None  Further Equipment Recommendations for Discharge:  none       SUBJECTIVE:   Patient stated I'm so sorry I can't do more.       OBJECTIVE DATA SUMMARY:   Critical Behavior:  Neurologic State: Alert  Orientation Level: Oriented X4  Cognition: Follows commands  Safety/Judgement: Awareness of environment, Fall prevention, Home safety  Functional Mobility Training:  Bed Mobility:      Up in chair upon arrival.              Transfers:  Sit to Stand: Stand-by asssistance  Stand to Sit: Stand-by asssistance                             Balance:  Sitting: Intact  Standing: Impaired  Standing - Static: Good  Standing - Dynamic : Fair  Ambulation/Gait Training:  Distance (ft): 110 Feet (ft)  Assistive Device: Gait belt;Walker, rollator  Ambulation - Level of Assistance: Contact guard assistance        Gait Abnormalities: Decreased step clearance; Path deviations        Base of Support: Widened     Speed/Samantha: Pace decreased (<100 feet/min)  Step Length: Left shortened;Right shortened                                            Pain:  Pain Scale 1: Numeric (0 - 10)  Pain Intensity 1: 0              Activity Tolerance:   fair  Please refer to the flowsheet for vital signs taken during this treatment.   After treatment:   [X]    Patient left in no apparent distress sitting up in chair  [ ]    Patient left in no apparent distress in bed  [X]    Call bell left within reach  [X]    Nursing notified  [ ]    Caregiver present  [ ]    Bed alarm activated      COMMUNICATION/COLLABORATION:   The patients plan of care was discussed with: Registered Nurse     Hi Sage, PT   Time Calculation: 17 mins

## 2017-10-17 NOTE — ED PROVIDER NOTES
HPI Comments: Dat Vasquez is a 80 y.o. female with hx of A-fib and colon cancer who presents ambulatory to [facility] ED with CC of generalized weakness and 2 episodes of palpitations since last night (5/31/17). She also c/o nausea and decreased appetite since onset of her symptoms. Pt states she has hx of similar symptoms with past episodes of A-fib, noting most recent episode ~1 year ago. Per daughter, pt had ground level fall ~4 days ago, in which she lost her balance and fell backwards, catching herself on the wall, and lacerating her right leg behind her knee on a wooden heater beneath her. She states pt had laceration repaired at Urgent Care the same day, but has been c/o lower back pain since, which she states is exacerbated by movement; she states pt had XR showing age indeterminate L1 compression fx. Daughter states pt has follow up appointment with Orthopedics later today. Per daughter, pt has also been constipated x ~2 days, and has taken suppositories and laxatives without improvement. Per daughter, pt is currently taking carvedilol BID, losartan 25 mg BID, amiodarone, coumadin, and tramadol; she otherwise denies pt has had any changes to her medication regimen. She denies pt is on O2 at baseline. Pt specifically denies numbness or tingling of any nature. PCP: Araceli Barron MD    There are no other complaints, changes, or physical findings at this time. The history is provided by the patient and a relative.         Past Medical History:   Diagnosis Date    Alopecia     Arthritis     Benign essential HTN     Benign neoplasm of vulva     Bronchitis     CAD (coronary artery disease)     Cancer ([address]     Cholelithiases     Colon cancer ([address]     Constipation     Dyspareunia     Glaucoma     History of mixed connective tissue disease     Hx of viral pericarditis     Hyperactivity of bladder     Hypercholesterolemia     Insomnia     Macular degeneration     Neck pain     Palpitations     Patient instructed on:  NPO after midnight.  No jewelry, valuables, money.  Bring insurance card(s)  No body piercings.  Wear comfortable, loose clothing appropriate for the procedure.  Advised to have someone home with him after the procedure.  Procedural prep instructions received and reviewed.  Advised to take appropriate morning medications with a sip of water.    Pt verbalizes understanding.           Psoriasis     PVT (paroxysmal ventricular tachycardia) (HCC)     RA (rheumatoid arthritis) (HCC)     Rectocele     Renal cyst     Seizure disorder (HCC)     Sinusitis     Sjogren's disease (HCC)     Thrombophlebitis     TIA (transient ischemic attack)     Vaginal vault prolapse, posthysterectomy     Varicose veins        Past Surgical History:   Procedure Laterality Date    HX BREAST LUMPECTOMY      left breast    HX CATARACT REMOVAL      HX COLECTOMY  1955    partial    HX HEMORRHOIDECTOMY      HX HYSTERECTOMY      HX SHOULDER ARTHROSCOPY           Family History:   Problem Relation Age of Onset    Cancer Father     Inflammatory Bowel Dz Mother     Heart Disease Brother     Diabetes Brother        Social History     Social History    Marital status:      Spouse name: N/A    Number of children: N/A    Years of education: N/A     Occupational History    Not on file. Social History Main Topics    Smoking status: Never Smoker    Smokeless tobacco: Not on file    Alcohol use No    Drug use: No    Sexual activity: No     Other Topics Concern    Not on file     Social History Narrative         ALLERGIES: Codeine; Pcn [penicillins]; Tetanus vaccines and toxoid; and Vibramycin [doxycycline calcium]    Review of Systems   Constitutional: Positive for appetite change (decreased). Negative for fatigue and fever. HENT: Negative. Eyes: Negative. Respiratory: Negative for shortness of breath and wheezing. Cardiovascular: Positive for palpitations. Negative for chest pain and leg swelling. Gastrointestinal: Positive for constipation and nausea. Negative for blood in stool, diarrhea and vomiting. Endocrine: Negative. Genitourinary: Negative for difficulty urinating and dysuria. Musculoskeletal: Positive for back pain (Lower). Skin: Negative for rash. Allergic/Immunologic: Negative. Neurological: Positive for weakness (generalized). Negative for numbness. Hematological: Negative. Psychiatric/Behavioral: Negative. Patient Vitals for the past 12 hrs:   Temp Pulse Resp BP SpO2   06/01/17 1300 - 96 17 172/75 98 %   06/01/17 1245 - 96 19 159/79 97 %   06/01/17 1239 - 97 - 173/80 97 %   06/01/17 1238 - 94 19 173/80 95 %   06/01/17 1237 - - - - 91 %   06/01/17 1235 - 97 - - (!) 86 %   06/01/17 1220 - - - - 92 %   06/01/17 1215 - 71 20 147/70 96 %   06/01/17 1200 - 76 30 145/78 97 %   06/01/17 1145 - 93 19 158/72 96 %   06/01/17 1130 - 70 17 150/62 98 %   06/01/17 1122 - - - 146/68 -   06/01/17 1039 - 92 20 143/63 -   06/01/17 0922 - - - - 92 %   06/01/17 0830 - - - - 92 %   06/01/17 0825 97.5 °F (36.4 °C) 73 22 131/59 90 %            Physical Exam   Constitutional: She is oriented to person, place, and time. She appears well-developed and well-nourished. No distress. HENT:   Head: Normocephalic and atraumatic. Mouth/Throat: Oropharynx is clear and moist.   Eyes: Conjunctivae and EOM are normal.   Neck: Neck supple. No JVD present. No tracheal deviation present. Cardiovascular: Normal rate, regular rhythm and intact distal pulses. Exam reveals no gallop and no friction rub. No murmur heard. Pulmonary/Chest: Effort normal and breath sounds normal. No stridor. No respiratory distress. She has no wheezes. Faint crackles in B/L lower lobes   Abdominal: Soft. She exhibits distension (mild). She exhibits no mass. There is no tenderness. There is no guarding. Bowel sounds quiet   Musculoskeletal: Normal range of motion. She exhibits no edema or tenderness. No deformity; normal, but painful ROM to back; no TTP, no step-offs, no lesions or swelling noted   Neurological: She is alert and oriented to person, place, and time. She has normal strength. No focal deficits; sensation intact B/L lower extremities; 5/5 muscle strength B/L lower extremities   Skin: Skin is warm, dry and intact. No rash noted. Psychiatric: She has a normal mood and affect.  Her behavior is normal. Judgment and thought content normal.   Nursing note and vitals reviewed. MDM  Number of Diagnoses or Management Options  Acute respiratory failure with hypoxia ([address]): Compression fracture of L1 lumbar vertebra, sequela:   Urinary tract infection without hematuria, site unspecified:   Diagnosis management comments: Pt presenting with palpitations, nausea, constipation and low back pain since fall 4 days ago. DDx includes medication adverse reaction, uti, fracture, constipation, SBO, dehydration, electrolyte abnormality. Constipation and lethargy likely due to patient taking tramadol for back pain. Will get acute series to r/o SBO. Will check labs, ua. Amount and/or Complexity of Data Reviewed  Clinical lab tests: ordered and reviewed  Tests in the radiology section of CPT®: ordered and reviewed  Tests in the medicine section of CPT®: ordered and reviewed  Obtain history from someone other than the patient: yes (Daughter)  Review and summarize past medical records: yes  Independent visualization of images, tracings, or specimens: yes    Patient Progress  Patient progress: stable    ED Course       Procedures     ED EKG interpretation: 8:16  Rhythm: normal sinus rhythm; and regular . Rate (approx.): 69; Axis: normal; P wave: normal; QRS interval: normal ; ST/T wave: no ST changes. This EKG was interpreted by Audelia Curtis DO,ED Provider. 12:20 PM  Updated pt and family on lab and imaging results. Pt states her pain has improved, but is still having back pain with movement. Physical therapy will ambulate. CONSULT NOTE:   1:46 PM  Audelia Curtis DO spoke with Bonnie Vásquez MD,   Specialty: Hospitalist  Discussed pt's hx, disposition, and available diagnostic and imaging results. Reviewed care plans. Consultant will evaluate pt for admission.     LABORATORY TESTS:  Recent Results (from the past 12 hour(s))   EKG, 12 LEAD, INITIAL    Collection Time: 06/01/17  8:16 AM Result Value Ref Range    Ventricular Rate 69 BPM    Atrial Rate 69 BPM    P-R Interval 190 ms    QRS Duration 86 ms    Q-T Interval 438 ms    QTC Calculation (Bezet) 469 ms    Calculated P Axis 43 degrees    Calculated R Axis 28 degrees    Calculated T Axis 49 degrees    Diagnosis       Normal sinus rhythm  Minimal voltage criteria for LVH, may be normal variant  When compared with ECG of 26-OCT-2016 20:54,  AK interval has decreased  Confirmed by Sarthak Blanchard ([id number]) on 6/1/2017 2:48:69 AM     METABOLIC PANEL, COMPREHENSIVE    Collection Time: 06/01/17  9:57 AM   Result Value Ref Range    Sodium 124 (L) 136 - 145 mmol/L    Potassium 3.7 3.5 - 5.1 mmol/L    Chloride 89 (L) 97 - 108 mmol/L    CO2 26 21 - 32 mmol/L    Anion gap 9 5 - 15 mmol/L    Glucose 122 (H) 65 - 100 mg/dL    BUN 18 6 - 20 MG/DL    Creatinine 0.77 0.55 - 1.02 MG/DL    BUN/Creatinine ratio 23 (H) 12 - 20      GFR est AA >60 >60 ml/min/1.73m2    GFR est non-AA >60 >60 ml/min/1.73m2    Calcium 8.6 8.5 - 10.1 MG/DL    Bilirubin, total 0.9 0.2 - 1.0 MG/DL    ALT (SGPT) 18 12 - 78 U/L    AST (SGOT) 16 15 - 37 U/L    Alk. phosphatase 83 45 - 117 U/L    Protein, total 7.6 6.4 - 8.2 g/dL    Albumin 3.3 (L) 3.5 - 5.0 g/dL    Globulin 4.3 (H) 2.0 - 4.0 g/dL    A-G Ratio 0.8 (L) 1.1 - 2.2     CK W/ CKMB & INDEX    Collection Time: 06/01/17  9:57 AM   Result Value Ref Range    CK 46 26 - 192 U/L    CK - MB 1.8 <3.6 NG/ML    CK-MB Index 3.9 (H) 0 - 2.5     CBC WITH AUTOMATED DIFF    Collection Time: 06/01/17  9:57 AM   Result Value Ref Range    WBC 9.2 3.6 - 11.0 K/uL    RBC 3.89 3.80 - 5.20 M/uL    HGB 11.8 11.5 - 16.0 g/dL    HCT 34.5 (L) 35.0 - 47.0 %    MCV 88.7 80.0 - 99.0 FL    MCH 30.3 26.0 - 34.0 PG    MCHC 34.2 30.0 - 36.5 g/dL    RDW 13.3 11.5 - 14.5 %    PLATELET 989 (L) 018 - 400 K/uL    NEUTROPHILS 79 (H) 32 - 75 %    LYMPHOCYTES 13 12 - 49 %    MONOCYTES 8 5 - 13 %    EOSINOPHILS 0 0 - 7 %    BASOPHILS 0 0 - 1 %    ABS.  NEUTROPHILS 7.2 1.8 - 8.0 K/UL    ABS. LYMPHOCYTES 1.2 0.8 - 3.5 K/UL    ABS. MONOCYTES 0.7 0.0 - 1.0 K/UL    ABS. EOSINOPHILS 0.0 0.0 - 0.4 K/UL    ABS. BASOPHILS 0.0 0.0 - 0.1 K/UL   TROPONIN I    Collection Time: 06/01/17  9:57 AM   Result Value Ref Range    Troponin-I, Qt. <0.04 <0.05 ng/mL   MAGNESIUM    Collection Time: 06/01/17  9:57 AM   Result Value Ref Range    Magnesium 2.4 1.6 - 2.4 mg/dL   PROTHROMBIN TIME + INR    Collection Time: 06/01/17  9:57 AM   Result Value Ref Range    INR 1.7 (H) 0.9 - 1.1      Prothrombin time 17.0 (H) 9.0 - 11.1 sec   PRO-BNP    Collection Time: 06/01/17  9:57 AM   Result Value Ref Range    NT pro-BNP 5151 (H) 0 - 450 PG/ML   URINALYSIS W/ REFLEX CULTURE    Collection Time: 06/01/17  9:58 AM   Result Value Ref Range    Color DARK YELLOW      Appearance TURBID (A) CLEAR      Specific gravity 1.023 1.003 - 1.030      pH (UA) 6.0 5.0 - 8.0      Protein 30 (A) NEG mg/dL    Glucose NEGATIVE  NEG mg/dL    Ketone TRACE (A) NEG mg/dL    Bilirubin NEGATIVE  NEG      Blood SMALL (A) NEG      Urobilinogen 1.0 0.2 - 1.0 EU/dL    Nitrites NEGATIVE  NEG      Leukocyte Esterase LARGE (A) NEG      WBC >100 (H) 0 - 4 /hpf    RBC 5-10 0 - 5 /hpf    Epithelial cells FEW FEW /lpf    Bacteria 3+ (A) NEG /hpf    UA:UC IF INDICATED URINE CULTURE ORDERED (A) CNI      Other: Renal Epithelial cells Present         IMAGING RESULTS:  CT Results  (Last 48 hours)               06/01/17 1109  CT SPINE LUMB WO CONT Final result    Impression:  IMPRESSION:   1. L1 vertebral body fracture with mild loss of vertebral body height and   approximately 6 mm of osseous retropulsion. Narrative:  INDICATION: Fall on Sunday, back pain. Noncontrast CT of the lumbar spine is performed with 2.5 mm collimation. Sagittal and coronal reformatted were performed. CT dose reduction was achieved with the use of the standardized protocol   tailored for this examination and automatic exposure control for dose   modulation. The osseous structures are diffusely demineralized. There is an acute appearing   fracture of the L1 vertebral body with mild loss of vertebral body height and   approximately 6 mm of osseous retropulsion. No additional fracture is seen. There is no subluxation. Bilateral facet hypertrophy is noted at the L2-L3,   L3-L4, L4-L5 and L5-S1 levels. There is a mild dextroconvex scoliosis centered   in the upper lumbar spine. No lytic or sclerotic osseous lesion is visualized. There are very small bilateral pleural effusions, as visualized on the    images. There is a 5.6 cm right renal cyst. Extensive atherosclerotic   calcifications are noted within the abdominal aorta. CXR Results  (Last 48 hours)               06/01/17 0953  XR ABD ACUTE W 1 V CHEST Final result    Impression:  IMPRESSION:    1. Nonspecific bowel gas pattern. No evidence of perforation. Moderate amount of   stool in the right colon and transverse colon. 2. Probable trace bilateral pleural effusions. Narrative:  INDICATION:  n/v, constipation        Exam: AP view of the chest, supine and upright frontal views of the abdomen. FINDINGS: Cardiomediastinal silhouette is within normal limits. Lungs are clear   bilaterally. There is blunting of the left and right costo phrenic angles,   likely representing trace bilateral pleural fluid. There is a nonspecific bowel gas pattern. There is a moderate amount of stool in   the right colon and transverse colon. No pathologic calcification is   visualized. Osseous structures are diffusely demineralized. There is marked   degenerative narrowing of the right hip joint space.                   MEDICATIONS GIVEN:  Medications   ondansetron (ZOFRAN) injection 4 mg (4 mg IntraVENous Given 6/1/17 1001)   ketorolac (TORADOL) injection 15 mg (15 mg IntraVENous Given 6/1/17 1019)   cefTRIAXone (ROCEPHIN) 1 g in 0.9% sodium chloride (MBP/ADV) 50 mL (0 g IntraVENous IV Completed 6/1/17 1202)   furosemide (LASIX) injection 40 mg (40 mg IntraVENous Given 6/1/17 1343)       IMPRESSION:  1. Urinary tract infection without hematuria, site unspecified    2. Compression fracture of L1 lumbar vertebra, sequela    3. Acute respiratory failure with hypoxia (HCC)        PLAN:  1. Admit to Hospitalist    ADMIT NOTE:  1:46 PM  The patient is being admitted to the hospital by Dr. Shaina De La Torre. The results of their tests and reasons for their admission have been discussed with the patient and/or available family. They convey agreement and understanding for the need to be admitted and for their admission diagnosis. This note is prepared by Misty Peck, acting as Scribe for Jessica Alcocer DO. Jessica Alcocer DO: The scribe's documentation has been prepared under my direction and personally reviewed by me in its entirety. I confirm that the note above accurately reflects all work, treatment, procedures, and medical decision making performed by me.

## 2017-10-18 LAB
ATRIAL RATE: 129 BPM
CALCULATED R AXIS, ECG10: 49 DEGREES
CALCULATED T AXIS, ECG11: 59 DEGREES
DIAGNOSIS, 93000: NORMAL
ERYTHROCYTE [DISTWIDTH] IN BLOOD BY AUTOMATED COUNT: 14.6 % (ref 11.5–14.5)
HCT VFR BLD AUTO: 27.8 % (ref 35–47)
HGB BLD-MCNC: 9.1 G/DL (ref 11.5–16)
INR PPP: 1.6 (ref 0.9–1.1)
MCH RBC QN AUTO: 29.1 PG (ref 26–34)
MCHC RBC AUTO-ENTMCNC: 32.7 G/DL (ref 30–36.5)
MCV RBC AUTO: 88.8 FL (ref 80–99)
P-R INTERVAL, ECG05: 192 MS
PLATELET # BLD AUTO: 185 K/UL (ref 150–400)
PROTHROMBIN TIME: 16.8 SEC (ref 9–11.1)
Q-T INTERVAL, ECG07: 378 MS
QRS DURATION, ECG06: 96 MS
QTC CALCULATION (BEZET), ECG08: 549 MS
RBC # BLD AUTO: 3.13 M/UL (ref 3.8–5.2)
VENTRICULAR RATE, ECG03: 127 BPM
WBC # BLD AUTO: 7.1 K/UL (ref 3.6–11)

## 2017-10-18 PROCEDURE — 36415 COLL VENOUS BLD VENIPUNCTURE: CPT | Performed by: INTERNAL MEDICINE

## 2017-10-18 PROCEDURE — 93041 RHYTHM ECG TRACING: CPT

## 2017-10-18 PROCEDURE — 85027 COMPLETE CBC AUTOMATED: CPT | Performed by: INTERNAL MEDICINE

## 2017-10-18 PROCEDURE — 74011250637 HC RX REV CODE- 250/637: Performed by: INTERNAL MEDICINE

## 2017-10-18 PROCEDURE — 65660000000 HC RM CCU STEPDOWN

## 2017-10-18 PROCEDURE — 85610 PROTHROMBIN TIME: CPT | Performed by: INTERNAL MEDICINE

## 2017-10-18 RX ORDER — WARFARIN SODIUM 5 MG/1
5 TABLET ORAL ONCE
Status: COMPLETED | OUTPATIENT
Start: 2017-10-18 | End: 2017-10-18

## 2017-10-18 RX ADMIN — LEVETIRACETAM 500 MG: 500 TABLET, FILM COATED ORAL at 08:03

## 2017-10-18 RX ADMIN — Medication 10 ML: at 05:52

## 2017-10-18 RX ADMIN — WARFARIN SODIUM 5 MG: 5 TABLET ORAL at 17:25

## 2017-10-18 RX ADMIN — ACETAMINOPHEN 650 MG: 325 TABLET ORAL at 21:25

## 2017-10-18 RX ADMIN — PROPAFENONE HYDROCHLORIDE 150 MG: 150 TABLET, FILM COATED ORAL at 14:30

## 2017-10-18 RX ADMIN — LATANOPROST 1 DROP: 50 SOLUTION OPHTHALMIC at 21:25

## 2017-10-18 RX ADMIN — AMITRIPTYLINE HYDROCHLORIDE 10 MG: 10 TABLET, FILM COATED ORAL at 21:25

## 2017-10-18 RX ADMIN — Medication 10 ML: at 14:31

## 2017-10-18 RX ADMIN — FERROUS SULFATE TAB 325 MG (65 MG ELEMENTAL FE) 325 MG: 325 (65 FE) TAB at 08:03

## 2017-10-18 RX ADMIN — Medication 10 ML: at 21:25

## 2017-10-18 RX ADMIN — LEVETIRACETAM 500 MG: 500 TABLET, FILM COATED ORAL at 17:25

## 2017-10-18 RX ADMIN — PROPAFENONE HYDROCHLORIDE 150 MG: 150 TABLET, FILM COATED ORAL at 05:52

## 2017-10-18 RX ADMIN — MULTIPLE VITAMINS W/ MINERALS TAB 1 TABLET: TAB at 08:07

## 2017-10-18 RX ADMIN — PROPAFENONE HYDROCHLORIDE 150 MG: 150 TABLET, FILM COATED ORAL at 21:25

## 2017-10-18 RX ADMIN — FERROUS SULFATE TAB 325 MG (65 MG ELEMENTAL FE) 325 MG: 325 (65 FE) TAB at 17:25

## 2017-10-18 NOTE — PROGRESS NOTES
1900- Bedside shift change report given to TIMA Gramajo RN (oncoming nurse) by William Staton RN(offgoing nurse). Report included the following information SBAR, Kardex, Procedure Summary, Intake/Output, MAR, Recent Results, Med Rec Status and Cardiac Rhythm afib     2000- pt assessed, able to communicate her needs, pt ambulates with a wheeled walker to bathroom, small amount of bright red blood seen in toilet. Pt states she has a hemorrhoid that bleeds occasionally will continue to monitor.

## 2017-10-18 NOTE — PROGRESS NOTES
Problem: Falls - Risk of  Goal: *Absence of Falls  Document Corey Fall Risk and appropriate interventions in the flowsheet.    Outcome: Progressing Towards Goal  Fall Risk Interventions:  Mobility Interventions: Patient to call before getting OOB         Medication Interventions: Patient to call before getting OOB    Elimination Interventions: Call light in reach, Toileting schedule/hourly rounds, Patient to call for help with toileting needs    History of Falls Interventions: Door open when patient unattended, Room close to nurse's station

## 2017-10-18 NOTE — PROGRESS NOTES
Cardiology Progress Note  10/18/2017 8:58 AM  Admit Date: 10/15/2017  Admit Diagnosis/CC: A-fib [facility])  UTI (urinary tract infection)  Subjective:   Patient reports:  Chest Pain:  [x] none,  consistent with [] non-cardiac  [] atypical  []  anginal chest pain             [x] none now    []  on-going  Dyspnea: [x] none  [] at rest  [] with exertion  [] improved  [] unchanged [] worsening  PND:       [x] none  [] overnight   [] current  Orthopnea: [x] none  [] improved  [] unchanged  [] worsening  Presyncope: [x] none  [] improved  [] unchanged  [] worsening  Ambulated in hallway without symptoms  [] Yes  Ambulated in room without symptoms  [x] Yes  ROS(2+other systems)   Hematuria: [] Yes  [x] No.   Dysuria: [] Yes  [x] No                                           Cough:       [] Yes  [x] No.   Sputum: [] Yes  [x] No                                            Hematochezia: [] Yes  [x] No.   Melena: [] Yes  [x] No                                            No change in family and social history from H&P/Consult note.     Current Facility-Administered Medications   Medication Dose Route Frequency    polyethylene glycol (MIRALAX) packet 17 g  17 g Oral DAILY PRN    propafenone (RYTHMOL) tablet 150 mg  150 mg Oral Q8H    vitamins  A,C,E-zinc-copper (I-LEROY PROTECT) tablet 1 Tab  1 Tab Oral DAILY    docusate sodium (COLACE) capsule 100 mg  100 mg Oral DAILY    sodium chloride (NS) flush 5-10 mL  5-10 mL IntraVENous Q8H    sodium chloride (NS) flush 5-10 mL  5-10 mL IntraVENous PRN    acetaminophen (TYLENOL) tablet 650 mg  650 mg Oral Q4H PRN    bisacodyl (DULCOLAX) tablet 5 mg  5 mg Oral DAILY PRN    ferrous sulfate tablet 325 mg  1 Tab Oral BID WITH MEALS    amitriptyline (ELAVIL) tablet 10-15 mg  10-15 mg Oral QHS    latanoprost (XALATAN) 0.005 % ophthalmic solution 1 Drop  1 Drop Both Eyes QHS    levETIRAcetam (KEPPRA) tablet 500 mg  500 mg Oral BID    WARFARIN INFORMATION NOTE (COUMADIN)   Other QPM Objective:    Physical Exam:  Last VS:   Visit Vitals    /68 (BP 1 Location: Right arm, BP Patient Position: Supine)    Pulse (!) 124    Temp 98 °F (36.7 °C)    Resp 18    Ht 5' 3\" (1.6 m)    Wt 70.1 kg (154 lb 8.7 oz)    SpO2 98%    Breastfeeding No    BMI 27.38 kg/m2    Temp (24hrs), Av.6 °F (37 °C), Min:98 °F (36.7 °C), Max:99.7 °F (37.6 °C)    24 hr VS reviewed. General Appearance:   [x] well developed, well nourished,  [x] NAD. [] agitated   [] lethargic but arousable  [] obtunded   ENT, Palate:    [x] WNL   [] dry palate/MM        Respiratory:    [x] CTA bilateral  [] rales  [] rhonchi  [] normal resp effort      [] similar to yesterday   [] worse    [] improved   Cardiovascular:   [x] RRR   [] Irregular rate and rhythm   [x] Normal S1, S2   [x] No gallop or rub. [] no murmur   [x] no new murmur  [] murmur c/w:   [x] no edema  RLE: []1+ []2+ [] 3+;  LLE: []1+ []2+ []3+      [] edema similar to yesterday   [] worse    [] improved   [x] normal JVP   [] Elevated JVP    [x] JVP similar to yesterday   [] worse    [] improved   [x] carotid upstroke unchanged   [x] abd aorta not palpated   [x] no stigmata of peripheral emboli   GI:    [x] abd soft, non-distented,bowel sounds present, no                     organomegaly appreciated   Skin:  Neuro:    Cath Site:  [x] warm and dry [] cold extremities   [x] A/O x 3, grossly non-focal      [] intact w/o hematoma or bruit; distal pulse unchanged.               Data Review:   Labs:    Recent Results (from the past 24 hour(s))   ECG RHYTHM ANALYSIS ADULT    Collection Time: 10/17/17  9:20 AM   Result Value Ref Range    Ventricular Rate 89 BPM    Atrial Rate 300 BPM    QRS Duration 86 ms    Q-T Interval 374 ms    QTC Calculation (Bezet) 455 ms    Calculated R Axis 25 degrees    Calculated T Axis 46 degrees    Diagnosis       Atrial flutter with variable AV block  Minimal voltage criteria for LVH, may be normal variant  When compared with ECG of 15-OCT-2017 09:05,  No significant change was found  Confirmed by Adan Alejandro ([id number]) on 10/17/2017 10:14:48 AM     PROTHROMBIN TIME + INR    Collection Time: 10/18/17  3:06 AM   Result Value Ref Range    INR 1.6 (H) 0.9 - 1.1      Prothrombin time 16.8 (H) 9.0 - 11.1 sec   CBC W/O DIFF    Collection Time: 10/18/17  3:06 AM   Result Value Ref Range    WBC 7.1 3.6 - 11.0 K/uL    RBC 3.13 (L) 3.80 - 5.20 M/uL    HGB 9.1 (L) 11.5 - 16.0 g/dL    HCT 27.8 (L) 35.0 - 47.0 %    MCV 88.8 80.0 - 99.0 FL    MCH 29.1 26.0 - 34.0 PG    MCHC 32.7 30.0 - 36.5 g/dL    RDW 14.6 (H) 11.5 - 14.5 %    PLATELET 742 261 - 106 K/uL   ECG RHYTHM ANALYSIS ADULT    Collection Time: 10/18/17  8:51 AM   Result Value Ref Range    Ventricular Rate 127 BPM    Atrial Rate 129 BPM    P-R Interval 192 ms    QRS Duration 96 ms    Q-T Interval 378 ms    QTC Calculation (Bezet) 549 ms    Calculated R Axis 49 degrees    Calculated T Axis 59 degrees    Diagnosis       Sinus tachycardia with premature atrial complexes  When compared with ECG of 17-OCT-2017 09:20,  Sinus rhythm has replaced Atrial flutter         Provided Telemetry: [x] sinus  [] chronic afib   [] paroxysmal afib  [] NSVT      Assessment:     Active Problems:    UTI (urinary tract infection) (10/15/2017)      A-fib ([address] (10/15/2017)        Plan:     Atrial Fibrillation - Flutter  improved   Continue current meds   she needs therapeutic INR tolerating propafenone ok so far . No ecg done.l    For all other plans, see orders.    [x] High complexity decision making was performed

## 2017-10-18 NOTE — PROGRESS NOTES
Spiritual Care Assessment/Progress Notes    Shamir Appiah [id number]  [id number]    11/16/1924  80 y.o.  female    Patient Telephone Number: [phone] (home)   Anglican Affiliation: [place]   Language: English   Extended Emergency Contact Information  Primary Emergency Contact: Mildred Brown Phone: [phone]  Relation: Child   Patient Active Problem List    Diagnosis Date Noted    UTI (urinary tract infection) 10/15/2017    A-fib ([facility] 10/15/2017    Atrial fibrillation with RVR ([address] 08/05/2017    Back pain 06/02/2017    Right hip pain 06/02/2017    Falls 06/02/2017    Constipation 06/01/2017    Compression fracture of L1 lumbar vertebra ([address] 06/01/2017    Complex partial seizure with impairment of consciousness ([facility] 06/15/2016    Unspecified hereditary and idiopathic peripheral neuropathy 07/01/2013    B12 deficiency 07/01/2013    Type II or unspecified type diabetes mellitus with neurological manifestations, not stated as uncontrolled(250.60) ([address] 07/01/2013    Polyneuropathy in diabetes(357.2) 07/01/2013    Ataxia 12/31/2012    Osteoarthritis of hip 07/11/2012    PAF (paroxysmal atrial fibrillation) ([facility] 07/11/2012        Date: 10/18/2017       Level of Anglican/Spiritual Activity:  [x]         Involved in yogesh tradition/spiritual practice    []         Not involved in yogesh tradition/spiritual practice  [x]         Spiritually oriented    []         Claims no spiritual orientation    []         seeking spiritual identity  []         Feels alienated from Zoroastrianism practice/tradition  []         Feels angry about Zoroastrianism practice/tradition  [x]         Spirituality/Zoroastrianism tradition is a resource for coping at this time.   []         Not able to assess due to medical condition    Services Provided Today:  []         crisis intervention    []         reading Scriptures  [x]         spiritual assessment    [x]         prayer  [x]         empathic listening/emotional support  []         rites and rituals (cite in comments)  []         life review     []         Pentecostal support  []         theological development    []         advocacy  []         ethical dialog     []         blessing  []         bereavement support    []         support to family  []         anticipatory grief support   []         help with AMD  []         spiritual guidance    []         meditation      Spiritual Care Needs  []         Emotional Support  []         Spiritual/Shinto Care  []         Loss/Adjustment  []         Advocacy/Referral                /Ethics  [x]         No needs expressed at               this time  []         Other: (note in               comments)  Spiritual Care Plan  []         Follow up visits with               pt/family  []         Provide materials  []         Schedule sacraments  []         Contact Community               Clergy  [x]         Follow up as needed  []         Other: (note in               comments)     Comments:  Initial visit on PCU for spiritual assessment. No visitors present. Provided listening presence and pastoral support as patient shared about her medical challenges over the past several months. She said this is the third time she has been hospitalized since May. Patient lives with her daughter and son-in-law and also has a son. She is [place], but attends a [employer]. Her  visited earlier this afternoon. Patient shared she was  to her  for 60 years before his death 6 years ago. She worked as a nurse. Mrs. Bea Gil also shared her sight is failing and she is no longer able to read as she once did--she said she was always studying subjects of interest.  She now relies on books on tape and for scripture she has memory verses she recites or calls to mind. Mrs. Bea Gil was receptive to assurance of prayer. Advised her of  availability. She is open to  visits anytime.   [facility] ROOSEVELT Carroll, [facility], [address] [place] [address] Paging Service  [phone])

## 2017-10-18 NOTE — PROGRESS NOTES
Pharmacy Daily Dosing of Warfarin    Indication: Paroxysmal Atrial Fibrillation    Goal INR: 2-3    PTA Warfarin Dose:   2.5mg tablets  2.5mg MWF  5mg T/Th/Sa/Sn    Last dose = 5mg taken morning of admission    Concurrent anticoagulants: none    Concurrent antiplatelet: none    Major Interacting Medications    Drugs that may increase INR: none   Drugs that may decrease INR: none    Conditions that may increase/decrease INR (CHF, C. diff, cirrhosis, thyroid disorder, hypoalbuminemia): Alb 2.7    Labs:  Recent Labs      10/18/17   0306  10/17/17   0438  10/16/17   0423   INR  1.6*  1.5*  1.6*   HGB  9.1*   --   8.6*   PLT  185   --   156   SGOT   --    --   14*   TBILI   --    --   0.3     Albumin   Date/Time Value Ref Range Status   10/16/2017 04:23 AM 2.6 (L) 3.5 - 5.0 g/dL Final     Date    INR    Dose  10/15    1.3        5mg (PTA today) + 2.5mg = 7.5mg   10/16      1.6        5 mg  10/17      1.5        7.5mg   10/18      1.6        5      Impression/Plan:   - INR 1.6  - Will order warfarin 5 mg PO tonight  - CBC w/o diff every other day, INR daily     Pharmacy will follow daily and adjust the dose as appropriate. Thanks  David Coburn PHARMD      [email or web address]/[email or web address]. pdf

## 2017-10-18 NOTE — PROGRESS NOTES
Hospitalist Progress Note    NAME: Javi Worthington   :  1924   MRN:  [id number]       Assessment / Plan:  Atrial Fibrillation with RVR with associated weakness  Hypertension  -continue Rhythmol per Dr. Frieda Torres, discussed case with him this am  -no longer on Coreg with BP being the lower limit of normal  -off amiodarone secondary to pulmonary issues ( was seen by Dr. Caty Meza 1 month ago)  -INR 1.6, on coumadin, Appreciate pharmacy dosing     UTI outside records reviewed   -discontinued rocephin as UA on 10/15 benign and no UCx sent      CKD stage III:   -renal function at baseline, continue to monitor      L1 compression fracture  -tylenol prn       Seizure disorder  -continue keppra      Macular Degeneration    Code Status: has DDNR  Surrogate Decision Maker: sandy      DVT Prophylaxis: NI  GI Prophylaxis: not indicated      Baseline: Lives at home         Body mass index is 27.38 kg/(m^2). Subjective:     Chief Complaint / Reason for Physician Visit: follow-up afib with RVR  Patient seen earlier this am and HR up to 150 after ambulation with PT yesterday  Patient denies any complaints other than malaise    Review of Systems:  Symptom Y/N Comments  Symptom Y/N Comments   Fever/Chills n   Chest Pain n    Poor Appetite    Edema     Cough    Abdominal Pain n    Sputum    Joint Pain     SOB/SANTOS n   Pruritis/Rash     Nausea/vomit    Tolerating PT/OT     Diarrhea n   Tolerating Diet y    Constipation n   Other       Could NOT obtain due to:      Objective:     VITALS:   Last 24hrs VS reviewed since prior progress note.  Most recent are:  Patient Vitals for the past 24 hrs:   Temp Pulse Resp BP SpO2   10/18/17 1220 98.2 °F (36.8 °C) (!) 118 17 113/61 95 %   10/18/17 0800 98 °F (36.7 °C) (!) 122 18 121/75 97 %   10/18/17 0259 98 °F (36.7 °C) (!) 124 18 125/68 98 %   10/17/17 2319 98.1 °F (36.7 °C) (!) 125 17 119/71 94 %   10/17/17 1933 99.7 °F (37.6 °C) (!) 125 16 104/67 96 %   10/17/17 1714 98.8 °F (37.1 °C) (!) 122 16 116/58 94 %   10/17/17 1702 - (!) 109 - - 97 %   10/17/17 1638 - 98 - - 98 %       Intake/Output Summary (Last 24 hours) at 10/18/17 1547  Last data filed at 10/17/17 1714   Gross per 24 hour   Intake              360 ml   Output              250 ml   Net              110 ml        PHYSICAL EXAM:  General: WD, WN. Alert, cooperative, no acute distress    EENT:  EOMI. Anicteric sclerae. MMM  Resp:  CTA bilaterally, no wheezing or rales. No accessory muscle use  CV:  Irregular with tachycardia to 120's,  trace BLE edema  GI:  Soft, Non distended, Non tender.  +Bowel sounds  Neurologic:  Alert and oriented X 3, normal speech,   Psych:   Good insight. Not anxious nor agitated  Skin:  No rashes. No jaundice    Reviewed most current lab test results and cultures  YES  Reviewed most current radiology test results   YES  Review and summation of old records today    NO  Reviewed patient's current orders and MAR    YES  PMH/SH reviewed - no change compared to H&P  ________________________________________________________________________  Care Plan discussed with:    Comments   Patient y    Family      RN y    Care Manager     Consultant  y Dr. Mandie Choi team rounds were held today with , nursing, pharmacist and clinical coordinator. Patient's plan of care was discussed; medications were reviewed and discharge planning was addressed. ________________________________________________________________________  Total NON critical care TIME:  20  Minutes    Total CRITICAL CARE TIME Spent:   Minutes non procedure based      Comments   >50% of visit spent in counseling and coordination of care     ________________________________________________________________________  Ting Cheek MD     Procedures: see electronic medical records for all procedures/Xrays and details which were not copied into this note but were reviewed prior to creation of Plan.       LABS:  I reviewed today's most current labs and imaging studies.   Pertinent labs include:  Recent Labs      10/18/17   0306  10/16/17   0423   WBC  7.1  5.8   HGB  9.1*  8.6*   HCT  27.8*  26.5*   PLT  185  156     Recent Labs      10/18/17   0306  10/17/17   0438  10/16/17   0423   NA   --    --   137   K   --    --   3.8   CL   --    --   106   CO2   --    --   24   GLU   --    --   119*   BUN   --    --   27*   CREA   --    --   0.83   CA   --    --   8.1*   MG   --    --   2.3   ALB   --    --   2.6*   TBILI   --    --   0.3   SGOT   --    --   14*   ALT   --    --   15   INR  1.6*  1.5*  1.6*       Signed: Harini Camarena MD

## 2017-10-19 ENCOUNTER — APPOINTMENT (OUTPATIENT)
Dept: GENERAL RADIOLOGY | Age: 82
DRG: 242 | End: 2017-10-19
Attending: INTERNAL MEDICINE
Payer: MEDICARE

## 2017-10-19 LAB
ALBUMIN SERPL-MCNC: 2.7 G/DL (ref 3.5–5)
ALBUMIN/GLOB SERPL: 0.6 {RATIO} (ref 1.1–2.2)
ALP SERPL-CCNC: 77 U/L (ref 45–117)
ALT SERPL-CCNC: 17 U/L (ref 12–78)
ANION GAP SERPL CALC-SCNC: 5 MMOL/L (ref 5–15)
AST SERPL-CCNC: 24 U/L (ref 15–37)
ATRIAL RATE: 133 BPM
ATRIAL RATE: 271 BPM
BILIRUB SERPL-MCNC: 0.3 MG/DL (ref 0.2–1)
BUN SERPL-MCNC: 29 MG/DL (ref 6–20)
BUN/CREAT SERPL: 28 (ref 12–20)
CALCIUM SERPL-MCNC: 8.5 MG/DL (ref 8.5–10.1)
CALCULATED R AXIS, ECG10: 43 DEGREES
CALCULATED R AXIS, ECG10: 48 DEGREES
CALCULATED T AXIS, ECG11: 54 DEGREES
CALCULATED T AXIS, ECG11: 66 DEGREES
CHLORIDE SERPL-SCNC: 106 MMOL/L (ref 97–108)
CO2 SERPL-SCNC: 25 MMOL/L (ref 21–32)
CREAT SERPL-MCNC: 1.03 MG/DL (ref 0.55–1.02)
DIAGNOSIS, 93000: NORMAL
DIAGNOSIS, 93000: NORMAL
ERYTHROCYTE [DISTWIDTH] IN BLOOD BY AUTOMATED COUNT: 14.7 % (ref 11.5–14.5)
GLOBULIN SER CALC-MCNC: 4.3 G/DL (ref 2–4)
GLUCOSE SERPL-MCNC: 89 MG/DL (ref 65–100)
HCT VFR BLD AUTO: 27.1 % (ref 35–47)
HGB BLD-MCNC: 8.8 G/DL (ref 11.5–16)
INR PPP: 2.3 (ref 0.9–1.1)
MAGNESIUM SERPL-MCNC: 2.4 MG/DL (ref 1.6–2.4)
MCH RBC QN AUTO: 29 PG (ref 26–34)
MCHC RBC AUTO-ENTMCNC: 32.5 G/DL (ref 30–36.5)
MCV RBC AUTO: 89.4 FL (ref 80–99)
PHOSPHATE SERPL-MCNC: 3.7 MG/DL (ref 2.6–4.7)
PLATELET # BLD AUTO: 213 K/UL (ref 150–400)
POTASSIUM SERPL-SCNC: 4.8 MMOL/L (ref 3.5–5.1)
PROT SERPL-MCNC: 7 G/DL (ref 6.4–8.2)
PROTHROMBIN TIME: 24 SEC (ref 9–11.1)
Q-T INTERVAL, ECG07: 316 MS
Q-T INTERVAL, ECG07: 332 MS
QRS DURATION, ECG06: 82 MS
QRS DURATION, ECG06: 86 MS
QTC CALCULATION (BEZET), ECG08: 439 MS
QTC CALCULATION (BEZET), ECG08: 491 MS
RBC # BLD AUTO: 3.03 M/UL (ref 3.8–5.2)
SODIUM SERPL-SCNC: 136 MMOL/L (ref 136–145)
TROPONIN I SERPL-MCNC: <0.04 NG/ML
TROPONIN I SERPL-MCNC: <0.04 NG/ML
VENTRICULAR RATE, ECG03: 116 BPM
VENTRICULAR RATE, ECG03: 132 BPM
WBC # BLD AUTO: 6.5 K/UL (ref 3.6–11)

## 2017-10-19 PROCEDURE — 83735 ASSAY OF MAGNESIUM: CPT | Performed by: INTERNAL MEDICINE

## 2017-10-19 PROCEDURE — 93306 TTE W/DOPPLER COMPLETE: CPT

## 2017-10-19 PROCEDURE — 36415 COLL VENOUS BLD VENIPUNCTURE: CPT | Performed by: INTERNAL MEDICINE

## 2017-10-19 PROCEDURE — 93041 RHYTHM ECG TRACING: CPT

## 2017-10-19 PROCEDURE — 74011250637 HC RX REV CODE- 250/637: Performed by: INTERNAL MEDICINE

## 2017-10-19 PROCEDURE — 85027 COMPLETE CBC AUTOMATED: CPT | Performed by: INTERNAL MEDICINE

## 2017-10-19 PROCEDURE — 85610 PROTHROMBIN TIME: CPT | Performed by: INTERNAL MEDICINE

## 2017-10-19 PROCEDURE — 90686 IIV4 VACC NO PRSV 0.5 ML IM: CPT | Performed by: INTERNAL MEDICINE

## 2017-10-19 PROCEDURE — 71010 XR CHEST PORT: CPT

## 2017-10-19 PROCEDURE — 84100 ASSAY OF PHOSPHORUS: CPT | Performed by: INTERNAL MEDICINE

## 2017-10-19 PROCEDURE — 90471 IMMUNIZATION ADMIN: CPT

## 2017-10-19 PROCEDURE — 74011250636 HC RX REV CODE- 250/636: Performed by: INTERNAL MEDICINE

## 2017-10-19 PROCEDURE — 74011000250 HC RX REV CODE- 250: Performed by: INTERNAL MEDICINE

## 2017-10-19 PROCEDURE — 80053 COMPREHEN METABOLIC PANEL: CPT | Performed by: INTERNAL MEDICINE

## 2017-10-19 PROCEDURE — 84484 ASSAY OF TROPONIN QUANT: CPT | Performed by: INTERNAL MEDICINE

## 2017-10-19 PROCEDURE — 74011000258 HC RX REV CODE- 258: Performed by: INTERNAL MEDICINE

## 2017-10-19 PROCEDURE — 65660000000 HC RM CCU STEPDOWN

## 2017-10-19 RX ORDER — GUAIFENESIN 100 MG/5ML
324 LIQUID (ML) ORAL
Status: COMPLETED | OUTPATIENT
Start: 2017-10-19 | End: 2017-10-19

## 2017-10-19 RX ORDER — MORPHINE SULFATE 4 MG/ML
1 INJECTION, SOLUTION INTRAMUSCULAR; INTRAVENOUS ONCE
Status: COMPLETED | OUTPATIENT
Start: 2017-10-19 | End: 2017-10-19

## 2017-10-19 RX ORDER — WARFARIN 2.5 MG/1
2.5 TABLET ORAL ONCE
Status: DISCONTINUED | OUTPATIENT
Start: 2017-10-19 | End: 2017-10-19

## 2017-10-19 RX ADMIN — PHYTONADIONE 5 MG: 10 INJECTION, EMULSION INTRAMUSCULAR; INTRAVENOUS; SUBCUTANEOUS at 18:34

## 2017-10-19 RX ADMIN — DOCUSATE SODIUM 100 MG: 100 CAPSULE, LIQUID FILLED ORAL at 08:39

## 2017-10-19 RX ADMIN — MORPHINE SULFATE 1 MG: 4 INJECTION, SOLUTION INTRAMUSCULAR; INTRAVENOUS at 10:34

## 2017-10-19 RX ADMIN — FERROUS SULFATE TAB 325 MG (65 MG ELEMENTAL FE) 325 MG: 325 (65 FE) TAB at 08:39

## 2017-10-19 RX ADMIN — PROPAFENONE HYDROCHLORIDE 150 MG: 150 TABLET, FILM COATED ORAL at 05:32

## 2017-10-19 RX ADMIN — SODIUM CHLORIDE 5 MG/HR: 900 INJECTION, SOLUTION INTRAVENOUS at 10:06

## 2017-10-19 RX ADMIN — ASPIRIN 81 MG 324 MG: 81 TABLET ORAL at 10:48

## 2017-10-19 RX ADMIN — INFLUENZA VIRUS VACCINE 0.5 ML: 15; 15; 15; 15 SUSPENSION INTRAMUSCULAR at 08:39

## 2017-10-19 RX ADMIN — SODIUM CHLORIDE 10 MG/HR: 900 INJECTION, SOLUTION INTRAVENOUS at 19:54

## 2017-10-19 RX ADMIN — Medication 10 ML: at 22:06

## 2017-10-19 RX ADMIN — MULTIPLE VITAMINS W/ MINERALS TAB 1 TABLET: TAB at 08:40

## 2017-10-19 RX ADMIN — LEVETIRACETAM 500 MG: 500 TABLET, FILM COATED ORAL at 18:35

## 2017-10-19 RX ADMIN — LATANOPROST 1 DROP: 50 SOLUTION OPHTHALMIC at 22:06

## 2017-10-19 RX ADMIN — Medication 10 ML: at 13:02

## 2017-10-19 RX ADMIN — LEVETIRACETAM 500 MG: 500 TABLET, FILM COATED ORAL at 08:39

## 2017-10-19 RX ADMIN — PROPAFENONE HYDROCHLORIDE 150 MG: 150 TABLET, FILM COATED ORAL at 22:06

## 2017-10-19 RX ADMIN — FERROUS SULFATE TAB 325 MG (65 MG ELEMENTAL FE) 325 MG: 325 (65 FE) TAB at 18:35

## 2017-10-19 RX ADMIN — AMITRIPTYLINE HYDROCHLORIDE 10 MG: 10 TABLET, FILM COATED ORAL at 22:05

## 2017-10-19 RX ADMIN — PROPAFENONE HYDROCHLORIDE 150 MG: 150 TABLET, FILM COATED ORAL at 14:14

## 2017-10-19 RX ADMIN — ACETAMINOPHEN 162.5 MG: 325 TABLET ORAL at 23:58

## 2017-10-19 RX ADMIN — Medication 10 ML: at 05:32

## 2017-10-19 NOTE — PROGRESS NOTES
Chart reviewed for updates, noted patient having onset of chest pain this morning and discussed case with Dr. Wayne Bae. Work up has been initiated regarding chest pain, but Dr. Wayne Bae is asking that OT be deferred pending completion of work up. OT will likely defer for the day and follow back tomorrow if patient is medically appropriate for participation in therapy.

## 2017-10-19 NOTE — PROGRESS NOTES
Pharmacy Daily Dosing of Warfarin    Indication: Paroxysmal Atrial Fibrillation    Goal INR: 2-3    PTA Warfarin Dose:   2.5mg tablets  2.5mg MWF  5mg T/Th/Sa/Sn    Last dose = 5mg taken morning of admission    Concurrent anticoagulants: none    Concurrent antiplatelet: none    Major Interacting Medications    Drugs that may increase INR: none   Drugs that may decrease INR: none    Conditions that may increase/decrease INR (CHF, C. diff, cirrhosis, thyroid disorder, hypoalbuminemia): Alb 2.7    Labs:  Recent Labs      10/19/17   0329  10/18/17   0306   10/17/17   0438   INR  2.3*  1.6*   --   1.5*   HGB  8.8*  9.1*   < >   --    PLT  213  185   --    --    SGOT  24   --    --    --    TBILI  0.3   --    --    --     < > = values in this interval not displayed. Albumin   Date/Time Value Ref Range Status   10/16/2017 04:23 AM 2.6 (L) 3.5 - 5.0 g/dL Final     Date    INR    Dose  10/15    1.3        5mg (PTA today) + 2.5mg = 7.5mg   10/16      1.6        5 mg  10/17      1.5        7.5mg   10/18      1.6        5  10/19      2.3        2.5    Impression/Plan:   - INR 2.3  - Will order warfarin 2.5 mg PO tonight  - Might get a cardioversion, possibly today  - CBC w/o diff every other day, INR daily     Pharmacy will follow daily and adjust the dose as appropriate. Thanks  Ruth Ann Velez PHARMD      [email or web address]. [email or web address]

## 2017-10-19 NOTE — PROGRESS NOTES
EP/Arrhythmia Progress Note    Patient ID:  Patient: Daniel Kunz  MRN: [id number]  Age: 80 y.o.  : 1924    Date of  Admission: 10/15/2017  9:02 AM   PCP:  MD Risa   Usual cardiologist:  Tamea Shone, MD    Assessment: 1. Atypical atrial flutter (treat like atrial fibrillation), recurrent after cardioversion and persistent. Very difficult to rate control with IV medication. On IV diltiazem, HR's still >100 on average, BP too low to increase. 2. Unable to take amiodarone due to existing lung disease (see CT scan 2017). Had been on amio before stopped 2017.  3. Echo today showed EF 15-20%, severe global hypokinesis, biatrial enlargement, mod-severe MR. Cardiomyopathy NOS, probably nonischemic but not evaluated. 4. Chronic systolic CHF class 3.  5. 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 Unclear etiology. 6. UTI diagnosis initially, but 10/15 U/A benign per hospitalist.  7. CKD stage 3.   8. Small hiatal hernia. 9. Anemia NOS with Hgb starting in 9's here. 10. Seizure disorder, on Keppra. 11. Lumbar compression fracture, can't lay flat easily. 12. DDNR. Plan:     1. Continue IV diltiazem for intermittent lenient rate control. 2. Daughter checks pulse frequently at home, says it only recently increased. Argues against tachycardia-mediated cardiomyopathy if not tachycardic for that long. This doesn't appear to be an acute infarct or myocarditis or takotsubo, other acute issues that can drop the EF quickly. 3. I will stop propafenone, I think the failure rate will be high and proarrhythmic risk is high. I don't think antiarrhythmic therapy will be useful at this time. 4. I discussed the potential benefits, risks (including bleeding, infection, pacer issue, perforation, med reaction, and rarely death) with the patient and daughter regarding pacemaker-AV node ablation. They are both willing to go this route.   Got the son on the phone as well to discuss and explain the options. 5. Will give vitamin K po today to attempt reversal of INR for possible procedure tomorrow. I spent a lot of time going over the situation--given her newly-diagnosed cardiomyopathy, I think rate control is imperative. She may have CAD, defer to Dr. Cesar Fernandez on how to manage this. Will see if we can do a pacemaker-AV node ablation tomorrow if we can get her lying relatively flat from a heart failure and back pain standpoint, and her INR is not climbing. I don't thing there are any viable options other than pacemaker-AV node ablation at this point IF measure will be taken to manage her situation. If an ischemia evaluation is needed, then this can be done, again defer to Dr. Cesar Fernandez on this. Patient and daughter are well aware of the risk of any invasive procedure. She is NPO after MN, so an ischemia evaluation can proceed if needed. [x]       High complexity decision making was performed in this patient at high risk for decompensation with multiple organ involvement. Antony Sykes is a 80 y.o. female told to come here with a UTI dx with a history of Afib/flutter with RVR and found to be in such in the ER. Was cardioverted in 8/2017, on amiodarone, but then amiodarone stopped after about 2 years of use due to some abnormal lung findings on CT 8/2017. She is currently on warfarin for anticoagulation PTA. She had sudden onset of racing palpitations the day before admission. This seemed to coincide with her UTI dx. A U/A 10/15 did not suggest a UTI so she was taken off antibiotics here. TODAY:  She denies syncope, dizziness, palpitations, but feel weak.   She is SOB with minimal activity, consistent with class 3 CHF sx at best.      Allergies   Allergen Reactions    Codeine Nausea Only    Pcn [Penicillins] Unknown (comments)     Cant remember reaction as a child    Tetanus Vaccines And Toxoid Unknown (comments)     Does not take tetanus due to tb hx    Vibramycin [Doxycycline Calcium] Rash          Current Facility-Administered Medications   Medication Dose Route Frequency    dilTIAZem (CARDIZEM) 100 mg in 0.9% sodium chloride (MBP/ADV) 100 mL infusion  10 mg/hr IntraVENous CONTINUOUS    phytonadione (VITAMIN K) 1 mg/mL oral solution 5 mg  5 mg Oral NOW    polyethylene glycol (MIRALAX) packet 17 g  17 g Oral DAILY PRN    propafenone (RYTHMOL) tablet 150 mg  150 mg Oral Q8H    vitamins  A,C,E-zinc-copper (I-LEROY PROTECT) tablet 1 Tab  1 Tab Oral DAILY    docusate sodium (COLACE) capsule 100 mg  100 mg Oral DAILY    sodium chloride (NS) flush 5-10 mL  5-10 mL IntraVENous Q8H    sodium chloride (NS) flush 5-10 mL  5-10 mL IntraVENous PRN    acetaminophen (TYLENOL) tablet 650 mg  650 mg Oral Q4H PRN    bisacodyl (DULCOLAX) tablet 5 mg  5 mg Oral DAILY PRN    ferrous sulfate tablet 325 mg  1 Tab Oral BID WITH MEALS    amitriptyline (ELAVIL) tablet 10-15 mg  10-15 mg Oral QHS    latanoprost (XALATAN) 0.005 % ophthalmic solution 1 Drop  1 Drop Both Eyes QHS    levETIRAcetam (KEPPRA) tablet 500 mg  500 mg Oral BID       Review of Symptoms:    Respiratory: Positive for cough and shortness of breath. Cardiovascular: Positive for palpitations. Negative for chest pain. Gastrointestinal: Negative. Negative for abdominal pain, nausea and vomiting.         Objective:      Physical Exam:  Temp (24hrs), Av.1 °F (36.7 °C), Min:97.8 °F (36.6 °C), Max:98.9 °F (37.2 °C)    Patient Vitals for the past 8 hrs:   Pulse   10/19/17 1414 (!) 115   10/19/17 1100 (!) 124   10/19/17 1014 (!) 128    Patient Vitals for the past 8 hrs:   Resp   10/19/17 1100 16    Patient Vitals for the past 8 hrs:   BP   10/19/17 1414 105/79   10/19/17 1100 119/75   10/19/17 1014 (!) 136/95          Intake/Output Summary (Last 24 hours) at 10/19/17 9814  Last data filed at 10/18/17 3459   Gross per 24 hour   Intake              200 ml   Output                0 ml   Net              200 ml Nondiaphoretic, not in acute distress. Supple, no palpable thyromegaly. No scleral icterus, mucous membranes moist, conjuctivae pink, no xanthelasma. Unlabored, clear to auscultation bilaterally, symmetric air movement. Irregular rate and rhythm, no murmur, pericardial rub, knock, or gallop. No peripheral edema. Palpable radial pulses bilaterally. Abdomen, soft, nontender, nondistended. Extremities without cyanosis or clubbing. Skin warm and dry. Neuro grossly nonfocal.  No tremor. Awake and appropriate. CARDIOGRAPHICS and STUDIES, I reviewed:    Telemetry:  Atrial flutter with RVR. ECG:  Atypical atrial flutter with variable block 107 bpm.    Echo 8/2017:  LEFT VENTRICLE: Size was normal. Systolic function was mildly reduced. Ejection fraction was estimated to be 45 %. Of note patient was in afib at  time of study. There was mild diffuse hypokinesis. RIGHT VENTRICLE: The ventricle was mildly dilated. Systolic function was  mildly reduced. LEFT ATRIUM: The atrium was mildly dilated. RIGHT ATRIUM: The atrium was mildly dilated. MITRAL VALVE: There was mild thickening. DOPPLER: There was moderate  regurgitation. AORTIC VALVE: The valve was probably trileaflet. DOPPLER: There was no  stenosis. There was no regurgitation. TRICUSPID VALVE: Normal valve structure. DOPPLER: There was mild  regurgitation. Pulmonary artery systolic pressure: 40 mmHg. PULMONIC VALVE: Not well visualized, but normal Doppler findings. AORTA: The root exhibited normal size. PERICARDIUM: There was no pericardial effusion.     Labs:  Recent Labs      10/19/17   1620  10/19/17   1059   TROIQ  <0.04  <0.04     Lab Results   Component Value Date/Time    Cholesterol, total 196 09/12/2012 12:00 AM    HDL Cholesterol 64 09/12/2012 12:00 AM    LDL, calculated 104 09/12/2012 12:00 AM    Triglyceride 142 09/12/2012 12:00 AM     Recent Labs      10/19/17   0329  10/18/17   0306  10/17/17   0438   INR 2. 3*  1.6*  1.5*   PTP  24.0*  16.8*  15.4*      Recent Labs      10/19/17   0329  10/18/17   0306   NA  136   --    K  4.8   --    CL  106   --    CO2  25   --    BUN  29*   --    CREA  1.03*   --    GLU  89   --    PHOS  3.7   --    CA  8.5   --    ALB  2.7*   --    WBC  6.5  7.1   HGB  8.8*  9.1*   HCT  27.1*  27.8*   PLT  213  185     Recent Labs      10/19/17   0329   SGOT  24   AP  77   TP  7.0   ALB  2.7*   GLOB  4.3*     No components found for: GLPOC  No results for input(s): PH, PCO2, PO2 in the last 72 hours.         Tisha Kwok MD  10/19/2017

## 2017-10-19 NOTE — PROGRESS NOTES
Note from NON PRIMARY NURSE. Pt c/o Chest pain, sitting up in the chair. Stat 12 EKG done. 02 applied.  contraindicate nitro at this time. After o2 application after 2 mins, pt feels better than before. cardizem started per . pt remains in the A Fib. 1017  at the bed side. Pt feeling better than before. This was communicated to [employer]. 1018  requested STAT echo. 80 Spoke with [facility], will be here as soon as the order is in.    1020 Spoke with  about echo tech arrival time. He ordered some more labs and meds.

## 2017-10-19 NOTE — CARDIO/PULMONARY
Cardiopulmonary Rehab Nursing Entry:    Chart reviewed secondary to in-basket referral.  Pt admitted with recurrent a-fib with long history of. Currently on cardizem, has episode of chest pain today. Echo pending. Will await echo and lab results to provide cardiac teaching if appropriate.

## 2017-10-19 NOTE — PROGRESS NOTES
PCU SHIFT NURSING NOTE      {[facility] [id number]_RECORDED_WRITTEN:[id number]::Bedside shift change report given to Jane (oncoming nurse) by Valentino Keepers. (offgoing nurse). Report included the following information SBAR, Kardex, Recent Results and Cardiac Rhythm Afib. Shift Summary: Notified Dr. Huseyin Garcias of patient's HR [id number]. Asymptomatic,  b/p 99/67 resting in recliner. No new orders given. Will continue to monitor. Admission Date 10/15/2017   Admission Diagnosis A-fib ([address]  UTI (urinary tract infection)   Consults IP CONSULT TO HOSPITALIST  IP CONSULT TO CARDIOLOGY  IP CONSULT TO CARDIOLOGY        Consults   []PT   []OT   []Speech   []Case Management      [] Palliative      Cardiac Monitoring Order   []Yes   []No     IV drips   []Yes    Drip:                            Dose:  Drip:                            Dose:  Drip:                            Dose:   []No     GI Prophylaxis   []Yes   []No         DVT Prophylaxis   SCDs:             Zackery stockings:         [] Medication   []Contraindicated   []None      Activity Level Activity Level: Up with Assistance     Activity Assistance: Partial (one person)   Purposeful Rounding every 1-2 hour? []Yes   Olguin Score  Total Score: 4   Bed Alarm (If score 3 or >)   []Yes   [] Refused (See signed refusal form in chart)   Magdaleno Score  Magdaleno Score: 20   Magdaleno Score (if score 14 or less)   []PMT consult   []Wound Care consult      []Specialty bed   [] Nutrition consult          Needs prior to discharge:   Home O2 required:    []Yes   []No    If yes, how much O2 required?     Other:    Last Bowel Movement: Last Bowel Movement Date: 10/17/17      Influenza Vaccine Received Flu Vaccine for Current Season (usually Sept-March): No    Patient/Guardian Refused (Notify MD): No   Pneumonia Vaccine           Diet Active Orders   Diet    DIET CARDIAC Regular      LDAs               Peripheral IV 10/16/17 Left Forearm (Active)   Site Assessment Clean, dry, & intact 10/18/2017  8:00 AM Phlebitis Assessment 0 10/18/2017  8:00 AM   Infiltration Assessment 0 10/18/2017  8:00 AM   Dressing Status Clean, dry, & intact 10/18/2017  8:00 AM   Dressing Type Transparent 10/18/2017  8:00 AM   Hub Color/Line Status Blue;Capped 10/18/2017  8:00 AM   Action Taken Open ports on tubing capped 10/18/2017  2:59 AM   Alcohol Cap Used Yes 10/18/2017  2:59 AM                      Urinary Catheter      Intake & Output   Date 10/17/17 1900 - 10/18/17 0659 10/18/17 0700 - 10/19/17 0659   Shift 2848-2838 24 Hour Total 2750-9865 0620-9229 24 Hour Total   I  N  T  A  K  E   P.O.  960         P. O.  960       Shift Total  (mL/kg)  960  (13.7)      O  U  T  P  U  T   Urine  (mL/kg/hr)  250         Urine Voided  250         Urine Occurrence(s) 1 x 3 x       Stool           Stool Occurrence(s)  2 x       Shift Total  (mL/kg)  250  (3.6)      NET  710      Weight (kg) 70.1 70.1 70.1 70.1 70.1         Readmission Risk Assessment Tool Score Medium Risk            19       Total Score        3 Has Seen PCP in Last 6 Months (Yes=3, No=0)    4 IP Visits Last 12 Months (1-3=4, 4=9, >4=11)    5 Pt. Coverage (Medicare=5 , Medicaid, or Self-Pay=4)    7 Charlson Comorbidity Score (Age + Comorbid Conditions)        Criteria that do not apply:    . Living with Significant Other. Assisted Living. LTAC. SNF.  or   Rehab    Patient Length of Stay (>5 days = 3)       Expected Length of Stay 2d 12h   Actual Length of Stay 3

## 2017-10-19 NOTE — PROGRESS NOTES
Hospitalist Progress Note    NAME: Sabrina Austin   :  1924   MRN:  [id number]       Assessment / Plan:  Addendum: Acute Chest Pain, Dr. Richard Pathak and Dr. Brijesh Broderick aware  -not having on my initial visit, patient re-evaluated  -EKG self interpreted with afib with RVR  -symptoms do sound cardiac  -cycle cardiac biomarkers  -IV morphine 1 mg once (will need to be careful not to cause hypotension)  -INR therapeutic, will give stat dose of ASA  -get CXR for completeness  -Stat limited TTE ordered to look for WMA  -no NTG as based on patient's symptoms I am concerned that this may be right sided and thus patient would be preload dependent  -cardizem gtt started this am, need to control HR better, may uptitrate soon    _______________________    Atrial Fibrillation with RVR with associated weakness and SANTOS  Hypertension currently with BP the lower limit of normal  -continue Rhythmol, since INR is therapeutic and patient still has not eaten this am I have made her NPO as she is a candidate for cardioversion, Dr. Richard Pathak should be by soon and will see if he agrees. -off amiodarone secondary to pulmonary issues ( was seen by Dr. Gabriel Tierney 1 month ago)  -INR 2.3, on coumadin, Appreciate pharmacy dosing     UTI outside records reviewed   -discontinued rocephin as UA on 10/15 benign and no UCx sent      CKD stage III:   -renal function at baseline, continue to monitor      L1 compression fracture  -tylenol prn       Seizure disorder  -continue keppra      Macular Degeneration    Code Status: has DDNR  Surrogate Decision Maker: snady      DVT Prophylaxis: NI  GI Prophylaxis: not indicated      Baseline: Lives at home         Body mass index is 25.11 kg/(m^2).        Subjective:     Chief Complaint / Reason for Physician Visit: follow-up afib with RVR  Patient still complains of malaise  Has SANTOS  Otherwise denies complaints    Review of Systems:  Symptom Y/N Comments  Symptom Y/N Comments   Fever/Chills    Chest Pain n Poor Appetite    Edema     Cough    Abdominal Pain     Sputum    Joint Pain     SOB/SANTOS n   Pruritis/Rash     Nausea/vomit    Tolerating PT/OT     Diarrhea    Tolerating Diet y But has not yet eaten this am   Constipation    Other n palpitations     Could NOT obtain due to:      Objective:     VITALS:   Last 24hrs VS reviewed since prior progress note. Most recent are:  Patient Vitals for the past 24 hrs:   Temp Pulse Resp BP SpO2   10/19/17 0322 97.8 °F (36.6 °C) (!) 115 16 90/78 95 %   10/18/17 2322 97.8 °F (36.6 °C) (!) 123 16 129/76 95 %   10/18/17 2022 98.9 °F (37.2 °C) (!) 120 16 99/73 96 %   10/18/17 1220 98.2 °F (36.8 °C) (!) 118 17 113/61 95 %       Intake/Output Summary (Last 24 hours) at 10/19/17 0809  Last data filed at 10/18/17 2322   Gross per 24 hour   Intake              200 ml   Output                0 ml   Net              200 ml        PHYSICAL EXAM:  General: WD, WN. Alert, cooperative, no acute distress    EENT:  EOMI. Anicteric sclerae. MMM  Resp:  CTA bilaterally, no wheezing or rales. No accessory muscle use  CV:  Irregular with tachycardia to 120's,  trace BLE edema  GI:  Soft, Non distended, Non tender.  +Bowel sounds  Neurologic:  Alert and oriented X 3, normal speech,   Psych:   Good insight. Not anxious nor agitated  Skin:  No rashes. No jaundice    Reviewed most current lab test results and cultures  YES  Reviewed most current radiology test results   YES  Review and summation of old records today    NO  Reviewed patient's current orders and MAR    YES  PMH/SH reviewed - no change compared to H&P  ________________________________________________________________________  Care Plan discussed with:    Comments   Patient y    Family  y daughter   RN y    Care Manager     Consultant  y Case discussed with Dr. Shwetha Brewer and Dr. Andrade Foley team rounds were held today with , nursing, pharmacist and clinical coordinator.   Patient's plan of care was discussed; medications were reviewed and discharge planning was addressed. ________________________________________________________________________  Total NON critical care TIME:  40  Minutes    Total CRITICAL CARE TIME Spent:   Minutes non procedure based      Comments   >50% of visit spent in counseling and coordination of care     ________________________________________________________________________  Braxton Barney MD     Procedures: see electronic medical records for all procedures/Xrays and details which were not copied into this note but were reviewed prior to creation of Plan. LABS:  I reviewed today's most current labs and imaging studies.   Pertinent labs include:  Recent Labs      10/19/17   0329  10/18/17   0306   WBC  6.5  7.1   HGB  8.8*  9.1*   HCT  27.1*  27.8*   PLT  213  185     Recent Labs      10/19/17   0329  10/18/17   0306  10/17/17   0438   NA  136   --    --    K  4.8   --    --    CL  106   --    --    CO2  25   --    --    GLU  89   --    --    BUN  29*   --    --    CREA  1.03*   --    --    CA  8.5   --    --    MG  2.4   --    --    PHOS  3.7   --    --    ALB  2.7*   --    --    TBILI  0.3   --    --    SGOT  24   --    --    ALT  17   --    --    INR  2.3*  1.6*  1.5*       Signed: Braxton Barney MD

## 2017-10-19 NOTE — PROGRESS NOTES
Physical Therapy  10/19/2017    Chart reviewed and noted onset of chest pain late this morning. Checked with RN who is asking to defer at this time d/t workup for chest pain. Will follow up later as able if patient is medically appropriate for participation today.     Thank Yael Velasquez, PT, DPT

## 2017-10-19 NOTE — PROGRESS NOTES
Rate still up. D/W Dr. Daryl Easton who will see. Chest: few rales. Cor: at. Fib. Difficult problem.

## 2017-10-20 ENCOUNTER — APPOINTMENT (OUTPATIENT)
Dept: NUCLEAR MEDICINE | Age: 82
DRG: 242 | End: 2017-10-20
Attending: INTERNAL MEDICINE
Payer: MEDICARE

## 2017-10-20 LAB
ALBUMIN SERPL-MCNC: 3 G/DL (ref 3.5–5)
ALBUMIN/GLOB SERPL: 0.7 {RATIO} (ref 1.1–2.2)
ALP SERPL-CCNC: 86 U/L (ref 45–117)
ALT SERPL-CCNC: 17 U/L (ref 12–78)
ANION GAP SERPL CALC-SCNC: 8 MMOL/L (ref 5–15)
AST SERPL-CCNC: 19 U/L (ref 15–37)
ATRIAL RATE: 271 BPM
ATTENDING PHYSICIAN, CST07: NORMAL
BACTERIA SPEC CULT: NORMAL
BASOPHILS # BLD: 0 K/UL (ref 0–0.1)
BASOPHILS NFR BLD: 0 % (ref 0–1)
BILIRUB SERPL-MCNC: 0.3 MG/DL (ref 0.2–1)
BUN SERPL-MCNC: 26 MG/DL (ref 6–20)
BUN/CREAT SERPL: 25 (ref 12–20)
CALCIUM SERPL-MCNC: 8.5 MG/DL (ref 8.5–10.1)
CALCULATED R AXIS, ECG10: 51 DEGREES
CALCULATED T AXIS, ECG11: 61 DEGREES
CHLORIDE SERPL-SCNC: 103 MMOL/L (ref 97–108)
CO2 SERPL-SCNC: 25 MMOL/L (ref 21–32)
CREAT SERPL-MCNC: 1.02 MG/DL (ref 0.55–1.02)
DIAGNOSIS, 93000: NORMAL
DIAGNOSIS, 93000: NORMAL
DUKE TM SCORE RESULT, CST14: NORMAL
DUKE TREADMILL SCORE, CST13: 5456
ECG INTERP BEFORE EX, CST11: NORMAL
ECG INTERP DURING EX, CST12: NORMAL
EOSINOPHIL # BLD: 0.2 K/UL (ref 0–0.4)
EOSINOPHIL NFR BLD: 2 % (ref 0–7)
ERYTHROCYTE [DISTWIDTH] IN BLOOD BY AUTOMATED COUNT: 14.8 % (ref 11.5–14.5)
FUNCTIONAL CAPACITY, CST17: NORMAL
GLOBULIN SER CALC-MCNC: 4.5 G/DL (ref 2–4)
GLUCOSE SERPL-MCNC: 110 MG/DL (ref 65–100)
HCT VFR BLD AUTO: 29.6 % (ref 35–47)
HGB BLD-MCNC: 9.8 G/DL (ref 11.5–16)
INR PPP: 1.8 (ref 0.9–1.1)
KNOWN CARDIAC CONDITION, CST08: NORMAL
LYMPHOCYTES # BLD: 2.1 K/UL (ref 0.8–3.5)
LYMPHOCYTES NFR BLD: 24 % (ref 12–49)
MAGNESIUM SERPL-MCNC: 2.4 MG/DL (ref 1.6–2.4)
MAX. DIASTOLIC BP, CST04: 69 MMHG
MAX. HEART RATE, CST05: 121 BPM
MAX. SYSTOLIC BP, CST03: 138 MMHG
MCH RBC QN AUTO: 30.1 PG (ref 26–34)
MCHC RBC AUTO-ENTMCNC: 33.1 G/DL (ref 30–36.5)
MCV RBC AUTO: 90.8 FL (ref 80–99)
MONOCYTES # BLD: 0.6 K/UL (ref 0–1)
MONOCYTES NFR BLD: 7 % (ref 5–13)
NEUTS SEG # BLD: 5.7 K/UL (ref 1.8–8)
NEUTS SEG NFR BLD: 67 % (ref 32–75)
OVERALL BP RESPONSE TO EXERCISE, CST16: NORMAL
OVERALL HR RESPONSE TO EXERCISE, CST15: NORMAL
PEAK EX METS, CST10: 1 METS
PHOSPHATE SERPL-MCNC: 3.1 MG/DL (ref 2.6–4.7)
PLATELET # BLD AUTO: 268 K/UL (ref 150–400)
POTASSIUM SERPL-SCNC: 4.4 MMOL/L (ref 3.5–5.1)
PROT SERPL-MCNC: 7.5 G/DL (ref 6.4–8.2)
PROTHROMBIN TIME: 18.8 SEC (ref 9–11.1)
PROTOCOL NAME, CST01: NORMAL
Q-T INTERVAL, ECG07: 344 MS
QRS DURATION, ECG06: 88 MS
QTC CALCULATION (BEZET), ECG08: 463 MS
RBC # BLD AUTO: 3.26 M/UL (ref 3.8–5.2)
SERVICE CMNT-IMP: NORMAL
SODIUM SERPL-SCNC: 136 MMOL/L (ref 136–145)
TEST INDICATION, CST09: NORMAL
VENTRICULAR RATE, ECG03: 109 BPM
WBC # BLD AUTO: 8.6 K/UL (ref 3.6–11)

## 2017-10-20 PROCEDURE — 74011000258 HC RX REV CODE- 258: Performed by: INTERNAL MEDICINE

## 2017-10-20 PROCEDURE — 74011250636 HC RX REV CODE- 250/636

## 2017-10-20 PROCEDURE — 84100 ASSAY OF PHOSPHORUS: CPT | Performed by: INTERNAL MEDICINE

## 2017-10-20 PROCEDURE — 93041 RHYTHM ECG TRACING: CPT

## 2017-10-20 PROCEDURE — 74011250637 HC RX REV CODE- 250/637: Performed by: INTERNAL MEDICINE

## 2017-10-20 PROCEDURE — A9500 TC99M SESTAMIBI: HCPCS

## 2017-10-20 PROCEDURE — 85610 PROTHROMBIN TIME: CPT | Performed by: INTERNAL MEDICINE

## 2017-10-20 PROCEDURE — 80053 COMPREHEN METABOLIC PANEL: CPT | Performed by: INTERNAL MEDICINE

## 2017-10-20 PROCEDURE — 93017 CV STRESS TEST TRACING ONLY: CPT

## 2017-10-20 PROCEDURE — 65660000000 HC RM CCU STEPDOWN

## 2017-10-20 PROCEDURE — 85025 COMPLETE CBC W/AUTO DIFF WBC: CPT | Performed by: INTERNAL MEDICINE

## 2017-10-20 PROCEDURE — 74011000250 HC RX REV CODE- 250: Performed by: INTERNAL MEDICINE

## 2017-10-20 PROCEDURE — 83735 ASSAY OF MAGNESIUM: CPT | Performed by: INTERNAL MEDICINE

## 2017-10-20 PROCEDURE — 36415 COLL VENOUS BLD VENIPUNCTURE: CPT | Performed by: INTERNAL MEDICINE

## 2017-10-20 RX ORDER — SODIUM CHLORIDE 0.9 % (FLUSH) 0.9 %
10 SYRINGE (ML) INJECTION AS NEEDED
Status: DISCONTINUED | OUTPATIENT
Start: 2017-10-20 | End: 2017-10-26 | Stop reason: HOSPADM

## 2017-10-20 RX ORDER — SODIUM CHLORIDE 0.9 % (FLUSH) 0.9 %
SYRINGE (ML) INJECTION
Status: DISCONTINUED
Start: 2017-10-20 | End: 2017-10-23

## 2017-10-20 RX ORDER — ENOXAPARIN SODIUM 100 MG/ML
1 INJECTION SUBCUTANEOUS EVERY 24 HOURS
Status: COMPLETED | OUTPATIENT
Start: 2017-10-21 | End: 2017-10-22

## 2017-10-20 RX ADMIN — Medication 10 ML: at 10:54

## 2017-10-20 RX ADMIN — REGADENOSON 0.4 MG: 0.08 INJECTION, SOLUTION INTRAVENOUS at 10:54

## 2017-10-20 RX ADMIN — FERROUS SULFATE TAB 325 MG (65 MG ELEMENTAL FE) 325 MG: 325 (65 FE) TAB at 17:20

## 2017-10-20 RX ADMIN — SODIUM CHLORIDE 10 MG/HR: 900 INJECTION, SOLUTION INTRAVENOUS at 06:03

## 2017-10-20 RX ADMIN — LEVETIRACETAM 500 MG: 500 TABLET, FILM COATED ORAL at 17:20

## 2017-10-20 RX ADMIN — DOCUSATE SODIUM 100 MG: 100 CAPSULE, LIQUID FILLED ORAL at 08:59

## 2017-10-20 RX ADMIN — Medication 10 ML: at 00:05

## 2017-10-20 RX ADMIN — SODIUM CHLORIDE 10 MG/HR: 900 INJECTION, SOLUTION INTRAVENOUS at 15:51

## 2017-10-20 RX ADMIN — LATANOPROST 1 DROP: 50 SOLUTION OPHTHALMIC at 21:46

## 2017-10-20 RX ADMIN — LEVETIRACETAM 500 MG: 500 TABLET, FILM COATED ORAL at 08:59

## 2017-10-20 RX ADMIN — AMITRIPTYLINE HYDROCHLORIDE 10 MG: 10 TABLET, FILM COATED ORAL at 21:46

## 2017-10-20 RX ADMIN — Medication 10 ML: at 15:52

## 2017-10-20 RX ADMIN — Medication 10 ML: at 21:46

## 2017-10-20 RX ADMIN — Medication 10 ML: at 06:04

## 2017-10-20 RX ADMIN — FERROUS SULFATE TAB 325 MG (65 MG ELEMENTAL FE) 325 MG: 325 (65 FE) TAB at 08:59

## 2017-10-20 RX ADMIN — MULTIPLE VITAMINS W/ MINERALS TAB 1 TABLET: TAB at 09:06

## 2017-10-20 RX ADMIN — ACETAMINOPHEN 126.5 MG: 325 TABLET ORAL at 21:46

## 2017-10-20 NOTE — PROGRESS NOTES
Discussed option of pacer and AV node ablation. Will aim for Monday for pacer and AV node ablation same day or following AM, depending on course. She'll get Lovenox Sat AM, Sun AM, nothing Mon AM.  All questions answered.

## 2017-10-20 NOTE — PROGRESS NOTES
Nutrition Services      Nutrition Screen:  Wt Readings from Last 10 Encounters:   10/19/17 64.3 kg (141 lb 12.1 oz)   08/24/17 62.4 kg (137 lb 9.6 oz)   08/09/17 65 kg (143 lb 4.8 oz)   08/08/17 64.4 kg (141 lb 15.6 oz)   07/04/17 64 kg (141 lb)   06/15/17 65.3 kg (144 lb)   06/09/17 67.1 kg (148 lb)   06/05/17 68.5 kg (151 lb 0.2 oz)   07/10/16 67.5 kg (148 lb 13 oz)   06/15/16 65.9 kg (145 lb 3.2 oz)     Body mass index is 25.11 kg/(m^2). Supplements:                        _____ ordered ___x___  declined. __ __  Pt is nutritionally stable at this time, will rescreen in 7 days. __x__    Pt is at nutritional risk and will be rescreened in 3-6 days. __ __  Pt is at moderate or high nutritional risk, will refer to RD for assessment.        Mayte Muñoz  Dietetic Technician, Registered

## 2017-10-20 NOTE — PROGRESS NOTES
Cardiology Progress Note  10/20/2017 9:00 AM  Admit Date: 10/15/2017  Admit Diagnosis/CC: A-fib [facility])  UTI (urinary tract infection)  Subjective:   Patient reports:  Chest Pain:  [x] none,  consistent with [] non-cardiac  [] atypical  []  anginal chest pain             [x] none now    []  on-going  Dyspnea: [x] none  [] at rest  [] with exertion  [] improved  [] unchanged [] worsening  PND:       [x] none  [] overnight   [] current  Orthopnea: [x] none  [] improved  [] unchanged  [] worsening  Presyncope: [x] none  [] improved  [] unchanged  [] worsening  Ambulated in hallway without symptoms  [] Yes  Ambulated in room without symptoms  [x] Yes  ROS(2+other systems)   Hematuria: [] Yes  [x] No.   Dysuria: [] Yes  [x] No                                           Cough:       [] Yes  [x] No.   Sputum: [] Yes  [x] No                                            Hematochezia: [] Yes  [x] No.   Melena: [] Yes  [x] No                                            No change in family and social history from H&P/Consult note.     Current Facility-Administered Medications   Medication Dose Route Frequency    dilTIAZem (CARDIZEM) 100 mg in 0.9% sodium chloride (MBP/ADV) 100 mL infusion  10 mg/hr IntraVENous CONTINUOUS    polyethylene glycol (MIRALAX) packet 17 g  17 g Oral DAILY PRN    vitamins  A,C,E-zinc-copper (I-LEROY PROTECT) tablet 1 Tab  1 Tab Oral DAILY    docusate sodium (COLACE) capsule 100 mg  100 mg Oral DAILY    sodium chloride (NS) flush 5-10 mL  5-10 mL IntraVENous Q8H    sodium chloride (NS) flush 5-10 mL  5-10 mL IntraVENous PRN    acetaminophen (TYLENOL) tablet 650 mg  650 mg Oral Q4H PRN    bisacodyl (DULCOLAX) tablet 5 mg  5 mg Oral DAILY PRN    ferrous sulfate tablet 325 mg  1 Tab Oral BID WITH MEALS    amitriptyline (ELAVIL) tablet 10-15 mg  10-15 mg Oral QHS    latanoprost (XALATAN) 0.005 % ophthalmic solution 1 Drop  1 Drop Both Eyes QHS    levETIRAcetam (KEPPRA) tablet 500 mg  500 mg Oral BID Objective:    Physical Exam:  Last VS:   Visit Vitals    /64    Pulse 98    Temp 97.5 °F (36.4 °C)    Resp 16    Ht 5' 3\" (1.6 m)    Wt 64.3 kg (141 lb 12.1 oz)    SpO2 92%    Breastfeeding No    BMI 25.11 kg/m2    Temp (24hrs), Av.8 °F (36.6 °C), Min:97.5 °F (36.4 °C), Max:98.1 °F (36.7 °C)    24 hr VS reviewed. General Appearance:   [x] well developed, well nourished,  [x] NAD. [] agitated   [] lethargic but arousable  [] obtunded   ENT, Palate:    [x] WNL   [] dry palate/MM        Respiratory:    [x] CTA bilateral  [] rales  [] rhonchi  [] normal resp effort      [] similar to yesterday   [] worse    [] improved   Cardiovascular:   [x] RRR   [] Irregular rate and rhythm   [x] Normal S1, S2   [x] No gallop or rub. [] no murmur   [x] no new murmur  [] murmur c/w:   [x] no edema  RLE: []1+ []2+ [] 3+;  LLE: []1+ []2+ []3+      [] edema similar to yesterday   [] worse    [] improved   [x] normal JVP   [] Elevated JVP    [x] JVP similar to yesterday   [] worse    [] improved   [x] carotid upstroke unchanged   [x] abd aorta not palpated   [x] no stigmata of peripheral emboli   GI:    [x] abd soft, non-distented,bowel sounds present, no                     organomegaly appreciated   Skin:  Neuro:    Cath Site:  [x] warm and dry [] cold extremities   [x] A/O x 3, grossly non-focal      [] intact w/o hematoma or bruit; distal pulse unchanged.               Data Review:   Labs:    Recent Results (from the past 24 hour(s))   ECG RHYTHM ANALYSIS ADULT    Collection Time: 10/19/17  9:55 AM   Result Value Ref Range    Ventricular Rate 132 BPM    Atrial Rate 133 BPM    QRS Duration 82 ms    Q-T Interval 332 ms    QTC Calculation (Bezet) 491 ms    Calculated R Axis 48 degrees    Calculated T Axis 66 degrees    Diagnosis       Probable Atrial flutter  Nonspecific ST and T wave abnormality  When compared with ECG of 19-OCT-2017 03:55,  No significant change was found    Confirmed by Omar Wise ([id number]) on 10/19/2017 10:13:22 AM     TROPONIN I    Collection Time: 10/19/17 10:59 AM   Result Value Ref Range    Troponin-I, Qt. <0.04 <0.05 ng/mL   TROPONIN I    Collection Time: 10/19/17  4:20 PM   Result Value Ref Range    Troponin-I, Qt. <0.04 <0.05 ng/mL   CBC WITH AUTOMATED DIFF    Collection Time: 10/20/17 12:09 AM   Result Value Ref Range    WBC 8.6 3.6 - 11.0 K/uL    RBC 3.26 (L) 3.80 - 5.20 M/uL    HGB 9.8 (L) 11.5 - 16.0 g/dL    HCT 29.6 (L) 35.0 - 47.0 %    MCV 90.8 80.0 - 99.0 FL    MCH 30.1 26.0 - 34.0 PG    MCHC 33.1 30.0 - 36.5 g/dL    RDW 14.8 (H) 11.5 - 14.5 %    PLATELET 592 718 - 875 K/uL    NEUTROPHILS 67 32 - 75 %    LYMPHOCYTES 24 12 - 49 %    MONOCYTES 7 5 - 13 %    EOSINOPHILS 2 0 - 7 %    BASOPHILS 0 0 - 1 %    ABS. NEUTROPHILS 5.7 1.8 - 8.0 K/UL    ABS. LYMPHOCYTES 2.1 0.8 - 3.5 K/UL    ABS. MONOCYTES 0.6 0.0 - 1.0 K/UL    ABS. EOSINOPHILS 0.2 0.0 - 0.4 K/UL    ABS. BASOPHILS 0.0 0.0 - 0.1 K/UL   METABOLIC PANEL, COMPREHENSIVE    Collection Time: 10/20/17 12:09 AM   Result Value Ref Range    Sodium 136 136 - 145 mmol/L    Potassium 4.4 3.5 - 5.1 mmol/L    Chloride 103 97 - 108 mmol/L    CO2 25 21 - 32 mmol/L    Anion gap 8 5 - 15 mmol/L    Glucose 110 (H) 65 - 100 mg/dL    BUN 26 (H) 6 - 20 MG/DL    Creatinine 1.02 0.55 - 1.02 MG/DL    BUN/Creatinine ratio 25 (H) 12 - 20      GFR est AA >60 >60 ml/min/1.73m2    GFR est non-AA 51 (L) >60 ml/min/1.73m2    Calcium 8.5 8.5 - 10.1 MG/DL    Bilirubin, total 0.3 0.2 - 1.0 MG/DL    ALT (SGPT) 17 12 - 78 U/L    AST (SGOT) 19 15 - 37 U/L    Alk.  phosphatase 86 45 - 117 U/L    Protein, total 7.5 6.4 - 8.2 g/dL    Albumin 3.0 (L) 3.5 - 5.0 g/dL    Globulin 4.5 (H) 2.0 - 4.0 g/dL    A-G Ratio 0.7 (L) 1.1 - 2.2     MAGNESIUM    Collection Time: 10/20/17 12:09 AM   Result Value Ref Range    Magnesium 2.4 1.6 - 2.4 mg/dL   PHOSPHORUS    Collection Time: 10/20/17 12:09 AM   Result Value Ref Range    Phosphorus 3.1 2.6 - 4.7 MG/DL   PROTHROMBIN TIME + INR Collection Time: 10/20/17  5:01 AM   Result Value Ref Range    INR 1.8 (H) 0.9 - 1.1      Prothrombin time 18.8 (H) 9.0 - 11.1 sec   ECG RHYTHM ANALYSIS ADULT    Collection Time: 10/20/17  8:51 AM   Result Value Ref Range    Ventricular Rate 109 BPM    Atrial Rate 271 BPM    QRS Duration 88 ms    Q-T Interval 344 ms    QTC Calculation (Bezet) 463 ms    Calculated R Axis 51 degrees    Calculated T Axis 61 degrees    Diagnosis       Atrial flutter with variable AV block  When compared with ECG of 19-OCT-2017 09:55,  Non-specific change in ST segment in Inferior leads         Provided Telemetry: [x] sinus  [] chronic afib   [] paroxysmal afib  [] NSVT      Assessment:     Active Problems:    UTI (urinary tract infection) (10/15/2017)      A-fib ([address] (10/15/2017)        Plan:     Atrial Fibrillation - Flutter  unchanged   Electrophysiology consult    Rales in basesFor all other plans, see orders. [x] High complexity decision making was performed  Inr is 1.8. No further chest pain. D/w Dr. Ella Jackson and Dr. Elizabeth Patel. Will do lexiscan.

## 2017-10-20 NOTE — PROGRESS NOTES
Physical therapy note:   Patient currently off the floor for stress test. Will continue to follow. Brandon Parra, PT, DPT

## 2017-10-20 NOTE — PROGRESS NOTES
Hospitalist Progress Note    NAME: Florian Cohen   :  1924   MRN:  [id number]       Assessment / Plan:  Atrial Fibrillation with RVR with associated weakness and SANTOS  Acute Systolic CHF (possible POA); ?tachycardia mediated cardiomyopathy  Hypertension with recent low BP's but tolerating cardizem gtt  -continue cardizem gtt  -Lexiscan this am  -INR reversed last pm by cardiology given plan for AVN ablation and PPM  -appreciate cardiology assistance     Chest pain on 10/19: MI ruled out    UTI outside records reviewed   -discontinued rocephin as UA on 10/15 benign and no UCx sent      CKD stage III:   -renal function at baseline, continue to monitor      L1 compression fracture  -tylenol prn       Seizure disorder  -continue keppra      Macular Degeneration    Code Status: has DDNR  Surrogate Decision Maker: sandy      DVT Prophylaxis: NI  GI Prophylaxis: not indicated      Baseline: Lives at home         Body mass index is 25.11 kg/(m^2). Subjective:     Chief Complaint / Reason for Physician Visit: follow-up afib with RVR  Patient without any pain and slept well  Denies complaints other than malaise this am    Review of Systems:  Symptom Y/N Comments  Symptom Y/N Comments   Fever/Chills    Chest Pain n    Poor Appetite    Edema n    Cough    Abdominal Pain     Sputum    Joint Pain     SOB/SANTOS n   Pruritis/Rash     Nausea/vomit    Tolerating PT/OT     Diarrhea    Tolerating Diet  NPO   Constipation    Other       Could NOT obtain due to:      Objective:     VITALS:   Last 24hrs VS reviewed since prior progress note.  Most recent are:  Patient Vitals for the past 24 hrs:   Temp Pulse Resp BP SpO2   10/20/17 0700 98 °F (36.7 °C) 94 16 110/68 95 %   10/20/17 0625 - 98 - 110/64 92 %   10/20/17 0400 - 89 - 95/53 96 %   10/20/17 0300 97.5 °F (36.4 °C) 89 16 113/58 97 %   10/20/17 0200 - 92 - 106/59 96 %   10/20/17 0100 - 94 - 115/74 97 %   10/20/17 0021 - 98 - 115/69 97 %   10/19/17 2300 98 °F (36.7 °C) (!) 102 16 120/68 94 %   10/19/17 2204 - (!) 106 - 120/71 94 %   10/19/17 2100 - (!) 103 - 115/59 98 %   10/19/17 2000 - 99 - 110/68 98 %   10/19/17 1947 97.8 °F (36.6 °C) 100 16 102/69 98 %   10/19/17 1600 97.8 °F (36.6 °C) (!) 104 16 123/72 98 %   10/19/17 1414 - (!) 115 - 105/79 -   10/19/17 1100 98.1 °F (36.7 °C) (!) 124 16 119/75 97 %       Intake/Output Summary (Last 24 hours) at 10/20/17 1019  Last data filed at 10/19/17 2359   Gross per 24 hour   Intake              100 ml   Output                0 ml   Net              100 ml        PHYSICAL EXAM:  General: WD, WN. Alert, cooperative, no acute distress    EENT:  EOMI. Anicteric sclerae. MMM  Resp:  CTA bilaterally, no wheezing or rales. No accessory muscle use  CV:  Irregular with mild tachycardia,  trace BLE edema  GI:  Soft, Non distended, Non tender.  +Bowel sounds  Neurologic:  Alert and oriented X 3, normal speech,   Psych:   Good insight. Not anxious nor agitated  Skin:  No rashes. No jaundice    Reviewed most current lab test results and cultures  YES  Reviewed most current radiology test results   YES  Review and summation of old records today    NO  Reviewed patient's current orders and MAR    YES  PMH/SH reviewed - no change compared to H&P  ________________________________________________________________________  Care Plan discussed with:    Comments   Patient y    Family  y daughter   RN y    Care Manager     Consultant  y Case discussed with Dr. Cherylene Sports team rounds were held today with , nursing, pharmacist and clinical coordinator. Patient's plan of care was discussed; medications were reviewed and discharge planning was addressed.      ________________________________________________________________________  Total NON critical care TIME:  20  Minutes    Total CRITICAL CARE TIME Spent:   Minutes non procedure based      Comments   >50% of visit spent in counseling and coordination of care ________________________________________________________________________  Harini Camarena MD     Procedures: see electronic medical records for all procedures/Xrays and details which were not copied into this note but were reviewed prior to creation of Plan. LABS:  I reviewed today's most current labs and imaging studies.   Pertinent labs include:  Recent Labs      10/20/17   0009  10/19/17   0329  10/18/17   0306   WBC  8.6  6.5  7.1   HGB  9.8*  8.8*  9.1*   HCT  29.6*  27.1*  27.8*   PLT  268  213  185     Recent Labs      10/20/17   0501  10/20/17   0009  10/19/17   0329  10/18/17   0306   NA   --   136  136   --    K   --   4.4  4.8   --    CL   --   103  106   --    CO2   --   25  25   --    GLU   --   110*  89   --    BUN   --   26*  29*   --    CREA   --   1.02  1.03*   --    CA   --   8.5  8.5   --    MG   --   2.4  2.4   --    PHOS   --   3.1  3.7   --    ALB   --   3.0*  2.7*   --    TBILI   --   0.3  0.3   --    SGOT   --   19  24   --    ALT   --   17 17   --    INR  1.8*   --   2.3*  1.6*       Signed: Harini Camarena MD

## 2017-10-20 NOTE — PROGRESS NOTES
Pharmacy Daily Dosing of Warfarin    Indication: Paroxysmal Atrial Fibrillation    Goal INR: 2-3    PTA Warfarin Dose:   2.5mg tablets  2.5mg MWF  5mg T/Th/Sa/Sn    Last dose = 5mg taken morning of admission    Concurrent anticoagulants: none    Concurrent antiplatelet: none    Major Interacting Medications    Drugs that may increase INR: none   Drugs that may decrease INR: Vitamin K 5 mg PO X1 on 10/19    Conditions that may increase/decrease INR (CHF, C. diff, cirrhosis, thyroid disorder, hypoalbuminemia): Alb 2.7    Labs:  Recent Labs      10/20/17   0501  10/20/17   0009  10/19/17   0329  10/18/17   0306   INR  1.8*   --   2.3*  1.6*   HGB   --   9.8*  8.8*  9.1*   PLT   --   268  213  185   SGOT   --   19  24   --    TBILI   --   0.3  0.3   --      Albumin   Date/Time Value Ref Range Status   10/16/2017 04:23 AM 2.6 (L) 3.5 - 5.0 g/dL Final     Date    INR    Dose  10/15    1.3        5mg (PTA today) + 2.5mg = 7.5mg   10/16      1.6        5 mg  10/17      1.5        7.5mg   10/18      1.6        5  10/19      2.3        HELD (Received Vit K 5 mg PO  10/20      1.8        HOLD    Impression/Plan:     - Warfarin held on 10/19 for possible PPM insertion and received Vitamin K 5 mg PO X1    - Will HOLD tonight's warfarin   - Please notify pharmacy when the patient is cleared to resume her warfarin   - Might get a cardioversion, possibly today  - CBC w/o diff every other day, INR daily     Pharmacy will follow daily and adjust the dose as appropriate. Thanks  Nancy Mesa PHARMD      [email or web address]/[email or web address]/Warfarin%20Dosing%20Protocol. pdf

## 2017-10-20 NOTE — PROGRESS NOTES
PCU SHIFT NURSING NOTE      Bedside shift change report given to Don Onofre RN (oncoming nurse) by Smitha Bee RN (offgoing nurse). Report included the following information SBAR, Kardex, Intake/Output, MAR and Recent Results. Shift Summary:     7:45 PM  Patient resting comfortably in recliner with no complaints at this time. Family present and staying the night. Admission Date 10/15/2017   Admission Diagnosis A-fib ([address]  UTI (urinary tract infection)   Consults IP CONSULT TO CARDIOLOGY  IP CONSULT TO CARDIOLOGY        Consults   []PT   []OT   []Speech   []Case Management      [] Palliative      Cardiac Monitoring Order   []Yes   []No     IV drips   []Yes    Drip:                            Dose:  Drip:                            Dose:  Drip:                            Dose:   []No     GI Prophylaxis   []Yes   []No         DVT Prophylaxis   SCDs:             Zackery stockings:         [] Medication   []Contraindicated   []None      Activity Level Activity Level: Up with Assistance     Activity Assistance: Partial (one person)   Purposeful Rounding every 1-2 hour? [x]Yes   Olguin Score  Total Score: 2   Bed Alarm (If score 3 or >)   [x]Yes   [] Refused (See signed refusal form in chart)   Magdaleno Score  Magdaleno Score: 20   Magdaleno Score (if score 14 or less)   []PMT consult   []Wound Care consult      []Specialty bed   [] Nutrition consult          Needs prior to discharge:   Home O2 required:    []Yes   []No    If yes, how much O2 required?     Other:    Last Bowel Movement: Last Bowel Movement Date: 10/17/17      Influenza Vaccine Received Flu Vaccine for Current Season (usually Sept-March): No    Patient/Guardian Refused (Notify MD): No   Pneumonia Vaccine           Diet Active Orders   Diet    DIET CARDIAC Regular      LDAs               Peripheral IV 10/16/17 Left Forearm (Active)   Site Assessment Clean, dry, & intact 10/19/2017  7:47 PM   Phlebitis Assessment 0 10/19/2017  3:22 AM   Infiltration Assessment 0 10/19/2017  3:22 AM   Dressing Status Clean, dry, & intact 10/19/2017  3:22 AM   Dressing Type Transparent 10/19/2017  3:22 AM   Hub Color/Line Status Blue;Capped 10/18/2017  8:00 AM   Action Taken Open ports on tubing capped 10/18/2017  2:59 AM   Alcohol Cap Used Yes 10/18/2017  8:22 PM                      Urinary Catheter      Intake & Output   Date 10/18/17 1900 - 10/19/17 0659 10/19/17 0700 - 10/20/17 0659   Shift 4822-3368 24 Hour Total 0700-1859 1900-0659 24 Hour Total   I  N  T  A  K  E   P.O. 200 200         P. O. 200 200       Shift Total  (mL/kg) 200  (3.1) 200  (3.1)      O  U  T  P  U  T   Urine  (mL/kg/hr)           Urine Occurrence(s) 2 x 2 x 4 x  4 x    Shift Total  (mL/kg)         200      Weight (kg) 64.3 64.3 64.3 64.3 64.3         Readmission Risk Assessment Tool Score Medium Risk            19       Total Score        3 Has Seen PCP in Last 6 Months (Yes=3, No=0)    4 IP Visits Last 12 Months (1-3=4, 4=9, >4=11)    5 Pt. Coverage (Medicare=5 , Medicaid, or Self-Pay=4)    7 Charlson Comorbidity Score (Age + Comorbid Conditions)        Criteria that do not apply:    . Living with Significant Other. Assisted Living. LTAC. SNF.  or   Rehab    Patient Length of Stay (>5 days = 3)       Expected Length of Stay 2d 12h   Actual Length of Stay 4

## 2017-10-20 NOTE — PROGRESS NOTES
[facility] MED: Lexiscan cardiac in progress. Pt to return to [facility] for images. Please check with Physician regarding diet.

## 2017-10-20 NOTE — PROGRESS NOTES
PCU SHIFT NURSING NOTE      Bedside and Verbal shift change report given to Julian Auguste RN (oncoming nurse) by Kailash Koo RN (offgoing nurse). Report included the following information SBAR, Kardex, Accordion and Recent Results. Shift Summary: Pt received resting in bed; A & O x 4; RA with no acute distress noted. Admission Date 10/15/2017   Admission Diagnosis A-fib ([address]  UTI (urinary tract infection)   Consults IP CONSULT TO CARDIOLOGY  IP CONSULT TO CARDIOLOGY        Consults   []PT   []OT   []Speech   []Case Management      [] Palliative      Cardiac Monitoring Order   []Yes   []No     IV drips   []Yes    Drip:                            Dose:  Drip:                            Dose:  Drip:                            Dose:   []No     GI Prophylaxis   []Yes   []No         DVT Prophylaxis   SCDs:             Zackery stockings:         [] Medication   []Contraindicated   []None      Activity Level Activity Level: Up with Assistance     Activity Assistance: Partial (one person)   Purposeful Rounding every 1-2 hour? []Yes   Olguin Score  Total Score: 4   Bed Alarm (If score 3 or >)   []Yes   [] Refused (See signed refusal form in chart)   Magdaleno Score  Magdaleno Score: 20   Magdaleno Score (if score 14 or less)   []PMT consult   []Wound Care consult      []Specialty bed   [] Nutrition consult          Needs prior to discharge:   Home O2 required:    []Yes   []No    If yes, how much O2 required?     Other:    Last Bowel Movement: Last Bowel Movement Date: 10/17/17      Influenza Vaccine Received Flu Vaccine for Current Season (usually Sept-March): No    Patient/Guardian Refused (Notify MD): No   Pneumonia Vaccine           Diet Active Orders   Diet    DIET NPO      LDAs               Peripheral IV 10/20/17 Right Wrist (Active)   Site Assessment Clean, dry, & intact 10/20/2017  3:00 AM   Phlebitis Assessment 0 10/20/2017  3:00 AM   Infiltration Assessment 0 10/20/2017  3:00 AM   Dressing Status Clean, dry, & intact 10/20/2017  3:00 AM   Dressing Type Transparent;Tape 10/20/2017  3:00 AM   Hub Color/Line Status Blue; Infusing 10/20/2017  3:00 AM                      Urinary Catheter      Intake & Output   Date 10/19/17 0700 - 10/20/17 0659 10/20/17 0700 - 10/21/17 0659   Shift 2592-8776 6122-4672 24 Hour Total 0700-1859 1900-0659 24 Hour Total   I  N  T  A  K  E   P.O.  100 100         P. O.  100 100       Shift Total  (mL/kg)  100  (1.6) 100  (1.6)      O  U  T  P  U  T   Urine  (mL/kg/hr)            Urine Occurrence(s) 4 x 3 x 7 x       Shift Total  (mL/kg)         NET  100 100      Weight (kg) 64.3 64.3 64.3 64.3 64.3 64.3         Readmission Risk Assessment Tool Score High Risk            22       Total Score        3 Has Seen PCP in Last 6 Months (Yes=3, No=0)    3 Patient Length of Stay (>5 days = 3)    4 IP Visits Last 12 Months (1-3=4, 4=9, >4=11)    5 Pt. Coverage (Medicare=5 , Medicaid, or Self-Pay=4)    7 Charlson Comorbidity Score (Age + Comorbid Conditions)        Criteria that do not apply:    . Living with Significant Other. Assisted Living. LTAC. SNF.  or   Rehab       Expected Length of Stay 2d 12h   Actual Length of Stay 5

## 2017-10-21 LAB
ALBUMIN SERPL-MCNC: 2.6 G/DL (ref 3.5–5)
ALBUMIN/GLOB SERPL: 0.7 {RATIO} (ref 1.1–2.2)
ALP SERPL-CCNC: 76 U/L (ref 45–117)
ALT SERPL-CCNC: 12 U/L (ref 12–78)
ANION GAP SERPL CALC-SCNC: 5 MMOL/L (ref 5–15)
AST SERPL-CCNC: 9 U/L (ref 15–37)
ATRIAL RATE: 73 BPM
BASOPHILS # BLD: 0 K/UL (ref 0–0.1)
BASOPHILS NFR BLD: 0 % (ref 0–1)
BILIRUB SERPL-MCNC: 0.5 MG/DL (ref 0.2–1)
BUN SERPL-MCNC: 20 MG/DL (ref 6–20)
BUN/CREAT SERPL: 24 (ref 12–20)
CALCIUM SERPL-MCNC: 8 MG/DL (ref 8.5–10.1)
CALCULATED P AXIS, ECG09: 70 DEGREES
CALCULATED R AXIS, ECG10: 38 DEGREES
CALCULATED T AXIS, ECG11: 67 DEGREES
CHLORIDE SERPL-SCNC: 106 MMOL/L (ref 97–108)
CO2 SERPL-SCNC: 26 MMOL/L (ref 21–32)
CREAT SERPL-MCNC: 0.84 MG/DL (ref 0.55–1.02)
DIAGNOSIS, 93000: NORMAL
EOSINOPHIL # BLD: 0.1 K/UL (ref 0–0.4)
EOSINOPHIL NFR BLD: 2 % (ref 0–7)
ERYTHROCYTE [DISTWIDTH] IN BLOOD BY AUTOMATED COUNT: 14.8 % (ref 11.5–14.5)
GLOBULIN SER CALC-MCNC: 3.7 G/DL (ref 2–4)
GLUCOSE SERPL-MCNC: 84 MG/DL (ref 65–100)
HCT VFR BLD AUTO: 25.3 % (ref 35–47)
HGB BLD-MCNC: 8.2 G/DL (ref 11.5–16)
INR PPP: 1.4 (ref 0.9–1.1)
LYMPHOCYTES # BLD: 1.5 K/UL (ref 0.8–3.5)
LYMPHOCYTES NFR BLD: 25 % (ref 12–49)
MAGNESIUM SERPL-MCNC: 2.2 MG/DL (ref 1.6–2.4)
MCH RBC QN AUTO: 29.2 PG (ref 26–34)
MCHC RBC AUTO-ENTMCNC: 32.4 G/DL (ref 30–36.5)
MCV RBC AUTO: 90 FL (ref 80–99)
MONOCYTES # BLD: 0.5 K/UL (ref 0–1)
MONOCYTES NFR BLD: 8 % (ref 5–13)
NEUTS SEG # BLD: 3.8 K/UL (ref 1.8–8)
NEUTS SEG NFR BLD: 65 % (ref 32–75)
P-R INTERVAL, ECG05: 208 MS
PHOSPHATE SERPL-MCNC: 3.1 MG/DL (ref 2.6–4.7)
PLATELET # BLD AUTO: 227 K/UL (ref 150–400)
POTASSIUM SERPL-SCNC: 3.7 MMOL/L (ref 3.5–5.1)
PROT SERPL-MCNC: 6.3 G/DL (ref 6.4–8.2)
PROTHROMBIN TIME: 13.9 SEC (ref 9–11.1)
Q-T INTERVAL, ECG07: 444 MS
QRS DURATION, ECG06: 86 MS
QTC CALCULATION (BEZET), ECG08: 489 MS
RBC # BLD AUTO: 2.81 M/UL (ref 3.8–5.2)
SODIUM SERPL-SCNC: 137 MMOL/L (ref 136–145)
VENTRICULAR RATE, ECG03: 73 BPM
WBC # BLD AUTO: 6 K/UL (ref 3.6–11)

## 2017-10-21 PROCEDURE — 83735 ASSAY OF MAGNESIUM: CPT | Performed by: INTERNAL MEDICINE

## 2017-10-21 PROCEDURE — 65660000000 HC RM CCU STEPDOWN

## 2017-10-21 PROCEDURE — 74011000250 HC RX REV CODE- 250: Performed by: INTERNAL MEDICINE

## 2017-10-21 PROCEDURE — 84100 ASSAY OF PHOSPHORUS: CPT | Performed by: INTERNAL MEDICINE

## 2017-10-21 PROCEDURE — 36415 COLL VENOUS BLD VENIPUNCTURE: CPT | Performed by: INTERNAL MEDICINE

## 2017-10-21 PROCEDURE — 85610 PROTHROMBIN TIME: CPT | Performed by: INTERNAL MEDICINE

## 2017-10-21 PROCEDURE — 74011250637 HC RX REV CODE- 250/637: Performed by: INTERNAL MEDICINE

## 2017-10-21 PROCEDURE — 85025 COMPLETE CBC W/AUTO DIFF WBC: CPT | Performed by: INTERNAL MEDICINE

## 2017-10-21 PROCEDURE — 74011250636 HC RX REV CODE- 250/636: Performed by: INTERNAL MEDICINE

## 2017-10-21 PROCEDURE — 74011000258 HC RX REV CODE- 258: Performed by: INTERNAL MEDICINE

## 2017-10-21 PROCEDURE — 93041 RHYTHM ECG TRACING: CPT

## 2017-10-21 PROCEDURE — 80053 COMPREHEN METABOLIC PANEL: CPT | Performed by: INTERNAL MEDICINE

## 2017-10-21 RX ADMIN — MULTIPLE VITAMINS W/ MINERALS TAB 1 TABLET: TAB at 10:20

## 2017-10-21 RX ADMIN — DOCUSATE SODIUM 100 MG: 100 CAPSULE, LIQUID FILLED ORAL at 10:15

## 2017-10-21 RX ADMIN — Medication 10 ML: at 21:30

## 2017-10-21 RX ADMIN — AMITRIPTYLINE HYDROCHLORIDE 10 MG: 10 TABLET, FILM COATED ORAL at 21:29

## 2017-10-21 RX ADMIN — SODIUM CHLORIDE 10 MG/HR: 900 INJECTION, SOLUTION INTRAVENOUS at 02:36

## 2017-10-21 RX ADMIN — Medication 10 ML: at 15:46

## 2017-10-21 RX ADMIN — ENOXAPARIN SODIUM 60 MG: 100 INJECTION SUBCUTANEOUS at 10:15

## 2017-10-21 RX ADMIN — FERROUS SULFATE TAB 325 MG (65 MG ELEMENTAL FE) 325 MG: 325 (65 FE) TAB at 10:15

## 2017-10-21 RX ADMIN — ACETAMINOPHEN 162.5 MG: 325 TABLET ORAL at 22:40

## 2017-10-21 RX ADMIN — SODIUM CHLORIDE 5 MG/HR: 900 INJECTION, SOLUTION INTRAVENOUS at 15:46

## 2017-10-21 RX ADMIN — Medication 10 ML: at 05:01

## 2017-10-21 RX ADMIN — FERROUS SULFATE TAB 325 MG (65 MG ELEMENTAL FE) 325 MG: 325 (65 FE) TAB at 18:52

## 2017-10-21 RX ADMIN — LATANOPROST 1 DROP: 50 SOLUTION OPHTHALMIC at 21:30

## 2017-10-21 RX ADMIN — LEVETIRACETAM 500 MG: 500 TABLET, FILM COATED ORAL at 10:15

## 2017-10-21 RX ADMIN — LEVETIRACETAM 500 MG: 500 TABLET, FILM COATED ORAL at 18:52

## 2017-10-21 NOTE — PROGRESS NOTES
Bedside and Verbal shift change report given to Juan Luis Mendieta (student RN) by Tyler Edmondson. Report included the following information SBAR, Kardex and Recent Results.     1910: Patient is sitting comfortably in the recliner, no pain, vitals were stable    2040: Patient is listening to a book on tape in the recliner, patient was assessed      2100: Patient ambulated to the restroom    0300: Patient is sleeping comfortably in the recliner, labs drawn    0530: Patient ambulated to the restroom and returned to the recliner

## 2017-10-21 NOTE — PROGRESS NOTES
Problem: Falls - Risk of  Goal: *Absence of Falls  Document Corey Fall Risk and appropriate interventions in the flowsheet. Outcome: Progressing Towards Goal  Fall Risk Interventions:  Mobility Interventions: Communicate number of staff needed for ambulation/transfer, Patient to call before getting OOB         Medication Interventions: Patient to call before getting OOB, Teach patient to arise slowly, Evaluate medications/consider consulting pharmacy    Elimination Interventions: Call light in reach, Patient to call for help with toileting needs, Toilet paper/wipes in reach    History of Falls Interventions:  Investigate reason for fall, Room close to nurse's station

## 2017-10-21 NOTE — PROGRESS NOTES
Hospitalist Progress Note    NAME: Hyacinth Garcia   :  1924   MRN:  [id number]       Assessment / Plan:  Atrial Fibrillation with RVR with associated weakness and SANTOS; patient in NSR today!!! Acute Systolic CHF (possibly POA)  Hypertension with recent low BP's but tolerating cardizem gtt  -continue cardizem gtt  -Lexiscan wnl  -INR reversedby cardiology given plan for AVN ablation and PPM; patient on treatment dose lovenox at this time  -appreciate cardiology assistance     Chest pain on 10/19: MI ruled out    UTI outside records reviewed   -discontinued rocephin as UA on 10/15 benign and no UCx sent      CKD stage III:   -renal function at baseline, continue to monitor      L1 compression fracture  -tylenol prn       Seizure disorder  -continue keppra      Macular Degeneration    Code Status: has DDNR  Surrogate Decision Maker: sandy      DVT Prophylaxis: NI  GI Prophylaxis: not indicated      Baseline: Lives at home         Body mass index is 25.11 kg/(m^2). Subjective:     Chief Complaint / Reason for Physician Visit: follow-up afib with RVR  Patient in NSR as of last night  She feels better today  She is on Diltiazem gtt at 5 mg/hr    Review of Systems:  Symptom Y/N Comments  Symptom Y/N Comments   Fever/Chills    Chest Pain n    Poor Appetite    Edema n    Cough    Abdominal Pain n    Sputum    Joint Pain     SOB/SANTOS n   Pruritis/Rash     Nausea/vomit    Tolerating PT/OT     Diarrhea    Tolerating Diet y    Constipation    Other       Could NOT obtain due to:      Objective:     VITALS:   Last 24hrs VS reviewed since prior progress note.  Most recent are:  Patient Vitals for the past 24 hrs:   Temp Pulse Resp BP SpO2   10/21/17 1136 - 72 - 103/45 93 %   10/21/17 1100 97.6 °F (36.4 °C) 73 18 102/45 95 %   10/21/17 1000 - 74 - 99/42 98 %   10/21/17 0900 - 79 - 108/46 96 %   10/21/17 0801 - 84 - 113/55 92 %   10/21/17 0700 - 68 - 104/50 96 %   10/21/17 0630 - 83 - 108/52 96 %   10/21/17 0253 97.7 °F (36.5 °C) 90 20 115/56 98 %   10/21/17 0239 - 75 - 101/47 90 %   10/21/17 0110 - 80 - - -   10/21/17 0100 - 99 - 106/69 95 %   10/21/17 0001 - 92 - 106/69 90 %   10/20/17 2312 98.1 °F (36.7 °C) 99 18 115/78 95 %   10/20/17 2200 - (!) 102 - 120/64 96 %   10/20/17 2100 - 97 - 108/54 92 %   10/20/17 1915 97.9 °F (36.6 °C) (!) 105 16 122/72 95 %     No intake or output data in the 24 hours ending 10/21/17 1552     PHYSICAL EXAM:  General: WD, WN. Alert, cooperative, no acute distress    EENT:  EOMI. Anicteric sclerae. MMM  Resp:  CTA bilaterally, no wheezing or rales. No accessory muscle use  CV:  RRR, trace BLE edema  GI:  Soft, Non distended, Non tender.  +Bowel sounds  Neurologic:  Alert and oriented X 3, normal speech,   Psych:   Good insight. Not anxious nor agitated  Skin:  No rashes. No jaundice    Reviewed most current lab test results and cultures  YES  Reviewed most current radiology test results   YES  Review and summation of old records today    NO  Reviewed patient's current orders and MAR    YES  PMH/SH reviewed - no change compared to H&P  ________________________________________________________________________  Care Plan discussed with:    Comments   Patient y    Family  y In room   RN y    Care Manager     Consultant                        Multidiciplinary team rounds were held today with , nursing, pharmacist and clinical coordinator. Patient's plan of care was discussed; medications were reviewed and discharge planning was addressed.      ________________________________________________________________________  Total NON critical care TIME:  20  Minutes    Total CRITICAL CARE TIME Spent:   Minutes non procedure based      Comments   >50% of visit spent in counseling and coordination of care     ________________________________________________________________________  Dary Smith MD     Procedures: see electronic medical records for all procedures/Xrays and details which were not copied into this note but were reviewed prior to creation of Plan. LABS:  I reviewed today's most current labs and imaging studies.   Pertinent labs include:  Recent Labs      10/21/17   0258  10/20/17   0009  10/19/17   0329   WBC  6.0  8.6  6.5   HGB  8.2*  9.8*  8.8*   HCT  25.3*  29.6*  27.1*   PLT  227  268  213     Recent Labs      10/21/17   0258  10/20/17   0501  10/20/17   0009  10/19/17   0329   NA  137   --   136  136   K  3.7   --   4.4  4.8   CL  106   --   103  106   CO2  26   --   25  25   GLU  84   --   110*  89   BUN  20   --   26*  29*   CREA  0.84   --   1.02  1.03*   CA  8.0*   --   8.5  8.5   MG  2.2   --   2.4  2.4   PHOS  3.1   --   3.1  3.7   ALB  2.6*   --   3.0*  2.7*   TBILI  0.5   --   0.3  0.3   SGOT  9*   --   19  24   ALT  12   --   17  17   INR  1.4*  1.8*   --   2.3*       Signed: Creighton Schirmer, MD

## 2017-10-21 NOTE — PROGRESS NOTES
PCU SHIFT NURSING NOTE      Bedside shift change report given to Jose Marinelli RN (oncoming nurse) by Rocael Witt RN (offgoing nurse). Report included the following information SBAR, Kardex, Intake/Output, MAR and Recent Results. Shift Summary:     1:10 AM  Patient converted to NSR with a 1st degree AV Block, DE interval of 0.24. Admission Date 10/15/2017   Admission Diagnosis A-fib ([address]  UTI (urinary tract infection)   Consults IP CONSULT TO CARDIOLOGY  IP CONSULT TO CARDIOLOGY        Consults   []PT   []OT   []Speech   []Case Management      [] Palliative      Cardiac Monitoring Order   []Yes   []No     IV drips   []Yes    Drip:                            Dose:  Drip:                            Dose:  Drip:                            Dose:   []No     GI Prophylaxis   []Yes   []No         DVT Prophylaxis   SCDs:             Zackery stockings:         [] Medication   []Contraindicated   []None      Activity Level Activity Level: Up with Assistance     Activity Assistance: Partial (one person)   Purposeful Rounding every 1-2 hour? [x]Yes   Olguin Score  Total Score: 4   Bed Alarm (If score 3 or >)   [x]Yes   [] Refused (See signed refusal form in chart)   Magdaleno Score  Magdaleno Score: 20   Magdaleno Score (if score 14 or less)   []PMT consult   []Wound Care consult      []Specialty bed   [] Nutrition consult          Needs prior to discharge:   Home O2 required:    []Yes   []No    If yes, how much O2 required?     Other:    Last Bowel Movement: Last Bowel Movement Date: 10/20/17      Influenza Vaccine Received Flu Vaccine for Current Season (usually Sept-March): No    Patient/Guardian Refused (Notify MD): No   Pneumonia Vaccine           Diet Active Orders   Diet    DIET CARDIAC Regular; 2 GM NA (House Low NA)    DIET NPO      LDAs               Peripheral IV 10/20/17 Right Wrist (Active)   Site Assessment Clean, dry, & intact 10/20/2017  8:40 PM   Phlebitis Assessment 0 10/20/2017  8:40 PM   Infiltration Assessment 0 10/20/2017  8:40 PM   Dressing Status Clean, dry, & intact 10/20/2017  8:40 PM   Dressing Type Transparent;Tape 10/20/2017  8:40 PM   Hub Color/Line Status Blue 10/20/2017  8:40 PM                      Urinary Catheter      Intake & Output   Date 10/20/17 0700 - 10/21/17 0659 10/21/17 0700 - 10/22/17 0659   Shift 7761-5710 0071-9081 24 Hour Total 0700-1859 1900-0659 24 Hour Total   I  N  T  A  K  E   Shift Total  (mL/kg)         O  U  T  P  U  T   Urine  (mL/kg/hr)            Urine Occurrence(s) 4 x 2 x 6 x       Stool            Stool Occurrence(s)  1 x 1 x       Shift Total  (mL/kg)         NET         Weight (kg) 64.3 64.3 64.3 64.3 64.3 64.3         Readmission Risk Assessment Tool Score High Risk            22       Total Score        3 Has Seen PCP in Last 6 Months (Yes=3, No=0)    3 Patient Length of Stay (>5 days = 3)    4 IP Visits Last 12 Months (1-3=4, 4=9, >4=11)    5 Pt. Coverage (Medicare=5 , Medicaid, or Self-Pay=4)    7 Charlson Comorbidity Score (Age + Comorbid Conditions)        Criteria that do not apply:    . Living with Significant Other. Assisted Living. LTAC. SNF.  or   Rehab       Expected Length of Stay 3d 14h   Actual Length of Stay 6

## 2017-10-21 NOTE — PROGRESS NOTES
0730 Bedside Report received from [facility]. SBAR, Kardex, Intake/Output, MAR, Recent Results or Cardiac Rhythm NSR were discussed. Jennie Anderson RN assumed care of the pt.  1000 pt remains in NSR, bp's trending down. Pt denies pain or dizziness.   Mary Bailey RN

## 2017-10-22 PROCEDURE — 74011250636 HC RX REV CODE- 250/636: Performed by: INTERNAL MEDICINE

## 2017-10-22 PROCEDURE — 74011250637 HC RX REV CODE- 250/637: Performed by: INTERNAL MEDICINE

## 2017-10-22 PROCEDURE — 93041 RHYTHM ECG TRACING: CPT

## 2017-10-22 PROCEDURE — 74011000258 HC RX REV CODE- 258: Performed by: INTERNAL MEDICINE

## 2017-10-22 PROCEDURE — 65660000000 HC RM CCU STEPDOWN

## 2017-10-22 PROCEDURE — 74011000250 HC RX REV CODE- 250: Performed by: INTERNAL MEDICINE

## 2017-10-22 RX ADMIN — ENOXAPARIN SODIUM 60 MG: 100 INJECTION SUBCUTANEOUS at 10:11

## 2017-10-22 RX ADMIN — DOCUSATE SODIUM 100 MG: 100 CAPSULE, LIQUID FILLED ORAL at 10:12

## 2017-10-22 RX ADMIN — Medication 10 ML: at 10:13

## 2017-10-22 RX ADMIN — FERROUS SULFATE TAB 325 MG (65 MG ELEMENTAL FE) 325 MG: 325 (65 FE) TAB at 18:51

## 2017-10-22 RX ADMIN — SODIUM CHLORIDE 5 MG/HR: 900 INJECTION, SOLUTION INTRAVENOUS at 12:41

## 2017-10-22 RX ADMIN — FERROUS SULFATE TAB 325 MG (65 MG ELEMENTAL FE) 325 MG: 325 (65 FE) TAB at 10:13

## 2017-10-22 RX ADMIN — Medication 10 ML: at 18:51

## 2017-10-22 RX ADMIN — LEVETIRACETAM 500 MG: 500 TABLET, FILM COATED ORAL at 10:13

## 2017-10-22 RX ADMIN — Medication 10 ML: at 21:43

## 2017-10-22 RX ADMIN — LATANOPROST 1 DROP: 50 SOLUTION OPHTHALMIC at 21:38

## 2017-10-22 RX ADMIN — MULTIPLE VITAMINS W/ MINERALS TAB 1 TABLET: TAB at 10:19

## 2017-10-22 RX ADMIN — AMITRIPTYLINE HYDROCHLORIDE 10 MG: 10 TABLET, FILM COATED ORAL at 21:38

## 2017-10-22 RX ADMIN — Medication 10 ML: at 05:59

## 2017-10-22 RX ADMIN — ACETAMINOPHEN 162.5 MG: 325 TABLET ORAL at 21:42

## 2017-10-22 RX ADMIN — LEVETIRACETAM 500 MG: 500 TABLET, FILM COATED ORAL at 18:51

## 2017-10-22 NOTE — PROGRESS NOTES
Hospitalist Progress Note    NAME: Florian Cohen   :  1924   MRN:  [id number]       Assessment / Plan:  Atrial Fibrillation with RVR with associated weakness and SANTOS; patient in NSR today  Acute Systolic CHF (possibly POA)  Hypertension with recent low BP's but tolerating cardizem gtt  -continue cardizem gtt  -Lexiscan wnl  -INR reversed by cardiology given plan for AVN ablation and PPM; patient no longer on treatment dose lovenox since plan is for procedure tomorrow  -appreciate cardiology assistance, case discussed with Dr. Rosalee Hilton today     Chest pain on 10/19: MI ruled out    UTI outside records reviewed   -discontinued rocephin as UA on 10/15 benign and no UCx sent      CKD stage III:   -renal function at baseline, continue to monitor      L1 compression fracture  -tylenol prn       Seizure disorder  -continue keppra      Macular Degeneration    Phlebitis left dorsal arm: continue warm compresses    Code Status: has DDNR  Surrogate Decision Maker: sandy      DVT Prophylaxis: NI  GI Prophylaxis: not indicated      Baseline: Lives at home         Body mass index is 25.11 kg/(m^2). Subjective:     Chief Complaint / Reason for Physician Visit: follow-up afib with RVR  Patient remains in NSR; still on Diltiazem gtt at 5 mg/hr  Complains of Phlebitis left dorsal arm from prior IV    Review of Systems:  Symptom Y/N Comments  Symptom Y/N Comments   Fever/Chills    Chest Pain n    Poor Appetite    Edema n    Cough    Abdominal Pain n    Sputum    Joint Pain     SOB/SANTOS n   Pruritis/Rash     Nausea/vomit    Tolerating PT/OT     Diarrhea    Tolerating Diet y    Constipation    Other       Could NOT obtain due to:      Objective:     VITALS:   Last 24hrs VS reviewed since prior progress note.  Most recent are:  Patient Vitals for the past 24 hrs:   Temp Pulse Resp BP SpO2   10/22/17 1200 98.2 °F (36.8 °C) 81 18 125/60 92 %   10/22/17 1000 - 96 - 115/61 96 %   10/22/17 0800 97.6 °F (36.4 °C) 80 18 126/60 95 %   10/22/17 0600 - 82 - 124/59 93 %   10/22/17 0500 - 82 - 120/55 93 %   10/22/17 0400 - 84 - 119/55 91 %   10/22/17 0300 98.2 °F (36.8 °C) 84 18 116/53 97 %   10/22/17 0200 - 84 - 121/57 96 %   10/22/17 0100 - 84 - 117/61 96 %   10/22/17 0001 - 98 - 139/63 97 %   10/21/17 2239 98 °F (36.7 °C) 83 20 130/60 95 %   10/21/17 2200 - 82 - 127/58 95 %   10/21/17 2100 - 80 - 117/55 94 %   10/21/17 2000 - 82 - 113/47 96 %   10/21/17 1911 98 °F (36.7 °C) 82 18 111/48 96 %       Intake/Output Summary (Last 24 hours) at 10/22/17 1505  Last data filed at 10/22/17 1241   Gross per 24 hour   Intake           696.92 ml   Output                0 ml   Net           696.92 ml        PHYSICAL EXAM:  General: WD, WN. Alert, cooperative, no acute distress    EENT:  EOMI. Anicteric sclerae. MMM  Resp:  CTA bilaterally, no wheezing or rales. No accessory muscle use  CV:  RRR, trace BLE edema  GI:  Soft, Non distended, Non tender.  +Bowel sounds  Neurologic:  Alert and oriented X 3, normal speech,   Psych:   Good insight. Not anxious nor agitated  Skin:  Phlebitis left dorsal arm,  No jaundice    Reviewed most current lab test results and cultures  YES  Reviewed most current radiology test results   YES  Review and summation of old records today    NO  Reviewed patient's current orders and MAR    YES  PMH/SH reviewed - no change compared to H&P  ________________________________________________________________________  Care Plan discussed with:    Comments   Patient y    Family  y daughter   RN y    Care Manager     Consultant  y Dr. Stef Neumann team rounds were held today with , nursing, pharmacist and clinical coordinator. Patient's plan of care was discussed; medications were reviewed and discharge planning was addressed.      ________________________________________________________________________  Total NON critical care TIME:  20  Minutes    Total CRITICAL CARE TIME Spent:   Minutes non procedure based      Comments   >50% of visit spent in counseling and coordination of care     ________________________________________________________________________  Mirella Ellison MD     Procedures: see electronic medical records for all procedures/Xrays and details which were not copied into this note but were reviewed prior to creation of Plan. LABS:  I reviewed today's most current labs and imaging studies.   Pertinent labs include:  Recent Labs      10/21/17   0258  10/20/17   0009   WBC  6.0  8.6   HGB  8.2*  9.8*   HCT  25.3*  29.6*   PLT  227  268     Recent Labs      10/21/17   0258  10/20/17   0501  10/20/17   0009   NA  137   --   136   K  3.7   --   4.4   CL  106   --   103   CO2  26   --   25   GLU  84   --   110*   BUN  20   --   26*   CREA  0.84   --   1.02   CA  8.0*   --   8.5   MG  2.2   --   2.4   PHOS  3.1   --   3.1   ALB  2.6*   --   3.0*   TBILI  0.5   --   0.3   SGOT  9*   --   19   ALT  12   --   17   INR  1.4*  1.8*   --        Signed: Mirella Ellison MD

## 2017-10-22 NOTE — PROGRESS NOTES
EP/Arrhythmia Progress Note    Patient ID:  Patient: Ebony Davidson  MRN: [id number]  Age: 80 y.o.  : 1924    Date of  Admission: 10/15/2017  9:02 AM   PCP:  Ahmet Garcia MD   Usual cardiologist:  Lin Dubon MD    Assessment: 1. Atypical atrial flutter (treat like atrial fibrillation), recurrent after cardioversion and persistent. Very difficult to rate control with IV medication. On IV diltiazem, HR's still >100 on average, BP too low to increase. 2. Unable to take amiodarone due to existing lung disease (see CT scan 2017). Had been on amio before stopped 2017.  3. Echo today showed EF 15-20%, severe global hypokinesis, biatrial enlargement, mod-severe MR. Cardiomyopathy NOS, probably nonischemic but not evaluated. 4. Chronic systolic CHF class 3.  5. 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 Unclear etiology. 6. UTI diagnosis initially, but 10/15 U/A benign per hospitalist.  7. CKD stage 3.   8. Small hiatal hernia. 9. Anemia NOS with Hgb starting in 9's here. 10. Seizure disorder, on Keppra. 11. Lumbar compression fracture, can't lay flat easily. 12. DDNR. Plan:     1. Continue IV diltiazem for intermittent lenient rate control. 2. Daughter checks pulse frequently at home, says it only recently increased. Argues against tachycardia-mediated cardiomyopathy if not tachycardic for that long. This doesn't appear to be an acute infarct or myocarditis or takotsubo, other acute issues that can drop the EF quickly. 3. I will stop propafenone, I think the failure rate will be high and proarrhythmic risk is high. I don't think antiarrhythmic therapy will be useful at this time. 4. Dr Chhaya Evans discussed the potential benefits, risks (including bleeding, infection, pacer issue, perforation, med reaction, and rarely death) with the patient and daughter regarding pacemaker-AV node ablation. They are both willing to go this route.   Got the son on the phone as well to discuss and explain the options. For ablation / pacer Monday. [x]       High complexity decision making was performed in this patient at high risk for decompensation with multiple organ involvement. Mayela Velasquez is a 80 y.o. female told to come here with a UTI dx with a history of Afib/flutter with RVR and found to be in such in the ER. Was cardioverted in 8/2017, on amiodarone, but then amiodarone stopped after about 2 years of use due to some abnormal lung findings on CT 8/2017. She is currently on warfarin for anticoagulation PTA. She had sudden onset of racing palpitations the day before admission. This seemed to coincide with her UTI dx. A U/A 10/15 did not suggest a UTI so she was taken off antibiotics here. TODAY:  She denies syncope, dizziness, palpitations, but feel weak.   She is SOB with minimal activity, consistent with class 3 CHF sx at best.      Allergies   Allergen Reactions    Codeine Nausea Only    Pcn [Penicillins] Unknown (comments)     Cant remember reaction as a child    Tetanus Vaccines And Toxoid Unknown (comments)     Does not take tetanus due to tb hx    Vibramycin [Doxycycline Calcium] Rash          Current Facility-Administered Medications   Medication Dose Route Frequency    saline peripheral flush soln 10 mL  10 mL InterCATHeter PRN    enoxaparin (LOVENOX) injection 60 mg  1 mg/kg SubCUTAneous Q24H    dilTIAZem (CARDIZEM) 100 mg in 0.9% sodium chloride (MBP/ADV) 100 mL infusion  10 mg/hr IntraVENous CONTINUOUS    polyethylene glycol (MIRALAX) packet 17 g  17 g Oral DAILY PRN    vitamins  A,C,E-zinc-copper (I-LEROY PROTECT) tablet 1 Tab  1 Tab Oral DAILY    docusate sodium (COLACE) capsule 100 mg  100 mg Oral DAILY    sodium chloride (NS) flush 5-10 mL  5-10 mL IntraVENous Q8H    sodium chloride (NS) flush 5-10 mL  5-10 mL IntraVENous PRN    acetaminophen (TYLENOL) tablet 650 mg  650 mg Oral Q4H PRN    bisacodyl (DULCOLAX) tablet 5 mg 5 mg Oral DAILY PRN    ferrous sulfate tablet 325 mg  1 Tab Oral BID WITH MEALS    amitriptyline (ELAVIL) tablet 10-15 mg  10-15 mg Oral QHS    latanoprost (XALATAN) 0.005 % ophthalmic solution 1 Drop  1 Drop Both Eyes QHS    levETIRAcetam (KEPPRA) tablet 500 mg  500 mg Oral BID       Review of Symptoms:    Respiratory: Positive for cough and shortness of breath. Cardiovascular: Positive for palpitations. Negative for chest pain. Gastrointestinal: Negative. Negative for abdominal pain, nausea and vomiting. Objective:      Physical Exam:  Temp (24hrs), Av.9 °F (36.6 °C), Min:97.6 °F (36.4 °C), Max:98.2 °F (36.8 °C)    Patient Vitals for the past 8 hrs:   Pulse   10/21/17 1911 82   10/21/17 1500 78   10/21/17 1400 77   10/21/17 1352 78    Patient Vitals for the past 8 hrs:   Resp   10/21/17 1911 18   10/21/17 1500 18    Patient Vitals for the past 8 hrs:   BP   10/21/17 1911 111/48   10/21/17 1500 117/55   10/21/17 1400 103/49   10/21/17 1352 105/48          Intake/Output Summary (Last 24 hours) at 10/21/17 2111  Last data filed at 10/21/17 1911   Gross per 24 hour   Intake          1356.75 ml   Output                0 ml   Net          1356.75 ml       Nondiaphoretic, not in acute distress. Supple, no palpable thyromegaly. No scleral icterus, mucous membranes moist, conjuctivae pink, no xanthelasma. Unlabored, clear to auscultation bilaterally, symmetric air movement. Irregular rate and rhythm, no murmur, pericardial rub, knock, or gallop. No peripheral edema. Palpable radial pulses bilaterally. Abdomen, soft, nontender, nondistended. Extremities without cyanosis or clubbing. Skin warm and dry. Neuro grossly nonfocal.  No tremor. Awake and appropriate. CARDIOGRAPHICS and STUDIES, I reviewed:    Telemetry:  Atrial flutter with RVR.     ECG:  Atypical atrial flutter with variable block 107 bpm.    Echo 2017:  LEFT VENTRICLE: Size was normal. Systolic function was mildly reduced. Ejection fraction was estimated to be 45 %. Of note patient was in afib at  time of study. There was mild diffuse hypokinesis. RIGHT VENTRICLE: The ventricle was mildly dilated. Systolic function was  mildly reduced. LEFT ATRIUM: The atrium was mildly dilated. RIGHT ATRIUM: The atrium was mildly dilated. MITRAL VALVE: There was mild thickening. DOPPLER: There was moderate  regurgitation. AORTIC VALVE: The valve was probably trileaflet. DOPPLER: There was no  stenosis. There was no regurgitation. TRICUSPID VALVE: Normal valve structure. DOPPLER: There was mild  regurgitation. Pulmonary artery systolic pressure: 40 mmHg. PULMONIC VALVE: Not well visualized, but normal Doppler findings. AORTA: The root exhibited normal size. PERICARDIUM: There was no pericardial effusion. Labs:  Recent Labs      10/19/17   1620  10/19/17   1059   TROIQ  <0.04  <0.04     Lab Results   Component Value Date/Time    Cholesterol, total 196 09/12/2012 12:00 AM    HDL Cholesterol 64 09/12/2012 12:00 AM    LDL, calculated 104 09/12/2012 12:00 AM    Triglyceride 142 09/12/2012 12:00 AM     Recent Labs      10/21/17   0258  10/20/17   0501  10/19/17   0329   INR  1.4*  1.8*  2.3*   PTP  13.9*  18.8*  24.0*      Recent Labs      10/21/17   0258  10/20/17   0009  10/19/17   0329   NA  137  136  136   K  3.7  4.4  4.8   CL  106  103  106   CO2  26  25  25   BUN  20  26*  29*   CREA  0.84  1.02  1.03*   GLU  84  110*  89   PHOS  3.1  3.1  3.7   CA  8.0*  8.5  8.5   ALB  2.6*  3.0*  2.7*   WBC  6.0  8.6  6.5   HGB  8.2*  9.8*  8.8*   HCT  25.3*  29.6*  27.1*   PLT  227  268  213     Recent Labs      10/21/17   0258  10/20/17   0009  10/19/17   0329   SGOT  9*  19  24   AP  76  86  77   TP  6.3*  7.5  7.0   ALB  2.6*  3.0*  2.7*   GLOB  3.7  4.5*  4.3*     No components found for: GLPOC  No results for input(s): PH, PCO2, PO2 in the last 72 hours.         Dari Thomas MD  10/21/2017

## 2017-10-22 NOTE — PROGRESS NOTES
PCU SHIFT NURSING NOTE      Bedside shift change report given to Ksenia Trejo  (oncoming nurse) by Susan Fine (offgoing nurse). Report included the following information SBAR, Kardex, Intake/Output, MAR, Recent Results and Cardiac Rhythm NSR. Shift Summary:     0730 Bedside Report received from Lars Ruvalcaba, [address] [place] [address]. SBAR, Kardex, Intake/Output, MAR, Recent Results or Cardiac Rhythm NSR were discussed. Kobi Vasquez RN assumed care of the pt.  1530 Dr Linden Balderrama and Dr Varghese Barnes have both been in to see pt, no changes noted. Pt aware npo after midnight for pacer placement in the am. Pt up and amb in [place] with rollator and her family, soco well. Admission Date 10/15/2017   Admission Diagnosis A-fib ([address]  UTI (urinary tract infection)   Consults IP CONSULT TO CARDIOLOGY  IP CONSULT TO CARDIOLOGY        Consults   []PT   []OT   []Speech   [x]Case Management      [] Palliative      Cardiac Monitoring Order   [x]Yes   []No     IV drips   [x]Yes    Drip:    dilt                        Dose:5  Drip:                            Dose:  Drip:                            Dose:   []No     GI Prophylaxis   [x]Yes   []No         DVT Prophylaxis   SCDs:             Zackery stockings:         [x] Medication   []Contraindicated   []None      Activity Level Activity Level: Up with Assistance     Activity Assistance: Partial (one person)   Purposeful Rounding every 1-2 hour? [x]Yes   Olguin Score  Total Score: 3   Bed Alarm (If score 3 or >)   [x]Yes   [] Refused (See signed refusal form in chart)   Magdaleno Score  Magdaleno Score: 19   Magdaleno Score (if score 14 or less)   []PMT consult   []Wound Care consult      []Specialty bed   [] Nutrition consult          Needs prior to discharge:   Home O2 required:    []Yes   [x]No    If yes, how much O2 required?     Other:    Last Bowel Movement: Last Bowel Movement Date: 10/21/17      Influenza Vaccine Received Flu Vaccine for Current Season (usually Sept-March): No    Patient/Guardian Refused (Notify MD): No   Pneumonia Vaccine           Diet Active Orders   Diet    DIET CARDIAC Regular; 2 GM NA (House Low NA)    DIET NPO      LDAs               Peripheral IV 10/22/17 Left Arm (Active)   Site Assessment Clean, dry, & intact 10/22/2017  4:52 PM   Phlebitis Assessment 0 10/22/2017  4:52 PM   Infiltration Assessment 0 10/22/2017  4:52 PM   Dressing Status Clean;Dry; Intact 10/22/2017  4:52 PM   Dressing Type Tape;Transparent 10/22/2017  4:52 PM   Hub Color/Line Status Blue;Flushed 10/22/2017  4:52 PM                      Urinary Catheter      Intake & Output   Date 10/21/17 0700 - 10/22/17 0659 10/22/17 0700 - 10/23/17 0659   Shift 3162-5386 0787-4342 24 Hour Total 4496-7989 2259-2456 24 Hour Total   I  N  T  A  K  E   P. O. 720 120 840 400  400      P. O. 720 120 840 400  400    I.V.  (mL/kg/hr) 72.3  (0.1) 56.2  (0.1) 128.5  (0.1) 48.4  48.4      Cardizem Volume 72.3 56.2 128.5 48.4  48.4    Shift Total  (mL/kg) 792.3  (12.3) 176.2  (2.7) 968.5  (15.1) 448.4  (7)  448.4  (7)   O  U  T  P  U  T   Urine  (mL/kg/hr)            Urine Occurrence(s) 3 x  3 x       Stool            Stool Occurrence(s) 2 x  2 x       Shift Total  (mL/kg)         .3 176.2 968.5 448.4  448. 4   Weight (kg) 64.3 64.3 64.3 64.3 64.3 64.3         Readmission Risk Assessment Tool Score High Risk            22       Total Score        3 Has Seen PCP in Last 6 Months (Yes=3, No=0)    3 Patient Length of Stay (>5 days = 3)    4 IP Visits Last 12 Months (1-3=4, 4=9, >4=11)    5 Pt. Coverage (Medicare=5 , Medicaid, or Self-Pay=4)    7 Charlson Comorbidity Score (Age + Comorbid Conditions)        Criteria that do not apply:    . Living with Significant Other. Assisted Living. LTAC. SNF.  or   Rehab       Expected Length of Stay 3d 14h   Actual Length of Stay 7              Jai Balderas, YOLANDA

## 2017-10-22 NOTE — PROGRESS NOTES
Problem: Falls - Risk of  Goal: *Absence of Falls  Document Corey Fall Risk and appropriate interventions in the flowsheet.    Outcome: Progressing Towards Goal  Fall Risk Interventions:  Mobility Interventions: Assess mobility with egress test, Bed/chair exit alarm, Communicate number of staff needed for ambulation/transfer, OT consult for ADLs, Patient to call before getting OOB, PT Consult for mobility concerns, PT Consult for assist device competence, Strengthening exercises (ROM-active/passive), Utilize walker, cane, or other assitive device         Medication Interventions: Assess postural VS orthostatic hypotension, Bed/chair exit alarm, Evaluate medications/consider consulting pharmacy, Patient to call before getting OOB, Teach patient to arise slowly    Elimination Interventions: Bed/chair exit alarm, Call light in reach, Elevated toilet seat, Patient to call for help with toileting needs, Toilet paper/wipes in reach, Toileting schedule/hourly rounds    History of Falls Interventions: Bed/chair exit alarm, Consult care management for discharge planning, Door open when patient unattended, Evaluate medications/consider consulting pharmacy, Investigate reason for fall, Room close to nurse's station

## 2017-10-22 NOTE — PROGRESS NOTES
Problem: Falls - Risk of  Goal: *Absence of Falls  Document Corey Fall Risk and appropriate interventions in the flowsheet. Outcome: Progressing Towards Goal  Fall Risk Interventions:  Mobility Interventions: Patient to call before getting OOB         Medication Interventions: Teach patient to arise slowly, Patient to call before getting OOB    Elimination Interventions: Toilet paper/wipes in reach, Toileting schedule/hourly rounds, Call light in reach, Patient to call for help with toileting needs    History of Falls Interventions:  Investigate reason for fall, Room close to nurse's station

## 2017-10-22 NOTE — PROGRESS NOTES
Problem: Falls - Risk of  Goal: *Absence of Falls  Document Corey Fall Risk and appropriate interventions in the flowsheet.    Outcome: Progressing Towards Goal  Fall Risk Interventions:  Mobility Interventions: Assess mobility with egress test, Bed/chair exit alarm, Communicate number of staff needed for ambulation/transfer, OT consult for ADLs, Patient to call before getting OOB, PT Consult for mobility concerns, PT Consult for assist device competence, Strengthening exercises (ROM-active/passive), Utilize walker, cane, or other assitive device         Medication Interventions: Assess postural VS orthostatic hypotension, Bed/chair exit alarm, Evaluate medications/consider consulting pharmacy, Patient to call before getting OOB, Teach patient to arise slowly    Elimination Interventions: Bed/chair exit alarm, Call light in reach, Elevated toilet seat, Patient to call for help with toileting needs, Toilet paper/wipes in reach, Toileting schedule/hourly rounds    History of Falls Interventions: Door open when patient unattended, Room close to nurse's station

## 2017-10-22 NOTE — PROGRESS NOTES
Bedside and Verbal shift change report given to Verla Hodgkin (student RN) by Aura Stafford. Report included the following information SBAR, Kardex and Recent Results.      1910: Vitals checked, patient is in NSR, granddaughter is present    2100: Granddaughter has everything she needs to stay with her grandma tonight, patient ambulated to the restroom    2130: Patient ambulated to the restroom and returned comfortably to bed

## 2017-10-22 NOTE — PROGRESS NOTES
EP/Arrhythmia Progress Note    Patient ID:  Patient: Hyacinth Garcia  MRN: [id number]  Age: 80 y.o.  : 1924    Date of  Admission: 10/15/2017  9:02 AM   PCP:  Iron Pizarro MD   Usual cardiologist:  Evelyn Sneed MD    Assessment: 1. Atypical atrial flutter (treat like atrial fibrillation), recurrent after cardioversion and persistent. Very difficult to rate control with IV medication. On IV diltiazem, HR's still >100 on average, BP too low to increase. 2. Unable to take amiodarone due to existing lung disease (see CT scan 2017). Had been on amio before stopped 2017.  3. Echo today showed EF 15-20%, severe global hypokinesis, biatrial enlargement, mod-severe MR. Cardiomyopathy NOS, probably nonischemic but not evaluated. 4. Chronic systolic CHF class 3.  5. 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 Unclear etiology. 6. UTI diagnosis initially, but 10/15 U/A benign per hospitalist.  7. CKD stage 3.   8. Small hiatal hernia. 9. Anemia NOS with Hgb starting in 9's here. 10. Seizure disorder, on Keppra. 11. Lumbar compression fracture, can't lay flat easily. 12. DDNR. Plan:     1. Continue IV diltiazem for intermittent lenient rate control. 2. Daughter checks pulse frequently at home, says it only recently increased. Argues against tachycardia-mediated cardiomyopathy if not tachycardic for that long. This doesn't appear to be an acute infarct or myocarditis or takotsubo, other acute issues that can drop the EF quickly. 3. I will stop propafenone, I think the failure rate will be high and proarrhythmic risk is high. I don't think antiarrhythmic therapy will be useful at this time. 4. Dr Monika Moore discussed the potential benefits, risks (including bleeding, infection, pacer issue, perforation, med reaction, and rarely death) with the patient and daughter regarding pacemaker-AV node ablation. They are both willing to go this route.   Got the son on the phone as well to discuss and explain the options. For ablation / pacer Monday. 10/22 has converted back to NSR . Remains on IV Cardizem. Expect dr Lali Mcgarry will procede with procedure tomorrow. [x]       High complexity decision making was performed in this patient at high risk for decompensation with multiple organ involvement. Duong Meza is a 80 y.o. female told to come here with a UTI dx with a history of Afib/flutter with RVR and found to be in such in the ER. Was cardioverted in 8/2017, on amiodarone, but then amiodarone stopped after about 2 years of use due to some abnormal lung findings on CT 8/2017. She is currently on warfarin for anticoagulation PTA. She had sudden onset of racing palpitations the day before admission. This seemed to coincide with her UTI dx. A U/A 10/15 did not suggest a UTI so she was taken off antibiotics here. TODAY:  She denies syncope, dizziness, palpitations, but feel weak.   She is SOB with minimal activity, consistent with class 3 CHF sx at best.      Allergies   Allergen Reactions    Codeine Nausea Only    Pcn [Penicillins] Unknown (comments)     Cant remember reaction as a child    Tetanus Vaccines And Toxoid Unknown (comments)     Does not take tetanus due to tb hx    Vibramycin [Doxycycline Calcium] Rash          Current Facility-Administered Medications   Medication Dose Route Frequency    saline peripheral flush soln 10 mL  10 mL InterCATHeter PRN    dilTIAZem (CARDIZEM) 100 mg in 0.9% sodium chloride (MBP/ADV) 100 mL infusion  10 mg/hr IntraVENous CONTINUOUS    polyethylene glycol (MIRALAX) packet 17 g  17 g Oral DAILY PRN    vitamins  A,C,E-zinc-copper (I-LEROY PROTECT) tablet 1 Tab  1 Tab Oral DAILY    docusate sodium (COLACE) capsule 100 mg  100 mg Oral DAILY    sodium chloride (NS) flush 5-10 mL  5-10 mL IntraVENous Q8H    sodium chloride (NS) flush 5-10 mL  5-10 mL IntraVENous PRN    acetaminophen (TYLENOL) tablet 650 mg 650 mg Oral Q4H PRN    bisacodyl (DULCOLAX) tablet 5 mg  5 mg Oral DAILY PRN    ferrous sulfate tablet 325 mg  1 Tab Oral BID WITH MEALS    amitriptyline (ELAVIL) tablet 10-15 mg  10-15 mg Oral QHS    latanoprost (XALATAN) 0.005 % ophthalmic solution 1 Drop  1 Drop Both Eyes QHS    levETIRAcetam (KEPPRA) tablet 500 mg  500 mg Oral BID       Review of Symptoms:    Respiratory: Positive for cough and shortness of breath. Cardiovascular: Positive for palpitations. Negative for chest pain. Gastrointestinal: Negative. Negative for abdominal pain, nausea and vomiting. Objective:      Physical Exam:  Temp (24hrs), Av °F (36.7 °C), Min:97.6 °F (36.4 °C), Max:98.2 °F (36.8 °C)    Patient Vitals for the past 8 hrs:   Pulse   10/22/17 1200 81   10/22/17 1000 96   10/22/17 0800 80    Patient Vitals for the past 8 hrs:   Resp   10/22/17 1200 18   10/22/17 0800 18    Patient Vitals for the past 8 hrs:   BP   10/22/17 1200 125/60   10/22/17 1000 115/61   10/22/17 0800 126/60          Intake/Output Summary (Last 24 hours) at 10/22/17 1438  Last data filed at 10/22/17 1241   Gross per 24 hour   Intake           696.92 ml   Output                0 ml   Net           696.92 ml       Nondiaphoretic, not in acute distress. Supple, no palpable thyromegaly. No scleral icterus, mucous membranes moist, conjuctivae pink, no xanthelasma. Unlabored, clear to auscultation bilaterally, symmetric air movement. Irregular rate and rhythm, no murmur, pericardial rub, knock, or gallop. No peripheral edema. Palpable radial pulses bilaterally. Abdomen, soft, nontender, nondistended. Extremities without cyanosis or clubbing. Skin warm and dry. Neuro grossly nonfocal.  No tremor. Awake and appropriate. CARDIOGRAPHICS and STUDIES, I reviewed:    Telemetry:  Atrial flutter with RVR.     ECG:  Atypical atrial flutter with variable block 107 bpm.    Echo 2017:  LEFT VENTRICLE: Size was normal. Systolic function was mildly reduced. Ejection fraction was estimated to be 45 %. Of note patient was in afib at  time of study. There was mild diffuse hypokinesis. RIGHT VENTRICLE: The ventricle was mildly dilated. Systolic function was  mildly reduced. LEFT ATRIUM: The atrium was mildly dilated. RIGHT ATRIUM: The atrium was mildly dilated. MITRAL VALVE: There was mild thickening. DOPPLER: There was moderate  regurgitation. AORTIC VALVE: The valve was probably trileaflet. DOPPLER: There was no  stenosis. There was no regurgitation. TRICUSPID VALVE: Normal valve structure. DOPPLER: There was mild  regurgitation. Pulmonary artery systolic pressure: 40 mmHg. PULMONIC VALVE: Not well visualized, but normal Doppler findings. AORTA: The root exhibited normal size. PERICARDIUM: There was no pericardial effusion. Labs:  Recent Labs      10/19/17   1620   TROIQ  <0.04     Lab Results   Component Value Date/Time    Cholesterol, total 196 09/12/2012 12:00 AM    HDL Cholesterol 64 09/12/2012 12:00 AM    LDL, calculated 104 09/12/2012 12:00 AM    Triglyceride 142 09/12/2012 12:00 AM     Recent Labs      10/21/17   0258  10/20/17   0501   INR  1.4*  1.8*   PTP  13.9*  18.8*      Recent Labs      10/21/17   0258  10/20/17   0009   NA  137  136   K  3.7  4.4   CL  106  103   CO2  26  25   BUN  20  26*   CREA  0.84  1.02   GLU  84  110*   PHOS  3.1  3.1   CA  8.0*  8.5   ALB  2.6*  3.0*   WBC  6.0  8.6   HGB  8.2*  9.8*   HCT  25.3*  29.6*   PLT  227  268     Recent Labs      10/21/17   0258  10/20/17   0009   SGOT  9*  19   AP  76  86   TP  6.3*  7.5   ALB  2.6*  3.0*   GLOB  3.7  4.5*     No components found for: GLPOC  No results for input(s): PH, PCO2, PO2 in the last 72 hours.         Jeane Mark MD  10/22/2017

## 2017-10-23 ENCOUNTER — APPOINTMENT (OUTPATIENT)
Dept: GENERAL RADIOLOGY | Age: 82
DRG: 242 | End: 2017-10-23
Attending: INTERNAL MEDICINE
Payer: MEDICARE

## 2017-10-23 LAB
ANION GAP SERPL CALC-SCNC: 8 MMOL/L (ref 5–15)
ATRIAL RATE: 80 BPM
BUN SERPL-MCNC: 14 MG/DL (ref 6–20)
BUN/CREAT SERPL: 19 (ref 12–20)
CALCIUM SERPL-MCNC: 8.4 MG/DL (ref 8.5–10.1)
CALCULATED P AXIS, ECG09: 63 DEGREES
CALCULATED R AXIS, ECG10: 35 DEGREES
CALCULATED T AXIS, ECG11: 69 DEGREES
CHLORIDE SERPL-SCNC: 109 MMOL/L (ref 97–108)
CO2 SERPL-SCNC: 23 MMOL/L (ref 21–32)
CREAT SERPL-MCNC: 0.72 MG/DL (ref 0.55–1.02)
DIAGNOSIS, 93000: NORMAL
ERYTHROCYTE [DISTWIDTH] IN BLOOD BY AUTOMATED COUNT: 15 % (ref 11.5–14.5)
GLUCOSE SERPL-MCNC: 92 MG/DL (ref 65–100)
HCT VFR BLD AUTO: 29 % (ref 35–47)
HGB BLD-MCNC: 9.4 G/DL (ref 11.5–16)
MCH RBC QN AUTO: 29.6 PG (ref 26–34)
MCHC RBC AUTO-ENTMCNC: 32.4 G/DL (ref 30–36.5)
MCV RBC AUTO: 91.2 FL (ref 80–99)
P-R INTERVAL, ECG05: 222 MS
PLATELET # BLD AUTO: 241 K/UL (ref 150–400)
POTASSIUM SERPL-SCNC: 3.9 MMOL/L (ref 3.5–5.1)
Q-T INTERVAL, ECG07: 424 MS
QRS DURATION, ECG06: 90 MS
QTC CALCULATION (BEZET), ECG08: 489 MS
RBC # BLD AUTO: 3.18 M/UL (ref 3.8–5.2)
SODIUM SERPL-SCNC: 140 MMOL/L (ref 136–145)
VENTRICULAR RATE, ECG03: 80 BPM
WBC # BLD AUTO: 6.2 K/UL (ref 3.6–11)

## 2017-10-23 PROCEDURE — C1892 INTRO/SHEATH,FIXED,PEEL-AWAY: HCPCS

## 2017-10-23 PROCEDURE — 74011636320 HC RX REV CODE- 636/320: Performed by: INTERNAL MEDICINE

## 2017-10-23 PROCEDURE — 74011250636 HC RX REV CODE- 250/636

## 2017-10-23 PROCEDURE — 77030018729 HC ELECTRD DEFIB PAD CARD -B

## 2017-10-23 PROCEDURE — 74011250636 HC RX REV CODE- 250/636: Performed by: INTERNAL MEDICINE

## 2017-10-23 PROCEDURE — 77030002996 HC SUT SLK J&J -A

## 2017-10-23 PROCEDURE — 65660000000 HC RM CCU STEPDOWN

## 2017-10-23 PROCEDURE — 75820 VEIN X-RAY ARM/LEG: CPT

## 2017-10-23 PROCEDURE — C1898 LEAD, PMKR, OTHER THAN TRANS: HCPCS

## 2017-10-23 PROCEDURE — 0JH606Z INSERTION OF PACEMAKER, DUAL CHAMBER INTO CHEST SUBCUTANEOUS TISSUE AND FASCIA, OPEN APPROACH: ICD-10-PCS | Performed by: INTERNAL MEDICINE

## 2017-10-23 PROCEDURE — 80048 BASIC METABOLIC PNL TOTAL CA: CPT | Performed by: INTERNAL MEDICINE

## 2017-10-23 PROCEDURE — 77030031139 HC SUT VCRL2 J&J -A

## 2017-10-23 PROCEDURE — C1785 PMKR, DUAL, RATE-RESP: HCPCS

## 2017-10-23 PROCEDURE — 85027 COMPLETE CBC AUTOMATED: CPT | Performed by: INTERNAL MEDICINE

## 2017-10-23 PROCEDURE — C1894 INTRO/SHEATH, NON-LASER: HCPCS

## 2017-10-23 PROCEDURE — 74011000250 HC RX REV CODE- 250

## 2017-10-23 PROCEDURE — 02HK3JZ INSERTION OF PACEMAKER LEAD INTO RIGHT VENTRICLE, PERCUTANEOUS APPROACH: ICD-10-PCS | Performed by: INTERNAL MEDICINE

## 2017-10-23 PROCEDURE — 74011250637 HC RX REV CODE- 250/637: Performed by: INTERNAL MEDICINE

## 2017-10-23 PROCEDURE — 02H63JZ INSERTION OF PACEMAKER LEAD INTO RIGHT ATRIUM, PERCUTANEOUS APPROACH: ICD-10-PCS | Performed by: INTERNAL MEDICINE

## 2017-10-23 PROCEDURE — 36415 COLL VENOUS BLD VENIPUNCTURE: CPT | Performed by: INTERNAL MEDICINE

## 2017-10-23 PROCEDURE — 77030018836 HC SOL IRR NACL ICUM -A

## 2017-10-23 PROCEDURE — 77010033678 HC OXYGEN DAILY

## 2017-10-23 PROCEDURE — 77030011640 HC PAD GRND REM COVD -A

## 2017-10-23 PROCEDURE — 71010 XR CHEST PORT: CPT

## 2017-10-23 PROCEDURE — L3670 SO ACRO/CLAV CAN WEB PRE OTS: HCPCS

## 2017-10-23 RX ORDER — MIDAZOLAM HYDROCHLORIDE 1 MG/ML
.5-2 INJECTION, SOLUTION INTRAMUSCULAR; INTRAVENOUS
Status: DISCONTINUED | OUTPATIENT
Start: 2017-10-23 | End: 2017-10-23

## 2017-10-23 RX ORDER — SODIUM CHLORIDE 0.9 % (FLUSH) 0.9 %
5-10 SYRINGE (ML) INJECTION AS NEEDED
Status: DISCONTINUED | OUTPATIENT
Start: 2017-10-23 | End: 2017-10-24 | Stop reason: SDUPTHER

## 2017-10-23 RX ORDER — ENOXAPARIN SODIUM 100 MG/ML
40 INJECTION SUBCUTANEOUS ONCE
Status: COMPLETED | OUTPATIENT
Start: 2017-10-23 | End: 2017-10-23

## 2017-10-23 RX ORDER — WARFARIN SODIUM 5 MG/1
5 TABLET ORAL ONCE
Status: COMPLETED | OUTPATIENT
Start: 2017-10-23 | End: 2017-10-23

## 2017-10-23 RX ORDER — HEPARIN SODIUM 200 [USP'U]/100ML
INJECTION, SOLUTION INTRAVENOUS
Status: COMPLETED
Start: 2017-10-23 | End: 2017-10-23

## 2017-10-23 RX ORDER — FENTANYL CITRATE 50 UG/ML
25-50 INJECTION, SOLUTION INTRAMUSCULAR; INTRAVENOUS
Status: DISCONTINUED | OUTPATIENT
Start: 2017-10-23 | End: 2017-10-23

## 2017-10-23 RX ORDER — LIDOCAINE HYDROCHLORIDE AND EPINEPHRINE 10; 10 MG/ML; UG/ML
INJECTION, SOLUTION INFILTRATION; PERINEURAL
Status: DISCONTINUED
Start: 2017-10-23 | End: 2017-10-23

## 2017-10-23 RX ORDER — VANCOMYCIN HYDROCHLORIDE 1 G/20ML
INJECTION, POWDER, LYOPHILIZED, FOR SOLUTION INTRAVENOUS
Status: DISCONTINUED
Start: 2017-10-23 | End: 2017-10-23

## 2017-10-23 RX ORDER — HEPARIN SODIUM 200 [USP'U]/100ML
500 INJECTION, SOLUTION INTRAVENOUS ONCE
Status: COMPLETED | OUTPATIENT
Start: 2017-10-23 | End: 2017-10-23

## 2017-10-23 RX ORDER — SODIUM CHLORIDE 0.9 % (FLUSH) 0.9 %
5-10 SYRINGE (ML) INJECTION EVERY 8 HOURS
Status: DISCONTINUED | OUTPATIENT
Start: 2017-10-23 | End: 2017-10-26 | Stop reason: HOSPADM

## 2017-10-23 RX ORDER — DILTIAZEM HYDROCHLORIDE 60 MG/1
60 TABLET, FILM COATED ORAL
Status: DISCONTINUED | OUTPATIENT
Start: 2017-10-23 | End: 2017-10-24

## 2017-10-23 RX ORDER — BACITRACIN 50000 [IU]/1
INJECTION, POWDER, FOR SOLUTION INTRAMUSCULAR
Status: COMPLETED
Start: 2017-10-23 | End: 2017-10-23

## 2017-10-23 RX ORDER — MIDAZOLAM HYDROCHLORIDE 1 MG/ML
INJECTION, SOLUTION INTRAMUSCULAR; INTRAVENOUS
Status: COMPLETED
Start: 2017-10-23 | End: 2017-10-23

## 2017-10-23 RX ORDER — BACITRACIN 50000 [IU]/1
50000 INJECTION, POWDER, FOR SOLUTION INTRAMUSCULAR ONCE
Status: COMPLETED | OUTPATIENT
Start: 2017-10-23 | End: 2017-10-23

## 2017-10-23 RX ORDER — ACETAMINOPHEN 325 MG/1
650 TABLET ORAL
Status: DISCONTINUED | OUTPATIENT
Start: 2017-10-23 | End: 2017-10-26 | Stop reason: HOSPADM

## 2017-10-23 RX ORDER — SODIUM CHLORIDE 900 MG/100ML
INJECTION INTRAVENOUS
Status: DISCONTINUED
Start: 2017-10-23 | End: 2017-10-23

## 2017-10-23 RX ORDER — LIDOCAINE HYDROCHLORIDE AND EPINEPHRINE 10; 10 MG/ML; UG/ML
INJECTION, SOLUTION INFILTRATION; PERINEURAL
Status: COMPLETED
Start: 2017-10-23 | End: 2017-10-23

## 2017-10-23 RX ORDER — FENTANYL CITRATE 50 UG/ML
INJECTION, SOLUTION INTRAMUSCULAR; INTRAVENOUS
Status: COMPLETED
Start: 2017-10-23 | End: 2017-10-23

## 2017-10-23 RX ORDER — LIDOCAINE HYDROCHLORIDE AND EPINEPHRINE 10; 10 MG/ML; UG/ML
1-20 INJECTION, SOLUTION INFILTRATION; PERINEURAL
Status: DISCONTINUED | OUTPATIENT
Start: 2017-10-23 | End: 2017-10-23

## 2017-10-23 RX ORDER — FUROSEMIDE 10 MG/ML
20 INJECTION INTRAMUSCULAR; INTRAVENOUS ONCE
Status: COMPLETED | OUTPATIENT
Start: 2017-10-23 | End: 2017-10-23

## 2017-10-23 RX ORDER — IODIXANOL 320 MG/ML
10 INJECTION, SOLUTION INTRAVASCULAR
Status: COMPLETED | OUTPATIENT
Start: 2017-10-23 | End: 2017-10-23

## 2017-10-23 RX ADMIN — WARFARIN SODIUM 5 MG: 5 TABLET ORAL at 19:15

## 2017-10-23 RX ADMIN — Medication 10 ML: at 05:45

## 2017-10-23 RX ADMIN — FERROUS SULFATE TAB 325 MG (65 MG ELEMENTAL FE) 325 MG: 325 (65 FE) TAB at 16:13

## 2017-10-23 RX ADMIN — VANCOMYCIN HYDROCHLORIDE 1000 MG: 1 INJECTION, POWDER, LYOPHILIZED, FOR SOLUTION INTRAVENOUS at 11:40

## 2017-10-23 RX ADMIN — AMITRIPTYLINE HYDROCHLORIDE 10 MG: 10 TABLET, FILM COATED ORAL at 21:28

## 2017-10-23 RX ADMIN — LIDOCAINE HYDROCHLORIDE,EPINEPHRINE BITARTRATE 100 MG: 10; .01 INJECTION, SOLUTION INFILTRATION; PERINEURAL at 12:10

## 2017-10-23 RX ADMIN — IODIXANOL 10 ML: 320 INJECTION, SOLUTION INTRAVASCULAR at 12:00

## 2017-10-23 RX ADMIN — FENTANYL CITRATE 25 MCG: 50 INJECTION, SOLUTION INTRAMUSCULAR; INTRAVENOUS at 12:07

## 2017-10-23 RX ADMIN — HEPARIN SODIUM 1000 UNITS: 200 INJECTION, SOLUTION INTRAVENOUS at 12:34

## 2017-10-23 RX ADMIN — LEVETIRACETAM 500 MG: 500 TABLET, FILM COATED ORAL at 16:11

## 2017-10-23 RX ADMIN — LIDOCAINE HYDROCHLORIDE AND EPINEPHRINE 100 MG: 10; 10 INJECTION, SOLUTION INFILTRATION; PERINEURAL at 12:10

## 2017-10-23 RX ADMIN — MIDAZOLAM HYDROCHLORIDE 0.5 MG: 1 INJECTION, SOLUTION INTRAMUSCULAR; INTRAVENOUS at 12:07

## 2017-10-23 RX ADMIN — Medication 10 ML: at 21:28

## 2017-10-23 RX ADMIN — Medication 10 ML: at 16:10

## 2017-10-23 RX ADMIN — BACITRACIN 50000 UNITS: 50000 INJECTION, POWDER, FOR SOLUTION INTRAMUSCULAR at 12:22

## 2017-10-23 RX ADMIN — DOCUSATE SODIUM 100 MG: 100 CAPSULE, LIQUID FILLED ORAL at 16:12

## 2017-10-23 RX ADMIN — Medication 10 ML: at 16:11

## 2017-10-23 RX ADMIN — LATANOPROST 1 DROP: 50 SOLUTION OPHTHALMIC at 21:30

## 2017-10-23 RX ADMIN — ENOXAPARIN SODIUM 40 MG: 40 INJECTION SUBCUTANEOUS at 22:01

## 2017-10-23 RX ADMIN — HEPARIN SODIUM 1000 UNITS: 200 INJECTION, SOLUTION INTRAVENOUS at 12:07

## 2017-10-23 RX ADMIN — BACITRACIN 50000 UNITS: 5000 INJECTION, POWDER, FOR SOLUTION INTRAMUSCULAR at 12:22

## 2017-10-23 RX ADMIN — DILTIAZEM HYDROCHLORIDE 60 MG: 60 TABLET, FILM COATED ORAL at 16:13

## 2017-10-23 RX ADMIN — MULTIPLE VITAMINS W/ MINERALS TAB 1 TABLET: TAB at 16:15

## 2017-10-23 RX ADMIN — FUROSEMIDE 20 MG: 10 INJECTION, SOLUTION INTRAMUSCULAR; INTRAVENOUS at 16:12

## 2017-10-23 NOTE — PROGRESS NOTES
1900 - Bedside and Verbal shift change report given to branden alonzo (oncoming nurse) by Karen John (offgoing nurse). Report included the following information SBAR, Kardex, Intake/Output, MAR, Recent Results and Cardiac Rhythm paced, BBB.   2154 - called Cardiology to confirm scheduled lovenox. Spoke with Dr. Kendrick Ferreira and ordered to \"give dose as Dr. Daryl Easton ordered\". 0700 - Bedside and Verbal shift change report given to sylvia (oncoming nurse) by Ingris Rdz (offgoing nurse). Report included the following information SBAR, Kardex, Intake/Output, MAR, Recent Results and Cardiac Rhythm paced, afib.

## 2017-10-23 NOTE — PROGRESS NOTES
PCU SHIFT NURSING NOTE      Bedside shift change report given to Yemi Olivas (oncoming nurse) by Jeannie Fraser (offgoing nurse). Report included the following information SBAR, Kardex, Intake/Output, MAR, Recent Results and Cardiac Rhythm nsr. Shift Summary:   0730 Bedside Report received from Yemi Olivas, [facility]. SBAR, Kardex, Intake/Output, MAR, Recent Results or Cardiac Rhythm NSR were discussed. Brianna Bell RN assumed care of the pt. Pt has been NPO since midnight, aware of pacer placement scheduled for 0930.  1130 pt to cath lab for pacer placement. 1252 pt returned from cath lab with left chest wall pacer. Ice pack in place. Family at bedside. 1730 pt able to get up oob to bathroom with assist, soco well. Sling in place when oob. 1930 consents signed and placed on chart  Admission Date 10/15/2017   Admission Diagnosis A-fib ([address]  UTI (urinary tract infection)   Consults IP CONSULT TO CARDIOLOGY  IP CONSULT TO CARDIOLOGY        Consults   [x]PT   [x]OT   []Speech   [x]Case Management      [] Palliative      Cardiac Monitoring Order   [x]Yes   []No     IV drips   []Yes    Drip:                            Dose:  Drip:                            Dose:  Drip:                            Dose:   [x]No     GI Prophylaxis   [x]Yes   []No         DVT Prophylaxis   SCDs:             Zackery stockings:         [x] Medication   []Contraindicated   []None      Activity Level Activity Level: Up with Assistance     Activity Assistance: Partial (one person)   Purposeful Rounding every 1-2 hour? [x]Yes   Olguin Score  Total Score: 3   Bed Alarm (If score 3 or >)   [x]Yes   [] Refused (See signed refusal form in chart)   Magdaleno Score  Magdaleno Score: 20   Magdaleno Score (if score 14 or less)   []PMT consult   []Wound Care consult      []Specialty bed   [] Nutrition consult          Needs prior to discharge:   Home O2 required:    [x]Yes   []No    If yes, how much O2 required?  Has at home for qhs    Other:    Last Bowel Movement: Last Bowel Movement Date: 10/22/17      Influenza Vaccine Received Flu Vaccine for Current Season (usually Sept-March): No    Patient/Guardian Refused (Notify MD): No   Pneumonia Vaccine           Diet Active Orders   Diet    DIET CARDIAC Regular; 2 GM NA (House Low NA)      LDAs               Peripheral IV 10/22/17 Left Arm (Active)   Site Assessment Clean, dry, & intact 10/23/2017  5:03 PM   Phlebitis Assessment 0 10/23/2017  5:03 PM   Infiltration Assessment 0 10/23/2017  5:03 PM   Dressing Status Clean, dry, & intact 10/23/2017  5:03 PM   Dressing Type Transparent;Tape 10/23/2017  5:03 PM   Hub Color/Line Status Blue;Flushed;Capped 10/23/2017  5:03 PM                      Urinary Catheter      Intake & Output   Date 10/22/17 0700 - 10/23/17 0659 10/23/17 0700 - 10/24/17 0659   Shift 2029-1311 9871-8934 24 Hour Total 1213-1574 8047-2565 24 Hour Total   I  N  T  A  K  E   P.O. 400  400         P. O. 400  400       I.V.  (mL/kg/hr) 48.4  (0.1) 75.4  (0.1) 123.8  (0.1)         Cardizem Volume 48.4 75.4 123.8       Shift Total  (mL/kg) 448.4  (7) 75.4  (1.2) 523.8  (8.1)      O  U  T  P  U  T   Urine  (mL/kg/hr)            Urine Occurrence(s)  2 x 2 x       Shift Total  (mL/kg)         .4 75.4 523.8      Weight (kg) 64.3 64.3 64.3 64.3 64.3 64.3         Readmission Risk Assessment Tool Score High Risk            22       Total Score        3 Has Seen PCP in Last 6 Months (Yes=3, No=0)    3 Patient Length of Stay (>5 days = 3)    4 IP Visits Last 12 Months (1-3=4, 4=9, >4=11)    5 Pt. Coverage (Medicare=5 , Medicaid, or Self-Pay=4)    7 Charlson Comorbidity Score (Age + Comorbid Conditions)        Criteria that do not apply:    . Living with Significant Other. Assisted Living. LTAC. SNF.  or   Rehab       Expected Length of Stay 3d 14h   Actual Length of Stay 8                Verdia Blakes, RN

## 2017-10-23 NOTE — PROGRESS NOTES
Physical therapy note:   Patient currently waiting for transport for AVN ablation and pacemaker placement today. Patient declined ambulation at this time. Will follow up tomorrow if appropriate for re-evaluation. Beth Parra, PT, DPT

## 2017-10-23 NOTE — PROGRESS NOTES
Bedside and Verbal shift change report given to Jose Pryor (oncoming nurse) by Daja Guido (offgoing nurse). Report included the following information SBAR, Kardex, Intake/Output, MAR and Cardiac Rhythm NSR.     0715 Bedside and Verbal shift change report given to Carmela Zelaya (oncoming nurse) by Jose Pryor (offgoing nurse). Report included the following information SBAR, Kardex, Intake/Output, MAR, Recent Results and Cardiac Rhythm NSR 1st degree block.

## 2017-10-23 NOTE — PROCEDURES
[address]  [phone]    Patient ID:  Patient: Sylvia Schaumann  MRN: [id number]  Age: 80 y.o.  : 1924  Gender: female  Study Date: 10/23/2017    History: This is a female with nonreversible sick sinus syndrome, unable to control her atrial fibrillation/flutter with rate control medication, here for pacemaker. Procedures Performed:  1. DUAL CHAMBER PACEMAKER ([id number])    The patient was brought to the cath lab in a postabsorptive state after informed consent had been previously obtained. Continuous electrocardiographic and hemodynamic monitoring was performed. Sedation was performed by the cath lab nurse who was in constant attendance and my supervision throughout the procedure. Versed and fentanyl was used, sedation time 11:39-12:36 (57 minutes). Using 1% lidocaine with epinephrine, the left chest site was anesthetized. The pocket was formed in the usual fashion and ultimately axillary venous access was obtained using a micropuncture needle. Two safe sheaths were placed. The tined right ventricular lead (5092-52 cm, [id number]) was advanced to the RV apex. The tined right atrial lead (4574-45 cm, [id number]) was advanced to the RA appendage. Each lead was passively-fixed and tested, performance verified. The leads were anchored to the pocket floor using two 0-silk sutures at each anchor sleeve. The pulse generator was connected to the leads and placed in the pocket after hemostasis was confirmed. Vigorous irrigation with antibiotic solution was performed. A single 0-silk suture was used to anchor the pulse generator to the pocket floor. The pocket was closed using a running 2-0 Vicryl layer x1, followed by a more superficial layer of running 4-0 Vicryl in a subcuticular fashion to close the skin. Final fluoroscopic check revealed adequate redundancy of the leads and the absence of pneumothorax.   Dermabond was applied. Preoperative Diagnosis: As above. Postoperative Diagnosis: As above. Procedure:  As above. Surgeon(s) and Role:  Taty Shah MD - Primary   Anesthesia:   MAC. Estimated Blood Loss:  <5 cc. Specimens: * No specimens in log *   Findings:  As below. Complications:  None. SETTINGS:  Medtronic [id number], [id number]  RA 4.7, 1.0, 577  RV 7.3, 1.5, 519  DDDR     Recommendations:  After successful dual chamber permanent pacemaker implant to the left chest using transvenous and tined leads, routine follow-up in 2-4 weeks for wound and device check. Will probably need an AV node ablation for definitive rate control.     Signed:  Taty Shah MD

## 2017-10-23 NOTE — PROGRESS NOTES
Cardiology Progress Note  10/23/2017 9:54 AM  Admit Date: 10/15/2017  Admit Diagnosis/CC: A-fib [facility])  UTI (urinary tract infection)  Subjective:   Patient reports:  Chest Pain:  [x] none,  consistent with [] non-cardiac  [] atypical  []  anginal chest pain             [x] none now    []  on-going  Dyspnea: [x] none  [] at rest  [] with exertion  [] improved  [] unchanged [] worsening  PND:       [x] none  [] overnight   [] current  Orthopnea: [x] none  [] improved  [] unchanged  [] worsening  Presyncope: [x] none  [] improved  [] unchanged  [] worsening  Ambulated in hallway without symptoms  [] Yes  Ambulated in room without symptoms  [x] Yes  ROS(2+other systems)   Hematuria: [] Yes  [x] No.   Dysuria: [] Yes  [x] No                                           Cough:       [] Yes  [x] No.   Sputum: [] Yes  [x] No                                            Hematochezia: [] Yes  [x] No.   Melena: [] Yes  [x] No                                            No change in family and social history from H&P/Consult note.     Current Facility-Administered Medications   Medication Dose Route Frequency    saline peripheral flush soln 10 mL  10 mL InterCATHeter PRN    dilTIAZem (CARDIZEM) 100 mg in 0.9% sodium chloride (MBP/ADV) 100 mL infusion  10 mg/hr IntraVENous CONTINUOUS    polyethylene glycol (MIRALAX) packet 17 g  17 g Oral DAILY PRN    vitamins  A,C,E-zinc-copper (I-LEROY PROTECT) tablet 1 Tab  1 Tab Oral DAILY    docusate sodium (COLACE) capsule 100 mg  100 mg Oral DAILY    sodium chloride (NS) flush 5-10 mL  5-10 mL IntraVENous Q8H    sodium chloride (NS) flush 5-10 mL  5-10 mL IntraVENous PRN    acetaminophen (TYLENOL) tablet 650 mg  650 mg Oral Q4H PRN    bisacodyl (DULCOLAX) tablet 5 mg  5 mg Oral DAILY PRN    ferrous sulfate tablet 325 mg  1 Tab Oral BID WITH MEALS    amitriptyline (ELAVIL) tablet 10-15 mg  10-15 mg Oral QHS    latanoprost (XALATAN) 0.005 % ophthalmic solution 1 Drop  1 Drop Both Eyes QHS    levETIRAcetam (KEPPRA) tablet 500 mg  500 mg Oral BID       Objective:    Physical Exam:  Last VS:   Visit Vitals    /61 (BP 1 Location: Right arm, BP Patient Position: At rest)    Pulse 87    Temp 97.9 °F (36.6 °C)    Resp 20    Ht 5' 3\" (1.6 m)    Wt 64.3 kg (141 lb 12.1 oz)    SpO2 94%    Breastfeeding No    BMI 25.11 kg/m2    Temp (24hrs), Av.9 °F (36.6 °C), Min:97.7 °F (36.5 °C), Max:98.2 °F (36.8 °C)    24 hr VS reviewed. General Appearance:   [x] well developed, well nourished,  [x] NAD. [] agitated   [] lethargic but arousable  [] obtunded   ENT, Palate:    [x] WNL   [] dry palate/MM        Respiratory:    [x] CTA bilateral  [] rales  [] rhonchi  [] normal resp effort      [] similar to yesterday   [] worse    [] improved   Cardiovascular:   [x] RRR   [] Irregular rate and rhythm   [x] Normal S1, S2   [x] No gallop or rub. [] no murmur   [x] no new murmur  [] murmur c/w:   [x] no edema  RLE: []1+ []2+ [] 3+;  LLE: []1+ []2+ []3+      [] edema similar to yesterday   [] worse    [] improved   [x] normal JVP   [] Elevated JVP    [x] JVP similar to yesterday   [] worse    [] improved   [x] carotid upstroke unchanged   [x] abd aorta not palpated   [x] no stigmata of peripheral emboli   GI:    [x] abd soft, non-distented,bowel sounds present, no                     organomegaly appreciated   Skin:  Neuro:    Cath Site:  [x] warm and dry [] cold extremities   [x] A/O x 3, grossly non-focal      [] intact w/o hematoma or bruit; distal pulse unchanged.               Data Review:   Labs:    Recent Results (from the past 24 hour(s))   CBC W/O DIFF    Collection Time: 10/23/17  4:02 AM   Result Value Ref Range    WBC 6.2 3.6 - 11.0 K/uL    RBC 3.18 (L) 3.80 - 5.20 M/uL    HGB 9.4 (L) 11.5 - 16.0 g/dL    HCT 29.0 (L) 35.0 - 47.0 %    MCV 91.2 80.0 - 99.0 FL    MCH 29.6 26.0 - 34.0 PG    MCHC 32.4 30.0 - 36.5 g/dL    RDW 15.0 (H) 11.5 - 14.5 %    PLATELET 831 669 - 327 K/uL   METABOLIC PANEL, BASIC    Collection Time: 10/23/17  4:02 AM   Result Value Ref Range    Sodium 140 136 - 145 mmol/L    Potassium 3.9 3.5 - 5.1 mmol/L    Chloride 109 (H) 97 - 108 mmol/L    CO2 23 21 - 32 mmol/L    Anion gap 8 5 - 15 mmol/L    Glucose 92 65 - 100 mg/dL    BUN 14 6 - 20 MG/DL    Creatinine 0.72 0.55 - 1.02 MG/DL    BUN/Creatinine ratio 19 12 - 20      GFR est AA >60 >60 ml/min/1.73m2    GFR est non-AA >60 >60 ml/min/1.73m2    Calcium 8.4 (L) 8.5 - 10.1 MG/DL       Provided Telemetry: [x] sinus  [] chronic afib   [] paroxysmal afib  [] NSVT      Assessment:     Active Problems:    UTI (urinary tract infection) (10/15/2017)      A-fib (HCC) (10/15/2017)        Plan:     Atrial Fibrillation - Flutter  improved   Electrophysiology consult  She has resumed RSR. Agree with PPM.? Sotalol    For all other plans, see orders.    [x] High complexity decision making was performed

## 2017-10-23 NOTE — PROGRESS NOTES
Hospitalist Progress Note    NAME: Adela Evans   :  1924   MRN:  [id number]       Assessment / Plan:  Atrial Fibrillation with RVR with associated weakness and SANTOS; patient in NSR today  Acute Systolic CHF (possibly POA)  Hypertension with recent low BP's but tolerating cardizem gtt  -s/p cardizem gtt, PPM placed today with plans for Ablation tomorrow  -Lexiscan wnl  -anticoagulation per cardiology     Chest pain on 10/19: MI ruled out    UTI outside records reviewed   -discontinued rocephin as UA on 10/15 benign and no UCx sent      CKD stage III:   -renal function at baseline, continue to monitor      L1 compression fracture  -tylenol prn       Seizure disorder  -continue keppra      Macular Degeneration    Phlebitis left dorsal arm: continue warm compresses    Code Status: has DDNR  Surrogate Decision Maker: sandy      DVT Prophylaxis: NI  GI Prophylaxis: not indicated      Baseline: Lives at home         Body mass index is 25.11 kg/(m^2). Subjective:     Chief Complaint / Reason for Physician Visit: follow-up afib with RVR  Patient seen s/p PPM, denies complaints and Cardiology planning for Ablation tomorrow    Review of Systems:  Symptom Y/N Comments  Symptom Y/N Comments   Fever/Chills    Chest Pain n    Poor Appetite    Edema n    Cough    Abdominal Pain n    Sputum    Joint Pain     SOB/SANTOS n   Pruritis/Rash     Nausea/vomit    Tolerating PT/OT     Diarrhea    Tolerating Diet     Constipation    Other       Could NOT obtain due to:      Objective:     VITALS:   Last 24hrs VS reviewed since prior progress note.  Most recent are:  Patient Vitals for the past 24 hrs:   Temp Pulse Resp BP SpO2   10/23/17 1430 - 93 22 154/77 100 %   10/23/17 1415 - 97 21 159/79 100 %   10/23/17 1400 - 97 22 158/78 100 %   10/23/17 1315 - (!) 101 26 158/80 95 %   10/23/17 1252 97.4 °F (36.3 °C) 97 18 158/69 92 %   10/23/17 0805 97.9 °F (36.6 °C) - - - -   10/23/17 0803 - 87 20 131/61 94 %   10/23/17 0216 97.7 °F (36.5 °C) 86 19 138/69 96 %   10/22/17 2329 97.8 °F (36.6 °C) 93 18 127/74 91 %   10/22/17 2000 97.8 °F (36.6 °C) - 18 - -       Intake/Output Summary (Last 24 hours) at 10/23/17 1531  Last data filed at 10/23/17 0346   Gross per 24 hour   Intake            75.42 ml   Output                0 ml   Net            75.42 ml        PHYSICAL EXAM:  General: WD, WN. Alert, cooperative, no acute distress    EENT:  EOMI. Anicteric sclerae. MM dry  Resp:  CTA bilaterally on anterior assessment, no wheezing or rales. No accessory muscle use  CV:  RRR, trace BLE edema  GI:  Soft, Non distended, Non tender.  +Bowel sounds  Neurologic:  Alert and oriented X 3, normal speech,   Psych:   Good insight. Not anxious nor agitated  Skin:  Phlebitis left dorsal arm,  No jaundice    Reviewed most current lab test results and cultures  YES  Reviewed most current radiology test results   YES  Review and summation of old records today    NO  Reviewed patient's current orders and MAR    YES  PMH/SH reviewed - no change compared to H&P  ________________________________________________________________________  Care Plan discussed with:    Comments   Patient y    Family  y daughter   RN y    Care Manager y    Consultant                       y Multidiciplinary team rounds were held today with , nursing, pharmacist and clinical coordinator. Patient's plan of care was discussed; medications were reviewed and discharge planning was addressed.      ________________________________________________________________________  Total NON critical care TIME:  15  Minutes    Total CRITICAL CARE TIME Spent:   Minutes non procedure based      Comments   >50% of visit spent in counseling and coordination of care     ________________________________________________________________________  Aries Maguire MD     Procedures: see electronic medical records for all procedures/Xrays and details which were not copied into this note but were reviewed prior to creation of Plan. LABS:  I reviewed today's most current labs and imaging studies.   Pertinent labs include:  Recent Labs      10/23/17   0402  10/21/17   0258   WBC  6.2  6.0   HGB  9.4*  8.2*   HCT  29.0*  25.3*   PLT  241  227     Recent Labs      10/23/17   0402  10/21/17   0258   NA  140  137   K  3.9  3.7   CL  109*  106   CO2  23  26   GLU  92  84   BUN  14  20   CREA  0.72  0.84   CA  8.4*  8.0*   MG   --   2.2   PHOS   --   3.1   ALB   --   2.6*   TBILI   --   0.5   SGOT   --   9*   ALT   --   12   INR   --   1.4*       Signed: Jennifer Wright MD

## 2017-10-23 NOTE — PROGRESS NOTES
Came by the room, patient, daughter, and son present. I discussed the potential benefits (rate and sx control), risks (5%, including infection, bleeding, PTX, pacer malfunction, and even death, etc.), and alternatives (opting for suboptimal rate control or antiarrhymthic strategy) and she agrees to proceed. Her sx are significant and scary for her when she has Afib/flutter. She understands the pacer has to go in first and then we'll figure out the timing of EPS/ablation which may be tomorrow since we will use tined leads (to minimize perforation risk) and want to make sure the passively-fixed leads don't move overnight. All questions answered, remains NPO.

## 2017-10-24 LAB
ATRIAL RATE: 99 BPM
CALCULATED P AXIS, ECG09: 49 DEGREES
CALCULATED R AXIS, ECG10: -21 DEGREES
CALCULATED T AXIS, ECG11: 94 DEGREES
DIAGNOSIS, 93000: NORMAL
ERYTHROCYTE [DISTWIDTH] IN BLOOD BY AUTOMATED COUNT: 14.9 % (ref 11.5–14.5)
HCT VFR BLD AUTO: 29.3 % (ref 35–47)
HGB BLD-MCNC: 9.7 G/DL (ref 11.5–16)
INR PPP: 1.1 (ref 0.9–1.1)
MCH RBC QN AUTO: 30.3 PG (ref 26–34)
MCHC RBC AUTO-ENTMCNC: 33.1 G/DL (ref 30–36.5)
MCV RBC AUTO: 91.6 FL (ref 80–99)
P-R INTERVAL, ECG05: 176 MS
PLATELET # BLD AUTO: 228 K/UL (ref 150–400)
PROTHROMBIN TIME: 11.4 SEC (ref 9–11.1)
Q-T INTERVAL, ECG07: 410 MS
QRS DURATION, ECG06: 116 MS
QTC CALCULATION (BEZET), ECG08: 526 MS
RBC # BLD AUTO: 3.2 M/UL (ref 3.8–5.2)
VENTRICULAR RATE, ECG03: 99 BPM
WBC # BLD AUTO: 7.9 K/UL (ref 3.6–11)

## 2017-10-24 PROCEDURE — C1894 INTRO/SHEATH, NON-LASER: HCPCS

## 2017-10-24 PROCEDURE — 02K83ZZ MAP CONDUCTION MECHANISM, PERCUTANEOUS APPROACH: ICD-10-PCS | Performed by: INTERNAL MEDICINE

## 2017-10-24 PROCEDURE — 74011250637 HC RX REV CODE- 250/637: Performed by: INTERNAL MEDICINE

## 2017-10-24 PROCEDURE — 77030015398 HC CBL EP EXT STJU -C

## 2017-10-24 PROCEDURE — 65660000000 HC RM CCU STEPDOWN

## 2017-10-24 PROCEDURE — 93041 RHYTHM ECG TRACING: CPT

## 2017-10-24 PROCEDURE — C1733 CATH, EP, OTHR THAN COOL-TIP: HCPCS

## 2017-10-24 PROCEDURE — 85610 PROTHROMBIN TIME: CPT | Performed by: INTERNAL MEDICINE

## 2017-10-24 PROCEDURE — 36415 COLL VENOUS BLD VENIPUNCTURE: CPT | Performed by: INTERNAL MEDICINE

## 2017-10-24 PROCEDURE — 74011250636 HC RX REV CODE- 250/636

## 2017-10-24 PROCEDURE — 74011000250 HC RX REV CODE- 250

## 2017-10-24 PROCEDURE — 77030018729 HC ELECTRD DEFIB PAD CARD -B

## 2017-10-24 PROCEDURE — C1731 CATH, EP, 20 OR MORE ELEC: HCPCS

## 2017-10-24 PROCEDURE — 77030000299 HC FEMSTP COMP GLD STJU -B

## 2017-10-24 PROCEDURE — 4A023FZ MEASUREMENT OF CARDIAC RHYTHM, PERCUTANEOUS APPROACH: ICD-10-PCS | Performed by: INTERNAL MEDICINE

## 2017-10-24 PROCEDURE — 93653 COMPRE EP EVAL TX SVT: CPT

## 2017-10-24 PROCEDURE — 77030030806 HC PTCH ENSIT NAVX STJU -G

## 2017-10-24 PROCEDURE — 4A0234Z MEASUREMENT OF CARDIAC ELECTRICAL ACTIVITY, PERCUTANEOUS APPROACH: ICD-10-PCS | Performed by: INTERNAL MEDICINE

## 2017-10-24 PROCEDURE — 77030010880 HC CBL EP SUPRME STJU -C

## 2017-10-24 PROCEDURE — 85027 COMPLETE CBC AUTOMATED: CPT | Performed by: INTERNAL MEDICINE

## 2017-10-24 PROCEDURE — 77010033678 HC OXYGEN DAILY

## 2017-10-24 PROCEDURE — 74011250636 HC RX REV CODE- 250/636: Performed by: INTERNAL MEDICINE

## 2017-10-24 PROCEDURE — 77030004964 HC CBL EP ABLAT BSC -C

## 2017-10-24 PROCEDURE — 77030011640 HC PAD GRND REM COVD -A

## 2017-10-24 PROCEDURE — 02583ZZ DESTRUCTION OF CONDUCTION MECHANISM, PERCUTANEOUS APPROACH: ICD-10-PCS | Performed by: INTERNAL MEDICINE

## 2017-10-24 RX ORDER — MIDAZOLAM HYDROCHLORIDE 1 MG/ML
1-5 INJECTION, SOLUTION INTRAMUSCULAR; INTRAVENOUS
Status: DISCONTINUED | OUTPATIENT
Start: 2017-10-24 | End: 2017-10-24 | Stop reason: HOSPADM

## 2017-10-24 RX ORDER — ACETAMINOPHEN 325 MG/1
650 TABLET ORAL
Status: DISCONTINUED | OUTPATIENT
Start: 2017-10-24 | End: 2017-10-26 | Stop reason: HOSPADM

## 2017-10-24 RX ORDER — LIDOCAINE HYDROCHLORIDE 10 MG/ML
INJECTION INFILTRATION; PERINEURAL
Status: COMPLETED
Start: 2017-10-24 | End: 2017-10-24

## 2017-10-24 RX ORDER — FENTANYL CITRATE 50 UG/ML
INJECTION, SOLUTION INTRAMUSCULAR; INTRAVENOUS
Status: COMPLETED
Start: 2017-10-24 | End: 2017-10-24

## 2017-10-24 RX ORDER — HEPARIN SODIUM 200 [USP'U]/100ML
INJECTION, SOLUTION INTRAVENOUS
Status: COMPLETED
Start: 2017-10-24 | End: 2017-10-24

## 2017-10-24 RX ORDER — FENTANYL CITRATE 50 UG/ML
12.5-5 INJECTION, SOLUTION INTRAMUSCULAR; INTRAVENOUS
Status: DISCONTINUED | OUTPATIENT
Start: 2017-10-24 | End: 2017-10-24 | Stop reason: HOSPADM

## 2017-10-24 RX ORDER — LIDOCAINE HYDROCHLORIDE 10 MG/ML
1-40 INJECTION INFILTRATION; PERINEURAL
Status: DISCONTINUED | OUTPATIENT
Start: 2017-10-24 | End: 2017-10-24 | Stop reason: HOSPADM

## 2017-10-24 RX ORDER — MIDAZOLAM HYDROCHLORIDE 1 MG/ML
INJECTION, SOLUTION INTRAMUSCULAR; INTRAVENOUS
Status: COMPLETED
Start: 2017-10-24 | End: 2017-10-24

## 2017-10-24 RX ORDER — FUROSEMIDE 10 MG/ML
20 INJECTION INTRAMUSCULAR; INTRAVENOUS ONCE
Status: COMPLETED | OUTPATIENT
Start: 2017-10-24 | End: 2017-10-24

## 2017-10-24 RX ORDER — SODIUM CHLORIDE 0.9 % (FLUSH) 0.9 %
5-10 SYRINGE (ML) INJECTION EVERY 8 HOURS
Status: DISCONTINUED | OUTPATIENT
Start: 2017-10-24 | End: 2017-10-26 | Stop reason: HOSPADM

## 2017-10-24 RX ORDER — WARFARIN SODIUM 5 MG/1
5 TABLET ORAL ONCE
Status: COMPLETED | OUTPATIENT
Start: 2017-10-24 | End: 2017-10-24

## 2017-10-24 RX ORDER — WARFARIN SODIUM 5 MG/1
5 TABLET ORAL ONCE
Status: DISCONTINUED | OUTPATIENT
Start: 2017-10-24 | End: 2017-10-24 | Stop reason: SDUPTHER

## 2017-10-24 RX ORDER — DOBUTAMINE HYDROCHLORIDE 200 MG/100ML
0-10 INJECTION INTRAVENOUS
Status: DISCONTINUED | OUTPATIENT
Start: 2017-10-24 | End: 2017-10-24 | Stop reason: HOSPADM

## 2017-10-24 RX ORDER — DOBUTAMINE HYDROCHLORIDE 200 MG/100ML
INJECTION INTRAVENOUS
Status: DISCONTINUED
Start: 2017-10-24 | End: 2017-10-24

## 2017-10-24 RX ORDER — HEPARIN SODIUM 200 [USP'U]/100ML
500 INJECTION, SOLUTION INTRAVENOUS ONCE
Status: COMPLETED | OUTPATIENT
Start: 2017-10-24 | End: 2017-10-24

## 2017-10-24 RX ORDER — SODIUM CHLORIDE 0.9 % (FLUSH) 0.9 %
5-10 SYRINGE (ML) INJECTION AS NEEDED
Status: DISCONTINUED | OUTPATIENT
Start: 2017-10-24 | End: 2017-10-26 | Stop reason: HOSPADM

## 2017-10-24 RX ORDER — CARVEDILOL 3.12 MG/1
3.12 TABLET ORAL 2 TIMES DAILY WITH MEALS
Status: DISCONTINUED | OUTPATIENT
Start: 2017-10-24 | End: 2017-10-26 | Stop reason: HOSPADM

## 2017-10-24 RX ADMIN — FERROUS SULFATE TAB 325 MG (65 MG ELEMENTAL FE) 325 MG: 325 (65 FE) TAB at 17:47

## 2017-10-24 RX ADMIN — HEPARIN SODIUM 1000 UNITS: 200 INJECTION, SOLUTION INTRAVENOUS at 13:33

## 2017-10-24 RX ADMIN — DILTIAZEM HYDROCHLORIDE 60 MG: 60 TABLET, FILM COATED ORAL at 07:22

## 2017-10-24 RX ADMIN — MIDAZOLAM HYDROCHLORIDE 1 MG: 1 INJECTION, SOLUTION INTRAMUSCULAR; INTRAVENOUS at 13:39

## 2017-10-24 RX ADMIN — LEVETIRACETAM 500 MG: 500 TABLET, FILM COATED ORAL at 10:16

## 2017-10-24 RX ADMIN — FENTANYL CITRATE 25 MCG: 50 INJECTION, SOLUTION INTRAMUSCULAR; INTRAVENOUS at 13:33

## 2017-10-24 RX ADMIN — Medication 10 ML: at 22:28

## 2017-10-24 RX ADMIN — Medication 10 ML: at 17:00

## 2017-10-24 RX ADMIN — CARVEDILOL 3.12 MG: 3.12 TABLET, FILM COATED ORAL at 17:44

## 2017-10-24 RX ADMIN — Medication 10 ML: at 10:18

## 2017-10-24 RX ADMIN — AMITRIPTYLINE HYDROCHLORIDE 10 MG: 10 TABLET, FILM COATED ORAL at 22:27

## 2017-10-24 RX ADMIN — FENTANYL CITRATE 25 MCG: 50 INJECTION, SOLUTION INTRAMUSCULAR; INTRAVENOUS at 13:39

## 2017-10-24 RX ADMIN — DOCUSATE SODIUM 100 MG: 100 CAPSULE, LIQUID FILLED ORAL at 17:47

## 2017-10-24 RX ADMIN — MIDAZOLAM HYDROCHLORIDE 1 MG: 1 INJECTION, SOLUTION INTRAMUSCULAR; INTRAVENOUS at 13:32

## 2017-10-24 RX ADMIN — LATANOPROST 1 DROP: 50 SOLUTION OPHTHALMIC at 22:31

## 2017-10-24 RX ADMIN — LIDOCAINE HYDROCHLORIDE 10 ML: 10 INJECTION, SOLUTION INFILTRATION; PERINEURAL at 13:34

## 2017-10-24 RX ADMIN — ACETAMINOPHEN 650 MG: 325 TABLET ORAL at 22:26

## 2017-10-24 RX ADMIN — DILTIAZEM HYDROCHLORIDE 60 MG: 60 TABLET, FILM COATED ORAL at 10:15

## 2017-10-24 RX ADMIN — MIDAZOLAM HYDROCHLORIDE 1 MG: 1 INJECTION INTRAMUSCULAR; INTRAVENOUS at 13:32

## 2017-10-24 RX ADMIN — LEVETIRACETAM 500 MG: 500 TABLET, FILM COATED ORAL at 17:44

## 2017-10-24 RX ADMIN — LIDOCAINE HYDROCHLORIDE 10 ML: 10 INJECTION INFILTRATION; PERINEURAL at 13:34

## 2017-10-24 RX ADMIN — WARFARIN SODIUM 5 MG: 5 TABLET ORAL at 17:48

## 2017-10-24 RX ADMIN — FUROSEMIDE 20 MG: 10 INJECTION, SOLUTION INTRAMUSCULAR; INTRAVENOUS at 10:15

## 2017-10-24 NOTE — PROGRESS NOTES
Pharmacy Daily Dosing of Warfarin    Indication: Paroxysmal Atrial Fibrillation    Goal INR: 2-2.5 (per consult)    PTA Warfarin Dose:   2.5mg tablets  2.5mg MWF  5mg T/Th/Sa/Sn     Last dose = 5mg taken morning of admission    Concurrent anticoagulants: none    Concurrent antiplatelet: none    Major Interacting Medications    Drugs that may increase INR: none   Drugs that may decrease INR: Vitamin K 5 mg PO X1 on 10/19    Conditions that may increase/decrease INR (CHF, C. diff, cirrhosis, thyroid disorder, hypoalbuminemia): Alb 2.6    Labs:  Recent Labs      10/24/17   0303  10/23/17   0402   INR  1.1   --    HGB  9.7*  9.4*   PLT  228  241           Impression/Plan:   -MD ordered consult for pharmacy to cont warfarin dosing  -Will order warfarin 5 mg PO x 1 dose.  -Daily INR  -CBC w/o diff QOD     Pharmacy will follow daily and adjust the dose as appropriate. Thanks  JUAN SotoD  [email or web address]. pdf

## 2017-10-24 NOTE — PROGRESS NOTES
No recommendations from physical therapy thus far. Patient is going for av michael ablation today per nursing staff.

## 2017-10-24 NOTE — PROGRESS NOTES
PROCEDURE SITE CHECK:    Procedure Site check: swelling noted at procedure site. No external bleeding noted. Handhold to site. slight hematoma noted. area became soft after handhold. Femostop to site. EP/ End of Procedure/ TRANSFER - OUT REPORT:    Verbal report given to [facility] Recovery Area on Jamie Oaklyn for routine progression of care       Report consisted of patients Situation, Background, Assessment and   Recommendations(SBAR). Information from the following report(s) Procedure Summary was reviewed with the receiving nurse. Opportunity for questions and clarification was provided. PROCEDURE SITE CHECK:    Procedure Site check: No bleeding, no hematoma, no pain/discomfort reported. No change in patient status. Continue to monitor patient and status.     Femostop at 50mmHg

## 2017-10-24 NOTE — PROGRESS NOTES
Attempted to see patient for OT intervention, but she is presently off the floor for a procedure/testing. Will follow back later today or tomorrow.

## 2017-10-24 NOTE — PROCEDURES
[address]  [phone]    Patient ID:  Patient: Polly Whyte  MRN: [id number]  Age: 80 y.o.  : 1924  Gender: female  Study Date: 10/24/2017      History:  This is a female with persistent atrial flutter unable to be rate controlled with intravenous medication, here for EP study and AV ablation if a right atrial flutter and AV node ablation if a left atrial flutter.     Procedures Performed:   1. EP study with ablation of AV node function (04998)  2. 3D mapping during EP study (86814)     The patient was brought to EP lab in NPO state and after informed consent.  Continuous ECG and hemodynamic monitoring was performed.  Sedation was by the EP nurse who was in constant attendance and supervision throughout the procedure.  Versed and fentanyl was used, sedation time 13:12-14:10 (58 minutes). Right groin was anesthetized with 1% lidocaine and access obtained (2 safe sheaths in the right femoral vein).     An 8Fr sheath on the right was changed to SR-0 long support sheath.  An 8 Fr 8 mm tip deflectable mapping and ablation catheter was used. A duodecapolar catheter was deployed very carefully in the right atrium without touching the RA or RV leads so as to disturb their course or redundancy. This was ultimately stowed in the anteroseptal region and used for reference.     An EP study was performed including atrial recording, right ventricular recording, and right ventricular pacing. To define the arrhythmia circuit for the atrial flutter with more clarity, 3D mapping was performed using the [address] [place] [address] system. This revealed earliest activation in the interatrial septal area and variable activation across the right atrium consistent with a left atrial circuit. Therefore, since a right atrial ablation was not useful and a left atrial ablation too risky for her, AV node ablation was performed.     After both temperature and power-limited energy was delivered to the anteroseptal region, complete heart block was obtained. The sheath was removed and satisfactory pacemaker function was verified. Sheaths were removed at the end of the case.     Preoperative Diagnosis: As above. Postoperative Diagnosis: As above. Procedure:  As above. Surgeon(s) and Role:  Tatiana Bedoya MD - Primary   Anesthesia:   MAC. Estimated Blood Loss:  <5 cc. Specimens: * No specimens in log *   Findings:  As below. Complications:  None. X-ray time:  7.3 minutes     Findings:  1.  At baseline, atypical atrial flutter (tachycardia cycle length 201-232 msec) was present.  ( and  msec). 2.  Antegrade conduction was present and without preexcitation. The HV interval was very difficult to determine with ongoing atrial arrhythmia despite attempting to measure for some time. 3.  The VERP was 400/260 msec pacing from the right ventricular apex. 4.  3D and activation mapping confirmed an atypical atrial flutter circuit coming from the left atrium. Therefore, a decision to move to AV node ablation rather than a primary ablation going after the atrial flutter was decided. 5.  Using electrogram mapping to facilitate precision of ablation therapy, an 8Fr 8mm tip mapping and ablation catheter was used to ablate in the anteroseptal region with a low amplitude atrial electrogram and tall ventricular electrogram.  During therapy, JET was seen. Complete heart block was present at the end of therapy. The QRS duration with right ventricular pacing was 161 ms.     Impression:  1.  Successful ablation of AV node function after EP study confirmed an atypical atrial flutter.   2.  Normal [employer] pacemaker function.     RECS:  After successful AV node ablation, follow-up in 2-4 weeks in the office to lower the pacemaker base rate from 80 to 70 beats per minute.     Signed:  Tatiana Bedoya MD

## 2017-10-24 NOTE — PROGRESS NOTES
TRANSFER - OUT REPORT:    Verbal report [facility] Mingo Haynes  being transferred to PCU(unit) for routine progression of care       Report consisted of patients Situation, Background, Assessment and   Recommendations(SBAR). Information from the following report(s) MAR was reviewed with the receiving nurse. Lines:   Peripheral IV 10/24/17 Right Forearm (Active)        Opportunity for questions and clarification was provided.       Patient transported with:   Registered Nurse

## 2017-10-24 NOTE — PROGRESS NOTES
..    PCU SHIFT NURSING NOTE      Bedside shift change report given to Reno Muir RN (oncoming nurse) by Ruby Paget (offgoing nurse). Report included the following information SBAR, Kardex, Intake/Output, MAR and Recent Results. Shift Summary:   0730- Received patient in A-fib with -136. BP stable. Gave patient 0730 dose of diltiazem 60 mg. Night nurse stated patient's HR has been at this rate  For apprp. 30 mins. Family at bedside. Patient resting in recliner. 0915- Heart rate unchanged and /76. Patient asymptomatic. Patient sleeping. Family at bedside. Paged Dr. Alisia Gillis to notify him of HR.   1015- Spoke to Dr. Alisia Gillis. Stated to give 1130 dose of diltizem now. Lasix 20 mg IV given per order. 200- Daughter stated  HR up. Patient up to bathroom. Patient gets nauseated when up to bathroom, then it resolves after sitting. No vomiting. Patient did this during the night also. HR remains 111- 136. NC 2L. Admission Date 10/15/2017   Admission Diagnosis A-fib ([facility] [address]  UTI (urinary tract infection)   Consults IP CONSULT TO CARDIOLOGY  IP CONSULT TO CARDIOLOGY        Consults   [x]PT   [x]OT   []Speech   [x]Case Management      [] Palliative      Cardiac Monitoring Order   [x]Yes   []No     IV drips   []Yes    Drip:                            Dose:  Drip:                            Dose:  Drip:                            Dose:   [x]No     GI Prophylaxis   []Yes   [x]No         DVT Prophylaxis   SCDs:             Zackery stockings:         [] Medication   []Contraindicated   [x]None      Activity Level Activity Level: Up with Assistance     Activity Assistance: Partial (one person)   Purposeful Rounding every 1-2 hour?    [x]Yes   Olguin Score  Total Score: 4   Bed Alarm (If score 3 or >)   []Yes   [] Refused (See signed refusal form in chart)   Magdaleno Score  Magdaleno Score: 20   Magdaleno Score (if score 14 or less)   []PMT consult   []Wound Care consult      []Specialty bed   [] Nutrition consult          Needs prior to discharge:   Home O2 required:    []Yes   [x]No    If yes, how much O2 required? Other:    Last Bowel Movement: Last Bowel Movement Date: 10/22/17      Influenza Vaccine Received Flu Vaccine for Current Season (usually Sept-March): No    Patient/Guardian Refused (Notify MD): No   Pneumonia Vaccine           Diet Active Orders   Diet    DIET NPO      LDAs               Peripheral IV 10/22/17 Left Arm (Active)   Site Assessment Clean, dry, & intact 10/24/2017  4:00 AM   Phlebitis Assessment 0 10/24/2017  4:00 AM   Infiltration Assessment 0 10/24/2017  4:00 AM   Dressing Status Clean, dry, & intact 10/24/2017  4:00 AM   Dressing Type Tape;Transparent 10/24/2017  4:00 AM   Hub Color/Line Status Blue; Infusing 10/24/2017  4:00 AM   Action Taken Open ports on tubing capped 10/23/2017  8:00 PM   Alcohol Cap Used Yes 10/24/2017  4:00 AM                      Urinary Catheter      Intake & Output   Date 10/23/17 0700 - 10/24/17 0659 10/24/17 0700 - 10/25/17 0659   Shift 1676-2042 0488-2630 24 Hour Total 7310-1095 3487-4494 24 Hour Total   I  N  T  A  K  E   P.O.  150 150         P. O.  150 150       Shift Total  (mL/kg)  150  (2.3) 150  (2.3)      O  U  T  P  U  T   Urine  (mL/kg/hr)            Urine Occurrence(s)  4 x 4 x       Shift Total  (mL/kg)         NET  150 150      Weight (kg) 64.3 64.3 64.3 64.3 64.3 64.3         Readmission Risk Assessment Tool Score High Risk            22       Total Score        3 Has Seen PCP in Last 6 Months (Yes=3, No=0)    3 Patient Length of Stay (>5 days = 3)    4 IP Visits Last 12 Months (1-3=4, 4=9, >4=11)    5 Pt. Coverage (Medicare=5 , Medicaid, or Self-Pay=4)    7 Charlson Comorbidity Score (Age + Comorbid Conditions)        Criteria that do not apply:    . Living with Significant Other. Assisted Living. LTAC. SNF.  or   Rehab       Expected Length of Stay 3d 14h   Actual Length of Stay 9

## 2017-10-24 NOTE — PROGRESS NOTES
Physical Therapy  Patient unable to be seen today  - off floor for procedure. Pacemaker yesterday. Will follow.

## 2017-10-24 NOTE — PROGRESS NOTES
Hospitalist Progress Note    NAME: Shamir Appiah   :  1924   MRN:  [id number]       Assessment / Plan:  Atrial Fibrillation with RVR with associated weakness and SANTOS; patient in NSR today  Acute Systolic CHF (possibly POA)  Hypertension with recent low BP's but tolerating cardizem gtt  -s/p cardizem gtt, PPM placed 10/23, awaiting for ablation scheduled today  -Lexiscan wnl  -anticoagulation per cardiology     Chest pain on 10/19: MI ruled out    UTI outside records reviewed   -discontinued rocephin as UA on 10/15 benign and no UCx sent      CKD stage III  -renal function at baseline, continue to monitor      L1 compression fracture  -tylenol prn       Seizure disorder  -continue keppra      Macular Degeneration    Phlebitis left dorsal arm: continue warm compresses    Code Status: has DDNR  Surrogate Decision Maker: sandy      DVT Prophylaxis: NI  GI Prophylaxis: not indicated      Baseline: Lives at home         Body mass index is 25.11 kg/(m^2). Subjective:     Chief Complaint / Reason for Physician Visit: follow-up afib with RVR  Patient seen w son and daughter at bedside. Eager to have ablation done. She wants to go home right anastacio. No other acute complaints. Review of Systems:  Symptom Y/N Comments  Symptom Y/N Comments   Fever/Chills n   Chest Pain n    Poor Appetite    Edema n    Cough    Abdominal Pain n    Sputum    Joint Pain     SOB/SANTOS n   Pruritis/Rash     Nausea/vomit    Tolerating PT/OT     Diarrhea    Tolerating Diet     Constipation    Other       Could NOT obtain due to:      Objective:     VITALS:   Last 24hrs VS reviewed since prior progress note.  Most recent are:  Patient Vitals for the past 24 hrs:   Temp Pulse Resp BP SpO2   10/24/17 1128 - (!) 124 - - 96 %   10/24/17 1100 98.1 °F (36.7 °C) (!) 140 - 130/84 94 %   10/24/17 0918 - (!) 126 - 116/78 95 %   10/24/17 0717 97.5 °F (36.4 °C) (!) 130 - 118/65 95 %   10/24/17 0630 - (!) 124 - - 96 %   10/24/17 0300 97.7 °F (36.5 °C) (!) 106 19 147/75 95 %   10/23/17 2300 97.8 °F (36.6 °C) (!) 105 20 (!) 143/93 93 %   10/23/17 2023 98.2 °F (36.8 °C) 90 20 134/72 93 %   10/23/17 1703 - 99 19 148/80 94 %   10/23/17 1645 - 97 24 151/74 94 %   10/23/17 1630 - (!) 104 18 152/82 94 %   10/23/17 1615 - (!) 101 23 153/81 99 %   10/23/17 1600 - (!) 103 23 (!) 150/98 (!) 86 %   10/23/17 1545 - 100 23 156/76 92 %   10/23/17 1530 - 98 24 160/82 99 %   10/23/17 1515 - 98 21 157/84 100 %   10/23/17 1500 - 98 22 146/85 100 %   10/23/17 1445 - 92 16 151/65 100 %   10/23/17 1430 97.9 °F (36.6 °C) 93 22 154/77 100 %   10/23/17 1415 - 97 21 159/79 100 %   10/23/17 1400 - 97 22 158/78 100 %   10/23/17 1315 - (!) 101 26 158/80 95 %   10/23/17 1252 97.4 °F (36.3 °C) 97 18 158/69 92 %       Intake/Output Summary (Last 24 hours) at 10/24/17 1227  Last data filed at 10/24/17 1118   Gross per 24 hour   Intake              150 ml   Output              250 ml   Net             -100 ml        PHYSICAL EXAM:  General: WD, WN. Alert, cooperative, no acute distress    EENT:  EOMI. Anicteric sclerae. MM dry  Resp:  CTA bilaterally on anterior assessment, no wheezing or rales.   No accessory muscle use  CV:  RRR, trace BLE edema  GI:  Soft, Non distended, Non tender.  +BS  Neurologic:  Alert and oriented X 3, normal speech,   Psych:   Good insight. Not anxious nor agitated  Skin:  Phlebitis left dorsal arm,  No jaundice    Reviewed most current lab test results and cultures  YES  Reviewed most current radiology test results   YES  Review and summation of old records today    NO  Reviewed patient's current orders and MAR    YES  PMH/SH reviewed - no change compared to H&P  ________________________________________________________________________  Care Plan discussed with:    Comments   Patient y    Family  y Daughter/son   RN y    Care Manager     Consultant                        Multidiciplinary team rounds were held today with , nursing, pharmacist and clinical coordinator. Patient's plan of care was discussed; medications were reviewed and discharge planning was addressed. ________________________________________________________________________  Total NON critical care TIME:  35  Minutes    Total CRITICAL CARE TIME Spent:   Minutes non procedure based      Comments   >50% of visit spent in counseling and coordination of care     ________________________________________________________________________  Sarthak Segovia MD     Procedures: see electronic medical records for all procedures/Xrays and details which were not copied into this note but were reviewed prior to creation of Plan. LABS:  I reviewed today's most current labs and imaging studies.   Pertinent labs include:  Recent Labs      10/24/17   0303  10/23/17   0402   WBC  7.9  6.2   HGB  9.7*  9.4*   HCT  29.3*  29.0*   PLT  228  241     Recent Labs      10/24/17   0303  10/23/17   0402   NA   --   140   K   --   3.9   CL   --   109*   CO2   --   23   GLU   --   92   BUN   --   14   CREA   --   0.72   CA   --   8.4*   INR  1.1   --        Signed: Sarthak Segovia MD

## 2017-10-24 NOTE — PROGRESS NOTES
Problem: Falls - Risk of  Goal: *Absence of Falls  Document Corey Fall Risk and appropriate interventions in the flowsheet.    Outcome: Progressing Towards Goal  Fall Risk Interventions:  Mobility Interventions: Assess mobility with egress test, Bed/chair exit alarm, Patient to call before getting OOB         Medication Interventions: Patient to call before getting OOB    Elimination Interventions: Patient to call for help with toileting needs, Call light in reach    History of Falls Interventions: Evaluate medications/consider consulting pharmacy

## 2017-10-25 LAB
ERYTHROCYTE [DISTWIDTH] IN BLOOD BY AUTOMATED COUNT: 15 % (ref 11.5–14.5)
HCT VFR BLD AUTO: 27.8 % (ref 35–47)
HGB BLD-MCNC: 8.9 G/DL (ref 11.5–16)
INR PPP: 1.2 (ref 0.9–1.1)
MCH RBC QN AUTO: 28.9 PG (ref 26–34)
MCHC RBC AUTO-ENTMCNC: 32 G/DL (ref 30–36.5)
MCV RBC AUTO: 90.3 FL (ref 80–99)
PLATELET # BLD AUTO: 197 K/UL (ref 150–400)
PROTHROMBIN TIME: 12.1 SEC (ref 9–11.1)
RBC # BLD AUTO: 3.08 M/UL (ref 3.8–5.2)
WBC # BLD AUTO: 9.2 K/UL (ref 3.6–11)

## 2017-10-25 PROCEDURE — 85610 PROTHROMBIN TIME: CPT | Performed by: INTERNAL MEDICINE

## 2017-10-25 PROCEDURE — 74011250637 HC RX REV CODE- 250/637: Performed by: INTERNAL MEDICINE

## 2017-10-25 PROCEDURE — 77010033678 HC OXYGEN DAILY

## 2017-10-25 PROCEDURE — 36415 COLL VENOUS BLD VENIPUNCTURE: CPT | Performed by: INTERNAL MEDICINE

## 2017-10-25 PROCEDURE — 97530 THERAPEUTIC ACTIVITIES: CPT | Performed by: OCCUPATIONAL THERAPIST

## 2017-10-25 PROCEDURE — 97530 THERAPEUTIC ACTIVITIES: CPT

## 2017-10-25 PROCEDURE — 77030012890

## 2017-10-25 PROCEDURE — 97168 OT RE-EVAL EST PLAN CARE: CPT | Performed by: OCCUPATIONAL THERAPIST

## 2017-10-25 PROCEDURE — 85027 COMPLETE CBC AUTOMATED: CPT | Performed by: INTERNAL MEDICINE

## 2017-10-25 PROCEDURE — 65660000000 HC RM CCU STEPDOWN

## 2017-10-25 PROCEDURE — 97535 SELF CARE MNGMENT TRAINING: CPT | Performed by: OCCUPATIONAL THERAPIST

## 2017-10-25 RX ORDER — WARFARIN SODIUM 5 MG/1
5 TABLET ORAL ONCE
Status: COMPLETED | OUTPATIENT
Start: 2017-10-25 | End: 2017-10-25

## 2017-10-25 RX ORDER — WARFARIN 2.5 MG/1
2.5 TABLET ORAL DAILY
Qty: 30 TAB | Refills: 0 | Status: SHIPPED | OUTPATIENT
Start: 2017-10-25 | End: 2017-10-25

## 2017-10-25 RX ORDER — CARVEDILOL 3.12 MG/1
3.12 TABLET ORAL 2 TIMES DAILY WITH MEALS
Qty: 60 TAB | Refills: 0 | Status: SHIPPED | OUTPATIENT
Start: 2017-10-25 | End: 2017-10-26

## 2017-10-25 RX ADMIN — LEVETIRACETAM 500 MG: 500 TABLET, FILM COATED ORAL at 17:12

## 2017-10-25 RX ADMIN — AMITRIPTYLINE HYDROCHLORIDE 10 MG: 10 TABLET, FILM COATED ORAL at 20:35

## 2017-10-25 RX ADMIN — DOCUSATE SODIUM 100 MG: 100 CAPSULE, LIQUID FILLED ORAL at 17:12

## 2017-10-25 RX ADMIN — LATANOPROST 1 DROP: 50 SOLUTION OPHTHALMIC at 20:36

## 2017-10-25 RX ADMIN — Medication 10 ML: at 06:50

## 2017-10-25 RX ADMIN — FERROUS SULFATE TAB 325 MG (65 MG ELEMENTAL FE) 325 MG: 325 (65 FE) TAB at 09:10

## 2017-10-25 RX ADMIN — FERROUS SULFATE TAB 325 MG (65 MG ELEMENTAL FE) 325 MG: 325 (65 FE) TAB at 17:12

## 2017-10-25 RX ADMIN — Medication 10 ML: at 17:13

## 2017-10-25 RX ADMIN — DOCUSATE SODIUM 100 MG: 100 CAPSULE, LIQUID FILLED ORAL at 09:11

## 2017-10-25 RX ADMIN — LEVETIRACETAM 500 MG: 500 TABLET, FILM COATED ORAL at 09:11

## 2017-10-25 RX ADMIN — Medication 10 ML: at 21:25

## 2017-10-25 RX ADMIN — WARFARIN SODIUM 5 MG: 5 TABLET ORAL at 17:12

## 2017-10-25 RX ADMIN — MULTIPLE VITAMINS W/ MINERALS TAB 1 TABLET: TAB at 09:10

## 2017-10-25 NOTE — PROGRESS NOTES
Hospitalist Progress Note    NAME: Marsha Ackerman   :  1924   MRN:  [id number]       Assessment / Plan:  Orthostatic hypotension  -lightheadedness upon positional changes in additional to drop BP. Likely related to volume depletion with lasix  -hold further diuresing, ANISHA hose ordered  -recheck vitals in the AM  -re-eval in the AM, if stable, can discharge with [facility] PT OT    Atrial Fibrillation with RVR with associated weakness and SANTOS; patient in NSR today  Acute Systolic CHF (possibly POA)  Hypertension with recent low BP's but tolerating cardizem gtt  -s/p cardizem gtt, PPM placed 10/23, awaiting for ablation scheduled today  -Lexiscan wnl  -anticoagulation per cardiology     Chest pain on 10/19: MI ruled out    UTI outside records reviewed   -discontinued rocephin as UA on 10/15 benign and no UCx sent      CKD stage III  -renal function at baseline, continue to monitor      L1 compression fracture  -tylenol prn       Seizure disorder  -continue keppra      Macular Degeneration    Phlebitis left dorsal arm: continue warm compresses    Code Status: has DDNR  Surrogate Decision Maker: sandy      DVT Prophylaxis: NI  GI Prophylaxis: not indicated      Baseline: Lives at home         Body mass index is 25.11 kg/(m^2). Subjective:     Chief Complaint / Reason for Physician Visit: follow-up afib with RVR  Patient seen w son at bedside. Became severely orthostasis with changing position. She states she was quite dizzy during OT session. No other acute complaints. Review of Systems:  Symptom Y/N Comments  Symptom Y/N Comments   Fever/Chills n   Chest Pain n    Poor Appetite    Edema n    Cough    Abdominal Pain n    Sputum    Joint Pain     SOB/SANTOS n   Pruritis/Rash     Nausea/vomit    Tolerating PT/OT     Diarrhea    Tolerating Diet     Constipation    Other       Could NOT obtain due to:      Objective:     VITALS:   Last 24hrs VS reviewed since prior progress note.  Most recent are:  Patient Vitals for the past 24 hrs:   Temp Pulse Resp BP SpO2   10/25/17 1531 - 81 22 94/46 -   10/25/17 1527 - 82 20 116/54 99 %   10/25/17 1517 - 80 19 125/60 94 %   10/25/17 1500 - 80 23 - 96 %   10/25/17 1300 - 81 21 - 94 %   10/25/17 1153 97.9 °F (36.6 °C) 80 20 107/54 96 %   10/25/17 1100 - 80 22 - 97 %   10/25/17 1010 - - - 119/54 -   10/25/17 1000 - - - (!) 77/44 -   10/25/17 0958 - - - 100/49 -   10/25/17 0900 - 80 18 - 95 %   10/25/17 0829 98 °F (36.7 °C) 80 20 (!) 88/42 94 %   10/25/17 0800 - - - (!) 88/42 -   10/25/17 0207 97.9 °F (36.6 °C) 80 20 103/53 98 %   10/24/17 1929 98.2 °F (36.8 °C) 82 22 124/60 97 %       Intake/Output Summary (Last 24 hours) at 10/25/17 1717  Last data filed at 10/25/17 1500   Gross per 24 hour   Intake             1490 ml   Output              360 ml   Net             1130 ml        PHYSICAL EXAM:  General: WD, WN. Alert, cooperative, no acute distress    EENT:  EOMI. Anicteric sclerae. MM dry  Resp:  CTA bilaterally on anterior assessment, no wheezing or rales. No accessory muscle use  CV:  RRR, trace BLE edema  GI:  Soft, Non distended, Non tender.  +BS  Neurologic:  Alert and oriented X 3, normal speech,   Psych:   Good insight. Not anxious nor agitated  Skin:  Phlebitis left dorsal arm,  No jaundice    Reviewed most current lab test results and cultures  YES  Reviewed most current radiology test results   YES  Review and summation of old records today    NO  Reviewed patient's current orders and MAR    YES  PMH/SH reviewed - no change compared to H&P  ________________________________________________________________________  Care Plan discussed with:    Comments   Patient y    Family  y son   RN y    Care Manager     Consultant                        Multidiciplinary team rounds were held today with , nursing, pharmacist and clinical coordinator. Patient's plan of care was discussed; medications were reviewed and discharge planning was addressed. ________________________________________________________________________  Total NON critical care TIME:  35  Minutes    Total CRITICAL CARE TIME Spent:   Minutes non procedure based      Comments   >50% of visit spent in counseling and coordination of care     ________________________________________________________________________  Yamilet Loza MD     Procedures: see electronic medical records for all procedures/Xrays and details which were not copied into this note but were reviewed prior to creation of Plan. LABS:  I reviewed today's most current labs and imaging studies.   Pertinent labs include:  Recent Labs      10/25/17   0208  10/24/17   0303  10/23/17   0402   WBC  9.2  7.9  6.2   HGB  8.9*  9.7*  9.4*   HCT  27.8*  29.3*  29.0*   PLT  197  228  241     Recent Labs      10/25/17   0208  10/24/17   0303  10/23/17   0402   NA   --    --   140   K   --    --   3.9   CL   --    --   109*   CO2   --    --   23   GLU   --    --   92   BUN   --    --   14   CREA   --    --   0.72   CA   --    --   8.4*   INR  1.2*  1.1   --        Signed: Yamilet Loza MD

## 2017-10-25 NOTE — PROGRESS NOTES
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 Neuro: A/O x 4, [place] and legally blind. Follows complex commands. Sitting up in chair since 2000pm post ablation. Resp: No shortness of breath on 2L n/c  CV: New permanent pacemaker placed on 10/23/2017, V-Paced on monitor. Ablation for AFib performed via R Fem. BP stable without pressors. IV: PIV LFA #22  IVF: Saline locked. GI: Tolerating cardiac diet w/o difficulty. : Voiding per [employer]  Integ: EDILBERTO chest new pacer site MARIA FERNANDA, R Fem site Femstop intact. Psychosocial: Has daughter and son attentive at bedside. Daughter is an LPN at a nephrologist's office. .    Problem: Falls - Risk of  Goal: *Absence of Falls  Document Corey Fall Risk and appropriate interventions in the flowsheet.    Outcome: Progressing Towards Goal  Fall Risk Interventions:  Mobility Interventions: Communicate number of staff needed for ambulation/transfer, Patient to call before getting OOB         Medication Interventions: Patient to call before getting OOB, Teach patient to arise slowly    Elimination Interventions: Patient to call for help with toileting needs, Toilet paper/wipes in reach, Toileting schedule/hourly rounds    History of Falls Interventions: Door open when patient unattended

## 2017-10-25 NOTE — PROGRESS NOTES
[facility] has accepted patient and family made aware. FOC signed and placed on chart. Orders faxed to Ej at [phone].

## 2017-10-25 NOTE — DISCHARGE INSTRUCTIONS
DISCHARGE INSTRUCTIONS FOR PATIENTS WITH PACEMAKERS    You had a Medtronic dual chamber pacemaker implanted by Dr. Oksana Lang on 10/23 to make sure the heart rate stays OK, followed by an AV node ablation for definitive rate control (rather than using medication which was ineffective) of your atrial fibrillation. The AV node ablation makes your heart dependent on the pacemaker to beat at a decent heart rate. 1. Remember to call for an appointment in 2-4 weeks to check healing and implant programming with Dr. Sherry Pelayo, your cardiologist.  2. [id number] are available from your pharmacist to wear at all times if you choose to wear one. 3. Carry your ID card for pacemaker with you at all times. This card will be given to you in the hospital or mailed to you. 4. The pacemaker will bulge slightly under your skin. The bulge will decrease in size over the next few weeks. Please notify the doctor's office if you notice any of the following around your site:   A.  A bruise that does not go away. B.  Soreness or yellow, green, or brown drainage from the site. C. Any swelling from the site. D. If you have a fever of 100 degrees or higher that lasts for a few days. INCISION CARE       1.  Leave skin glue over your site until it starts to fall off, usually in a few weeks. 2.  You may shower after 3 days as long as your incision isnt submerged or directly sprayed upon until well healed. 3.  For comfort, wear loose fitting clothing. 4.  Report any signs of infection, fever, pain, swelling, redness, oozing, or heat at site especially if these symptoms increase after the first 3 to 4 days. ACTIVITY PRECAUTIONS     1. Avoid rough contact with the implant site. 2. No driving for 14 days. 3. Avoid lifting your arm over your head, carrying anything on the affected side, or lifting over 10 pounds for 30 days. For the first 2 days only bend your arm at the elbow.   4. Any extreme activity such as golf, weight lifting or exercise biking should be restricted for 60 days. 5. Do not carry objects by holding them against your implant site. 6.  No shooting rifles or any type of gun with the affected shoulder permanently. SPECIAL PRECAUTIONS     1. You should avoid all strong magnetic fields, such as arc welding, large transformers, large motors. 2.  You may NOT have an MRI which uses a strong magnet to take pictures. 3.  Treatments or surgery that requires diathermy or electrocautery should be discussed with your doctor before scheduled. 4. Avoid radio frequency transmitters, including radar. 5. Advise dentist or other medical personnel you see that you have a pacemaker. 6.  Cell phones and microwave oven use is okay. 7.  If you plan to move or take a trip to a new area, the doctor's office will give you a name of a doctor to contact for any problems. ANTIBIOTIC THERAPY    During the first 8 weeks after your pacemaker insertion, you may need antibiotics before any dental work or certain tests or operations. Let the dentist or doctor who is caring for you know that you have had an implanted device. POST-ABLATION DISCHARGE INSTRUCTIONS:    Do not drive, operate any machinery, or sign any legal documents for 24 hours after your procedure. You must have someone to drive you home. You may take a shower 24 hours after your cardiac procedure. Be sure to get the dressing wet and then remove it; gently wash the area with warm soapy water. Pat dry and leave open to air. To help prevent infections, be sure to keep the cath site clean and dry. No lotions, creams, powders, ointments, etc. in the cath site for approximately 1 week. Do not take a tub bath, get in a hot tub or swimming pool for approximately 5 days or until the cath site is completely healed. No strenuous activity or heavy lifting over 20 lbs. for 7 days.      After your procedure, some bruising or discomfort is common during the healing process. Tylenol, 1-2 tablets every 6 hours as needed, is recommended if you experience any discomfort. If you experience any signs or symptoms of infection such as fever, chills, or poorly healing incision, persistent tenderness or swelling in the groin, redness and/or warmth to the touch, numbness, significant tingling or pain at the groin site or affected extremity, rash, drainage from the site, or if the leg feels tight or swollen, call your physician right away. If bleeding at the site occurs, take a clean gauze pad and apply direct pressure to the groin just above the puncture site, and call your physician right away. If your procedure involved ablation therapy, you may feel some mild or vague chest discomfort due to delivery of heat therapy to the heart muscle. This should resolve in 1-2 days. If it gets worse or is associated with shortness of breath, dizziness, loss of consciousness, call your physician right away or call 911 if emergency medical care is needed.

## 2017-10-25 NOTE — PROGRESS NOTES
Occupational Therapy Goals:  Initiated 10/16/2017, Goals remain appropriate as per 10/25 Reevaluation. 1. Patient will perform grooming standing with modified independence within 7 days. 2. Patient will perform toileting with modified independence within 7 days. 3. Patient will perform lower body dressing with modified independence within 7 days. 4. Patient will perform bathing with SBA within 7 days. 5. Patient will transfer from toilet with modified independence using the least restrictive device and appropriate durable medical equipment within 7 days. Occupational Therapy ReEVALUATION  Patient: Cyrus Gaines (43 y.o. female)  Date: 10/25/2017  Diagnosis: A-fib [facility])  UTI (urinary tract infection) <principal problem not specified>       Precautions: Other (comment) (s/p pacer for LUE)    ASSESSMENT :  Based on the objective data described below, the patient presents with orthostatic hypotension, mild GW, decreased activity tolerance, LUE s/p pacer precautions, and mildly decreased balance which is impairing her functional independence. Patient BP was 116/54 in upright sitting and s/p ambulation to bathroom it dropped to 76/41. Patient symptomatic and stating, \"I'm about to go\", while stand after returning from ambulating to the bathroom. Patient is overall CGA for functional mobility and is CGA to min A for most ADLs, which is below her independent to supervision baseline. Home Aide mainly assists with IADLs. At this point the patient will continue to benefit from acute OT, and follow up with HHOT and PT is also recommended. Patient will benefit from skilled intervention to address the above impairments.   Patients rehabilitation potential is considered to be Good  Factors which may influence rehabilitation potential include:   []                None noted  []                Mental ability/status  [x]                Medical condition  []                Home/family situation and support systems  [] Safety awareness  []                Pain tolerance/management  []                Other:      PLAN :  Recommendations and Planned Interventions:  [x]                  Self Care Training                  []           Therapeutic Activities  [x]                  Functional Mobility Training    []           Cognitive Retraining  [x]                  Therapeutic Exercises           [x]           Endurance Activities  [x]                  Balance Training                   []           Neuromuscular Re-Education  []                  Visual/Perceptual Training     [x]      Home Safety Training  [x]                  Patient Education                 [x]           Family Training/Education  []                  Other (comment):    Frequency/Duration: Patient will be followed by occupational therapy 3 times a week to address goals. Discharge Recommendations: Home Health OT and PT  Further Equipment Recommendations for Discharge: none         OBJECTIVE DATA SUMMARY:   Hospital course since last seen and reason for reevaluation: s/p Pacer placement on 10/23, cardiac cath with ablation on 10/24  Cognitive/Behavioral Status:  Neurologic State: Alert  Orientation Level: Oriented X4  Cognition: Appropriate decision making; Appropriate for age attention/concentration; Follows commands        Safety/Judgement: Good awareness of safety precautions (minimal cues for adherence to LUE s/p pacer precautions. )    Range of Motion:  AROM: Generally decreased, functional (L shoulder limited by pacer precautions)     Strength:  Strength: Generally decreased, functional     Coordination:  Coordination: Within functional limits          Tone & Sensation:  Tone: Normal  Sensation: Intact     Balance:  Sitting: Intact  Standing: Impaired (but good for ambulation with rollator and ADLs)  Standing - Static: Good  Standing - Dynamic : Good    Functional Mobility and Transfers for ADLs:  Bed Mobility:  Scooting: Supervision    Transfers:  Sit to Stand: Contact guard assistance  Functional Transfers  Bathroom Mobility: Contact guard assistance (ambulating to/from with Rollator)  Toilet Transfer : Contact guard assistance  Cues: Verbal cues provided    ADL Assessment:  Feeding: Independent    Oral Facial Hygiene/Grooming: Contact guard assistance (briefly if standing, supervision/setup seated)    Bathing: Minimum assistance    Upper Body Dressing: Minimum assistance    Lower Body Dressing: Minimum assistance    Toileting: Minimum assistance           Barthel Index:    Bathin  Bladder: 5  Bowels: 10  Groomin  Dressin  Feeding: 10  Mobility: 10  Stairs: 0  Toilet Use: 5  Transfer (Bed to Chair and Back): 10  Total: 55       Barthel and G-code impairment scale:  Percentage of impairment CH  0% CI  1-19% CJ  20-39% CK  40-59% CL  60-79% CM  80-99% CN  100%   Barthel Score 0-100 100 99-80 79-60 59-40 20-39 1-19   0   Barthel Score 0-20 20 17-19 13-16 9-12 5-8 1-4 0      The Barthel ADL Index: Guidelines  1. The index should be used as a record of what a patient does, not as a record of what a patient could do. 2. The main aim is to establish degree of independence from any help, physical or verbal, however minor and for whatever reason. 3. The need for supervision renders the patient not independent. 4. A patient's performance should be established using the best available evidence. Asking the patient, friends/relatives and nurses are the usual sources, but direct observation and common sense are also important. However direct testing is not needed. 5. Usually the patient's performance over the preceding 24-48 hours is important, but occasionally longer periods will be relevant. 6. Middle categories imply that the patient supplies over 50 per cent of the effort. 7. Use of aids to be independent is allowed. Livan Woods., Barthel, D.W. (7772). Functional evaluation: the Barthel Index. [address] [facility] (14)2.   [facility] KASHIF Hart, Asim Hart., Catalina Senior., Daniella Sanderson (1999). Measuring the change indisability after inpatient rehabilitation; comparison of the responsiveness of the Barthel Index and Functional [place] Measure. Journal of Neurology, Neurosurgery, and Psychiatry, 66(4), 660-963. NARCISA Villaseñor, CARISSA Pascual, & Maritza Turcios M.A. (2004.) Assessment of post-stroke quality of life in cost-effectiveness studies: The usefulness of the Barthel Index and the EuroQoL-5D. Quality of Life Research, 15, 427-43     ADL Intervention:   Lower Body Dressing Assistance  Socks: Supervision/set-up  Slip on Shoes with Back: Supervision/set-up  Leg Crossed Method Used: Yes  Position Performed: Bending forward method;Seated in chair  Cues: Verbal cues provided    Toileting  Clothing Management: Stand-by assistance  Cues: Verbal cues provided    Cognitive Retraining  Safety/Judgement: Good awareness of safety precautions (minimal cues for adherence to LUE s/p pacer precautions. )    Pain:  Pain Scale 1: Numeric (0 - 10)  Pain Intensity 1: 0              Activity Tolerance:   BP seated:116/54  BP standing at chair: 94/46  BP seated after standing; 109/52  BP s/p ambulation to from the bathroom for toiletin/41  BP after performance of isometric him adduction in sittin/52    After treatment:   [x] Patient left in no apparent distress sitting up in chair  [] Patient left in no apparent distress in bed  [x] Call bell left within reach  [x] Nursing notified  [x] Caregiver present  [] Bed alarm activated    COMMUNICATION/EDUCATION:   The patients plan of care was discussed with: Registered Nurse, Physician and . [x]    Home safety education was provided and the patient/caregiver indicated understanding. [x]    Patient/family have participated as able in goal setting and plan of care. [x]    Patient/family agree to work toward stated goals and plan of care.   []    Patient understands intent and goals of therapy, but is neutral about his/her participation. []    Patient is unable to participate in goal setting and plan of care. This patients plan of care is appropriate for delegation to MARIA FERNANDA.     Thank you for this referral.  Shai Bose, OTR/L  Time Calculation: 35 mins

## 2017-10-25 NOTE — PROGRESS NOTES
.    PCU SHIFT NURSING NOTE      Bedside shift change report given to Ean Shah RN (oncoming nurse) by Ardith Saint (offgoing nurse). Report included the following information SBAR, Kardex, Intake/Output, MAR and Recent Results. Shift Summary:   0900- Received patient sitting in recliner. Dr. Guerrero Socks in to assess. Notified him of BP 88/42. Will hold am Coreg. Family at bedside. 1015- PT in to work with patient. Patient light-headed when chair to sitting position, BP 77/40. BP to 100 when legs up on chair. 1200- Discussed with Dr. Oc Tovar. Patient may not be discharged today. 1230- Dr. Oc Tovar will reassess patient at 1600. Patient drinking water, ate lunch. Ambulated to bathroom. 1345- Patient ambulated to bathroom. Slight light headiness. BP 87/47 standing. Sitting 110/54.  1500- Patient ambulated to bathroom. Doesn't feel as well as she did this morning. Wants to go home, but will stay if she needs to. Drinking plenty of water. Son remains at bedside. Admission Date    Admission Diagnosis    Consults         Consults              Cardiac Monitoring Order       IV drips       GI Prophylaxis           DVT Prophylaxis                        Activity Level        Purposeful Rounding every 1-2 hour?     Olguin Score     Bed Alarm (If score 3 or >)     Magdaleno Score     Magdaleno Score (if score 14 or less)                 Needs prior to discharge:                     Influenza Vaccine        Pneumonia Vaccine        Diet    LDAs    Urinary Catheter    Intake & Output       Readmission Risk Assessment Tool Score    Expected Length of Stay    Actual Length of Stay

## 2017-10-25 NOTE — PROGRESS NOTES
Initial Nutrition Assessment:    INTERVENTIONS/RECOMMENDATIONS:   · Meals/Snacks: General/healthful diet: Cardiac diet    ASSESSMENT:   Patient medically noted for AFIB, UTI, PPM, and ablation. Patient reports a good appetite currently and PTA. She is using menu and ordering her meals with assistance from family; family also bringing in meals as needed to make sure patient is eating. No questions or concerns at this time. Continue Cardiac diet. Diet Order: Cardiac  % Eaten:  Patient Vitals for the past 72 hrs:   % Diet Eaten   10/23/17 1912 75 %     Pertinent Medications: [x]Reviewed []Other: Coreg, Colace, Ferrous Sulfate, Keppra, Coumadin  Pertinent Labs: [x]Reviewed []Other:   Food Allergies: [x]None []Other   Last BM: 10/22  []Active     []Hyperactive  []Hypoactive       [] Absent BS  Skin:    [] Intact   [x] Incision  [] Breakdown  [] Other:    Anthropometrics:   Height: 5' 3\" (160 cm) Weight: 64.3 kg (141 lb 12.1 oz)   IBW (%IBW):   ( ) UBW (%UBW):   (  %)   Last Weight Metrics:  Weight Loss Metrics 10/19/2017 8/24/2017 8/9/2017 7/4/2017 6/15/2017 6/9/2017 6/5/2017   Today's Wt 141 lb 12.1 oz 137 lb 9.6 oz 143 lb 4.8 oz 141 lb 144 lb 148 lb 151 lb 0.2 oz   BMI 25.11 kg/m2 23.62 kg/m2 24.6 kg/m2 24.2 kg/m2 24.72 kg/m2 25.4 kg/m2 25.92 kg/m2       BMI: Body mass index is 25.11 kg/(m^2). This BMI is indicative of:   []Underweight    []Normal    [x]Overweight    [] Obesity   [] Extreme Obesity (BMI>40)     Estimated Nutrition Needs (Based on):   1325 Kcals/day (BMR (1019) x 1. 3AF) , 70 g (1.1 g/kg bw) Protein  Carbohydrate:  At Least 130 g/day  Fluids: 1300 mL/day (1ml/kcal)    Pt expected to meet estimated nutrient needs: [x]Yes []No    NUTRITION DIAGNOSES:   Problem:  No nutritional diagnosis at this time      Etiology: related to       Signs/Symptoms: as evidenced by        NUTRITION INTERVENTIONS:  Meals/Snacks: General/healthful diet                  GOAL:   PO intake >70% of meals/snacks next 5-7 days    LEARNING NEEDS (Diet, Food/Nutrient-Drug Interaction):    [x] None Identified   [] Identified and Education Provided/Documented   [] Identified and Pt declined/was not appropriate     Cultureal, Oriental orthodox, OR Ethnic Dietary Needs:    [x] None Identified   [] Identified and Addressed     [x] Interdisciplinary Care Plan Reviewed/Documented    [x] Discharge Planning: Heart healthy diet      MONITORING /EVALUATION:   Food/Nutrient Intake Outcomes:  Total energy intake  Physical Signs/Symptoms Outcomes: Weight/weight change    NUTRITION RISK:    [] High              [] Moderate           [x]  Low  []  Minimal/Uncompromised    PT SEEN FOR:    []  MD Consult: []Calorie Count      []Diabetic Diet Education        []Diet Education     []Electrolyte Management     []General Nutrition Management and Supplements     []Management of Tube Feeding     []TPN Recommendations    []  RN Referral:  []MST score >=2     []Enteral/Parenteral Nutrition PTA     []Pregnant: Gestational DM or Multigestation     []Pressure Ulcer/Wound Care needs        []  Low BMI  [x]  DTR Referral       Dara Pinto  Pager [phone]                 Weekend Pager [phone]

## 2017-10-25 NOTE — PROGRESS NOTES
Cardiology Progress Note  10/25/2017 8:56 AM  Admit Date: 10/15/2017  Admit Diagnosis/CC: A-fib [facility])  UTI (urinary tract infection)  Subjective:   Patient reports:  Chest Pain:  [x] none,  consistent with [] non-cardiac  [] atypical  []  anginal chest pain             [x] none now    []  on-going  Dyspnea: [x] none  [] at rest  [] with exertion  [] improved  [] unchanged [] worsening  PND:       [x] none  [] overnight   [] current  Orthopnea: [x] none  [] improved  [] unchanged  [] worsening  Presyncope: [x] none  [] improved  [] unchanged  [] worsening  Ambulated in hallway without symptoms  [] Yes  Ambulated in room without symptoms  [x] Yes  ROS(2+other systems)   Hematuria: [] Yes  [x] No.   Dysuria: [] Yes  [x] No                                           Cough:       [] Yes  [x] No.   Sputum: [] Yes  [x] No                                            Hematochezia: [] Yes  [x] No.   Melena: [] Yes  [x] No                                            No change in family and social history from H&P/Consult note.     Current Facility-Administered Medications   Medication Dose Route Frequency    carvedilol (COREG) tablet 3.125 mg  3.125 mg Oral BID WITH MEALS    sodium chloride (NS) flush 5-10 mL  5-10 mL IntraVENous Q8H    sodium chloride (NS) flush 5-10 mL  5-10 mL IntraVENous PRN    acetaminophen (TYLENOL) tablet 650 mg  650 mg Oral Q4H PRN    WARFARIN INFORMATION NOTE (COUMADIN)   Other QPM    sodium chloride (NS) flush 5-10 mL  5-10 mL IntraVENous Q8H    acetaminophen (TYLENOL) tablet 650 mg  650 mg Oral Q4H PRN    saline peripheral flush soln 10 mL  10 mL InterCATHeter PRN    polyethylene glycol (MIRALAX) packet 17 g  17 g Oral DAILY PRN    vitamins  A,C,E-zinc-copper (I-LEROY PROTECT) tablet 1 Tab  1 Tab Oral DAILY    docusate sodium (COLACE) capsule 100 mg  100 mg Oral DAILY    sodium chloride (NS) flush 5-10 mL  5-10 mL IntraVENous PRN    bisacodyl (DULCOLAX) tablet 5 mg  5 mg Oral DAILY PRN    ferrous sulfate tablet 325 mg  1 Tab Oral BID WITH MEALS    amitriptyline (ELAVIL) tablet 10-15 mg  10-15 mg Oral QHS    latanoprost (XALATAN) 0.005 % ophthalmic solution 1 Drop  1 Drop Both Eyes QHS    levETIRAcetam (KEPPRA) tablet 500 mg  500 mg Oral BID       Objective:    Physical Exam:  Last VS:   Visit Vitals    BP (!) 88/42 (BP 1 Location: Right arm, BP Patient Position: Sitting)    Pulse 80    Temp 98 °F (36.7 °C)    Resp 20    Ht 5' 3\" (1.6 m)    Wt 64.3 kg (141 lb 12.1 oz)    SpO2 94%    Breastfeeding No    BMI 25.11 kg/m2    Temp (24hrs), Av.1 °F (36.7 °C), Min:97.9 °F (36.6 °C), Max:98.2 °F (36.8 °C)    24 hr VS reviewed. General Appearance:   [x] well developed, well nourished,  [x] NAD. [] agitated   [] lethargic but arousable  [] obtunded   ENT, Palate:    [x] WNL   [] dry palate/MM        Respiratory:    [x] CTA bilateral  [] rales  [] rhonchi  [] normal resp effort      [] similar to yesterday   [] worse    [] improved   Cardiovascular:   [x] RRR   [] Irregular rate and rhythm   [x] Normal S1, S2   [x] No gallop or rub. [] no murmur   [x] no new murmur  [] murmur c/w:   [x] no edema  RLE: []1+ []2+ [] 3+;  LLE: []1+ []2+ []3+      [] edema similar to yesterday   [] worse    [] improved   [x] normal JVP   [] Elevated JVP    [x] JVP similar to yesterday   [] worse    [] improved   [x] carotid upstroke unchanged   [x] abd aorta not palpated   [x] no stigmata of peripheral emboli   GI:    [x] abd soft, non-distented,bowel sounds present, no                     organomegaly appreciated   Skin:  Neuro:    Cath Site:  [x] warm and dry [] cold extremities   [x] A/O x 3, grossly non-focal      [] intact w/o hematoma or bruit; distal pulse unchanged.               Data Review:   Labs:    Recent Results (from the past 24 hour(s))   PROTHROMBIN TIME + INR    Collection Time: 10/25/17  2:08 AM   Result Value Ref Range    INR 1.2 (H) 0.9 - 1.1      Prothrombin time 12.1 (H) 9.0 - 11.1 sec CBC W/O DIFF    Collection Time: 10/25/17  2:08 AM   Result Value Ref Range    WBC 9.2 3.6 - 11.0 K/uL    RBC 3.08 (L) 3.80 - 5.20 M/uL    HGB 8.9 (L) 11.5 - 16.0 g/dL    HCT 27.8 (L) 35.0 - 47.0 %    MCV 90.3 80.0 - 99.0 FL    MCH 28.9 26.0 - 34.0 PG    MCHC 32.0 30.0 - 36.5 g/dL    RDW 15.0 (H) 11.5 - 14.5 %    PLATELET 848 663 - 014 K/uL       Provided Telemetry: [x] sinus  [] chronic afib   [] paroxysmal afib  [] NSVT      Assessment:     Active Problems:    UTI (urinary tract infection) (10/15/2017)      A-fib ([facility] [address] (10/15/2017)        Plan:     Atrial Fibrillation - Flutter  improved   Electrophysiology consult  Rales are better D/W her chris. Will see as OP. Barrington Edgar For all other plans, see orders.    [x] High complexity decision making was performed

## 2017-10-25 NOTE — DISCHARGE SUMMARY
Discharge Summary      Name: Kvng Abbott  [id number]  YOB: 1924 (Age: 80 y.o.)   Date of Admission: 10/15/2017  Date of Discharge: 10/26/2017  Attending Physician: No att. providers found    Discharge Diagnosis:   Orthostatic hypotension  Atrial Fibrillation with RVR  Acute Systolic CHF (possibly POA)  Hypertension now with low bp  Chest pain  CKD 3  Seizure disorder  Macular Degeneration    Consultants: Cardiology    Procedures:     Brief Admission History/Reason for Admission Per Sebas Klein MD:   Kvng Abbott is a 80 y.o. PMhx significant for CAD, hypercholesterolemia, and a fib who presents ambulatory to the ED with cc of palpitations since 1900 last night. pt has  Been treated for uti recently blood cx + ecoli,  Per daughter, 3 days ago pt was diagnosed with a UTI and was placed on nitrofurantoin and keflex which she is still taking. She reports that she is on coumadin. Per daughter, pt's pulmonologist said that pt has interstitial changes not pneumonia. Pt denies chest pain, dysuria, or abdominal pain. no abd pain  No n/v,.  [address] Main Problems:   Orthostatic hypotension, likely due to volume depletion with lasix use. Pt with lightheadedness upon positional changes in additional to significant drop of SBP. No further lasix given, symptoms resolved with ANISHA hose. Will discontinue lasix and coreg until she sees her cardiologist at f/u for re-evaluation. Educate pt and family members to take precautions with positional changes. Family and pt eager to be discharge.       Atrial Fibrillation with RVR with associated weakness and SANTOS  Acute Systolic CHF (possibly POA)  Hypertension, now with low BP  Cardizem gtt discontinued. Coreg on hold due to low BP.  PPM placed 10/23, s/p ablation on 10/24. Mldominique James was normal.  Resume coumadin. She will need an INR check in 3-5 days at f/u appointment.      Chest pain on 10/19: MI ruled out     UTI outside records reviewed   Abx discontinued rocephin as UA on 10/15 benign.      CKD stage III  Renal function at baseline      L1 compression fracture, stable on tylenol prn      Seizure disorder  Continue keppra      Macular Degeneration     Phlebitis left dorsal arm: continue warm compresses    Discharge Exam:  Patient seen and examined by me on discharge day. Pertinent Findings:  Visit Vitals    /48 (BP 1 Location: Right arm, BP Patient Position: Sitting)    Pulse 80    Temp 98 °F (36.7 °C)    Resp 19    Ht 5' 3\" (1.6 m)    Wt 64.3 kg (141 lb 12.1 oz)    SpO2 99%    Breastfeeding No    BMI 25.11 kg/m2     Gen:    Not in distress  Chest: Clear lungs  CVS:   Regular rhythm. No edema  Abd:  Soft, not distended, not tender    Discharge/Recent Laboratory Results:  No results for input(s): NA, K, CL, CO2, BUN, GLU, CA, PHOS, MG in the last 72 hours. No lab exists for component: CREATININE  Recent Labs      10/26/17   0340   HGB  9.3*   HCT  27.5*   WBC  8.4   PLT  188       Discharge Medications:  Discharge Medication List as of 10/26/2017 10:40 AM      CONTINUE these medications which have NOT CHANGED    Details   !! warfarin (COUMADIN) 2.5 mg tablet Take 2.5 mg by mouth three (3) days a week. Patient's medication list states that she takes 2.5 mg Monday Wednesday Friday, and 5 mg Sunday Saturday Tuesday and Thursday., Historical Med      !! warfarin (COUMADIN) 2.5 mg tablet Take 5 mg by mouth four (4) days a week. Patient's medication list states that she takes 2.5 mg Monday Wednesday Friday, and 5 mg Sunday Saturday Tuesday and Thursday., Historical Med      VIT A/C/E AC/ZNOX/CUPRIC OXIDE (EYE VITAMIN AND MINERALS PO) Take 1 Tab by mouth two (2) times a day., Historical Med      ferrous sulfate (IRON) 325 mg (65 mg iron) tablet Take 325 mg by mouth two (2) times a day., Historical Med      acetaminophen (TYLENOL) 325 mg tablet Take 162.5 mg by mouth nightly.  Patient takes 1/2 of a 325 mg tablet every evening at bedtime. , Historical Med      DOCUSATE CALCIUM (STOOL SOFTENER PO) Take 100 mg by mouth daily. , Historical Med      levETIRAcetam (KEPPRA) 500 mg tablet TAKE ONE TABLET BY MOUTH TWICE DAILY. PATIENT TO KEEP 6/15/16 FOR FURTHER FILLS, Normal, Disp-180 Tab, R-5      latanoprost (XALATAN) 0.005 % ophthalmic solution Administer 1 Drop to both eyes nightly., Historical Med      AMITRIPTYLINE HCL (AMITRIPTYLINE PO) Take 10-15 mg by mouth nightly., Historical Med       !! - Potential duplicate medications found. Please discuss with provider. STOP taking these medications       carvedilol (COREG) 3.125 mg tablet Comments:   Reason for Stopping:         furosemide (LASIX) 20 mg tablet Comments:   Reason for Stopping:         cephALEXin (KEFLEX) 500 mg capsule Comments:   Reason for Stopping:         nitrofurantoin (MACRODANTIN) 100 mg capsule Comments:   Reason for Stopping:         PHENAZOPYRIDINE HCL (AZO PO) Comments:   Reason for Stopping:         carvedilol (COREG) 25 mg tablet Comments:   Reason for Stopping:         carvedilol (COREG) 25 mg tablet Comments:   Reason for Stopping:               DISPOSITION:    Home with Family:    Home with HH/PT/OT/RN: x   SNF/LTC:    LOW:    OTHER:          Follow up with:   PCP : Chencho Adhikari MD  Follow-up Information     Follow up With Details Comments Xavi Sanchez MD  The nurse will call your daughter to schedule this appointment [address]  [facility]  [phone]      Chencho Adhikari MD Go on 11/7/2017 PCP follow up at 11:00 AM [id number] [facility]  [phone]      Coumadin Clinic with pcp  You are to go and have your inr checked on Monday Oct 30th at 1100am at your pcp office.   [phone]          Diet: cardiac    Total time in minutes spent coordinating this discharge (includes going over instructions, follow-up, prescriptions, and preparing report for sign off to her PCP) :   minutes

## 2017-10-25 NOTE — PROGRESS NOTES
Problem: Mobility Impaired (Adult and Pediatric)  Goal: *Acute Goals and Plan of Care (Insert Text)  Physical Therapy Goals  Initiated 10/16/2017  1. Patient will move from supine to sit and sit to supine  and roll side to side in bed with independence within 7 day(s). 2.  Patient will transfer from bed to chair and chair to bed with modified independence using the least restrictive device within 7 day(s). 3.  Patient will perform sit to stand with modified independence within 7 day(s). 4.  Patient will ambulate with modified independence for 350 feet with the least restrictive device within 7 day(s). 5.  Patient will ascend/descend 4 stairs with 2 handrail(s) with minimal assistance/contact guard assist within 7 day(s). physical Therapy TREATMENT  Patient: Cristy Mcmullen (52 y.o. female)  Date: 10/25/2017  Diagnosis: A-fib [facility])  UTI (urinary tract infection) <principal problem not specified>       Precautions: DNR    ASSESSMENT:  Pt was received in recliner with feet elevated. Noted pt BP was lower this morning, discussed with nursing and medication was on hold. Cleared by nursing to attempt to mobilize. VSS initially. Pt's legs were lower and she stood with SBA and was requested to return to sitting to obtain BP. BP dropped to 74/44. She was returned to sitting and feet elevated. Cued pt to perform ankle pumps and BP recovered to normal range. Continue to recommend HHPT if pt is discharged home. Dicussed with son about safe ascending of stairs and people around to help. Progression toward goals:  []    Improving appropriately and progressing toward goals  [x]    Improving slowly and progressing toward goals  []    Not making progress toward goals and plan of care will be adjusted     PLAN:  Patient continues to benefit from skilled intervention to address the above impairments. Continue treatment per established plan of care.   Discharge Recommendations:  Home Health  Further Equipment Recommendations for Discharge:  Has DME     SUBJECTIVE:   Patient stated at least I don't have to go to walk from the bathroom when I feel this bad.     OBJECTIVE DATA SUMMARY:   Critical Behavior:  Neurologic State: Alert  Orientation Level: Oriented X4  Cognition: Appropriate decision making  Safety/Judgement: Awareness of environment, Fall prevention, Home safety  Functional Mobility Training:  Bed Mobility:      session began and ended in sitting              Transfers:  Sit to Stand: Stand-by asssistance  Stand to Sit: Stand-by asssistance                             Balance:  Sitting: Intact  Standing: Impaired  Standing - Static: Good  Ambulation/Gait Training:         not safe to attempt at this time                      Therapeutic Exercises: Ankle pumps  Pain:  Pain Scale 1: Numeric (0 - 10)  Pain Intensity 1: 0              Activity Tolerance:   hypotensive   Please refer to the flowsheet for vital signs taken during this treatment.   After treatment:   [x]    Patient left in no apparent distress sitting up in chair  []    Patient left in no apparent distress in bed  [x]    Call bell left within reach  [x]    Nursing notified  []    Caregiver present  []    Bed alarm activated    COMMUNICATION/COLLABORATION:   The patients plan of care was discussed with: Registered Nurse    Keshia Hammer, PT, DPT   Time Calculation: 12 mins

## 2017-10-25 NOTE — PROGRESS NOTES
Therapy has worked with patient and home health is recommended. Offered choice and she would like to go back with [facility]. Referral sent via ecin and will await their response.

## 2017-10-25 NOTE — PROGRESS NOTES
Came by to see patient. Doing OK, sitting in chair. All questions answered for her and the family. Warfarin anticoagulation underway. Discharge instructions including plan for F/U with Dr. Sarita man. I'll sign off, please call if any issues or concerns.

## 2017-10-25 NOTE — PROGRESS NOTES
Pharmacy Daily Dosing of Warfarin    Indication: Paroxysmal Atrial Fibrillation    Goal INR: 2-2.5 (per consult)    PTA Warfarin Dose:   2.5mg tablets  2.5mg MWF  5mg T/Th/Sa/Sn     Last dose = 5mg taken morning of admission    Concurrent anticoagulants: none    Concurrent antiplatelet: none    Major Interacting Medications    Drugs that may increase INR: none   Drugs that may decrease INR: Vitamin K 5 mg PO X1 on 10/19    Conditions that may increase/decrease INR (CHF, C. diff, cirrhosis, thyroid disorder, hypoalbuminemia): Alb 2.6    Labs:  Recent Labs      10/25/17   0208  10/24/17   0303  10/23/17   0402   INR  1.2*  1.1   --    HGB  8.9*  9.7*  9.4*   PLT  197  228  241       Impression/Plan:   - warfarin was held for procedure and resumed on 10/24  - Subtherapeutic INR  - Warfarin 5 mg for tonight     Pharmacy will follow daily and adjust the dose as appropriate.     Thanks    Kezia Woodard, JUAND

## 2017-10-25 NOTE — PROGRESS NOTES
Report given to Kenda Paget, RN. SBAR, ED Summary, OR Summary, Procedure Summary, Intake/Output, MAR, Recent Results, Med Rec Status or Alarm Parameters  was discussed. Kenda Paget, RN assumed care of the pt. Kathie Carrion      3982: Pt received sitting up in the chair. Pt is comfortable with no complaints. SBP wdl at 125.    1533: OT in to work with patient. 1605: Compression stockings placed on pt. Pt was positive for orthostatics. SBP in the 70s when returned from the bathroom. 1630:  states that she wishes for the pt to stay a night longer to keep an eye on pts BP.  aware and spoke with pt and family. Instructed to hold evening coreg dose. [id number]: Pts daughter and son took patient on walk down the hallway with her walker. Pt did well and did not report any dizziness or s/s of low BP. Took pts BP when she returned to the chair and her SBP was 115.     1800: Pts family requests that pt be discharged earlier in the morning rather than later. Will call  first thing in the morning.

## 2017-10-26 VITALS
RESPIRATION RATE: 19 BRPM | OXYGEN SATURATION: 99 % | HEIGHT: 63 IN | DIASTOLIC BLOOD PRESSURE: 48 MMHG | WEIGHT: 141.76 LBS | BODY MASS INDEX: 25.12 KG/M2 | TEMPERATURE: 98 F | HEART RATE: 80 BPM | SYSTOLIC BLOOD PRESSURE: 101 MMHG

## 2017-10-26 LAB
ERYTHROCYTE [DISTWIDTH] IN BLOOD BY AUTOMATED COUNT: 14.8 % (ref 11.5–14.5)
HCT VFR BLD AUTO: 27.5 % (ref 35–47)
HGB BLD-MCNC: 9.3 G/DL (ref 11.5–16)
INR PPP: 1.4 (ref 0.9–1.1)
MCH RBC QN AUTO: 29.9 PG (ref 26–34)
MCHC RBC AUTO-ENTMCNC: 33.8 G/DL (ref 30–36.5)
MCV RBC AUTO: 88.4 FL (ref 80–99)
PLATELET # BLD AUTO: 188 K/UL (ref 150–400)
PROTHROMBIN TIME: 14 SEC (ref 9–11.1)
RBC # BLD AUTO: 3.11 M/UL (ref 3.8–5.2)
WBC # BLD AUTO: 8.4 K/UL (ref 3.6–11)

## 2017-10-26 PROCEDURE — 74011250637 HC RX REV CODE- 250/637: Performed by: INTERNAL MEDICINE

## 2017-10-26 PROCEDURE — 85610 PROTHROMBIN TIME: CPT | Performed by: INTERNAL MEDICINE

## 2017-10-26 PROCEDURE — 85027 COMPLETE CBC AUTOMATED: CPT | Performed by: INTERNAL MEDICINE

## 2017-10-26 PROCEDURE — 36415 COLL VENOUS BLD VENIPUNCTURE: CPT | Performed by: INTERNAL MEDICINE

## 2017-10-26 RX ORDER — WARFARIN 2.5 MG/1
2.5 TABLET ORAL ONCE
Status: DISCONTINUED | OUTPATIENT
Start: 2017-10-26 | End: 2017-10-26 | Stop reason: HOSPADM

## 2017-10-26 RX ADMIN — FERROUS SULFATE TAB 325 MG (65 MG ELEMENTAL FE) 325 MG: 325 (65 FE) TAB at 08:17

## 2017-10-26 RX ADMIN — DOCUSATE SODIUM 100 MG: 100 CAPSULE, LIQUID FILLED ORAL at 08:17

## 2017-10-26 RX ADMIN — Medication 10 ML: at 05:13

## 2017-10-26 RX ADMIN — MULTIPLE VITAMINS W/ MINERALS TAB 1 TABLET: TAB at 09:00

## 2017-10-26 RX ADMIN — LEVETIRACETAM 500 MG: 500 TABLET, FILM COATED ORAL at 08:17

## 2017-10-26 NOTE — PROGRESS NOTES
PCU SHIFT NURSING NOTE      Bedside shift change report given to Dick Yañez RN (oncoming nurse) by Amando Morales RN (offgoing nurse). Report included the following information SBAR, Kardex, Intake/Output, MAR and Recent Results. Shift Summary:     7:35 PM  Patient resting comfortably in recliner with no complaints at this time. Admission Date 10/15/2017   Admission Diagnosis A-fib ([address]  UTI (urinary tract infection)   Consults IP CONSULT TO CARDIOLOGY  IP CONSULT TO CARDIOLOGY        Consults   []PT   []OT   []Speech   []Case Management      [] Palliative      Cardiac Monitoring Order   []Yes   []No     IV drips   []Yes    Drip:                            Dose:  Drip:                            Dose:  Drip:                            Dose:   []No     GI Prophylaxis   []Yes   []No         DVT Prophylaxis   SCDs:             Zackery stockings:  Graduated Compression Stockings: Bilateral      [] Medication   []Contraindicated   []None      Activity Level Activity Level: Up with Assistance     Activity Assistance: Partial (one person)   Purposeful Rounding every 1-2 hour? [x]Yes   Olguin Score  Total Score: 4   Bed Alarm (If score 3 or >)   [x]Yes   [] Refused (See signed refusal form in chart)   Magdaleno Score  Magdaleno Score: 20   Magdaleno Score (if score 14 or less)   []PMT consult   []Wound Care consult      []Specialty bed   [] Nutrition consult          Needs prior to discharge:   Home O2 required:    []Yes   []No    If yes, how much O2 required?     Other:    Last Bowel Movement: Last Bowel Movement Date: 10/22/17      Influenza Vaccine Received Flu Vaccine for Current Season (usually Sept-March): No    Patient/Guardian Refused (Notify MD): No   Pneumonia Vaccine           Diet Active Orders   Diet    DIET CARDIAC Regular      LDAs               Peripheral IV 10/25/17 Right Hand (Active)   Site Assessment Clean, dry, & intact 10/25/2017  7:35 PM   Phlebitis Assessment 0 10/25/2017  7:35 PM   Infiltration Assessment 0 10/25/2017  7:35 PM   Dressing Status Clean, dry, & intact 10/25/2017  7:35 PM   Dressing Type Transparent;Tape 10/25/2017  7:35 PM   Hub Color/Line Status Blue;Capped 10/25/2017  7:35 PM   Action Taken Open ports on tubing capped 10/25/2017  2:53 AM   Alcohol Cap Used Yes 10/25/2017  2:53 AM                      Urinary Catheter      Intake & Output   Date 10/24/17 1900 - 10/25/17 0659 10/25/17 0700 - 10/26/17 0659   Shift 8539-5612 24 Hour Total 2174-2062 9247-3964 24 Hour Total   I  N  T  A  K  E   P. O.  50 1560  1560      P. O.  50 1560  1560    Shift Total  (mL/kg)  50  (0.8) 1560  (24.3)  1560  (24.3)   O  U  T  P  U  T   Urine  (mL/kg/hr) 360 610         Urine Voided 360 610         Urine Occurrence(s) 2 x 4 x 5 x  5 x    Shift Total  (mL/kg) 360  (5.6) 610  (9.5)      NET -360 -560 1560  1560   Weight (kg) 64.3 64.3 64.3 64.3 64.3         Readmission Risk Assessment Tool Score High Risk            22       Total Score        3 Has Seen PCP in Last 6 Months (Yes=3, No=0)    3 Patient Length of Stay (>5 days = 3)    4 IP Visits Last 12 Months (1-3=4, 4=9, >4=11)    5 Pt. Coverage (Medicare=5 , Medicaid, or Self-Pay=4)    7 Charlson Comorbidity Score (Age + Comorbid Conditions)        Criteria that do not apply:    . Living with Significant Other. Assisted Living. LTAC. SNF.  or   Rehab       Expected Length of Stay 3d 14h   Actual Length of Stay 10

## 2017-10-26 NOTE — PROGRESS NOTES
I have reviewed discharge instructions with the patient and caregiver. The patient and caregiver verbalized understanding. At home care instructions were provided, as well as medication instructions for after discharge. Patient was able to verbalize feedback. Patient discharged home with son and daughter.      Crescencio Albarado RN

## 2017-10-26 NOTE — PROGRESS NOTES
Spoke with Macey Wan with [facility] and she is  aware patient will be discharging today. They have the orders that were faxed to them on yesterday.

## 2017-10-26 NOTE — PROGRESS NOTES
Pharmacy Daily Dosing of Warfarin    Indication: Paroxysmal Atrial Fibrillation    Goal INR: 2-2.5 (per consult)    PTA Warfarin Dose:   2.5mg tablets  2.5mg MWF  5mg T/Th/Sa/Sn     Last dose = 5mg taken morning of admission    Concurrent anticoagulants: none    Concurrent antiplatelet: none    Major Interacting Medications    Drugs that may increase INR: none   Drugs that may decrease INR: Vitamin K 5 mg PO X1 on 10/19    Conditions that may increase/decrease INR (CHF, C. diff, cirrhosis, thyroid disorder, hypoalbuminemia): Alb 2.6    Labs:  Recent Labs      10/26/17   0340  10/25/17   0208  10/24/17   0303   INR  1.4*  1.2*  1.1   HGB  9.3*  8.9*  9.7*   PLT  188  197  228       Impression/Plan:   - warfarin was held for procedure and resumed on 10/24  - Subtherapeutic INR, but trending up  - Warfarin 2.5 mg for tonight     Pharmacy will follow daily and adjust the dose as appropriate.     Thanks    Susan Brooks, JUAND

## 2017-10-26 NOTE — FACE TO FACE
Home Health Care Discharge Planning: [facility]  Face to Face Encounter      NAME: Shamir Appiah   :  1924   MRN:  [id number]     Primary Diagnosis:   Orthostatic Hypotension    Date of Face to Face:  10/26/2017 10:41 AM                                  Face to Face Encounter findings are related to primary reason for home care:   YES    1. I certify that the patient needs intermittent skilled nursing care, physical therapy and/or speech therapy. I will not be following this patient in the Community and Dr. Sukumar Maravilla MD will be responsible for signing the [address] of Care. 2. Initial Orders for Care: Physical Therapy and Occupational Therapy    3. I certify that this patient is homebound because of illness or injury, need the aid of supportive devices such as crutches, canes, wheelchairs, and walkers; the use of special transportation; or the assistance of another person in order to leave their place of residence. There exists a normal inability to leave home and leaving home requires a considerable and taxing effort. 4. I certify that this patient is under my care and that I had a Face-to-Face Encounter that meets the physician Face-to-Face Encounter requirements. Document the physical findings from the [address] [place] [address] Encounter that support the need for skilled services: Has new finding of weakness and altered mobility that requires skilled physical/occupational and/or speech therapy services for evaluation and interventions.      Tayla Rivera MD  Discharging Physician                 Office:  [phone]  Fax:    [phone]

## 2017-10-26 NOTE — PROGRESS NOTES
PCU SHIFT NURSING NOTE      Bedside and Verbal shift change report given to Savannah Cedeno (oncoming nurse) by Heidy Newman (offgoing nurse). Report included the following information SBAR, Kardex, ED Summary, OR Summary, Procedure Summary, Intake/Output, MAR, Accordion, Recent Results and Alarm Parameters . Shift Summary:   0700: Pt received sitting up in chair. Family present and asking about discharge times. Pt positive for orthostatics this AM, but pt denies any symptoms. 0745: Pt up to bathroom with family. R groin dressing removed. Dr.Le hall, stated that pt would be discharged by 11am today. 1019:  in to see pt. Did one more round of BPs. Still positive for orthostatics. Pt is asymptomatic. Sitting BP is good with systolic in the 897Q. , , and  have all signed off. Will prepare discharge paperwork. Admission Date 10/15/2017   Admission Diagnosis A-fib ([address]  UTI (urinary tract infection)   Consults IP CONSULT TO CARDIOLOGY  IP CONSULT TO CARDIOLOGY        Consults   [x]PT   [x]OT   []Speech   []Case Management      [] Palliative      Cardiac Monitoring Order   [x]Yes   []No     IV drips   []Yes    Drip:                            Dose:  Drip:                            Dose:  Drip:                            Dose:   [x]No     GI Prophylaxis   [x]Yes   []No         DVT Prophylaxis   SCDs:             Zackery stockings:  Graduated Compression Stockings: Bilateral      [] Medication   []Contraindicated   []None      Activity Level Activity Level: Up with Assistance     Activity Assistance: Partial (one person)   Purposeful Rounding every 1-2 hour?    [x]Yes   Olguin Score  Total Score: 4   Bed Alarm (If score 3 or >)   []Yes   [] Refused (See signed refusal form in chart)   Magdaleno Score  Magdaleno Score: 20   Magdaleno Score (if score 14 or less)   []PMT consult   []Wound Care consult      []Specialty bed   [] Nutrition consult          Needs prior to discharge:   Home O2 required:    [x]Yes   []No    If yes, how much O2 required? 2L nc at night ( has at home )    Other:    Last Bowel Movement: Last Bowel Movement Date: 10/25/17      Influenza Vaccine Received Flu Vaccine for Current Season (usually Sept-March): No    Patient/Guardian Refused (Notify MD): No   Pneumonia Vaccine           Diet Active Orders   Diet    DIET CARDIAC Regular      LDAs               Peripheral IV 10/25/17 Right Hand (Active)   Site Assessment Clean, dry, & intact 10/26/2017  3:27 AM   Phlebitis Assessment 0 10/26/2017  3:27 AM   Infiltration Assessment 0 10/26/2017  3:27 AM   Dressing Status Clean, dry, & intact 10/26/2017  3:27 AM   Dressing Type Transparent;Tape 10/26/2017  3:27 AM   Hub Color/Line Status Blue;Flushed;Capped 10/26/2017  3:27 AM   Action Taken Open ports on tubing capped 10/25/2017  2:53 AM   Alcohol Cap Used Yes 10/25/2017  2:53 AM                      Urinary Catheter      Intake & Output   Date 10/25/17 0700 - 10/26/17 0659 10/26/17 0700 - 10/27/17 0659   Shift 9816-0029 7879-1113 24 Hour Total 8998-0716 0603-5512 24 Hour Total   I  N  T  A  K  E   P.O. 1560  1560         P.O. 1560  1560       Shift Total  (mL/kg) 1560  (24.3)  1560  (24.3)      O  U  T  P  U  T   Urine  (mL/kg/hr)            Urine Occurrence(s) 5 x 7 x 12 x       Stool            Stool Occurrence(s)  1 x 1 x       Shift Total  (mL/kg)         NET 1560  1560      Weight (kg) 64.3 64.3 64.3 64.3 64.3 64.3         Readmission Risk Assessment Tool Score High Risk            22       Total Score        3 Has Seen PCP in Last 6 Months (Yes=3, No=0)    3 Patient Length of Stay (>5 days = 3)    4 IP Visits Last 12 Months (1-3=4, 4=9, >4=11)    5 Pt. Coverage (Medicare=5 , Medicaid, or Self-Pay=4)    7 Charlson Comorbidity Score (Age + Comorbid Conditions)        Criteria that do not apply:    . Living with Significant Other. Assisted Living. LTAC. SNF.  or   Rehab       Expected Length of Stay 3d 14h   Actual Length of Stay 11

## 2017-10-26 NOTE — PROGRESS NOTES
Physical Therapy  Attempted to see patient for PT treatment but patient politely declining therapy at this time. Patient states she has just been to the bathroom and is fatigued. Patient scheduled for D/C this a.m. Patient's son reports BP has been better today. Patient to receive HHPT at discharge.

## 2017-10-27 ENCOUNTER — TELEPHONE (OUTPATIENT)
Dept: INTERNAL MEDICINE CLINIC | Age: 82
End: 2017-10-27

## 2017-10-27 ENCOUNTER — PATIENT OUTREACH (OUTPATIENT)
Dept: INTERNAL MEDICINE CLINIC | Age: 82
End: 2017-10-27

## 2017-10-27 NOTE — PROGRESS NOTES
[address] - AFD - 10/27/2017    This note will not be viewable in [address] [facility] Discharge [facility]  Follow-Up    Abdi Florestara, listed on [facility]) Discharge Report. Patient hospitalized @ [address] 10/15/17 - 10/26/17. RRAT score: 22 High    Discharge Instructions/Plans:  Brief Admission History/Reason for Admission Per José Stout MD:   Cassy Hanson is a 80 y.o. PMhx significant for CAD, hypercholesterolemia, and a fib who presents ambulatory to the ED with cc of palpitations since 1900 last night. pt has  Been treated for uti recently blood cx + ecoli,  Per daughter, 3 days ago pt was diagnosed with a UTI and was placed on nitrofurantoin and keflex which she is still taking.  She reports that she is on coumadin. Per daughter, pt's pulmonologist said that pt has interstitial changes not pneumonia. Pt denies chest pain, dysuria, or abdominal pain. no abd pain  No n/v,.         Brief Hospital Course by Main Problems:   Orthostatic hypotension, likely due to volume depletion with lasix use. Pt with lightheadedness upon positional changes in additional to significant drop of SBP. No further lasix given, symptoms resolved with ANISHA hose. Will discontinue lasix and coreg until she sees her cardiologist at f/u for re-evaluation. Educate pt and family members to take precautions with positional changes. Family and pt eager to be discharge.        Atrial Fibrillation with RVR with associated weakness and SANTOS  Acute Systolic CHF (possibly POA)  Hypertension, now with low BP  Cardizem gtt discontinued. Coreg on hold due to low BP.  PPM placed 10/23, s/p ablation on 10/24. Reece Night was normal.  Resume coumadin. She will need an INR check in 3-5 days at f/u appointment. NN post hospital interactive contact done by telephone within 2 business days of discharge     10/27/2017  - pt outreach (call). Pt ID verified with 2 identifiers, name and . NN introduction.  Reason for call stated. Med reconciliation done with patient's daughter. Taking as prescribed. No barriers to obtaining meds identified. Red flags- reviewed and patient understands when to seek medical attention from PCP/ED.     Support System:  Family    ACP - Advanced directive dated 12/2012. Decision Maker/MPOA is Lev Moore, daughter.  Halima Guardado  Barriers- none identified @ this time     Discharge Instructions- reviewed with pt. Pt verbalized understanding. Given opportunity to ask questions. PCP f/u - Dr. Marina Ellis, 11/9/17 at 1100    NN contact # given to call as needed. Goals      Attends follow-up appointments as directed. AFD - 10/27/2017    Subjective - \"I made the appointment with Dr. Marina Ellis this morning. \"  Objective - Appointment date noted in patient's encounter list for 2 weeks from discharge date. Patient is comfortable in a regular car and family can provide transportation. Assessment - No barriers to attending scheduled appointments. Plan - Maintain current schedule.  Returns to baseline activity level. AFD - 10/27/2017    Subjective - \"Our goal is to get her back to baseline; walking laps around the house, build up her strength. \"  Objective - Patient at advanced age of 80. States [facility] has been out to evaluate patient and begin developing program to help her get her strength back and avoid use of Left arm while pacemaker site heals. Patient / family have prior hx with [facility]. Assessment - Family and patient have complete plan in place for patient's recovery. Plan - Reassess progress in one week.  Understands red flags post discharge. AFD - 10/27/2017    Subjective - \"Mother lives with my  and myself. I am an RN and my  is an EMT. I draw all her labs. \"  Objective - Patient's daughter fully listed all safety measures installed in the home, listed all DME available.  Patient's daughter takes VS twice a day with a full nursing assessment. Patient's daughter draws all INRs on the date needed and is able to adjust coumadin dosages as indicated by physician. Patient's daughter works very closely with all patient's medical team to coordinate care and keep all practitioners informed. Assessment - No changes indicated. Plan - Support as needed. Patient's daughter has contact information for this office and this NN.

## 2017-10-27 NOTE — TELEPHONE ENCOUNTER
Vonda///[facility] states she needs a call back to get order for patient that was just admitted to  [facility] with them today. Please call to discuss.  Thank you

## 2017-10-30 ENCOUNTER — TELEPHONE (OUTPATIENT)
Dept: INTERNAL MEDICINE CLINIC | Age: 82
End: 2017-10-30

## 2017-10-30 ENCOUNTER — HOSPITAL ENCOUNTER (EMERGENCY)
Age: 82
Discharge: HOME | End: 2017-10-31
Attending: EMERGENCY MEDICINE
Payer: MEDICARE

## 2017-10-30 DIAGNOSIS — I50.9 ACUTE ON CHRONIC CONGESTIVE HEART FAILURE, UNSPECIFIED CONGESTIVE HEART FAILURE TYPE: Primary | ICD-10-CM

## 2017-10-30 PROCEDURE — 94640 AIRWAY INHALATION TREATMENT: CPT

## 2017-10-30 PROCEDURE — 85730 THROMBOPLASTIN TIME PARTIAL: CPT | Performed by: EMERGENCY MEDICINE

## 2017-10-30 PROCEDURE — 93005 ELECTROCARDIOGRAM TRACING: CPT

## 2017-10-30 PROCEDURE — 83880 ASSAY OF NATRIURETIC PEPTIDE: CPT | Performed by: EMERGENCY MEDICINE

## 2017-10-30 PROCEDURE — 82550 ASSAY OF CK (CPK): CPT | Performed by: EMERGENCY MEDICINE

## 2017-10-30 PROCEDURE — 80053 COMPREHEN METABOLIC PANEL: CPT | Performed by: EMERGENCY MEDICINE

## 2017-10-30 PROCEDURE — 96374 THER/PROPH/DIAG INJ IV PUSH: CPT

## 2017-10-30 PROCEDURE — 36415 COLL VENOUS BLD VENIPUNCTURE: CPT | Performed by: EMERGENCY MEDICINE

## 2017-10-30 PROCEDURE — 84484 ASSAY OF TROPONIN QUANT: CPT | Performed by: EMERGENCY MEDICINE

## 2017-10-30 PROCEDURE — 99285 EMERGENCY DEPT VISIT HI MDM: CPT

## 2017-10-30 PROCEDURE — 85610 PROTHROMBIN TIME: CPT | Performed by: EMERGENCY MEDICINE

## 2017-10-30 PROCEDURE — 85025 COMPLETE CBC W/AUTO DIFF WBC: CPT | Performed by: EMERGENCY MEDICINE

## 2017-10-30 PROCEDURE — 77030029684 HC NEB SM VOL KT MONA -A

## 2017-10-30 RX ORDER — IPRATROPIUM BROMIDE AND ALBUTEROL SULFATE 2.5; .5 MG/3ML; MG/3ML
3 SOLUTION RESPIRATORY (INHALATION) ONCE
Status: COMPLETED | OUTPATIENT
Start: 2017-10-30 | End: 2017-10-31

## 2017-10-30 RX ORDER — FUROSEMIDE 10 MG/ML
40 INJECTION INTRAMUSCULAR; INTRAVENOUS
Status: DISCONTINUED | OUTPATIENT
Start: 2017-10-30 | End: 2017-10-30

## 2017-10-30 RX ORDER — FUROSEMIDE 10 MG/ML
20 INJECTION INTRAMUSCULAR; INTRAVENOUS
Status: COMPLETED | OUTPATIENT
Start: 2017-10-30 | End: 2017-10-31

## 2017-10-30 NOTE — TELEPHONE ENCOUNTER
#[phone] Ramakrishna Hong with [facility] did assessment today and would like a verbal to see pt 2/week for 8 weeks.

## 2017-10-31 ENCOUNTER — APPOINTMENT (OUTPATIENT)
Dept: GENERAL RADIOLOGY | Age: 82
End: 2017-10-31
Attending: EMERGENCY MEDICINE
Payer: MEDICARE

## 2017-10-31 VITALS
HEART RATE: 80 BPM | RESPIRATION RATE: 16 BRPM | SYSTOLIC BLOOD PRESSURE: 130 MMHG | DIASTOLIC BLOOD PRESSURE: 52 MMHG | OXYGEN SATURATION: 98 %

## 2017-10-31 LAB
ALBUMIN SERPL-MCNC: 3 G/DL (ref 3.5–5)
ALBUMIN/GLOB SERPL: 0.7 {RATIO} (ref 1.1–2.2)
ALP SERPL-CCNC: 91 U/L (ref 45–117)
ALT SERPL-CCNC: 18 U/L (ref 12–78)
ANION GAP SERPL CALC-SCNC: 9 MMOL/L (ref 5–15)
APPEARANCE UR: ABNORMAL
APTT PPP: 40.4 SEC (ref 22.1–32.5)
AST SERPL-CCNC: 21 U/L (ref 15–37)
ATRIAL RATE: 81 BPM
BACTERIA URNS QL MICRO: ABNORMAL /HPF
BASOPHILS # BLD: 0 K/UL (ref 0–0.1)
BASOPHILS NFR BLD: 0 % (ref 0–1)
BILIRUB SERPL-MCNC: 0.8 MG/DL (ref 0.2–1)
BILIRUB UR QL: NEGATIVE
BNP SERPL-MCNC: 7412 PG/ML (ref 0–450)
BUN SERPL-MCNC: 26 MG/DL (ref 6–20)
BUN/CREAT SERPL: 27 (ref 12–20)
CALCIUM SERPL-MCNC: 8.2 MG/DL (ref 8.5–10.1)
CALCULATED P AXIS, ECG09: 75 DEGREES
CALCULATED R AXIS, ECG10: -51 DEGREES
CALCULATED T AXIS, ECG11: 100 DEGREES
CHLORIDE SERPL-SCNC: 101 MMOL/L (ref 97–108)
CK MB CFR SERPL CALC: NORMAL % (ref 0–2.5)
CK MB SERPL-MCNC: <1 NG/ML (ref 5–25)
CK SERPL-CCNC: 44 U/L (ref 26–192)
CO2 SERPL-SCNC: 26 MMOL/L (ref 21–32)
COLOR UR: ABNORMAL
CREAT SERPL-MCNC: 0.98 MG/DL (ref 0.55–1.02)
DIAGNOSIS, 93000: NORMAL
EOSINOPHIL # BLD: 0 K/UL (ref 0–0.4)
EOSINOPHIL NFR BLD: 0 % (ref 0–7)
EPITH CASTS URNS QL MICRO: ABNORMAL /LPF
ERYTHROCYTE [DISTWIDTH] IN BLOOD BY AUTOMATED COUNT: 15.8 % (ref 11.5–14.5)
GLOBULIN SER CALC-MCNC: 4.5 G/DL (ref 2–4)
GLUCOSE SERPL-MCNC: 113 MG/DL (ref 65–100)
GLUCOSE UR STRIP.AUTO-MCNC: NEGATIVE MG/DL
HCT VFR BLD AUTO: 26.2 % (ref 35–47)
HGB BLD-MCNC: 8.4 G/DL (ref 11.5–16)
HGB UR QL STRIP: ABNORMAL
INR PPP: 1.8 (ref 0.9–1.1)
KETONES UR QL STRIP.AUTO: NEGATIVE MG/DL
LEUKOCYTE ESTERASE UR QL STRIP.AUTO: ABNORMAL
LYMPHOCYTES # BLD: 1.1 K/UL (ref 0.8–3.5)
LYMPHOCYTES NFR BLD: 12 % (ref 12–49)
MCH RBC QN AUTO: 28.8 PG (ref 26–34)
MCHC RBC AUTO-ENTMCNC: 32.1 G/DL (ref 30–36.5)
MCV RBC AUTO: 89.7 FL (ref 80–99)
MONOCYTES # BLD: 0.6 K/UL (ref 0–1)
MONOCYTES NFR BLD: 7 % (ref 5–13)
NEUTS SEG # BLD: 7.4 K/UL (ref 1.8–8)
NEUTS SEG NFR BLD: 81 % (ref 32–75)
NITRITE UR QL STRIP.AUTO: NEGATIVE
P-R INTERVAL, ECG05: 168 MS
PH UR STRIP: 5.5 [PH] (ref 5–8)
PLATELET # BLD AUTO: 146 K/UL (ref 150–400)
POTASSIUM SERPL-SCNC: 4.2 MMOL/L (ref 3.5–5.1)
PROT SERPL-MCNC: 7.5 G/DL (ref 6.4–8.2)
PROT UR STRIP-MCNC: NEGATIVE MG/DL
PROTHROMBIN TIME: 18.9 SEC (ref 9–11.1)
Q-T INTERVAL, ECG07: 470 MS
QRS DURATION, ECG06: 156 MS
QTC CALCULATION (BEZET), ECG08: 545 MS
RBC # BLD AUTO: 2.92 M/UL (ref 3.8–5.2)
RBC #/AREA URNS HPF: ABNORMAL /HPF (ref 0–5)
SODIUM SERPL-SCNC: 136 MMOL/L (ref 136–145)
SP GR UR REFRACTOMETRY: 1.01 (ref 1–1.03)
THERAPEUTIC RANGE,PTTT: ABNORMAL SECS (ref 58–77)
TROPONIN I SERPL-MCNC: <0.04 NG/ML
UA: UC IF INDICATED,UAUC: ABNORMAL
UROBILINOGEN UR QL STRIP.AUTO: 0.2 EU/DL (ref 0.2–1)
VENTRICULAR RATE, ECG03: 81 BPM
WBC # BLD AUTO: 9.1 K/UL (ref 3.6–11)
WBC URNS QL MICRO: ABNORMAL /HPF (ref 0–4)

## 2017-10-31 PROCEDURE — 87186 SC STD MICRODIL/AGAR DIL: CPT | Performed by: EMERGENCY MEDICINE

## 2017-10-31 PROCEDURE — 74011250636 HC RX REV CODE- 250/636: Performed by: EMERGENCY MEDICINE

## 2017-10-31 PROCEDURE — 87077 CULTURE AEROBIC IDENTIFY: CPT | Performed by: EMERGENCY MEDICINE

## 2017-10-31 PROCEDURE — 96374 THER/PROPH/DIAG INJ IV PUSH: CPT

## 2017-10-31 PROCEDURE — 71020 XR CHEST PA LAT: CPT

## 2017-10-31 PROCEDURE — 87086 URINE CULTURE/COLONY COUNT: CPT | Performed by: EMERGENCY MEDICINE

## 2017-10-31 PROCEDURE — 94640 AIRWAY INHALATION TREATMENT: CPT

## 2017-10-31 PROCEDURE — 81001 URINALYSIS AUTO W/SCOPE: CPT | Performed by: EMERGENCY MEDICINE

## 2017-10-31 PROCEDURE — 74011000250 HC RX REV CODE- 250: Performed by: EMERGENCY MEDICINE

## 2017-10-31 RX ORDER — FUROSEMIDE 20 MG/1
20 TABLET ORAL DAILY
Qty: 15 TAB | Refills: 0 | Status: SHIPPED | OUTPATIENT
Start: 2017-10-31 | End: 2019-12-06 | Stop reason: DRUGHIGH

## 2017-10-31 RX ADMIN — FUROSEMIDE 20 MG: 10 INJECTION, SOLUTION INTRAMUSCULAR; INTRAVENOUS at 00:09

## 2017-10-31 RX ADMIN — IPRATROPIUM BROMIDE AND ALBUTEROL SULFATE 3 ML: .5; 3 SOLUTION RESPIRATORY (INHALATION) at 00:09

## 2017-10-31 NOTE — ED NOTES
Assumed care of pt from [place].YOLANDA. Pt resting quietly and in no acute distress at this time. VSS. Family at bedside. Call bell within reach.

## 2017-10-31 NOTE — ED NOTES
Bedside report given to __Alexus Allison RN______________ and transfer of care. Dr Santamaria Grew in to see patient & was advised patient up to bedside commode with assistance from daughter x 5 & has voided approximate total of 700 cc.

## 2017-10-31 NOTE — ED NOTES
Pt ambulated with steady gait using walker. O2 sat 96% on RA while at rest and decreased to 90-93% while ambulating.  Notified MD.

## 2017-10-31 NOTE — ED PROVIDER NOTES
[address]  EMERGENCY DEPARTMENT HISTORY AND PHYSICAL EXAM       Date of Service: 10/30/2017   Patient Name: Naldo Mayo   YOB: 1924  Medical Record Number: [id number]    History of Presenting Illness     No chief complaint on file. History Provided By:  patient and Daughter     Additional History:     Naldo Mayo is a 80 y.o. female, pmhx CAD, TIA, Atrial Fib, and Arrhythmia, who presents ambulatory to the ED c/o worsening shortness of breath upon laying down for bed tonight. Pt notes it feels like she is constricted by a \"band\" and the sxs are exacerbated when laying down. Pt's Daughter notes bilateral lower leg edema and a decreased appetite taody. She reports giving the pt half of a 20 mg Lasix with slight relief of the swelling. Of note, the pt had an pacemaker placed in her upper left chest on 10/23 and an ablation on 10/24 with Dr. Tnoy Villarreal. The Daughter notes she was discharged on 10/26 and has not needed to take her blood pressure medication since. She notes having a follow-up with pulmonology next week. She specifically denies any recent fever, chills, nausea, vomiting, diarrhea, abd pain, CP, lightheadedness, dizziness, numbness, weakness, tingling, HA, heart palpitations, urinary sxs, changes in BM, melena, hematochezia, cough, or congestion. PMHx: Significant for Cholelithiases, Colon cancer, Arthritis, CAD, TIA, Atrial Fib, Arrhythmia   PSHx: Significant for hysterectomy  Social Hx: -tobacco, -EtOH, -Illicit Drugs     There are no other complaints, changes, or physical findings at this time.      Primary Care Provider: Jazmin Gutierrez MD     Specialist:  Cardiology: Jenn Moreno MD    Past History     Past Medical History:   Past Medical History:   Diagnosis Date    Alopecia     Arrhythmia     atrial fib,?svt    Arthritis     [facility]rial fibrillation ([facility] [address]     Benign essential HTN     Benign neoplasm of vulva     CAD (coronary artery disease)     Cholelithiases     Colon cancer (HCC)     Constipation     Dyspareunia     Glaucoma     History of mixed connective tissue disease     Hx of viral pericarditis     Hyperactivity of bladder     Hypercholesterolemia     Ill-defined condition     macular degeneration    Insomnia     Macular degeneration     Neck pain     Psoriasis     Rectocele     Renal cyst     Seizure disorder (HCC)     [facility]ren's disease ([facility] [address]     Thrombophlebitis     TIA (transient ischemic attack)     Vaginal vault prolapse, posthysterectomy     Varicose veins         Past Surgical History:   Past Surgical History:   Procedure Laterality Date    HX BREAST LUMPECTOMY      left breast    HX CATARACT REMOVAL      HX COLECTOMY  1955    partial    HX HEMORRHOIDECTOMY      HX HYSTERECTOMY      HX SHOULDER ARTHROSCOPY      left        Family History:   Family History   Problem Relation Age of Onset    Cancer Father     Inflammatory Bowel Dz Mother     Heart Disease Brother     Diabetes Brother         Social History:   Social History   Substance Use Topics    Smoking status: Never Smoker    Smokeless tobacco: Never Used    Alcohol use No        Allergies: Allergies   Allergen Reactions    Codeine Nausea Only    Pcn [Penicillins] Unknown (comments)     Cant remember reaction as a child    Tetanus Vaccines And Toxoid Unknown (comments)     Does not take tetanus due to tb hx    Vibramycin [Doxycycline Calcium] Rash         Review of Systems   Review of Systems   Constitutional: Positive for appetite change. Negative for chills and fever. HENT: Negative for congestion, ear pain, rhinorrhea and sore throat. Respiratory: Positive for shortness of breath. Negative for cough. Cardiovascular: Positive for leg swelling. Negative for chest pain and palpitations. Gastrointestinal: Negative for abdominal pain, constipation, diarrhea, nausea and vomiting.         No melena  No hematochezia Endocrine: Negative for polyuria. Genitourinary: Negative for dysuria, frequency and hematuria. Neurological: Negative for dizziness, weakness, light-headedness, numbness and headaches. No tingling   All other systems reviewed and are negative. Physical Exam    General appearance - elderly, frail, and in no distress  Eyes - pupils equal and reactive, extraocular eye movements intact  ENT - mucous membranes moist, pharynx normal without lesions  Neck - supple, no significant adenopathy; non-tender to palpation  Chest - clear to auscultation, no wheezes, rales or rhonchi; non-tender to palpation; tachypnea and dyspnea- talking in 2-3 word sentences  Heart - normal rate and regular rhythm, S1 and S2 normal, no murmurs noted  Abdomen - soft, nontender, nondistended, no masses or organomegaly  Musculoskeletal - no joint tenderness, deformity or swelling; normal ROM  Extremities - peripheral pulses normal, trace BLE edema   Skin - normal coloration and turgor, no rashes; pacemake in left-upper chest, incision clean,dry and intact  Neurological - alert, oriented x3, normal speech, no focal findings or movement disorder noted  Written by Dalton Matias ED Scribe, as dictated by Tracie Ramirez MD      Provider Notes / MDM:     DDx: PNA, CHF, ACS, arrhythmia     Medical Decision Making   I am the first provider for this patient. I reviewed the vital signs, available nursing notes, past medical history, past surgical history, family history and social history. Old Medical Records: Old medical records. Nursing notes. ED Course:   11:23 PM   Initial assessment performed. The patients presenting problems have been discussed, and they are in agreement with the care plan formulated and outlined with them. I have encouraged them to ask questions as they arise throughout their visit. Progress Notes:     EKG interpretation: (Preliminary) 8463  Rhythm: paced rhythm .  Rate (approx.): 81bpm; Written by HEIDI Mckinney, as dictated by Carmen Arciniega MD    11:23 PM  Pulse Oximetry Analysis - Normal 92% on RA    Cardiac Monitor:   Rate: 80   Rhythm: Normal Sinus Rhythm      Consult Note:  1:33 AM  Aminata Vazquez MD spoke with Dr. Elmo Damon,  Specialty: Cardiology  Discussed pt's hx, disposition, and available diagnostic and imaging results. Reviewed care plans. Consultant agrees with plans as outlined. PROGRESS NOTE:  3:01 AM  Pt reevaluated. Per nursing staff, pt urinated about 350 ccs. Written by HEIDI Mckinneyibe, as dictated by Carmen Arciniega MD    Consult Note:  3:30 AM  Aminata Vazquez MD spoke with Dr. Fabian Kendall,  Specialty: Cardiology  Discussed pt's hx, disposition, and available diagnostic and imaging results. Reviewed care plans. Consultant agrees with plans as outlined. If family feels comfortable taking home the pt, she is okay for discharge. Recommends starting the pt on 20mg of Lasix a day. PROGRESS NOTE:  3:31 AM  Per nursing staff, pt was ambulated. Her stats were 96 while sitting, and 90-93 while walking. Pt noted some shortness of breath while walking, but it was tolerable. Written by HEIDI Mckinney, as dictated by Aminata Rasheed MD    Progress note:  3:47 AM  Pt noted to be feeling better , ready for discharge. Updated pt and/or family on all final lab and imaging findings. Will follow up as instructed. All questions have been answered, pt voiced understanding and agreement with plan. Specific return precautions provided as well as instructions to return to the ED should sx worsen at any time. Vital signs stable for discharge.    Written by HEIDI Mckinney, as dictated by Carmen Arciniega MD      Diagnostic Study Results:       Labs       Recent Results (from the past 12 hour(s))   EKG, 12 LEAD, INITIAL    Collection Time: 10/30/17 11:26 PM   Result Value Ref Range    Ventricular Rate 81 BPM    Atrial Rate 81 BPM    P-R Interval 168 ms    QRS Duration 156 ms    Q-T Interval 470 ms    QTC Calculation (Bezet) 545 ms    Calculated P Axis 75 degrees    Calculated R Axis -51 degrees    Calculated T Axis 100 degrees    Diagnosis       Atrial-sensed ventricular-paced rhythm  When compared with ECG of 24-OCT-2017 03:20,  Vent. rate has decreased BY  18 BPM     NT-PRO BNP    Collection Time: 10/30/17 11:55 PM   Result Value Ref Range    NT pro-BNP 7412 (H) 0 - 450 PG/ML   CBC WITH AUTOMATED DIFF    Collection Time: 10/30/17 11:55 PM   Result Value Ref Range    WBC 9.1 3.6 - 11.0 K/uL    RBC 2.92 (L) 3.80 - 5.20 M/uL    HGB 8.4 (L) 11.5 - 16.0 g/dL    HCT 26.2 (L) 35.0 - 47.0 %    MCV 89.7 80.0 - 99.0 FL    MCH 28.8 26.0 - 34.0 PG    MCHC 32.1 30.0 - 36.5 g/dL    RDW 15.8 (H) 11.5 - 14.5 %    PLATELET 499 (L) 626 - 400 K/uL    NEUTROPHILS 81 (H) 32 - 75 %    LYMPHOCYTES 12 12 - 49 %    MONOCYTES 7 5 - 13 %    EOSINOPHILS 0 0 - 7 %    BASOPHILS 0 0 - 1 %    ABS. NEUTROPHILS 7.4 1.8 - 8.0 K/UL    ABS. LYMPHOCYTES 1.1 0.8 - 3.5 K/UL    ABS. MONOCYTES 0.6 0.0 - 1.0 K/UL    ABS. EOSINOPHILS 0.0 0.0 - 0.4 K/UL    ABS. BASOPHILS 0.0 0.0 - 0.1 K/UL   METABOLIC PANEL, COMPREHENSIVE    Collection Time: 10/30/17 11:55 PM   Result Value Ref Range    Sodium 136 136 - 145 mmol/L    Potassium 4.2 3.5 - 5.1 mmol/L    Chloride 101 97 - 108 mmol/L    CO2 26 21 - 32 mmol/L    Anion gap 9 5 - 15 mmol/L    Glucose 113 (H) 65 - 100 mg/dL    BUN 26 (H) 6 - 20 MG/DL    Creatinine 0.98 0.55 - 1.02 MG/DL    BUN/Creatinine ratio 27 (H) 12 - 20      GFR est AA >60 >60 ml/min/1.73m2    GFR est non-AA 53 (L) >60 ml/min/1.73m2    Calcium 8.2 (L) 8.5 - 10.1 MG/DL    Bilirubin, total 0.8 0.2 - 1.0 MG/DL    ALT (SGPT) 18 12 - 78 U/L    AST (SGOT) 21 15 - 37 U/L    Alk.  phosphatase 91 45 - 117 U/L    Protein, total 7.5 6.4 - 8.2 g/dL    Albumin 3.0 (L) 3.5 - 5.0 g/dL    Globulin 4.5 (H) 2.0 - 4.0 g/dL    A-G Ratio 0.7 (L) 1.1 - 2.2     CK W/ CKMB & INDEX    Collection Time: 10/30/17 11:55 PM   Result Value Ref Range    CK 44 26 - 192 U/L    CK - MB <1.0 <3.6 NG/ML    CK-MB Index Cannot be calculated 0 - 2.5     TROPONIN I    Collection Time: 10/30/17 11:55 PM   Result Value Ref Range    Troponin-I, Qt. <0.04 <0.05 ng/mL   PROTHROMBIN TIME + INR    Collection Time: 10/30/17 11:55 PM   Result Value Ref Range    INR 1.8 (H) 0.9 - 1.1      Prothrombin time 18.9 (H) 9.0 - 11.1 sec   PTT    Collection Time: 10/30/17 11:55 PM   Result Value Ref Range    aPTT 40.4 (H) 22.1 - 32.5 sec    aPTT, therapeutic range     58.0 - 77.0 SECS   URINALYSIS W/ REFLEX CULTURE    Collection Time: 10/31/17 12:58 AM   Result Value Ref Range    Color YELLOW/STRAW      Appearance CLOUDY (A) CLEAR      Specific gravity 1.012 1.003 - 1.030      pH (UA) 5.5 5.0 - 8.0      Protein NEGATIVE  NEG mg/dL    Glucose NEGATIVE  NEG mg/dL    Ketone NEGATIVE  NEG mg/dL    Bilirubin NEGATIVE  NEG      Blood MODERATE (A) NEG      Urobilinogen 0.2 0.2 - 1.0 EU/dL    Nitrites NEGATIVE  NEG      Leukocyte Esterase SMALL (A) NEG      WBC 0-4 0 - 4 /hpf    RBC 5-10 0 - 5 /hpf    Epithelial cells FEW FEW /lpf    Bacteria 1+ (A) NEG /hpf    UA:UC IF INDICATED URINE CULTURE ORDERED (A) CNI           Radiology - The following have been ordered and reviewed:    XR CHEST PA LAT   Final Result        CT Results  (Last 48 hours)    None        CXR Results  (Last 48 hours)               10/31/17 0041  XR CHEST PA LAT Final result    Impression:  IMPRESSION: No change. Cardiomegaly, mild interstitial pulmonary edema, and   small pleural effusions. Narrative:  INDICATION: Chest Pain       EXAM: CXR 2 Views. COMPARISON: 10/23/2017. FINDINGS: Frontal and lateral views of the chest show no significant change. Heart is large. There is mild interstitial pulmonary edema and small pleural   effusions. Pacemaker is stable. There is no focal pneumonia, pneumothorax or   midline shift.                  Vital Signs - Reviewed the patient's vital signs. Patient Vitals for the past 12 hrs:   Pulse Resp BP SpO2   10/31/17 0315 80 16 130/52 98 %   10/31/17 0300 80 22 108/88 96 %   10/31/17 0245 82 16 122/56 99 %   10/31/17 0230 82 19 133/50 98 %   10/31/17 0215 80 24 120/48 96 %   10/31/17 0200 80 21 126/45 95 %   10/31/17 0145 80 23 123/48 97 %   10/31/17 0130 83 21 132/50 98 %   10/31/17 0115 80 21 122/48 97 %   10/31/17 0100 80 24 137/53 98 %   10/31/17 0045 86 19 115/52 94 %   10/31/17 0041 80 13 - 99 %   10/31/17 0039 - - 136/47 -   10/31/17 0030 80 21 128/57 100 %   10/31/17 0015 80 18 107/75 98 %   10/31/17 0001 80 24 136/55 98 %   10/30/17 2345 80 (!) 31 136/50 96 %   10/30/17 2333 86 12 - 92 %   10/30/17 2331 - - 134/62 -       Medications Given in the ED:    Medications   albuterol-ipratropium (DUO-NEB) 2.5 MG-0.5 MG/3 ML (3 mL Nebulization Given 10/31/17 0009)   furosemide (LASIX) injection 20 mg (20 mg IntraVENous Given 10/31/17 0009)         Diagnosis   Clinical Impression:   1. Acute on chronic congestive heart failure, unspecified congestive heart failure type [facility])         Plan:  1:   Follow-up Information     Follow up With Details Comments Contact Info    [facility] EMERGENCY DEPT   [address]  [address]   [address] [phone]    Tawni Rubinstein, MD Schedule an appointment as soon as possible for a visit  [address]  [address] [address] [phone]          2:   Current Discharge Medication List      START taking these medications    Details   furosemide (LASIX) 20 mg tablet Take 1 Tab by mouth daily. Qty: 15 Tab, Refills: 0           Return to ED if Worse    Disposition Note:    Discharge Note:  3:47 AM  The pt is ready for discharge. The pt's signs, symptoms, diagnosis, and discharge instructions have been discussed and pt has conveyed their understanding. The pt is to follow up as recommended or return to ER should their symptoms worsen. Plan has been discussed and pt is in agreement.             This note is prepared by Paty Wilder, acting as Scribe for MD Aminata Obrien MD : The scribe's documentation has been prepared under my direction and personally reviewed by me in its entirety. I confirm that the note above accurately reflects all work, treatment, procedures, and medical decision making performed by me. This note will not be viewable in [address]

## 2017-10-31 NOTE — ED NOTES
Patient has voided on bedside commode x 3 with note that she is feeling better.  Less coughing noted

## 2017-10-31 NOTE — DISCHARGE INSTRUCTIONS
Heart Failure: Care Instructions  Your Care Instructions    Heart failure occurs when your heart does not pump as much blood as the body needs. Failure does not mean that the heart has stopped pumping but rather that it is not pumping as well as it should. Over time, this causes fluid buildup in your lungs and other parts of your body. Fluid buildup can cause shortness of breath, fatigue, swollen ankles, and other problems. By taking medicines regularly, reducing sodium (salt) in your diet, checking your weight every day, and making lifestyle changes, you can feel better and live longer. Follow-up care is a key part of your treatment and safety. Be sure to make and go to all appointments, and call your doctor if you are having problems. It's also a good idea to know your test results and keep a list of the medicines you take. How can you care for yourself at home? Medicines  ? · Be safe with medicines. Take your medicines exactly as prescribed. Call your doctor if you think you are having a problem with your medicine. ? · Do not take any vitamins, over-the-counter medicine, or herbal products without talking to your doctor first. Abhi Givens not take ibuprofen (Advil or Motrin) and naproxen (Aleve) without talking to your doctor first. They could make your heart failure worse. ? · You may be taking some of the following medicine. ¨ Beta-blockers can slow heart rate, decrease blood pressure, and improve your condition. Taking a beta-blocker may lower your chance of needing to be hospitalized. ¨ Angiotensin-converting enzyme inhibitors (ACEIs) reduce the heart's workload, lower blood pressure, and reduce swelling. Taking an ACEI may lower your chance of needing to be hospitalized again. ¨ Angiotensin II receptor blockers (ARBs) work like ACEIs. Your doctor may prescribe them instead of ACEIs. ¨ Diuretics, also called water pills, reduce swelling.   ¨ Potassium supplements replace this important mineral, which is sometimes lost with diuretics. ¨ Aspirin and other blood thinners prevent blood clots, which can cause a stroke or heart attack. ? You will get more details on the specific medicines your doctor prescribes. Diet  ? · Your doctor may suggest that you limit sodium to 2,000 milligrams (mg) a day or less. That is less than 1 teaspoon of salt a day, including all the salt you eat in cooking or in packaged foods. People get most of their sodium from processed foods. Fast food and restaurant meals also tend to be very high in sodium. ? · Ask your doctor how much liquid you can drink each day. You may have to limit liquids. ?Weight  ? · Weigh yourself without clothing at the same time each day. Record your weight. Call your doctor if you have a sudden weight gain, such as more than 2 to 3 pounds in a day or 5 pounds in a week. (Your doctor may suggest a different range of weight gain.) A sudden weight gain may mean that your heart failure is getting worse. ? Activity level  ? · Start light exercise (if your doctor says it is okay). Even if you can only do a small amount, exercise will help you get stronger, have more energy, and manage your weight and your stress. Walking is an easy way to get exercise. Start out by walking a little more than you did before. Bit by bit, increase the amount you walk. ? · When you exercise, watch for signs that your heart is working too hard. You are pushing yourself too hard if you cannot talk while you are exercising. If you become short of breath or dizzy or have chest pain, stop, sit down, and rest.   ? · If you feel \"wiped out\" the day after you exercise, walk slower or for a shorter distance until you can work up to a better pace. ? · Get enough rest at night. Sleeping with 1 or 2 pillows under your upper body and head may help you breathe easier. ? Lifestyle changes  ? · Do not smoke. Smoking can make a heart condition worse.  If you need help quitting, talk to your doctor about stop-smoking programs and medicines. These can increase your chances of quitting for good. Quitting smoking may be the most important step you can take to protect your heart. ? · Limit alcohol to 2 drinks a day for men and 1 drink a day for women. Too much alcohol can cause health problems. ? · Avoid getting sick from colds and the flu. Get a pneumococcal vaccine shot. If you have had one before, ask your doctor whether you need another dose. Get a flu shot each year. If you must be around people with colds or the flu, wash your hands often. When should you call for help? Call 911 if you have symptoms of sudden heart failure such as:  ? · You have severe trouble breathing. ? · You cough up pink, foamy mucus. ? · You have a new irregular or rapid heartbeat. ?Call your doctor now or seek immediate medical care if:  ? · You have new or increased shortness of breath. ? · You are dizzy or lightheaded, or you feel like you may faint. ? · You have sudden weight gain, such as more than 2 to 3 pounds in a day or 5 pounds in a week. (Your doctor may suggest a different range of weight gain.)   ? · You have increased swelling in your legs, ankles, or feet. ? · You are suddenly so tired or weak that you cannot do your usual activities. ? Watch closely for changes in your health, and be sure to contact your doctor if you develop new symptoms. Where can you learn more? Go to http://zackery-jessica.info/. Enter Y336 in the search box to learn more about \"Heart Failure: Care Instructions. \"  Current as of: September 21, 2016  Content Version: 11.4  © 0005-4834 [employer]. Care instructions adapted under license by [employer] (which disclaims liability or warranty for this information).  If you have questions about a medical condition or this instruction, always ask your healthcare professional. [address]  any warranty or liability for your use of this information.

## 2017-11-01 NOTE — TELEPHONE ENCOUNTER
Karen//[facility] PT states he needs a call back in reference to getting a Verbal order for Physical Therapy. See Previous encounter from 10/30/17. Please call.  Thank you

## 2017-11-02 LAB
BACTERIA SPEC CULT: ABNORMAL
CC UR VC: ABNORMAL
SERVICE CMNT-IMP: ABNORMAL

## 2017-11-02 RX ORDER — CEPHALEXIN 500 MG/1
500 CAPSULE ORAL 4 TIMES DAILY
Qty: 28 CAP | Refills: 0 | Status: SHIPPED | OUTPATIENT
Start: 2017-11-02 | End: 2017-11-09

## 2017-11-02 RX ORDER — CEPHALEXIN 500 MG/1
500 CAPSULE ORAL 4 TIMES DAILY
Qty: 28 CAP | Refills: 0 | Status: SHIPPED | OUTPATIENT
Start: 2017-11-02 | End: 2017-11-02

## 2017-11-02 NOTE — TELEPHONE ENCOUNTER
Returned call to [place] with [facility] OT. Left detailed message advising ok for OT once weekly for 7 weeks.

## 2017-11-09 ENCOUNTER — HOSPITAL ENCOUNTER (OUTPATIENT)
Dept: LAB | Age: 82
Discharge: HOME | End: 2017-11-09
Payer: MEDICARE

## 2017-11-09 ENCOUNTER — OFFICE VISIT (OUTPATIENT)
Dept: INTERNAL MEDICINE CLINIC | Age: 82
End: 2017-11-09

## 2017-11-09 VITALS
HEART RATE: 87 BPM | TEMPERATURE: 97.9 F | WEIGHT: 137.8 LBS | SYSTOLIC BLOOD PRESSURE: 110 MMHG | BODY MASS INDEX: 24.41 KG/M2 | DIASTOLIC BLOOD PRESSURE: 66 MMHG | OXYGEN SATURATION: 99 % | RESPIRATION RATE: 18 BRPM | HEIGHT: 63 IN

## 2017-11-09 DIAGNOSIS — K62.5 RECTAL BLEEDING: Primary | ICD-10-CM

## 2017-11-09 DIAGNOSIS — D64.9 ANEMIA, UNSPECIFIED TYPE: ICD-10-CM

## 2017-11-09 DIAGNOSIS — R79.89 LOW VITAMIN D LEVEL: ICD-10-CM

## 2017-11-09 DIAGNOSIS — R79.89 ABNORMAL TSH: ICD-10-CM

## 2017-11-09 DIAGNOSIS — D53.1 MEGALOBLASTIC ANEMIA: ICD-10-CM

## 2017-11-09 DIAGNOSIS — I50.22 CHRONIC SYSTOLIC CONGESTIVE HEART FAILURE (HCC): ICD-10-CM

## 2017-11-09 PROCEDURE — 82306 VITAMIN D 25 HYDROXY: CPT

## 2017-11-09 PROCEDURE — 82607 VITAMIN B-12: CPT

## 2017-11-09 PROCEDURE — 84443 ASSAY THYROID STIM HORMONE: CPT

## 2017-11-09 PROCEDURE — 36415 COLL VENOUS BLD VENIPUNCTURE: CPT

## 2017-11-09 PROCEDURE — 85060 BLOOD SMEAR INTERPRETATION: CPT

## 2017-11-09 RX ORDER — CARVEDILOL 3.12 MG/1
3.12 TABLET ORAL AS NEEDED
COMMUNITY
Start: 2017-10-25 | End: 2019-12-06

## 2017-11-09 NOTE — LETTER
11/14/2017 1:55 PM 
 
Ms. Mingo Haynes [address] [facility] [address] Dear Mingo Haynes: 
 
Please find your most recent results below. Resulted Orders VITAMIN B12 Result Value Ref Range Vitamin B12 >2000 (H) 211 - 946 pg/mL Narrative Performed at:  [address]  [id number] : Mulu King MD, Phone:  [phone] VITAMIN D, 25 HYDROXY Result Value Ref Range VITAMIN D, 25-HYDROXY 91.7 30.0 - 100.0 ng/mL Comment:  
   Vitamin D deficiency has been defined by the [address] [place] [address] practice guideline as a 
level of serum 25-OH vitamin D less than 20 ng/mL (1,2). The Endocrine Society went on to further define vitamin D 
insufficiency as a level between 21 and 29 ng/mL (2). 1. IOM ([place] [employer]). 2010. Dietary reference 
   intakes for calcium and D. [address] [place] [address]: The 
   [employer]. 2. Rojas MF, Aby NC, Samantha YU, et al. 
   Evaluation, treatment, and prevention of vitamin D 
   deficiency: an Endocrine Society clinical practice 
   guideline. JCEM. 2011 Jul; 96(7):1911-30. Narrative Performed at:  [address]  [id number] : Mulu King MD, Phone:  [phone] TSH AND FREE T4 Result Value Ref Range TSH 4.340 0.450 - 4.500 uIU/mL T4, Free 1.69 0.82 - 1.77 ng/dL Narrative Performed at:  [address]  [id number] : Mulu King MD, Phone:  [phone] RECOMMENDATIONS: 
Vitamin B12 is excellent no need for additional supplementation Vitamin D is now normal 91 patient should take only 1000 units of vitamin D3 now Thyroid function is now normal  
 
Please call me if you have any questions: [phone] Sincerely, MD Risa

## 2017-11-09 NOTE — MR AVS SNAPSHOT
Visit Information Date & Time Provider Department Dept. Phone Encounter #  
 11/9/2017  1:15 PM Cindy De La O, [address] [phone] [id number] Follow-up Instructions Return in about 3 months (around 2/9/2018) for medicare wellness visit and CHF. Your Appointments 6/13/2018  8:00 AM  
Follow Up with Wallace Sánchez MD  
[facility]urology [facility] at [facility] Appt Note: f/u seizure,jrb 6/15/17; f/u seizure,jrb 6/15/17  
 [address] [facility], 
[id number], [address] [phone]  
[address] [facility], [address], [address] [place] [address] [address] [address] Upcoming Health Maintenance Date Due  
 EYE EXAM RETINAL OR DILATED Q1 1934 DTaP/Tdap/Td series (1 - Tdap) 11/16/1945 ZOSTER VACCINE AGE 60> 9/16/1984 GLAUCOMA SCREENING Q2Y 11/16/1989 OSTEOPOROSIS SCREENING (DEXA) 11/16/1989 MEDICARE YEARLY EXAM 11/16/1989 Pneumococcal 65+ Low/Medium Risk (2 of 2 - PCV13) 10/2/2015 LIPID PANEL Q1 7/7/2017 HEMOGLOBIN A1C Q6M 12/9/2017 FOOT EXAM Q1 11/9/2018 MICROALBUMIN Q1 11/9/2018 Allergies as of 11/9/2017  Review Complete On: 11/9/2017 By: Cindy De La O MD  
  
 Severity Noted Reaction Type Reactions Codeine  07/11/2012    Nausea Only Pcn [Penicillins]  07/11/2012    Unknown (comments) Cant remember reaction as a child Tetanus Vaccines And Toxoid  07/11/2012    Unknown (comments) Does not take tetanus due to tb hx  
 Vibramycin [Doxycycline Calcium]  07/11/2012    Rash Current Immunizations  Reviewed on 9/13/2012 Name Date Influenza Vaccine 10/2/2014 Influenza Vaccine (Quad) PF 10/19/2017  8:39 AM  
 Influenza Vaccine Split 9/13/2012 Pneumococcal Polysaccharide (PPSV-23) 10/2/2014 Not reviewed this visit You Were Diagnosed With   
  
 Codes Comments Rectal bleeding    -  Primary ICD-10-CM: K62.5 ICD-9-CM: 569.3 Chronic systolic congestive heart failure (HCC)     ICD-10-CM: I50.22 ICD-9-CM: 428.22, 428.0 Anemia, unspecified type     ICD-10-CM: D64.9 ICD-9-CM: 862. 9 Abnormal TSH     ICD-10-CM: R94.6 ICD-9-CM: 790.6 Low vitamin D level     ICD-10-CM: E55.9 ICD-9-CM: 268.9 Megaloblastic anemia     ICD-10-CM: D53.1 ICD-9-CM: 518. 9 Vitals BP Pulse Temp Resp Height(growth percentile) Weight(growth percentile) 110/66 (BP 1 Location: Right arm, BP Patient Position: Sitting) 87 97.9 °F (36.6 °C) (Oral) 18 5' 3\" (1.6 m) 137 lb 12.8 oz (62.5 kg) SpO2 BMI OB Status Smoking Status 99% 24.41 kg/m2 Hysterectomy Never Smoker BMI and BSA Data Body Mass Index Body Surface Area  
 24.41 kg/m 2 1.67 m 2 Preferred Pharmacy Pharmacy Name [facility] [address] [place], [place] - [address] [place] [address] [phone] Your Updated Medication List  
  
   
This list is accurate as of: 11/9/17  2:07 PM.  Always use your most recent med list.  
  
  
  
  
 AMITRIPTYLINE PO Take 10-15 mg by mouth nightly. carvedilol 3.125 mg tablet Commonly known as:  COREG  
  
 cephALEXin 500 mg capsule Commonly known as:  Nickola Newer Take 1 Cap by mouth four (4) times daily for 7 days. * COUMADIN 2.5 mg tablet Generic drug:  warfarin Take 2.5 mg by mouth three (3) days a week. Patient's medication list states that she takes 2.5 mg Monday Wednesday Friday, and 5 mg Sunday Saturday Tuesday and Thursday. * COUMADIN 2.5 mg tablet Generic drug:  warfarin Take 5 mg by mouth four (4) days a week. Patient's medication list states that she takes 2.5 mg Monday Wednesday Friday, and 5 mg Sunday Saturday Tuesday and Thursday. EYE VITAMIN AND MINERALS PO Take 1 Tab by mouth two (2) times a day. furosemide 20 mg tablet Commonly known as:  LASIX Take 1 Tab by mouth daily. Iron 325 mg (65 mg iron) tablet Generic drug:  ferrous sulfate Take 325 mg by mouth two (2) times a day. latanoprost 0.005 % ophthalmic solution Commonly known as:  Richardine Solum Administer 1 Drop to both eyes nightly. levETIRAcetam 500 mg tablet Commonly known as:  KEPPRA TAKE ONE TABLET BY MOUTH TWICE DAILY. PATIENT TO KEEP 6/15/16 FOR FURTHER FILLS  
  
 STOOL SOFTENER PO Take 100 mg by mouth daily. TYLENOL 325 mg tablet Generic drug:  acetaminophen Take 162.5 mg by mouth nightly. Patient takes 1/2 of a 325 mg tablet every evening at bedtime. * Notice: This list has 2 medication(s) that are the same as other medications prescribed for you. Read the directions carefully, and ask your doctor or other care provider to review them with you. We Performed the Following PATHOLOGIST REVIEW SMEARS [JCF4787 Custom] REFERRAL TO GASTROENTEROLOGY [VWG95 Custom] Comments:  
 Please evaluate patient for rectal bleeding TSH AND FREE T4 [94760 CPT(R)] VITAMIN B12 [id number] CPT(R)] VITAMIN D, 25 HYDROXY [id number] CPT(R)] Follow-up Instructions Return in about 3 months (around 2/9/2018) for medicare wellness visit and CHF. Referral Information Referral ID Referred By Referred To  
  
 [id number] [facility], [address] [place] [address] [facility] [address] [place], [address] [facility] Visits Status Start Date End Date 1 New Request 11/9/17 11/9/18 If your referral has a status of pending review or denied, additional information will be sent to support the outcome of this decision. Patient Instructions Referral to gastroenterology for rectal bleeding Discuss with Dr Nick Campos if plans to continue anticoagulation after ablation long term Introducing [facility]! Dear Lcuio Bronson: Thank you for requesting a [employer] account. Our records indicate that you already have an active [employer] account.   You can access your account anytime at [email or web address] Did you know that you can access your hospital and ER discharge instructions at any time in [employer]? You can also review all of your test results from your hospital stay or ER visit. Additional Information If you have questions, please visit the Frequently Asked Questions section of the [employer] website at [email or web address] Remember, [employer] is NOT to be used for urgent needs. For medical emergencies, dial 911. Now available from your iPhone and Android! Please provide this summary of care documentation to your next provider. Your primary care clinician is listed as WINNIE  LISET Bacon [place] Mary. If you have any questions after today's visit, please call [phone].

## 2017-11-09 NOTE — PROGRESS NOTES
CC: Hospital Follow Up (10/30/17 SOB) and Rectal Bleeding (has been going on for a while , has gotten worse over the last few weeks)      HPI:    She is a 80 y.o. female who presents for evaluation of of chronic medical problems. Ms Kate Gomez hs a hx of atrial fibrillation, seizure disorder, colon cancer status post resection many years ago, recent L1 fracture in June 2017 with hospitalization in August with afib with RVR and underwent [facility] and amiodarone dose was increased. 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 Meridith Koyanagi. Patient still having chronic cough but breathing is much better. No hypoxia on exam.     Congestive heart failure EF 20% in October 2017 then had  Stress test which showed  \"IMPRESSION: No ischemia demonstrated. No infarct visible. LVEF greater than 65%. \"  Doing 20mg twice a day of lasix. On coreg only if BP goes above 140  Overall better       Urine with E coli on 10/31 - on keflex for UTI       Rectal bleeding: reports hemorrhoids- has difficulty with getting herself clean and removing stool and bleeding. However she has a remote hx of colorectal cancer which is concerning to have bleeding. Labs are noteworthy for anemia. She is not straining with BMs. She is mostly incontinent of stool. Daugther thinks she is wiping too much/ no abdominal pain. She also has a hx of rectocele  On Coumadin with rectal bleeding - Dr Mirela Weathers is aware of anemia.   ROS:  10 systems reviewed and negative other than HPI     Past Medical History:   Diagnosis Date    Alopecia     Arrhythmia     atrial fib,?svt    Arthritis     Atrial fibrillation (HCC)     Benign essential HTN     Benign neoplasm of vulva     CAD (coronary artery disease)     Cholelithiases     [facility] cancer ([facility] [address]     Constipation     Dyspareunia     Glaucoma     History of mixed connective tissue disease     Hx of viral pericarditis     Hyperactivity of bladder     Hypercholesterolemia     Ill-defined condition     macular degeneration    Insomnia     Macular degeneration     Neck pain     Psoriasis     Rectocele     Renal cyst     Seizure disorder (HCC)     Sjogren's disease ([facility] [address]     Thrombophlebitis     TIA (transient ischemic attack)     Vaginal vault prolapse, posthysterectomy     Varicose veins        Current Outpatient Prescriptions on File Prior to Visit   Medication Sig Dispense Refill    [] cephALEXin (KEFLEX) 500 mg capsule Take 1 Cap by mouth four (4) times daily for 7 days. 28 Cap 0    furosemide (LASIX) 20 mg tablet Take 1 Tab by mouth daily. 15 Tab 0    warfarin (COUMADIN) 2.5 mg tablet Take 2.5 mg by mouth three (3) days a week. Patient's medication list states that she takes 2.5 mg , and 5 mg  and Thursday.  warfarin (COUMADIN) 2.5 mg tablet Take 5 mg by mouth four (4) days a week. Patient's medication list states that she takes 2.5 mg , and 5 mg  and Thursday.  VIT A/C/E AC/ZNOX/CUPRIC OXIDE (EYE VITAMIN AND MINERALS PO) Take 1 Tab by mouth two (2) times a day.  ferrous sulfate (IRON) 325 mg (65 mg iron) tablet Take 325 mg by mouth two (2) times a day.  acetaminophen (TYLENOL) 325 mg tablet Take 162.5 mg by mouth nightly. Patient takes 1/2 of a 325 mg tablet every evening at bedtime.  DOCUSATE CALCIUM (STOOL SOFTENER PO) Take 100 mg by mouth daily.  levETIRAcetam (KEPPRA) 500 mg tablet TAKE ONE TABLET BY MOUTH TWICE DAILY. PATIENT TO KEEP 6/15/16 FOR FURTHER FILLS 180 Tab 5    latanoprost (XALATAN) 0.005 % ophthalmic solution Administer 1 Drop to both eyes nightly.  AMITRIPTYLINE HCL (AMITRIPTYLINE PO) Take 10-15 mg by mouth nightly.        No current facility-administered medications on file prior to visit. Past Surgical History:   Procedure Laterality Date    HX BREAST LUMPECTOMY      left breast    HX CATARACT REMOVAL      HX COLECTOMY  1955    partial    HX HEMORRHOIDECTOMY      HX HYSTERECTOMY      HX SHOULDER ARTHROSCOPY      left       Family History   Problem Relation Age of Onset    Cancer Father     Inflammatory Bowel Dz Mother     Heart Disease Brother     Diabetes Brother      Reviewed and no changes     Social History     Social History    Marital status:      Spouse name: N/A    Number of children: N/A    Years of education: N/A     Occupational History    Not on file.      Social History Main Topics    Smoking status: Never Smoker    Smokeless tobacco: Never Used    Alcohol use No    Drug use: No    Sexual activity: No     Other Topics Concern    Not on file     Social History Narrative            Visit Vitals    /66 (BP 1 Location: Right arm, BP Patient Position: Sitting)    Pulse 87    Temp 97.9 °F (36.6 °C) (Oral)    Resp 18    Ht 5' 3\" (1.6 m)    Wt 137 lb 12.8 oz (62.5 kg)    SpO2 99%    BMI 24.41 kg/m2       Physical Examination:   General - Frail appearing female , not on oxygen  HEENT - PERRL, TM no erythema/opacification, normal nasal turbinates, oropharynx no erythema or exudate, MMM  Neck - supple, no bruits, no TMG, no LAD  Pulm - clear to auscultation bilaterally  Cardio - RRR, normal S1 S2, no murmur gallops or rubs  Abd - soft, nontender, no masses, no HSM  Rectal exam shows rectal prolapse, no external hemorrhoids, no bleeding noted ( patient had brown stool)  Extrem - trace edema B/L, +2 distal pulses  Psych - normal affect, appropriate mood    Lab Results   Component Value Date/Time    WBC 9.1 10/30/2017 11:55 PM    HGB 8.4 10/30/2017 11:55 PM    HCT 26.2 10/30/2017 11:55 PM    PLATELET 886 31/23/6066 11:55 PM    MCV 89.7 10/30/2017 11:55 PM     Lab Results   Component Value Date/Time Sodium 136 10/30/2017 11:55 PM    Potassium 4.2 10/30/2017 11:55 PM    Chloride 101 10/30/2017 11:55 PM    CO2 26 10/30/2017 11:55 PM    Anion gap 9 10/30/2017 11:55 PM    Glucose 113 10/30/2017 11:55 PM    BUN 26 10/30/2017 11:55 PM    Creatinine 0.98 10/30/2017 11:55 PM    BUN/Creatinine ratio 27 10/30/2017 11:55 PM    GFR est AA >60 10/30/2017 11:55 PM    GFR est non-AA 53 10/30/2017 11:55 PM    Calcium 8.2 10/30/2017 11:55 PM     Lab Results   Component Value Date/Time    Cholesterol, total 196 09/12/2012 12:00 AM    HDL Cholesterol 64 09/12/2012 12:00 AM    LDL, calculated 104 09/12/2012 12:00 AM    VLDL, calculated 28 09/12/2012 12:00 AM    Triglyceride 142 09/12/2012 12:00 AM     Lab Results   Component Value Date/Time    TSH 4.340 11/09/2017 02:13 PM     No results found for: PSA, PSA2, PSAR1, Glass Anger, [id number], [id number], PSALT  Lab Results   Component Value Date/Time    Hemoglobin A1c 5.6 06/09/2017 09:07 AM     Lab Results   Component Value Date/Time    VITAMIN D, 25-HYDROXY 91.7 11/09/2017 02:13 PM       Lab Results   Component Value Date/Time    ALT (SGPT) 18 10/30/2017 11:55 PM    AST (SGOT) 21 10/30/2017 11:55 PM    Alk. phosphatase 91 10/30/2017 11:55 PM    Bilirubin, total 0.8 10/30/2017 11:55 PM           Assessment/Plan:    1. Rectal bleeding  - patient has a hx of rectocele, hemorrhoids and remote hx of colon cancer. Hemoglobin is 8.4. I am referring to GI for further evaluation.   - Dr Richard Pathak is aware of rectal bleeding and anemia. Discussed with patient that if bleeding becomes more pronounced she must go to ER   - REFERRAL TO GASTROENTEROLOGY    2. Chronic systolic congestive heart failure (HCC)  - EF 20% on TTE while in the hospital and nuclear stress test showed normal EF   - taking lasix per Dr Richard Pathak    3. Afib: she had an av michael ablation and advised to continue on coumadin.  I personally discussed with Dr Richard Pathak and he advised she must continue on coumadin long term  - no longer on amioradone    4. Anemia, unspecified type/ iron level checked and normal /ferritin elevated  - VITAMIN B12 normal  - PATHOLOGIST REVIEW SMEARS    5 Abnormal TSH  - TSH AND FREE T4 are now normal    5. Low vitamin D level  - VITAMIN D, 25 HYDROXY    6. CKD: followed by Dr Oakes Round       Follow-up Disposition:  Return in about 3 months (around 2/9/2018) for medicare wellness visit and CHF.     MD Risa

## 2017-11-09 NOTE — PROGRESS NOTES
Chief Complaint   Patient presents with   [facility] Follow Up     10/30/17 SOB    Rectal Bleeding     has been going on for a while , has gotten worse over the last few weeks     1. Have you been to the ER, urgent care clinic since your last visit? Hospitalized since your last visit? Yes When: 10/30/17 Where: [facility] Reason for visit: SOB    2. Have you seen or consulted any other health care providers outside of the [address] [place] [address] since your last visit? Include any pap smears or colon screening.  No

## 2017-11-10 LAB
25(OH)D3+25(OH)D2 SERPL-MCNC: 91.7 NG/ML (ref 30–100)
T4 FREE SERPL-MCNC: 1.69 NG/DL (ref 0.82–1.77)
TSH SERPL DL<=0.005 MIU/L-ACNC: 4.34 UIU/ML (ref 0.45–4.5)
VIT B12 SERPL-MCNC: >2000 PG/ML (ref 211–946)

## 2017-11-10 NOTE — PROGRESS NOTES
Vitamin B12 is excellent no need for additional supplementation   Lab letter or call  Vitamin D is now normal 91 patient should take only 1000 units of vitamin   D3 now   Thyroid function is now normal

## 2017-11-11 ENCOUNTER — HOSPITAL ENCOUNTER (OUTPATIENT)
Dept: GENERAL RADIOLOGY | Age: 82
Discharge: HOME | End: 2017-11-11
Payer: MEDICARE

## 2017-11-11 DIAGNOSIS — R06.02 SOB (SHORTNESS OF BREATH) ON EXERTION: ICD-10-CM

## 2017-11-11 PROCEDURE — 71020 XR CHEST PA LAT: CPT

## 2017-11-14 ENCOUNTER — PATIENT OUTREACH (OUTPATIENT)
Dept: INTERNAL MEDICINE CLINIC | Age: 82
End: 2017-11-14

## 2017-11-14 NOTE — PROGRESS NOTES
[address] [facility] [address] - JESSE - 11/14/2017  This note will not be viewable in [address] Attempted contact with patient and family for routine AQUILINO f/u. Message left with purpose of call and contact information for this NN.

## 2017-11-18 LAB
PATH INTERP BLD-IMP: ABNORMAL
PATH REV BLD -IMP: ABNORMAL
PATH REV BLD -IMP: NORMAL
PATH REV BLD -IMP: NORMAL
PATHOLOGIST NAME: ABNORMAL

## 2017-11-20 ENCOUNTER — HOSPITAL ENCOUNTER (OUTPATIENT)
Dept: INFUSION THERAPY | Age: 82
Discharge: HOME | End: 2017-11-20
Payer: MEDICARE

## 2017-11-20 VITALS
HEART RATE: 81 BPM | RESPIRATION RATE: 18 BRPM | SYSTOLIC BLOOD PRESSURE: 142 MMHG | DIASTOLIC BLOOD PRESSURE: 74 MMHG | TEMPERATURE: 98.7 F

## 2017-11-20 PROCEDURE — 74011250636 HC RX REV CODE- 250/636: Performed by: INTERNAL MEDICINE

## 2017-11-20 PROCEDURE — 96374 THER/PROPH/DIAG INJ IV PUSH: CPT

## 2017-11-20 RX ORDER — SODIUM CHLORIDE 0.9 % (FLUSH) 0.9 %
10-40 SYRINGE (ML) INJECTION AS NEEDED
Status: DISCONTINUED | OUTPATIENT
Start: 2017-11-20 | End: 2017-11-24 | Stop reason: HOSPADM

## 2017-11-20 RX ADMIN — FERRIC CARBOXYMALTOSE INJECTION 750 MG: 50 INJECTION, SOLUTION INTRAVENOUS at 15:32

## 2017-11-20 NOTE — PROGRESS NOTES
Outpatient Infusion Center Progress Note    1430 Pt admit to [facility] for Injectafer 1 of 2 wheelchair bound in stable condition with caregiver. Assessment completed. No new concerns voiced. PIV started in left hand with positive blood return. Medication ordered. Medication discussed with pt, pt verbalized understanding. Visit Vitals    /74    Pulse 81    Temp 98.7 °F (37.1 °C)    Resp 18    Breastfeeding No       Medications:  Injectafer    1625 Pt tolerated treatment well. Pt monitored for 30 minutes post infusion without any s/s of reaction. PIV removed prior to discharge. D/c home ambulatory in no distress. Pt aware of next appointment scheduled for 11/27/17.

## 2017-11-27 ENCOUNTER — HOSPITAL ENCOUNTER (OUTPATIENT)
Dept: INFUSION THERAPY | Age: 82
Discharge: HOME | End: 2017-11-27
Payer: MEDICARE

## 2017-11-27 VITALS
OXYGEN SATURATION: 95 % | DIASTOLIC BLOOD PRESSURE: 60 MMHG | HEART RATE: 79 BPM | RESPIRATION RATE: 18 BRPM | SYSTOLIC BLOOD PRESSURE: 120 MMHG | TEMPERATURE: 98.1 F

## 2017-11-27 PROCEDURE — 96374 THER/PROPH/DIAG INJ IV PUSH: CPT

## 2017-11-27 PROCEDURE — 96365 THER/PROPH/DIAG IV INF INIT: CPT

## 2017-11-27 PROCEDURE — 74011250636 HC RX REV CODE- 250/636: Performed by: INTERNAL MEDICINE

## 2017-11-27 RX ORDER — SODIUM CHLORIDE 9 MG/ML
25 INJECTION, SOLUTION INTRAVENOUS AS NEEDED
Status: DISPENSED | OUTPATIENT
Start: 2017-11-27 | End: 2017-11-28

## 2017-11-27 RX ORDER — SODIUM CHLORIDE 0.9 % (FLUSH) 0.9 %
10-40 SYRINGE (ML) INJECTION AS NEEDED
Status: ACTIVE | OUTPATIENT
Start: 2017-11-27 | End: 2017-11-28

## 2017-11-27 RX ADMIN — Medication 10 ML: at 11:25

## 2017-11-27 RX ADMIN — FERRIC CARBOXYMALTOSE INJECTION 750 MG: 50 INJECTION, SOLUTION INTRAVENOUS at 11:35

## 2017-11-27 RX ADMIN — Medication 10 ML: at 12:20

## 2017-11-27 RX ADMIN — SODIUM CHLORIDE 25 ML/HR: 900 INJECTION, SOLUTION INTRAVENOUS at 11:20

## 2017-11-27 NOTE — PROGRESS NOTES
Outpatient Infusion Center Progress Note    1100- Pt admit to [facility] for Injectafer Dose 2 in wheelchair in stable condition. Assessment completed. No new concerns voiced other than pt states she has a UTI and is on Keflex. PIV established in L arm with positive blood return noted. Visit Vitals    /61 (BP 1 Location: Left arm, BP Patient Position: Sitting)    Pulse 82    Temp 98.1 °F (36.7 °C)    Resp 18    SpO2 95%     Medications:  NS KVO  Injectafer 750 mg IV    Visit Vitals    /60    Pulse 79    Temp 98.1 °F (36.7 °C)    Resp 18    SpO2 95%       Pt monitored for 30 mins post treatment. Pt tolerated well, no s/s of adverse reaction noted. PIV flushed and discontinued. Site wrapped in gauze and coban. 1220- D/C home in wheelchair in no distress. No further [facility] appts scheduled at this time.

## 2017-12-05 ENCOUNTER — PATIENT OUTREACH (OUTPATIENT)
Dept: INTERNAL MEDICINE CLINIC | Age: 82
End: 2017-12-05

## 2017-12-05 NOTE — PROGRESS NOTES
[address] - AFD - 12/5/2017  This note will not be viewable in [address] [facility] Hospitalization       Patient attended [facility] appointment with Dr. Liyah Ramirez on 11/9/17. To the best of this NN's knowledge, this patient had no additional ED visits or Hospital Admissions during 30 day AQUILINO period following admission to [facility]. AQUILINO period has ended. Post-Hospitalization Episode Resolved. Patient has NN contact information if any questions/concerns arise in the future.

## 2017-12-07 ENCOUNTER — TELEPHONE (OUTPATIENT)
Dept: INTERNAL MEDICINE CLINIC | Age: 82
End: 2017-12-07

## 2017-12-07 NOTE — TELEPHONE ENCOUNTER
Eileen//[facility] needs to get patient's most recent office notes faxed over. Please clalif any questions.  Thank you        Fax# is [phone]

## 2017-12-08 ENCOUNTER — TELEPHONE (OUTPATIENT)
Dept: INTERNAL MEDICINE CLINIC | Age: 82
End: 2017-12-08

## 2017-12-08 DIAGNOSIS — N30.00 ACUTE CYSTITIS WITHOUT HEMATURIA: Primary | ICD-10-CM

## 2017-12-08 RX ORDER — CEFUROXIME AXETIL 500 MG/1
500 TABLET ORAL 2 TIMES DAILY
Qty: 28 TAB | Refills: 0 | Status: SHIPPED | OUTPATIENT
Start: 2017-12-08 | End: 2018-07-19

## 2017-12-08 NOTE — TELEPHONE ENCOUNTER
Patients daughter called stated that the patient has a UTI again. Wants Dr. Arnold Whitlock to call a medication in for the patient. Daughter stated that she had tested the urine and was sending the culture out. We will ask daughter to have results sent to our office. In the future we will need patient to come in to the office so we can see her for the uti's.

## 2017-12-08 NOTE — TELEPHONE ENCOUNTER
Prescribed antibiotics at [address] [facility] in [place]. Patient is to take it for 2 weeks. Make appointment when she is completing antibiotics and I will repeat a urine.

## 2017-12-13 ENCOUNTER — TELEPHONE (OUTPATIENT)
Dept: INTERNAL MEDICINE CLINIC | Age: 82
End: 2017-12-13

## 2017-12-13 DIAGNOSIS — N30.00 ACUTE CYSTITIS WITHOUT HEMATURIA: Primary | ICD-10-CM

## 2017-12-13 RX ORDER — PHENAZOPYRIDINE HYDROCHLORIDE 100 MG/1
100 TABLET, FILM COATED ORAL
Qty: 9 TAB | Refills: 0 | Status: SHIPPED | OUTPATIENT
Start: 2017-12-13 | End: 2017-12-16

## 2017-12-13 NOTE — TELEPHONE ENCOUNTER
[facility] is requesting a verbal order OT to work with pt 1/wk for 8 weeks.  Please call #[phone]

## 2017-12-13 NOTE — TELEPHONE ENCOUNTER
Patient's daughter, Kristie Calero, states she needs a call back in reference to getting something called in for urinary pain/burning. Please call to discuss.  Thank you

## 2017-12-13 NOTE — TELEPHONE ENCOUNTER
Called patient. Two patient identifiers verified. Patient complaint of dysuria since yesterday morning. Took 3 pyridium yesterday and 1 this morning. Has been on Ceftin x 5-6 days. Ingrid Leonch is requesting rx for pyridium. Awaiting culture results.

## 2017-12-20 ENCOUNTER — TELEPHONE (OUTPATIENT)
Dept: INTERNAL MEDICINE CLINIC | Age: 82
End: 2017-12-20

## 2017-12-21 ENCOUNTER — TELEPHONE (OUTPATIENT)
Dept: INTERNAL MEDICINE CLINIC | Age: 82
End: 2017-12-21

## 2017-12-21 NOTE — TELEPHONE ENCOUNTER
#[phone] [facility] requesting additional PT    1/wk for 1/wk (due to holiday)    2/wk for 4/wk    1/wk for 2/wk    Call with verbal

## 2017-12-21 NOTE — TELEPHONE ENCOUNTER
Returned call to [place]. Left detailed message advising ok for additional PT per Dr. Laura Ghosh. Requested return call with any questions or concerns.

## 2018-01-03 ENCOUNTER — TELEPHONE (OUTPATIENT)
Dept: INTERNAL MEDICINE CLINIC | Age: 83
End: 2018-01-03

## 2018-01-03 NOTE — TELEPHONE ENCOUNTER
Shermanbret Landeros wanted you to know that pt has another UTI and went to an urgent care and is being treated. This is why she canceled appt for tomorrow.     Pt does have an upcoming appt with Dr. Victoria Coleman, [address] [place] [address] urologist.

## 2018-01-11 ENCOUNTER — HOSPITAL ENCOUNTER (OUTPATIENT)
Dept: LAB | Age: 83
Discharge: HOME | End: 2018-01-11
Payer: MEDICARE

## 2018-01-11 ENCOUNTER — TELEPHONE (OUTPATIENT)
Dept: INTERNAL MEDICINE CLINIC | Age: 83
End: 2018-01-11

## 2018-01-11 DIAGNOSIS — N30.00 ACUTE CYSTITIS WITHOUT HEMATURIA: Primary | ICD-10-CM

## 2018-01-11 PROCEDURE — 87186 SC STD MICRODIL/AGAR DIL: CPT

## 2018-01-11 PROCEDURE — 87086 URINE CULTURE/COLONY COUNT: CPT

## 2018-01-11 PROCEDURE — 87088 URINE BACTERIA CULTURE: CPT

## 2018-01-11 NOTE — TELEPHONE ENCOUNTER
Called patient's daughter, Isidra Arroyo on [employer]. Two patient identifiers verified. Patient was seen at [address] [place] [address] urgent care in [place] about 2 weeks ago and treated for UTI. Treated with abx. Patient is now symptomatic again. Dark cloudy urine and dysuria. Isidra Arroyo has collected a urine specimen from patient, and started on Macrobid rx they had at home. Urologist appt on 1/18/18. Patient also being treated for bronchitis with doxycycline. Negative chest xray x2 per patient's daughter. Isidra Arroyo requests order for urine cx be faxed to her for sample to be sent to [address] [place] [address].  Fax [phone]. Order entered and faxed as requested.

## 2018-01-11 NOTE — TELEPHONE ENCOUNTER
Pt daughter Esther West would like a lab slip faxed to her at [phone]. Pt has another UTI and Liliam Santiago has collected the urine sample. She needs to have it sent off. Liliam Santiago can be reached at [phone].        Message received & copied from [facility]

## 2018-01-13 LAB
BACTERIA UR CULT: ABNORMAL
SPECIMEN STATUS REPORT, ROLRST: NORMAL

## 2018-01-14 NOTE — TELEPHONE ENCOUNTER
Urine is growing E coli Resistant to cipro and levofloxacin   Sensitive to macrobid and bactrim   What antibiotic is patient on??

## 2018-01-18 ENCOUNTER — TELEPHONE (OUTPATIENT)
Dept: INTERNAL MEDICINE CLINIC | Age: 83
End: 2018-01-18

## 2018-02-20 ENCOUNTER — TELEPHONE (OUTPATIENT)
Dept: INTERNAL MEDICINE CLINIC | Age: 83
End: 2018-02-20

## 2018-02-20 NOTE — TELEPHONE ENCOUNTER
#[phone] [facility] PT states he is extending pt's PT  2/week  2 wk  1/week 2 wk     Terressa Bending is asking for a call back. Thanks.

## 2018-03-29 ENCOUNTER — HOSPITAL ENCOUNTER (OUTPATIENT)
Dept: GENERAL RADIOLOGY | Age: 83
Discharge: HOME | End: 2018-03-29
Payer: MEDICARE

## 2018-03-29 DIAGNOSIS — I48.20 CHRONIC ATRIAL FIBRILLATION (HCC): ICD-10-CM

## 2018-03-29 DIAGNOSIS — R06.02 SHORTNESS OF BREATH: ICD-10-CM

## 2018-03-29 PROCEDURE — 71046 X-RAY EXAM CHEST 2 VIEWS: CPT

## 2018-04-16 ENCOUNTER — OFFICE VISIT (OUTPATIENT)
Dept: INTERNAL MEDICINE CLINIC | Age: 83
End: 2018-04-16

## 2018-04-16 VITALS
HEIGHT: 63 IN | SYSTOLIC BLOOD PRESSURE: 117 MMHG | WEIGHT: 127.8 LBS | TEMPERATURE: 97.4 F | OXYGEN SATURATION: 96 % | HEART RATE: 88 BPM | BODY MASS INDEX: 22.64 KG/M2 | DIASTOLIC BLOOD PRESSURE: 70 MMHG | RESPIRATION RATE: 18 BRPM

## 2018-04-16 DIAGNOSIS — R09.02 HYPOXIA: ICD-10-CM

## 2018-04-16 DIAGNOSIS — Z23 ENCOUNTER FOR IMMUNIZATION: ICD-10-CM

## 2018-04-16 DIAGNOSIS — I50.9 CONGESTIVE HEART FAILURE, UNSPECIFIED HF CHRONICITY, UNSPECIFIED HEART FAILURE TYPE (HCC): ICD-10-CM

## 2018-04-16 DIAGNOSIS — Z00.00 MEDICARE ANNUAL WELLNESS VISIT, SUBSEQUENT: ICD-10-CM

## 2018-04-16 DIAGNOSIS — G40.209 COMPLEX PARTIAL SEIZURE WITH IMPAIRMENT OF CONSCIOUSNESS (HCC): ICD-10-CM

## 2018-04-16 DIAGNOSIS — I48.0 PAF (PAROXYSMAL ATRIAL FIBRILLATION) (HCC): Primary | ICD-10-CM

## 2018-04-16 DIAGNOSIS — Z87.440 HISTORY OF RECURRENT UTIS: ICD-10-CM

## 2018-04-16 RX ORDER — DOXYCYCLINE HYCLATE 100 MG
TABLET ORAL
COMMUNITY
Start: 2018-01-09 | End: 2018-07-19

## 2018-04-16 RX ORDER — CEPHALEXIN 250 MG/1
CAPSULE ORAL
COMMUNITY
Start: 2018-02-16 | End: 2018-07-19

## 2018-04-16 RX ORDER — WARFARIN SODIUM 5 MG/1
5 TABLET ORAL DAILY
COMMUNITY
End: 2018-06-12 | Stop reason: CLARIF

## 2018-04-16 NOTE — PROGRESS NOTES
CC: O2/Oxygen (needs to have a walking test)      HPI:    She is a 80 y.o. female who presents for evaluation of hypoxia. She has a history of congestive heart failure and chronic hypoxia. She has a history of pleural effusions. 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  Medication was stopped. She is followed by pulmonologist.  She needs a portable oxygen as well as a concentrator. 88 oxygen saturation when walking during office. Recurrent UTIs stopped resolved. Patient saw urologist and currently on Keflex aternating with macrobid q month and drinking cranberry- probiotics advised      ROS:  10 systems reviewed and negative other than HPI  Past Medical History:   Diagnosis Date    Alopecia     Arrhythmia     atrial fib,?svt    Arthritis     Atrial fibrillation (HCC)     Benign essential HTN     Benign neoplasm of vulva     CAD (coronary artery disease)     Cholelithiases     Colon cancer ([address]     Constipation     Dyspareunia     Glaucoma     History of mixed connective tissue disease     Hx of viral pericarditis     Hyperactivity of bladder     Hypercholesterolemia     Ill-defined condition     macular degeneration    Insomnia     Macular degeneration     Neck pain     Psoriasis     Rectocele     Renal cyst     Seizure disorder ([address]     Sjogren's disease ([address]     Thrombophlebitis     TIA (transient ischemic attack)     Vaginal vault prolapse, posthysterectomy     Varicose veins        Current Outpatient Prescriptions on File Prior to Visit   Medication Sig Dispense Refill    cefUROXime (CEFTIN) 500 mg tablet Take 1 Tab by mouth two (2) times a day. 28 Tab 0    carvedilol (COREG) 3.125 mg tablet       furosemide (LASIX) 20 mg tablet Take 1 Tab by mouth daily. 15 Tab 0    VIT A/C/E AC/ZNOX/CUPRIC OXIDE (EYE VITAMIN AND MINERALS PO) Take 1 Tab by mouth two (2) times a day.       ferrous sulfate (IRON) 325 mg (65 mg iron) tablet Take 325 mg by mouth two (2) times a day.  acetaminophen (TYLENOL) 325 mg tablet Take 162.5 mg by mouth nightly. Patient takes 1/2 of a 325 mg tablet every evening at bedtime.  DOCUSATE CALCIUM (STOOL SOFTENER PO) Take 100 mg by mouth daily.  levETIRAcetam (KEPPRA) 500 mg tablet TAKE ONE TABLET BY MOUTH TWICE DAILY. PATIENT TO KEEP 6/15/16 FOR FURTHER FILLS 180 Tab 5    latanoprost (XALATAN) 0.005 % ophthalmic solution Administer 1 Drop to both eyes nightly.  AMITRIPTYLINE HCL (AMITRIPTYLINE PO) Take 10-15 mg by mouth nightly.  warfarin (COUMADIN) 2.5 mg tablet Take 5 mg by mouth once. Patient's medication list states that she takes 2.5 mg Monday Wednesday Friday, and 5 mg Sunday Saturday Tuesday and Thursday.  warfarin (COUMADIN) 2.5 mg tablet Take 5 mg by mouth four (4) days a week. Patient's medication list states that she takes 2.5 mg Monday Wednesday Friday, and 5 mg Sunday Saturday Tuesday and Thursday. No current facility-administered medications on file prior to visit. Past Surgical History:   Procedure Laterality Date    HX BREAST LUMPECTOMY      left breast    HX CATARACT REMOVAL      HX COLECTOMY  1955    partial    HX HEMORRHOIDECTOMY      HX HYSTERECTOMY      HX SHOULDER ARTHROSCOPY      left       Family History   Problem Relation Age of Onset    Cancer Father     Inflammatory Bowel Dz Mother     Heart Disease Brother     Diabetes Brother      Reviewed and no changes     Social History     Social History    Marital status:      Spouse name: N/A    Number of children: N/A    Years of education: N/A     Occupational History    Not on file.      Social History Main Topics    Smoking status: Never Smoker    Smokeless tobacco: Never Used    Alcohol use No    Drug use: No    Sexual activity: No     Other Topics Concern    Not on file     Social History Narrative            Visit Vitals    /70 (BP 1 Location: Right arm, BP Patient Position: Sitting)    Pulse 88    Temp 97.4 °F (36.3 °C) (Oral)    Resp 18    Ht 5' 3\" (1.6 m)    Wt 127 lb 12.8 oz (58 kg)    SpO2 96%    BMI 22.64 kg/m2       OXYGEN TESTING       O2 sat Room air at rest:  95  O2 sat Room air with Exercise:  88 %  Exercise with oxygen 2 L :  96    Done on April 16th at [facility]       Physical Examination:   General - Well appearing female  HEENT - PERRL, TM no erythema/opacification, normal nasal turbinates, oropharynx no erythema or exudate, MMM  Neck - supple, no bruits, no TMG, no LAD  Pulm - clear to auscultation bilaterally  Cardio - RRR, normal S1 S2, no murmur gallops or rubs  Abd - soft, nontender, no masses, no HSM  Extrem - no edema, +2 distal pulses  Psych - normal affect, appropriate mood    Lab Results   Component Value Date/Time    WBC 9.1 10/30/2017 11:55 PM    HGB 8.4 (L) 10/30/2017 11:55 PM    HCT 26.2 (L) 10/30/2017 11:55 PM    PLATELET 019 (L) 56/83/2008 11:55 PM    MCV 89.7 10/30/2017 11:55 PM     Lab Results   Component Value Date/Time    Sodium 136 10/30/2017 11:55 PM    Potassium 4.2 10/30/2017 11:55 PM    Chloride 101 10/30/2017 11:55 PM    CO2 26 10/30/2017 11:55 PM    Anion gap 9 10/30/2017 11:55 PM    Glucose 113 (H) 10/30/2017 11:55 PM    BUN 26 (H) 10/30/2017 11:55 PM    Creatinine 0.98 10/30/2017 11:55 PM    BUN/Creatinine ratio 27 (H) 10/30/2017 11:55 PM    GFR est AA >60 10/30/2017 11:55 PM    GFR est non-AA 53 (L) 10/30/2017 11:55 PM    Calcium 8.2 (L) 10/30/2017 11:55 PM     Lab Results   Component Value Date/Time    Cholesterol, total 196 09/12/2012 12:00 AM    HDL Cholesterol 64 09/12/2012 12:00 AM    LDL, calculated 104 (H) 09/12/2012 12:00 AM    VLDL, calculated 28 09/12/2012 12:00 AM    Triglyceride 142 09/12/2012 12:00 AM     Lab Results   Component Value Date/Time    TSH 4.340 11/09/2017 02:13 PM     No results found for: PSA, Mayur Jose Manuel, [id number], [id number], PSALT  Lab Results   Component Value Date/Time    Hemoglobin A1c 5.6 06/09/2017 09:07 AM     Lab Results   Component Value Date/Time    VITAMIN D, 25-HYDROXY 91.7 11/09/2017 02:13 PM       Lab Results   Component Value Date/Time    ALT (SGPT) 18 10/30/2017 11:55 PM    AST (SGOT) 21 10/30/2017 11:55 PM    Alk. phosphatase 91 10/30/2017 11:55 PM    Bilirubin, total 0.8 10/30/2017 11:55 PM        This is the Subsequent Medicare Annual Wellness Exam, performed 12 months or more after the Initial AWV or the last Subsequent AWV    I have reviewed the patient's medical history in detail and updated the computerized patient record. Depression Risk Factor Screening:     PHQ over the last two weeks 4/16/2018   Little interest or pleasure in doing things Not at all   Feeling down, depressed or hopeless Not at all   Total Score PHQ 2 0     Alcohol Risk Factor Screening: You do not drink alcohol or very rarely. Functional Ability and Level of Safety:   Hearing Loss  Patient wears hearing aids. Activities of Daily Living  The home contains: uses night lights and walker  Patient needs help with:  phone, transportation, shopping, preparing meals, laundry, housework, managing medications, managing money and bathing    Fall Risk  Fall Risk Assessment, last 12 mths 4/16/2018   Able to walk? Yes   Fall in past 12 months? Yes   Fall with injury?  Yes   Number of falls in past 12 months 1   Fall Risk Score 2       Abuse Screen  Patient is not abused    Cognitive Screening   Evaluation of Cognitive Function:  Has your family/caregiver stated any concerns about your memory: no  Normal    Patient Care Team   Patient Care Team:  Fran Santiago MD as PCP - General (Internal Medicine)  Jhon Hauser MD as Physician (Neurology)  Reji Castellon MD as Consulting Provider (Cardiology)  Ellis Moyer MD (Nephrology)  Jeanette Howell RN as Ambulatory Care Navigator (Internal Medicine)    Assessment/Plan   Education and counseling provided:  Are appropriate based on today's review and evaluation    Diagnoses and all orders for this visit:     Medicare annual wellness visit, subsequent      Chronic systolic congestive heart failure ([facility] with hypoxia  - EF 20% on TTE while in the hospital and nuclear stress test showed normal EF   Uses oxygen at night and needs a portable oxygen and concentration   X ray - showed decreased pleural effusion on 03/28/ 2018  Saw Dr Fidelina Burr and had recent TTE - requested records  Increased lasix to 40mg daily recently - requesting records         Afib: she had an av michael ablation and advised to continue on coumadin. I personally discussed with Dr Fidelina Burr and he advised she must continue on coumadin long term  - no longer on amioradone (had toxicity)      CKD: followed by Dr Alexey Sen -following Ms April French      Recurrent UTIs - in suppression with Keflex alternating with Macrobid.     Here is a list of your current Health Maintenance items with a due date:  Health Maintenance Due   Topic Date Due    Eye Exam  Plan to go this month    DTaP/Tdap/Td  (1 - Tdap) recommended    Shingles Vaccine  Patient to consider    Bone Mineral Density   11/16/1989    Pneumococcal Vaccine (2 of 2 - PCV13) due        

## 2018-04-16 NOTE — PATIENT INSTRUCTIONS

## 2018-04-16 NOTE — PROGRESS NOTES
Reviewed record in preparation for visit and have obtained necessary documentation. Identified pt with two pt identifiers(name and ). Chief Complaint   Patient presents with    O2/Oxygen     needs to have a walking test       Health Maintenance Due   Topic Date Due    Eye Exam  1934    DTaP/Tdap/Td  (1 - Tdap) 1945    Shingles Vaccine  1984    Glaucoma Screening   1989    Bone Mineral Density   1989    Pneumococcal Vaccine (2 of 2 - PCV13) 10/02/2015    Cholesterol Test   2017    Hemoglobin A1C    2017    Annual Well Visit  2018       Ms. Carlos Munson has a reminder for a \"due or due soon\" health maintenance. I have asked that she discuss this further with her primary care provider for follow-up on this health maintenance. Coordination of Care Questionnaire:  :     1) Have you been to an emergency room, urgent care clinic since your last visit? no   Hospitalized since your last visit? no             2) Have you seen or consulted any other health care providers outside of [address] [place] [address] since your last visit? no  (Include any pap smears or colon screenings in this section.)    3) In the event something were to happen to you and you were unable to speak on your behalf, do you have an Advance Directive/ Living Will in place stating your wishes? YES    Do you have an Advance Directive on file? yes    4) Are you interested in receiving information on Advance Directives? NO    Patient is accompanied by daughter I have received verbal consent from Deena Singleton to discuss any/all medical information while they are present in the room.

## 2018-04-16 NOTE — MR AVS SNAPSHOT
[address] [facility] 
[phone] Patient: Tobin Kanner MRN: [id number] Visit Information Date & Time Provider Department Dept. Phone Encounter #  
 2018  2:15 PM Simone Harmon, [address] [phone] [id number] Follow-up Instructions Return in about 6 months (around 10/16/2018) for congestive heart failure, recurrent UTIs. Your Appointments 2018  8:00 AM  
Follow Up with Rocio Ni MD  
Neurology Clinic at [facility] [address]) Appt Note: f/u seizure,jrb 6/15/17; f/u seizure,jrb 6/15/17  
 [address] [facility], 
[id number], [address] [phone]  
[address] [facility], [address] [facility], [address] [place] [address] [address] [phone] Upcoming Health Maintenance Date Due  
 EYE EXAM RETINAL OR DILATED Q1 1934 DTaP/Tdap/Td series (1 - Tdap) 1945 ZOSTER VACCINE AGE 60> 1984 Bone Densitometry (Dexa) Screening 1989 Pneumococcal 65+ Low/Medium Risk (2 of 2 - PCV13) 10/2/2015 LIPID PANEL Q1 2017 HEMOGLOBIN A1C Q6M 2017 MEDICARE YEARLY EXAM 3/14/2018 FOOT EXAM Q1 2018 MICROALBUMIN Q1 2018 GLAUCOMA SCREENING Q2Y 11/10/2019 Allergies as of 2018  Review Complete On: 2018 By: Raquel Tuttle Severity Noted Reaction Type Reactions Codeine  2012    Nausea Only Pcn [Penicillins]  2012    Unknown (comments) Cant remember reaction as a child Tetanus Vaccines And Toxoid  2012    Unknown (comments) Does not take tetanus due to tb hx  
 Vibramycin [Doxycycline Calcium]  2012    Rash Current Immunizations  Reviewed on 2017 Name Date Influenza Vaccine 10/2/2014 Influenza Vaccine (Quad) PF 10/19/2017  8:39 AM  
 Influenza Vaccine Split 2012 Pneumococcal Conjugate (PCV-13)  Incomplete Pneumococcal Polysaccharide (PPSV-23) 10/2/2014 Not reviewed this visit You Were Diagnosed With   
  
 Codes Comments PAF (paroxysmal atrial fibrillation) ([facility]    -  Primary ICD-10-CM: I48.0 ICD-9-CM: 427.31 Complex partial seizure with impairment of consciousness ([facility]     ICD-10-CM: R71.090 ICD-9-CM: 345.40 Medicare annual wellness visit, subsequent     ICD-10-CM: Z00.00 ICD-9-CM: V70.0 Encounter for immunization     ICD-10-CM: M35 ICD-9-CM: V03.89 Hypoxia     ICD-10-CM: R09.02 
ICD-9-CM: 799.02 Congestive heart failure, unspecified HF chronicity, unspecified heart failure type ([facility]     ICD-10-CM: I50.9 ICD-9-CM: 428.0 History of recurrent UTIs     ICD-10-CM: Z87.440 ICD-9-CM: V13.02 Vitals BP Pulse Temp Resp Height(growth percentile) Weight(growth percentile) 117/70 (BP 1 Location: Right arm, BP Patient Position: Sitting) 88 97.4 °F (36.3 °C) (Oral) 18 5' 3\" (1.6 m) 127 lb 12.8 oz (58 kg) SpO2 BMI OB Status Smoking Status 96% 22.64 kg/m2 Hysterectomy Never Smoker Vitals History BMI and BSA Data Body Mass Index Body Surface Area  
 22.64 kg/m 2 1.61 m 2 Preferred Pharmacy Pharmacy Name Phone [facility] [address] [place] [address] Expose [phone] Your Updated Medication List  
  
   
This list is accurate as of 4/16/18  2:57 PM.  Always use your most recent med list.  
  
  
  
  
 AMITRIPTYLINE PO Take 10-15 mg by mouth nightly. carvedilol 3.125 mg tablet Commonly known as:  COREG  
  
 cefUROXime 500 mg tablet Commonly known as:  CEFTIN Take 1 Tab by mouth two (2) times a day. cephALEXin 250 mg capsule Commonly known as:  KEFLEX  
  
 * COUMADIN 2.5 mg tablet Generic drug:  warfarin Take 5 mg by mouth once.  Patient's medication list states that she takes 2.5 mg Monday Wednesday Friday, and 5 mg Sunday Saturday Tuesday and Thursday. * COUMADIN 2.5 mg tablet Generic drug:  warfarin Take 5 mg by mouth four (4) days a week. Patient's medication list states that she takes 2.5 mg Monday Wednesday Friday, and 5 mg Sunday Saturday Tuesday and Thursday. * warfarin 5 mg tablet Commonly known as:  COUMADIN Take 5 mg by mouth daily. doxycycline 100 mg tablet Commonly known as:  VIBRA-TABS  
  
 EYE VITAMIN AND MINERALS PO Take 1 Tab by mouth two (2) times a day. furosemide 20 mg tablet Commonly known as:  LASIX Take 1 Tab by mouth daily. Iron 325 mg (65 mg iron) tablet Generic drug:  ferrous sulfate Take 325 mg by mouth two (2) times a day. latanoprost 0.005 % ophthalmic solution Commonly known as:  Madan Charles Administer 1 Drop to both eyes nightly. levETIRAcetam 500 mg tablet Commonly known as:  KEPPRA TAKE ONE TABLET BY MOUTH TWICE DAILY. PATIENT TO KEEP 6/15/16 FOR FURTHER FILLS  
  
 STOOL SOFTENER PO Take 100 mg by mouth daily. TYLENOL 325 mg tablet Generic drug:  acetaminophen Take 162.5 mg by mouth nightly. Patient takes 1/2 of a 325 mg tablet every evening at bedtime. * Notice: This list has 3 medication(s) that are the same as other medications prescribed for you. Read the directions carefully, and ask your doctor or other care provider to review them with you. We Performed the Following ADMIN PNEUMOCOCCAL VACCINE [ [facility]] PNEUMOCOCCAL CONJ VACCINE 13 VALENT IM [id number] CPT(R)] Follow-up Instructions Return in about 6 months (around 10/16/2018) for congestive heart failure, recurrent UTIs. Patient Instructions Medicare Wellness Visit, Female The best way to live healthy is to have a healthy lifestyle by eating a well-balanced diet, exercising regularly, limiting alcohol and stopping smoking. Regular physical exams and screening tests are another way to keep healthy. Preventive exams provided by your health care provider can find health problems before they become diseases or illnesses. Preventive services including immunizations, screening tests, monitoring and exams can help you take care of your own health. All people over age 72 should have a pneumovax  and and a prevnar shot to prevent pneumonia. These are once in a lifetime unless you and your provider decide differently. All people over 65 should have a yearly flu shot and a tetanus vaccine every 10 years. A bone mass density to screen for osteoporosis or thinning of the bones should be done every 2 years after 65. Screening for diabetes mellitus with a blood sugar test should be done every year. Glaucoma is a disease of the eye due to increased ocular pressure that can lead to blindness and it should be done every year by an eye professional. 
 
Cardiovascular screening tests that check for elevated lipids (fatty part of blood) which can lead to heart disease and strokes should be done every 5 years. Colorectal screening that evaluates for blood or polyps in your colon should be done yearly as a stool test or every five years as a flexible sigmoidoscope or every 10 years as a colonoscopy up to age 76. Breast cancer screening with a mammogram is recommended biennially  for women age 54-69. Screening for cervical cancer with a pap smear and pelvic exam is recommended for women after age 72 years every 2 years up to age 79 or when the provider and patient decide to stop. If there is a history of cervical abnormalities or other increased risk for cancer then the test is recommended yearly. Hepatitis C screening is also recommended for anyone born between [birth year] through [address]. A shingles vaccine is also recommended once in a lifetime after age 61. Your Medicare Wellness Exam is recommended annually. Here is a list of your current Health Maintenance items with a due date: 
Health Maintenance Due Topic Date Due  Eye Exam  Plan to go this month  DTaP/Tdap/Td  (1 - Tdap) recommended  Shingles Vaccine  Patient to consider  Bone Mineral Density   11/16/1989  Pneumococcal Vaccine (2 of 2 - PCV13) due   Introducing [facility] SERVICES! Dear Damaris Pope: Thank you for requesting a [employer] account. Our records indicate that you already have an active [employer] account. You can access your account anytime at [email or web address] Did you know that you can access your hospital and ER discharge instructions at any time in [employer]? You can also review all of your test results from your hospital stay or ER visit. Additional Information If you have questions, please visit the Frequently Asked Questions section of the [employer] website at [email or web address] Remember, [employer] is NOT to be used for urgent needs. For medical emergencies, dial 911. Now available from your iPhone and Android! Please provide this summary of care documentation to your next provider. Your primary care clinician is listed as WINNIE Hernandez. If you have any questions after today's visit, please call [phone].

## 2018-04-19 ENCOUNTER — TELEPHONE (OUTPATIENT)
Dept: INTERNAL MEDICINE CLINIC | Age: 83
End: 2018-04-19

## 2018-04-30 ENCOUNTER — TELEPHONE (OUTPATIENT)
Dept: INTERNAL MEDICINE CLINIC | Age: 83
End: 2018-04-30

## 2018-05-03 ENCOUNTER — TELEPHONE (OUTPATIENT)
Dept: INTERNAL MEDICINE CLINIC | Age: 83
End: 2018-05-03

## 2018-05-08 ENCOUNTER — TELEPHONE (OUTPATIENT)
Dept: INTERNAL MEDICINE CLINIC | Age: 83
End: 2018-05-08

## 2018-06-12 ENCOUNTER — OFFICE VISIT (OUTPATIENT)
Dept: NEUROLOGY | Age: 83
End: 2018-06-12

## 2018-06-12 VITALS
HEART RATE: 91 BPM | WEIGHT: 124 LBS | OXYGEN SATURATION: 94 % | HEIGHT: 63 IN | SYSTOLIC BLOOD PRESSURE: 138 MMHG | DIASTOLIC BLOOD PRESSURE: 78 MMHG | BODY MASS INDEX: 21.97 KG/M2

## 2018-06-12 DIAGNOSIS — G40.209 COMPLEX PARTIAL SEIZURE WITH IMPAIRMENT OF CONSCIOUSNESS (HCC): Primary | ICD-10-CM

## 2018-06-12 DIAGNOSIS — R27.0 ATAXIA: ICD-10-CM

## 2018-06-12 DIAGNOSIS — I48.91 ATRIAL FIBRILLATION WITH RVR (HCC): ICD-10-CM

## 2018-06-12 DIAGNOSIS — M54.50 ACUTE MIDLINE LOW BACK PAIN WITHOUT SCIATICA: ICD-10-CM

## 2018-06-12 RX ORDER — SULFAMETHOXAZOLE AND TRIMETHOPRIM 800; 160 MG/1; MG/1
TABLET ORAL
COMMUNITY
End: 2019-12-09

## 2018-06-12 RX ORDER — NITROFURANTOIN 25; 75 MG/1; MG/1
CAPSULE ORAL
COMMUNITY
End: 2019-12-09

## 2018-06-12 RX ORDER — ESTRADIOL 0.1 MG/G
CREAM VAGINAL DAILY
COMMUNITY
Start: 2018-06-04 | End: 2019-12-06

## 2018-06-12 RX ORDER — LEVETIRACETAM 500 MG/1
TABLET ORAL
Qty: 180 TAB | Refills: 5 | Status: SHIPPED | OUTPATIENT
Start: 2018-06-12 | End: 2019-08-13 | Stop reason: SDUPTHER

## 2018-06-12 NOTE — LETTER
2018 10:35 AM 
 
Patient:  Fortino Meza YOB: 1924 Date of Visit: 2018 Dear No Recipients: Thank you for referring Ms. Fortino Meza to me for evaluation/treatment. Below are the relevant portions of my assessment and plan of care. Consult Subjective:  
 
Fortino Meza is a 80 y. o.right-handed  female seen for evaluation of seizures at the request of Dr Monie Souza. Patient has not had any seizures since her last visit 1 year ago. She is currently on Keppra 500 mg twice a day tolerating it well without any side effects of the medications. Patient had a fall and had a burst fracture of L1, and was about to get a kyphoplasty which she developed severe cardiac arrhythmias, recurrent atrial fibrillation, had to get a pacemaker and ablation done, and had prolonged hospitalization after that. She has had no more falls and has to use a walker for ambulation. She is on Coumadin will probably have to stop this first.    She cannot take anti-inflammatories because of her kidney disease. Her medications were refilled today a new prescription sent in for patient. She's had no other new neurological problems, no new focal weakness, her memory seems fairly good for her age of 80, she does have decreased vision and slightly unsteady gait because of the vision problem of macular degeneration and hip arthritis, and uses a walker at times when her daughters not at home. The patient had new onset of seizures in  and had apparently normal imaging of the brain and an EEG that showed a \"short circuit\" in her brain. She had no other clear cause for her seizures. There was no trauma, infection, stroke,  injury or family history of similar problems. She had several seizures and was treated with Neurontin and eventually started on Keppra 500 mg b.i.d which has controlled her seizures for years.  They didn't want to use Dilantin because of its side effects. She has remained free of seizures for 20+ years and is seizure-free since her last office visit 12 months ago and has had no side effects on the medication. We talked about coming off the medication and what needed to be done, the patient would prefer to stay on the medication because she is stable and having no problems and doesn't want to risk recurrent seizures her daughter agrees. No seizure since last visit in June 2016. Patient's seizures were mainly manifest by brief spells of staring off and having slight confusion for several minutes afterwards but no generalized tonic-clonic seizures were seen. 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 Patient has a new complaint of increasing unsteadiness in her gait and difficulty walking. He has slight coldness in her feet and loss of feeling in her feet. She had increased difficulty with right hip pain and radicular pain in the right leg. She had ankle problems and right hip problems and has taken several anti-inflammatories and steroids with mild improvement only. She does have a heel spur but cannot give an injection because they're afraid it might cause rupture of the Achilles tendon. She has had arthritis in her hip and gets a shot there in the as needed. She also had recent neck pain this January, that responded to physical therapy and conservative management. Patient has a history of multiple medical problems including atrial fibrillation and is on chronic Coumadin therapy. She also has degenerative arthritis, osteoporosis and osteoarthritis, glaucoma. She also has macular degeneration and an unsteady gait because she can't see and has more arthritis. Past Medical History:  
Diagnosis Date  Alopecia  Arrhythmia   
 atrial fib,?svt  Arthritis  Atrial fibrillation ([address]  Benign essential HTN  Benign neoplasm of vulva  CAD (coronary artery disease)  Cholelithiases  Colon cancer ([address]  Constipation  Dyspareunia  Glaucoma  History of mixed connective tissue disease  Hx of viral pericarditis  Hyperactivity of bladder  Hypercholesterolemia  Ill-defined condition   
 macular degeneration  Insomnia  Macular degeneration  Neck pain  Psoriasis  Rectocele  Renal cyst   
 Seizure disorder ([address]  Sjogren's disease ([address]  Thrombophlebitis  TIA (transient ischemic attack)  Vaginal vault prolapse, posthysterectomy  Varicose veins Past Surgical History:  
Procedure Laterality Date  HX BREAST LUMPECTOMY    
 left breast  
 HX CATARACT REMOVAL    
 HX COLECTOMY  1955  
 partial  
 HX HEMORRHOIDECTOMY  HX HYSTERECTOMY  HX SHOULDER ARTHROSCOPY    
 left Family History Problem Relation Age of Onset  Cancer Father  Inflammatory Bowel Dz Mother  Heart Disease Brother  Diabetes Brother Social History Substance Use Topics  Smoking status: Never Smoker  Smokeless tobacco: Never Used  Alcohol use No  
   
Current Outpatient Prescriptions Medication Sig Dispense Refill  estradiol (ESTRACE) 0.01 % (0.1 mg/gram) vaginal cream daily.  nitrofurantoin, macrocrystal-monohydrate, (MACROBID) 100 mg capsule odd months qd    
 trimethoprim-sulfamethoxazole (BACTRIM DS) 160-800 mg per tablet Take 1 Tab by mouth daily. On even months  levETIRAcetam (KEPPRA) 500 mg tablet TAKE ONE TABLET BY MOUTH TWICE DAILY. 180 Tab 5  
 doxycycline (VIBRA-TABS) 100 mg tablet  cephALEXin (KEFLEX) 250 mg capsule  cefUROXime (CEFTIN) 500 mg tablet Take 1 Tab by mouth two (2) times a day. 28 Tab 0  carvedilol (COREG) 3.125 mg tablet as needed.  furosemide (LASIX) 20 mg tablet Take 1 Tab by mouth daily. 15 Tab 0  
 warfarin (COUMADIN) 2.5 mg tablet Take 5 mg by mouth daily.     
 VIT A/C/E AC/ZNOX/CUPRIC OXIDE (EYE VITAMIN AND MINERALS PO) Take 1 Tab by mouth two (2) times a day.  ferrous sulfate (IRON) 325 mg (65 mg iron) tablet Take 325 mg by mouth two (2) times a day.  acetaminophen (TYLENOL) 325 mg tablet Take 162.5 mg by mouth nightly. Patient takes 1/2 of a 325 mg tablet every evening at bedtime.  DOCUSATE CALCIUM (STOOL SOFTENER PO) Take 100 mg by mouth daily.  latanoprost (XALATAN) 0.005 % ophthalmic solution Administer 1 Drop to both eyes nightly.  AMITRIPTYLINE HCL (AMITRIPTYLINE PO) Take 10-15 mg by mouth nightly. Allergies Allergen Reactions  Codeine Nausea Only  Pcn [Penicillins] Unknown (comments) Cant remember reaction as a child  Tetanus Vaccines And Toxoid Unknown (comments) Does not take tetanus due to tb hx  Vibramycin [Doxycycline Calcium] Rash Review of Systems: A comprehensive review of systems was negative except for: Constitutional: positive for fatigue and malaise Eyes: positive for glaucoma, visual disturbance and macular degeneration Cardiovascular: positive for dyspnea, palpitations, irregular heart beats, exertional chest pressure/discomfort Musculoskeletal: positive for myalgias, arthralgias, stiff joints, back pain and muscle weakness Neurological: positive for dizziness, seizures, coordination problems, gait problems and weakness Behvioral/Psych: positive for anxiety and depression Objective: I 
 
 
NEUROLOGICAL EXAM: 
 
Appearance: The patient is well developed, well nourished, provides a fair history and is in no acute distress. Mental Status: Oriented to time, place and person and the president. Her mental status is amazing for 80year-old. Mood and affect depressed. Speech is fluent without aphasia or dysarthria Cranial Nerves:   Intact visual fields but her visual acuity is markedly decreased but she does have count fingers vision because of her macular degeneration. Fundi are poorly seen.  Pupils minimally react due to previous surgery, EOM's full, no nystagmus, no ptosis. Facial sensation is normal. Corneal reflexes are not tested. Facial movement is symmetric. Hearing is reduced bilaterally. Palate is midline with normal sternocleidomastoid and trapezius muscles are normal. Tongue is midline. Neck supple without bruits or meningismus Temporal arteries are not tender or enlarged Motor:  4/5 strength in upper and lower proximal and distal muscles. reduced bulk and tone. No fasciculations. Reflexes:   Deep tendon reflexes 1+/4 and symmetrical. No Babinski or clonus present Sensory:   Abnormal to touch, pinprick and vibration and temperature in the feet bilaterally. Gait:  Abnormal gait due to arthritis and poor vision and patient has to use assistive devices of a rolling walker or Rollator. Tremor:   No tremor noted. Cerebellar:  Mildly abnormal Romberg and tandem cerebellar signs present. Neurovascular:  Normal heart sounds and regular rhythm, peripheral pulses decreased, and no carotid bruits. Assessment:  
 
1 Plan:  
 
New problem of atrial fibrillation last fall, but no embolic stroke or other new neurological problems, but patient does have pacemaker now and cannot get an MRI History of seizures of probable partial complex and secondary generalized type. Well controlled on Keppra Ataxia of gait due to a combination of arthritis, poor vision and senile gait apraxia and possible neuropathy Possible neuropathy of unclear cause, ruled out treatable causes, probably causing mild sensory ataxia Multiple medical problems including a fib on chronic Coumadin therapy as above. Severe visual loss secondary to macular degeneration Discussed with patient and daughter again, the possibility of coming off her medication but all agreed to stay on the medicine We'll continue the Keppra refills given the patient Patient advised to continue her vitamins and vitamin D every day and her B12 and other supplements and medications as prescribed. She was encouraged to remain mentally and physically active. We will see her again in 12 months time or earlier if needed. They will call if any problem. Signed By: Tyler Bhardwaj MD   
 June 12, 2018 This note will not be viewable in [address] If you have questions, please do not hesitate to call me. I look forward to following Ms. Giulia Heard along with you. Sincerely, Tyler Bhardwaj MD

## 2018-06-12 NOTE — PROGRESS NOTES
Consult    Subjective:     Demian Hampton is a 80 y. o.right-handed  female seen for evaluation of seizures at the request of Dr William Solis. Patient has not had any seizures since her last visit 1 year ago. She is currently on Keppra 500 mg twice a day tolerating it well without any side effects of the medications. Patient had a fall and had a burst fracture of L1, and was about to get a kyphoplasty which she developed severe cardiac arrhythmias, recurrent atrial fibrillation, had to get a pacemaker and ablation done, and had prolonged hospitalization after that. She has had no more falls and has to use a walker for ambulation. She is on Coumadin will probably have to stop this first.    She cannot take anti-inflammatories because of her kidney disease. Her medications were refilled today a new prescription sent in for patient. She's had no other new neurological problems, no new focal weakness, her memory seems fairly good for her age of 80, she does have decreased vision and slightly unsteady gait because of the vision problem of macular degeneration and hip arthritis, and uses a walker at times when her daughters not at home. The patient had new onset of seizures in  and had apparently normal imaging of the brain and an EEG that showed a \"short circuit\" in her brain. She had no other clear cause for her seizures. There was no trauma, infection, stroke,  injury or family history of similar problems. She had several seizures and was treated with Neurontin and eventually started on Keppra 500 mg b.i.d which has controlled her seizures for years. They didn't want to use Dilantin because of its side effects. She has remained free of seizures for 20+ years and is seizure-free since her last office visit 12 months ago and has had no side effects on the medication.  We talked about coming off the medication and what needed to be done, the patient would prefer to stay on the medication because she is stable and having no problems and doesn't want to risk recurrent seizures her daughter agrees. No seizure since last visit in June 2016. Patient's seizures were mainly manifest by brief spells of staring off and having slight confusion for several minutes afterwards but no generalized tonic-clonic seizures were seen. 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 Patient has a new complaint of increasing unsteadiness in her gait and difficulty walking. He has slight coldness in her feet and loss of feeling in her feet. She had increased difficulty with right hip pain and radicular pain in the right leg. She had ankle problems and right hip problems and has taken several anti-inflammatories and steroids with mild improvement only. She does have a heel spur but cannot give an injection because they're afraid it might cause rupture of the Achilles tendon. She has had arthritis in her hip and gets a shot there in the as needed. She also had recent neck pain this January, that responded to physical therapy and conservative management. Patient has a history of multiple medical problems including atrial fibrillation and is on chronic Coumadin therapy. She also has degenerative arthritis, osteoporosis and osteoarthritis, glaucoma. She also has macular degeneration and an unsteady gait because she can't see and has more arthritis.         Past Medical History:   Diagnosis Date    Alopecia     Arrhythmia     atrial fib,?svt    Arthritis     Atrial fibrillation ([facility])     Benign essential HTN     Benign neoplasm of vulva     CAD (coronary artery disease)     Cholelithiases     Colon cancer (HCC)     Constipation     Dyspareunia     Glaucoma     History of mixed connective tissue disease     Hx of viral pericarditis     Hyperactivity of bladder     Hypercholesterolemia     Ill-defined condition     macular degeneration    Insomnia     Macular degeneration     Neck pain     Psoriasis     Rectocele  Renal cyst     Seizure disorder ([facility])     [facility]ren's [facility]     Thrombophlebitis     TIA (transient ischemic attack)     Vaginal vault prolapse, posthysterectomy     Varicose veins       Past Surgical History:   Procedure Laterality Date    HX BREAST LUMPECTOMY      left breast    HX CATARACT REMOVAL      HX COLECTOMY  1955    partial    HX HEMORRHOIDECTOMY      HX HYSTERECTOMY      HX SHOULDER ARTHROSCOPY      left     Family History   Problem Relation Age of Onset    Cancer Father     Inflammatory Bowel Dz Mother     Heart Disease Brother     Diabetes Brother       Social History   Substance Use Topics    Smoking status: Never Smoker    Smokeless tobacco: Never Used    Alcohol use No       Current Outpatient Prescriptions   Medication Sig Dispense Refill    estradiol (ESTRACE) 0.01 % (0.1 mg/gram) vaginal cream daily.  nitrofurantoin, macrocrystal-monohydrate, (MACROBID) 100 mg capsule odd months qd      trimethoprim-sulfamethoxazole (BACTRIM DS) 160-800 mg per tablet Take 1 Tab by mouth daily. On even months      levETIRAcetam (KEPPRA) 500 mg tablet TAKE ONE TABLET BY MOUTH TWICE DAILY. 180 Tab 5    doxycycline (VIBRA-TABS) 100 mg tablet       cephALEXin (KEFLEX) 250 mg capsule       cefUROXime (CEFTIN) 500 mg tablet Take 1 Tab by mouth two (2) times a day. 28 Tab 0    carvedilol (COREG) 3.125 mg tablet as needed.  furosemide (LASIX) 20 mg tablet Take 1 Tab by mouth daily. 15 Tab 0    warfarin (COUMADIN) 2.5 mg tablet Take 5 mg by mouth daily.  VIT A/C/E AC/ZNOX/CUPRIC OXIDE (EYE VITAMIN AND MINERALS PO) Take 1 Tab by mouth two (2) times a day.  ferrous sulfate (IRON) 325 mg (65 mg iron) tablet Take 325 mg by mouth two (2) times a day.  acetaminophen (TYLENOL) 325 mg tablet Take 162.5 mg by mouth nightly. Patient takes 1/2 of a 325 mg tablet every evening at bedtime.  DOCUSATE CALCIUM (STOOL SOFTENER PO) Take 100 mg by mouth daily.       latanoprost (XALATAN) 0.005 % ophthalmic solution Administer 1 Drop to both eyes nightly.  AMITRIPTYLINE HCL (AMITRIPTYLINE PO) Take 10-15 mg by mouth nightly. Allergies   Allergen Reactions    Codeine Nausea Only    Pcn [Penicillins] Unknown (comments)     Cant remember reaction as a child    Tetanus Vaccines And Toxoid Unknown (comments)     Does not take tetanus due to tb hx    Vibramycin [Doxycycline Calcium] Rash        Review of Systems:  A comprehensive review of systems was negative except for: Constitutional: positive for fatigue and malaise  Eyes: positive for glaucoma, visual disturbance and macular degeneration  Cardiovascular: positive for dyspnea, palpitations, irregular heart beats, exertional chest pressure/discomfort  Musculoskeletal: positive for myalgias, arthralgias, stiff joints, back pain and muscle weakness  Neurological: positive for dizziness, seizures, coordination problems, gait problems and weakness  Behvioral/Psych: positive for anxiety and depression     Objective:     I      NEUROLOGICAL EXAM:    Appearance: The patient is well developed, well nourished, provides a fair history and is in no acute distress. Mental Status: Oriented to time, place and person and the president. Her mental status is amazing for 80year-old. Mood and affect depressed. Speech is fluent without aphasia or dysarthria   Cranial Nerves:   Intact visual fields but her visual acuity is markedly decreased but she does have count fingers vision because of her macular degeneration. Fundi are poorly seen. Pupils minimally react due to previous surgery, EOM's full, no nystagmus, no ptosis. Facial sensation is normal. Corneal reflexes are not tested. Facial movement is symmetric. Hearing is reduced bilaterally. Palate is midline with normal sternocleidomastoid and trapezius muscles are normal. Tongue is midline.   Neck supple without bruits or meningismus   Temporal arteries are not tender or enlarged   Motor:  4/5 strength in upper and lower proximal and distal muscles. reduced bulk and tone. No fasciculations. Reflexes:   Deep tendon reflexes 1+/4 and symmetrical. No Babinski or clonus present   Sensory:   Abnormal to touch, pinprick and vibration and temperature in the feet bilaterally. Gait:  Abnormal gait due to arthritis and poor vision and patient has to use assistive devices of a rolling walker or Rollator. Tremor:   No tremor noted. Cerebellar:  Mildly abnormal Romberg and tandem cerebellar signs present. Neurovascular:  Normal heart sounds and regular rhythm, peripheral pulses decreased, and no carotid bruits. 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 Well controlled on Keppra  Ataxia of gait due to a combination of arthritis, poor vision and senile gait apraxia and possible neuropathy  Possible neuropathy of unclear cause, ruled out treatable causes, probably causing mild sensory ataxia  Multiple medical problems including a fib on chronic Coumadin therapy as above. Severe visual loss secondary to macular degeneration  Discussed with patient and daughter again, the possibility of coming off her medication but all agreed to stay on the medicine  We'll continue the Keppra refills given the patient  Patient advised to continue her vitamins and vitamin D every day and her B12 and other supplements and medications as prescribed. She was encouraged to remain mentally and physically active. We will see her again in 12 months time or earlier if needed. They will call if any problem. Signed By: Jolanta Edouard MD     June 12, 2018       This note will not be viewable in [address]

## 2018-06-12 NOTE — MR AVS SNAPSHOT
[address], 
[address], [address] [facility] 
[phone] Patient: Tung Ruiz MRN: [id number] Visit Information Date & Time Provider Department Dept. Phone Encounter #  
 2018  8:40 AM Cline Nyhan, MD [facility]rology [facility] at [facility] [phone] [id number] Follow-up Instructions Return in about 1 year (around 2019). Upcoming Health Maintenance Date Due  
 EYE EXAM RETINAL OR DILATED Q1 1934 DTaP/Tdap/Td series (1 - Tdap) 1945 ZOSTER VACCINE AGE 60> 1984 Bone Densitometry (Dexa) Screening 1989 LIPID PANEL Q1 2017 HEMOGLOBIN A1C Q6M 2017 Influenza Age 5 to Adult 2018 FOOT EXAM Q1 2018 MICROALBUMIN Q1 2018 MEDICARE YEARLY EXAM 2019 GLAUCOMA SCREENING Q2Y 11/10/2019 Allergies as of 2018  Review Complete On: 2018 By: Cline Nyhan, MD  
  
 Severity Noted Reaction Type Reactions Codeine  2012    Nausea Only Pcn [Penicillins]  2012    Unknown (comments) Cant remember reaction as a child Tetanus Vaccines And Toxoid  2012    Unknown (comments) Does not take tetanus due to tb hx  
 Vibramycin [Doxycycline Calcium]  2012    Rash Current Immunizations  Reviewed on 2017 Name Date Influenza Vaccine 10/2/2014 Influenza Vaccine (Quad) PF 10/19/2017  8:39 AM  
 Influenza Vaccine Split 2012 Pneumococcal Conjugate (PCV-13) 2018 Pneumococcal Polysaccharide (PPSV-23) 10/2/2014 Not reviewed this visit You Were Diagnosed With   
  
 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 3 Acute midline low back pain without sciatica     ICD-10-CM: M54.5 ICD-9-CM: 724.2 Atrial fibrillation with RVR (HCC)     ICD-10-CM: I48.91 
ICD-9-CM: 427.31 Vitals BP Pulse Height(growth percentile) Weight(growth percentile) SpO2 BMI  
 138/78 91 5' 3\" (1.6 m) 124 lb (56.2 kg) 94% 21.97 kg/m2 OB Status Smoking Status Hysterectomy Never Smoker BMI and BSA Data Body Mass Index Body Surface Area  
 21.97 kg/m 2 1.58 m 2 Preferred Pharmacy Pharmacy Name Phone [facility] [address] [place] [address] Natalie Mccray [phone] Your Updated Medication List  
  
   
This list is accurate as of 6/12/18  9:25 AM.  Always use your most recent med list.  
  
  
  
  
 AMITRIPTYLINE PO Take 10-15 mg by mouth nightly. BACTRIM -800 mg per tablet Generic drug:  trimethoprim-sulfamethoxazole Take 1 Tab by mouth daily. On even months  
  
 carvedilol 3.125 mg tablet Commonly known as:  COREG  
as needed. cefUROXime 500 mg tablet Commonly known as:  CEFTIN Take 1 Tab by mouth two (2) times a day. cephALEXin 250 mg capsule Commonly known as:  KEFLEX  
  
 COUMADIN 2.5 mg tablet Generic drug:  warfarin Take 5 mg by mouth daily. doxycycline 100 mg tablet Commonly known as:  VIBRA-TABS  
  
 estradiol 0.01 % (0.1 mg/gram) vaginal cream  
Commonly known as:  ESTRACE  
daily. EYE VITAMIN AND MINERALS PO Take 1 Tab by mouth two (2) times a day. furosemide 20 mg tablet Commonly known as:  LASIX Take 1 Tab by mouth daily. Iron 325 mg (65 mg iron) tablet Generic drug:  ferrous sulfate Take 325 mg by mouth two (2) times a day. latanoprost 0.005 % ophthalmic solution Commonly known as:  Madan Melvin Administer 1 Drop to both eyes nightly. levETIRAcetam 500 mg tablet Commonly known as:  KEPPRA TAKE ONE TABLET BY MOUTH TWICE DAILY. nitrofurantoin (macrocrystal-monohydrate) 100 mg capsule Commonly known as:  MACROBID  
odd months qd  STOOL SOFTENER PO  
 Take 100 mg by mouth daily. TYLENOL 325 mg tablet Generic drug:  acetaminophen Take 162.5 mg by mouth nightly. Patient takes 1/2 of a 325 mg tablet every evening at bedtime. Prescriptions Sent to Pharmacy Refills  
 levETIRAcetam (KEPPRA) 500 mg tablet 5 Sig: TAKE ONE TABLET BY MOUTH TWICE DAILY. Class: Normal  
 Pharmacy: [address] Ph #: [phone] Follow-up Instructions Return in about 1 year (around 6/12/2019). Patient Instructions Office Policies 
 
o Phone calls/patient messages: Please allow up to 24 hours for someone in the office to contact you about your call or message. Be mindful your provider may be out of the office or your message may require further review. We encourage you to use Ondango for your messages as this is a faster, more efficient way to communicate with our office 
 
o Medication Refills: 
Prescription medications require up to 48 business hours to process. We encourage you to use Ondango for your refills. For controlled medications: Please allow up to 72 business hours to process. Certain medications may require you to  a written prescription at our office. NO narcotic/controlled medications will be prescribed after 4pm Monday through Friday or on weekends 
 
o Form/Paperwork Completion: 
Please note there is a $25 fee for all paperwork completed by our providers. We ask that you allow 7-14 business days. Pre-payment is due prior to picking up/faxing the completed form. You may also download your forms to Ondango to have your doctor print off. A Healthy Lifestyle: Care Instructions Your Care Instructions A healthy lifestyle can help you feel good, stay at a healthy weight, and have plenty of energy for both work and play. A healthy lifestyle is something you can share with your whole family. A healthy lifestyle also can lower your risk for serious health problems, such as high blood pressure, heart disease, and diabetes. You can follow a few steps listed below to improve your health and the health of your family. Follow-up care is a key part of your treatment and safety. Be sure to make and go to all appointments, and call your doctor if you are having problems. It's also a good idea to know your test results and keep a list of the medicines you take. How can you care for yourself at home? · Do not eat too much sugar, fat, or fast foods. You can still have dessert and treats now and then. The goal is moderation. · Start small to improve your eating habits. Pay attention to portion sizes, drink less juice and soda pop, and eat more fruits and vegetables. ¨ Eat a healthy amount of food. A 3-ounce serving of meat, for example, is about the size of a deck of cards. Fill the rest of your plate with vegetables and whole grains. ¨ Limit the amount of soda and sports drinks you have every day. Drink more water when you are thirsty. ¨ Eat at least 5 servings of fruits and vegetables every day. It may seem like a lot, but it is not hard to reach this goal. A serving or helping is 1 piece of fruit, 1 cup of vegetables, or 2 cups of leafy, raw vegetables. Have an apple or some carrot sticks as an afternoon snack instead of a candy bar. Try to have fruits and/or vegetables at every meal. 
· Make exercise part of your daily routine. You may want to start with simple activities, such as walking, bicycling, or slow swimming. Try to be active 30 to 60 minutes every day. You do not need to do all 30 to 60 minutes all at once. For example, you can exercise 3 times a day for 10 or 20 minutes. Moderate exercise is safe for most people, but it is always a good idea to talk to your doctor before starting an exercise program. 
· Keep moving. Jesenia Sherwood the lawn, work in the garden, or incuBET.  Take the stairs instead of the elevator at work. · If you smoke, quit. People who smoke have an increased risk for heart attack, stroke, cancer, and other lung illnesses. Quitting is hard, but there are ways to boost your chance of quitting tobacco for good. ¨ Use nicotine gum, patches, or lozenges. ¨ Ask your doctor about stop-smoking programs and medicines. ¨ Keep trying. In addition to reducing your risk of diseases in the future, you will notice some benefits soon after you stop using tobacco. If you have shortness of breath or asthma symptoms, they will likely get better within a few weeks after you quit. · Limit how much alcohol you drink. Moderate amounts of alcohol (up to 2 drinks a day for men, 1 drink a day for women) are okay. But drinking too much can lead to liver problems, high blood pressure, and other health problems. Family health If you have a family, there are many things you can do together to improve your health. · Eat meals together as a family as often as possible. · Eat healthy foods. This includes fruits, vegetables, lean meats and dairy, and whole grains. · Include your family in your fitness plan. Most people think of activities such as jogging or tennis as the way to fitness, but there are many ways you and your family can be more active. Anything that makes you breathe hard and gets your heart pumping is exercise. Here are some tips: 
¨ Walk to do errands or to take your child to school or the bus. ¨ Go for a family bike ride after dinner instead of watching TV. Where can you learn more? Go to http://zackery-jessica.info/. Enter T214 in the search box to learn more about \"A Healthy Lifestyle: Care Instructions. \" Current as of: May 12, 2017 Content Version: 11.4 © 9869-1210 Tudou.  Care instructions adapted under license by Path.To (which disclaims liability or warranty for this information). If you have questions about a medical condition or this instruction, always ask your healthcare professional. [address] any warranty or liability for your use of this information. Introducing [facility]! Dear Marianela Potts: Thank you for requesting a [email or web address] account. Our records indicate that you already have an active [email or web address] account. You can access your account anytime at [email or web address] Did you know that you can access your hospital and ER discharge instructions at any time in [email or web address]? You can also review all of your test results from your hospital stay or ER visit. Additional Information If you have questions, please visit the Frequently Asked Questions section of the [email or web address] website at [email or web address] Remember, [email or web address] is NOT to be used for urgent needs. For medical emergencies, dial 911. Now available from your iPhone and Android! Please provide this summary of care documentation to your next provider. Your primary care clinician is listed as WINNIE Hernandez. If you have any questions after today's visit, please call [phone].

## 2018-06-12 NOTE — PATIENT INSTRUCTIONS
Office Policies    o Phone calls/patient messages:  Please allow up to 24 hours for someone in the office to contact you about your call or message. Be mindful your provider may be out of the office or your message may require further review. We encourage you to use Compellon for your messages as this is a faster, more efficient way to communicate with our office    o Medication Refills:  Prescription medications require up to 48 business hours to process. We encourage you to use Compellon for your refills. For controlled medications: Please allow up to 72 business hours to process. Certain medications may require you to  a written prescription at our office. NO narcotic/controlled medications will be prescribed after 4pm Monday through Friday or on weekends    o Form/Paperwork Completion:  Please note there is a $25 fee for all paperwork completed by our providers. We ask that you allow 7-14 business days. Pre-payment is due prior to picking up/faxing the completed form. You may also download your forms to Compellon to have your doctor print off. A Healthy Lifestyle: Care Instructions  Your Care Instructions    A healthy lifestyle can help you feel good, stay at a healthy weight, and have plenty of energy for both work and play. A healthy lifestyle is something you can share with your whole family. A healthy lifestyle also can lower your risk for serious health problems, such as high blood pressure, heart disease, and diabetes. You can follow a few steps listed below to improve your health and the health of your family. Follow-up care is a key part of your treatment and safety. Be sure to make and go to all appointments, and call your doctor if you are having problems. It's also a good idea to know your test results and keep a list of the medicines you take. How can you care for yourself at home? · Do not eat too much sugar, fat, or fast foods. You can still have dessert and treats now and then. The goal is moderation. · Start small to improve your eating habits. Pay attention to portion sizes, drink less juice and soda pop, and eat more fruits and vegetables. ¨ Eat a healthy amount of food. A 3-ounce serving of meat, for example, is about the size of a deck of cards. Fill the rest of your plate with vegetables and whole grains. ¨ Limit the amount of soda and sports drinks you have every day. Drink more water when you are thirsty. ¨ Eat at least 5 servings of fruits and vegetables every day. It may seem like a lot, but it is not hard to reach this goal. A serving or helping is 1 piece of fruit, 1 cup of vegetables, or 2 cups of leafy, raw vegetables. Have an apple or some carrot sticks as an afternoon snack instead of a candy bar. Try to have fruits and/or vegetables at every meal.  · Make exercise part of your daily routine. You may want to start with simple activities, such as walking, bicycling, or slow swimming. Try to be active 30 to 60 minutes every day. You do not need to do all 30 to 60 minutes all at once. For example, you can exercise 3 times a day for 10 or 20 minutes. Moderate exercise is safe for most people, but it is always a good idea to talk to your doctor before starting an exercise program.  · Keep moving. Clent Cramp the lawn, work in the garden, or [employer]. Take the stairs instead of the elevator at work. · If you smoke, quit. People who smoke have an increased risk for heart attack, stroke, cancer, and other lung illnesses. Quitting is hard, but there are ways to boost your chance of quitting tobacco for good. ¨ Use nicotine gum, patches, or lozenges. ¨ Ask your doctor about stop-smoking programs and medicines. ¨ Keep trying. In addition to reducing your risk of diseases in the future, you will notice some benefits soon after you stop using tobacco. If you have shortness of breath or asthma symptoms, they will likely get better within a few weeks after you quit.   · Limit how much alcohol you drink. Moderate amounts of alcohol (up to 2 drinks a day for men, 1 drink a day for women) are okay. But drinking too much can lead to liver problems, high blood pressure, and other health problems. Family health  If you have a family, there are many things you can do together to improve your health. · Eat meals together as a family as often as possible. · Eat healthy foods. This includes fruits, vegetables, lean meats and dairy, and whole grains. · Include your family in your fitness plan. Most people think of activities such as jogging or tennis as the way to fitness, but there are many ways you and your family can be more active. Anything that makes you breathe hard and gets your heart pumping is exercise. Here are some tips:  ¨ Walk to do errands or to take your child to school or the bus. ¨ Go for a family bike ride after dinner instead of watching TV. Where can you learn more? Go to http://zackery-jessica.info/. Enter H927 in the search box to learn more about \"A Healthy Lifestyle: Care Instructions. \"  Current as of: May 12, 2017  Content Version: 11.4  © 4040-9679 Healthwise, Incorporated. Care instructions adapted under license by [employer] (which disclaims liability or warranty for this information). If you have questions about a medical condition or this instruction, always ask your healthcare professional. [address] any warranty or liability for your use of this information.

## 2018-06-23 ENCOUNTER — HOSPITAL ENCOUNTER (OUTPATIENT)
Dept: GENERAL RADIOLOGY | Age: 83
Discharge: HOME | End: 2018-06-23
Payer: MEDICARE

## 2018-06-23 DIAGNOSIS — R93.89 ABNORMAL CT SCAN: ICD-10-CM

## 2018-06-23 PROCEDURE — 71046 X-RAY EXAM CHEST 2 VIEWS: CPT

## 2018-07-09 ENCOUNTER — OFFICE VISIT (OUTPATIENT)
Dept: SURGERY | Age: 83
End: 2018-07-09

## 2018-07-09 VITALS
HEART RATE: 79 BPM | TEMPERATURE: 97.6 F | HEIGHT: 63 IN | DIASTOLIC BLOOD PRESSURE: 71 MMHG | RESPIRATION RATE: 16 BRPM | OXYGEN SATURATION: 97 % | BODY MASS INDEX: 22.04 KG/M2 | WEIGHT: 124.4 LBS | SYSTOLIC BLOOD PRESSURE: 129 MMHG

## 2018-07-09 DIAGNOSIS — K40.20 BILATERAL INGUINAL HERNIA WITHOUT OBSTRUCTION OR GANGRENE, RECURRENCE NOT SPECIFIED: Primary | ICD-10-CM

## 2018-07-09 NOTE — MR AVS SNAPSHOT
[address], [address], [address] [place] [address] [facility] 
[phone] Patient: Shai Ferrer MRN: [id number] Visit Information Date & Time Provider Department Dept. Phone Encounter #  
 2018 10:00 AM Yaz De Anda MD [facility] [id number] Your Appointments 2018  1:45 PM  
ROUTINE CARE with Chinedu Camp MD  
[facility] [address] [facility]) Appt Note: re cert for oxygen to meet medicare guidelines [address] [facility]  
[phone]  
  
   
 [address] [facility] [address] [address]  
  
    
 2018  3:00 PM  
POST OP with Yaz De Anda MD  
[facility] Dr. Dionicio Salcedo [facility] ([address] [facility]) Appt Note: po- Open [facility] 18  
 [address] [facility], [address], [address] [phone]  
[address] [facility], [address], [address] [facility] [address] [phone]  
  
    
 2019  9:40 AM  
Follow Up with Warren Boast, MD  
[facility] at [facility] [address] [facility]) Appt Note: f/u seizure, jrb 18  
 [address] [facility], 
[address] [place] [address], [address] [phone]  
[address], [address] [place] [address], [address] [phone] Upcoming Health Maintenance Date Due  
 EYE EXAM RETINAL OR DILATED Q1 1934 DTaP/Tdap/Td series (1 - Tdap) 1945 ZOSTER VACCINE AGE 60> 1984 Bone Densitometry (Dexa) Screening 1989 LIPID PANEL Q1 2017 HEMOGLOBIN A1C Q6M 2017 Influenza Age 5 to Adult 2018 FOOT EXAM Q1 2018 MICROALBUMIN Q1 2018 MEDICARE YEARLY EXAM 2019 GLAUCOMA SCREENING Q2Y 11/10/2019 Allergies as of 2018  Review Complete On: 2018 By: Sargeant Oats Severity Noted Reaction Type Reactions Codeine  07/11/2012    Nausea Only Pcn [Penicillins]  07/11/2012    Unknown (comments) Cant remember reaction as a child Tetanus Vaccines And Toxoid  07/11/2012    Unknown (comments) Does not take tetanus due to tb hx  
 Vibramycin [Doxycycline Calcium]  07/11/2012    Rash Current Immunizations  Reviewed on 11/27/2017 Name Date Influenza Vaccine 10/2/2014 Influenza Vaccine (Quad) PF 10/19/2017  8:39 AM  
 Influenza Vaccine Split 9/13/2012 Pneumococcal Conjugate (PCV-13) 4/16/2018 Pneumococcal Polysaccharide (PPSV-23) 10/2/2014 Not reviewed this visit Vitals BP Pulse Temp Resp Height(growth percentile) Weight(growth percentile) 129/71 (BP 1 Location: Right arm, BP Patient Position: Sitting) 79 97.6 °F (36.4 °C) (Oral) 16 5' 3\" (1.6 m) 124 lb 6.4 oz (56.4 kg) SpO2 BMI OB Status Smoking Status 97% 22.04 kg/m2 Hysterectomy Never Smoker BMI and BSA Data Body Mass Index Body Surface Area 22.04 kg/m 2 1.58 m 2 Preferred Pharmacy Pharmacy Name Phone [facility] [address] [phone] Your Updated Medication List  
  
   
This list is accurate as of 7/9/18 11:28 AM.  Always use your most recent med list.  
  
  
  
  
 AMITRIPTYLINE PO Take 10-15 mg by mouth nightly. BACTRIM -800 mg per tablet Generic drug:  trimethoprim-sulfamethoxazole Take 1 Tab by mouth daily. On even months  
  
 carvedilol 3.125 mg tablet Commonly known as:  COREG  
as needed. cefUROXime 500 mg tablet Commonly known as:  CEFTIN Take 1 Tab by mouth two (2) times a day. cephALEXin 250 mg capsule Commonly known as:  KEFLEX  
  
 COUMADIN 2.5 mg tablet Generic drug:  warfarin Take 5 mg by mouth daily. doxycycline 100 mg tablet Commonly known as:  VIBRA-TABS  
  
 estradiol 0.01 % (0.1 mg/gram) vaginal cream  
Commonly known as:  ESTRACE  
daily. EYE VITAMIN AND MINERALS PO Take 1 Tab by mouth two (2) times a day. furosemide 20 mg tablet Commonly known as:  LASIX Take 1 Tab by mouth daily. Iron 325 mg (65 mg iron) tablet Generic drug:  ferrous sulfate Take 325 mg by mouth two (2) times a day. latanoprost 0.005 % ophthalmic solution Commonly known as:  Conley Nageotte Administer 1 Drop to both eyes nightly. levETIRAcetam 500 mg tablet Commonly known as:  KEPPRA TAKE ONE TABLET BY MOUTH TWICE DAILY. nitrofurantoin (macrocrystal-monohydrate) 100 mg capsule Commonly known as:  MACROBID  
odd months qd STOOL SOFTENER PO Take 100 mg by mouth daily. TYLENOL 325 mg tablet Generic drug:  acetaminophen Take 162.5 mg by mouth nightly. Patient takes 1/2 of a 325 mg tablet every evening at bedtime. To-Do List   
 07/18/2018 10:00 AM  
  Appointment with [facility] ROOM P3 at [place] [address] ([phone]) [facility]! Dear Ebony Munoz: Thank you for requesting a Responsible City account. Our records indicate that you already have an active Responsible City account. You can access your account anytime at [email or web address] Did you know that you can access your hospital and ER discharge instructions at any time in Responsible City? You can also review all of your test results from your hospital stay or ER visit. Additional Information If you have questions, please visit the Frequently Asked Questions section of the Responsible City website at [email or web address] Remember, Responsible City is NOT to be used for urgent needs. For medical emergencies, dial 911. Now available from your iPhone and Android! Please provide this summary of care documentation to your next provider. Your primary care clinician is listed as WINNIE Hernandez. If you have any questions after today's visit, please call [phone].

## 2018-07-09 NOTE — PROGRESS NOTES
Dio Pugh is a 80 y.o. female     Chief Complaint   Patient presents with    Possible Hernia     right side ref by Dr. Alexandru Romero /71 (BP 1 Location: Right arm, BP Patient Position: Sitting)    Pulse 79    Temp 97.6 °F (36.4 °C) (Oral)    Resp 16    Ht 5' 3\" (1.6 m)    Wt 124 lb 6.4 oz (56.4 kg)    SpO2 97%    BMI 22.04 kg/m2       Health Maintenance Due   Topic Date Due    EYE EXAM RETINAL OR DILATED Q1  1934    DTaP/Tdap/Td series (1 - Tdap) 11/16/1945    ZOSTER VACCINE AGE 60>  09/16/1984    Bone Densitometry (Dexa) Screening  11/16/1989    LIPID PANEL Q1  07/07/2017    HEMOGLOBIN A1C Q6M  12/09/2017       1. Have you been to the ER, urgent care clinic since your last visit? Hospitalized since your last visit? No    2. Have you seen or consulted any other health care providers outside of the [facility] since your last visit? Include any pap smears or colon screening.  No

## 2018-07-10 ENCOUNTER — TELEPHONE (OUTPATIENT)
Dept: SURGERY | Age: 83
End: 2018-07-10

## 2018-07-10 NOTE — TELEPHONE ENCOUNTER
Patients daughter called stating her mother's surgery has been rescheduled to 08/01. She wants to know if Dr Collette Heimlich wants her mother's pre op moved closer to her surgery date.

## 2018-07-19 ENCOUNTER — OFFICE VISIT (OUTPATIENT)
Dept: INTERNAL MEDICINE CLINIC | Age: 83
End: 2018-07-19

## 2018-07-19 ENCOUNTER — HOSPITAL ENCOUNTER (OUTPATIENT)
Dept: PREADMISSION TESTING | Age: 83
Discharge: HOME | End: 2018-07-19
Attending: SURGERY
Payer: MEDICARE

## 2018-07-19 ENCOUNTER — HOSPITAL ENCOUNTER (OUTPATIENT)
Dept: GENERAL RADIOLOGY | Age: 83
End: 2018-07-19
Attending: SURGERY
Payer: MEDICARE

## 2018-07-19 VITALS
WEIGHT: 128.31 LBS | SYSTOLIC BLOOD PRESSURE: 123 MMHG | HEART RATE: 81 BPM | DIASTOLIC BLOOD PRESSURE: 56 MMHG | BODY MASS INDEX: 21.91 KG/M2 | HEIGHT: 64 IN | RESPIRATION RATE: 20 BRPM | OXYGEN SATURATION: 96 % | TEMPERATURE: 97.6 F

## 2018-07-19 VITALS
RESPIRATION RATE: 18 BRPM | HEART RATE: 98 BPM | BODY MASS INDEX: 21.85 KG/M2 | HEIGHT: 64 IN | OXYGEN SATURATION: 93 % | WEIGHT: 128 LBS | SYSTOLIC BLOOD PRESSURE: 129 MMHG | TEMPERATURE: 97.8 F | DIASTOLIC BLOOD PRESSURE: 69 MMHG

## 2018-07-19 DIAGNOSIS — I48.0 PAF (PAROXYSMAL ATRIAL FIBRILLATION) (HCC): ICD-10-CM

## 2018-07-19 DIAGNOSIS — I50.22 CHRONIC SYSTOLIC CONGESTIVE HEART FAILURE (HCC): ICD-10-CM

## 2018-07-19 DIAGNOSIS — F51.01 PRIMARY INSOMNIA: ICD-10-CM

## 2018-07-19 DIAGNOSIS — E11.9 TYPE 2 DIABETES MELLITUS WITHOUT COMPLICATION, WITHOUT LONG-TERM CURRENT USE OF INSULIN (HCC): Primary | ICD-10-CM

## 2018-07-19 LAB
ANION GAP SERPL CALC-SCNC: 7 MMOL/L (ref 5–15)
APPEARANCE UR: CLEAR
BACTERIA URNS QL MICRO: NEGATIVE /HPF
BILIRUB UR QL: NEGATIVE
BUN SERPL-MCNC: 24 MG/DL (ref 6–20)
BUN/CREAT SERPL: 21 (ref 12–20)
CALCIUM SERPL-MCNC: 9.8 MG/DL (ref 8.5–10.1)
CHLORIDE SERPL-SCNC: 103 MMOL/L (ref 97–108)
CO2 SERPL-SCNC: 33 MMOL/L (ref 21–32)
COLOR UR: NORMAL
CREAT SERPL-MCNC: 1.12 MG/DL (ref 0.55–1.02)
EPITH CASTS URNS QL MICRO: NORMAL /LPF
ERYTHROCYTE [DISTWIDTH] IN BLOOD BY AUTOMATED COUNT: 14.6 % (ref 11.5–14.5)
GLUCOSE SERPL-MCNC: 125 MG/DL (ref 65–100)
GLUCOSE UR STRIP.AUTO-MCNC: NEGATIVE MG/DL
HBA1C MFR BLD HPLC: 5.2 %
HCT VFR BLD AUTO: 36.9 % (ref 35–47)
HGB BLD-MCNC: 11.7 G/DL (ref 11.5–16)
HGB UR QL STRIP: NEGATIVE
HYALINE CASTS URNS QL MICRO: NORMAL /LPF (ref 0–5)
KETONES UR QL STRIP.AUTO: NEGATIVE MG/DL
LEUKOCYTE ESTERASE UR QL STRIP.AUTO: NEGATIVE
MCH RBC QN AUTO: 31.4 PG (ref 26–34)
MCHC RBC AUTO-ENTMCNC: 31.7 G/DL (ref 30–36.5)
MCV RBC AUTO: 98.9 FL (ref 80–99)
NITRITE UR QL STRIP.AUTO: NEGATIVE
NRBC # BLD: 0 K/UL (ref 0–0.01)
NRBC BLD-RTO: 0 PER 100 WBC
PH UR STRIP: 5.5 [PH] (ref 5–8)
PLATELET # BLD AUTO: 171 K/UL (ref 150–400)
PMV BLD AUTO: 9.8 FL (ref 8.9–12.9)
POTASSIUM SERPL-SCNC: 4.9 MMOL/L (ref 3.5–5.1)
PROT UR STRIP-MCNC: NEGATIVE MG/DL
RBC # BLD AUTO: 3.73 M/UL (ref 3.8–5.2)
RBC #/AREA URNS HPF: NORMAL /HPF (ref 0–5)
SODIUM SERPL-SCNC: 143 MMOL/L (ref 136–145)
SP GR UR REFRACTOMETRY: 1.01 (ref 1–1.03)
UA: UC IF INDICATED,UAUC: NORMAL
UROBILINOGEN UR QL STRIP.AUTO: 0.2 EU/DL (ref 0.2–1)
WBC # BLD AUTO: 7.3 K/UL (ref 3.6–11)
WBC URNS QL MICRO: NORMAL /HPF (ref 0–4)

## 2018-07-19 PROCEDURE — 85027 COMPLETE CBC AUTOMATED: CPT | Performed by: SURGERY

## 2018-07-19 PROCEDURE — 36415 COLL VENOUS BLD VENIPUNCTURE: CPT | Performed by: SURGERY

## 2018-07-19 PROCEDURE — 80048 BASIC METABOLIC PNL TOTAL CA: CPT | Performed by: SURGERY

## 2018-07-19 PROCEDURE — 81001 URINALYSIS AUTO W/SCOPE: CPT | Performed by: SURGERY

## 2018-07-19 RX ORDER — SODIUM CHLORIDE, SODIUM LACTATE, POTASSIUM CHLORIDE, CALCIUM CHLORIDE 600; 310; 30; 20 MG/100ML; MG/100ML; MG/100ML; MG/100ML
25 INJECTION, SOLUTION INTRAVENOUS CONTINUOUS
Status: CANCELLED | OUTPATIENT
Start: 2018-08-01

## 2018-07-19 RX ORDER — AMITRIPTYLINE HYDROCHLORIDE 10 MG/1
10 TABLET, FILM COATED ORAL
COMMUNITY
End: 2022-01-01

## 2018-07-19 RX ORDER — CHOLECALCIFEROL (VITAMIN D3) 125 MCG
2000 CAPSULE ORAL DAILY
COMMUNITY
End: 2022-01-01

## 2018-07-19 NOTE — PERIOP NOTES
[facility] Preoperative Instructions Surgery Date 08/01/18          Time Jomar Vasquez  Contact # [phone] 1. On the day of your surgery, please report to the Surgical Services Registration Desk and sign in at your designated time. The Surgery Center is located to the right of the Emergency Room. 2. You must have someone with you to drive you home. You should not drive a car for 24 hours following surgery. Please make arrangements for a friend or family member to stay with you for the first 24 hours after your surgery. 3. Do not have anything to eat or drink (including water, gum, mints, coffee, juice) after midnight ?07/31/18. ? This may not apply to medications prescribed by your physician. ?(Please note below the special instructions with medications to take the morning of your procedure.) 4. We recommend you do not drink any alcoholic beverages for 24 hours before and after your surgery. 5. Contact your surgeons office for instructions on the following medications: non-steroidal anti-inflammatory drugs (i.e. Advil, Aleve), vitamins, and supplements. (Some surgeons will want you to stop these medications prior to surgery and others may allow you to take them) **If you are currently taking Plavix, Coumadin, Aspirin and/or other blood-thinning agents, contact your surgeon for instructions. ** Your surgeon will partner with the physician prescribing these medications to determine if it is safe to stop or if you need to continue taking. Please do not stop taking these medications without instructions from your surgeon 6. Wear comfortable clothes. Wear glasses instead of contacts. Do not bring any money or jewelry. Please bring picture ID, insurance card, and any prearranged co-payment or hospital payment. Do not wear make-up, particularly mascara the morning of your surgery. Do not wear nail polish, particularly if you are having foot /hand surgery. Wear your hair loose or down, no ponytails, buns, balwinder pins or clips. All body piercings must be removed. Please shower with antibacterial soap for three consecutive days before and on the morning of surgery, but do not apply any lotions, powders or deodorants after the shower on the day of surgery. Please use a fresh towels after each shower. Please sleep in clean clothes and change bed linens the night before surgery. Please do not shave for 48 hours prior to surgery. Shaving of the face is acceptable. 7. You should understand that if you do not follow these instructions your surgery may be cancelled. If your physical condition changes (I.e. fever, cold or flu) please contact your surgeon as soon as possible. 8. It is important that you be on time. If a situation occurs where you may be late, please call [phone] (OR Holding Area). 9. If you have any questions and or problems, please call [phone] (Pre-admission Testing). 10. Your surgery time may be subject to change. You will receive a phone call the evening prior if your time changes. 11.  If having outpatient surgery, you must have someone to drive you here, stay with you during the duration of your stay, and to drive you home at time of discharge. 12.   In an effort to improve the efficiency, privacy, and safety for all of our Pre-op patients visitors are not allowed in the Holding area. Once you arrive and are registered your family/visitors will be asked to remain in the waiting room. The Pre-op staff will get you from the Surgical Waiting Area and will explain to you and your family/visitors that the Pre-op phase is beginning. The staff will answer any questions and provide instructions for tracking of the patient, by use of the existing tracking number and color-coded status board in the waiting room.   At this time the staff will also ask for your designated spokesperson information in the event that the physician or staff need to provide an update or obtain any pertinent information. The designated spokesperson will be notified if the physician needs to speak to family during the pre-operative phase. If at any time your family/visitors has questions or concerns they may approach the volunteer desk in the waiting area for assistance. Special Instructions:stop coumadin per surgeon MEDICATIONS TO TAKE THE MORNING OF SURGERY WITH A SIP OF WATER:tylenol as needed,coreg,keppra I understand a pre-operative phone call will be made to verify my surgery time. In the event that I am not available, I give permission for a message to be left on my answering service and/or with another person? yes  
 
 
 
 ___________________      __________   _________ 
  (Signature of Patient)             (Witness)                (Date and Time)

## 2018-07-19 NOTE — MR AVS SNAPSHOT
[address] [place] [phone] Patient: Elayne Greenbreg MRN: [id number] Visit Information Date & Time Provider Department Dept. Phone Encounter #  
 2018  1:45 PM Melly Alatorre, [address] [phone] [id number] Follow-up Instructions Return in about 3 months (around 10/19/2018) for CHF and insomnia. Your Appointments 2018  9:00 AM  
POST OP with Stefany Vazquez MD  
[facility] Dr. Dionicio Putnam ([facility] Appt Note: po- Open BIHR 18; .; .  
 [address] [facility], [address] [facility], [address] [phone]  
[address], [address], [address] [facility] [address] [address]  
  
    
 2019  9:40 AM  
Follow Up with Live Merida MD  
[facility] Appt Note: f/u seizure, jrb 18  
 [address] [facility], 
[address], [address] [phone]  
[address] [facility], [address], [address] [address] Upcoming Health Maintenance Date Due  
 EYE EXAM RETINAL OR DILATED Q1 1934 DTaP/Tdap/Td series (1 - Tdap) 1945 ZOSTER VACCINE AGE 60> 1984 Bone Densitometry (Dexa) Screening 1989 LIPID PANEL Q1 2017 HEMOGLOBIN A1C Q6M 2017 Influenza Age 5 to Adult 2018 FOOT EXAM Q1 2018 MICROALBUMIN Q1 2018 MEDICARE YEARLY EXAM 2019 GLAUCOMA SCREENING Q2Y 11/10/2019 Allergies as of 2018  Review Complete On: 2018 By: Virgilio Sun RN Severity Noted Reaction Type Reactions Codeine  2012    Nausea Only Pcn [Penicillins]  2012    Unknown (comments) Cant remember reaction as a child Tetanus Vaccines And Toxoid  2012    Unknown (comments)  Does not take tetanus due to tb hx  
 Vibramycin [Doxycycline Calcium]  07/11/2012    Rash Current Immunizations  Reviewed on 11/27/2017 Name Date Influenza Vaccine 10/2/2014 Influenza Vaccine (Quad) PF 10/19/2017  8:39 AM  
 Influenza Vaccine Split 9/13/2012 Pneumococcal Conjugate (PCV-13) 4/16/2018 Pneumococcal Polysaccharide (PPSV-23) 10/2/2014 Not reviewed this visit You Were Diagnosed With   
  
 Codes Comments Type 2 diabetes mellitus without complication, without long-term current use of insulin (HCC)    -  Primary ICD-10-CM: E11.9 ICD-9-CM: 250.00 Primary insomnia     ICD-10-CM: F51.01 
ICD-9-CM: 307.42   
 PAF (paroxysmal atrial fibrillation) ([facility])     ICD-10-CM: I48.0 ICD-9-CM: 427.31 Chronic systolic congestive heart failure (HCC)     ICD-10-CM: I50.22 ICD-9-CM: 428.22, 428.0 Vitals BP Pulse Temp Resp Height(growth percentile) Weight(growth percentile) 129/69 (BP 1 Location: Right arm, BP Patient Position: Sitting) 98 97.8 °F (36.6 °C) (Oral) 18 5' 4\" (1.626 m) 128 lb (58.1 kg) SpO2 BMI OB Status Smoking Status 93% 21.97 kg/m2 Hysterectomy Never Smoker BMI and BSA Data Body Mass Index Body Surface Area  
 21.97 kg/m 2 1.62 m 2 Preferred Pharmacy Pharmacy Name Phone [facility] [address] [place] [address] [phone] Your Updated Medication List  
  
   
This list is accurate as of 7/19/18  2:56 PM.  Always use your most recent med list.  
  
  
  
  
 BACTRIM -800 mg per tablet Generic drug:  trimethoprim-sulfamethoxazole Take 1 Tab by mouth daily. On even months  
  
 carvedilol 3.125 mg tablet Commonly known as:  COREG  
as needed. cephALEXin 250 mg capsule Commonly known as:  KEFLEX  
  
 COUMADIN 2.5 mg tablet Generic drug:  warfarin Take 5 mg by mouth daily. CRANBERRY PO Take 1 Can by mouth daily.   
  
 estradiol 0.01 % (0.1 mg/gram) vaginal cream  
Commonly known as:  ESTRACE  
 daily.  
  
 EYE VITAMIN AND MINERALS PO Take 1 Tab by mouth two (2) times a day. furosemide 20 mg tablet Commonly known as:  LASIX Take 1 Tab by mouth daily. Iron 325 mg (65 mg iron) tablet Generic drug:  ferrous sulfate Take 325 mg by mouth two (2) times a day. latanoprost 0.005 % ophthalmic solution Commonly known as:  Kellee Calhoun Administer 1 Drop to both eyes nightly. levETIRAcetam 500 mg tablet Commonly known as:  KEPPRA TAKE ONE TABLET BY MOUTH TWICE DAILY. nitrofurantoin (macrocrystal-monohydrate) 100 mg capsule Commonly known as:  MACROBID  
odd months qd STOOL SOFTENER PO Take 100 mg by mouth daily. suvorexant 10 mg tablet Commonly known as:  Bernardino Gilford Take 1 Tab by mouth nightly as needed for Insomnia. Max Daily Amount: 10 mg.  
  
 TYLENOL 325 mg tablet Generic drug:  acetaminophen Take 162.5 mg by mouth nightly. Patient takes 1/2 of a 325 mg tablet every evening at bedtime. Prescriptions Printed Refills  
 suvorexant (BELSOMRA) 10 mg tablet 0 Sig: Take 1 Tab by mouth nightly as needed for Insomnia. Max Daily Amount: 10 mg.  
 Class: Print Route: Oral  
  
We Performed the Following AMB POC HEMOGLOBIN A1C [89484 CPT(R)] Follow-up Instructions Return in about 3 months (around 10/19/2018) for CHF and insomnia. Patient Instructions Trial of Belsomra 10mg at bedtime to help with insomnia do not mix the belsomra and the amitriptyline If belsomra does not work recommend taking amitriptyline daily Introducing [facility]! Dear Mirela Craft: Thank you for requesting a Senseg account. Our records indicate that you already have an active Senseg account. You can access your account anytime at [email or web address] Did you know that you can access your hospital and ER discharge instructions at any time in Senseg?   You can also review all of your test results from your hospital stay or ER visit. Additional Information If you have questions, please visit the Frequently Asked Questions section of the Unicot[employer] website at [email or web address] Remember, Unicotrip is NOT to be used for urgent needs. For medical emergencies, dial 911. Now available from your iPhone and Android! Please provide this summary of care documentation to your next provider. Your primary care clinician is listed as WINNIE Hernandez. If you have any questions after today's visit, please call [phone].

## 2018-07-19 NOTE — PROGRESS NOTES
CC: C6/SHZMTD (recert)      HPI:    She is a 80 y.o. female who presents for evaluation of chronic medical problems  She reports recently seen Dr. Christy Unger her cardiologist.  She recently had to take a full dose of Lasix daily due to increased edema which has improved. X ray - showed decreased pleural effusion on 03/28/ 2018  Saw Dr Christy Unger and had recent TTE - requested records        Afib: she had an av michael ablation and advised to continue on coumadin. I personally discussed with Dr Christy Unger and he advised she must continue on coumadin long term  - no longer on amioradone (had toxicity)      CKD: She is followed closely by Dr. Sis Umaña. Recurrent UTIs - in suppression with Keflex alternating with Macrobid. Denies any recent UTIs      Cannot sleep at night: Complains of frequent insomnia difficulty falling asleep since getting 3-4 hours per night. She is requesting medication to help her with sleep. The Elavil is rarely effective. She is also not taking it daily.     ROS:  10 systems reviewed and negative other than HPI    Past Medical History:   Diagnosis Date    Alopecia     Arrhythmia     atrial fib,?svt    Arthritis     Atrial fibrillation (HCC)     Benign essential HTN     Benign neoplasm of vulva     CAD (coronary artery disease)     Cholelithiases     Colon cancer (HCC)     Constipation     Dyspareunia     Glaucoma     History of mixed connective tissue disease     Hx of viral pericarditis     Hyperactivity of bladder     Hypercholesterolemia     Ill-defined condition     macular degeneration    Insomnia     Macular degeneration     Neck pain     Psoriasis     Rectocele     Renal cyst     Seizure disorder (HCC)     none in 36 years    [facility]ogren's disease ([facility] [address]     Thrombophlebitis     TIA (transient ischemic attack)     Vaginal vault prolapse, posthysterectomy     Varicose veins        Current Outpatient Prescriptions on File Prior to Visit   Medication Sig Dispense Refill    estradiol (ESTRACE) 0.01 % (0.1 mg/gram) vaginal cream daily.  nitrofurantoin, macrocrystal-monohydrate, (MACROBID) 100 mg capsule odd months qd      trimethoprim-sulfamethoxazole (BACTRIM DS) 160-800 mg per tablet Take 1 Tab by mouth daily. On even months      levETIRAcetam (KEPPRA) 500 mg tablet TAKE ONE TABLET BY MOUTH TWICE DAILY. 180 Tab 5    carvedilol (COREG) 3.125 mg tablet 3.125 mg as needed. Only takes medication if blood pressure systolic 614 or greater      furosemide (LASIX) 20 mg tablet Take 1 Tab by mouth daily. 15 Tab 0    warfarin (COUMADIN) 2.5 mg tablet Take 5 mg by mouth daily.  VIT A/C/E AC/ZNOX/CUPRIC OXIDE (EYE VITAMIN AND MINERALS PO) Take 1 Tab by mouth two (2) times a day.  ferrous sulfate (IRON) 325 mg (65 mg iron) tablet Take 325 mg by mouth two (2) times a day.  acetaminophen (TYLENOL) 325 mg tablet Take 162.5 mg by mouth nightly. Patient takes 1/2 of a 325 mg tablet every evening at bedtime.  DOCUSATE CALCIUM (STOOL SOFTENER PO) Take 100 mg by mouth daily.  latanoprost (XALATAN) 0.005 % ophthalmic solution Administer 1 Drop to both eyes nightly. No current facility-administered medications on file prior to visit. Past Surgical History:   Procedure Laterality Date    HX BREAST LUMPECTOMY      left breast    HX CATARACT REMOVAL      HX COLECTOMY  1955    partial    HX HEMORRHOIDECTOMY      HX HYSTERECTOMY      HX PACEMAKER      left    HX SHOULDER ARTHROSCOPY      left       Family History   Problem Relation Age of Onset    Cancer Father     Inflammatory Bowel Dz Mother     Heart Disease Brother     Diabetes Brother      Reviewed and no changes     Social History     Social History    Marital status:      Spouse name: N/A    Number of children: N/A    Years of education: N/A     Occupational History    Not on file.      Social History Main Topics    Smoking status: Never Smoker    Smokeless tobacco: Never Used   [facility] Alcohol use No    Drug use: No    Sexual activity: No     Other Topics Concern    Not on file     Social History Narrative            Visit Vitals    /69 (BP 1 Location: Right arm, BP Patient Position: Sitting)    Pulse 98    Temp 97.8 °F (36.6 °C) (Oral)    Resp 18    Ht 5' 4\" (1.626 m)    Wt 128 lb (58.1 kg)    SpO2 93%    BMI 21.97 kg/m2       Physical Examination:   General - Well appearing female  HEENT - PERRL, TM no erythema/opacification, normal nasal turbinates, oropharynx no erythema or exudate, MMM  Neck - supple, no bruits, no TMG, no LAD  Pulm - clear to auscultation bilaterally  Cardio - RRR, normal S1 S2, no murmur gallops or rubs  Abd - soft, nontender, no masses, no HSM  Extrem - no edema, +2 distal pulses  Psych - normal affect, appropriate mood    Lab Results   Component Value Date/Time    WBC 7.3 07/19/2018 01:46 PM    HGB 11.7 07/19/2018 01:46 PM    HCT 36.9 07/19/2018 01:46 PM    PLATELET 636 01/26/2701 01:46 PM    MCV 98.9 07/19/2018 01:46 PM     Lab Results   Component Value Date/Time    Sodium 143 07/19/2018 01:46 PM    Potassium 4.9 07/19/2018 01:46 PM    Chloride 103 07/19/2018 01:46 PM    CO2 33 (H) 07/19/2018 01:46 PM    Anion gap 7 07/19/2018 01:46 PM    Glucose 125 (H) 07/19/2018 01:46 PM    BUN 24 (H) 07/19/2018 01:46 PM    Creatinine 1.12 (H) 07/19/2018 01:46 PM    BUN/Creatinine ratio 21 (H) 07/19/2018 01:46 PM    GFR est AA 55 (L) 07/19/2018 01:46 PM    GFR est non-AA 45 (L) 07/19/2018 01:46 PM    Calcium 9.8 07/19/2018 01:46 PM     Lab Results   Component Value Date/Time    Cholesterol, total 196 09/12/2012 12:00 AM    HDL Cholesterol 64 09/12/2012 12:00 AM    LDL, calculated 104 (H) 09/12/2012 12:00 AM    VLDL, calculated 28 09/12/2012 12:00 AM    Triglyceride 142 09/12/2012 12:00 AM     Lab Results   Component Value Date/Time    TSH 4.340 11/09/2017 02:13 PM     No results found for: PSACarlito, PSAR3, [id number], [id number], PSALT  Lab Results   Component Value Date/Time    Hemoglobin A1c 5.6 06/09/2017 09:07 AM    Hemoglobin A1c (POC) 5.2 07/19/2018 03:42 PM     Lab Results   Component Value Date/Time    VITAMIN D, 25-HYDROXY 91.7 11/09/2017 02:13 PM       Lab Results   Component Value Date/Time    ALT (SGPT) 18 10/30/2017 11:55 PM    AST (SGOT) 21 10/30/2017 11:55 PM    Alk. phosphatase 91 10/30/2017 11:55 PM    Bilirubin, total 0.8 10/30/2017 11:55 PM      EKG with paced rhythm        Assessment/Plan:    Type 2 diabetes mellitus without complication, without long-term current use of insulin ([address]  Well controlled on diet  - AMB POC HEMOGLOBIN A1C     Primary insomnia  - discussed sleep  hygiene. Patient advised to start insomnia 10 mg at bedtime. Patient advised to not take Elavil as it has not been effective. Systolic congestive heart failure (HCC) with hypoxia  - EF 20% on TTE while in the hospital and nuclear stress test showed normal EF   Uses oxygen at night and needs to continue using oxygen  X ray - showed decreased pleural effusion on 03/28/ 2018  Saw Dr Christina Jose and had recent TTE - requested records  Currently weight is 128 lbs   Using oxygen at night time        Afib: she had an av michael ablation and advised to continue on coumadin  - no longer on amioradone (had toxicity)      CKD: followed by Dr Roberto Prajapati -following Ms Edouard Mariscal recent blood work done ( July 2018)       Recurrent UTIs - in suppression with Keflex alternating with Macrobid. No recent UTI        Cannot sleep at night       Follow-up Disposition:  Return in about 3 months (around 10/19/2018) for CHF and insomnia.     Derek Leon MD

## 2018-07-19 NOTE — PROGRESS NOTES
Reviewed record in preparation for visit and have obtained necessary documentation. Identified pt with two pt identifiers(name and ). Chief Complaint   Patient presents with    N5/XFMLBR     recert       Health Maintenance Due   Topic Date Due    Eye Exam  1934    DTaP/Tdap/Td  (1 - Tdap) 1945    Shingles Vaccine  1984    Bone Mineral Density   1989    Cholesterol Test   2017    Hemoglobin A1C    2017       Ms. Allen Polo has a reminder for a \"due or due soon\" health maintenance. I have asked that she discuss this further with her primary care provider for follow-up on this health maintenance. Coordination of Care Questionnaire:  :     1) Have you been to an emergency room, urgent care clinic since your last visit? no   Hospitalized since your last visit? no             2) Have you seen or consulted any other health care providers outside of [facility] since your last visit? no  (Include any pap smears or colon screenings in this section.)    3) In the event something were to happen to you and you were unable to speak on your behalf, do you have an Advance Directive/ Living Will in place stating your wishes? NO    Do you have an Advance Directive on file? no    4) Are you interested in receiving information on Advance Directives? NO    Patient is accompanied by adult caretaker I have received verbal consent from Gabi Carrasco to discuss any/all medical information while they are present in the room.

## 2018-07-19 NOTE — PATIENT INSTRUCTIONS
Trial of Belsomra 10mg at bedtime to help with insomnia do not mix the belsomra and the amitriptyline

## 2018-07-26 RX ORDER — VANCOMYCIN/0.9 % SOD CHLORIDE 1.5G/250ML
1500 PLASTIC BAG, INJECTION (ML) INTRAVENOUS ONCE
Status: CANCELLED | OUTPATIENT
Start: 2018-08-01 | End: 2018-08-01

## 2018-08-01 ENCOUNTER — ANESTHESIA (OUTPATIENT)
Dept: SURGERY | Age: 83
DRG: 351 | End: 2018-08-01
Payer: MEDICARE

## 2018-08-01 ENCOUNTER — ANESTHESIA EVENT (OUTPATIENT)
Dept: SURGERY | Age: 83
DRG: 351 | End: 2018-08-01
Payer: MEDICARE

## 2018-08-01 ENCOUNTER — HOSPITAL ENCOUNTER (INPATIENT)
Age: 83
LOS: 1 days | Discharge: HOME | DRG: 351 | End: 2018-08-03
Attending: SURGERY | Admitting: SURGERY
Payer: MEDICARE

## 2018-08-01 PROBLEM — K40.20 BILATERAL INGUINAL HERNIA: Status: RESOLVED | Noted: 2018-08-01 | Resolved: 2018-08-01

## 2018-08-01 PROBLEM — K40.20 BILATERAL INGUINAL HERNIA: Status: ACTIVE | Noted: 2018-08-01

## 2018-08-01 LAB
ANION GAP BLD CALC-SCNC: 15 MMOL/L (ref 10–20)
BUN BLD-MCNC: 26 MG/DL (ref 9–20)
CA-I BLD-MCNC: 1.16 MMOL/L (ref 1.12–1.32)
CHLORIDE BLD-SCNC: 101 MMOL/L (ref 98–107)
CO2 BLD-SCNC: 29 MMOL/L (ref 21–32)
CREAT BLD-MCNC: 0.8 MG/DL (ref 0.6–1.3)
GLUCOSE BLD-MCNC: 102 MG/DL (ref 65–100)
HCT VFR BLD CALC: 35 % (ref 35–47)
INR BLD: 1.1 (ref 0.9–1.2)
POTASSIUM BLD-SCNC: 4.6 MMOL/L (ref 3.5–5.1)
SERVICE CMNT-IMP: ABNORMAL
SODIUM BLD-SCNC: 140 MMOL/L (ref 136–145)

## 2018-08-01 PROCEDURE — 99218 HC RM OBSERVATION: CPT

## 2018-08-01 PROCEDURE — 74011000250 HC RX REV CODE- 250: Performed by: SURGERY

## 2018-08-01 PROCEDURE — 94760 N-INVAS EAR/PLS OXIMETRY 1: CPT

## 2018-08-01 PROCEDURE — C1781 MESH (IMPLANTABLE): HCPCS | Performed by: SURGERY

## 2018-08-01 PROCEDURE — 76010000153 HC OR TIME 1.5 TO 2 HR: Performed by: SURGERY

## 2018-08-01 PROCEDURE — 74011250636 HC RX REV CODE- 250/636

## 2018-08-01 PROCEDURE — 74011250636 HC RX REV CODE- 250/636: Performed by: ANESTHESIOLOGY

## 2018-08-01 PROCEDURE — 85610 PROTHROMBIN TIME: CPT

## 2018-08-01 PROCEDURE — 77010033678 HC OXYGEN DAILY

## 2018-08-01 PROCEDURE — 76060000034 HC ANESTHESIA 1.5 TO 2 HR: Performed by: SURGERY

## 2018-08-01 PROCEDURE — 77030012890

## 2018-08-01 PROCEDURE — 74011000250 HC RX REV CODE- 250

## 2018-08-01 PROCEDURE — 77030026438 HC STYL ET INTUB CARD -A: Performed by: ANESTHESIOLOGY

## 2018-08-01 PROCEDURE — 76210000006 HC OR PH I REC 0.5 TO 1 HR: Performed by: SURGERY

## 2018-08-01 PROCEDURE — 88302 TISSUE EXAM BY PATHOLOGIST: CPT | Performed by: SURGERY

## 2018-08-01 PROCEDURE — 74011250636 HC RX REV CODE- 250/636: Performed by: SURGERY

## 2018-08-01 PROCEDURE — 77030013079 HC BLNKT BAIR HGGR 3M -A: Performed by: ANESTHESIOLOGY

## 2018-08-01 PROCEDURE — 77030002933 HC SUT MCRYL J&J -A: Performed by: SURGERY

## 2018-08-01 PROCEDURE — 77030002966 HC SUT PDS J&J -A: Performed by: SURGERY

## 2018-08-01 PROCEDURE — 77030020782 HC GWN BAIR PAWS FLX 3M -B

## 2018-08-01 PROCEDURE — 74011250637 HC RX REV CODE- 250/637: Performed by: SURGERY

## 2018-08-01 PROCEDURE — 0YUA0JZ SUPPLEMENT BILATERAL INGUINAL REGION WITH SYNTHETIC SUBSTITUTE, OPEN APPROACH: ICD-10-PCS | Performed by: SURGERY

## 2018-08-01 PROCEDURE — 77030018673: Performed by: SURGERY

## 2018-08-01 PROCEDURE — 77030037878 HC DRSG MEPILEX >48IN BORD MOLN -B

## 2018-08-01 PROCEDURE — 77030019908 HC STETH ESOPH SIMS -A: Performed by: ANESTHESIOLOGY

## 2018-08-01 PROCEDURE — 77030031139 HC SUT VCRL2 J&J -A: Performed by: SURGERY

## 2018-08-01 PROCEDURE — 77030032490 HC SLV COMPR SCD KNE COVD -B: Performed by: SURGERY

## 2018-08-01 PROCEDURE — 77030039266 HC ADH SKN EXOFIN S2SG -A: Performed by: SURGERY

## 2018-08-01 PROCEDURE — 77030003028 HC SUT VCRL J&J -A: Performed by: SURGERY

## 2018-08-01 PROCEDURE — 77030021668 HC NEB PREFIL KT VYRM -A

## 2018-08-01 PROCEDURE — 77030011640 HC PAD GRND REM COVD -A: Performed by: SURGERY

## 2018-08-01 PROCEDURE — 80047 BASIC METABLC PNL IONIZED CA: CPT

## 2018-08-01 PROCEDURE — 77030008684 HC TU ET CUF COVD -B: Performed by: ANESTHESIOLOGY

## 2018-08-01 DEVICE — MESH SURG W3.5XL6IN POLY SELF FIXATING RECT W/ RESRB PLA: Type: IMPLANTABLE DEVICE | Site: INGUINAL | Status: FUNCTIONAL

## 2018-08-01 RX ORDER — WARFARIN SODIUM 5 MG/1
5 TABLET ORAL DAILY
Status: DISCONTINUED | OUTPATIENT
Start: 2018-08-01 | End: 2018-08-01 | Stop reason: SDUPTHER

## 2018-08-01 RX ORDER — ONDANSETRON 2 MG/ML
4 INJECTION INTRAMUSCULAR; INTRAVENOUS AS NEEDED
Status: DISCONTINUED | OUTPATIENT
Start: 2018-08-01 | End: 2018-08-01 | Stop reason: HOSPADM

## 2018-08-01 RX ORDER — NEOSTIGMINE METHYLSULFATE 1 MG/ML
INJECTION INTRAVENOUS AS NEEDED
Status: DISCONTINUED | OUTPATIENT
Start: 2018-08-01 | End: 2018-08-01 | Stop reason: HOSPADM

## 2018-08-01 RX ORDER — MORPHINE SULFATE 10 MG/ML
2 INJECTION, SOLUTION INTRAMUSCULAR; INTRAVENOUS
Status: DISCONTINUED | OUTPATIENT
Start: 2018-08-01 | End: 2018-08-01 | Stop reason: HOSPADM

## 2018-08-01 RX ORDER — ONDANSETRON 2 MG/ML
4 INJECTION INTRAMUSCULAR; INTRAVENOUS
Status: DISCONTINUED | OUTPATIENT
Start: 2018-08-01 | End: 2018-08-03 | Stop reason: HOSPADM

## 2018-08-01 RX ORDER — ACETAMINOPHEN 10 MG/ML
INJECTION, SOLUTION INTRAVENOUS AS NEEDED
Status: DISCONTINUED | OUTPATIENT
Start: 2018-08-01 | End: 2018-08-01 | Stop reason: HOSPADM

## 2018-08-01 RX ORDER — FENTANYL CITRATE 50 UG/ML
25 INJECTION, SOLUTION INTRAMUSCULAR; INTRAVENOUS
Status: DISCONTINUED | OUTPATIENT
Start: 2018-08-01 | End: 2018-08-01 | Stop reason: HOSPADM

## 2018-08-01 RX ORDER — LIDOCAINE HYDROCHLORIDE 10 MG/ML
0.1 INJECTION, SOLUTION EPIDURAL; INFILTRATION; INTRACAUDAL; PERINEURAL AS NEEDED
Status: DISCONTINUED | OUTPATIENT
Start: 2018-08-01 | End: 2018-08-01 | Stop reason: HOSPADM

## 2018-08-01 RX ORDER — SODIUM CHLORIDE 9 MG/ML
50 INJECTION, SOLUTION INTRAVENOUS CONTINUOUS
Status: DISCONTINUED | OUTPATIENT
Start: 2018-08-01 | End: 2018-08-01 | Stop reason: HOSPADM

## 2018-08-01 RX ORDER — FUROSEMIDE 10 MG/ML
INJECTION INTRAMUSCULAR; INTRAVENOUS AS NEEDED
Status: DISCONTINUED | OUTPATIENT
Start: 2018-08-01 | End: 2018-08-01 | Stop reason: HOSPADM

## 2018-08-01 RX ORDER — ROCURONIUM BROMIDE 10 MG/ML
INJECTION, SOLUTION INTRAVENOUS AS NEEDED
Status: DISCONTINUED | OUTPATIENT
Start: 2018-08-01 | End: 2018-08-01 | Stop reason: HOSPADM

## 2018-08-01 RX ORDER — SODIUM CHLORIDE, SODIUM LACTATE, POTASSIUM CHLORIDE, CALCIUM CHLORIDE 600; 310; 30; 20 MG/100ML; MG/100ML; MG/100ML; MG/100ML
25 INJECTION, SOLUTION INTRAVENOUS CONTINUOUS
Status: DISCONTINUED | OUTPATIENT
Start: 2018-08-01 | End: 2018-08-01 | Stop reason: HOSPADM

## 2018-08-01 RX ORDER — KETAMINE HYDROCHLORIDE 50 MG/ML
INJECTION, SOLUTION INTRAMUSCULAR; INTRAVENOUS AS NEEDED
Status: DISCONTINUED | OUTPATIENT
Start: 2018-08-01 | End: 2018-08-01 | Stop reason: HOSPADM

## 2018-08-01 RX ORDER — NALOXONE HYDROCHLORIDE 0.4 MG/ML
0.4 INJECTION, SOLUTION INTRAMUSCULAR; INTRAVENOUS; SUBCUTANEOUS AS NEEDED
Status: DISCONTINUED | OUTPATIENT
Start: 2018-08-01 | End: 2018-08-03 | Stop reason: HOSPADM

## 2018-08-01 RX ORDER — SODIUM CHLORIDE 0.9 % (FLUSH) 0.9 %
5-10 SYRINGE (ML) INJECTION EVERY 8 HOURS
Status: DISCONTINUED | OUTPATIENT
Start: 2018-08-01 | End: 2018-08-01 | Stop reason: HOSPADM

## 2018-08-01 RX ORDER — FENTANYL CITRATE 50 UG/ML
50 INJECTION, SOLUTION INTRAMUSCULAR; INTRAVENOUS AS NEEDED
Status: DISCONTINUED | OUTPATIENT
Start: 2018-08-01 | End: 2018-08-01 | Stop reason: HOSPADM

## 2018-08-01 RX ORDER — SODIUM CHLORIDE, SODIUM LACTATE, POTASSIUM CHLORIDE, CALCIUM CHLORIDE 600; 310; 30; 20 MG/100ML; MG/100ML; MG/100ML; MG/100ML
75 INJECTION, SOLUTION INTRAVENOUS CONTINUOUS
Status: DISCONTINUED | OUTPATIENT
Start: 2018-08-01 | End: 2018-08-01 | Stop reason: HOSPADM

## 2018-08-01 RX ORDER — LEVETIRACETAM 500 MG/1
500 TABLET ORAL 2 TIMES DAILY
Status: DISCONTINUED | OUTPATIENT
Start: 2018-08-01 | End: 2018-08-03 | Stop reason: HOSPADM

## 2018-08-01 RX ORDER — DIPHENHYDRAMINE HYDROCHLORIDE 50 MG/ML
12.5 INJECTION, SOLUTION INTRAMUSCULAR; INTRAVENOUS AS NEEDED
Status: DISCONTINUED | OUTPATIENT
Start: 2018-08-01 | End: 2018-08-01 | Stop reason: HOSPADM

## 2018-08-01 RX ORDER — SODIUM CHLORIDE 0.9 % (FLUSH) 0.9 %
5-10 SYRINGE (ML) INJECTION AS NEEDED
Status: DISCONTINUED | OUTPATIENT
Start: 2018-08-01 | End: 2018-08-01 | Stop reason: HOSPADM

## 2018-08-01 RX ORDER — ETOMIDATE 2 MG/ML
INJECTION INTRAVENOUS AS NEEDED
Status: DISCONTINUED | OUTPATIENT
Start: 2018-08-01 | End: 2018-08-01 | Stop reason: HOSPADM

## 2018-08-01 RX ORDER — GABAPENTIN 300 MG/1
300 CAPSULE ORAL 2 TIMES DAILY
Status: DISCONTINUED | OUTPATIENT
Start: 2018-08-01 | End: 2018-08-02

## 2018-08-01 RX ORDER — HYDROMORPHONE HYDROCHLORIDE 1 MG/ML
0.2 INJECTION, SOLUTION INTRAMUSCULAR; INTRAVENOUS; SUBCUTANEOUS
Status: DISCONTINUED | OUTPATIENT
Start: 2018-08-01 | End: 2018-08-01 | Stop reason: HOSPADM

## 2018-08-01 RX ORDER — SODIUM CHLORIDE 0.9 % (FLUSH) 0.9 %
5-10 SYRINGE (ML) INJECTION AS NEEDED
Status: DISCONTINUED | OUTPATIENT
Start: 2018-08-01 | End: 2018-08-03 | Stop reason: HOSPADM

## 2018-08-01 RX ORDER — HYDROMORPHONE HYDROCHLORIDE 2 MG/ML
.5-1 INJECTION, SOLUTION INTRAMUSCULAR; INTRAVENOUS; SUBCUTANEOUS
Status: DISCONTINUED | OUTPATIENT
Start: 2018-08-01 | End: 2018-08-02

## 2018-08-01 RX ORDER — ENOXAPARIN SODIUM 100 MG/ML
60 INJECTION SUBCUTANEOUS EVERY 24 HOURS
Status: DISCONTINUED | OUTPATIENT
Start: 2018-08-01 | End: 2018-08-03 | Stop reason: HOSPADM

## 2018-08-01 RX ORDER — MIDAZOLAM HYDROCHLORIDE 1 MG/ML
0.5 INJECTION, SOLUTION INTRAMUSCULAR; INTRAVENOUS
Status: DISCONTINUED | OUTPATIENT
Start: 2018-08-01 | End: 2018-08-01 | Stop reason: HOSPADM

## 2018-08-01 RX ORDER — HYDROCODONE BITARTRATE AND ACETAMINOPHEN 5; 325 MG/1; MG/1
1-2 TABLET ORAL
Status: DISCONTINUED | OUTPATIENT
Start: 2018-08-01 | End: 2018-08-02

## 2018-08-01 RX ORDER — BUPIVACAINE HYDROCHLORIDE AND EPINEPHRINE 5; 5 MG/ML; UG/ML
INJECTION, SOLUTION EPIDURAL; INTRACAUDAL; PERINEURAL AS NEEDED
Status: DISCONTINUED | OUTPATIENT
Start: 2018-08-01 | End: 2018-08-01 | Stop reason: HOSPADM

## 2018-08-01 RX ORDER — AMITRIPTYLINE HYDROCHLORIDE 10 MG/1
10-20 TABLET, FILM COATED ORAL
Status: DISCONTINUED | OUTPATIENT
Start: 2018-08-01 | End: 2018-08-03 | Stop reason: HOSPADM

## 2018-08-01 RX ORDER — GLYCOPYRROLATE 0.2 MG/ML
INJECTION INTRAMUSCULAR; INTRAVENOUS AS NEEDED
Status: DISCONTINUED | OUTPATIENT
Start: 2018-08-01 | End: 2018-08-01 | Stop reason: HOSPADM

## 2018-08-01 RX ORDER — MIDAZOLAM HYDROCHLORIDE 1 MG/ML
1 INJECTION, SOLUTION INTRAMUSCULAR; INTRAVENOUS AS NEEDED
Status: DISCONTINUED | OUTPATIENT
Start: 2018-08-01 | End: 2018-08-01 | Stop reason: HOSPADM

## 2018-08-01 RX ORDER — PHENYLEPHRINE HCL IN 0.9% NACL 0.4MG/10ML
SYRINGE (ML) INTRAVENOUS AS NEEDED
Status: DISCONTINUED | OUTPATIENT
Start: 2018-08-01 | End: 2018-08-01 | Stop reason: HOSPADM

## 2018-08-01 RX ORDER — OXYCODONE AND ACETAMINOPHEN 5; 325 MG/1; MG/1
1 TABLET ORAL AS NEEDED
Status: DISCONTINUED | OUTPATIENT
Start: 2018-08-01 | End: 2018-08-01 | Stop reason: HOSPADM

## 2018-08-01 RX ORDER — SUCCINYLCHOLINE CHLORIDE 20 MG/ML
INJECTION INTRAMUSCULAR; INTRAVENOUS AS NEEDED
Status: DISCONTINUED | OUTPATIENT
Start: 2018-08-01 | End: 2018-08-01 | Stop reason: HOSPADM

## 2018-08-01 RX ORDER — ONDANSETRON 2 MG/ML
INJECTION INTRAMUSCULAR; INTRAVENOUS AS NEEDED
Status: DISCONTINUED | OUTPATIENT
Start: 2018-08-01 | End: 2018-08-01 | Stop reason: HOSPADM

## 2018-08-01 RX ORDER — LATANOPROST 50 UG/ML
1 SOLUTION/ DROPS OPHTHALMIC
Status: DISCONTINUED | OUTPATIENT
Start: 2018-08-01 | End: 2018-08-03 | Stop reason: HOSPADM

## 2018-08-01 RX ORDER — SODIUM CHLORIDE 0.9 % (FLUSH) 0.9 %
5-10 SYRINGE (ML) INJECTION EVERY 8 HOURS
Status: DISCONTINUED | OUTPATIENT
Start: 2018-08-01 | End: 2018-08-03 | Stop reason: HOSPADM

## 2018-08-01 RX ORDER — POLYETHYLENE GLYCOL 3350 17 G/17G
17 POWDER, FOR SOLUTION ORAL DAILY
Status: DISCONTINUED | OUTPATIENT
Start: 2018-08-01 | End: 2018-08-03 | Stop reason: HOSPADM

## 2018-08-01 RX ADMIN — Medication 40 MCG: at 07:57

## 2018-08-01 RX ADMIN — SODIUM CHLORIDE, POTASSIUM CHLORIDE, SODIUM LACTATE AND CALCIUM CHLORIDE 25 ML/HR: 600; 310; 30; 20 INJECTION, SOLUTION INTRAVENOUS at 07:25

## 2018-08-01 RX ADMIN — GLYCOPYRROLATE 0.4 MG: 0.2 INJECTION INTRAMUSCULAR; INTRAVENOUS at 09:14

## 2018-08-01 RX ADMIN — ETOMIDATE 20 MG: 2 INJECTION INTRAVENOUS at 07:57

## 2018-08-01 RX ADMIN — ROCURONIUM BROMIDE 5 MG: 10 INJECTION, SOLUTION INTRAVENOUS at 07:57

## 2018-08-01 RX ADMIN — ONDANSETRON 4 MG: 2 INJECTION INTRAMUSCULAR; INTRAVENOUS at 09:01

## 2018-08-01 RX ADMIN — KETAMINE HYDROCHLORIDE 20 MG: 50 INJECTION, SOLUTION INTRAMUSCULAR; INTRAVENOUS at 08:12

## 2018-08-01 RX ADMIN — NEOSTIGMINE METHYLSULFATE 3 MG: 1 INJECTION INTRAVENOUS at 09:14

## 2018-08-01 RX ADMIN — SODIUM CHLORIDE, POTASSIUM CHLORIDE, SODIUM LACTATE AND CALCIUM CHLORIDE: 600; 310; 30; 20 INJECTION, SOLUTION INTRAVENOUS at 07:23

## 2018-08-01 RX ADMIN — ENOXAPARIN SODIUM 60 MG: 100 INJECTION SUBCUTANEOUS at 21:03

## 2018-08-01 RX ADMIN — LEVETIRACETAM 500 MG: 500 TABLET ORAL at 19:15

## 2018-08-01 RX ADMIN — SUCCINYLCHOLINE CHLORIDE 100 MG: 20 INJECTION INTRAMUSCULAR; INTRAVENOUS at 07:57

## 2018-08-01 RX ADMIN — AMITRIPTYLINE HYDROCHLORIDE 20 MG: 10 TABLET, FILM COATED ORAL at 21:04

## 2018-08-01 RX ADMIN — FUROSEMIDE 20 MG: 10 INJECTION INTRAMUSCULAR; INTRAVENOUS at 07:52

## 2018-08-01 RX ADMIN — Medication 80 MCG: at 08:23

## 2018-08-01 RX ADMIN — WARFARIN SODIUM 5 MG: 1 TABLET ORAL at 21:03

## 2018-08-01 RX ADMIN — GABAPENTIN 300 MG: 300 CAPSULE ORAL at 13:52

## 2018-08-01 RX ADMIN — ACETAMINOPHEN 1000 MG: 10 INJECTION, SOLUTION INTRAVENOUS at 09:01

## 2018-08-01 RX ADMIN — ROCURONIUM BROMIDE 15 MG: 10 INJECTION, SOLUTION INTRAVENOUS at 08:29

## 2018-08-01 RX ADMIN — VANCOMYCIN HYDROCHLORIDE 1000 MG: 1 INJECTION, POWDER, LYOPHILIZED, FOR SOLUTION INTRAVENOUS at 07:25

## 2018-08-01 RX ADMIN — GABAPENTIN 300 MG: 300 CAPSULE ORAL at 19:15

## 2018-08-01 RX ADMIN — Medication 10 ML: at 21:04

## 2018-08-01 NOTE — ANESTHESIA PREPROCEDURE EVALUATION
Anesthetic History No history of anesthetic complications Review of Systems / Medical History Patient summary reviewed, nursing notes reviewed and pertinent labs reviewed Pulmonary Within defined limits Neuro/Psych  
 
seizures: poorly controlled Comments: Macular degeneration (H35.30) Cardiovascular Hypertension Dysrhythmias : atrial fibrillation Hyperlipidemia Exercise tolerance: <4 METS Comments: 35% moderate AI,MR and TR  
GI/Hepatic/Renal 
  
 
 
Renal disease: CRI Endo/Other Arthritis and cancer Other Findings Comments: Compression fracture of L1 lumbar vertebra (HCC) Physical Exam 
 
Airway Mallampati: II 
TM Distance: 4 - 6 cm Neck ROM: decreased range of motion Mouth opening: Normal 
 
 Cardiovascular Rhythm: irregular Rate: normal 
 
 
 
 Dental 
 
Dentition: Upper partial plate Pulmonary Decreased breath sounds: bilateral 
 
 
 
 
 Abdominal 
GI exam deferred Other Findings Anesthetic Plan ASA: 4 Anesthesia type: general 
 
 
 
 
Induction: Intravenous Anesthetic plan and risks discussed with: Patient and Son / Daughter Possible post operative intubation

## 2018-08-01 NOTE — ANESTHESIA POSTPROCEDURE EVALUATION
Post-Anesthesia Evaluation and Assessment Patient: Bonilla Odell MRN: [id number]  SSN: [id number] YOB: 1924  Age: 80 y.o. Sex: female Cardiovascular Function/Vital Signs Visit Vitals  /63 (BP 1 Location: Left arm, BP Patient Position: At rest)  Pulse 80  Temp 36.5 °C (97.7 °F)  Resp 20  
 Ht 5' 4\" (1.626 m)  Wt 59.3 kg (130 lb 11.7 oz)  SpO2 93%  BMI 22.44 kg/m2 Patient is status post general anesthesia for Procedure(s): BILATERAL OPEN INGUINAL HERNIA REPAIR WITH MESH. Nausea/Vomiting: None Postoperative hydration reviewed and adequate. Pain: 
Pain Scale 1: Numeric (0 - 10) (08/01/18 1045) Pain Intensity 1: 0 (08/01/18 1045) Managed Neurological Status:  
Neuro (WDL): Exceptions to [facility] (08/01/18 4033) Neuro Neurologic State: Appropriate for age;Drowsy; Eyes open to pain;Eyes open spontaneously; Eyes open to voice (08/01/18 0939) Orientation Level: Oriented to person (08/01/18 0349) Cognition: Follows commands (08/01/18 3568) LUE Motor Response: Purposeful (08/01/18 0939) LLE Motor Response: Purposeful (08/01/18 0939) RUE Motor Response: Purposeful (08/01/18 0939) RLE Motor Response: Purposeful (08/01/18 6163) At baseline Mental Status and Level of Consciousness: Alert and oriented Pulmonary Status:  
O2 Device: Nasal cannula (08/01/18 1045) Adequate oxygenation and airway patent Complications related to anesthesia: None Post-anesthesia assessment completed. No acute concerns Signed By: Olesya Walker DO August 1, 2018

## 2018-08-01 NOTE — H&P
Day of Surgery H&P Assessment:  
BILATERAL INGUINAL HERNIA Body mass index is 22.44 kg/(m^2). Plan: BILATERAL OPEN INGUINAL HERNIA REPAIR (Bilateral Groin) Discussed the risk of surgery including bleeding, infection, injury to adjacent structures, and the risks of general anesthetic. The patient understands the risks; any and all questions were answered to the patient's satisfaction. Subjective:  
  
Skylar Arevalo is a 80 y.o. female who presents for above procedure. Objective:  
Blood pressure 133/55, pulse 82, temperature 97.8 °F (36.6 °C), resp. rate 20, height 5' 4\" (1.626 m), weight 59.3 kg (130 lb 11.7 oz), SpO2 95 %. Temp (24hrs), Av.8 °F (36.6 °C), Min:97.8 °F (36.6 °C), Max:97.8 °F (36.6 °C) Physical Exam: PHYSICAL EXAM: 
 
Chest:   [x]   CTA bialterally, no wheezing/rhonchi/rales/crackles []   wheezing     []   rhonchi     []   crackles     []   use of accessory muscles Heart:  [x]   regular rate and rhythm     [x]   No murmurs/rubs/gallops 
  []   irregular rhythm     []   Murmur     []   Rubs     []   Gallops BIH Past Medical History:  
Diagnosis Date  Alopecia  Arrhythmia   
 atrial fib,?svt  Arthritis  Atrial fibrillation ([address]  Benign essential HTN  Benign neoplasm of vulva  CAD (coronary artery disease)  Cholelithiases  Chronic kidney disease   
 followed by specialist  
 Chronic pain  Colon cancer ([address]  Constipation  Dyspareunia  Glaucoma  Heart failure ([address]  History of mixed connective tissue disease  Hx of viral pericarditis  Hyperactivity of bladder  Hypercholesterolemia  Ill-defined condition   
 macular degeneration  Ill-defined condition   
 recurrent UTI hx  
 Insomnia  Macular degeneration  Neck pain  Psoriasis  Rectocele  Renal cyst   
 Seizure disorder ([address]   
 none in 30 plus years---sees neurologist   
 Sjogren's disease ([address]  Thrombophlebitis  TIA (transient ischemic attack)  Vaginal vault prolapse, posthysterectomy  Varicose veins Past Surgical History:  
Procedure Laterality Date  CARDIAC SURG PROCEDURE UNLIST    
 AV ablation  HX BREAST LUMPECTOMY    
 left breast  
 HX CATARACT REMOVAL    
 HX COLECTOMY  1955  
 partial  
 HX HEMORRHOIDECTOMY  HX HYSTERECTOMY  HX PACEMAKER    
 left  HX SHOULDER ARTHROSCOPY    
 left Family History Problem Relation Age of Onset  Cancer Father  Inflammatory Bowel Dz Mother  Heart Disease Brother  Diabetes Brother Social History Social History  Marital status:  Spouse name: N/A  
 Number of children: N/A  
 Years of education: N/A Social History Main Topics  Smoking status: Never Smoker  Smokeless tobacco: Never Used  Alcohol use No  
 Drug use: No  
 Sexual activity: No  
 
Other Topics Concern  None Social History Narrative Prior to Admission medications Medication Sig Start Date End Date Taking? Authorizing Provider  
cranberry fruit extract (CRANBERRY PO) Take 1 Can by mouth daily. Yes Historical Provider  
amitriptyline (ELAVIL) 10 mg tablet Take  by mouth nightly. Yes Historical Provider  
cholecalciferol, vitamin D3, (VITAMIN D3) 2,000 unit tab Take  by mouth daily. Yes Historical Provider  
psyllium seed, with dextrose, (FIBER PO) Take  by mouth daily. Yes Historical Provider  
estradiol (ESTRACE) 0.01 % (0.1 mg/gram) vaginal cream daily. 6/4/18  Yes Historical Provider  
nitrofurantoin, macrocrystal-monohydrate, (MACROBID) 100 mg capsule odd months qd   Yes Historical Provider  
trimethoprim-sulfamethoxazole (BACTRIM DS) 160-800 mg per tablet Take 1 Tab by mouth daily. On even months   Yes Historical Provider  
levETIRAcetam (KEPPRA) 500 mg tablet TAKE ONE TABLET BY MOUTH TWICE DAILY. 6/12/18  Yes Liang Lambert MD  
carvedilol (COREG) 3.125 mg tablet 3.125 mg as needed. Only takes medication if blood pressure systolic 512 or greater 74/45/10  Yes Historical Provider  
furosemide (LASIX) 20 mg tablet Take 1 Tab by mouth daily. 10/31/17  Yes Jamal Stapleton MD  
warfarin (COUMADIN) 2.5 mg tablet Take 5 mg by mouth daily. Yes Yeny Begum MD  
VIT A/C/E AC/ZNOX/CUPRIC OXIDE (EYE VITAMIN AND MINERALS PO) Take 1 Tab by mouth two (2) times a day. Yes Yeny Begum MD  
ferrous sulfate (IRON) 325 mg (65 mg iron) tablet Take 325 mg by mouth two (2) times a day. Yes Yeny Begum MD  
DOCUSATE CALCIUM (STOOL SOFTENER PO) Take 100 mg by mouth daily. Yes Historical Provider  
latanoprost (XALATAN) 0.005 % ophthalmic solution Administer 1 Drop to both eyes nightly. 6/5/17  Yes Historical Provider  
acetaminophen (TYLENOL) 325 mg tablet Take 162.5 mg by mouth nightly. Patient takes 1/2 of a 325 mg tablet every evening at bedtime. Historical Provider ALLERGIES:   
Allergies Allergen Reactions  Codeine Nausea Only  Pcn [Penicillins] Unknown (comments) Cant remember reaction as a child  Tetanus Vaccines And Toxoid Unknown (comments) Does not take tetanus due to tb hx  Vibramycin [Doxycycline Calcium] Rash Review of Systems:   
See prior consult, office note or scanned list in media section. 10 systems negative except as specified. Signed By: Donalee Leventhal, MD   
 August 1, 2018

## 2018-08-01 NOTE — PROGRESS NOTES
Patient arrived to 2137. No complaints at this time. Daughter at bedside. Patient and daughter state that patient is on a limited sodium diet at home with only 4-5 cups of water a day.  Will contact MD.

## 2018-08-01 NOTE — PERIOP NOTES
Daughter Rajani Ridge in to help assist with verifying patient information. Patient unable to void in Holding area; has incontinent brief on, but states cannot go while in Holding. Patient and daughter states patient has occ brignt red rectal bleeding. Upon assessment, patient has a mod size mass protruding at opening of rectum that is beefy red and irritated in appearance. On inner left buttock at rectal level is a 2 cm open area that is also beefy red and irritated in appearance. There is no active bleeding at this time. Dr. Nathan Mtz made aware of this issue when he arrived to Holding. Mepilex applied to sacrum for prevention.

## 2018-08-01 NOTE — PERIOP NOTES
TRANSFER - OUT REPORT: 
 
Verbal report given to YOLANDA Fitzpatrick(name) on Heather Graham  being transferred to [facility](unit) for routine progression of care Report consisted of patients Situation, Background, Assessment and  
Recommendations(SBAR). Information from the following report(s) SBAR, OR Summary, Procedure Summary, Intake/Output and MAR was reviewed with the receiving nurse. Opportunity for questions and clarification was provided. Patient transported with: 
 [employer]

## 2018-08-01 NOTE — PERIOP NOTES
Handoff Report from Operating Room to PACU Report received from En Treadwell RN and HUONG Redd regarding Alexandra Davis. Surgeon(s): 
Kandice Ritter MD  And Procedure(s) (LRB): 
BILATERAL OPEN INGUINAL HERNIA REPAIR WITH MESH (Bilateral)  confirmed  
with allergies, drains and dressings discussed. Anesthesia type, drugs, patient history, complications, estimated blood loss, vital signs, intake and output, and last pain medication, lines and temperature were reviewed.

## 2018-08-01 NOTE — IP AVS SNAPSHOT
[facility] [address] 
[phone] Patient: Doug Harrington MRN: [id number] :[id number] About your hospitalization You were admitted on:  2018 You last received care in the:  [facility] 2 GENERAL SURGERY You were discharged on:  August 3, 2018 Why you were hospitalized Your primary diagnosis was:  Not on File Your diagnoses also included:  Bilateral Inguinal Hernia, Bilateral Inguinal Hernia, Pulmonary Edema Follow-up Information Follow up With Details Comments Contact Info Guero Ortiz MD Go on 2018 For hospital follow up appointment at 1:15PM  [address] [address] [facility] 
[phone] Your Scheduled Appointments 2018  4:40 PM EDT  
POST OP with Radha Gomez MD  
[facility] Dr. Dionicio Putnam ([facility]) [address] [place] [address], [address] [facility]  
[phone] 2018  9:00 AM EDT  
POST OP with Radha Gomez MD  
[facility] Dr. Dionicio Putnam ([facility]) [address] [place] [address] [address], [address] [facility]  
[phone] 2018  1:15 PM EDT HOSPITAL FOLLOW-UP with Guero Ortiz, [address] [place] [address],[address] Floor [facility] [address] [facility]  
[phone] Discharge Orders None A check pepe indicates which time of day the medication should be taken. My Medications START taking these medications Instructions Each Dose to Equal  
 Morning Noon Evening Bedtime  
 enoxaparin 60 mg/0.6 mL injection Commonly known as:  LOVENOX Your last dose was: Your next dose is:    
   
   
 60 mg by SubCUTAneous route daily. 60 mg CONTINUE taking these medications Instructions Each Dose to Equal  
 Morning Noon Evening Bedtime  
 amitriptyline 10 mg tablet Commonly known as:  ELAVIL Your last dose was: Your next dose is: Take  by mouth nightly. BACTRIM -800 mg per tablet Generic drug:  trimethoprim-sulfamethoxazole Your last dose was: Your next dose is: Take 1 Tab by mouth daily. On even months 1 Tab  
    
   
   
   
  
 carvedilol 3.125 mg tablet Commonly known as:  Kaelyn Vernonar Your last dose was: Your next dose is:    
   
   
 3.125 mg as needed. Only takes medication if blood pressure systolic 069 or greater 3.125 mg  
    
   
   
   
  
 COUMADIN 2.5 mg tablet Generic drug:  warfarin Your last dose was: Your next dose is: Take 5 mg by mouth daily. 5 mg CRANBERRY PO Your last dose was: Your next dose is: Take 1 Can by mouth daily. 1 Can  
    
   
   
   
  
 estradiol 0.01 % (0.1 mg/gram) vaginal cream  
Commonly known as:  ESTRACE Your last dose was: Your next dose is:    
   
   
 daily. EYE VITAMIN AND MINERALS PO Your last dose was: Your next dose is: Take 1 Tab by mouth two (2) times a day. 1 Tab FIBER PO Your last dose was: Your next dose is: Take  by mouth daily. furosemide 20 mg tablet Commonly known as:  LASIX Your last dose was: Your next dose is: Take 1 Tab by mouth daily. 20 mg Iron 325 mg (65 mg iron) tablet Generic drug:  ferrous sulfate Your last dose was: Your next dose is: Take 325 mg by mouth two (2) times a day. 325 mg  
    
   
   
   
  
 latanoprost 0.005 % ophthalmic solution Commonly known as:  Kris Petty  
   
 Your last dose was: Your next dose is:    
   
   
 Administer 1 Drop to both eyes nightly. 1 Drop  
    
   
   
   
  
 levETIRAcetam 500 mg tablet Commonly known as:  KEPPRA Your last dose was: Your next dose is: TAKE ONE TABLET BY MOUTH TWICE DAILY. nitrofurantoin (macrocrystal-monohydrate) 100 mg capsule Commonly known as:  MACROBID Your last dose was: Your next dose is:    
   
   
 odd months qd STOOL SOFTENER PO Your last dose was: Your next dose is: Take 100 mg by mouth daily. 100 mg  
    
   
   
   
  
 TYLENOL 325 mg tablet Generic drug:  acetaminophen Your last dose was: Your next dose is: Take 162.5 mg by mouth nightly. Patient takes 1/2 of a 325 mg tablet every evening at bedtime. 162.5 mg  
    
   
   
   
  
 VITAMIN D3 2,000 unit Tab Generic drug:  cholecalciferol (vitamin D3) Your last dose was: Your next dose is: Take  by mouth daily. Where to Get Your Medications These medications were sent to [facility], [address]  [address] [place] [address] Phone:  [phone]  
  enoxaparin 60 mg/0.6 mL injection Discharge Instructions Discharge Instructions: Hernia -- Dr. Forest Swartz Call on next business day to arrange appointment for follow up in 3 weeks -- [phone]. Activity: 
Walk regularly. No lifting more than 10 pounds for 6 weeks. Light aerobic activity is okay when you feel up to it. You may resume driving in three days unless still requiring narcotics for pain. Return to work in 1-2 weeks but no heavy lifting for 6 weeks. Apply ice to areas of tenderness for 20 minutes at a time. Keep a cloth between the skin and the bag of ice. Work: You may return to work in 1 or 2 weeks to light activity. No lifting more than 10 pounds (=\"light duty\") for 6 weeks. If your employer can not accommodate \"light duty,\" your employer will need to provide any necessary paperwork to our office. This document with serve as the initial \"note\" to your employer. Diet: 
You may resume normal diet. Wound Care: 
Dermabond glue dressing will fall off on its own. If you experience a lot of drainage, develop redness around the wound, or a fever over 101 F occurs please call the office. There will be significant swelling under the incision(s) that will develop over the next 3-4 days. Ice will help reduce this. You may shower. No baths or swimming for 3 weeks. Medications: 
See attached \"Medication Reconciliation. \" 
Stop the Lovenox when INR >/=2. Resume home medications as indicated on the Medical Reconciliation form. Do not use blood thinners (such as Aspirin, Coumadin, or Plavix) until 3 days after surgery. Pain medications:  Non steroidal antiinflammatories (ibuprofen -- Advil or Motrin) seem to work best for post surgical pain. Try these first.   
 
PUT ICE ON YOUR INCISIONS 20 MINUTES EVERY HOUR WHILE THEY REMAIN SORE. PLACE A CLOTH BETWEEN YOUR SKIN THE THE ICE BAG. A narcotic prescription will also be given for breakthrough pain. Tylenol can be used in place of the narcotic. Colace or Miralax should be used twice daily to prevent constipation while on narcotics. If you are still having trouble having a BM after 1-2 days, try milk of magnesia. If this does not work within 24 hours, try a bottle of magnesium citrate. Narcotics and anesthesia sometimes cause nausea and vomiting. If persistent please call the office. Do not hesitate to call with questions or concerns. Madyson Zapata MD 
Tel [phone] Fax [phone] ACO Transitions of Care Introducing [facility] [address] offers a voluntary care coordination program to provide high quality service and care to Caroline Martin fee-for-service beneficiaries. Rafaela Cox was designed to help you enhance your health and well-being through the following services: ? Transitions of Care  support for individuals who are transitioning from one care setting to another (example: Hospital to home). ? Chronic and Complex Care Coordination  support for individuals and caregivers of those with serious or chronic illnesses or with more than one chronic (ongoing) condition and those who take a number of different medications. If you meet specific medical criteria, a [address] [place] [address] Rd may call you directly to coordinate your care with your primary care physician and your other care providers. For questions about the [facility] programs, please, contact your physicians office. For general questions or additional information about Accountable Care Organizations: 
Please visit www.medicare.gov/acos. html or call 1-800-MEDICARE (6-942.557.8643) TTY users should call 0-461.728.6421. Introducing [facility]! Dear Phillip Stinson: Thank you for requesting a BPT account. Our records indicate that you already have an active BPT account. You can access your account anytime at [email or web address] Prescription [email or web address]/[email or web address] Did you know that you can access your hospital and ER discharge instructions at any time in BPT? You can also review all of your test results from your hospital stay or ER visit. Additional Information If you have questions, please visit the Frequently Asked Questions section of the BPT website at [email or web address] Remember, BPT is NOT to be used for urgent needs. For medical emergencies, dial 911. Now available from your iPhone and Android! Introducing [facility] As a [employer] patient, I wanted to make you aware of our electronic visit tool called [facility]. [employer] 24/7 allows you to connect within minutes with a medical provider 24 hours a day, seven days a week via a mobile device or tablet or logging into a secure website from your computer. You can access [facility] Deleonfin from anywhere in the [place]. A virtual visit might be right for you when you have a simple condition and feel like you just dont want to get out of bed, or cant get away from work for an appointment, when your regular [employer] provider is not available (evenings, weekends or holidays), or when youre out of town and need minor care. Electronic visits cost only $49 and if the [employer] 24/7 provider determines a prescription is needed to treat your condition, one can be electronically transmitted to a nearby pharmacy*. Please take a moment to enroll today if you have not already done so. The enrollment process is free and takes just a few minutes. To enroll, please download the [employer] 24/7 kalyn to your tablet or phone, or visit [email or web address] to enroll on your computer. And, as an [address] [place] [address] patient with a [employer] account, the results of your visits will be scanned into your electronic medical record and your primary care provider will be able to view the scanned results. We urge you to continue to see your regular [employer] provider for your ongoing medical care. And while your primary care provider may not be the one available when you seek a [facility] Pabon[facility]ah[facility] virtual visit, the peace of mind you get from getting a real diagnosis real time can be priceless. For more information on [facility] Cm[facility]ahfin, view our Frequently Asked Questions (FAQs) at [email or web address]. Sincerely, 
 
Daniel Mariscal MD 
Chief Medical Officer Mildred[facility] Pa[facility] Harmon *:  certain medications cannot be prescribed via [facility] Providers Seen During Your Hospitalization Provider Specialty Primary office phone Suhas Galindo MD General Surgery [phone] Your Primary Care Physician (PCP) Primary Care Physician Office Phone Office Fax Chencho Moyer [phone] You are allergic to the following Allergen Reactions Codeine Nausea Only Pcn (Penicillins) Unknown (comments) Cant remember reaction as a child Tetanus Vaccines And Toxoid Unknown (comments) Does not take tetanus due to tb hx  
    
 Vibramycin (Doxycycline Calcium) Rash Recent Documentation Height Weight Breastfeeding? BMI OB Status Smoking Status 1.626 m 59.3 kg No 22.44 kg/m2 Hysterectomy Never Smoker Emergency Contacts Name Discharge Info Relation Home Work Mobile Ruchi Manriquez DISCHARGE CAREGIVER [3] Child [2] [phone] Patient Belongings The following personal items are in your possession at time of discharge: 
  Dental Appliances: Partials  Visual Aid: Glasses      Home Medications: None   Jewelry: Ring, With patient, Other (comment) (2 rings)  Clothing: At bedside    Other Valuables: Wheelchair Please provide this summary of care documentation to your next provider. Signatures-by signing, you are acknowledging that this After Visit Summary has been reviewed with you and you have received a copy. Patient Signature:  ____________________________________________________________ Date:  ____________________________________________________________  
  
Jed Suero Provider Signature:  ____________________________________________________________ Date:  ____________________________________________________________

## 2018-08-01 NOTE — IP AVS SNAPSHOT
[address] [facility] 
[phone] Patient: Tommy Walters MRN: [id number] :[id number] A check pepe indicates which time of day the medication should be taken. My Medications START taking these medications Instructions Each Dose to Equal  
 Morning Noon Evening Bedtime  
 enoxaparin 60 mg/0.6 mL injection Commonly known as:  LOVENOX Your last dose was: Your next dose is:    
   
   
 60 mg by SubCUTAneous route daily. 60 mg CONTINUE taking these medications Instructions Each Dose to Equal  
 Morning Noon Evening Bedtime  
 amitriptyline 10 mg tablet Commonly known as:  ELAVIL Your last dose was: Your next dose is: Take  by mouth nightly. BACTRIM -800 mg per tablet Generic drug:  trimethoprim-sulfamethoxazole Your last dose was: Your next dose is: Take 1 Tab by mouth daily. On even months 1 Tab  
    
   
   
   
  
 carvedilol 3.125 mg tablet Commonly known as:  Ortega Pintos Your last dose was: Your next dose is:    
   
   
 3.125 mg as needed. Only takes medication if blood pressure systolic 550 or greater 3.125 mg  
    
   
   
   
  
 COUMADIN 2.5 mg tablet Generic drug:  warfarin Your last dose was: Your next dose is: Take 5 mg by mouth daily. 5 mg CRANBERRY PO Your last dose was: Your next dose is: Take 1 Can by mouth daily. 1 Can  
    
   
   
   
  
 estradiol 0.01 % (0.1 mg/gram) vaginal cream  
Commonly known as:  ESTRACE Your last dose was: Your next dose is:    
   
   
 daily. EYE VITAMIN AND MINERALS PO Your last dose was: Your next dose is: Take 1 Tab by mouth two (2) times a day. 1 Tab FIBER PO Your last dose was: Your next dose is: Take  by mouth daily. furosemide 20 mg tablet Commonly known as:  LASIX Your last dose was: Your next dose is: Take 1 Tab by mouth daily. 20 mg Iron 325 mg (65 mg iron) tablet Generic drug:  ferrous sulfate Your last dose was: Your next dose is: Take 325 mg by mouth two (2) times a day. 325 mg  
    
   
   
   
  
 latanoprost 0.005 % ophthalmic solution Commonly known as:  Richardine Solum Your last dose was: Your next dose is:    
   
   
 Administer 1 Drop to both eyes nightly. 1 Drop  
    
   
   
   
  
 levETIRAcetam 500 mg tablet Commonly known as:  KEPPRA Your last dose was: Your next dose is: TAKE ONE TABLET BY MOUTH TWICE DAILY. nitrofurantoin (macrocrystal-monohydrate) 100 mg capsule Commonly known as:  MACROBID Your last dose was: Your next dose is:    
   
   
 odd months qd STOOL SOFTENER PO Your last dose was: Your next dose is: Take 100 mg by mouth daily. 100 mg  
    
   
   
   
  
 TYLENOL 325 mg tablet Generic drug:  acetaminophen Your last dose was: Your next dose is: Take 162.5 mg by mouth nightly. Patient takes 1/2 of a 325 mg tablet every evening at bedtime. 162.5 mg  
    
   
   
   
  
 VITAMIN D3 2,000 unit Tab Generic drug:  cholecalciferol (vitamin D3) Your last dose was: Your next dose is: Take  by mouth daily. Where to Get Your Medications These medications were sent to [facility] [address] [place] [address] Phone:  [phone]  
  enoxaparin 60 mg/0.6 mL injection

## 2018-08-01 NOTE — BRIEF OP NOTE
BRIEF OPERATIVE NOTE Date of Procedure: 8/1/2018 Preoperative Diagnosis: BILATERAL INGUINAL HERNIA Postoperative Diagnosis: BILATERAL INGUINAL HERNIA Procedure(s): BILATERAL OPEN INGUINAL HERNIA REPAIR Surgeon(s) and Role: Kalpesh Chaney MD - Primary Surgical Assistant: Hemal Coley Surgical Staff: 
Circ-1: Shara Christianson RN Scrub Tech-1: Benjamin Tyson Surg Asst-1: Ramonechristiane Walsh Event Time In Incision Start 4882 Incision Close Anesthesia: General  
Estimated Blood Loss: min Specimens: sacs Findings: large right indirect, small left direct. Complications: none immediate Implants:  
Implant Name Type Inv. Item Serial No.  Lot No. LRB No. Used Action MESH POLYSTR SLF- 15X9 CM -- PROGRIP - SN/A   MESH POLYSTR SLF- 15X9 CM -- PROGRIP N/A [employer] SURGICAL [id number] N/A 1 Implanted [id number]

## 2018-08-02 LAB
INR PPP: 1.2 (ref 0.9–1.1)
PROTHROMBIN TIME: 12 SEC (ref 9–11.1)

## 2018-08-02 PROCEDURE — 51798 US URINE CAPACITY MEASURE: CPT

## 2018-08-02 PROCEDURE — 74011250636 HC RX REV CODE- 250/636: Performed by: SURGERY

## 2018-08-02 PROCEDURE — 74011250637 HC RX REV CODE- 250/637: Performed by: SURGERY

## 2018-08-02 PROCEDURE — 99218 HC RM OBSERVATION: CPT

## 2018-08-02 PROCEDURE — 36415 COLL VENOUS BLD VENIPUNCTURE: CPT | Performed by: SURGERY

## 2018-08-02 PROCEDURE — 85610 PROTHROMBIN TIME: CPT | Performed by: SURGERY

## 2018-08-02 RX ORDER — WARFARIN SODIUM 5 MG/1
5 TABLET ORAL DAILY
Status: DISCONTINUED | OUTPATIENT
Start: 2018-08-02 | End: 2018-08-03 | Stop reason: HOSPADM

## 2018-08-02 RX ORDER — ACETAMINOPHEN 325 MG/1
650 TABLET ORAL
Status: DISCONTINUED | OUTPATIENT
Start: 2018-08-02 | End: 2018-08-03 | Stop reason: HOSPADM

## 2018-08-02 RX ADMIN — Medication 10 ML: at 13:04

## 2018-08-02 RX ADMIN — Medication 10 ML: at 05:40

## 2018-08-02 RX ADMIN — LEVETIRACETAM 500 MG: 500 TABLET ORAL at 18:25

## 2018-08-02 RX ADMIN — Medication 10 ML: at 21:48

## 2018-08-02 RX ADMIN — POLYETHYLENE GLYCOL 3350 17 G: 17 POWDER, FOR SOLUTION ORAL at 09:28

## 2018-08-02 RX ADMIN — ENOXAPARIN SODIUM 60 MG: 100 INJECTION SUBCUTANEOUS at 21:47

## 2018-08-02 RX ADMIN — LEVETIRACETAM 500 MG: 500 TABLET ORAL at 09:28

## 2018-08-02 RX ADMIN — WARFARIN SODIUM 5 MG: 5 TABLET ORAL at 18:26

## 2018-08-02 RX ADMIN — GABAPENTIN 300 MG: 300 CAPSULE ORAL at 09:28

## 2018-08-02 RX ADMIN — AMITRIPTYLINE HYDROCHLORIDE 20 MG: 10 TABLET, FILM COATED ORAL at 21:49

## 2018-08-02 NOTE — PROGRESS NOTES
Spiritual Care Partner Volunteer visited patient in  2137 on 8/2/18. Documented by: Chaplain Reynoso MDiv, MACE 
[phone] ([id number])

## 2018-08-02 NOTE — DISCHARGE INSTRUCTIONS
Discharge Instructions: Hernia -- Dr. Lorenza Zhao    Call on next business day to arrange appointment for follow up in 3 weeks -- [phone]. Activity:  Walk regularly. No lifting more than 10 pounds for 6 weeks. Light aerobic activity is okay when you feel up to it. You may resume driving in three days unless still requiring narcotics for pain. Return to work in 1-2 weeks but no heavy lifting for 6 weeks. Apply ice to areas of tenderness for 20 minutes at a time. Keep a cloth between the skin and the bag of ice. Work:  You may return to work in 1 or 2 weeks to light activity. No lifting more than 10 pounds (=\"light duty\") for 6 weeks. If your employer can not accommodate \"light duty,\" your employer will need to provide any necessary paperwork to our office. This document with serve as the initial \"note\" to your employer. Diet:  You may resume normal diet. Wound Care:  Dermabond glue dressing will fall off on its own. If you experience a lot of drainage, develop redness around the wound, or a fever over 101 F occurs please call the office. There will be significant swelling under the incision(s) that will develop over the next 3-4 days. Ice will help reduce this. You may shower. No baths or swimming for 3 weeks. Medications:  See attached \"Medication Reconciliation. \"  Stop the Lovenox when INR >/=2. Resume home medications as indicated on the Medical Reconciliation form. Do not use blood thinners (such as Aspirin, Coumadin, or Plavix) until 3 days after surgery. Pain medications:  Non steroidal antiinflammatories (ibuprofen -- Advil or Motrin) seem to work best for post surgical pain. Try these first.      PUT ICE ON YOUR INCISIONS 20 MINUTES EVERY HOUR WHILE THEY REMAIN SORE. PLACE A CLOTH BETWEEN YOUR SKIN THE THE ICE BAG. A narcotic prescription will also be given for breakthrough pain. Tylenol can be used in place of the narcotic.       Colace or Miralax should be used twice daily to prevent constipation while on narcotics. If you are still having trouble having a BM after 1-2 days, try milk of magnesia. If this does not work within 24 hours, try a bottle of magnesium citrate. Narcotics and anesthesia sometimes cause nausea and vomiting. If persistent please call the office. Do not hesitate to call with questions or concerns.     Radha Gomez MD  Tel [phone]  Fax [phone]

## 2018-08-02 NOTE — PROGRESS NOTES
Subjective:   
Feel fuzzy headed today - thinks its from the gabapentin used for post-op pain. Not voided since rosario removed. Objective:   
Blood pressure 110/55, pulse 80, temperature 98.5 °F (36.9 °C), resp. rate 18, height 5' 4\" (1.626 m), weight 59.3 kg (130 lb 11.7 oz), SpO2 93 %, not currently breastfeeding. Exam - alert, NAD. No facial droop, hand strength =, right pupil chronically dilated. abd soft, NABS, incisions CDI without hematoma. Assessment:  Procedure(s): BILATERAL OPEN INGUINAL HERNIA REPAIR WITH MESH 8/1/2018 
 
- chronic anticoagulation on coumadin -- INR 1.2 this AM.  On bridging lovenox 1mg/kg once daily (adjusted for GFR). - acute medication effect -- appears to be from gabapentin. - labs reviewed. - I/O's reviewed. - vital signs reviewed -- stable. Plan: 
- home coumadin dose -- ok to load if Dr. Wander Chavez wants. - await spont voiding, clearing MS.   
- could go home on bridging Lovenox later today if above resolved.    
 
Luis Kaur MD

## 2018-08-03 VITALS
SYSTOLIC BLOOD PRESSURE: 114 MMHG | RESPIRATION RATE: 16 BRPM | HEIGHT: 64 IN | BODY MASS INDEX: 22.32 KG/M2 | OXYGEN SATURATION: 100 % | WEIGHT: 130.73 LBS | DIASTOLIC BLOOD PRESSURE: 45 MMHG | TEMPERATURE: 97.2 F | HEART RATE: 84 BPM

## 2018-08-03 PROBLEM — K40.20 BILATERAL INGUINAL HERNIA: Status: ACTIVE | Noted: 2018-08-03

## 2018-08-03 PROBLEM — J81.1 PULMONARY EDEMA: Status: ACTIVE | Noted: 2018-08-03

## 2018-08-03 LAB
INR PPP: 1.3 (ref 0.9–1.1)
PROTHROMBIN TIME: 13.4 SEC (ref 9–11.1)

## 2018-08-03 PROCEDURE — 94760 N-INVAS EAR/PLS OXIMETRY 1: CPT

## 2018-08-03 PROCEDURE — 36415 COLL VENOUS BLD VENIPUNCTURE: CPT | Performed by: SURGERY

## 2018-08-03 PROCEDURE — 65270000029 HC RM PRIVATE

## 2018-08-03 PROCEDURE — 85610 PROTHROMBIN TIME: CPT | Performed by: SURGERY

## 2018-08-03 PROCEDURE — 99218 HC RM OBSERVATION: CPT

## 2018-08-03 PROCEDURE — 74011250637 HC RX REV CODE- 250/637: Performed by: SURGERY

## 2018-08-03 RX ORDER — ENOXAPARIN SODIUM 100 MG/ML
60 INJECTION SUBCUTANEOUS DAILY
Qty: 5 SYRINGE | Refills: 0 | Status: SHIPPED | OUTPATIENT
Start: 2018-08-03 | End: 2019-12-06

## 2018-08-03 RX ADMIN — Medication 10 ML: at 05:05

## 2018-08-03 RX ADMIN — ACETAMINOPHEN 650 MG: 325 TABLET ORAL at 12:04

## 2018-08-03 RX ADMIN — LEVETIRACETAM 500 MG: 500 TABLET ORAL at 08:35

## 2018-08-03 RX ADMIN — POLYETHYLENE GLYCOL 3350 17 G: 17 POWDER, FOR SOLUTION ORAL at 08:35

## 2018-08-03 NOTE — PHYSICIAN ADVISORY
Letter of Status Determination:  
Recommend hospitalization status upgraded from OBSERVATION  to INPATIENT  Status Pt Name:  Marie Coombs MR#  
DILSHAD # [id number] / 
[id number] [facility]#  [id number] Room and [address] [facility]  2137/01  @ [facility] Hospitalization date  8/1/2018  6:07 AM  
Current Attending Physician  Krystal Jiménez MD  
Principal diagnosis  <principal problem not specified> Bilateral inguinal hernia Clinicals  80 y.o. y.o  female hospitalized with above diagnosis The pt went through planned procedure without any acute surgical complications He did have some adverse effect attributed to gabapentin; also he has had urinary retention issue as well. Due to appropriate and necessary medical care, this pt's hospitalization has now exceeded two midnights . [place] ([facility]) criteria Does  NOT apply STATUS DETERMINATION  This patient is at above high risk of deterioration based on documented presenting clinical data, comorbid conditions, high risk of adverse events and current acute care course. Ms. Marie Coombs now meets Inpatient Admission status criteria in accordance with CMS regulation Section 43 .3. Specifically, due to medical necessity the patient's stay now exceeds Two Midnights. It is our recommendation that this patient's hospitalization status should be upgraded from  OBSERVATION to INPATIENT status. The final decision of the patient's hospitalization status depends on the attending physician's judgment Additional comments Payor: Becki Virgen / Plan: [address] [place] [address] / Product Type: Medicare /   
  
 
Mary Bella MD MPH FACP Physician Advisor [address] [facility] 88 145 [facility]  
President Medical Staff, [facility]  [phone]   
 
 
[phone] Michelet Avila

## 2018-08-03 NOTE — PROGRESS NOTES
*ALY ACO/BSMG* Pt is an 80 y.o.  female admitted for bilateral inguinal hernia. Pt was alert and oriented upon CM room visit, with family by bedside. Pt reported that she resides with family in their 2 story home (4 steps into main entrance). Pt reported that needs assistance with ADLs, and does not drive. Pt reported that she is active with PCP: seen July 2018, and uses [facility]). Pt reported that she has DME at home: wheelchair, walker, bedside. Pt reported that she had HH in the past, and no SNF. Pt has provide aide that come to her home 5 days a week (10-3pm) Pt denies need additional assistance at d/c. CM will contact Ita Blanco Reason for Admission:    bilateral inguinal hernia RRAT Score:    12 Plan for utilizing home health:    No home health needed at this Likelihood of Readmission:  LOW Transition of Care Plan:     Home with follow up appointment Care Management Interventions PCP Verified by CM: Yes Mode of Transport at Discharge: Other (see comment) Transition of Care Consult (CM Consult): Discharge Planning Discharge Durable Medical Equipment: No 
Physical Therapy Consult: No 
Occupational Therapy Consult: No 
Speech Therapy Consult: No 
Current Support Network: Lives with Spouse, Own Home Confirm Follow Up Transport: Family Plan discussed with Pt/Family/Caregiver: Yes Discharge Location Discharge Placement: Home [facility]  
[phone]

## 2018-08-06 ENCOUNTER — PATIENT OUTREACH (OUTPATIENT)
Dept: INTERNAL MEDICINE CLINIC | Age: 83
End: 2018-08-06

## 2018-08-06 NOTE — PROGRESS NOTES
Daughter, Kristie Calero, who states she handles all her mother's medical declines case management at this time.     AQUILINO appointment follow up cancelled at Ruchi's request.

## 2018-08-07 ENCOUNTER — OFFICE VISIT (OUTPATIENT)
Dept: SURGERY | Age: 83
End: 2018-08-07

## 2018-08-07 ENCOUNTER — TELEPHONE (OUTPATIENT)
Dept: INTERNAL MEDICINE CLINIC | Age: 83
End: 2018-08-07

## 2018-08-07 VITALS
DIASTOLIC BLOOD PRESSURE: 69 MMHG | RESPIRATION RATE: 18 BRPM | HEART RATE: 80 BPM | OXYGEN SATURATION: 96 % | BODY MASS INDEX: 22.53 KG/M2 | TEMPERATURE: 97.7 F | HEIGHT: 64 IN | WEIGHT: 132 LBS | SYSTOLIC BLOOD PRESSURE: 126 MMHG

## 2018-08-07 DIAGNOSIS — Z09 POSTOPERATIVE EXAMINATION: Primary | ICD-10-CM

## 2018-08-07 NOTE — PROGRESS NOTES
Chief Complaint   Patient presents with    Surgical Follow-up     Hernia surgery 8/1/18. 1. Have you been to the ER, urgent care clinic since your last visit? Hospitalized since your last visit? No    2. Have you seen or consulted any other health care providers outside of the [address] [place] [address] since your last visit? Include any pap smears or colon screening.  No

## 2018-08-07 NOTE — TELEPHONE ENCOUNTER
#[phone]  X Tisha Chandler with [facility] states they need chart notes that states pt would benefit from oxygen faxed to them as soon as you can. Fax #[phone]  Thanks.

## 2018-08-07 NOTE — PROGRESS NOTES
To: MD Risa, Layla Pineda MD    From: Andrea Boswell MD    Thank you for referring Antony Sykes. Encounter Date: 8/7/2018    Subjective:      Elayne Greenberg is a 80 y.o. female presents for postop care. Has no complaints today. Minimal pain. Eating a regular diet without difficulty, but appetite is chronically not very good. Bowel movements are regular. Objective:     General:  alert, cooperative, no distress, appears stated age   Abdomen: soft, bowel sounds active, non-tender   Incisions:  healing well, no drainage, no erythema, no hematoma, repairs intact with vigorous valsalva. Assessment:     S/p bilateral inguinal hernia repairs. INR back to 2.1 today. No hematomas. Doing well postoperatively. Plan:     Encouraged increases PO and activity. Will see her back in 3 weeks for a final check. Knows to call for any concerns.       Andrea Boswell MD

## 2018-08-07 NOTE — DISCHARGE SUMMARY
Physician Discharge Summary     Patient ID:  Doug Harrington  [id number]  89 y.o.  11/16/1924    Admit Date:  8/1/2018    Discharge Date:  8/3/2018    Admission Diagnoses:  BILATERAL INGUINAL HERNIA;Bilateral inguinal hernia;Bilater*    Discharge Diagnoses: Active Problems:    Bilateral inguinal hernia (8/3/2018)      Pulmonary edema (8/3/2018)         Surgical Procedure:  Procedure(s):  BILATERAL OPEN INGUINAL HERNIA REPAIR WITH MESH      Admission Condition:  Good    Discharge Condition:  Good    Hospital Problems:    Active Hospital Problems    Diagnosis Date Noted    Bilateral inguinal hernia 08/03/2018    Pulmonary edema 08/03/2018       Hospital Course:   Normal hospital course for this procedure given her age and anticoagulation. Consults:  None    Disposition:  home    Physical Exam:  Abdomen soft. Bowel sounds normal.  Incisions CDI. Appropriately tender. Visit Vitals    /45 (BP 1 Location: Left arm, BP Patient Position: Sitting)    Pulse 84    Temp 97.2 °F (36.2 °C)    Resp 16    Ht 5' 4\" (1.626 m)    Wt 59.3 kg (130 lb 11.7 oz)    SpO2 100%    Breastfeeding No    BMI 22.44 kg/m2       Discharge medications:    Discharge Medication List as of 8/3/2018 12:06 PM      START taking these medications    Details   enoxaparin (LOVENOX) 60 mg/0.6 mL injection 60 mg by SubCUTAneous route daily. , Normal, Disp-5 Syringe, R-0         CONTINUE these medications which have NOT CHANGED    Details   cranberry fruit extract (CRANBERRY PO) Take 1 Can by mouth daily. , Historical Med      amitriptyline (ELAVIL) 10 mg tablet Take  by mouth nightly., Historical Med      cholecalciferol, vitamin D3, (VITAMIN D3) 2,000 unit tab Take  by mouth daily. , Historical Med      psyllium seed, with dextrose, (FIBER PO) Take  by mouth daily. , Historical Med      estradiol (ESTRACE) 0.01 % (0.1 mg/gram) vaginal cream daily. , Historical Med      nitrofurantoin, macrocrystal-monohydrate, (MACROBID) 100 mg capsule odd months qd, Historical Med      trimethoprim-sulfamethoxazole (BACTRIM DS) 160-800 mg per tablet Take 1 Tab by mouth daily. On even months, Historical Med      levETIRAcetam (KEPPRA) 500 mg tablet TAKE ONE TABLET BY MOUTH TWICE DAILY., Normal, Disp-180 Tab, R-5      carvedilol (COREG) 3.125 mg tablet 3.125 mg as needed. Only takes medication if blood pressure systolic 467 or greater, Historical Med      furosemide (LASIX) 20 mg tablet Take 1 Tab by mouth daily. , Print, Disp-15 Tab, R-0      warfarin (COUMADIN) 2.5 mg tablet Take 5 mg by mouth daily. , Historical Med      VIT A/C/E AC/ZNOX/CUPRIC OXIDE (EYE VITAMIN AND MINERALS PO) Take 1 Tab by mouth two (2) times a day., Historical Med      ferrous sulfate (IRON) 325 mg (65 mg iron) tablet Take 325 mg by mouth two (2) times a day., Historical Med      acetaminophen (TYLENOL) 325 mg tablet Take 162.5 mg by mouth nightly. Patient takes 1/2 of a 325 mg tablet every evening at bedtime. , Historical Med      DOCUSATE CALCIUM (STOOL SOFTENER PO) Take 100 mg by mouth daily. , Historical Med      latanoprost (XALATAN) 0.005 % ophthalmic solution Administer 1 Drop to both eyes nightly., Historical Med              Follow-up: 1 week(s) in my office. Will need INR check this weekend or Monday. See discharge instructions for further details.      Signed:  Donalee Leventhal, MD  8/7/2018  2:28 PM

## 2018-08-09 NOTE — PROGRESS NOTES
To: Messi Aj MD, Manuel Layton MD , Parmjit Sanchez MD    From: Kaelyn Wise MD    Thank you for sending Mayela Velasquez to see us. Encounter Date: 7/9/2018  History and Physical    Assessment:   Bilateral inguinal hernias. Comorbid afib on coumadin. Body mass index is 22.04 kg/(m^2). Plan:   Open repairs. Bridge with Lovenox. Discussed possibility of incarceration, strangulation, increase in size of the hernia over time, and the risk of emergency surgery in the face of strangulation. Discussed techniques for reducing the hernia should it not reduce spontaneously. Knows to call immediately for incarceration. Also discussed the risk of surgery including recurrence, bleeding, hematoma, infection, difficulty voiding, chronic nerve pain, and the risks of general anesthetic. The patient expressed understanding of the risks and all questions were answered to the patient's satisfaction. HPI:   Shikha Yoo is a 80 y.o. female who is seen in consultation at the request of Lucia Diaz for bilateral inguinal hernia. They are getting bigger and more uncomfortable. Increases in size with commode. No n/v. She is worries they will strangulate. On coumadin for Afib.       Past Medical History:   Diagnosis Date    Alopecia     Arrhythmia     atrial fib,?svt    Arthritis     Atrial fibrillation (HCC)     Benign essential HTN     Benign neoplasm of vulva     CAD (coronary artery disease)     Cholelithiases     Chronic kidney disease     followed by specialist    Chronic pain     Colon cancer (HCC)     Constipation     Dyspareunia     Glaucoma     Heart failure (HCC)     History of mixed connective tissue disease     Hx of viral pericarditis     Hyperactivity of bladder     Hypercholesterolemia     Ill-defined condition     macular degeneration    Ill-defined condition     recurrent UTI hx    Insomnia     Macular degeneration     Neck pain     Psoriasis     Rectocele     Renal cyst     Seizure disorder (HCC)     none in 30 plus years---sees neurologist     [facility]ogren's disease ([facility] [address]     Thrombophlebitis     TIA (transient ischemic attack)     Vaginal vault prolapse, posthysterectomy     Varicose veins      Past Surgical History:   Procedure Laterality Date    CARDIAC SURG PROCEDURE UNLIST      AV ablation    HX BREAST LUMPECTOMY      left breast    HX CATARACT REMOVAL      HX COLECTOMY  1955    partial    HX HEMORRHOIDECTOMY      HX HYSTERECTOMY      HX PACEMAKER      left    HX SHOULDER ARTHROSCOPY      left      Family History   Problem Relation Age of Onset    Cancer Father     Inflammatory Bowel Dz Mother     Heart Disease Brother     Diabetes Brother       Social History   Substance Use Topics    Smoking status: Never Smoker    Smokeless tobacco: Never Used    Alcohol use No      Current Outpatient Prescriptions   Medication Sig    estradiol (ESTRACE) 0.01 % (0.1 mg/gram) vaginal cream daily.  nitrofurantoin, macrocrystal-monohydrate, (MACROBID) 100 mg capsule odd months qd    trimethoprim-sulfamethoxazole (BACTRIM DS) 160-800 mg per tablet Take 1 Tab by mouth daily. On even months    levETIRAcetam (KEPPRA) 500 mg tablet TAKE ONE TABLET BY MOUTH TWICE DAILY.  carvedilol (COREG) 3.125 mg tablet 3.125 mg as needed. Only takes medication if blood pressure systolic 487 or greater    furosemide (LASIX) 20 mg tablet Take 1 Tab by mouth daily.  warfarin (COUMADIN) 2.5 mg tablet Take 5 mg by mouth daily.  VIT A/C/E AC/ZNOX/CUPRIC OXIDE (EYE VITAMIN AND MINERALS PO) Take 1 Tab by mouth two (2) times a day.  ferrous sulfate (IRON) 325 mg (65 mg iron) tablet Take 325 mg by mouth two (2) times a day.  acetaminophen (TYLENOL) 325 mg tablet Take 162.5 mg by mouth nightly. Patient takes 1/2 of a 325 mg tablet every evening at bedtime.  DOCUSATE CALCIUM (STOOL SOFTENER PO) Take 100 mg by mouth daily.     latanoprost (XALATAN) 0.005 % ophthalmic solution Administer 1 Drop to both eyes nightly.  enoxaparin (LOVENOX) 60 mg/0.6 mL injection 60 mg by SubCUTAneous route daily.  cranberry fruit extract (CRANBERRY PO) Take 1 Can by mouth daily.  amitriptyline (ELAVIL) 10 mg tablet Take  by mouth nightly.  cholecalciferol, vitamin D3, (VITAMIN D3) 2,000 unit tab Take  by mouth daily.  psyllium seed, with dextrose, (FIBER PO) Take  by mouth daily. No current facility-administered medications for this visit. Allergies: Allergies   Allergen Reactions    Codeine Nausea Only    Pcn [Penicillins] Unknown (comments)     Cant remember reaction as a child    Tetanus Toxoid, Adsorbed Other (comments)    Tetanus Vaccines And Toxoid Unknown (comments)     Does not take tetanus due to tb hx    Vibramycin [Doxycycline Calcium] Rash        Review of Systems:  10 systems reviewed. See scanned sheet in \"Media\" section. See HPI for pertinent positives and negatives. Objective:     Visit Vitals    /71 (BP 1 Location: Right arm, BP Patient Position: Sitting)    Pulse 79    Temp 97.6 °F (36.4 °C) (Oral)    Resp 16    Ht 5' 3\" (1.6 m)    Wt 56.4 kg (124 lb 6.4 oz)    SpO2 97%    BMI 22.04 kg/m2       Physical Exam:  General appearance  Alert, cooperative, no distress, appears stated age   HEENT Anicteric   Neck Mod ROM. Lungs   Clear to auscultation bilaterally   Heart  Irregular. No murmur, rub or gallop   Abdomen   Soft. Bowel sounds normal. No organomegaly. Large RIH, mod LIH. Both reducible.     Extremities no cyanosis or edema   Pulses 2+ right radial   Skin    Lymph nodes Inguinal nodes normal.   Neurologic Without overt sensory or motor deficit     Signed By: Mere Mcpherson MD     August 9, 2018

## 2018-08-14 NOTE — OP NOTES
[address]  OPERATIVE REPORT    Wilian Echevarria  MR#: [id number]  : 1924  ACCOUNT #: [de-identified]   DATE OF SERVICE: 2018    PREOPERATIVE DIAGNOSIS:  Bilateral inguinal hernias. POSTOPERATIVE DIAGNOSIS:  Bilateral inguinal hernias. PROCEDURE PERFORMED:  Open bilateral inguinal hernia repairs with mesh. ANESTHESIA:  General endotracheal.    SURGEON:  Adrian Xiong MD.    ASSISTANT:  None. ESTIMATED BLOOD LOSS:  Minimal.    SPECIMENS REMOVED:  Bilateral inguinal hernia sacs. FINDINGS:  Large right indirect hernia, small left direct hernia. COMPLICATIONS:  None immediate. IMPLANTS:  Two pieces of Covidien mesh. INDICATIONS:  The patient is a 70-year-old female who has recently developed an enlarging right inguinal hernia. On exam, she was found to have a smaller left inguinal hernia as well. She is maintained on Coumadin for cardiac arrhythmia. She has been bridged with Lovenox for this procedure. DESCRIPTION OF PROCEDURE:  After obtaining informed consent, the patient was taken to the operating room and placed supine on the operating table. An operative timeout was performed and general endotracheal anesthesia was induced. Preoperative antibiotics were administered, and the abdomen was prepped and draped in the usual sterile fashion. An [place] Crooked was employed. An incision was made over the right inguinal ligament with a blade and taken down into the subcutaneous tissues with electrocautery. The superficial epigastric vessels were suture ligated. The external oblique was identified and cleared of its areolar attachments. The external oblique was incised along its fibers at the level of the external ring. This was then elevated with Metzenbaum scissors and divided through the external ring. The gubernaculum was surrounded at the pubic tubercle.   The cremasterics were then  using blunt dissection, and the transition between the round ligament and the indirect sac was identified. The round ligament was divided at this point with electrocautery. The hernia sac was then grasped and dissected proximally to the internal ring. The hernia sac was then opened at the distal end with scissors and under direct vision was high ligated with a 2-0 Vicryl suture. The redundant sac was excised and passed off. The peritoneum retracted back into the abdominal cavity. ProGrip mesh was then implanted to reinforce the repair, and it was sutured to the iliopubic tract with PDS suture. The cavity was copiously irrigated with bacitracin and saline and made hemostatic with minimal electrocautery. The external oblique was closed with 2-0 Vicryl. Teresa's fascia was closed with 3-0 Vicryl. The deep dermal tissues were closed with interrupted 3-0 Vicryl sutures, and the skin was closed with running 4-0 Monocryl in the subcuticular layer. The incision was dressed with glue dressing. The left side was then addressed. The dissection was carried out in the same manner and a direct inguinal hernia was identified. The inguinal floor was recreated by approximating the lateral edge of the rectus sheath to the iliopubic tract using interrupted 0 Ethibond sutures. There was no indirect defect. The inguinal floor was then reinforced with another piece of ProGrip mesh, and the cavity was copiously irrigated with bacitracin and saline and made hemostatic with minimal electrocautery. The external oblique was closed with 2-0 Vicryl. Teresa's fascia was closed with 3-0 Vicryl. The deep dermal tissues were reapproximated with 3-0 Vicryl, and the skin was closed with 4-0 Monocryl. It was dressed with Dermabond. The patient was then recovered from general anesthesia and taken to the recovery area in satisfactory condition. All instrument, sponge, and needle counts were reported as correct.       MD ROGELIO Clarke /   D: 08/14/2018 10:57     T: 08/14/2018 11:44  JOB #: [id number]  CC: Shahrzad Mary MD  CC: So Novoa MD

## 2018-08-28 ENCOUNTER — OFFICE VISIT (OUTPATIENT)
Dept: SURGERY | Age: 83
End: 2018-08-28

## 2018-08-28 VITALS
HEIGHT: 64 IN | DIASTOLIC BLOOD PRESSURE: 73 MMHG | BODY MASS INDEX: 22.43 KG/M2 | HEART RATE: 93 BPM | WEIGHT: 131.4 LBS | SYSTOLIC BLOOD PRESSURE: 145 MMHG | OXYGEN SATURATION: 97 % | TEMPERATURE: 97.6 F

## 2018-08-28 DIAGNOSIS — Z09 POSTOPERATIVE EXAMINATION: Primary | ICD-10-CM

## 2018-08-28 NOTE — MR AVS SNAPSHOT
[address] [address], [facility], [address] [place] [address] [facility] 
[phone] Patient: Bonilla Odell MRN: [id number] :[id number] Visit Information Date & Time Provider Department Dept. Phone Encounter #  
 2018  3:40 PM Benedicto Hutton MD [facility] [address] [phone] [id number] Your Appointments 2019  9:40 AM  
Follow Up with Tracie Perez MD  
[facility] at [facility]) Appt Note: f/u seizure, jrb 18  
 [address] [facility], 
[address] [place] [address], [address] [address]  
[address] [facility], [address] [facility], [address] [address] Upcoming Health Maintenance Date Due  
 EYE EXAM RETINAL OR DILATED Q1 1934 DTaP/Tdap/Td series (1 - Tdap) 1945 ZOSTER VACCINE AGE 60> 1984 Bone Densitometry (Dexa) Screening 1989 LIPID PANEL Q1 2017 Influenza Age 5 to Adult 2018 FOOT EXAM Q1 2018 MICROALBUMIN Q1 2018 HEMOGLOBIN A1C Q6M 2019 MEDICARE YEARLY EXAM 2019 GLAUCOMA SCREENING Q2Y 11/10/2019 Allergies as of 2018  Review Complete On: 2018 By: Rosita Palmer LPN Severity Noted Reaction Type Reactions Codeine  2012    Nausea Only Pcn [Penicillins]  2012    Unknown (comments) Cant remember reaction as a child Tetanus Toxoid, Adsorbed    Other (comments) Tetanus Vaccines And Toxoid  2012    Unknown (comments) Does not take tetanus due to tb hx  
 Vibramycin [Doxycycline Calcium]  2012    Rash Current Immunizations  Reviewed on 2017 Name Date Influenza Vaccine 10/2/2014 Influenza Vaccine (Quad) PF 10/19/2017  8:39 AM  
 Influenza Vaccine Split 2012 Pneumococcal Conjugate (PCV-13) 2018 Pneumococcal Polysaccharide (PPSV-23) 10/2/2014 Not reviewed this visit Vitals BP Pulse Temp Height(growth percentile) Weight(growth percentile) SpO2  
 145/73 (BP 1 Location: Right arm, BP Patient Position: Sitting) 93 97.6 °F (36.4 °C) 5' 4\" (1.626 m) 131 lb 6.4 oz (59.6 kg) 97% BMI OB Status Smoking Status 22.55 kg/m2 Hysterectomy Never Smoker Vitals History BMI and BSA Data Body Mass Index Body Surface Area  
 22.55 kg/m 2 1.64 m 2 Preferred Pharmacy Pharmacy Name Phone [facility] [address] [address] [place] [phone] Your Updated Medication List  
  
   
This list is accurate as of 8/28/18  4:44 PM.  Always use your most recent med list.  
  
  
  
  
 amitriptyline 10 mg tablet Commonly known as:  ELAVIL Take  by mouth nightly. BACTRIM -800 mg per tablet Generic drug:  trimethoprim-sulfamethoxazole Take 1 Tab by mouth daily. On even months  
  
 carvedilol 3.125 mg tablet Commonly known as:  COREG  
3.125 mg as needed. Only takes medication if blood pressure systolic 891 or greater COUMADIN 2.5 mg tablet Generic drug:  warfarin Take 5 mg by mouth daily. CRANBERRY PO Take 1 Can by mouth daily. enoxaparin 60 mg/0.6 mL injection Commonly known as:  LOVENOX  
60 mg by SubCUTAneous route daily. estradiol 0.01 % (0.1 mg/gram) vaginal cream  
Commonly known as:  ESTRACE  
daily. EYE VITAMIN AND MINERALS PO Take 1 Tab by mouth two (2) times a day. FIBER PO Take  by mouth daily. furosemide 20 mg tablet Commonly known as:  LASIX Take 1 Tab by mouth daily. Iron 325 mg (65 mg iron) tablet Generic drug:  ferrous sulfate Take 325 mg by mouth two (2) times a day. latanoprost 0.005 % ophthalmic solution Commonly known as:  Ami Curls Administer 1 Drop to both eyes nightly. levETIRAcetam 500 mg tablet Commonly known as:  KEPPRA TAKE ONE TABLET BY MOUTH TWICE DAILY. nitrofurantoin (macrocrystal-monohydrate) 100 mg capsule Commonly known as:  MACROBID  
odd months qd STOOL SOFTENER PO Take 100 mg by mouth daily. TYLENOL 325 mg tablet Generic drug:  acetaminophen Take 162.5 mg by mouth nightly. Patient takes 1/2 of a 325 mg tablet every evening at bedtime. VITAMIN D3 2,000 unit Tab Generic drug:  cholecalciferol (vitamin D3) Take  by mouth daily. Introducing [facility] SERVICES! Dear Layne Staff: Thank you for requesting a [employer] account. Our records indicate that you already have an active [employer] account. You can access your account anytime at [email or web address] Did you know that you can access your hospital and ER discharge instructions at any time in [employer]? You can also review all of your test results from your hospital stay or ER visit. Additional Information If you have questions, please visit the Frequently Asked Questions section of the [employer] website at [email or web address] Remember, [employer] is NOT to be used for urgent needs. For medical emergencies, dial 911. Now available from your iPhone and Android! Please provide this summary of care documentation to your next provider. Your primary care clinician is listed as WINNIE Hernandez. If you have any questions after today's visit, please call [phone].

## 2018-08-28 NOTE — PROGRESS NOTES
Chief Complaint   Patient presents with    Surgical Follow-up     Open bilateral inguinal hernia repairs with mesh. 8/1/18     1. Have you been to the ER, urgent care clinic since your last visit? Hospitalized since your last visit? No    2. Have you seen or consulted any other health care providers outside of the [address] [place] [address] since your last visit? Include any pap smears or colon screening.  No

## 2018-08-29 NOTE — PROGRESS NOTES
To: Daniel Hughes MD  CC: Kingsley Sesay MD    From: Vinny King MD    Thank you for referring Artur Skelton. Encounter Date: 8/28/2018    Subjective:      Royer Reis is a 80 y.o. female presents for postop care. Has no complaints today regarding her hernia repairs. Little discomfort. She is a bit tired today. Notes a bit more pedal edema than usual.  No SOB. Eating a regular diet without difficulty but chronically poor appetite. Bowel movements are regular. Notes that she cannot keep her perineum clean. Daughter thinks she may have hemorrhoids. Patient declined examination of the area. Objective:     General:  alert, cooperative, no distress, appears stated age   Abdomen: soft, bowel sounds active, non-tender   Incision(s):  healing well, no drainage, no erythema, repairs intact with vigorous valsalva. Assessment:     S/p bilateral open inguinal hernia repairs. Doing well postoperatively. Plan:     Reminded of activity restrictions documented on discharge instructions. Patient understands no further follow-up required. Knows to call for any concerns. Recommended to daughter that she see Dr. Hailey Alford group.       Vinny King MD

## 2018-09-18 ENCOUNTER — HOSPITAL ENCOUNTER (OUTPATIENT)
Dept: GENERAL RADIOLOGY | Age: 83
Discharge: HOME | End: 2018-09-18
Attending: COLON & RECTAL SURGERY
Payer: MEDICARE

## 2018-09-18 ENCOUNTER — HOSPITAL ENCOUNTER (OUTPATIENT)
Dept: PREADMISSION TESTING | Age: 83
Discharge: HOME | End: 2018-09-18
Attending: COLON & RECTAL SURGERY
Payer: MEDICARE

## 2018-09-18 VITALS
SYSTOLIC BLOOD PRESSURE: 132 MMHG | TEMPERATURE: 97.7 F | RESPIRATION RATE: 20 BRPM | WEIGHT: 133.38 LBS | HEIGHT: 65 IN | DIASTOLIC BLOOD PRESSURE: 63 MMHG | OXYGEN SATURATION: 93 % | BODY MASS INDEX: 22.22 KG/M2

## 2018-09-18 PROCEDURE — 71046 X-RAY EXAM CHEST 2 VIEWS: CPT

## 2018-09-18 RX ORDER — FUROSEMIDE 20 MG/1
40 TABLET ORAL
COMMUNITY
End: 2019-12-06

## 2018-09-18 NOTE — PERIOP NOTES
[facility] Preoperative Instructions Surgery Date 9/28/2018          Time of Arrival 9:00 a.m. 
 
1. On the day of your surgery, please report to the Surgical Services Registration Desk and sign in at your designated time. The Surgery Center is located to the right of the Emergency Room. 2. You must have someone with you to drive you home. You should not drive a car for 24 hours following surgery. Please make arrangements for a friend or family member to stay with you for the first 24 hours after your surgery. 3. Do not have anything to eat or drink (including water, gum, mints, coffee, juice) after midnight. ?This may not apply to medications prescribed by your physician. ?(Please note below the special instructions with medications to take the morning of your procedure.) 4. We recommend you do not drink any alcoholic beverages for 24 hours before and after your surgery. 5. Contact your surgeons office for instructions on the following medications: non-steroidal anti-inflammatory drugs (i.e. Advil, Aleve), vitamins, and supplements. (Some surgeons will want you to stop these medications prior to surgery and others may allow you to take them) **If you are currently taking Plavix, Coumadin, Aspirin and/or other blood-thinning agents, contact your surgeon for instructions. ** Your surgeon will partner with the physician prescribing these medications to determine if it is safe to stop or if you need to continue taking. Please do not stop taking these medications without instructions from your surgeon 6. Wear comfortable clothes. Wear glasses instead of contacts. Do not bring any money or jewelry. Please bring picture ID, insurance card, and any prearranged co-payment or hospital payment. Do not wear make-up, particularly mascara the morning of your surgery. Do not wear nail polish, particularly if you are having foot /hand surgery.   Wear your hair loose or down, no ponytails, buns, balwinder pins or clips. All body piercings must be removed. Please shower with antibacterial soap for three consecutive days before and on the morning of surgery, but do not apply any lotions, powders or deodorants after the shower on the day of surgery. Please use a fresh towels after each shower. Please sleep in clean clothes and change bed linens the night before surgery. Please do not shave for 48 hours prior to surgery. Shaving of the face is acceptable. 7. You should understand that if you do not follow these instructions your surgery may be cancelled. If your physical condition changes (I.e. fever, cold or flu) please contact your surgeon as soon as possible. 8. It is important that you be on time. If a situation occurs where you may be late, please call [phone] (OR Holding Area). 9. If you have any questions and or problems, please call [phone] (Pre-admission Testing). 10. Your surgery time may be subject to change. You will receive a phone call the evening prior if your time changes. 11.  If having outpatient surgery, you must have someone to drive you here, stay with you during the duration of your stay, and to drive you home at time of discharge. 12.   In an effort to improve the efficiency, privacy, and safety for all of our Pre-op patients visitors are not allowed in the Holding area. Once you arrive and are registered your family/visitors will be asked to remain in the waiting room. The Pre-op staff will get you from the Surgical Waiting Area and will explain to you and your family/visitors that the Pre-op phase is beginning. The staff will answer any questions and provide instructions for tracking of the patient, by use of the existing tracking number and color-coded status board in the waiting room.   At this time the staff will also ask for your designated spokesperson information in the event that the physician or staff need to provide an update or obtain any pertinent information. The designated spokesperson will be notified if the physician needs to speak to family during the pre-operative phase. If at any time your family/visitors has questions or concerns they may approach the volunteer desk in the waiting area for assistance. Special Instructions: MEDICATIONS TO TAKE THE MORNING OF SURGERY WITH A SIP OF WATER: levetiracetam (keppra), carvedilol if needed I understand a pre-operative phone call will be made to verify my surgery time. In the event that I am not available, I give permission for a message to be left on my answering service and/or with another person? Yes [phone] 
 
 
 
 ___________________      __________   _________ 
  (Signature of Patient)             (Witness)                (Date and Time)

## 2018-09-18 NOTE — PERIOP NOTES
Copy of last office note and EKG 7/12/2018 with Dr. Seymour Hairston placed on patient's paper chart. Pacemaker management form faxed to Dr. [facility] office, fax confirmed. Completed pacemaker management form placed on patient's paper chart.

## 2018-09-19 ENCOUNTER — TELEPHONE (OUTPATIENT)
Dept: INTERNAL MEDICINE CLINIC | Age: 83
End: 2018-09-19

## 2018-09-19 NOTE — TELEPHONE ENCOUNTER
Charo [place] with [facility] requesting CMN an Client Notes for Pt if possible. Stated they faxed over request on 7/13/2018 and again on 8/7/2018 however they have received a signed blank document from Dr. Kayode Mosqueda. Best contact Lee Wong with [employer]) [phone] ext. [phone] fax [phone].        Copy/paste [employer]

## 2018-09-19 NOTE — TELEPHONE ENCOUNTER
Spoke with Bandar Silva with [employer]. Two pt identifiers confirmed. Advised that I will have Dr. Ines Closs complete the form and will fax back as soon as this has been done. Bandar Silva verbalized understanding of information discussed w/ no further questions at this time.

## 2018-09-24 RX ORDER — SODIUM CHLORIDE, SODIUM LACTATE, POTASSIUM CHLORIDE, CALCIUM CHLORIDE 600; 310; 30; 20 MG/100ML; MG/100ML; MG/100ML; MG/100ML
25 INJECTION, SOLUTION INTRAVENOUS CONTINUOUS
Status: CANCELLED | OUTPATIENT
Start: 2018-09-28

## 2018-09-28 ENCOUNTER — HOSPITAL ENCOUNTER (OUTPATIENT)
Age: 83
Setting detail: OUTPATIENT SURGERY
Discharge: HOME | End: 2018-09-28
Attending: COLON & RECTAL SURGERY | Admitting: COLON & RECTAL SURGERY
Payer: MEDICARE

## 2018-09-28 ENCOUNTER — ANESTHESIA (OUTPATIENT)
Dept: SURGERY | Age: 83
End: 2018-09-28
Payer: MEDICARE

## 2018-09-28 ENCOUNTER — ANESTHESIA EVENT (OUTPATIENT)
Dept: SURGERY | Age: 83
End: 2018-09-28
Payer: MEDICARE

## 2018-09-28 VITALS
DIASTOLIC BLOOD PRESSURE: 62 MMHG | HEART RATE: 80 BPM | WEIGHT: 132.5 LBS | HEIGHT: 65 IN | TEMPERATURE: 97.5 F | OXYGEN SATURATION: 94 % | BODY MASS INDEX: 22.08 KG/M2 | RESPIRATION RATE: 12 BRPM | SYSTOLIC BLOOD PRESSURE: 138 MMHG

## 2018-09-28 LAB — INR BLD: 0.9 (ref 0.9–1.2)

## 2018-09-28 PROCEDURE — 77030018836 HC SOL IRR NACL ICUM -A: Performed by: COLON & RECTAL SURGERY

## 2018-09-28 PROCEDURE — 77030018846 HC SOL IRR STRL H20 ICUM -A: Performed by: COLON & RECTAL SURGERY

## 2018-09-28 PROCEDURE — 74011250636 HC RX REV CODE- 250/636: Performed by: ANESTHESIOLOGY

## 2018-09-28 PROCEDURE — 77030034696 HC CATH URETH FOL 2W BARD -A: Performed by: COLON & RECTAL SURGERY

## 2018-09-28 PROCEDURE — 77030021052 HC RNG RETRCTR STAY COOP -A: Performed by: COLON & RECTAL SURGERY

## 2018-09-28 PROCEDURE — 77030013629 HC ELECTRD NDL STRY -B: Performed by: COLON & RECTAL SURGERY

## 2018-09-28 PROCEDURE — 77030020782 HC GWN BAIR PAWS FLX 3M -B

## 2018-09-28 PROCEDURE — 88307 TISSUE EXAM BY PATHOLOGIST: CPT | Performed by: COLON & RECTAL SURGERY

## 2018-09-28 PROCEDURE — 74011250636 HC RX REV CODE- 250/636

## 2018-09-28 PROCEDURE — 77030011267 HC ELECTRD BLD COVD -A: Performed by: COLON & RECTAL SURGERY

## 2018-09-28 PROCEDURE — 77030032490 HC SLV COMPR SCD KNE COVD -B: Performed by: COLON & RECTAL SURGERY

## 2018-09-28 PROCEDURE — 88304 TISSUE EXAM BY PATHOLOGIST: CPT | Performed by: COLON & RECTAL SURGERY

## 2018-09-28 PROCEDURE — 76210000017 HC OR PH I REC 1.5 TO 2 HR: Performed by: COLON & RECTAL SURGERY

## 2018-09-28 PROCEDURE — 74011250637 HC RX REV CODE- 250/637

## 2018-09-28 PROCEDURE — 77030013079 HC BLNKT BAIR HGGR 3M -A: Performed by: ANESTHESIOLOGY

## 2018-09-28 PROCEDURE — 76060000034 HC ANESTHESIA 1.5 TO 2 HR: Performed by: COLON & RECTAL SURGERY

## 2018-09-28 PROCEDURE — 77030008684 HC TU ET CUF COVD -B: Performed by: NURSE ANESTHETIST, CERTIFIED REGISTERED

## 2018-09-28 PROCEDURE — 77030002996 HC SUT SLK J&J -A: Performed by: COLON & RECTAL SURGERY

## 2018-09-28 PROCEDURE — 77030014007 HC SPNG HEMSTAT J&J -B: Performed by: COLON & RECTAL SURGERY

## 2018-09-28 PROCEDURE — 77030011640 HC PAD GRND REM COVD -A: Performed by: COLON & RECTAL SURGERY

## 2018-09-28 PROCEDURE — 85610 PROTHROMBIN TIME: CPT

## 2018-09-28 PROCEDURE — 76210000020 HC REC RM PH II FIRST 0.5 HR: Performed by: COLON & RECTAL SURGERY

## 2018-09-28 PROCEDURE — 76010000153 HC OR TIME 1.5 TO 2 HR: Performed by: COLON & RECTAL SURGERY

## 2018-09-28 PROCEDURE — 77030031139 HC SUT VCRL2 J&J -A: Performed by: COLON & RECTAL SURGERY

## 2018-09-28 PROCEDURE — 77030026438 HC STYL ET INTUB CARD -A: Performed by: NURSE ANESTHETIST, CERTIFIED REGISTERED

## 2018-09-28 PROCEDURE — 74011000250 HC RX REV CODE- 250: Performed by: COLON & RECTAL SURGERY

## 2018-09-28 PROCEDURE — 77030002888 HC SUT CHRMC J&J -A: Performed by: COLON & RECTAL SURGERY

## 2018-09-28 PROCEDURE — 77030019908 HC STETH ESOPH SIMS -A: Performed by: NURSE ANESTHETIST, CERTIFIED REGISTERED

## 2018-09-28 RX ORDER — LIDOCAINE HYDROCHLORIDE 10 MG/ML
0.1 INJECTION, SOLUTION EPIDURAL; INFILTRATION; INTRACAUDAL; PERINEURAL AS NEEDED
Status: DISCONTINUED | OUTPATIENT
Start: 2018-09-28 | End: 2018-09-28 | Stop reason: HOSPADM

## 2018-09-28 RX ORDER — LIDOCAINE HYDROCHLORIDE 20 MG/ML
INJECTION, SOLUTION EPIDURAL; INFILTRATION; INTRACAUDAL; PERINEURAL AS NEEDED
Status: DISCONTINUED | OUTPATIENT
Start: 2018-09-28 | End: 2018-09-28 | Stop reason: HOSPADM

## 2018-09-28 RX ORDER — FENTANYL CITRATE 50 UG/ML
25 INJECTION, SOLUTION INTRAMUSCULAR; INTRAVENOUS
Status: DISCONTINUED | OUTPATIENT
Start: 2018-09-28 | End: 2018-09-28 | Stop reason: HOSPADM

## 2018-09-28 RX ORDER — FENTANYL CITRATE 50 UG/ML
INJECTION, SOLUTION INTRAMUSCULAR; INTRAVENOUS AS NEEDED
Status: DISCONTINUED | OUTPATIENT
Start: 2018-09-28 | End: 2018-09-28 | Stop reason: HOSPADM

## 2018-09-28 RX ORDER — DEXAMETHASONE SODIUM PHOSPHATE 4 MG/ML
INJECTION, SOLUTION INTRA-ARTICULAR; INTRALESIONAL; INTRAMUSCULAR; INTRAVENOUS; SOFT TISSUE AS NEEDED
Status: DISCONTINUED | OUTPATIENT
Start: 2018-09-28 | End: 2018-09-28 | Stop reason: HOSPADM

## 2018-09-28 RX ORDER — SODIUM CHLORIDE 0.9 % (FLUSH) 0.9 %
5-10 SYRINGE (ML) INJECTION AS NEEDED
Status: DISCONTINUED | OUTPATIENT
Start: 2018-09-28 | End: 2018-09-28 | Stop reason: HOSPADM

## 2018-09-28 RX ORDER — PHENYLEPHRINE HCL IN 0.9% NACL 0.4MG/10ML
SYRINGE (ML) INTRAVENOUS AS NEEDED
Status: DISCONTINUED | OUTPATIENT
Start: 2018-09-28 | End: 2018-09-28 | Stop reason: HOSPADM

## 2018-09-28 RX ORDER — CEFOXITIN 1 G/1
INJECTION, POWDER, FOR SOLUTION INTRAVENOUS AS NEEDED
Status: DISCONTINUED | OUTPATIENT
Start: 2018-09-28 | End: 2018-09-28 | Stop reason: HOSPADM

## 2018-09-28 RX ORDER — DIPHENHYDRAMINE HYDROCHLORIDE 50 MG/ML
12.5 INJECTION, SOLUTION INTRAMUSCULAR; INTRAVENOUS AS NEEDED
Status: DISCONTINUED | OUTPATIENT
Start: 2018-09-28 | End: 2018-09-28 | Stop reason: HOSPADM

## 2018-09-28 RX ORDER — ACETAMINOPHEN 500 MG
TABLET ORAL
Status: COMPLETED
Start: 2018-09-28 | End: 2018-09-28

## 2018-09-28 RX ORDER — BUPIVACAINE HYDROCHLORIDE AND EPINEPHRINE 2.5; 5 MG/ML; UG/ML
INJECTION, SOLUTION EPIDURAL; INFILTRATION; INTRACAUDAL; PERINEURAL AS NEEDED
Status: DISCONTINUED | OUTPATIENT
Start: 2018-09-28 | End: 2018-09-28 | Stop reason: HOSPADM

## 2018-09-28 RX ORDER — SUCCINYLCHOLINE CHLORIDE 20 MG/ML
INJECTION INTRAMUSCULAR; INTRAVENOUS AS NEEDED
Status: DISCONTINUED | OUTPATIENT
Start: 2018-09-28 | End: 2018-09-28 | Stop reason: HOSPADM

## 2018-09-28 RX ORDER — ACETAMINOPHEN 500 MG
500 TABLET ORAL ONCE
Status: COMPLETED | OUTPATIENT
Start: 2018-09-28 | End: 2018-09-28

## 2018-09-28 RX ORDER — HYDROMORPHONE HYDROCHLORIDE 1 MG/ML
0.2 INJECTION, SOLUTION INTRAMUSCULAR; INTRAVENOUS; SUBCUTANEOUS
Status: DISCONTINUED | OUTPATIENT
Start: 2018-09-28 | End: 2018-09-28 | Stop reason: HOSPADM

## 2018-09-28 RX ORDER — SODIUM CHLORIDE, SODIUM LACTATE, POTASSIUM CHLORIDE, CALCIUM CHLORIDE 600; 310; 30; 20 MG/100ML; MG/100ML; MG/100ML; MG/100ML
25 INJECTION, SOLUTION INTRAVENOUS CONTINUOUS
Status: DISCONTINUED | OUTPATIENT
Start: 2018-09-28 | End: 2018-09-28 | Stop reason: HOSPADM

## 2018-09-28 RX ORDER — ACETAMINOPHEN 500 MG
500 TABLET ORAL
COMMUNITY
Start: 2022-01-01

## 2018-09-28 RX ORDER — PROPOFOL 10 MG/ML
INJECTION, EMULSION INTRAVENOUS AS NEEDED
Status: DISCONTINUED | OUTPATIENT
Start: 2018-09-28 | End: 2018-09-28 | Stop reason: HOSPADM

## 2018-09-28 RX ORDER — SODIUM CHLORIDE 0.9 % (FLUSH) 0.9 %
5-10 SYRINGE (ML) INJECTION EVERY 8 HOURS
Status: DISCONTINUED | OUTPATIENT
Start: 2018-09-28 | End: 2018-09-28 | Stop reason: HOSPADM

## 2018-09-28 RX ADMIN — ACETAMINOPHEN 500 MG: 500 TABLET ORAL at 12:52

## 2018-09-28 RX ADMIN — PROPOFOL 10 MG: 10 INJECTION, EMULSION INTRAVENOUS at 10:25

## 2018-09-28 RX ADMIN — LIDOCAINE HYDROCHLORIDE 60 MG: 20 INJECTION, SOLUTION EPIDURAL; INFILTRATION; INTRACAUDAL; PERINEURAL at 09:57

## 2018-09-28 RX ADMIN — PROPOFOL 90 MG: 10 INJECTION, EMULSION INTRAVENOUS at 09:57

## 2018-09-28 RX ADMIN — PROPOFOL 10 MG: 10 INJECTION, EMULSION INTRAVENOUS at 10:06

## 2018-09-28 RX ADMIN — SODIUM CHLORIDE, SODIUM LACTATE, POTASSIUM CHLORIDE, AND CALCIUM CHLORIDE 25 ML/HR: 600; 310; 30; 20 INJECTION, SOLUTION INTRAVENOUS at 09:40

## 2018-09-28 RX ADMIN — DEXAMETHASONE SODIUM PHOSPHATE 4 MG: 4 INJECTION, SOLUTION INTRA-ARTICULAR; INTRALESIONAL; INTRAMUSCULAR; INTRAVENOUS; SOFT TISSUE at 10:02

## 2018-09-28 RX ADMIN — FENTANYL CITRATE 25 MCG: 50 INJECTION, SOLUTION INTRAMUSCULAR; INTRAVENOUS at 09:57

## 2018-09-28 RX ADMIN — Medication 500 MG: at 12:52

## 2018-09-28 RX ADMIN — PROPOFOL 20 MG: 10 INJECTION, EMULSION INTRAVENOUS at 10:11

## 2018-09-28 RX ADMIN — CEFOXITIN 1 G: 1 INJECTION, POWDER, FOR SOLUTION INTRAVENOUS at 10:21

## 2018-09-28 RX ADMIN — SUCCINYLCHOLINE CHLORIDE 80 MG: 20 INJECTION INTRAMUSCULAR; INTRAVENOUS at 09:57

## 2018-09-28 RX ADMIN — FENTANYL CITRATE 25 MCG: 50 INJECTION, SOLUTION INTRAMUSCULAR; INTRAVENOUS at 10:11

## 2018-09-28 RX ADMIN — Medication 40 MCG: at 10:28

## 2018-09-28 NOTE — ANESTHESIA PREPROCEDURE EVALUATION
Anesthetic History No history of anesthetic complications Review of Systems / Medical History Patient summary reviewed, nursing notes reviewed and pertinent labs reviewed Pulmonary Within defined limits Neuro/Psych  
 
seizures: poorly controlled CVA TIA Comments: Macular degeneration (H35.30) Cardiovascular Hypertension Dysrhythmias : atrial fibrillation CAD and hyperlipidemia Exercise tolerance: <4 METS Comments: 25-35% moderate:   AI,MR and TR and pulm htn GI/Hepatic/Renal 
  
 
 
Renal disease: CRI Endo/Other Arthritis and cancer Other Findings Comments: Blood in stool, hemorrhoid Compression fracture of L1 lumbar vertebra (HCC) Physical Exam 
 
Airway Mallampati: II 
TM Distance: 4 - 6 cm Neck ROM: decreased range of motion Mouth opening: Normal 
 
 Cardiovascular Rhythm: irregular Rate: normal 
 
 
 
 Dental 
 
Dentition: Upper partial plate Pulmonary Decreased breath sounds: bilateral 
 
 
 
 
 Abdominal 
GI exam deferred Other Findings Anesthetic Plan ASA: 4 Anesthesia type: general 
 
 
 
 
Induction: Intravenous Anesthetic plan and risks discussed with: Patient and Son / Daughter

## 2018-09-28 NOTE — ANESTHESIA POSTPROCEDURE EVALUATION
Post-Anesthesia Evaluation and Assessment Patient: Gabi Carrasco MRN: [id number]  SSN: [id number] YOB: 1924  Age: 80 y.o. Sex: female Cardiovascular Function/Vital Signs Visit Vitals  /71  Pulse 80  Temp (!) 34.7 °C (94.4 °F)  Resp 21  
 Ht 5' 5\" (1.651 m)  Wt 60.1 kg (132 lb 7.9 oz)  SpO2 91%  BMI 22.05 kg/m2 Patient is status post general anesthesia for Procedure(s): 
TRANSANAL EXCISION RECTAL POLYP,  HEMORRHOIDECTOMY HEMORRHOIDECTOMY. Nausea/Vomiting: None Postoperative hydration reviewed and adequate. Pain: 
Pain Scale 1: Numeric (0 - 10) (09/28/18 0920) Pain Intensity 1: 0 (09/28/18 0920) Managed Neurological Status:  
Neuro (WDL): Within Defined Limits (09/28/18 0927) At baseline Mental Status and Level of Consciousness: Arousable Pulmonary Status:  
O2 Device: Nasal cannula (09/28/18 1138) Adequate oxygenation and airway patent Complications related to anesthesia: None Post-anesthesia assessment completed. No concerns Signed By: Farhan Reyes MD   
 September 28, 2018

## 2018-09-28 NOTE — H&P
Colon and Rectal Surgery History and Physical 
 
Subjective:  
  
Addison Klein is a 80 y.o. female who has rectal polyp prolapsing as well as hemorrhoids Patient Active Problem List  
 Diagnosis Date Noted  Bilateral inguinal hernia 08/03/2018  Pulmonary edema 08/03/2018  UTI (urinary tract infection) 10/15/2017  A-fib ([address] 10/15/2017  Atrial fibrillation with RVR ([address] 08/05/2017  Back pain 06/02/2017  Right hip pain 06/02/2017  Falls 06/02/2017  Constipation 06/01/2017  Compression fracture of L1 lumbar vertebra (HCC) 06/01/2017  Complex partial seizure with impairment of consciousness ([address] 06/15/2016  Unspecified hereditary and idiopathic peripheral neuropathy 07/01/2013  B12 deficiency 07/01/2013  Polyneuropathy in diabetes(357.2) 07/01/2013  Ataxia 12/31/2012  Osteoarthritis of hip 07/11/2012  PAF (paroxysmal atrial fibrillation) ([address] 07/11/2012 Past Medical History:  
Diagnosis Date  Adverse effect of anesthesia   
 slow to wake  Alopecia  Arrhythmia   
 atrial fib,?svt  Arthritis  Atrial fibrillation ([address]  Benign essential HTN  Benign neoplasm of vulva  CAD (coronary artery disease)  Cholelithiases  Chronic kidney disease   
 followed by specialist  
 Chronic pain  Colon cancer ([address]  Constipation  Dyspareunia  Glaucoma  Heart failure ([address]  History of mixed connective tissue disease  Hx of viral pericarditis  Hyperactivity of bladder  Hypercholesterolemia  Ill-defined condition   
 macular degeneration  Ill-defined condition   
 recurrent UTI hx  
 Insomnia  Macular degeneration  Neck pain  Psoriasis   
 patient denies  Rectocele  Renal cyst   
 Seizure disorder ([address]   
 none in 30 plus years---sees neurologist   
 Sjogren's disease [facility])   
 patient unsure  Thrombophlebitis  TIA (transient ischemic attack)   
 patient denies  Vaginal vault prolapse, posthysterectomy  Varicose veins Past Surgical History:  
Procedure Laterality Date  CARDIAC SURG PROCEDURE UNLIST    
 AV ablation  HX BREAST LUMPECTOMY    
 left breast  
 HX CATARACT REMOVAL    
 HX COLECTOMY  1955  
 partial  
 HX HEMORRHOIDECTOMY  HX HERNIA REPAIR  08/01/2018 Open bilateral inguinal hernia repairs with mesh.  HX HYSTERECTOMY  HX PACEMAKER    
 left  HX SHOULDER ARTHROSCOPY    
 left Social History Substance Use Topics  Smoking status: Never Smoker  Smokeless tobacco: Never Used  Alcohol use No  
  
Family History Problem Relation Age of Onset  Cancer Father  Inflammatory Bowel Dz Mother  Heart Disease Brother  Diabetes Brother Prior to Admission medications Medication Sig Start Date End Date Taking? Authorizing Provider  
furosemide (LASIX) 20 mg tablet Take 40 mg by mouth daily as needed. Indications: Patient takes 20mg daily, then 0.5 to 1 tab extra PRN    Historical Provider  
enoxaparin (LOVENOX) 60 mg/0.6 mL injection 60 mg by SubCUTAneous route daily. 8/3/18   Jose Wood MD  
cranberry fruit extract (CRANBERRY PO) Take 1 Can by mouth daily. Historical Provider  
amitriptyline (ELAVIL) 10 mg tablet Take  by mouth nightly. Historical Provider  
cholecalciferol, vitamin D3, (VITAMIN D3) 2,000 unit tab Take  by mouth daily. Historical Provider  
psyllium seed, with dextrose, (FIBER PO) Take  by mouth daily. Historical Provider  
estradiol (ESTRACE) 0.01 % (0.1 mg/gram) vaginal cream daily. 6/4/18   Historical Provider  
nitrofurantoin, macrocrystal-monohydrate, (MACROBID) 100 mg capsule odd months qd    Historical Provider  
trimethoprim-sulfamethoxazole (BACTRIM DS) 160-800 mg per tablet Take 1 Tab by mouth daily. On even months    Historical Provider  
levETIRAcetam (KEPPRA) 500 mg tablet TAKE ONE TABLET BY MOUTH TWICE DAILY.  6/12/18   Stacy Newman MD  
 carvedilol (COREG) 3.125 mg tablet 3.125 mg as needed. Only takes medication if blood pressure systolic 390 or greater 90/26/57   Historical Provider  
furosemide (LASIX) 20 mg tablet Take 1 Tab by mouth daily. Patient taking differently: Take 40 mg by mouth daily. 10/31/17   April Jolene Bloch, MD  
warfarin (COUMADIN) 2.5 mg tablet Take 5 mg by mouth every evening. Yeny Begum MD  
VIT A/C/E AC/ZNOX/CUPRIC OXIDE (EYE VITAMIN AND MINERALS PO) Take 1 Tab by mouth two (2) times a day. Yeny Begum MD  
ferrous sulfate (IRON) 325 mg (65 mg iron) tablet Take 325 mg by mouth daily. Yeny Begum MD  
DOCUSATE CALCIUM (STOOL SOFTENER PO) Take 100 mg by mouth as needed. Historical Provider  
latanoprost (XALATAN) 0.005 % ophthalmic solution Administer 1 Drop to both eyes nightly. 6/5/17   Historical Provider Allergies Allergen Reactions  Codeine Nausea Only  Pcn [Penicillins] Unknown (comments) Cant remember reaction as a child  Tetanus Toxoid, Adsorbed Other (comments)  Tetanus Vaccines And Toxoid Unknown (comments) Does not take tetanus due to tb hx  Vibramycin [Doxycycline Calcium] Rash Review of Systems: A comprehensive review of systems was negative except for that written in the History of Present Illness. Objective: There were no vitals taken for this visit. Physical Exam: nad Chest clear Heart reg 
abd soft Imaging:  images and reports reviewed Lab Review:  No results found for this or any previous visit (from the past 24 hour(s)). Labs and radiology: images and reports reviewed Assessment:  
Hems and a rectal polyp Plan: 1. I recommend proceeding with transanal excision of rectal polyp and hemorrhoidectomy. Treatment alternatives were discussed.  
2. Discussed aspects of surgical intervention, methods, risks (including by not limited to infection, bleeding, hematoma, and perforation of the intestines or solid organs), and the risks of general anesthetic. The patient understands the risks; any and all questions were answered to the patient's satisfaction. Signed By: Doug Sotelo MD   
 September 28, 2018

## 2018-09-28 NOTE — OP NOTES
[address]  OPERATIVE REPORT    Russell Greenberg  MR#: [id number]  : 1924  ACCOUNT #: [de-identified]   DATE OF SERVICE: 2018    PREOPERATIVE DIAGNOSIS:  Rectal bleeding, rectal polyp and hemorrhoids. POSTOPERATIVE DIAGNOSIS:  Rectal bleeding, rectal polyp and hemorrhoids. PROCEDURE PERFORMED:  Transanal excision of 2 rectal polyps. Excisional hemorrhoidectomy. SURGEON:  Taylor Prajapati MD     ASSISTANT:  SA    ANESTHESIA:  General plus 30 mL of 0.25% Marcaine with epinephrine. ESTIMATED BLOOD LOSS:  5 mL. IMPLANTS:  none    SPECIMENS REMOVED:  Left anterior distal rectal polyp anterior midline distal rectal polyp and left lateral hemorrhoid sent to pathology. Patient tolerated the procedure well and was transferred to recovery room in stable condition. COMPLICATIONS:  Bleeding from her left ear was noted as she was extubated. We will call ENT for evaluation. INDICATIONS:  This is a 79-year-old female who was having some rectal bleeding. She thought she had a rectal prolapse. On inspection, she had a large rectal polyp which was prolapsing. Risks, benefits and alternatives were discussed with her and her daughter regarding excision. She also had some hemorrhoids, which she was able to prolapse. I discussed an excisional hemorrhoidectomy. She was consented and taken to the operating room and placed on the table in normal supine position. She was intubated and flipped in the prone jackknife position. The buttocks taped and taped apart and the perineum was prepped and draped in usual fashion. Timeout was performed. She had left lateral hemorrhoid which was obviously that needed to be removed. I used a [place] retractor to gene the anal canal.  This helped bring the rectal polyp lower. I used the [employer] operating anoscope to identify. There were actually two large distal rectal polyps.   The left anterior polyp was removed first to make marking out a 1 cm negative margin. This was then dissected out circumferentially after injecting 0.25% Marcaine with epinephrine in a submucosal plane. I performed a submucosal dissection in order to lift this out. Once this lesion was removed I marked this distal margin with a suture and plan to pin this to a specimen board at the end of the case. I then closed this defect in the transverse fashion with 2-0 Vicryl sutures in interrupted fashion. I then removed the anterior midline polyp in a similar fashion and closed the defect in a similar fashion. These were both irrigated prior to closure. I then removed the [facility] [place] retractor and removed the left lateral hemorrhoid making a V-shaped incision on the perineum and undermining the hemorrhoid taking care to avoid injuring the sphincter musculature. I closed this incision using a running locking and 3-0 chromic in the anal canal.  Running simple suture on the anal margin. This was all hemostatic. Gelfoam pack was applied. The patient tolerated the procedure well and was transferred to the recovery in stable condition. Upon extubation, she was noted to have some bleeding from her left ear. We will call otolaryngology for an evaluation.       MD Yumiko Terrell / PASCUAL  D: 09/28/2018 11:45     T: 09/28/2018 12:58  JOB #: [id number]

## 2018-09-28 NOTE — DISCHARGE INSTRUCTIONS
Kate Rico MD, FACS  Mike DUMONT. Mike Wood MD, FACS  Cecilio Leanderchrisfranny. Will Gamboa MD, 0[address] [facility] Ahmet Eckert MD, [address] [facility] Anabel Gooden MD, FACS  Panfilo Butt. MD Alaina Brice MD    [facility] Discharge Instructions for Ambulatory 23-Hour Surgery Patients    1. Advance to high fiber diet as tolerated. 2. Take Metamucil/Citrucel 1 teaspoon mixed in a glass of water in AM and PM.  3. Remove dressing at 6PM.  4. Take 2 sitz baths today (warm water baths for 15-20 minutes). Begin four (4) times a day the day after surgery. 5. Apply Nupercainal Ointment (does not need a prescription) to the anal area after sitz baths, place a dry 4x4 gauze over the are between the buttocks. 6. Take pain medication as prescribed. (NO DRIVING WHILE ON PAIN MEDICATION). 7. No lifting any objects weighing more than 40 pounds. 8. No sitting more than 45 minutes without standing, walking, or lying down. 9. You may walk as desired. 10.  Please schedule an appointment in the office within 10-14 days. Call ahead to schedule your appointment [phone]. 11.  Call Exchange [phone] if you have any problems or questions after   hours. 12.  Expect slight bright red blood up to four (4) weeks from surgery. 13.  Stitches are the dissolving type - they do not need removal in the office. 14.  If no bowel movement by 9/30/2018, take 30cc (1 tablespoon) of Milk of Magnesia. Repeat if no results. If still no bowel movement the next day, then call the office. TO PREVENT AN INFECTION      1. [address] YOUR HANDS     To prevent infection, good handwashing is the most important thing you or your caregiver can do.  Wash your hands with soap and water or use the hand  we gave you before you touch any wounds. 2. SHOWER     Use the antibacterial soap we gave you when you take a shower.  Shower with this soap until your wounds are healed.        To reach all areas of your body, you may need someone to help you.  Dont forget to clean your belly button with every shower. 3.  USE CLEAN SHEETS     Use freshly cleaned sheets on your bed after surgery.  To keep the surgery site clean, do not allow pets to sleep with you while your wound is still healing. 4. STOP SMOKING     Stop smoking, or at least cut back on smoking     Smoking slows your healing. 5.  CONTROL YOUR BLOOD SUGAR     High blood sugars slow wound healing.  If you are diabetic, control your blood sugar levels before and after your surgery. DISCHARGE SUMMARY from Nurse    PATIENT INSTRUCTIONS:    After general anesthesia or intravenous sedation, for 24 hours or while taking prescription Narcotics:  · Limit your activities  · Do not drive and operate hazardous machinery  · Do not make important personal or business decisions  · Do  not drink alcoholic beverages  · If you have not urinated within 8 hours after discharge, please contact your surgeon on call. Report the following to your surgeon:  · Excessive pain, swelling, redness or odor of or around the surgical area  · Temperature over 100.5  · Nausea and vomiting lasting longer than 4 hours or if unable to take medications  · Any signs of decreased circulation or nerve impairment to extremity: change in color, persistent  numbness, tingling, coldness or increase pain  · Any questions    These are general instructions for a healthy lifestyle:    No smoking/ No tobacco products/ Avoid exposure to second hand smoke  Surgeon General's Warning:  Quitting smoking now greatly reduces serious risk to your health.     Obesity, smoking, and sedentary lifestyle greatly increases your risk for illness    A healthy diet, regular physical exercise & weight monitoring are important for maintaining a healthy lifestyle    You may be retaining fluid if you have a history of heart failure or if you experience any of the following symptoms:  Weight gain of 3 pounds or more overnight or 5 pounds in a week, increased swelling in our hands or feet or shortness of breath while lying flat in bed. Please call your doctor as soon as you notice any of these symptoms; do not wait until your next office visit. Recognize signs and symptoms of STROKE:    F-face looks uneven    A-arms unable to move or move unevenly    S-speech slurred or non-existent    T-time-call 911 as soon as signs and symptoms begin-DO NOT go       Back to bed or wait to see if you get better-TIME IS BRAIN. Warning Signs of HEART ATTACK     Call 911 if you have these symptoms:   Chest discomfort. Most heart attacks involve discomfort in the center of the chest that lasts more than a few minutes, or that goes away and comes back. It can feel like uncomfortable pressure, squeezing, fullness, or pain.  Discomfort in other areas of the upper body. Symptoms can include pain or discomfort in one or both arms, the back, neck, jaw, or stomach.  Shortness of breath with or without chest discomfort.  Other signs may include breaking out in a cold sweat, nausea, or lightheadedness. Don't wait more than five minutes to call 911 - MINUTES MATTER! Fast action can save your life. Calling 911 is almost always the fastest way to get lifesaving treatment. Emergency Medical Services staff can begin treatment when they arrive -- up to an hour sooner than if someone gets to the hospital by car. The discharge information has been reviewed with the patient and caregiver. The patient and caregiver verbalized understanding. Discharge medications reviewed with the patient and caregiver and appropriate educational materials and side effects teaching were provided.

## 2018-09-28 NOTE — IP AVS SNAPSHOT
[address] [facility] 83. 
[phone] Patient: Rajan Allen MRN: [id number] About your hospitalization You were admitted on:  2018 You last received care in the:  [facility] PACU You were discharged on:  2018 Why you were hospitalized Your primary diagnosis was:  Not on File Follow-up Information Follow up With Details Comments Contact Info MD Risa   . Sedrick Beckett [address] [facility] 83. 
[phone] Discharge Orders None A check pepe indicates which time of day the medication should be taken. My Medications CHANGE how you take these medications Instructions Each Dose to Equal  
 Morning Noon Evening Bedtime * furosemide 20 mg tablet Commonly known as:  LASIX What changed:  Another medication with the same name was changed. Make sure you understand how and when to take each. Your last dose was: Your next dose is: Take 40 mg by mouth daily as needed. Indications: Patient takes 20mg daily, then 0.5 to 1 tab extra PRN  
 40 mg  
    
   
   
   
  
 * furosemide 20 mg tablet Commonly known as:  LASIX What changed:  how much to take Your last dose was: Your next dose is: Take 1 Tab by mouth daily. 20 mg  
    
   
   
   
  
 * Notice: This list has 2 medication(s) that are the same as other medications prescribed for you. Read the directions carefully, and ask your doctor or other care provider to review them with you. CONTINUE taking these medications Instructions Each Dose to Equal  
 Morning Noon Evening Bedtime  
 amitriptyline 10 mg tablet Commonly known as:  ELAVIL Your last dose was: Your next dose is: Take  by mouth nightly. BACTRIM -800 mg per tablet Generic drug:  trimethoprim-sulfamethoxazole Your last dose was: Your next dose is: Take 1 Tab by mouth daily. On even months 1 Tab  
    
   
   
   
  
 carvedilol 3.125 mg tablet Commonly known as:  Theoplis Alfredito Your last dose was: Your next dose is:    
   
   
 3.125 mg as needed. Only takes medication if blood pressure systolic 254 or greater 3.125 mg  
    
   
   
   
  
 COUMADIN 2.5 mg tablet Generic drug:  warfarin Your last dose was: Your next dose is: Take 5 mg by mouth every evening. 5 mg CRANBERRY PO Your last dose was: Your next dose is: Take 1 Can by mouth daily. 1 Can  
    
   
   
   
  
 enoxaparin 60 mg/0.6 mL injection Commonly known as:  LOVENOX Your last dose was: Your next dose is:    
   
   
 60 mg by SubCUTAneous route daily. 60 mg  
    
   
   
   
  
 estradiol 0.01 % (0.1 mg/gram) vaginal cream  
Commonly known as:  ESTRACE Your last dose was: Your next dose is:    
   
   
 daily. EYE VITAMIN AND MINERALS PO Your last dose was: Your next dose is: Take 1 Tab by mouth two (2) times a day. 1 Tab FIBER PO Your last dose was: Your next dose is: Take  by mouth daily. Iron 325 mg (65 mg iron) tablet Generic drug:  ferrous sulfate Your last dose was: Your next dose is: Take 325 mg by mouth daily. 325 mg  
    
   
   
   
  
 latanoprost 0.005 % ophthalmic solution Commonly known as:  Mordecai Clink Your last dose was: Your next dose is:    
   
   
 Administer 1 Drop to both eyes nightly. 1 Drop  
    
   
   
   
  
 levETIRAcetam 500 mg tablet Commonly known as:  KEPPRA Your last dose was: Your next dose is: TAKE ONE TABLET BY MOUTH TWICE DAILY. nitrofurantoin (macrocrystal-monohydrate) 100 mg capsule Commonly known as:  MACROBID Your last dose was: Your next dose is:    
   
   
 odd months qd STOOL SOFTENER PO Your last dose was: Your next dose is: Take 100 mg by mouth as needed. 100 mg  
    
   
   
   
  
 TYLENOL EXTRA STRENGTH 500 mg tablet Generic drug:  acetaminophen Your last dose was: Your next dose is: Take 500 mg by mouth every six (6) hours as needed for Pain. 500 mg  
    
   
   
   
  
 VITAMIN D3 2,000 unit Tab Generic drug:  cholecalciferol (vitamin D3) Your last dose was: Your next dose is: Take  by mouth daily. Discharge Instructions Kolby Ambrose MD, FACS Mike DUMONT. Clotilde Carranza MD, FACS oJsé Miguel DUMONT. Kevin Peñaloza MD, FACS Vamsi Massey. Suhas Virk MD, FACS Martha CARRINGTON. Venita Clemons MD, FACS Elida Charles. MD Doug Carter MD 
 
[facility] Discharge Instructions for Ambulatory 23-Hour Surgery Patients 1. Advance to high fiber diet as tolerated. 2. Take Metamucil/Citrucel 1 teaspoon mixed in a glass of water in AM and PM. 
3. Remove dressing at 6PM. 4. Take 2 sitz baths today (warm water baths for 15-20 minutes). Begin four (4) times a day the day after surgery. 5. Apply Nupercainal Ointment (does not need a prescription) to the anal area after sitz baths, place a dry 4x4 gauze over the are between the buttocks. 6. Take pain medication as prescribed. (NO DRIVING WHILE ON PAIN MEDICATION). 7. No lifting any objects weighing more than 40 pounds. 8. No sitting more than 45 minutes without standing, walking, or lying down. 9. You may walk as desired. 10.  Please schedule an appointment in the office within 10-14 days. Call ahead to schedule your appointment [phone]. 11.  Call Exchange [phone] if you have any problems or questions after   hours. 12.  Expect slight bright red blood up to four (4) weeks from surgery. 15.  Stitches are the dissolving type  they do not need removal in the office. 14.  If no bowel movement by 9/30/2018, take 30cc (1 tablespoon) of Milk of Magnesia. Repeat if no results. If still no bowel movement the next day, then call the office. TO PREVENT AN INFECTION 1. [address] YOUR HANDS 
 
? To prevent infection, good handwashing is the most important thing you or your caregiver can do.   
 
? Wash your hands with soap and water or use the hand  we gave you before you touch any wounds. 2. SHOWER ? Use the antibacterial soap we gave you when you take a shower. ? Shower with this soap until your wounds are healed. ? To reach all areas of your body, you may need someone to help you. ? Dont forget to clean your belly button with every shower. 3.  USE CLEAN SHEETS 
 
? Use freshly cleaned sheets on your bed after surgery. ? To keep the surgery site clean, do not allow pets to sleep with you while your wound is still healing. 4. STOP SMOKING ? Stop smoking, or at least cut back on smoking ? Smoking slows your healing. 5.  CONTROL YOUR BLOOD SUGAR 
 
? High blood sugars slow wound healing. ? If you are diabetic, control your blood sugar levels before and after your surgery. DISCHARGE SUMMARY from Nurse PATIENT INSTRUCTIONS: 
 
After general anesthesia or intravenous sedation, for 24 hours or while taking prescription Narcotics: · Limit your activities · Do not drive and operate hazardous machinery · Do not make important personal or business decisions · Do  not drink alcoholic beverages · If you have not urinated within 8 hours after discharge, please contact your surgeon on call. Report the following to your surgeon: · Excessive pain, swelling, redness or odor of or around the surgical area · Temperature over 100.5 · Nausea and vomiting lasting longer than 4 hours or if unable to take medications · Any signs of decreased circulation or nerve impairment to extremity: change in color, persistent  numbness, tingling, coldness or increase pain · Any questions These are general instructions for a healthy lifestyle: No smoking/ No tobacco products/ Avoid exposure to second hand smoke Surgeon General's Warning:  Quitting smoking now greatly reduces serious risk to your health. Obesity, smoking, and sedentary lifestyle greatly increases your risk for illness A healthy diet, regular physical exercise & weight monitoring are important for maintaining a healthy lifestyle You may be retaining fluid if you have a history of heart failure or if you experience any of the following symptoms:  Weight gain of 3 pounds or more overnight or 5 pounds in a week, increased swelling in our hands or feet or shortness of breath while lying flat in bed. Please call your doctor as soon as you notice any of these symptoms; do not wait until your next office visit. Recognize signs and symptoms of STROKE: 
 
F-face looks uneven A-arms unable to move or move unevenly S-speech slurred or non-existent T-time-call 911 as soon as signs and symptoms begin-DO NOT go Back to bed or wait to see if you get better-TIME IS BRAIN. Warning Signs of HEART ATTACK Call 911 if you have these symptoms: 
? Chest discomfort. Most heart attacks involve discomfort in the center of the chest that lasts more than a few minutes, or that goes away and comes back. It can feel like uncomfortable pressure, squeezing, fullness, or pain. ? Discomfort in other areas of the upper body. Symptoms can include pain or discomfort in one or both arms, the back, neck, jaw, or stomach. ? Shortness of breath with or without chest discomfort. ? Other signs may include breaking out in a cold sweat, nausea, or lightheadedness. Don't wait more than five minutes to call [address]! Fast action can save your life. Calling 911 is almost always the fastest way to get lifesaving treatment. Emergency Medical Services staff can begin treatment when they arrive  up to an hour sooner than if someone gets to the hospital by car. The discharge information has been reviewed with the patient and caregiver. The patient and caregiver verbalized understanding. Discharge medications reviewed with the patient and caregiver and appropriate educational materials and side effects teaching were provided. ACO Transitions of Care Introducing [employer] [address] Harmon offers a voluntary care coordination program to provide high quality service and care to [facility] fee-for-service beneficiaries. Carmen Hartman was designed to help you enhance your health and well-being through the following services: ? Transitions of Care  support for individuals who are transitioning from one care setting to another (example: Hospital to home). ? Chronic and Complex Care Coordination  support for individuals and caregivers of those with serious or chronic illnesses or with more than one chronic (ongoing) condition and those who take a number of different medications. If you meet specific medical criteria, a [address] [place] [address] Rd may call you directly to coordinate your care with your primary care physician and your other care providers. For questions about the [facility] programs, please, contact your physicians office. For general questions or additional information about Accountable Care Organizations: 
Please visit www.medicare.gov/acos. html or call 1-800-MEDICARE (1-494.500.5285) TTY users should call 6-916.931.4850. Introducing [facility]! Dear Dee Dee Rivers: Thank you for requesting a [employer] account. Our records indicate that you already have an active [employer] account. You can access your account anytime at [email or web address] Did you know that you can access your hospital and ER discharge instructions at any time in [employer]? You can also review all of your test results from your hospital stay or ER visit. Additional Information If you have questions, please visit the Frequently Asked Questions section of the [employer] website at [email or web address] Remember, [employer] is NOT to be used for urgent needs. For medical emergencies, dial 911. Now available from your iPhone and Android! Introducing Stone Ward As a [facility] patient, I wanted to make you aware of our electronic visit tool called Stone Ward. [employer] allows you to connect within minutes with a medical provider 24 hours a day, seven days a week via a mobile device or tablet or logging into a secure website from your computer. You can access Stone Ward from anywhere in the [place]. A virtual visit might be right for you when you have a simple condition and feel like you just dont want to get out of bed, or cant get away from work for an appointment, when your regular [facility] provider is not available (evenings, weekends or holidays), or when youre out of town and need minor care. Electronic visits cost only $49 and if the [employer]/YOLLEGE provider determines a prescription is needed to treat your condition, one can be electronically transmitted to a nearby pharmacy*. Please take a moment to enroll today if you have not already done so. The enrollment process is free and takes just a few minutes. To enroll, please download the [employer]YOLLEGE kalyn to your tablet or phone, or visit [email or web address] to enroll on your computer. And, as an [address] [place] [address] patient with a [employer] account, the results of your visits will be scanned into your electronic medical record and your primary care provider will be able to view the scanned results. We urge you to continue to see your regular [facility] provider for your ongoing medical care. And while your primary care provider may not be the one available when you seek a Stone Pabonbekahfin virtual visit, the peace of mind you get from getting a real diagnosis real time can be priceless. For more information on Stone Pabonbekahfin, view our Frequently Asked Questions (FAQs) at [email or web address]. Sincerely, 
 
Socorro Recio MD 
Chief Medical Officer [employer] *:  certain medications cannot be prescribed via [employer] Pabon[employer]ah[employer] Providers Seen During Your Hospitalization Provider Specialty Primary office phone Tracie Segura MD Colon and Rectal Surgery [phone] Your Primary Care Physician (PCP) Primary Care Physician Office Phone Office Fax Karthikeyan Riojas [phone] You are allergic to the following Allergen Reactions Codeine Nausea Only Pcn (Penicillins) Unknown (comments) Cant remember reaction as a child Tetanus Toxoid, Adsorbed Other (comments) Tetanus Vaccines And Toxoid Unknown (comments) Does not take tetanus due to tb hx  
    
 Vibramycin (Doxycycline Calcium) Rash Recent Documentation Height Weight BMI OB Status Smoking Status 1.651 m 60.1 kg 22.05 kg/m2 Hysterectomy Never Smoker Emergency Contacts Name Discharge Info Relation Home Work Mobile DeclanRuchi DISCHARGE CAREGIVER [3] Child [2] [phone] Patient Belongings The following personal items are in your possession at time of discharge: 
  Dental Appliances:  At home, Uppers, Partials  Visual Aid: Glasses Home Medications: None   Jewelry: None  Clothing: Undergarments, Pants, Footwear, Socks, Shirt    Other Valuables: Wheelchair, Eyeglasses  Personal Items Sent to Safe: declined Please provide this summary of care documentation to your next provider. Signatures-by signing, you are acknowledging that this After Visit Summary has been reviewed with you and you have received a copy. Patient Signature:  ____________________________________________________________ Date:  ____________________________________________________________  
  
[facility] Provider Signature:  ____________________________________________________________ Date:  ____________________________________________________________

## 2018-09-28 NOTE — BRIEF OP NOTE
BRIEF OPERATIVE NOTE Date of Procedure: 9/28/2018 Preoperative Diagnosis: BLOOD IN STOOL, PERSONAL HISTORY COLON CANCER Postoperative Diagnosis: BLOOD IN STOOL, PERSONAL HISTORY COLON CANCER Procedure(s): 
TRANSANAL EXCISION RECTAL POLYP,  HEMORRHOIDECTOMY HEMORRHOIDECTOMY Surgeon(s) and Role: 
   * Anais Hi MD - Primary Surgical Assistant: Sade Piña Surgical Staff: 
Circ-1: Angie Monsivais; Mendez Blas, YOLANDA Scrub Tech-1: Samuel Cary Surg Asst-1: Roelne ; Maddy Journathaly Event Time In Incision Start [phone] Incision Close 1127 Anesthesia: General  
Estimated Blood Loss: 5cc Specimens:  
ID Type Source Tests Collected by Time Destination 1 : Left Anterior Distal Rectal Polyp, stitch at distal margin Preservative Rectal  Anais Hi MD 9/28/2018 1036 Pathology 2 : Anterior Mid-line Rectal Polyp, stitch at distal margin Fresh Rectal  Anais Hi MD 9/28/2018 1112 Pathology 3 : Left Lateral Hemorrhoid Preservative Rectal  Anais Hi MD 9/28/2018 1117 Pathology Findings: 2 large anterior rectal polyps and one left lateral hemorrhoid Complications: none apparent Implants: * No implants in log *

## 2018-09-28 NOTE — PERIOP NOTES
1136-Handoff Report from Operating Room to PACU Report received from Alona Valdes and Johanny Lima CRNA regarding Bety Sport. Surgeon(s): 
Darian Yanez MD  And Procedure(s) (LRB): 
TRANSANAL EXCISION RECTAL POLYP,  HEMORRHOIDECTOMY (N/A) HEMORRHOIDECTOMY (N/A)  confirmed  
with allergies and dressings discussed. Anesthesia type, drugs, patient history, complications, estimated blood loss, vital signs, intake and output, and last pain medication, lines, reversal medications and temperature were reviewed. 1138- CRNA reported blood clot in left ear when surgery was completed. Pt arrived to PACU with a small amount bright red blood coming from inside her left ear. Pt reports she wears hearing aides but has never had bleeding before. Dr. Sylvie Chandler requested an ENT consult for today. 1230- Family updated. Kacy Edna [facility] ENT is able to see the patient after she is discharged today. Appointment made for the patient with Dr. German Sahu. Pt and daughter aware. 1335- DISCHARGE SUMMARY from Nurse PATIENT INSTRUCTIONS: 
 
After general anesthesia or intravenous sedation, for 24 hours or while taking prescription Narcotics: · Limit your activities · Do not drive and operate hazardous machinery · Do not make important personal or business decisions · Do  not drink alcoholic beverages · If you have not urinated within 8 hours after discharge, please contact your surgeon on call. Report the following to your surgeon: 
· Excessive pain, swelling, redness or odor of or around the surgical area · Temperature over 100.5 · Nausea and vomiting lasting longer than 4 hours or if unable to take medications · Any signs of decreased circulation or nerve impairment to extremity: change in color, persistent  numbness, tingling, coldness or increase pain · Any questions These are general instructions for a healthy lifestyle: No smoking/ No tobacco products/ Avoid exposure to second hand smoke Surgeon General's Warning:  Quitting smoking now greatly reduces serious risk to your health. Obesity, smoking, and sedentary lifestyle greatly increases your risk for illness A healthy diet, regular physical exercise & weight monitoring are important for maintaining a healthy lifestyle You may be retaining fluid if you have a history of heart failure or if you experience any of the following symptoms:  Weight gain of 3 pounds or more overnight or 5 pounds in a week, increased swelling in our hands or feet or shortness of breath while lying flat in bed. Please call your doctor as soon as you notice any of these symptoms; do not wait until your next office visit. Recognize signs and symptoms of STROKE: 
 
F-face looks uneven A-arms unable to move or move unevenly S-speech slurred or non-existent T-time-call 911 as soon as signs and symptoms begin-DO NOT go Back to bed or wait to see if you get better-TIME IS BRAIN. Warning Signs of HEART ATTACK Call 911 if you have these symptoms: 
? Chest discomfort. Most heart attacks involve discomfort in the center of the chest that lasts more than a few minutes, or that goes away and comes back. It can feel like uncomfortable pressure, squeezing, fullness, or pain. ? Discomfort in other areas of the upper body. Symptoms can include pain or discomfort in one or both arms, the back, neck, jaw, or stomach. ? Shortness of breath with or without chest discomfort. ? Other signs may include breaking out in a cold sweat, nausea, or lightheadedness. Don't wait more than five minutes to call [address]! Fast action can save your life. Calling 911 is almost always the fastest way to get lifesaving treatment. Emergency Medical Services staff can begin treatment when they arrive  up to an hour sooner than if someone gets to the hospital by car.   
 
The discharge information has been reviewed with the patient and caregiver. The patient and caregiver verbalized understanding. Discharge medications reviewed with the patient and caregiver and appropriate educational materials and side effects teaching were provided. ___________________________________________________________________________________________________________________________________

## 2018-12-19 ENCOUNTER — TELEPHONE (OUTPATIENT)
Dept: INTERNAL MEDICINE CLINIC | Age: 83
End: 2018-12-19

## 2018-12-19 NOTE — TELEPHONE ENCOUNTER
Wilman Gasca from [facility], would like a call back regarding the status of the recertification on Medical necessity forms for oxygen.  Callback: [phone]       Message received & copied from [facility]

## 2018-12-19 NOTE — TELEPHONE ENCOUNTER
Attempted to return call to [facility] with Emmanuelle Wynn, phone number continuously rings busy.

## 2019-01-29 ENCOUNTER — TELEPHONE (OUTPATIENT)
Dept: INTERNAL MEDICINE CLINIC | Age: 84
End: 2019-01-29

## 2019-01-29 NOTE — TELEPHONE ENCOUNTER
Spoke with patients daughter Ruchi(HIPPA)  Two pt identifiers confirmed. Lauree Moritz offered an appointment with Dr. Gayla Fabry on 01/31/19 at 3:45pm.  Lauree Moritz accepted. Lauree Moritz advised if anything changes or if unable to keep this appointment to call the office  Lauree Moritz verbalized understanding of information discussed w/ no further questions at this time.

## 2019-01-29 NOTE — TELEPHONE ENCOUNTER
Brenda Jacques, pt's daughter, stated pt is experiencing \"extreme dreams\" and \"super confusion\" after waking up. Brenda Jacques stated, pt believes dreams are real. Requesting to speak with the nurse in regards to this matter.    Best contact number (c) [phone]       Message received & copied from [address] [place] [address]

## 2019-05-17 ENCOUNTER — TELEPHONE (OUTPATIENT)
Dept: INTERNAL MEDICINE CLINIC | Age: 84
End: 2019-05-17

## 2019-05-17 NOTE — TELEPHONE ENCOUNTER
Spoke with Meagan Mcleod with [employer] states that paperwork was received in July, but was completed incorrectly. Meagan Button to refax to [phone]. Advised will have Dr. Harris November complete and will fax back as soon as possible.

## 2019-05-17 NOTE — TELEPHONE ENCOUNTER
Aguila Pacheco with [facility] states they sent a CMN on 7-13-18 that was signed and dated by physician, but not filled out. They have been waiting almost a year to get this done. She states they have sent this numerous times with no success. They will have to  pt's O2 if this doesn't happen as soon as possible. She just doesn't know what to do as Medicare is not paying the bill due to this. This was last sent on 4-16-19.   Please call Aguila Pacheco at [phone]   [phone]      Pt was seen last year for the re cert in July 8478

## 2019-06-17 ENCOUNTER — TELEPHONE (OUTPATIENT)
Dept: NEUROLOGY | Age: 84
End: 2019-06-17

## 2019-07-23 ENCOUNTER — TELEPHONE (OUTPATIENT)
Dept: INTERNAL MEDICINE CLINIC | Age: 84
End: 2019-07-23

## 2019-07-23 NOTE — TELEPHONE ENCOUNTER
Jah Israel from [facility] [facility] requesting call back. Caller wants to f/up on certificate of medical necessity for pt. Caller says she has made multiple attempts to have this filled and returned. PCP has already signed and dated it.  Caller best Fax: [phone] Phone: [phone]       Message received & copied from [facility]

## 2019-08-13 DIAGNOSIS — R27.0 ATAXIA: ICD-10-CM

## 2019-08-13 DIAGNOSIS — G40.209 COMPLEX PARTIAL SEIZURE WITH IMPAIRMENT OF CONSCIOUSNESS (HCC): ICD-10-CM

## 2019-08-13 DIAGNOSIS — M54.50 ACUTE MIDLINE LOW BACK PAIN WITHOUT SCIATICA: ICD-10-CM

## 2019-08-13 DIAGNOSIS — I48.91 ATRIAL FIBRILLATION WITH RVR (HCC): ICD-10-CM

## 2019-08-14 RX ORDER — LEVETIRACETAM 500 MG/1
TABLET ORAL
Qty: 180 TAB | Refills: 5 | Status: SHIPPED | OUTPATIENT
Start: 2019-08-14 | End: 2020-01-10 | Stop reason: SDUPTHER

## 2019-08-14 NOTE — TELEPHONE ENCOUNTER
Future Appointments   Date Time Provider Manuel Arina   1/10/2020 11:00 AM Ladi Anguiano  [facility]                         Last Appointment My Department:  6/12/18    Please advise of refill below.     Requested Prescriptions     Pending Prescriptions Disp Refills    levETIRAcetam (KEPPRA) 500 mg tablet [Pharmacy Med Name: LevETIRAcetam 500MG TAB] 180 Tab 5     Sig: TAKE 1 TABLET BY MOUTH TWICE DAILY

## 2019-08-27 ENCOUNTER — TELEPHONE (OUTPATIENT)
Dept: INTERNAL MEDICINE CLINIC | Age: 84
End: 2019-08-27

## 2019-08-27 NOTE — TELEPHONE ENCOUNTER
Spoke with Joana Mathis with [facility] [facility]. Advised that I do not see where we have received the requested forms. Joana Mathis to refax to [phone].

## 2019-08-27 NOTE — TELEPHONE ENCOUNTER
Charissa Huggins (child) requesting call back. Caller says Emilie Estrada faxed in paper work for pt's oxygen to the practice but have not received any response. Pt best contact is [phone]   Emilie Estrada # is [phone] ext [phone] Tish Dejesus.        Message received & copied from [facility]

## 2019-12-06 ENCOUNTER — PATIENT OUTREACH (OUTPATIENT)
Dept: CASE MANAGEMENT | Age: 84
End: 2019-12-06

## 2019-12-06 ENCOUNTER — HOSPITAL ENCOUNTER (INPATIENT)
Age: 84
LOS: 3 days | Discharge: HOME HEALTH | DRG: 291 | End: 2019-12-09
Attending: EMERGENCY MEDICINE | Admitting: INTERNAL MEDICINE
Payer: MEDICARE

## 2019-12-06 ENCOUNTER — APPOINTMENT (OUTPATIENT)
Dept: NON INVASIVE DIAGNOSTICS | Age: 84
DRG: 291 | End: 2019-12-06
Attending: INTERNAL MEDICINE
Payer: MEDICARE

## 2019-12-06 ENCOUNTER — APPOINTMENT (OUTPATIENT)
Dept: GENERAL RADIOLOGY | Age: 84
DRG: 291 | End: 2019-12-06
Attending: INTERNAL MEDICINE
Payer: MEDICARE

## 2019-12-06 DIAGNOSIS — I50.9 ACUTE ON CHRONIC CONGESTIVE HEART FAILURE, UNSPECIFIED HEART FAILURE TYPE (HCC): ICD-10-CM

## 2019-12-06 DIAGNOSIS — J96.01 ACUTE HYPOXEMIC RESPIRATORY FAILURE (HCC): Primary | ICD-10-CM

## 2019-12-06 LAB
ANION GAP SERPL CALC-SCNC: 6 MMOL/L (ref 5–15)
ATRIAL RATE: 86 BPM
AV PEAK GRADIENT: 49.69 MMHG
AV VELOCITY RATIO: 0.78
AV VTI RATIO: 0.7
BASOPHILS # BLD: 0 K/UL (ref 0–0.1)
BASOPHILS NFR BLD: 0 % (ref 0–1)
BUN SERPL-MCNC: 22 MG/DL (ref 6–20)
BUN/CREAT SERPL: 26 (ref 12–20)
CALCIUM SERPL-MCNC: 8.5 MG/DL (ref 8.5–10.1)
CALCULATED R AXIS, ECG10: -58 DEGREES
CALCULATED T AXIS, ECG11: 113 DEGREES
CHLORIDE SERPL-SCNC: 103 MMOL/L (ref 97–108)
CO2 SERPL-SCNC: 32 MMOL/L (ref 21–32)
CREAT SERPL-MCNC: 0.84 MG/DL (ref 0.55–1.02)
DIAGNOSIS, 93000: NORMAL
DIFFERENTIAL METHOD BLD: ABNORMAL
ECHO AO ROOT DIAM: 3.34 CM
ECHO AV AREA PEAK VELOCITY: 2.4 CM2
ECHO AV AREA PLAN: 2.3 CM2
ECHO AV AREA VTI: 2 CM2
ECHO AV MEAN GRADIENT: 2.4 MMHG
ECHO AV MEAN VELOCITY: 0.74 M/S
ECHO AV PEAK GRADIENT: 3.9 MMHG
ECHO AV PEAK VELOCITY: 98.6 CM/S
ECHO AV REGURGITANT PHT: 345.6 CM
ECHO AV VTI: 18.31 CM
ECHO EST RA PRESSURE: 10 MMHG
ECHO LA AREA 4C: 20 CM2
ECHO LA MAJOR AXIS: 3.62 CM
ECHO LA TO AORTIC ROOT RATIO: 1.08
ECHO LA VOL 4C: 62.33 ML (ref 22–52)
ECHO LA VOLUME INDEX A4C: 40.15 ML/M2 (ref 16–28)
ECHO LV E' LATERAL VELOCITY: 9.23 CM/S
ECHO LV E' SEPTAL VELOCITY: 9.23 CM/S
ECHO LV EDV A4C: 61.9 ML
ECHO LV EDV INDEX A4C: 39.9 ML/M2
ECHO LV EJECTION FRACTION A4C: 7 %
ECHO LV ESV A4C: 57.8 ML
ECHO LV ESV INDEX A4C: 37.2 ML/M2
ECHO LV INTERNAL DIMENSION DIASTOLIC: 4.39 CM (ref 3.9–5.3)
ECHO LV INTERNAL DIMENSION SYSTOLIC: 3.58 CM
ECHO LV IVSD: 1.51 CM (ref 0.6–0.9)
ECHO LV MASS 2D: 286.9 G (ref 67–162)
ECHO LV MASS INDEX 2D: 184.8 G/M2 (ref 43–95)
ECHO LV POSTERIOR WALL DIASTOLIC: 1.3 CM (ref 0.6–0.9)
ECHO LVOT CARDIAC OUTPUT: 3 L/MIN
ECHO LVOT DIAM: 1.99 CM
ECHO LVOT PEAK GRADIENT: 2.4 MMHG
ECHO LVOT PEAK VELOCITY: 77.21 CM/S
ECHO LVOT SV: 37.5 ML
ECHO LVOT VTI: 12.1 CM
ECHO MV A VELOCITY: 48.59 CM/S
ECHO MV AREA PHT: 4.9 CM2
ECHO MV AREA VTI: 1.9 CM2
ECHO MV E DECELERATION TIME (DT): 161 MS
ECHO MV E VELOCITY: 100.49 CM/S
ECHO MV E/A RATIO: 2.07
ECHO MV E/E' LATERAL: 10.89
ECHO MV E/E' RATIO (AVERAGED): 10.89
ECHO MV E/E' SEPTAL: 10.89
ECHO MV MAX VELOCITY: 100.04 CM/S
ECHO MV MEAN GRADIENT: 1 MMHG
ECHO MV MEAN INFLOW VELOCITY: 0.45 M/S
ECHO MV PEAK GRADIENT: 4 MMHG
ECHO MV PRESSURE HALF TIME (PHT): 45.2 MS
ECHO MV REGURGITANT PEAK GRADIENT: 91.9 MMHG
ECHO MV REGURGITANT PEAK VELOCITY: 479.45 CM/S
ECHO MV REGURGITANT VTIA: 144.54 CM
ECHO MV VTI: 19.31 CM
ECHO PULMONARY ARTERY SYSTOLIC PRESSURE (PASP): 43.1 MMHG
ECHO PV MAX VELOCITY: 56.38 CM/S
ECHO PV MEAN GRADIENT: 0.9 MMHG
ECHO PV PEAK GRADIENT: 1.3 MMHG
ECHO PV VTI: 10.84 CM
ECHO RA AREA 4C: 24.09 CM2
ECHO RIGHT VENTRICULAR SYSTOLIC PRESSURE (RVSP): 43.1 MMHG
ECHO TV REGURGITANT MAX VELOCITY: 287.7 CM/S
ECHO TV REGURGITANT PEAK GRADIENT: 33.1 MMHG
EOSINOPHIL # BLD: 0.4 K/UL (ref 0–0.4)
EOSINOPHIL NFR BLD: 7 % (ref 0–7)
ERYTHROCYTE [DISTWIDTH] IN BLOOD BY AUTOMATED COUNT: 13.7 % (ref 11.5–14.5)
GLUCOSE SERPL-MCNC: 93 MG/DL (ref 65–100)
HCT VFR BLD AUTO: 34.8 % (ref 35–47)
HGB BLD-MCNC: 11.3 G/DL (ref 11.5–16)
IMM GRANULOCYTES # BLD AUTO: 0 K/UL (ref 0–0.04)
IMM GRANULOCYTES NFR BLD AUTO: 0 % (ref 0–0.5)
INR PPP: 5.6 (ref 0.9–1.1)
LVFS 2D: 18.58 %
LVOT MG: 0.95 MMHG
LVOT MV: 0.43 CM/S
LYMPHOCYTES # BLD: 0.8 K/UL (ref 0.8–3.5)
LYMPHOCYTES NFR BLD: 14 % (ref 12–49)
MCH RBC QN AUTO: 30.7 PG (ref 26–34)
MCHC RBC AUTO-ENTMCNC: 32.5 G/DL (ref 30–36.5)
MCV RBC AUTO: 94.6 FL (ref 80–99)
MONOCYTES # BLD: 0.5 K/UL (ref 0–1)
MONOCYTES NFR BLD: 9 % (ref 5–13)
MV DEC SLOPE: 6.24
NEUTS SEG # BLD: 4.2 K/UL (ref 1.8–8)
NEUTS SEG NFR BLD: 70 % (ref 32–75)
NRBC # BLD: 0 K/UL (ref 0–0.01)
NRBC BLD-RTO: 0 PER 100 WBC
PISA AR MAX VEL: 352.45 CM/S
PLATELET # BLD AUTO: 182 K/UL (ref 150–400)
PMV BLD AUTO: 9.4 FL (ref 8.9–12.9)
POTASSIUM SERPL-SCNC: 3.5 MMOL/L (ref 3.5–5.1)
PROTHROMBIN TIME: 52.3 SEC (ref 9–11.1)
PULMONARY ARTERY END DIASTOLIC PRESSURE: 18.9 MMHG
PULMONARY ARTERY MEAN PRESURE: 27 MMHG
PV END DIASTOLIC VELOCITY: 1.5 MMHG
Q-T INTERVAL, ECG07: 454 MS
QRS DURATION, ECG06: 152 MS
QTC CALCULATION (BEZET), ECG08: 523 MS
RBC # BLD AUTO: 3.68 M/UL (ref 3.8–5.2)
SODIUM SERPL-SCNC: 141 MMOL/L (ref 136–145)
TROPONIN I SERPL-MCNC: <0.05 NG/ML
TROPONIN I SERPL-MCNC: <0.05 NG/ML
VENTRICULAR RATE, ECG03: 80 BPM
WBC # BLD AUTO: 6 K/UL (ref 3.6–11)

## 2019-12-06 PROCEDURE — 97530 THERAPEUTIC ACTIVITIES: CPT | Performed by: OCCUPATIONAL THERAPIST

## 2019-12-06 PROCEDURE — 99285 EMERGENCY DEPT VISIT HI MDM: CPT

## 2019-12-06 PROCEDURE — 74011250637 HC RX REV CODE- 250/637: Performed by: INTERNAL MEDICINE

## 2019-12-06 PROCEDURE — 94761 N-INVAS EAR/PLS OXIMETRY MLT: CPT

## 2019-12-06 PROCEDURE — 36415 COLL VENOUS BLD VENIPUNCTURE: CPT

## 2019-12-06 PROCEDURE — 74011000250 HC RX REV CODE- 250: Performed by: INTERNAL MEDICINE

## 2019-12-06 PROCEDURE — 77010033678 HC OXYGEN DAILY

## 2019-12-06 PROCEDURE — 65660000000 HC RM CCU STEPDOWN

## 2019-12-06 PROCEDURE — 93005 ELECTROCARDIOGRAM TRACING: CPT

## 2019-12-06 PROCEDURE — 77030038269 HC DRN EXT URIN PURWCK BARD -A

## 2019-12-06 PROCEDURE — 85610 PROTHROMBIN TIME: CPT

## 2019-12-06 PROCEDURE — 97530 THERAPEUTIC ACTIVITIES: CPT

## 2019-12-06 PROCEDURE — 93306 TTE W/DOPPLER COMPLETE: CPT

## 2019-12-06 PROCEDURE — 71045 X-RAY EXAM CHEST 1 VIEW: CPT

## 2019-12-06 PROCEDURE — 97166 OT EVAL MOD COMPLEX 45 MIN: CPT | Performed by: OCCUPATIONAL THERAPIST

## 2019-12-06 PROCEDURE — 97161 PT EVAL LOW COMPLEX 20 MIN: CPT

## 2019-12-06 PROCEDURE — 85025 COMPLETE CBC W/AUTO DIFF WBC: CPT

## 2019-12-06 PROCEDURE — 74011250636 HC RX REV CODE- 250/636: Performed by: INTERNAL MEDICINE

## 2019-12-06 PROCEDURE — 80048 BASIC METABOLIC PNL TOTAL CA: CPT

## 2019-12-06 PROCEDURE — 84484 ASSAY OF TROPONIN QUANT: CPT

## 2019-12-06 PROCEDURE — 51702 INSERT TEMP BLADDER CATH: CPT

## 2019-12-06 RX ORDER — WARFARIN 2.5 MG/1
2.5 TABLET ORAL
COMMUNITY
End: 2021-01-01

## 2019-12-06 RX ORDER — POTASSIUM CHLORIDE 750 MG/1
20 TABLET, FILM COATED, EXTENDED RELEASE ORAL 2 TIMES DAILY
Status: COMPLETED | OUTPATIENT
Start: 2019-12-06 | End: 2019-12-07

## 2019-12-06 RX ORDER — SODIUM CHLORIDE 0.9 % (FLUSH) 0.9 %
5-40 SYRINGE (ML) INJECTION AS NEEDED
Status: DISCONTINUED | OUTPATIENT
Start: 2019-12-06 | End: 2019-12-09 | Stop reason: HOSPADM

## 2019-12-06 RX ORDER — LISINOPRIL 5 MG/1
2.5 TABLET ORAL DAILY
Status: DISCONTINUED | OUTPATIENT
Start: 2019-12-06 | End: 2019-12-09 | Stop reason: HOSPADM

## 2019-12-06 RX ORDER — FUROSEMIDE 40 MG/1
80 TABLET ORAL EVERY OTHER DAY
Status: ON HOLD | COMMUNITY
End: 2019-12-09 | Stop reason: SDUPTHER

## 2019-12-06 RX ORDER — FUROSEMIDE 10 MG/ML
40 INJECTION INTRAMUSCULAR; INTRAVENOUS DAILY
Status: DISCONTINUED | OUTPATIENT
Start: 2019-12-06 | End: 2019-12-09 | Stop reason: HOSPADM

## 2019-12-06 RX ORDER — POLYETHYLENE GLYCOL 3350 17 G/17G
17 POWDER, FOR SOLUTION ORAL DAILY
Status: DISCONTINUED | OUTPATIENT
Start: 2019-12-07 | End: 2019-12-09 | Stop reason: HOSPADM

## 2019-12-06 RX ORDER — LEVETIRACETAM 500 MG/1
500 TABLET ORAL 2 TIMES DAILY
Status: DISCONTINUED | OUTPATIENT
Start: 2019-12-06 | End: 2019-12-09 | Stop reason: HOSPADM

## 2019-12-06 RX ORDER — ACETAMINOPHEN 325 MG/1
650 TABLET ORAL
Status: DISCONTINUED | OUTPATIENT
Start: 2019-12-06 | End: 2019-12-09 | Stop reason: HOSPADM

## 2019-12-06 RX ORDER — LATANOPROST 50 UG/ML
1 SOLUTION/ DROPS OPHTHALMIC
Status: DISCONTINUED | OUTPATIENT
Start: 2019-12-06 | End: 2019-12-09 | Stop reason: HOSPADM

## 2019-12-06 RX ORDER — SODIUM CHLORIDE 0.9 % (FLUSH) 0.9 %
5-40 SYRINGE (ML) INJECTION EVERY 8 HOURS
Status: DISCONTINUED | OUTPATIENT
Start: 2019-12-06 | End: 2019-12-09 | Stop reason: HOSPADM

## 2019-12-06 RX ORDER — CARVEDILOL 3.12 MG/1
3.12 TABLET ORAL 2 TIMES DAILY WITH MEALS
Status: DISCONTINUED | OUTPATIENT
Start: 2019-12-06 | End: 2019-12-06 | Stop reason: SDUPTHER

## 2019-12-06 RX ORDER — ONDANSETRON 2 MG/ML
4 INJECTION INTRAMUSCULAR; INTRAVENOUS
Status: DISCONTINUED | OUTPATIENT
Start: 2019-12-06 | End: 2019-12-09 | Stop reason: HOSPADM

## 2019-12-06 RX ORDER — FUROSEMIDE 40 MG/1
40 TABLET ORAL EVERY OTHER DAY
COMMUNITY
End: 2019-12-09

## 2019-12-06 RX ORDER — CARVEDILOL 3.12 MG/1
3.12 TABLET ORAL 2 TIMES DAILY WITH MEALS
Status: DISCONTINUED | OUTPATIENT
Start: 2019-12-06 | End: 2019-12-09 | Stop reason: HOSPADM

## 2019-12-06 RX ORDER — FACIAL-BODY WIPES
10 EACH TOPICAL DAILY PRN
Status: DISCONTINUED | OUTPATIENT
Start: 2019-12-06 | End: 2019-12-09 | Stop reason: HOSPADM

## 2019-12-06 RX ORDER — AMITRIPTYLINE HYDROCHLORIDE 10 MG/1
10 TABLET, FILM COATED ORAL
Status: DISCONTINUED | OUTPATIENT
Start: 2019-12-06 | End: 2019-12-09 | Stop reason: HOSPADM

## 2019-12-06 RX ORDER — LANOLIN ALCOHOL/MO/W.PET/CERES
325 CREAM (GRAM) TOPICAL DAILY
Status: DISCONTINUED | OUTPATIENT
Start: 2019-12-06 | End: 2019-12-09 | Stop reason: HOSPADM

## 2019-12-06 RX ADMIN — BISACODYL 10 MG: 10 SUPPOSITORY RECTAL at 18:33

## 2019-12-06 RX ADMIN — FUROSEMIDE 40 MG: 10 INJECTION, SOLUTION INTRAMUSCULAR; INTRAVENOUS at 09:21

## 2019-12-06 RX ADMIN — LEVETIRACETAM 500 MG: 500 TABLET ORAL at 10:55

## 2019-12-06 RX ADMIN — FERROUS SULFATE TAB 325 MG (65 MG ELEMENTAL FE) 325 MG: 325 (65 FE) TAB at 09:21

## 2019-12-06 RX ADMIN — LATANOPROST 1 DROP: 50 SOLUTION OPHTHALMIC at 21:11

## 2019-12-06 RX ADMIN — CARVEDILOL 3.12 MG: 3.12 TABLET, FILM COATED ORAL at 10:55

## 2019-12-06 RX ADMIN — CARVEDILOL 3.12 MG: 3.12 TABLET, FILM COATED ORAL at 17:55

## 2019-12-06 RX ADMIN — Medication 10 ML: at 17:55

## 2019-12-06 RX ADMIN — AMITRIPTYLINE HYDROCHLORIDE 10 MG: 10 TABLET, FILM COATED ORAL at 21:11

## 2019-12-06 RX ADMIN — POTASSIUM CHLORIDE 20 MEQ: 750 TABLET, FILM COATED, EXTENDED RELEASE ORAL at 12:09

## 2019-12-06 RX ADMIN — LEVETIRACETAM 500 MG: 500 TABLET ORAL at 17:55

## 2019-12-06 RX ADMIN — Medication 10 ML: at 21:12

## 2019-12-06 RX ADMIN — Medication 10 ML: at 06:25

## 2019-12-06 RX ADMIN — LISINOPRIL 2.5 MG: 5 TABLET ORAL at 09:21

## 2019-12-06 RX ADMIN — POTASSIUM CHLORIDE 20 MEQ: 750 TABLET, FILM COATED, EXTENDED RELEASE ORAL at 17:55

## 2019-12-06 NOTE — PROGRESS NOTES
Problem: Mobility Impaired (Adult and Pediatric)  Goal: *Acute Goals and Plan of Care (Insert Text)  Description  FUNCTIONAL STATUS PRIOR TO ADMISSION: Pt lives with dtr and has caregivers daily from 10-3 during the week. Dtr is a nurse. Pt has been able to amb mod I with rollator, complete her own dressing but sometimes has assist for energy conservation, and is supervised with showering using seat. She has not recently been on home O2, but was ~6 mos ago. HOME SUPPORT PRIOR TO ADMISSION: The patient lived with dtr. Physical Therapy Goals  Initiated 12/6/2019  1. Patient will move from supine to sit and sit to supine , scoot up and down and roll side to side in bed with independence within 7 day(s). 2.  Patient will transfer from bed to chair and chair to bed with independence using the least restrictive device within 7 day(s). 3.  Patient will perform sit to stand with independence within 7 day(s). 4.  Patient will ambulate with modified independence for 150 feet with the least restrictive device within 7 day(s). Outcome: Progressing Towards Goal   PHYSICAL THERAPY EVALUATION  Patient: Jerome Rosado (22 y.o. female)  Date: 12/6/2019  Primary Diagnosis: CHF (congestive heart failure) ([facility]) [I50.9]        Precautions: Pt on O2 with mask at 4L on eval         ASSESSMENT  Based on the objective data described below, the patient presents with mainly deficits in activity tolerance which effect her functional mobility and gait independence. Current Level of Function Impacting Discharge (mobility/balance): Pt is mainly at a S to SBA level of function for all mobility and gait. BP is lower but stable and pt is not orthostatic. Other vitals stable except RR into 30s with amb; sats remain in 90s on 4L O2. Pt returned to bed with dtr present and call bell in reach. Functional Outcome Measure: The patient scored 60/100 on the barthel outcome measure which is indicative of 40% functional impairment. Other factors to consider for discharge: good support with dtr and caregivers     Patient will benefit from skilled therapy intervention to address the above noted impairments. PLAN :  Recommendations and Planned Interventions: bed mobility training, transfer training, gait training, therapeutic exercises, patient and family training/education, and therapeutic activities      Frequency/Duration: Patient will be followed by physical therapy:  4 times a week to address goals. Recommendation for discharge: (in order for the patient to meet his/her long term goals)  Physical therapy at least 2 days/week in the home vs none depending upon progress as pt is near mobility baseline with main deficits in activity tolerance    This discharge recommendation:  Has not yet been discussed the attending provider and/or case management    IF patient discharges home will need the following DME: patient owns DME required for discharge         SUBJECTIVE:   Patient stated I am ok (to walk more).     OBJECTIVE DATA SUMMARY:   HISTORY:    Past Medical History:   Diagnosis Date    Adverse effect of anesthesia     slow to wake    Alopecia     Arrhythmia     atrial fib,?svt    Arthritis     Atrial fibrillation (HCC)     Benign essential HTN     Benign neoplasm of vulva     CAD (coronary artery disease)     Cholelithiases     Chronic kidney disease     followed by specialist    Chronic pain     Colon cancer (HCC)     Constipation     Dyspareunia     Glaucoma     Heart failure (HCC)     History of mixed connective tissue disease     Hx of viral pericarditis     Hyperactivity of bladder     Hypercholesterolemia     Ill-defined condition     macular degeneration    Ill-defined condition     recurrent UTI hx    Insomnia     Macular degeneration     Neck pain     Psoriasis     patient denies    Rectocele     Renal cyst     Seizure disorder ([facility]     none in 30 plus years---sees neurologist     Sjogren's disease ([facility] [address]     patient unsure    Thrombophlebitis     TIA (transient ischemic attack)     patient denies    Vaginal vault prolapse, posthysterectomy     Varicose veins      Past Surgical History:   Procedure Laterality Date    CARDIAC SURG PROCEDURE UNLIST      AV ablation    HX BREAST LUMPECTOMY      left breast    HX CATARACT REMOVAL      HX COLECTOMY  1955    partial    HX HEMORRHOIDECTOMY      HX HERNIA REPAIR  08/01/2018    Open bilateral inguinal hernia repairs with mesh.  HX HYSTERECTOMY      HX PACEMAKER      left    HX SHOULDER ARTHROSCOPY      left           Home Situation  Home Environment: Private residence  Wheelchair Ramp: Yes  One/Two Story Residence: One story  Living Alone: No(paid caregiver M-F and daughter otherwise)  Support Systems: Family member(s), Other (comments)(paid caregiver)  Current DME Used/Available at Home: Shower chair, Grab bars, Walker, rollator(raised toilet seat with handles, transport chair)  Tub or Shower Type: Shower    EXAMINATION/PRESENTATION/DECISION MAKING:   Critical Behavior:  Neurologic State: Alert  Orientation Level: Oriented X4  Cognition: Follows commands     Hearing:   Auditory  Auditory Impairment: Hard of hearing, bilateral    Range Of Motion:  AROM: Within functional limits           PROM: Within functional limits           Strength:    Strength: Generally decreased, functional                    Tone & Sensation:                  Sensation: Intact               Coordination:  Coordination: Within functional limits  Vision:    Macular degeneration; see shapes and shadows  Functional Mobility:  Bed Mobility:  Rolling: Supervision  Supine to Sit: Supervision;Stand-by assistance  Sit to Supine: Supervision;Stand-by assistance     Transfers:  Sit to Stand: Stand-by assistance;Supervision  Stand to Sit: Stand-by assistance;Supervision                       Balance:   Sitting: Intact  Standing: Impaired  Standing - Static: Good(with rollator)  Standing - Dynamic : Good(with rollator)  Ambulation/Gait Training:  Distance (ft): 65 Feet (ft)  Assistive Device: Gait belt;Walker, rollator  Ambulation - Level of Assistance: Stand-by assistance        Gait Abnormalities: Decreased step clearance              Speed/Samantha: Slow  Step Length: Right shortened;Left shortened                   Functional Measure:  Barthel Index:    Bathin  Bladder: 0(rosario)  Bowels: 10  Groomin  Dressin  Feeding: 10  Mobility: 10  Stairs: 0  Toilet Use: 5  Transfer (Bed to Chair and Back): 10  Total: 60/100       The Barthel ADL Index: Guidelines  1. The index should be used as a record of what a patient does, not as a record of what a patient could do. 2. The main aim is to establish degree of independence from any help, physical or verbal, however minor and for whatever reason. 3. The need for supervision renders the patient not independent. 4. A patient's performance should be established using the best available evidence. Asking the patient, friends/relatives and nurses are the usual sources, but direct observation and common sense are also important. However direct testing is not needed. 5. Usually the patient's performance over the preceding 24-48 hours is important, but occasionally longer periods will be relevant. 6. Middle categories imply that the patient supplies over 50 per cent of the effort. 7. Use of aids to be independent is allowed. Leny Ventura., Barthel, D.W. (8097). Functional evaluation: the Barthel Index. [facility] (14)2. KASHIF [facility]., [place], [address] [place] [address] (). Measuring the change indisability after inpatient rehabilitation; comparison of the responsiveness of the Barthel Index and Functional [place] Measure. Journal of Neurology, Neurosurgery, and Psychiatry, 66(4), 054-253.   Sung Fernández, N.J.A, SARKIS PascualJ.LUIS, & Shai Contreras MCarsonA. (2004.) Assessment of post-stroke quality of life in cost-effectiveness studies: The usefulness of the Barthel Index and the EuroQoL-5D. Quality of Life Research, 15, 302-65           Physical Therapy Evaluation Charge Determination   History Examination Presentation Decision-Making   HIGH Complexity :3+ comorbidities / personal factors will impact the outcome/ POC  HIGH Complexity : 4+ Standardized tests and measures addressing body structure, function, activity limitation and / or participation in recreation  MEDIUM Complexity : Evolving with changing characteristics  LOW Complexity : FOTO score of       Based on the above components, the patient evaluation is determined to be of the following complexity level: LOW     Pain Rating:  No c/o pain    Activity Tolerance:   Fair  Please refer to the flowsheet for vital signs taken during this treatment. After treatment patient left in no apparent distress:   Supine in bed, Call bell within reach, Caregiver / family present, and stretcher rails up     COMMUNICATION/EDUCATION:   The patients plan of care was discussed with: Occupational Therapist and Registered Nurse. Fall prevention education was provided and the patient/caregiver indicated understanding., Patient/family have participated as able in goal setting and plan of care. , and Patient/family agree to work toward stated goals and plan of care.     Thank you for this referral.  Melva Weiss, PT   Time Calculation: 26 mins

## 2019-12-06 NOTE — ED NOTES
Patient arrives via EMS as a transfer from [facility]. She went to their facility with shortness of breath with exertion and some wheezing. Pt has a hx of CHF and takes lasix everyday. She was taken off O2 at home about 6 months ago and is now currently requiring 4L by mask for 95% O2 sats.  She is a patient of Dr. Neda Pearson and was sent over for further cardiac workup

## 2019-12-06 NOTE — PROGRESS NOTES
Hospitalist Progress Note    NAME: Jah Dunn   :  1924   MRN:  [id number]       Assessment / Plan:  Acute hypoxic respiratory failure secondary to acute on chronic systolic heart failure  Hx of Atrial fib  HTN  Hx of SSS s/p PPM placement  -workup at [facility] showed CHF exacerbation and started on IV diuresis. Ellison cath placed then.  -will obtain CXR  -cont' IV lasix (good UO thus far)  -resume BB, pharm to help coumadin dosing  -monitor strict I&O, lytes replete prn. Daily wt  -O2 suppl, wean as tolerated  -PT/OT    Macular degeneration  Hx of orthostatic hypotension  Hx of seizure  -resume home meds    Code status: Full  Prophylaxis: warfarin  Recommended Disposition: TBD     Subjective:     Chief Complaint / Reason for Physician Visit  Pt seen in the ER. \"Am I in a storage house? \"  NAD. Discussed with RN events overnight. Review of Systems:  Symptom Y/N Comments  Symptom Y/N Comments   Fever/Chills    Chest Pain     Poor Appetite    Edema     Cough    Abdominal Pain     Sputum    Joint Pain     SOB/SANTOS    Pruritis/Rash     Nausea/vomit    Tolerating PT/OT     Diarrhea    Tolerating Diet     Constipation    Other       Could NOT obtain due to: Mild confusion     Objective:     VITALS:   Last 24hrs VS reviewed since prior progress note. Most recent are:  Patient Vitals for the past 24 hrs:   Temp Pulse Resp BP SpO2   19 0251  81 21  95 %   19 0020 97.5 °F (36.4 °C) 82 20 132/66 96 %     No intake or output data in the 24 hours ending 19 0838     PHYSICAL EXAM:  General: WD, WN. Alert, cooperative, no acute distress    EENT:  EOMI. Anicteric sclerae. MMM  Resp:  CTA bilaterally, no wheezing or rales. No accessory muscle use  CV:  Regular  rhythm,  No edema  GI:  Soft, Non distended, Non tender.  +BS  Neurologic:  AAOx2, moving all exts   Psych:   Not anxious nor agitated  Skin:  No rashes.   No jaundice    Reviewed most current lab test results and cultures  YES  Reviewed most current radiology test results   YES  Review and summation of old records today    NO  Reviewed patient's current orders and MAR    YES  PMH/SH reviewed - no change compared to H&P  ________________________________________________________________________  Care Plan discussed with:    Comments   Patient x    Family      RN x    Care Manager     Consultant                        Multidiciplinary team rounds were held today with , nursing, pharmacist and clinical coordinator. Patient's plan of care was discussed; medications were reviewed and discharge planning was addressed. ________________________________________________________________________  Total NON critical care TIME: 35   Minutes    Total CRITICAL CARE TIME Spent:   Minutes non procedure based      Comments   >50% of visit spent in counseling and coordination of care     ________________________________________________________________________  Nadia Sacks, MD     Procedures: see electronic medical records for all procedures/Xrays and details which were not copied into this note but were reviewed prior to creation of Plan. LABS:  I reviewed today's most current labs and imaging studies.   Pertinent labs include:  Recent Labs     12/06/19 0107   WBC 6.0   HGB 11.3*   HCT 34.8*        Recent Labs     12/06/19 0107      K 3.5      CO2 32   GLU 93   BUN 22*   CREA 0.84   CA 8.5       Signed: Nadia Sacks, MD

## 2019-12-06 NOTE — H&P
Hospitalist Admission Note    NAME: Bridget Warner   :  1924   MRN:  [id number]     Date/Time:  2019 6:12 AM    Patient PCP: Aparna Allen MD  ______________________________________________________________________  Given the patient's current clinical presentation, I have a high level of concern for decompensation if discharged from the emergency department. Complex decision making was performed, which includes reviewing the patient's available past medical records, laboratory results, and x-ray films. My assessment of this patient's clinical condition and my plan of care is as follows. Assessment / Plan:    Acute hypoxic respiratory failure secondary to CHF exacerbation systolic acute on chronic  Admit patient to [facility] [place] telemetry  Start patient on Lasix  Cardiology consultation  Daily weight  Echocardiogram    Hypertensioncontinue home antihypertensive medication    History of atrial fibrillationcontinue Coumadin      Code Status: Full   Surrogate Decision Maker:Ruchi Manriquez    DVT Prophylaxis:coumadin   GI Prophylaxis: not indicated        Subjective:   CHIEF COMPLAINT: SOB     HISTORY OF PRESENT ILLNESS:       80years old female with past medical history significant for hypertension, CHF, history of atrial fibrillation was transferred from her medical [facility] for evaluation of worsening shortness of breath associated with bilateral leg swelling and orthopnea, patient has history of CHF, O2 sat was dropped to 80% on room air, chest x-ray was done on show pulmonary central pulmonary vessel and mild interstitial pattern. We were asked to admit for work up and evaluation of the above problems.      Past Medical History:   Diagnosis Date    Adverse effect of anesthesia     slow to wake    Alopecia     Arrhythmia     atrial fib,?svt    Arthritis     Atrial fibrillation (HCC)     Benign essential HTN     Benign neoplasm of vulva     CAD (coronary artery disease)     Cholelithiases     Chronic kidney disease     followed by specialist    Chronic pain     [facility] ([facility]     Constipation     Dyspareunia     Glaucoma     Heart failure ([facility]     History of mixed connective tissue disease     Hx of viral pericarditis     Hyperactivity of bladder     Hypercholesterolemia     Ill-defined condition     macular degeneration    Ill-defined condition     recurrent UTI hx    Insomnia     Macular degeneration     Neck pain     Psoriasis     patient denies    Rectocele     Renal cyst     Seizure disorder ([facility]     none in 30 plus years---sees neurologist     Sjogren's disease ([facility]     patient unsure    Thrombophlebitis     TIA (transient ischemic attack)     patient denies    Vaginal vault prolapse, posthysterectomy     Varicose veins         Past Surgical History:   Procedure Laterality Date    CARDIAC SURG PROCEDURE UNLIST      AV ablation    HX BREAST LUMPECTOMY      left breast    HX CATARACT REMOVAL      HX COLECTOMY  1955    partial    HX HEMORRHOIDECTOMY      HX HERNIA REPAIR  08/01/2018    Open bilateral inguinal hernia repairs with mesh.  HX HYSTERECTOMY      HX PACEMAKER      left    HX SHOULDER ARTHROSCOPY      left       Social History     Tobacco Use    Smoking status: Never Smoker    Smokeless tobacco: Never Used   Substance Use Topics    Alcohol use: No        Family History   Problem Relation Age of Onset    Cancer Father     Inflammatory Bowel Dz Mother     Heart Disease Brother     Diabetes Brother      Allergies   Allergen Reactions    Codeine Nausea Only    Pcn [Penicillins] Unknown (comments)     Cant remember reaction as a child    Tetanus Toxoid, Adsorbed Other (comments)    Tetanus Vaccines And Toxoid Unknown (comments)     Does not take tetanus due to tb hx    Vibramycin [Doxycycline Calcium] Rash        Prior to Admission medications    Medication Sig Start Date End Date Taking? Authorizing Provider   levETIRAcetam (KEPPRA) 500 mg tablet TAKE 1 TABLET BY MOUTH TWICE DAILY 8/14/19  Yes Huber Mccormick MD   furosemide (LASIX) 20 mg tablet Take 40 mg by mouth daily as needed. Indications: Patient takes 20mg daily, then 0.5 to 1 tab extra PRN   Yes Provider, Historical   cranberry fruit extract (CRANBERRY PO) Take 1 Can by mouth daily. Yes Provider, Historical   amitriptyline (ELAVIL) 10 mg tablet Take  by mouth nightly. Yes Provider, Historical   cholecalciferol, vitamin D3, (VITAMIN D3) 2,000 unit tab Take  by mouth daily. Yes Provider, Historical   psyllium seed, with dextrose, (FIBER PO) Take  by mouth daily. Yes Provider, Historical   nitrofurantoin, macrocrystal-monohydrate, (MACROBID) 100 mg capsule odd months qd   Yes Provider, Historical   trimethoprim-sulfamethoxazole (BACTRIM DS) 160-800 mg per tablet Take 1 Tab by mouth daily. On even months   Yes Provider, Historical   furosemide (LASIX) 20 mg tablet Take 1 Tab by mouth daily. Patient taking differently: Take 40 mg by mouth daily. 40 mg every other day alternating with 80 mg 10/31/17  Yes Aminata Rasheed MD   warfarin (COUMADIN) 2.5 mg tablet Take 5 mg by mouth every evening. Yes Other, MD Yeny   VIT A/C/E AC/ZNOX/CUPRIC OXIDE (EYE VITAMIN AND MINERALS PO) Take 1 Tab by mouth two (2) times a day. Yes Other, MD Yeny   ferrous sulfate (IRON) 325 mg (65 mg iron) tablet Take 325 mg by mouth daily. Yes Kel, MD Yeny   DOCUSATE CALCIUM (STOOL SOFTENER PO) Take 100 mg by mouth as needed. Yes Provider, Historical   acetaminophen (TYLENOL EXTRA STRENGTH) 500 mg tablet Take 500 mg by mouth every six (6) hours as needed for Pain. Provider, Historical   carvedilol (COREG) 3.125 mg tablet 3.125 mg as needed. Only takes medication if blood pressure systolic 057 or greater 44/99/51   Provider, Historical   latanoprost (XALATAN) 0.005 % ophthalmic solution Administer 1 Drop to both eyes nightly.  6/5/17   Provider, Historical       REVIEW OF SYSTEMS:     I am not able to complete the review of systems because: The patient is intubated and sedated    The patient has altered mental status due to his acute medical problems    The patient has baseline aphasia from prior stroke(s)    The patient has baseline dementia and is not reliable historian    The patient is in acute medical distress and unable to provide information           Total of 12 systems reviewed as follows:       POSITIVE= underlined text  Negative = text not underlined  General:  fever, chills, sweats, generalized weakness, weight loss/gain,      loss of appetite   Eyes:    blurred vision, eye pain, loss of vision, double vision  ENT:    rhinorrhea, pharyngitis   Respiratory:   cough, sputum production, SOB, SANTOS, wheezing, pleuritic pain   Cardiology:   chest pain, palpitations, orthopnea, PND, edema, syncope   Gastrointestinal:  abdominal pain , N/V, diarrhea, dysphagia, constipation, bleeding   Genitourinary:  frequency, urgency, dysuria, hematuria, incontinence   Muskuloskeletal :  arthralgia, myalgia, back pain  Hematology:  easy bruising, nose or gum bleeding, lymphadenopathy   Dermatological: rash, ulceration, pruritis, color change / jaundice  Endocrine:   hot flashes or polydipsia   Neurological:  headache, dizziness, confusion, focal weakness, paresthesia,     Speech difficulties, memory loss, gait difficulty  Psychological: Feelings of anxiety, depression, agitation    Objective:   VITALS:    Visit Vitals  /66 (BP 1 Location: Right arm, BP Patient Position: At rest)   Pulse 81   Temp 97.5 °F (36.4 °C)   Resp 21   Ht 5' 1\" (1.549 m)   Wt 57.2 kg (126 lb)   SpO2 95%   BMI 23.81 kg/m²       PHYSICAL EXAM:    General:    Alert, cooperative, no distress, appears stated age.      HEENT: Atraumatic, anicteric sclerae, pink conjunctivae     No oral ulcers, mucosa moist, throat clear, dentition fair  Neck:  Supple, symmetrical,  thyroid: non tender  Lungs: B/l rales. Chest wall:  No tenderness  No Accessory muscle use. Heart:   Regular  rhythm,  No  murmur   No edema  Abdomen:   Soft, non-tender. Not distended. Bowel sounds normal  Extremities: No cyanosis. No clubbing,      Skin turgor normal, Capillary refill normal, Radial dial pulse 2+  Skin:     Not pale. Not Jaundiced  No rashes   Psych:  Good insight. Not depressed. Not anxious or agitated. Neurologic: EOMs intact. No facial asymmetry. No aphasia or slurred speech. Symmetrical strength, Sensation grossly intact. Alert and oriented X 4.     _______________________________________________________________________  Care Plan discussed with:    Comments   Patient y    Family      RN y    Care Manager                    Consultant:      _______________________________________________________________________  Expected  Disposition:   Home with Family y   HH/PT/OT/RN    SNF/LTC    LOW    ________________________________________________________________________  TOTAL TIME:  61  Minutes    Critical Care Provided     Minutes non procedure based      Comments    y Reviewed previous records   >50% of visit spent in counseling and coordination of care y Discussion with patient and/or family and questions answered       ________________________________________________________________________  Signed: Dior Pathak MD    Procedures: see electronic medical records for all procedures/Xrays and details which were not copied into this note but were reviewed prior to creation of Plan.     LAB DATA REVIEWED:    Recent Results (from the past 24 hour(s))   CBC WITH AUTOMATED DIFF    Collection Time: 12/06/19  1:07 AM   Result Value Ref Range    WBC 6.0 3.6 - 11.0 K/uL    RBC 3.68 (L) 3.80 - 5.20 M/uL    HGB 11.3 (L) 11.5 - 16.0 g/dL    HCT 34.8 (L) 35.0 - 47.0 %    MCV 94.6 80.0 - 99.0 FL    MCH 30.7 26.0 - 34.0 PG    MCHC 32.5 30.0 - 36.5 g/dL    RDW 13.7 11.5 - 14.5 %    PLATELET 693 007 - 476 K/uL    MPV 9.4 8.9 - 12.9 FL    NRBC 0.0 0  WBC    ABSOLUTE NRBC 0.00 0.00 - 0.01 K/uL    NEUTROPHILS 70 32 - 75 %    LYMPHOCYTES 14 12 - 49 %    MONOCYTES 9 5 - 13 %    EOSINOPHILS 7 0 - 7 %    BASOPHILS 0 0 - 1 %    IMMATURE GRANULOCYTES 0 0.0 - 0.5 %    ABS. NEUTROPHILS 4.2 1.8 - 8.0 K/UL    ABS. LYMPHOCYTES 0.8 0.8 - 3.5 K/UL    ABS. MONOCYTES 0.5 0.0 - 1.0 K/UL    ABS. EOSINOPHILS 0.4 0.0 - 0.4 K/UL    ABS. BASOPHILS 0.0 0.0 - 0.1 K/UL    ABS. IMM.  GRANS. 0.0 0.00 - 0.04 K/UL    DF AUTOMATED     METABOLIC PANEL, BASIC    Collection Time: 12/06/19  1:07 AM   Result Value Ref Range    Sodium 141 136 - 145 mmol/L    Potassium 3.5 3.5 - 5.1 mmol/L    Chloride 103 97 - 108 mmol/L    CO2 32 21 - 32 mmol/L    Anion gap 6 5 - 15 mmol/L    Glucose 93 65 - 100 mg/dL    BUN 22 (H) 6 - 20 MG/DL    Creatinine 0.84 0.55 - 1.02 MG/DL    BUN/Creatinine ratio 26 (H) 12 - 20      GFR est AA >60 >60 ml/min/1.73m2    GFR est non-AA >60 >60 ml/min/1.73m2    Calcium 8.5 8.5 - 10.1 MG/DL   TROPONIN I    Collection Time: 12/06/19  1:07 AM   Result Value Ref Range    Troponin-I, Qt. <0.05 <0.05 ng/mL

## 2019-12-06 NOTE — PROGRESS NOTES
Primary Nurse Burgess Prateek RN and Jessica Ray RN performed a dual skin assessment on this patient Impairment noted- see wound doc flow sheet    Small scab on right buttock. Pt states it is from a scratch. Sacrum pink-red and blanching.  Heels pink and blanching  Callous on medial aspect of R great toe    Magdaleno score is 17

## 2019-12-06 NOTE — PROGRESS NOTES
Pharmacy Clarification of the Prior to Admission Medication Regimen Retrospective to the Admission Medication Reconciliation    The patient was interviewed regarding clarification of the prior to admission medication regimen. Patient's daughter ws present in room and obtained permission from patient to discuss drug regimen with visitor(s) present. Patient's daughter was questioned regarding use of any other inhalers, topical products, over the counter medications, herbal medications, vitamin products or ophthalmic/nasal/otic medication use. Information Obtained From: RX Query, Patient's daughter    Recommendations/Findings: The following amendments were made to the patient's active medication list on file at [facility]:     1) Additions: None    2) Removals: None    3) Changes:  amitriptyline (ELAVIL) 10 mg tablet (Old regimen: daily /New regimen: 10 mg daily)  ferrous sulfate (IRON) 325 mg (65 mg iron) tablet (Old regimen: 325 mg daily /New regimen: 325 mg BID)  furosemide (LASIX)  tablet (Old regimen: (strength 20 mg) 1 tab daily /New regimen: (strength 40 mg) 1 tab every other day patient alternates with 80 mg)  warfarin (COUMADIN) 2.5 mg tablet (Old regimen: 1 tab daily /New regimen: 5 mg 5 days a week and 2.5 mg 2 days a week (see PTA Med List))    4) Pertinent Pharmacy Findings:  Identified High Alert Medication Information  Current Anticoagulants:  Name: Warfarin     PTA medication list was corrected to the following:     Prior to Admission Medications   Prescriptions Last Dose Informant Taking? DOCUSATE CALCIUM (STOOL SOFTENER PO) 12/5/2019 at Unknown time Child Yes   Sig: Take 100 mg by mouth as needed. VIT A/C/E AC/ZNOX/CUPRIC OXIDE (EYE VITAMIN AND MINERALS PO) 12/5/2019 at Unknown time Child Yes   Sig: Take 1 Tab by mouth two (2) times a day.    acetaminophen (TYLENOL EXTRA STRENGTH) 500 mg tablet 12/4/2019 at Unknown time Child Yes   Sig: Take 500 mg by mouth every six (6) hours as needed for Pain.   amitriptyline (ELAVIL) 10 mg tablet 12/4/2019 at Unknown time Child Yes   Sig: Take 10 mg by mouth nightly. cholecalciferol, vitamin D3, (VITAMIN D3) 2,000 unit tab 12/5/2019 at Unknown time Child Yes   Sig: Take  by mouth daily. cranberry fruit extract (CRANBERRY PO) 12/5/2019 at Unknown time Child Yes   Sig: Take 1 Can by mouth daily. ferrous sulfate (IRON) 325 mg (65 mg iron) tablet 12/5/2019 at Unknown time Child Yes   Sig: Take 325 mg by mouth two (2) times a day. furosemide (LASIX) 40 mg tablet 12/5/2019 at Unknown time Child Yes   Sig: Take 40 mg by mouth every other day. Pt takes 40 mg every other day and alternates with 80 mg.   furosemide (LASIX) 40 mg tablet 12/4/2019 at Unknown time Child Yes   Sig: Take 80 mg by mouth every other day. Pt takes 40 mg every other day and alternates with 80 mg.   latanoprost (XALATAN) 0.005 % ophthalmic solution 12/4/2019 at Unknown time Child Yes   Sig: Administer 1 Drop to both eyes nightly. levETIRAcetam (KEPPRA) 500 mg tablet 12/5/2019 at Unknown time Child Yes   Sig: TAKE 1 TABLET BY MOUTH TWICE DAILY   nitrofurantoin, macrocrystal-monohydrate, (MACROBID) 100 mg capsule 11/6/2019 at Unknown time Child Yes   Sig: Pt takes every other month every other day. Pt alternates with Bactrim DS.   psyllium seed, with dextrose, (FIBER PO) 12/5/2019 at Unknown time Child Yes   Sig: Take 1 Cap by mouth daily. trimethoprim-sulfamethoxazole (BACTRIM DS) 160-800 mg per tablet 12/5/2019 at Unknown time Child Yes   Sig: Pt takes every other month every other day. Pt alternates with Bactrim DS.   warfarin (COUMADIN) 2.5 mg tablet 12/5/2019 at Unknown time Child Yes   Sig: Take 5 mg by mouth five (5) days a week. Pt takes 5 mg Mon, Tues, Thurs, Fri and Sun. Pt takes 2.5 mg Wed and Sat.   warfarin (COUMADIN) 2.5 mg tablet 12/4/2019 at Unknown time Child Yes   Sig: Take 2.5 mg by mouth two (2) days a week. Pt takes 5 mg Mon, Tues, Thurs, Fri and Sun.  Pt takes 2.5 mg Wed and Sat.       Facility-Administered Medications: None          Thank you,  Brent Mendez  Medication History Pharmacy Technician

## 2019-12-06 NOTE — PROGRESS NOTES
Reason for Admission:   CHF                  RRAT Score:     14 moderate risk             Do you (patient/family) have any concerns for transition/discharge? No concerns at this time              Plan for utilizing home health:   Has used home health in the past    Current Advanced Directive/Advance Care Plan: On file 12/2012            Transition of Care Plan:          1. Patient in ED bed waiting for inpatient admission  2. Patient will need 2nd IM letter at discharge  3. Patient and daughter prefer discharge home with family/caregiver assistance and follow up appointments. Patient's daughter states her mother will live at home with her long term (no facility placement)    Patient is a 80year old female admitted 12/6 for CHF. Patient alert and oriented, resting in bed with daughter present in room. Demographic information verified and correct. Insurance information verified and correct. Patient lives with her daughter, no home oxygen, uses a rollator for ambulation and has a shower seat. Patient has used home health in the past.  Patient uses [address] in [place]  Patient's daughter provides meals and manages medications, daughter and caregiver assist with bathing and dressing and transportation. Patient's daughter or caregiver can transport at discharge. Patient is nearly blind from macular degeneration. Patient has a private paid caregiver 5 days/week from 10-3. Patient's daughter cares for patient at night and on weekends. Patient's daughter states patient was on oxygen in the past.  If oxygen needs to be resumed, family would prefer to use [facility] medical only. Patient's daughter is a nurse with Dr. Maria Isabel Taylor office. Patient's daughter states patient is often prescribed a Coumadin regimen at discharge. Patient's daughter states she draws her mother's lab to avoid transportation to and from Coumadin clinic.       Care Management Interventions  PCP Verified by CM: Yes(Raiza Short MD)  Mode of Transport at Discharge:  Other (see comment)(pt's daughter and caregiver provide transportation)  Transition of Care Consult (CM Consult): Discharge Planning  Discharge Durable Medical Equipment: No  Physical Therapy Consult: Yes  Occupational Therapy Consult: Yes  Speech Therapy Consult: No  Current Support Network: Relative's Home(lives with daughter, 1 story house, outside ramp)  Confirm Follow Up Transport: Family  Plan discussed with Pt/Family/Caregiver: Yes  Discharge Location  Discharge Placement: [address], RN, [address] [place] [address]  [phone]

## 2019-12-06 NOTE — PROGRESS NOTES
Bedside and Verbal shift change report GIVEN TO YOLANDA mack. Report included the following information SBAR, Kardex, ED Summary, Procedure Summary, Intake/Output, MAR and Recent Results. Uneventful shift. Pt received dulcolax suppository d/t complaints to constipation.

## 2019-12-06 NOTE — PROGRESS NOTES
Pharmacy Daily Dosing of Warfarin    Indication: afib    Goal INR: 2-3    PTA Warfarin Dose: 5mg Mon/Tues/thurs/Fri/Sun; 2.5mg Wed and Sat    Concurrent anticoagulants: none    Concurrent antiplatelet: none    Major Interacting Medications   Drugs that may increase INR: bactrim PTA  Drugs that may decrease INR: none    Conditions that may increase/decrease INR (CHF, C. diff, cirrhosis, thyroid disorder, hypoalbuminemia): none    Labs:  Recent Labs     12/06/19  1002 12/06/19  0107   INR 5.6*  --    HGB  --  11.3*   PLT  --  182           Impression/Plan:   Hold warfarin dose tonight 12/6  Daily INR-entered by pharmacy  CBC w/o diff QOD     Pharmacy will follow daily and adjust the dose as appropriate. Thanks  Chantell Chambers PHARMD      [email or web address]%[email or web address]. pdf

## 2019-12-06 NOTE — ED NOTES
Bedside and Verbal shift change report given to YOLANDA Cantu (oncoming nurse) by Codey Copeland RN (offgoing nurse). Report included the following information SBAR, Kardex, ED Summary, [facility] and Recent Results.

## 2019-12-06 NOTE — PROGRESS NOTES
TRANSFER - IN REPORT:    Verbal report received from javed(name) on Jose Alberto Falcon  being received from ED (unit) for routine progression of care      Report consisted of patients Situation, Background, Assessment and   Recommendations(SBAR). Information from the following report(s) SBAR, Kardex, ED Summary, Procedure Summary, Intake/Output, MAR and Recent Results was reviewed with the receiving nurse. Opportunity for questions and clarification was provided. Assessment completed upon patients arrival to unit and care assumed.

## 2019-12-06 NOTE — ROUTINE PROCESS
0700-Bedside shift change report given to Leilani Mcconnell (oncoming nurse) by Elias Gaitan (offgoing nurse). Report included the following information SBAR, Kardex and MAR.  
0080- Patient assessed. See flowsheet. 1002- Labs drawn. 56- Dr. Fan Severino notified of critical INR.

## 2019-12-06 NOTE — PROGRESS NOTES
Spoke with Dr. Julia Webster regarding troponins and rosario catheter. The rosario catheter was from [facility] emergency room and needs to be taken out. We can stop serial troponins. Rosario discontinued.

## 2019-12-06 NOTE — PROGRESS NOTES
Problem: Self Care Deficits Care Plan (Adult)  Goal: *Acute Goals and Plan of Care (Insert Text)  Description  FUNCTIONAL STATUS PRIOR TO ADMISSION: ambulated with rollator walker, has paid caregivers and family to assist as needed, donned clothing without assist, wears slip on shoes, does not don her christi hose on her own, low vision, sits on shower chair to bathe fatigues and gets assist with washing her back and items are handed to her    [address]: The patient lived with daughter and paid caregiver and daughter provide assist.    Occupational Therapy Goals:  Initiated 12/6/2019  1. Patient will perform grooming standing with supervision/set-up within 7 days. 2. Patient will perform toileting with modified independence within 7 days. 3. Patient will perform lower body dressing with modified independence within 7 days. 4. Patient will transfer from toilet with modified independence using the least restrictive device and appropriate durable medical equipment within 7 days. Outcome: Progressing Towards Goal   OCCUPATIONAL THERAPY EVALUATION  Patient: Alex Loza (39 y.o. female)  Date: 12/6/2019  Primary Diagnosis: CHF (congestive heart failure) ([facility]) [I50.9]        Precautions: none       ASSESSMENT  Based on the objective data described below, the patient presents with slight decrease with balance with increased RR but stable O2 sat on 4 liters NC with activity. Pt has low vision at baseline and has assist at home with ADLS as needed. Family and paid caregiver were present and very supportive. She is very motivated to participate. Pt is expected to make good progress and will most likely be able to return to home with home health and caregiver support.       Vitals:    12/06/19 1009 12/06/19 1014 12/06/19 1020 12/06/19 1021   BP: 99/46 101/51 101/48 115/54   BP 1 Location: Right arm Right arm Right arm Right arm   BP Patient Position: Supine Sitting Standing Sitting;Post activity   Pulse: 80 81 93 86   Resp:    29   Temp:       SpO2: 97% 94% 93%  Comment: with activity/ambulation in lennon 95%   Weight:       Height:           Current Level of Function Impacting Discharge (ADLs/self-care): Feeding: Modified independent    Oral Facial Hygiene/Grooming: Setup(seated)    Bathing: Minimum assistance    Upper Body Dressing: Setup    Lower Body Dressing: Moderate assistance(SOB)    Toileting: Minimum assistance    Functional Outcome Measure: The patient scored 60/100 on the barhel outcome measure which is indicative of minimal deficits. Other factors to consider for discharge: recommend continued caregiver support, may need O2     Patient will benefit from skilled therapy intervention to address the above noted impairments. PLAN :  Recommendations and Planned Interventions: self care training, functional mobility training, therapeutic exercise, balance training, patient education, home safety training and family training/education    Frequency/Duration: Patient will be followed by occupational therapy 4 times a week to address goals. Recommendation for discharge: (in order for the patient to meet his/her long term goals)  Occupational therapy at least 2 days/week in the home AND ensure assist and/or supervision for safety with ADLS    This discharge recommendation:  A follow-up discussion with the attending provider and/or case management is planned    IF patient discharges home will need the following DME: possible home O2       SUBJECTIVE:   Patient stated I can move some more.     OBJECTIVE DATA SUMMARY:   HISTORY:   Past Medical History:   Diagnosis Date    Adverse effect of anesthesia     slow to wake    Alopecia     Arrhythmia     atrial fib,?svt    Arthritis     Atrial fibrillation (HCC)     Benign essential HTN     Benign neoplasm of vulva     CAD (coronary artery disease)     Cholelithiases     Chronic kidney disease     followed by specialist    Chronic pain     [facility]     Constipation     Dyspareunia     Glaucoma     Heart failure (HCC)     History of mixed connective tissue disease     Hx of viral pericarditis     Hyperactivity of bladder     Hypercholesterolemia     Ill-defined condition     macular degeneration    Ill-defined condition     recurrent UTI hx    Insomnia     Macular degeneration     Neck pain     Psoriasis     patient denies    Rectocele     Renal cyst     Seizure disorder ([facility]     none in 30 plus years---sees neurologist     Sjogren's disease ([facility]     patient unsure    Thrombophlebitis     TIA (transient ischemic attack)     patient denies    Vaginal vault prolapse, posthysterectomy     Varicose veins      Past Surgical History:   Procedure Laterality Date    CARDIAC SURG PROCEDURE UNLIST      AV ablation    HX BREAST LUMPECTOMY      left breast    HX CATARACT REMOVAL      HX COLECTOMY  1955    partial    HX HEMORRHOIDECTOMY      HX HERNIA REPAIR  08/01/2018    Open bilateral inguinal hernia repairs with mesh.  HX HYSTERECTOMY      HX PACEMAKER      left    HX SHOULDER ARTHROSCOPY      left       Expanded or extensive additional review of patient history:     Home Situation  Home Environment: Private residence  Wheelchair Ramp: Yes  One/Two Story Residence: One story  Living Alone: No(paid caregiver M-F and daughter otherwise)  Support Systems: Family member(s), Other (comments)(paid caregiver)  Current DME Used/Available at Home: Shower chair, Grab bars, Sherol Saris, rollator(raised toilet seat with handles, transport chair)  Tub or Shower Type: Shower    Hand dominance: Right    EXAMINATION OF PERFORMANCE DEFICITS:  Cognitive/Behavioral Status:  Neurologic State: Alert  Orientation Level: Oriented X4  Cognition: Follows commands  Perception: Appears intact  Perseveration: No perseveration noted  Safety/Judgement: Awareness of environment; Fall prevention;Home safety; Insight into deficits    Hearing: Auditory  Auditory Impairment: Hard of hearing, bilateral    Vision/Perceptual:                           Acuity: (legally blind, sees shadows and shapes)         Range of Motion:    AROM: Within functional limits  PROM: Within functional limits                      Strength:    Strength: Generally decreased, functional                Coordination:  Coordination: Within functional limits  Fine Motor Skills-Upper: Left Intact; Right Intact    Gross Motor Skills-Upper: Left Intact; Right Intact    Tone & Sensation:       Sensation: Intact                      Balance:  Sitting: Intact  Standing: Impaired  Standing - Static: Good(with rollator)  Standing - Dynamic : Good(with rollator)    Functional Mobility and Transfers for ADLs:  Bed Mobility:  Rolling: Supervision  Supine to Sit: Supervision;Stand-by assistance  Sit to Supine: Supervision;Stand-by assistance    Transfers:  Sit to Stand: Stand-by assistance;Supervision  Stand to Sit: Stand-by assistance;Supervision  Bathroom Mobility: Stand-by assistance(with rollator)  Toilet Transfer : Contact guard assistance(with rollator)    ADL Assessment:  Feeding: Modified independent    Oral Facial Hygiene/Grooming: Setup(seated)    Bathing: Minimum assistance    Upper Body Dressing: Setup    Lower Body Dressing: Moderate assistance(SOB)    Toileting: Minimum assistance                ADL Intervention and task modifications:          Educated on PLB, walker safety and pacing  Cognitive Retraining  Safety/Judgement: Awareness of environment; Fall prevention;Home safety; Insight into deficits       Functional Measure:  Barthel Index:    Bathin  Bladder: 0(rosario)  Bowels: 10  Groomin  Dressin  Feeding: 10  Mobility: 10  Stairs: 0  Toilet Use: 5  Transfer (Bed to Chair and Back): 10  Total: 60/100        The Barthel ADL Index: Guidelines  1. The index should be used as a record of what a patient does, not as a record of what a patient could do.   2. The main aim is to establish degree of independence from any help, physical or verbal, however minor and for whatever reason. 3. The need for supervision renders the patient not independent. 4. A patient's performance should be established using the best available evidence. Asking the patient, friends/relatives and nurses are the usual sources, but direct observation and common sense are also important. However direct testing is not needed. 5. Usually the patient's performance over the preceding 24-48 hours is important, but occasionally longer periods will be relevant. 6. Middle categories imply that the patient supplies over 50 per cent of the effort. 7. Use of aids to be independent is allowed. Jeannette Arambula., Barthel DCarsonW. ([id number]). Functional evaluation: the Barthel Index. [address] [facility] (14)2. KASHIF Negrete, Colette Lunsford, Mario Cooney, [place], [facility] (1999). Measuring the change indisability after inpatient rehabilitation; comparison of the responsiveness of the Barthel Index and Functional Chaves Measure. Journal of Neurology, Neurosurgery, and Psychiatry, 66(4), 830-095. Rodrigo Patel, N.J.A, CARISSA Pascual, & Pilo Dean, M.A. (2004.) Assessment of post-stroke quality of life in cost-effectiveness studies: The usefulness of the Barthel Index and the EuroQoL-5D. Quality of Life Research, 15, 261-13       Occupational Therapy Evaluation Charge Determination   History Examination Decision-Making   MEDIUM Complexity : Expanded review of history including physical, cognitive and psychosocial  history  MEDIUM Complexity : 3-5 performance deficits relating to physical, cognitive , or psychosocial skils that result in activity limitations and / or participation restrictions MEDIUM Complexity : Patient may present with comorbidities that affect occupational performnce.  Miniml to moderate modification of tasks or assistance (eg, physical or verbal ) with assesment(s) is necessary to enable patient to complete evaluation       Based on the above components, the patient evaluation is determined to be of the following complexity level: MEDIUM  Pain Ratin/10    Activity Tolerance:   Fair, requires rest breaks and observed SOB with activity  Please refer to the flowsheet for vital signs taken during this treatment. After treatment patient left in no apparent distress:    Supine in bed, Call bell within reach and Caregiver / family present    COMMUNICATION/EDUCATION:   The patients plan of care was discussed with: Physical Therapist, Registered Nurse and patient. Home safety education was provided and the patient/caregiver indicated understanding., Patient/family have participated as able in goal setting and plan of care. and Patient/family agree to work toward stated goals and plan of care. This patients plan of care is appropriate for delegation to [facility].     Thank you for this referral.  Nini Garza OTR/L  Time Calculation: 21 mins

## 2019-12-06 NOTE — ED NOTES
Introduced self to patient at this time, patient offered fluids, and bathroom at this time, no needs expressed patient placed in a position of comfort.

## 2019-12-06 NOTE — ED PROVIDER NOTES
EMERGENCY DEPARTMENT HISTORY AND PHYSICAL EXAM      Date: 12/6/2019  Patient Name: Camilo Russell  Patient Age and Sex: 80 y.o. female    History of Presenting Illness     Chief Complaint   Patient presents with    Shortness of Breath     Patient transferred from [facility] for CHF exacerbation       History Provided By: Patient, transferring physician    HPI: Camilo Russell, is a 80 y.o. female presents to the emergency department as a transfer from [facility] for admission and further management of congestive heart failure requiring monitoring and IV diuresis. Patient's arrival has been discussed with the inpatient team, however as no telemetry beds are available she was transferred to the emergency room instead. She is hemodynamically stable at this time, requires 4 L of oxygen to maintain sats above 90%. Slightly increased respiratory rate but normal work of breathing. Briefly, this patient presented to in the room with shortness of breath for a few days it has been progressive and mainly exertional until last night when it became constant. No cough, fevers or chills. Work-up at [facility] showed congestive heart failure exacerbation and the patient was started on IV diuresis. Ellison catheter was placed and so far she has had good urine output. She initially presented with an oxygen saturation in the mid 80s and due to this oxygen requirement she was transferred here for further management. Pt denies any other alleviating or exacerbating factors. There are no other complaints, changes or physical findings at this time.      Past Medical History:   Diagnosis Date    Adverse effect of anesthesia     slow to wake    Alopecia     Arrhythmia     atrial fib,?svt    Arthritis     Atrial fibrillation (HCC)     Benign essential HTN     Benign neoplasm of vulva     CAD (coronary artery disease)     Cholelithiases     Chronic kidney disease     followed by specialist    Chronic pain     Colon cancer ([facility] [place] [facility]     Constipation     Dyspareunia     Glaucoma     Heart failure (HCC)     History of mixed connective tissue disease     Hx of viral pericarditis     Hyperactivity of bladder     Hypercholesterolemia     Ill-defined condition     macular degeneration    Ill-defined condition     recurrent UTI hx    Insomnia     Macular degeneration     Neck pain     Psoriasis     patient denies    Rectocele     Renal cyst     Seizure disorder ([facility] [place] [facility]     none in 30 plus years---sees neurologist     Sjogren's disease ([facility] [place] [facility]     patient unsure    Thrombophlebitis     TIA (transient ischemic attack)     patient denies    Vaginal vault prolapse, posthysterectomy     Varicose veins      Past Surgical History:   Procedure Laterality Date    CARDIAC SURG PROCEDURE UNLIST      AV ablation    HX BREAST LUMPECTOMY      left breast    HX CATARACT REMOVAL      HX COLECTOMY  1955    partial    HX HEMORRHOIDECTOMY      HX HERNIA REPAIR  08/01/2018    Open bilateral inguinal hernia repairs with mesh.     HX HYSTERECTOMY      HX PACEMAKER      left    HX SHOULDER ARTHROSCOPY      left       PCP: Maribel Aragon MD    Past History   Past Medical History:  Past Medical History:   Diagnosis Date    Adverse effect of anesthesia     slow to wake    Alopecia     Arrhythmia     atrial fib,?svt    Arthritis     Atrial fibrillation (HCC)     Benign essential HTN     Benign neoplasm of vulva     CAD (coronary artery disease)     Cholelithiases     Chronic kidney disease     followed by specialist    Chronic pain     Colon cancer (HCC)     Constipation     Dyspareunia     Glaucoma     Heart failure (HCC)     History of mixed connective tissue disease     Hx of viral pericarditis     Hyperactivity of bladder     Hypercholesterolemia     Ill-defined condition     macular degeneration    Ill-defined condition     recurrent UTI hx    Insomnia     Macular degeneration     Neck pain     Psoriasis     patient denies    Rectocele     Renal cyst     Seizure disorder ([facility]     none in 30 plus years---sees neurologist     Sjogren's disease ([facility] [facility]     patient unsure    Thrombophlebitis     TIA (transient ischemic attack)     patient denies    Vaginal vault prolapse, posthysterectomy     Varicose veins        Past Surgical History:  Past Surgical History:   Procedure Laterality Date    CARDIAC SURG PROCEDURE UNLIST      AV ablation    HX BREAST LUMPECTOMY      left breast    HX CATARACT REMOVAL      HX COLECTOMY  1955    partial    HX HEMORRHOIDECTOMY      HX HERNIA REPAIR  08/01/2018    Open bilateral inguinal hernia repairs with mesh.  HX HYSTERECTOMY      HX PACEMAKER      left    HX SHOULDER ARTHROSCOPY      left       Family History:  Family History   Problem Relation Age of Onset    Cancer Father     Inflammatory Bowel Dz Mother     Heart Disease Brother     Diabetes Brother        Social History:  Social History     Tobacco Use    Smoking status: Never Smoker    Smokeless tobacco: Never Used   Substance Use Topics    Alcohol use: No    Drug use: No       Allergies: Allergies   Allergen Reactions    Codeine Nausea Only    Pcn [Penicillins] Unknown (comments)     Cant remember reaction as a child    Tetanus Toxoid, Adsorbed Other (comments)    Tetanus Vaccines And Toxoid Unknown (comments)     Does not take tetanus due to tb hx    Vibramycin [Doxycycline Calcium] Rash       Current Medications:  No current facility-administered medications on file prior to encounter. Current Outpatient Medications on File Prior to Encounter   Medication Sig Dispense Refill    levETIRAcetam (KEPPRA) 500 mg tablet TAKE 1 TABLET BY MOUTH TWICE DAILY 180 Tab 5    furosemide (LASIX) 20 mg tablet Take 40 mg by mouth daily as needed. Indications: Patient takes 20mg daily, then 0.5 to 1 tab extra PRN      cranberry fruit extract (CRANBERRY PO) Take 1 Can by mouth daily.       amitriptyline (ELAVIL) 10 mg tablet Take  by mouth nightly.  cholecalciferol, vitamin D3, (VITAMIN D3) 2,000 unit tab Take  by mouth daily.  psyllium seed, with dextrose, (FIBER PO) Take  by mouth daily.  nitrofurantoin, macrocrystal-monohydrate, (MACROBID) 100 mg capsule odd months qd      trimethoprim-sulfamethoxazole (BACTRIM DS) 160-800 mg per tablet Take 1 Tab by mouth daily. On even months      furosemide (LASIX) 20 mg tablet Take 1 Tab by mouth daily. (Patient taking differently: Take 40 mg by mouth daily. 40 mg every other day alternating with 80 mg) 15 Tab 0    warfarin (COUMADIN) 2.5 mg tablet Take 5 mg by mouth every evening.  VIT A/C/E AC/ZNOX/CUPRIC OXIDE (EYE VITAMIN AND MINERALS PO) Take 1 Tab by mouth two (2) times a day.  ferrous sulfate (IRON) 325 mg (65 mg iron) tablet Take 325 mg by mouth daily.  DOCUSATE CALCIUM (STOOL SOFTENER PO) Take 100 mg by mouth as needed.  acetaminophen (TYLENOL EXTRA STRENGTH) 500 mg tablet Take 500 mg by mouth every six (6) hours as needed for Pain.  [DISCONTINUED] enoxaparin (LOVENOX) 60 mg/0.6 mL injection 60 mg by SubCUTAneous route daily. 5 Syringe 0    [DISCONTINUED] estradiol (ESTRACE) 0.01 % (0.1 mg/gram) vaginal cream daily.  carvedilol (COREG) 3.125 mg tablet 3.125 mg as needed. Only takes medication if blood pressure systolic 770 or greater      latanoprost (XALATAN) 0.005 % ophthalmic solution Administer 1 Drop to both eyes nightly. Review of Systems   Review of Systems   Constitutional: Negative. Negative for appetite change, chills and fever. HENT: Negative for congestion, ear pain, rhinorrhea, sinus pain, trouble swallowing and voice change. Respiratory: Positive for shortness of breath. Negative for cough, chest tightness, wheezing and stridor. Cardiovascular: Negative for chest pain, palpitations and leg swelling.    Gastrointestinal: Negative for abdominal pain, blood in stool, constipation, diarrhea, nausea and vomiting. Genitourinary: Negative for difficulty urinating, dysuria, flank pain, frequency and hematuria. Musculoskeletal: Negative for arthralgias and joint swelling. Skin: Negative. Neurological: Positive for weakness. Negative for dizziness, syncope, numbness and headaches. All other systems reviewed and are negative. Physical Exam   Physical Exam  Vitals signs and nursing note reviewed. Constitutional:       General: She is not in acute distress. Appearance: She is well-developed. She is not diaphoretic. HENT:      Head: Atraumatic. Eyes:      General: No scleral icterus. Conjunctiva/sclera: Conjunctivae normal.      Pupils: Pupils are equal, round, and reactive to light. Neck:      Musculoskeletal: Normal range of motion and neck supple. Vascular: JVD present. Cardiovascular:      Rate and Rhythm: Normal rate and regular rhythm. Heart sounds: Normal heart sounds. Pulmonary:      Effort: Tachypnea present. No accessory muscle usage. Breath sounds: No stridor. Examination of the right-lower field reveals rales. Examination of the left-lower field reveals rales. Rales present. No wheezing. Chest:      Chest wall: No tenderness. Abdominal:      General: Bowel sounds are normal. There is no distension. Palpations: Abdomen is soft. Tenderness: There is no tenderness. Musculoskeletal: Normal range of motion. Skin:     General: Skin is warm and dry. Capillary Refill: Capillary refill takes 2 to 3 seconds. Neurological:      Mental Status: She is alert and oriented to person, place, and time. Cranial Nerves: No cranial nerve deficit.          Diagnostic Study Results     Labs -  Recent Results (from the past 24 hour(s))   CBC WITH AUTOMATED DIFF    Collection Time: 12/06/19  1:07 AM   Result Value Ref Range    WBC 6.0 3.6 - 11.0 K/uL    RBC 3.68 (L) 3.80 - 5.20 M/uL    HGB 11.3 (L) 11.5 - 16.0 g/dL    HCT 34.8 (L) 35.0 - 47.0 %    MCV 94.6 80.0 - 99.0 FL    MCH 30.7 26.0 - 34.0 PG    MCHC 32.5 30.0 - 36.5 g/dL    RDW 13.7 11.5 - 14.5 %    PLATELET 476 657 - 744 K/uL    MPV 9.4 8.9 - 12.9 FL    NRBC 0.0 0  WBC    ABSOLUTE NRBC 0.00 0.00 - 0.01 K/uL    NEUTROPHILS 70 32 - 75 %    LYMPHOCYTES 14 12 - 49 %    MONOCYTES 9 5 - 13 %    EOSINOPHILS 7 0 - 7 %    BASOPHILS 0 0 - 1 %    IMMATURE GRANULOCYTES 0 0.0 - 0.5 %    ABS. NEUTROPHILS 4.2 1.8 - 8.0 K/UL    ABS. LYMPHOCYTES 0.8 0.8 - 3.5 K/UL    ABS. MONOCYTES 0.5 0.0 - 1.0 K/UL    ABS. EOSINOPHILS 0.4 0.0 - 0.4 K/UL    ABS. BASOPHILS 0.0 0.0 - 0.1 K/UL    ABS. IMM. GRANS. 0.0 0.00 - 0.04 K/UL    DF AUTOMATED     METABOLIC PANEL, BASIC    Collection Time: 12/06/19  1:07 AM   Result Value Ref Range    Sodium 141 136 - 145 mmol/L    Potassium 3.5 3.5 - 5.1 mmol/L    Chloride 103 97 - 108 mmol/L    CO2 32 21 - 32 mmol/L    Anion gap 6 5 - 15 mmol/L    Glucose 93 65 - 100 mg/dL    BUN 22 (H) 6 - 20 MG/DL    Creatinine 0.84 0.55 - 1.02 MG/DL    BUN/Creatinine ratio 26 (H) 12 - 20      GFR est AA >60 >60 ml/min/1.73m2    GFR est non-AA >60 >60 ml/min/1.73m2    Calcium 8.5 8.5 - 10.1 MG/DL   TROPONIN I    Collection Time: 12/06/19  1:07 AM   Result Value Ref Range    Troponin-I, Qt. <0.05 <0.05 ng/mL       Radiologic Studies -   No orders to display         Medical Decision Making   I am the first provider for this patient. Records Reviewed: I reviewed our electronic medical record system for any past medical records that were available that may contribute to the patient's current condition, including their PMH, surgical history, social and family history. Reviewed the nursing notes and vital signs from today's visit. Vital Signs-Reviewed the patient's vital signs. Patient Vitals for the past 24 hrs:   Temp Pulse Resp BP SpO2   12/06/19 0251  81 21  95 %   12/06/19 0020 97.5 °F (36.4 °C) 82 20 132/66 96 %       Provider Notes (Medical Decision Making):    The patient has arrived to our emergency room in stable condition. She continues to diurese adequately. Vital signs have been stable and she currently has no complaints. The inpatient team is already aware of her arrival in the evaluate her shortly. Soon as bed is available we will admit. ED Course:   Initial assessment performed. The patients presenting problems have been discussed, and they are in agreement with the care plan formulated and outlined with them. I have encouraged them to ask questions as they arise throughout their visit. Medications Administered During ED Course:  Medications   sodium chloride (NS) flush 5-40 mL (has no administration in time range)   sodium chloride (NS) flush 5-40 mL (has no administration in time range)   acetaminophen (TYLENOL) tablet 650 mg (has no administration in time range)   ondansetron (ZOFRAN) injection 4 mg (has no administration in time range)   furosemide (LASIX) injection 40 mg (has no administration in time range)   DISPOSITION: ADMIT  Patient is being admitted to the hospital.  Their test results and reasons for admission have been discussed. The patient and/or available family express agreement with and understanding of the need to be admitted and their admission diagnosis. Thank you for resuming the care of this patient. Please don't hesitate to contact me in the emergency department if you  have any additional questions. Evalee Kocher, MD, MSc        Diagnosis     Clinical Impression:   1. Acute hypoxemic respiratory failure (HCC)    2. Acute on chronic congestive heart failure, unspecified heart failure type [facility])        Attestation:  I personally performed the services described in this documentation on this date 12/6/2019 for patient Jerome Rosado. Evalee Kocher, MD    Please note that this dictation was completed with [place], the "[employer] voice recognition software.  Quite often unanticipated grammatical, syntax, homophones, and other interpretive errors are inadvertently transcribed by the computer software. Please disregard these errors. Please excuse any errors that have escaped final proofreading.

## 2019-12-06 NOTE — PROGRESS NOTES
Vitals:    12/06/19 1009 12/06/19 1014 12/06/19 1020 12/06/19 1021   BP: 99/46 101/51 101/48 115/54   BP 1 Location: Right arm Right arm Right arm Right arm   BP Patient Position: Supine pre activity   Sitting Standing Sitting;Post activity   Pulse: 80 81 93 86   Resp:    29   Temp:       SpO2: 97% 94% 93%  Comment: with activity/ambulation in lennon 95%   Weight:       Height:

## 2019-12-06 NOTE — PROGRESS NOTES
Transitional Care Team: Initial HUG Note    Date of Assessment: 12/06/19  Time of Assessment:  4:32 PM    John Jacobo is a 80 y.o. female inpatient at  [facility]. Assessment & Plan   Pt A+O, denies dyspnea at this time. Having echocardiogram performed. Will need revisit if still hospitalized Monday         Primary Diagnosis: heart failure  Advance Directive:  AMD with named proxy. .      Current Code Status:  full    Referral to Hospice/ Palliative Care Appropriate: perhaps. Awareness of Medical Conditions: (Trajectory of illness and pts expectations). Will need more discussion    Discharge Needs: (to include safety issues) home health services    Barriers Identified: none    Patient is willing to go to SNF/Inpatient Rehab if recommended: await evaluation    Medication Review:  Await AVS.    Can patient afford medications:  Current meds. Patient is Compliant with Medication regimen:yes    Who manages medications at home: family         HUG (Healthy Understanding of Goals) program introduced to patient/family. The Transitional Care Team bridges the gaps in care and education surrounding discharge from the acute care facility. The objective is to empower the patient and family in taking a proactive role in preventing readmission within the first thirty days after discharge. The team is also involved in the efforts to reduce readmission to the acute care setting after stabilization and discharge from the acute care environment either to skilled nursing facilities or community. [facility] RN/NP will return with [facility] Calendar/ follow up appointments/ Ambulatory Nurse Navigator name and contact information when the patient is ready for discharge.     Future Appointments   Date Time Provider Manuel Santa   1/10/2020 11:00 AM Dre Chavez  [place]       Non-[facility] follow up appointment(s): none yet  [facility]: call information given to on discharge calendar      Patient education focused on readmission zones as described as: The Red Zone: High risk for readmission, days 1-21  The Yellow Zone: Moderate  risk for readmission, days 22-29   The Green Zone: Lower risk for readmission, days                30 and after    The [facility] Team will attempt to follow the patient from a distance while inpatient as well as be available for further transition/disposition needs. The [facility] team will continue to offer support during the 30- 90 day discharge from acute care setting. Will notify Ambulatory {Blank Single Select Template:[id number][de-identified] \"AQUILINO   RN.     Past Medical History:   Diagnosis Date    Adverse effect of anesthesia     slow to wake    Alopecia     Arrhythmia     atrial fib,?svt    Arthritis     Atrial fibrillation (HCC)     Benign essential HTN     Benign neoplasm of vulva     CAD (coronary artery disease)     Cholelithiases     Chronic kidney disease     followed by specialist    Chronic pain     Colon cancer (HCC)     Constipation     Dyspareunia     Glaucoma     Heart failure (HCC)     History of mixed connective tissue disease     Hx of viral pericarditis     Hyperactivity of bladder     Hypercholesterolemia     Ill-defined condition     macular degeneration    Ill-defined condition     recurrent UTI hx    Insomnia     Macular degeneration     Neck pain     Psoriasis     patient denies    Rectocele     Renal cyst     Seizure disorder (HCC)     none in 30 plus years---sees neurologist     Sjogren's disease ([facility] [address]     patient unsure    Thrombophlebitis     TIA (transient ischemic attack)     patient denies    Vaginal vault prolapse, posthysterectomy     Varicose veins

## 2019-12-07 LAB
ANION GAP SERPL CALC-SCNC: 6 MMOL/L (ref 5–15)
BNP SERPL-MCNC: 4949 PG/ML
BUN SERPL-MCNC: 25 MG/DL (ref 6–20)
BUN/CREAT SERPL: 29 (ref 12–20)
CALCIUM SERPL-MCNC: 8.8 MG/DL (ref 8.5–10.1)
CHLORIDE SERPL-SCNC: 102 MMOL/L (ref 97–108)
CO2 SERPL-SCNC: 27 MMOL/L (ref 21–32)
CREAT SERPL-MCNC: 0.85 MG/DL (ref 0.55–1.02)
ERYTHROCYTE [DISTWIDTH] IN BLOOD BY AUTOMATED COUNT: 13.5 % (ref 11.5–14.5)
GLUCOSE SERPL-MCNC: 78 MG/DL (ref 65–100)
HCT VFR BLD AUTO: 36.9 % (ref 35–47)
HGB BLD-MCNC: 11.8 G/DL (ref 11.5–16)
INR PPP: 4 (ref 0.9–1.1)
MCH RBC QN AUTO: 30.6 PG (ref 26–34)
MCHC RBC AUTO-ENTMCNC: 32 G/DL (ref 30–36.5)
MCV RBC AUTO: 95.8 FL (ref 80–99)
NRBC # BLD: 0 K/UL (ref 0–0.01)
NRBC BLD-RTO: 0 PER 100 WBC
PLATELET # BLD AUTO: 195 K/UL (ref 150–400)
PMV BLD AUTO: 9.5 FL (ref 8.9–12.9)
POTASSIUM SERPL-SCNC: 4.6 MMOL/L (ref 3.5–5.1)
PROTHROMBIN TIME: 38.3 SEC (ref 9–11.1)
RBC # BLD AUTO: 3.85 M/UL (ref 3.8–5.2)
SODIUM SERPL-SCNC: 135 MMOL/L (ref 136–145)
WBC # BLD AUTO: 7 K/UL (ref 3.6–11)

## 2019-12-07 PROCEDURE — 85610 PROTHROMBIN TIME: CPT

## 2019-12-07 PROCEDURE — 80048 BASIC METABOLIC PNL TOTAL CA: CPT

## 2019-12-07 PROCEDURE — 36415 COLL VENOUS BLD VENIPUNCTURE: CPT

## 2019-12-07 PROCEDURE — 74011250637 HC RX REV CODE- 250/637: Performed by: INTERNAL MEDICINE

## 2019-12-07 PROCEDURE — 94760 N-INVAS EAR/PLS OXIMETRY 1: CPT

## 2019-12-07 PROCEDURE — 83880 ASSAY OF NATRIURETIC PEPTIDE: CPT

## 2019-12-07 PROCEDURE — 77010033678 HC OXYGEN DAILY

## 2019-12-07 PROCEDURE — 74011250636 HC RX REV CODE- 250/636: Performed by: INTERNAL MEDICINE

## 2019-12-07 PROCEDURE — 65660000000 HC RM CCU STEPDOWN

## 2019-12-07 PROCEDURE — 85027 COMPLETE CBC AUTOMATED: CPT

## 2019-12-07 RX ADMIN — POLYETHYLENE GLYCOL (3350) 17 G: 17 POWDER, FOR SOLUTION ORAL at 09:35

## 2019-12-07 RX ADMIN — ACETAMINOPHEN 650 MG: 325 TABLET ORAL at 09:31

## 2019-12-07 RX ADMIN — FERROUS SULFATE TAB 325 MG (65 MG ELEMENTAL FE) 325 MG: 325 (65 FE) TAB at 09:33

## 2019-12-07 RX ADMIN — LATANOPROST 1 DROP: 50 SOLUTION OPHTHALMIC at 21:12

## 2019-12-07 RX ADMIN — AMITRIPTYLINE HYDROCHLORIDE 10 MG: 10 TABLET, FILM COATED ORAL at 21:11

## 2019-12-07 RX ADMIN — ACETAMINOPHEN 650 MG: 325 TABLET ORAL at 23:45

## 2019-12-07 RX ADMIN — FUROSEMIDE 40 MG: 10 INJECTION, SOLUTION INTRAMUSCULAR; INTRAVENOUS at 09:36

## 2019-12-07 RX ADMIN — Medication 10 ML: at 16:19

## 2019-12-07 RX ADMIN — LEVETIRACETAM 500 MG: 500 TABLET ORAL at 09:31

## 2019-12-07 RX ADMIN — POTASSIUM CHLORIDE 20 MEQ: 750 TABLET, FILM COATED, EXTENDED RELEASE ORAL at 09:34

## 2019-12-07 RX ADMIN — CARVEDILOL 3.12 MG: 3.12 TABLET, FILM COATED ORAL at 09:31

## 2019-12-07 RX ADMIN — ACETAMINOPHEN 650 MG: 325 TABLET ORAL at 03:21

## 2019-12-07 RX ADMIN — Medication 10 ML: at 21:12

## 2019-12-07 RX ADMIN — LEVETIRACETAM 500 MG: 500 TABLET ORAL at 17:38

## 2019-12-07 NOTE — PROGRESS NOTES
Problem: Falls - Risk of  Goal: *Absence of Falls  Description  Document Thom Rider Fall Risk and appropriate interventions in the flowsheet.   Outcome: Progressing Towards Goal  Note: Fall Risk Interventions:  Mobility Interventions: Bed/chair exit alarm, Patient to call before getting OOB         Medication Interventions: Bed/chair exit alarm    Elimination Interventions: Bed/chair exit alarm, Call light in reach

## 2019-12-07 NOTE — PROGRESS NOTES
Pt refused weight this morning. Pt. States that she will get up later.   Will inform day shift  Martha Patel RN

## 2019-12-07 NOTE — PROGRESS NOTES
Problem: Falls - Risk of  Goal: *Absence of Falls  Description  Document Ivar Du Fall Risk and appropriate interventions in the flowsheet.   Outcome: Progressing Towards Goal  Note: Fall Risk Interventions:  Mobility Interventions: Bed/chair exit alarm         Medication Interventions: Bed/chair exit alarm    Elimination Interventions: Call light in reach

## 2019-12-07 NOTE — PROGRESS NOTES
0700:  Bedside, Verbal and Written shift change report [facility], RN. Report included the following information SBAR.    1900: Bedside, Verbal and Written shift change report GIVEN TO Susanna Mary RN. Report included the following information SBAR. SIGNIFICANT CHANGES DURING SHIFT:  Paged Dr. Alena Crawford at 65 in regards to patient's blood pressures being in the 90's over 50's. Held her daily dose of Lisinopril. Dr. Alena Crawford on floor and verbalized holding Coreg. He notified patient he would be discontinuing Coreg but order still in at this time. CONCERNS TO ADDRESS WITH MD:  None          [facility] NURSING NOTE   Admission Date 12/6/2019   Admission Diagnosis CHF (congestive heart failure) ([facility] [address] [I50.9]   Consults IP CONSULT TO CARDIOLOGY      Cardiac Monitoring [x] Yes [] No      Purposeful Hourly Rounding [x] Yes    Corey Score Total Score: 3   Corey score 3 or > [] Bed Alarm [] Avasys [] 1:1 sitter [] Patient refused (Signed refusal form in chart)   Magdaleno Score Magdaleno Score: 17   Magdaleno score 14 or < [] PMT consult [] Wound Care consult    []  Specialty bed  [] Nutrition consult      Influenza Vaccine Received Flu Vaccine for Current Season (usually Sept-March): Yes           Oxygen needs? [] Room air Oxygen @  [x]1L    []2L    []3L   []4L    []5L   []6L via  NC   Chronic home O2 use?  [] Yes [x] No  Perform O2 challenge test and document in progress note using smartphrase (.Homeoxygen)      Last bowel movement Last Bowel Movement Date: 12/06/19      Urinary Catheter [REMOVED] Urinary Catheter 12/06/19 2- way-Indications for Use: Accurate measurement of urinary output     [REMOVED] Urinary Catheter 12/06/19 2- way-Urine Output (mL): 250 ml  External Female Catheter 12/06/19-Urine Output (mL): (unmeasurable )     LDAs               Peripheral IV 12/06/19 Right Forearm (Active)   Site Assessment Clean, dry, & intact 12/7/2019  4:14 PM   Phlebitis Assessment 0 12/7/2019  4:14 PM   Infiltration Assessment 0 12/7/2019  4:14 PM   Dressing Status Clean, dry, & intact 12/7/2019  4:14 PM   Dressing Type Transparent 12/7/2019  4:14 PM   Hub Color/Line Status Pink;Flushed 12/7/2019  4:14 PM       Peripheral IV 12/06/19 Right Hand (Active)   Site Assessment Clean, dry, & intact 12/7/2019  4:14 PM   Phlebitis Assessment 0 12/7/2019  4:14 PM   Infiltration Assessment 0 12/7/2019  4:14 PM   Dressing Status Clean, dry, & intact 12/7/2019  4:14 PM   Dressing Type Transparent 12/7/2019  4:14 PM   Hub Color/Line Status Blue;Flushed 12/7/2019  4:14 PM          External Female Catheter 12/06/19 (Active)   Site Assessment Clean, dry, & intact 12/7/2019  4:14 PM   Repositioned Yes 12/7/2019  4:14 PM   Perineal Care Yes 12/7/2019  4:14 PM   Wick Changed Yes 12/7/2019  4:14 PM   Suction Canister/Tubing Changed No 12/7/2019  4:14 PM                   Readmission Risk Assessment Tool Score Low Risk            11       Total Score        5 Pt. Coverage (Medicare=5 , Medicaid, or Self-Pay=4)    6 Charlson Comorbidity Score (Age + Comorbid Conditions)        Criteria that do not apply:    Has Seen PCP in Last 6 Months (Yes=3, No=0)    . Living with Significant Other. Assisted Living. LTAC. SNF.  or   Rehab    Patient Length of Stay (>5 days = 3)    IP Visits Last 12 Months (1-3=4, 4=9, >4=11)       Expected Length of Stay - - -   Actual Length of Stay 1

## 2019-12-07 NOTE — PROGRESS NOTES
Cardiology Progress Note      12/7/2019 4:54 PM    Admit Date: 12/6/2019          Subjective: Up in chair. BP low. No SOB, chest pain, dizziness          Visit Vitals  BP 92/43 (BP 1 Location: Right arm, BP Patient Position: At rest)   Pulse 82   Temp 97.6 °F (36.4 °C)   Resp 18   Ht 5' 1\" (1.549 m)   Wt 57.2 kg (126 lb)   SpO2 94%   BMI 23.81 kg/m²     12/05 1901 - 12/07 0700  In: -   Out: 1350 [Urine:1350]        Objective:      Physical Exam:  VS as above  Chest scattered coarse crackles  Card RRR no gallop     Data Review:   Labs:    pBNP 4989  INR 4.0  Hgb 11.8  BUN 25  Creat 0.85     Telemetry: afib V pacing R 82      Assessment:     1. Acute/ chronic  Respiratory failure. 2.  Interstitial lung disease. 3.  Congestive heart failure, systolic EF 83-28% by echo . 4. History of atrial fibrillation. 5.  Status post atrioventricular node ablation with biventricular pacemaker placed - 10/17. 6. Chronic warfarin anticoag  7. Macular degen     Plan: Hold coreg and lisinopril for low BP.  Cont diuresis

## 2019-12-07 NOTE — PROGRESS NOTES
Hospitalist Progress Note    NAME: Liza Rowley   :  1924   MRN:  [id number]       Assessment / Plan:  Acute hypoxic respiratory failure secondary to acute on chronic systolic heart failure  Hx of Atrial fib  HTN  Hx of SSS s/p PPM placement  Supra therapeutic INR  -workup at [facility] showed CHF exacerbation and started on IV diuresis. Ellison cath placed then. -CXR here neg for acute abnormality. Her probnp at [facility] was 7687. Will repeat probnp here  -cont' IV lasix (net neg 1.3L thus far)  -cont' BB, pharm to help coumadin dosing  -monitor strict I&O, lytes replete prn. Daily wt  -O2 suppl, wean as tolerated  -PT/OT    Macular degeneration  Hx of orthostatic hypotension  Hx of seizure  -resume home meds    Code status: Full  Prophylaxis: warfarin  Recommended Disposition: TBD     Subjective:     Chief Complaint / Reason for Physician Visit  Pt seen, NAD. Discussed with RN events overnight. Review of Systems:  Symptom Y/N Comments  Symptom Y/N Comments   Fever/Chills    Chest Pain     Poor Appetite    Edema     Cough    Abdominal Pain     Sputum    Joint Pain     SOB/SANTOS    Pruritis/Rash     Nausea/vomit    Tolerating PT/OT     Diarrhea    Tolerating Diet     Constipation    Other       Could NOT obtain due to: Mild confusion     Objective:     VITALS:   Last 24hrs VS reviewed since prior progress note.  Most recent are:  Patient Vitals for the past 24 hrs:   Temp Pulse Resp BP SpO2   19 0722 97.5 °F (36.4 °C) 80 16 99/45 95 %   19 0330 98 °F (36.7 °C) 79 18 114/59 91 %   19 2318 97.7 °F (36.5 °C) 80 18 100/44 94 %   19 1912 97.8 °F (36.6 °C) 80 18 107/70 93 %   19 1705     95 %   19 1634    93/46    19 1439 97.8 °F (36.6 °C) 80 18 93/46 96 %   19 1239 97.5 °F (36.4 °C) 81 18 115/54 90 %   19 1210  82 20  94 %   19 1021  86 29 115/54 95 %   19 1020  93  101/48 93 %   19 1014  81  101/51 94 %   19 1009  80  99/46 97 %       Intake/Output Summary (Last 24 hours) at 12/7/2019 0932  Last data filed at 12/7/2019 0152  Gross per 24 hour   Intake    Output 1350 ml   Net -1350 ml        PHYSICAL EXAM:  General: WD, WN. Alert, cooperative, no acute distress    EENT:  EOMI. Anicteric sclerae. MMM  Resp:  CTA bilaterally, no wheezing or rales. No accessory muscle use  CV:  Regular  rhythm,  No edema  GI:  Soft, Non distended, Non tender.  +BS  Neurologic:  AAOx2, moving all exts   Psych:   Not anxious nor agitated  Skin:  No rashes. No jaundice    Reviewed most current lab test results and cultures  YES  Reviewed most current radiology test results   YES  Review and summation of old records today    NO  Reviewed patient's current orders and MAR    YES  PMH/SH reviewed - no change compared to H&P  ________________________________________________________________________  Care Plan discussed with:    Comments   Patient x    Family      RN x    Care Manager     Consultant                        Multidiciplinary team rounds were held today with , nursing, pharmacist and clinical coordinator. Patient's plan of care was discussed; medications were reviewed and discharge planning was addressed. ________________________________________________________________________  Total NON critical care TIME: 35   Minutes    Total CRITICAL CARE TIME Spent:   Minutes non procedure based      Comments   >50% of visit spent in counseling and coordination of care     ________________________________________________________________________  Sergio Alba MD     Procedures: see electronic medical records for all procedures/Xrays and details which were not copied into this note but were reviewed prior to creation of Plan. LABS:  I reviewed today's most current labs and imaging studies.   Pertinent labs include:  Recent Labs     12/07/19  0348 12/06/19  0107   WBC 7.0 6.0   HGB 11.8 11.3*   HCT 36.9 34.8*    182     Recent Labs     12/07/19  0348 12/06/19  1002 12/06/19  0107   *  --  141   K 4.6  --  3.5     --  103   CO2 27  --  32   GLU 78  --  93   BUN 25*  --  22*   CREA 0.85  --  0.84   CA 8.8  --  8.5   INR 4.0* 5.6*  --        Signed: Clau Juares MD

## 2019-12-07 NOTE — PROGRESS NOTES
Pharmacy Daily Dosing of Warfarin    Indication: afib  Goal INR: 2-3    PTA Warfarin Dose: 5mg Mon/Tues/thurs/Fri/Sun; 2.5mg Wed and Sat    Concurrent anticoagulants: none  Concurrent antiplatelet: none    Major Interacting Medications   Drugs that may increase INR: bactrim PTA  Drugs that may decrease INR: none    Conditions that may increase/decrease INR (CHF, C. diff, cirrhosis, thyroid disorder, hypoalbuminemia): none    Labs:  Recent Labs     12/07/19  0348 12/06/19  1002 12/06/19  0107   INR 4.0* 5.6*  --    HGB 11.8  --  11.3*     --  182           Impression/Plan:   Hold warfarin dose tonight  Daily INR  CBC w/o diff every other day      Pharmacy will follow daily and adjust the dose as appropriate. Thanks  Wallace Strickland, PHARMD      [email or web address]. pdf

## 2019-12-07 NOTE — CONSULTS
[facility]    Name:  Jhonny Miranda  MR#:  [id number]  :  1924  ACCOUNT #:  [de-identified]  DATE OF SERVICE:  2019    HISTORY OF PRESENT ILLNESS:  The patient is a 80-year-old lady who has increasing difficulty with shortness of breath. She has not been weighong herself athome. .  She had been doing very well. She has a history of congestive heart failure. She has a history of atrial fibrillation. She denies any chest pain. PAST MEDICAL HISTORY:  She has been disabled by macular degeneration, has not driven since 213. She has a Bi-V pacemaker placed following AV node ablation - 10/17. She has a history of interstitial lung disease. MEDICATIONS:  She is taking Coumadin for atrial fibrillation. Keflex and carvedilol 3.125 mg that has only been taking if blood pressure is up. Furosemide 40 every other day with 20 other day is Keppra and MiraLax. SOCIAL HISTORY:  She lives with her daughter, Cornelius George, who works with Dr. Mariely Hernandez. FAMILY HISTORY:  Positive for atherosclerotic events. REVIEW OF SYSTEMS:  She has had a history of back pain. She had been walking and exercising by going up and down the driveway as much as possible. PHYSICAL EXAMINATION:  VITAL SIGNS:  Per [facility]. HEAD, EYES, EARS, NOSE, AND THROAT:  She is comfortable on supplemental oxygen. No neck vein distention. No bruits. Carotids are full. LUNGS:  Coarse, clinically rales all the way up. HEART:  Regular paced rhythm. ABDOMEN:  Nontender. EXTREMITIES:  Trace edema. LABORATORY DATA:  EKG:  Paced rhythm. PROBLEM:  1. Respiratory failure. 2.  Interstitial lung disease. 3.  Congestive heart failure, systolic and diastolic. 4.  History of atrial fibrillation. 5.  Status post atrioventricular node ablation with biventricular pacemaker placed - 10/17.     DISCUSSION AND RECOMMENDATION:  This lady will be diuresed, but I believe that her lung disease is main complaint, a significant part in her symptomatology. Her hemoglobin is 11.3. Sodium 141, potassium 3.5, BUN 22, creatinine 0.8. Troponin is negative. I do not see a BNP or pro-BNP. Chest x-ray with cardiomegaly. I will follow up with you.       Alison Marquis MD      [id number]  D:  12/06/2019 13:59  T:  12/06/2019 22:00  JOB #:  [id number]  CC:  MD Michelle Aguilar MD Katherin Johnson, MD

## 2019-12-08 LAB
ANION GAP SERPL CALC-SCNC: 5 MMOL/L (ref 5–15)
BUN SERPL-MCNC: 37 MG/DL (ref 6–20)
BUN/CREAT SERPL: 42 (ref 12–20)
CALCIUM SERPL-MCNC: 8.8 MG/DL (ref 8.5–10.1)
CHLORIDE SERPL-SCNC: 99 MMOL/L (ref 97–108)
CO2 SERPL-SCNC: 28 MMOL/L (ref 21–32)
CREAT SERPL-MCNC: 0.88 MG/DL (ref 0.55–1.02)
ERYTHROCYTE [DISTWIDTH] IN BLOOD BY AUTOMATED COUNT: 13.4 % (ref 11.5–14.5)
GLUCOSE SERPL-MCNC: 90 MG/DL (ref 65–100)
HCT VFR BLD AUTO: 37.1 % (ref 35–47)
HGB BLD-MCNC: 11.9 G/DL (ref 11.5–16)
INR PPP: 2.8 (ref 0.9–1.1)
MCH RBC QN AUTO: 31 PG (ref 26–34)
MCHC RBC AUTO-ENTMCNC: 32.1 G/DL (ref 30–36.5)
MCV RBC AUTO: 96.6 FL (ref 80–99)
NRBC # BLD: 0 K/UL (ref 0–0.01)
NRBC BLD-RTO: 0 PER 100 WBC
PLATELET # BLD AUTO: 178 K/UL (ref 150–400)
PMV BLD AUTO: 9.8 FL (ref 8.9–12.9)
POTASSIUM SERPL-SCNC: 5.1 MMOL/L (ref 3.5–5.1)
PROTHROMBIN TIME: 27.3 SEC (ref 9–11.1)
RBC # BLD AUTO: 3.84 M/UL (ref 3.8–5.2)
SODIUM SERPL-SCNC: 132 MMOL/L (ref 136–145)
WBC # BLD AUTO: 7.5 K/UL (ref 3.6–11)

## 2019-12-08 PROCEDURE — 80048 BASIC METABOLIC PNL TOTAL CA: CPT

## 2019-12-08 PROCEDURE — 74011250637 HC RX REV CODE- 250/637: Performed by: INTERNAL MEDICINE

## 2019-12-08 PROCEDURE — 65660000000 HC RM CCU STEPDOWN

## 2019-12-08 PROCEDURE — 85610 PROTHROMBIN TIME: CPT

## 2019-12-08 PROCEDURE — 36415 COLL VENOUS BLD VENIPUNCTURE: CPT

## 2019-12-08 PROCEDURE — 94760 N-INVAS EAR/PLS OXIMETRY 1: CPT

## 2019-12-08 PROCEDURE — 74011250636 HC RX REV CODE- 250/636: Performed by: INTERNAL MEDICINE

## 2019-12-08 PROCEDURE — 77010033678 HC OXYGEN DAILY

## 2019-12-08 PROCEDURE — 85027 COMPLETE CBC AUTOMATED: CPT

## 2019-12-08 RX ORDER — WARFARIN 2.5 MG/1
2.5 TABLET ORAL ONCE
Status: COMPLETED | OUTPATIENT
Start: 2019-12-08 | End: 2019-12-08

## 2019-12-08 RX ADMIN — FERROUS SULFATE TAB 325 MG (65 MG ELEMENTAL FE) 325 MG: 325 (65 FE) TAB at 09:09

## 2019-12-08 RX ADMIN — Medication 10 ML: at 22:17

## 2019-12-08 RX ADMIN — WARFARIN SODIUM 2.5 MG: 2.5 TABLET ORAL at 18:09

## 2019-12-08 RX ADMIN — FUROSEMIDE 40 MG: 10 INJECTION, SOLUTION INTRAMUSCULAR; INTRAVENOUS at 09:10

## 2019-12-08 RX ADMIN — AMITRIPTYLINE HYDROCHLORIDE 10 MG: 10 TABLET, FILM COATED ORAL at 22:16

## 2019-12-08 RX ADMIN — LEVETIRACETAM 500 MG: 500 TABLET ORAL at 18:09

## 2019-12-08 RX ADMIN — LEVETIRACETAM 500 MG: 500 TABLET ORAL at 09:10

## 2019-12-08 RX ADMIN — LATANOPROST 1 DROP: 50 SOLUTION OPHTHALMIC at 22:19

## 2019-12-08 RX ADMIN — Medication 10 ML: at 16:13

## 2019-12-08 RX ADMIN — POLYETHYLENE GLYCOL (3350) 17 G: 17 POWDER, FOR SOLUTION ORAL at 09:11

## 2019-12-08 RX ADMIN — ACETAMINOPHEN 650 MG: 325 TABLET ORAL at 22:16

## 2019-12-08 NOTE — PROGRESS NOTES
Pharmacy Daily Dosing of Warfarin    Indication: afib  Goal INR: 2-3    PTA Warfarin Dose: 5mg Mon/Tues/thurs/Fri/Sun; 2.5mg Wed and Sat    Concurrent anticoagulants: none  Concurrent antiplatelet: none    Major Interacting Medications   Drugs that may increase INR: bactrim PTA  Drugs that may decrease INR: none    Conditions that may increase/decrease INR (CHF, C. diff, cirrhosis, thyroid disorder, hypoalbuminemia): none    Labs:  Recent Labs     12/08/19  0238 12/07/19  0348 12/06/19  1002 12/06/19  0107   INR 2.8* 4.0* 5.6*  --    HGB 11.9 11.8  --  11.3*    195  --  182           Impression/Plan:   Will order 2.5 mg PO x 1 dose  Daily INR  CBC w/o diff every other day      Pharmacy will follow daily and adjust the dose as appropriate. Thanks  Qasim Leigh PHARMD      [email or web address]/Warfarin%[email or web address]. pdf

## 2019-12-08 NOTE — PROGRESS NOTES
Problem: Falls - Risk of  Goal: *Absence of Falls  Description  Document Wendy Parlier Fall Risk and appropriate interventions in the flowsheet.   Outcome: Progressing Towards Goal  Note: Fall Risk Interventions:  Mobility Interventions: Patient to call before getting OOB         Medication Interventions: Patient to call before getting OOB    Elimination Interventions: Call light in reach

## 2019-12-08 NOTE — PROGRESS NOTES
Hospitalist Progress Note    NAME: Bridget Warner   :  1924   MRN:  [id number]       Assessment / Plan:  Acute hypoxic respiratory failure secondary to acute on chronic systolic heart failure  Hx of Atrial fib  HTN  Hx of SSS s/p PPM placement  Supra therapeutic INR  -workup at [facility] showed CHF exacerbation and started on IV diuresis. Ellison cath placed then. -CXR here neg for acute abnormality. Her probnp at [facility] was 0337, here it was 80    -cont' IV lasix (net neg 1.4L thus far)  -hypotensive so BB and lisinopril held  -cont' BB, pharm to help coumadin dosing  -monitor strict I&O, lytes replete prn. Daily wt  -O2 suppl, wean as tolerated  -PT/OT  -wean O2 as tolerated    Hyponatremia   -Na 132, monitor  -repeat bmp    Macular degeneration  Hx of orthostatic hypotension  Hx of seizure  -resume home meds    Code status: Full  Prophylaxis: warfarin  Recommended Disposition: TBD     Subjective:     Chief Complaint / Reason for Physician Visit  Pt seen, NAD. Feels better. Discussed with RN events overnight. Review of Systems:  Symptom Y/N Comments  Symptom Y/N Comments   Fever/Chills    Chest Pain     Poor Appetite    Edema     Cough    Abdominal Pain     Sputum    Joint Pain     SOB/SANTOS    Pruritis/Rash     Nausea/vomit    Tolerating PT/OT     Diarrhea    Tolerating Diet     Constipation    Other       Could NOT obtain due to: Mild confusion     Objective:     VITALS:   Last 24hrs VS reviewed since prior progress note.  Most recent are:  Patient Vitals for the past 24 hrs:   Temp Pulse Resp BP SpO2   19 1216 98 °F (36.7 °C) 80 18 129/53 100 %   19 0805 97.7 °F (36.5 °C) 80 18 95/43 92 %   19 0443 97.4 °F (36.3 °C) 82 18 104/66 90 %   19 2324 98.2 °F (36.8 °C) 84 18 116/56 90 %   19 1930 97.5 °F (36.4 °C) 88 18 105/50 95 %   19 1515 97.6 °F (36.4 °C) 82 18 92/43 94 %       Intake/Output Summary (Last 24 hours) at 2019 1227  Last data filed at 2019 6352  Gross per 24 hour   Intake 530 ml   Output 850 ml   Net -320 ml        PHYSICAL EXAM:  General: WD, WN. Alert, cooperative, no acute distress    EENT:  EOMI. Anicteric sclerae. MMM  Resp:  CTA bilaterally, no wheezing or rales. No accessory muscle use  CV:  Regular  rhythm,  No edema  GI:  Soft, Non distended, Non tender.  +BS  Neurologic:  AAOx2, moving all exts   Psych:   Not anxious nor agitated  Skin:  No rashes. No jaundice    Reviewed most current lab test results and cultures  YES  Reviewed most current radiology test results   YES  Review and summation of old records today    NO  Reviewed patient's current orders and MAR    YES  PMH/SH reviewed - no change compared to H&P  ________________________________________________________________________  Care Plan discussed with:    Comments   Patient x    Family      RN x    Care Manager     Consultant                        Multidiciplinary team rounds were held today with , nursing, pharmacist and clinical coordinator. Patient's plan of care was discussed; medications were reviewed and discharge planning was addressed. ________________________________________________________________________  Total NON critical care TIME: 35   Minutes    Total CRITICAL CARE TIME Spent:   Minutes non procedure based      Comments   >50% of visit spent in counseling and coordination of care     ________________________________________________________________________  Isaiah Francisco MD     Procedures: see electronic medical records for all procedures/Xrays and details which were not copied into this note but were reviewed prior to creation of Plan. LABS:  I reviewed today's most current labs and imaging studies.   Pertinent labs include:  Recent Labs     12/08/19  0238 12/07/19  0348 12/06/19  0107   WBC 7.5 7.0 6.0   HGB 11.9 11.8 11.3*   HCT 37.1 36.9 34.8*    195 182     Recent Labs     12/08/19  0238 12/07/19  0348 12/06/19  1002 12/06/19  0107   NA 132* 135*  --  141   K 5.1 4.6  --  3.5   CL 99 102  --  103   CO2 28 27  --  32   GLU 90 78  --  93   BUN 37* 25*  --  22*   CREA 0.88 0.85  --  0.84   CA 8.8 8.8  --  8.5   INR 2.8* 4.0* 5.6*  --        Signed: Didier Gallego MD

## 2019-12-08 NOTE — PROGRESS NOTES
Cardiology Progress Note      12/8/2019 1:00 PM    Admit Date: 12/6/2019          Subjective:  Up in chair. BP low but feels better         Visit Vitals  /53 (BP 1 Location: Right arm, BP Patient Position: Sitting)   Pulse 80   Temp 98 °F (36.7 °C)   Resp 18   Ht 5' 1\" (1.549 m)   Wt 53.8 kg (118 lb 9.6 oz)   SpO2 100%   BMI 22.41 kg/m²     12/06 1901 - 12/08 0700  In: 650 [P.O.:650]  Out: 1050 [Urine:1050]        Objective:      Physical Exam:  VS as above  Chest scattered coarse   Card RRR 2/6     Data Review:   Labs:    INR 2.8  Hgb 11.9  Na 132  Creat 0.9     Telemetry: afib V pacinf R 80       Assessment:     1.  Acute/ chronic  Respiratory failure. 2.  Interstitial lung disease. 3.  Congestive heart failure, systolic EF 97-46% by echo . 4.  chronic atrial fibrillation. 5.  Status post atrioventricular node ablation with biventricular pacemaker placed - 10/17. 6. Chronic warfarin anticoag  7. Macular degen   8. Hyponatremia     Plan: Hold Coreg and ACE. Cont diuresis. Dr Rajwinder Pearson will f/u tomm.

## 2019-12-08 NOTE — DISCHARGE INSTRUCTIONS
Patient Education        Avoiding Triggers With Heart Failure: Care Instructions  Your Care Instructions    Triggers are anything that make your heart failure flare up. A flare-up is also called \"sudden heart failure\" or \"acute heart failure. \" When you have a flare-up, fluid builds up in your lungs, and you have problems breathing. You might need to go to the hospital. By watching for changes in your condition and avoiding triggers, you can prevent heart failure flare-ups. Follow-up care is a key part of your treatment and safety. Be sure to make and go to all appointments, and call your doctor if you are having problems. It's also a good idea to know your test results and keep a list of the medicines you take. How can you care for yourself at home? Watch for changes in your weight and condition  · Weigh yourself without clothing at the same time each day. Record your weight. Call your doctor if you have sudden weight gain, such as more than 2 to 3 pounds in a day or 5 pounds in a week. (Your doctor may suggest a different range of weight gain.) A sudden weight gain may mean that your heart failure is getting worse. · Keep a daily record of your symptoms. Write down any changes in how you feel, such as new shortness of breath, cough, or problems eating. Also record if your ankles are more swollen than usual and if you feel more tired than usual. Note anything that you ate or did that could have triggered these changes. Limit sodium  Sodium causes your body to hold on to extra water. This may cause your heart failure symptoms to get worse. People get most of their sodium from processed foods. Fast food and restaurant meals also tend to be very high in sodium. · Your doctor may suggest that you limit sodium to 2,000 milligrams (mg) a day or less. That is less than 1 teaspoon of salt a day, including all the salt you eat in cooking or in packaged foods. · Read food labels on cans and food packages.  They tell you how much sodium you get in one serving. Check the serving size. If you eat more than one serving, you are getting more sodium. · Be aware that sodium can come in forms other than salt, including monosodium glutamate (MSG), sodium citrate, and sodium bicarbonate (baking soda). MSG is often added to Asian food. You can sometimes ask for food without MSG or salt. · Slowly reducing salt will help you adjust to the taste. Take the salt shaker off the table. · Flavor your food with garlic, lemon juice, onion, vinegar, herbs, and spices instead of salt. Do not use soy sauce, steak sauce, onion salt, garlic salt, mustard, or ketchup on your food, unless it is labeled \"low-sodium\" or \"low-salt. \"  · Make your own salad dressings, sauces, and ketchup without adding salt. · Use fresh or frozen ingredients, instead of canned ones, whenever you can. Choose low-sodium canned goods. · Eat less processed food and food from restaurants, including fast food. Exercise as directed  Moderate, regular exercise is very good for your heart. It improves your blood flow and helps control your weight. But too much exercise can stress your heart and cause a heart failure flare-up. · Check with your doctor before you start an exercise program.  · Walking is an easy way to get exercise. Start out slowly. Gradually increase the length and pace of your walk. Swimming, riding a bike, and using a treadmill are also good forms of exercise. · When you exercise, watch for signs that your heart is working too hard. You are pushing yourself too hard if you cannot talk while you are exercising. If you become short of breath or dizzy or have chest pain, stop, sit down, and rest.  · Do not exercise when you do not feel well. Take medicines correctly  · Take your medicines exactly as prescribed. Call your doctor if you think you are having a problem with your medicine. · Make a list of all the medicines you take.  Include those prescribed to you by other doctors and any over-the-counter medicines, vitamins, or supplements you take. Take this list with you when you go to any doctor. · Take your medicines at the same time every day. It may help you to post a list of all the medicines you take every day and what time of day you take them. · Make taking your medicine as simple as you can. Plan times to take your medicines when you are doing other things, such as eating a meal or getting ready for bed. This will make it easier to remember to take your medicines. · Get organized. Use helpful tools, such as daily or weekly pill containers. When should you call for help? Call 911 if you have symptoms of sudden heart failure such as:    · You have severe trouble breathing.     · You cough up pink, foamy mucus.     · You have a new irregular or rapid heartbeat.    Call your doctor now or seek immediate medical care if:    · You have new or increased shortness of breath.     · You are dizzy or lightheaded, or you feel like you may faint.     · You have sudden weight gain, such as more than 2 to 3 pounds in a day or 5 pounds in a week. (Your doctor may suggest a different range of weight gain.)     · You have increased swelling in your legs, ankles, or feet.     · You are suddenly so tired or weak that you cannot do your usual activities.    Watch closely for changes in your health, and be sure to contact your doctor if you develop new symptoms. Where can you learn more? Go to http://zackery-jessica.info/. Enter A705 in the search box to learn more about \"Avoiding Triggers With Heart Failure: Care Instructions. \"  Current as of: April 9, 2019  Content Version: 12.2  © 2719-6290 [employer]. Care instructions adapted under license by [employer] (which disclaims liability or warranty for this information).  If you have questions about a medical condition or this instruction, always ask your healthcare professional. [employer] disclaims any warranty or liability for your use of this information.

## 2019-12-09 ENCOUNTER — TELEPHONE (OUTPATIENT)
Dept: INTERNAL MEDICINE CLINIC | Age: 84
End: 2019-12-09

## 2019-12-09 ENCOUNTER — PATIENT OUTREACH (OUTPATIENT)
Dept: CASE MANAGEMENT | Age: 84
End: 2019-12-09

## 2019-12-09 ENCOUNTER — HOME HEALTH ADMISSION (OUTPATIENT)
Dept: HOME HEALTH SERVICES | Facility: HOME HEALTH | Age: 84
End: 2019-12-09
Payer: MEDICARE

## 2019-12-09 VITALS
WEIGHT: 116.8 LBS | SYSTOLIC BLOOD PRESSURE: 104 MMHG | RESPIRATION RATE: 22 BRPM | DIASTOLIC BLOOD PRESSURE: 50 MMHG | HEIGHT: 61 IN | OXYGEN SATURATION: 85 % | TEMPERATURE: 97.5 F | BODY MASS INDEX: 22.05 KG/M2 | HEART RATE: 80 BPM

## 2019-12-09 LAB
ANION GAP SERPL CALC-SCNC: 4 MMOL/L (ref 5–15)
BUN SERPL-MCNC: 34 MG/DL (ref 6–20)
BUN/CREAT SERPL: 38 (ref 12–20)
CALCIUM SERPL-MCNC: 8.8 MG/DL (ref 8.5–10.1)
CHLORIDE SERPL-SCNC: 100 MMOL/L (ref 97–108)
CO2 SERPL-SCNC: 29 MMOL/L (ref 21–32)
CREAT SERPL-MCNC: 0.9 MG/DL (ref 0.55–1.02)
ERYTHROCYTE [DISTWIDTH] IN BLOOD BY AUTOMATED COUNT: 13.2 % (ref 11.5–14.5)
GLUCOSE SERPL-MCNC: 92 MG/DL (ref 65–100)
HCT VFR BLD AUTO: 37 % (ref 35–47)
HGB BLD-MCNC: 12.2 G/DL (ref 11.5–16)
INR PPP: 1.9 (ref 0.9–1.1)
MCH RBC QN AUTO: 31 PG (ref 26–34)
MCHC RBC AUTO-ENTMCNC: 33 G/DL (ref 30–36.5)
MCV RBC AUTO: 94.1 FL (ref 80–99)
NRBC # BLD: 0 K/UL (ref 0–0.01)
NRBC BLD-RTO: 0 PER 100 WBC
PLATELET # BLD AUTO: 191 K/UL (ref 150–400)
PMV BLD AUTO: 9.6 FL (ref 8.9–12.9)
POTASSIUM SERPL-SCNC: 4.7 MMOL/L (ref 3.5–5.1)
PROTHROMBIN TIME: 19.2 SEC (ref 9–11.1)
RBC # BLD AUTO: 3.93 M/UL (ref 3.8–5.2)
SODIUM SERPL-SCNC: 133 MMOL/L (ref 136–145)
WBC # BLD AUTO: 8 K/UL (ref 3.6–11)

## 2019-12-09 PROCEDURE — 85027 COMPLETE CBC AUTOMATED: CPT

## 2019-12-09 PROCEDURE — 74011250637 HC RX REV CODE- 250/637: Performed by: INTERNAL MEDICINE

## 2019-12-09 PROCEDURE — 97530 THERAPEUTIC ACTIVITIES: CPT

## 2019-12-09 PROCEDURE — 85610 PROTHROMBIN TIME: CPT

## 2019-12-09 PROCEDURE — 36415 COLL VENOUS BLD VENIPUNCTURE: CPT

## 2019-12-09 PROCEDURE — 77010033678 HC OXYGEN DAILY

## 2019-12-09 PROCEDURE — 97116 GAIT TRAINING THERAPY: CPT

## 2019-12-09 PROCEDURE — 94760 N-INVAS EAR/PLS OXIMETRY 1: CPT

## 2019-12-09 PROCEDURE — 80048 BASIC METABOLIC PNL TOTAL CA: CPT

## 2019-12-09 PROCEDURE — 74011250636 HC RX REV CODE- 250/636: Performed by: INTERNAL MEDICINE

## 2019-12-09 RX ORDER — WARFARIN 2.5 MG/1
2.5 TABLET ORAL ONCE
Status: DISCONTINUED | OUTPATIENT
Start: 2019-12-09 | End: 2019-12-09 | Stop reason: HOSPADM

## 2019-12-09 RX ORDER — FUROSEMIDE 40 MG/1
80 TABLET ORAL EVERY OTHER DAY
Qty: 30 TAB | Refills: 0 | Status: SHIPPED | OUTPATIENT
Start: 2019-12-09

## 2019-12-09 RX ORDER — CARVEDILOL 3.12 MG/1
3.12 TABLET ORAL 2 TIMES DAILY WITH MEALS
Qty: 30 TAB | Refills: 0 | Status: SHIPPED | OUTPATIENT
Start: 2019-12-09

## 2019-12-09 RX ORDER — LISINOPRIL 2.5 MG/1
2.5 TABLET ORAL DAILY
Qty: 30 TAB | Refills: 0 | Status: SHIPPED | OUTPATIENT
Start: 2019-12-09 | End: 2021-01-01 | Stop reason: ALTCHOICE

## 2019-12-09 RX ADMIN — LEVETIRACETAM 500 MG: 500 TABLET ORAL at 09:00

## 2019-12-09 RX ADMIN — FUROSEMIDE 40 MG: 10 INJECTION, SOLUTION INTRAMUSCULAR; INTRAVENOUS at 09:01

## 2019-12-09 RX ADMIN — FERROUS SULFATE TAB 325 MG (65 MG ELEMENTAL FE) 325 MG: 325 (65 FE) TAB at 09:00

## 2019-12-09 RX ADMIN — POLYETHYLENE GLYCOL (3350) 17 G: 17 POWDER, FOR SOLUTION ORAL at 08:59

## 2019-12-09 RX ADMIN — Medication 10 ML: at 02:01

## 2019-12-09 NOTE — PROGRESS NOTES
A[place]IL[place]: Home with Home Health and Follow-up Appointments    3:00pm-No further CM needs identified. CM notified pt's nurse of d/c.    2:30pm- CM called [facility] via phone to find out the status of oxygen. [facility] sated that they needed the order for oxygen corrected. CM faxed order to [facility] stated that they will put a rush to have the tanks deliver. 12:47pm- CM called Dr. Kathleen Don office to schedule pt follow-up appointment. CM left voicemail message for office to call CM back. 12:00pm- [facility] accepted referral for home oxygen. AVS updated    10:47am-[facility] accepted referral for home health. AVS updated. 10:46am- CM sent referral to [facility] via [employer] for home oxygen. 9:30am- CM met with pt at bedside. CM informed pt's of discharge    9:15am- CM spoke to pt's daughter via phone in regards to d/c planning. CM informed pt's daughter of discharge. Pt's daughter stated that she will transport at d/c. CM discussed with pt's daughter about home health services. The Plan for Transition of Care is related to the following treatment goals: Home Health    The Patient and/or patient representative Newt Hammans was provided with a choice of provider and agrees   with the discharge plan. [x] Yes [] No    Freedom of choice list was provided with basic dialogue that supports the patient's individualized plan of care/goals, treatment preferences and shares the quality data associated with the providers. [x] Yes [] No    Pt's daughter selected [facility]. Pt's daughter provided verbal consent for [facility] document due to pt unable to see. [facility] document placed in pt's bedside chart. Referral sent to [facility] via [employer]. CM discuss with pt's daughter about pt's Medicare rights and their right to appeal the discharge. Pt's daughter understood pt's Medicare rights. Pt unable to sign. Pt's daughter provided verbal understanding and agreement.  Copy of 2nd IM letter was placed in pt bedside chart. Care Management Interventions  PCP Verified by CM: Yes  Mode of Transport at Discharge:  Other (see comment)(Pt's daughter will transport at d/c. )  Transition of Care Consult (CM Consult): Home Health([facility] [address] [facility])  [address]: Yes  Discharge Durable Medical Equipment: Yes(Home Oxygen through [place])  Physical Therapy Consult: Yes  Occupational Therapy Consult: Yes  Speech Therapy Consult: No  Current Support Network: Relative's Home, Has Personal Caregivers  Confirm Follow Up Transport: Family  Plan discussed with Pt/Family/Caregiver: Yes  Freedom of Choice Offered: Yes  Discharge Location  Discharge Placement: Home with home health(Home with [facility])    Lele Honeycutt [address] [place] [address]  [phone]

## 2019-12-09 NOTE — PROGRESS NOTES
Problem: Falls - Risk of  Goal: *Absence of Falls  Description  Document Liane Stade Fall Risk and appropriate interventions in the flowsheet. Outcome: Progressing Towards Goal  Note: Fall Risk Interventions:  Mobility Interventions: Bed/chair exit alarm, Communicate number of staff needed for ambulation/transfer, OT consult for ADLs, Patient to call before getting OOB, PT Consult for mobility concerns, Strengthening exercises (ROM-active/passive), Utilize walker, cane, or other assistive device         Medication Interventions: Bed/chair exit alarm, Patient to call before getting OOB, Teach patient to arise slowly    Elimination Interventions: Bed/chair exit alarm, Call light in reach, Patient to call for help with toileting needs, Toileting schedule/hourly rounds              Problem: Patient Education: Go to Patient Education Activity  Goal: Patient/Family Education  Outcome: Progressing Towards Goal     Problem: Pressure Injury - Risk of  Goal: *Prevention of pressure injury  Description  Document Magdaleno Scale and appropriate interventions in the flowsheet.   Outcome: Progressing Towards Goal  Note: Pressure Injury Interventions:  Sensory Interventions: Assess changes in LOC, Assess need for specialty bed, Float heels, Keep linens dry and wrinkle-free    Moisture Interventions: Absorbent underpads, Apply protective barrier, creams and emollients    Activity Interventions: Increase time out of bed, PT/OT evaluation    Mobility Interventions: Assess need for specialty bed, Float heels, HOB 30 degrees or less    Nutrition Interventions: Document food/fluid/supplement intake, Offer support with meals,snacks and hydration    Friction and Shear Interventions: Apply protective barrier, creams and emollients, Lift sheet, Minimize layers                Problem: Patient Education: Go to Patient Education Activity  Goal: Patient/Family Education  Outcome: Progressing Towards Goal

## 2019-12-09 NOTE — PROGRESS NOTES
Problem: Mobility Impaired (Adult and Pediatric)  Goal: *Acute Goals and Plan of Care (Insert Text)  Description  FUNCTIONAL STATUS PRIOR TO ADMISSION: Pt lives with dtr and has caregivers daily from 10-3 during the week. Dtr is a nurse. Pt has been able to amb mod I with rollator, complete her own dressing but sometimes has assist for energy conservation, and is supervised with showering using seat. She has not recently been on home O2, but was ~6 mos ago. HOME SUPPORT PRIOR TO ADMISSION: The patient lived with dtr. Physical Therapy Goals  Initiated 12/6/2019  1. Patient will move from supine to sit and sit to supine , scoot up and down and roll side to side in bed with independence within 7 day(s). 2.  Patient will transfer from bed to chair and chair to bed with independence using the least restrictive device within 7 day(s). 3.  Patient will perform sit to stand with independence within 7 day(s). 4.  Patient will ambulate with modified independence for 150 feet with the least restrictive device within 7 day(s). Outcome: Progressing Towards Goal   PHYSICAL THERAPY TREATMENT  Patient: Ruslan Shah (22 y.o. female)  Date: 12/9/2019  Diagnosis: CHF (congestive heart failure) ([facility]) [I50.9]   <principal problem not specified>       Precautions: Bed Alarm  Chart, physical therapy assessment, plan of care and goals were reviewed. ASSESSMENT  Patient continues with skilled PT services and is progressing towards goals. Patient continues to desaturate on RA at rest and with activity, requiring 2L O2 for mobility. Patient with improved endurance and balance with use of rollator and SBA. Current Level of Function Impacting Discharge (mobility/balance): CGA-SBA with rollator    Other factors to consider for discharge: needs O2         PLAN :  Patient continues to benefit from skilled intervention to address the above impairments. Continue treatment per established plan of care.   to address goals. Recommendation for discharge: (in order for the patient to meet his/her long term goals)  Physical therapy at least 2 days/week in the home vs none    This discharge recommendation:  Has been made in collaboration with the attending provider and/or case management    IF patient discharges home will need the following DME: patient owns DME required for discharge       SUBJECTIVE:   Patient stated I can tell a difference with the oxygen immediately.     OBJECTIVE DATA SUMMARY:   Critical Behavior:  Neurologic State: Alert  Orientation Level: Oriented X4  Cognition: Appropriate decision making, Appropriate for age attention/concentration, Decreased attention/concentration, Follows commands  Safety/Judgement: Awareness of environment, Fall prevention, Home safety, Insight into deficits  Functional Mobility Training:  Bed Mobility:                    Transfers:  Sit to Stand: Stand-by assistance  Stand to Sit: Stand-by assistance        Bed to Chair: Stand-by assistance                    Balance:  Sitting: Intact; Without support  Standing: Impaired; With support  Standing - Static: Good  Standing - Dynamic : Good  Ambulation/Gait Training:  Distance (ft): 150 Feet (ft)  Assistive Device: Gait belt;Walker, rollator  Ambulation - Level of Assistance: Stand-by assistance;Contact guard assistance        Gait Abnormalities: Decreased step clearance              Speed/Samantha: Pace decreased (<100 feet/min)  Step Length: Right shortened;Left shortened           Therapeutic Exercises:   Verbal cues for PLB to improve O2 sats      Activity Tolerance:   Good and desaturates with exertion and requires oxygen  Please refer to the flowsheet for vital signs taken during this treatment.     After treatment patient left in no apparent distress:   Sitting in chair, Call bell within reach, and Bed / chair alarm activated    COMMUNICATION/COLLABORATION:   The patients plan of care was discussed with: Registered Nurse and Social Worker    Ashley Montgomery, PT, DPT   Time Calculation: 23 mins

## 2019-12-09 NOTE — TELEPHONE ENCOUNTER
Spoke with patients daughter Mila Alexis   Two pt identifiers confirmed. Miladonaldo Espinosakary states that she does not feel that patient will be able to make it in to the office to be seen on 12/10/19. Mila Alexis would like to have the patient seen in the next 2-3 weeks to give her time to get her strength back. Mila Alexis offered an appointment with Dr. Edmund Jean on 12/24/19. Appointment accepted. Patient advised if anything changes or if unable to keep this appointment to call the office  Pt verbalized understanding of information discussed w/ no further questions at this time.

## 2019-12-09 NOTE — PROGRESS NOTES
Follow up with PCP Dr Prasad Shane 12/10/19 830 Cardiology Dr Terrance Zamudio patient to call for appointment . On AVS and Hug team notified.

## 2019-12-09 NOTE — DISCHARGE SUMMARY
Discharge Summary      Name: Dmitry Almodovar  [id number]  YOB: 1924 (Age: 80 y.o.)   Date of Admission: 12/6/2019  Date of Discharge: 12/9/2019  Attending Physician: Alix Villa MD    Discharge Diagnosis:   Acute hypoxic respiratory failure secondary to acute on chronic systolic heart failure  Hx of Atrial fib  HTN  Hx of SSS s/p PPM placement  Supra therapeutic INR  Macular degeneration  Hx of orthostatic hypotension  Hx of seizure    Consultations:  IP CONSULT TO CARDIOLOGY    Brief Admission History/Reason for Admission Per Juan Dash MD:   80years old female with past medical history significant for hypertension, CHF, history of atrial fibrillation was transferred from her [facility] for evaluation of worsening shortness of breath associated with bilateral leg swelling and orthopnea, patient has history of CHF, O2 sat was dropped to 80% on room air, chest x-ray was done on show pulmonary. Brief Hospital Course by Main Problems:   Acute hypoxic respiratory failure secondary to acute on chronic systolic heart failure  Hx of Atrial fib  HTN  Hx of SSS s/p PPM placement  Supra therapeutic INR, in the setting of abx use, resolved. Workup at [facility] showed CHF exacerbation and started on IV diuresis. Ellison cath placed then, now removed. CXR here neg for acute abnormality. Her probnp at [facility] was 6001, here it was 4949 . she was diuresed with IV lasix and her O2 requirement was able to be weaned. Pt remains hemodynamically stable. Her labs are stable. INR 1.9 today. She is requiring up to 2 LNC at discharge. She feels well and her family states she will do better at home. Cont' lasix, BB, lisinopril as prescribed. Pt lives with her daughter and has a nurse aide during the day. F/u with Dr Carl Dewey in 2 weeks. PCP in 3-5 days.   Pulse Oximetry Assessment   86% at rest on room air  85% while ambulating on room air  97% at rest on 2LPM  94% while ambulating on 2LPM    Hyponatremia, stable. Repeat labs at PCP. Macular degeneration  Hx of orthostatic hypotension  Hx of seizure  Resume home meds     Discharge Exam:  Patient seen and examined by me on discharge day. Pertinent Findings:  Visit Vitals  /52 (BP 1 Location: Right arm, BP Patient Position: Sitting)   Pulse 81   Temp 96 °F (35.6 °C)   Resp 20   Ht 5' 1\" (1.549 m)   Wt 53 kg (116 lb 12.8 oz)   SpO2 92%   BMI 22.07 kg/m²     Gen:    Not in distress  Chest: Clear lungs  CVS:   Regular rhythm. No edema  Abd:  Soft, not distended, not tender    Discharge/Recent Laboratory Results:  Recent Labs     12/09/19  0202   *   K 4.7      CO2 29   BUN 34*   GLU 92   CA 8.8     Recent Labs     12/09/19  0202   HGB 12.2   HCT 37.0   WBC 8.0          Discharge Medications:  Current Discharge Medication List      START taking these medications    Details   carvedilol (COREG) 3.125 mg tablet Take 1 Tab by mouth two (2) times daily (with meals). Qty: 30 Tab, Refills: 0      lisinopril (PRINIVIL, ZESTRIL) 2.5 mg tablet Take 1 Tab by mouth daily. Qty: 30 Tab, Refills: 0         CONTINUE these medications which have CHANGED    Details   furosemide (LASIX) 40 mg tablet Take 2 Tabs by mouth every other day. Pt takes 40 mg every other day and alternates with 80 mg.  Qty: 30 Tab, Refills: 0         CONTINUE these medications which have NOT CHANGED    Details   !! warfarin (COUMADIN) 2.5 mg tablet Take 2.5 mg by mouth two (2) days a week. Pt takes 5 mg Mon, Tues, Thurs, Fri and Sun. Pt takes 2.5 mg Wed and Sat.      levETIRAcetam (KEPPRA) 500 mg tablet TAKE 1 TABLET BY MOUTH TWICE DAILY  Qty: 180 Tab, Refills: 5    Associated Diagnoses: Complex partial seizure with impairment of consciousness ([address] Ataxia; Acute midline low back pain without sciatica;  Atrial fibrillation with RVR (HCC)      acetaminophen (TYLENOL EXTRA STRENGTH) 500 mg tablet Take 500 mg by mouth every six (6) hours as needed for Pain. cranberry fruit extract (CRANBERRY PO) Take 1 Can by mouth daily. amitriptyline (ELAVIL) 10 mg tablet Take 10 mg by mouth nightly. cholecalciferol, vitamin D3, (VITAMIN D3) 2,000 unit tab Take  by mouth daily. psyllium seed, with dextrose, (FIBER PO) Take 1 Cap by mouth daily. !! warfarin (COUMADIN) 2.5 mg tablet Take 5 mg by mouth five (5) days a week. Pt takes 5 mg Mon, Tues, Thurs, Fri and Sun. Pt takes 2.5 mg Wed and Sat. VIT A/C/E AC/ZNOX/CUPRIC OXIDE (EYE VITAMIN AND MINERALS PO) Take 1 Tab by mouth two (2) times a day. ferrous sulfate (IRON) 325 mg (65 mg iron) tablet Take 325 mg by mouth two (2) times a day. DOCUSATE CALCIUM (STOOL SOFTENER PO) Take 100 mg by mouth as needed. 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 !! - Potential duplicate medications found. Please discuss with provider. STOP taking these medications       nitrofurantoin, macrocrystal-monohydrate, (MACROBID) 100 mg capsule Comments:   Reason for Stopping:         trimethoprim-sulfamethoxazole (BACTRIM DS) 160-800 mg per tablet Comments:   Reason for Stopping:               Patient Follow Up Instructions:    Activity: Activity as tolerated  Diet: Cardiac Diet  Wound Care: None needed  Code: cardiac    DISPOSITION:    Home with Family:    Home with HH/PT/OT/RN: x   SNF/LTC:    LOW:    OTHER:          Follow up with:   PCP : Leonardo Mejía MD  Follow-up Information     Follow up With Specialties Details Why Maryanne Bernheim, MD Internal Medicine In 3 days  [address] [facility]  [address] [facility]      Aris Scruggs MD Cardiology In 2 weeks  [address] [address] [phone]              Total time in minutes spent coordinating this discharge (includes going over instructions, follow-up, prescriptions, and preparing report for sign off to her PCP) : 35 minutes

## 2019-12-09 NOTE — TELEPHONE ENCOUNTER
----- Message from Alex Dias sent at 12/9/2019 12:29 PM EST -----  Regarding: Dr. Varela Drew: [phone]  Caller's first and last name: Black Ray  Reason for call: Pt's daughter was calling because she's unable to make the appointment scheduled for tomorrow 12/10/2019. Pt wondering if provider could set up home health over the telephone. Pt stated she's willing to reschedule to a later time if not.   Callback required yes/no and why: yes  Best contact number(s): [phone]  Details to clarify the request: n/a

## 2019-12-09 NOTE — PROGRESS NOTES
Paged Dr. Tito Arzate to make aware of low sodium. She returned call and stated Dr. Carlos Alberto Joshi is already aware based on her note and we will continue to monitor.

## 2019-12-09 NOTE — FACE TO FACE
Home Health Care Discharge Planning: [facility] Face to Face Encounter NAME: Louis Sumner :  1924 MRN:  [id number] Primary Diagnosis:  
Acute hypoxic respiratory failure Date of Face to Face:  2019 9:07 AM        
                        
Face to Face Encounter findings are related to primary reason for home care:   YES 
 
1. I certify that the patient needs intermittent skilled nursing care, physical therapy and/or speech therapy. I will not be following this patient in the Community and Dr. Shamika Mills MD will be responsible for signing the [address] of Care. 2. Initial Orders for Care: Skilled Nursing and Physical Therapy 3. I certify that this patient is homebound because of illness or injury, need the aid of supportive devices such as crutches, canes, wheelchairs, and walkers; the use of special transportation; or the assistance of another person in order to leave their place of residence. There exists a normal inability to leave home and leaving home requires a considerable and taxing effort. 4. I certify that this patient is under my care and that I had a Face-to-Face Encounter that meets the physician Face-to-Face Encounter requirements. Document the physical findings from the Face-to-Face Encounter that support the need for skilled services: Has new diagnosis that requires skilled nursing teaching and intervention  and Has new finding of weakness and altered mobility that requires skilled physical/occupational and/or speech therapy services for evaluation and interventions. Tanna Carroll MD 
Discharging Physician Office:  [phone] Fax:    [phone]

## 2019-12-09 NOTE — PROGRESS NOTES
Cardiology Progress Note  12/9/2019 8:55 AM  Admit Date: 12/6/2019  Admit Diagnosis/CC: CHF (congestive heart failure) ([facility] [I50.9]  Subjective:   Patient reports:  Chest Pain:  [x] none,  consistent with [] non-cardiac  [] atypical  []  anginal chest pain             [x] none now    []  on-going  Dyspnea: [x] none  [] at rest  [] with exertion  [] improved  [] unchanged [] worsening  PND:       [x] none  [] overnight   [] current  Orthopnea: [x] none  [] improved  [] unchanged  [] worsening  Presyncope: [x] none  [] improved  [] unchanged  [] worsening  Ambulated in hallway without symptoms  [] Yes  Ambulated in room without symptoms  [x] Yes  ROS(2+other systems)   Hematuria: [] Yes  [x] No.   Dysuria: [] Yes  [x] No                                           Cough:       [] Yes  [x] No.   Sputum: [] Yes  [x] No                                            Hematochezia: [] Yes  [x] No.   Melena: [] Yes  [x] No                                            No change in family and social history from H&P/Consult note.     Current Facility-Administered Medications   Medication Dose Route Frequency    sodium chloride (NS) flush 5-40 mL  5-40 mL IntraVENous Q8H    sodium chloride (NS) flush 5-40 mL  5-40 mL IntraVENous PRN    acetaminophen (TYLENOL) tablet 650 mg  650 mg Oral Q6H PRN    ondansetron (ZOFRAN) injection 4 mg  4 mg IntraVENous Q6H PRN    furosemide (LASIX) injection 40 mg  40 mg IntraVENous DAILY    levETIRAcetam (KEPPRA) tablet 500 mg  500 mg Oral BID    latanoprost (XALATAN) 0.005 % ophthalmic solution 1 Drop  1 Drop Both Eyes QHS    amitriptyline (ELAVIL) tablet 10 mg  10 mg Oral QHS    ferrous sulfate tablet 325 mg  325 mg Oral DAILY    WARFARIN INFORMATION NOTE (COUMADIN)   Other QPM    carvedilol (COREG) tablet 3.125 mg  3.125 mg Oral BID WITH MEALS    lisinopril (PRINIVIL, ZESTRIL) tablet 2.5 mg  2.5 mg Oral DAILY    bisacodyl (DULCOLAX) suppository 10 mg  10 mg Rectal DAILY PRN    polyethylene glycol (MIRALAX) packet 17 g  17 g Oral DAILY       Objective:    Physical Exam:  Last VS:   Visit Vitals  /52 (BP 1 Location: Right arm, BP Patient Position: Sitting)   Pulse 81   Temp 96 °F (35.6 °C)   Resp 20   Ht 5' 1\" (1.549 m)   Wt 53 kg (116 lb 12.8 oz)   SpO2 92%   BMI 22.07 kg/m²    Temp (24hrs), Av.5 °F (36.4 °C), Min:96 °F (35.6 °C), Max:98 °F (36.7 °C)    24 hr VS reviewed. General Appearance:   [x] well developed, well nourished,  [x] NAD. [] agitated   [] lethargic but arousable  [] obtunded   ENT, Palate:    [x] WNL   [] dry palate/MM        Respiratory:    [x] CTA bilateral  [] rales  [] rhonchi  [] normal resp effort      [] similar to yesterday   [] worse    [] improved   Cardiovascular:   [x] RRR   [] Irregular rate and rhythm   [x] Normal S1, S2   [x] No gallop or rub. [] no murmur   [x] no new murmur  [] murmur c/w:   [x] no edema  RLE: []1+ []2+ [] 3+;  LLE: []1+ []2+ []3+      [] edema similar to yesterday   [] worse    [] improved   [x] normal JVP   [] Elevated JVP    [x] JVP similar to yesterday   [] worse    [] improved   [x] carotid upstroke unchanged   [x] abd aorta not palpated   [x] no stigmata of peripheral emboli   GI:    [x] abd soft, non-distented,bowel sounds present, no                     organomegaly appreciated   Skin:  Neuro:    Cath Site:  [x] warm and dry [] cold extremities   [x] A/O x 3, grossly non-focal      [] intact w/o hematoma or bruit; distal pulse unchanged.               Data Review:   Labs:    Recent Results (from the past 24 hour(s))   PROTHROMBIN TIME + INR    Collection Time: 19  2:02 AM   Result Value Ref Range    INR 1.9 (H) 0.9 - 1.1      Prothrombin time 19.2 (H) 9.0 - 11.1 sec   CBC W/O DIFF    Collection Time: 19  2:02 AM   Result Value Ref Range    WBC 8.0 3.6 - 11.0 K/uL    RBC 3.93 3.80 - 5.20 M/uL    HGB 12.2 11.5 - 16.0 g/dL    HCT 37.0 35.0 - 47.0 %    MCV 94.1 80.0 - 99.0 FL    MCH 31.0 26.0 - 34.0 PG MCHC 33.0 30.0 - 36.5 g/dL    RDW 13.2 11.5 - 14.5 %    PLATELET 349 935 - 556 K/uL    MPV 9.6 8.9 - 12.9 FL    NRBC 0.0 0  WBC    ABSOLUTE NRBC 0.00 0.00 - 1.27 K/uL   METABOLIC PANEL, BASIC    Collection Time: 12/09/19  2:02 AM   Result Value Ref Range    Sodium 133 (L) 136 - 145 mmol/L    Potassium 4.7 3.5 - 5.1 mmol/L    Chloride 100 97 - 108 mmol/L    CO2 29 21 - 32 mmol/L    Anion gap 4 (L) 5 - 15 mmol/L    Glucose 92 65 - 100 mg/dL    BUN 34 (H) 6 - 20 MG/DL    Creatinine 0.90 0.55 - 1.02 MG/DL    BUN/Creatinine ratio 38 (H) 12 - 20      GFR est AA >60 >60 ml/min/1.73m2    GFR est non-AA 58 (L) >60 ml/min/1.73m2    Calcium 8.8 8.5 - 10.1 MG/DL       Provided Telemetry: [x] sinus  [] chronic afib   [] paroxysmal afib  [] NSVT      Assessment:     Active Problems:    CHF (congestive heart failure) ([facility]) (12/6/2019)        Plan:     Atrial Fibrillation - Flutter  unchanged   Continue current meds  Agree with Dr. Kellen Gross. OK for D/C at baseline. For all other plans, see orders.    [x] High complexity decision making was performed

## 2019-12-09 NOTE — PROGRESS NOTES
Transitional Care Team: [facility] Discharge Note    Date of Assessment: 12/09/19  Time of Assessment:  12:54 PM      John Jacobo is a 80 y.o. female inpatient at [facility] with heart failure on  12/6. Assessment & Plan   Pt A+O sitting in bedside chair with nasal cannula in place. Denies chest pain dyspnea. States daughter will meet the oxygen delivery agency at home. Initial follow up appointment scheduled with PCP for tomorrow AM 8:30. Pt states her care giver is only available from 10 Am to 3PM. Rescheduled appointment for 12 noon. Pt also has [facility] services. Current Code Status:  full    Medication Reconciliation:  was performed. Can patient afford medications:  yes    Who manages medications at home family/ caregiver        Chart reviewed by me and HUG (Healthy Understanding of Goals) program introduced to patient/family. The Transitional Care Team bridges the gaps in care and education surrounding discharge from the acute care facility. The objective is to empower the patient and family in taking a proactive role in the task of preventing readmission within the first thirty days after discharge from the acute care setting, The team is also involved in the efforts to reduce readmission to the acute care setting after stabilization and discharge from the acute care environment either to skilled nursing facilities or community. Discharge With The Following Arrangements in place:    Future Appointments   Date Time Provider Manuel Santa   1/10/2020 11:00 AM MD Donny Mckeon       [facility]): pt to call Dr Hermann Nieves office      Patient / Family was instructed on specific signs/symptoms to look for with regards to worsening of their medical conditions. Learning was assessed using teach back. The patient / family have added upcoming appointment dates to their [facility] Calendar prior to discharge.      Patient education focused on readmission zones as described as: The Red Zone: High risk for readmission, days 1-21   The Yellow Zone: Moderate  risk for readmission, days 22-29   The Green Zone: Lower risk for readmission, days 30 and after    The [facility] Team will follow patient from a distance while inpatient as well as be available for further transition disposition as needed. The [facility] TEAM will continue to offer support during the 30- 90 day discharge from acute care setting. Notified Ambulatory {Blank Single Select Template:[id number][de-identified] ,[facility] RN.       Signed By: Cary Glaser RN     December 9, 2019

## 2019-12-09 NOTE — PROGRESS NOTES
Pharmacy Daily Dosing of Warfarin    Indication: afib  Goal INR: 2-3    PTA Warfarin Dose: 5mg Mon/Tues/thurs/Fri/Sun; 2.5mg Wed and Sat    Concurrent anticoagulants: none  Concurrent antiplatelet: none    Major Interacting Medications   Drugs that may increase INR: bactrim PTA  Drugs that may decrease INR: none    Conditions that may increase/decrease INR (CHF, C. diff, cirrhosis, thyroid disorder, hypoalbuminemia): none    Labs:  Recent Labs     12/09/19  0202 12/08/19  0238 12/07/19  0348   INR 1.9* 2.8* 4.0*   HGB 12.2 11.9 11.8    178 195           Impression/Plan:   Will order 2.5 mg PO x 1 dose (in case patient is still here tonight)  Daily INR  CBC w/o diff every other day      Pharmacy will follow daily and adjust the dose as appropriate. Thanks  Liz Smith PHARMD      [email or web address]

## 2019-12-09 NOTE — PROGRESS NOTES
0700:  Bedside, Verbal and Written shift change report [facility], RN. Report included the following information SBAR.    1900:  Bedside, Verbal and Written shift change report GIVEN TO Winston Dunne RN. Report included the following information SBAR. SIGNIFICANT CHANGES DURING SHIFT:  Patient's BP still low. Dr. Socrates Lechuga paged and stated okay to hold BP medications. Dr. Kraft will follow-up tomorrow. Patient weened down to no oxygen however when patient falls to sleep oxygen stats were down in the 80's. Oxygen placed again at 1L. Oxygen sats also decrease when patient is up to bathroom. Unable to do oxygen challenge. CONCERNS TO ADDRESS WITH MD:  None          [facility] NURSING NOTE   Admission Date 12/6/2019   Admission Diagnosis CHF (congestive heart failure) ([facility] [address] [I50.9]   Consults IP CONSULT TO CARDIOLOGY      Cardiac Monitoring [x] Yes [] No      Purposeful Hourly Rounding [x] Yes    Corey Score Total Score: 3   Corey score 3 or > [] Bed Alarm [] Avasys [] 1:1 sitter [] Patient refused (Signed refusal form in chart)   Magdaleno Score Magdaleno Score: 17   Magdaleno score 14 or < [] PMT consult [] Wound Care consult    []  Specialty bed  [] Nutrition consult      Influenza Vaccine Received Flu Vaccine for Current Season (usually Sept-March): Yes           Oxygen needs? [] Room air Oxygen @  [x]1L    []2L    []3L   []4L    []5L   []6L via  NC   Chronic home O2 use?  [] Yes [x] No  Perform O2 challenge test and document in progress note using smartphrase (.Homeoxygen)      Last bowel movement Last Bowel Movement Date: 12/06/19      Urinary Catheter [REMOVED] Urinary Catheter 12/06/19 2- way-Indications for Use: Accurate measurement of urinary output     [REMOVED] Urinary Catheter 12/06/19 2- way-Urine Output (mL): 250 ml  External Female Catheter 12/06/19-Urine Output (mL): (unmeasurable )     LDAs               Peripheral IV 12/06/19 Right Forearm (Active)   Site Assessment Clean, dry, & intact 12/8/2019  4:02 PM   Phlebitis Assessment 0 12/8/2019  4:02 PM   Infiltration Assessment 0 12/8/2019  4:02 PM   Dressing Status Clean, dry, & intact 12/8/2019  4:02 PM   Dressing Type Transparent 12/8/2019  4:02 PM   Hub Color/Line Status Pink;Flushed 12/8/2019  4:02 PM       Peripheral IV 12/06/19 Right Hand (Active)   Site Assessment Clean, dry, & intact 12/8/2019  4:02 PM   Phlebitis Assessment 0 12/8/2019  4:02 PM   Infiltration Assessment 0 12/8/2019  4:02 PM   Dressing Status Clean, dry, & intact 12/8/2019  4:02 PM   Dressing Type Transparent 12/8/2019  4:02 PM   Hub Color/Line Status Blue;Flushed 12/8/2019  4:02 PM          External Female Catheter 12/06/19 (Active)   Site Assessment Clean, dry, & intact 12/8/2019  4:02 PM   Repositioned Yes 12/8/2019  4:02 PM   Perineal Care Yes 12/8/2019  4:02 PM   Wick Changed Yes 12/8/2019  4:02 PM   Suction Canister/Tubing Changed No 12/8/2019  4:02 PM                   Readmission Risk Assessment Tool Score Low Risk            11       Total Score        5 Pt. Coverage (Medicare=5 , Medicaid, or Self-Pay=4)    6 Charlson Comorbidity Score (Age + Comorbid Conditions)        Criteria that do not apply:    Has Seen PCP in Last 6 Months (Yes=3, No=0)    . Living with Significant Other. Assisted Living. LTAC. SNF.  or   Rehab    Patient Length of Stay (>5 days = 3)    IP Visits Last 12 Months (1-3=4, 4=9, >4=11)       Expected Length of Stay - - -   Actual Length of Stay 2

## 2019-12-09 NOTE — PROGRESS NOTES
PT note:     Pulse Oximetry Assessment    86% at rest on room air  85% while ambulating on room air  97% at rest on 2LPM  94% while ambulating on 2LPM    Full note to follow.      Brianna Samuels, PT, DPT

## 2019-12-09 NOTE — TELEPHONE ENCOUNTER
Can pt get the appt back for 8:30 am on 12-10-19  They didn't realize they have to keep in order to get home health signed. Please call to reschedule. Will Dr. Howard Allison sign off on home health orders?   Please call Vanda Alatorre  #[phone]

## 2019-12-09 NOTE — PROGRESS NOTES
Spiritual Care Partner Volunteer visited patient in  [address] [place] [address] on 12.9. 19. Documented by:    Sena Espitia MDiv   For  Assistance Page [phone])

## 2019-12-09 NOTE — PROGRESS NOTES
0700: Bedside shift change report given to Marco Segal (oncoming nurse) by Андрей Melo (offgoing nurse). Report included the following information SBAR, Kardex, Intake/Output, MAR, Recent Results and Cardiac Rhythm Paced. 1557: DISCHARGE SUMMARY FROM NURSE    The patient is stable for discharge. I have reviewed the discharge instructions with the patient and caregiver. The patient and caregiver verbalized understanding. All questions were fully answered. The  patient and caregiver denies any complaints. There were no personal belongings, valuables or home medications left at patients bedside,  or safe. Hard scripts and medication handouts were given and reviewed with the patient and caregiver. Appropriate educational materials and medication side effects teaching were provided. Cardiac monitor and IV line(s) were removed.

## 2019-12-09 NOTE — PROGRESS NOTES
Pharmacist Discharge Medication Reconciliation    Significant PMH:   Past Medical History:   Diagnosis Date    Adverse effect of anesthesia     slow to wake    Alopecia     Arrhythmia     atrial fib,?svt    Arthritis     Atrial fibrillation (HCC)     Benign essential HTN     Benign neoplasm of vulva     CAD (coronary artery disease)     Cholelithiases     Chronic kidney disease     followed by specialist    Chronic pain     Colon cancer (HCC)     Constipation     Dyspareunia     Glaucoma     Heart failure (HCC)     History of mixed connective tissue disease     Hx of viral pericarditis     Hyperactivity of bladder     Hypercholesterolemia     Ill-defined condition     macular degeneration    Ill-defined condition     recurrent UTI hx    Insomnia     Macular degeneration     Neck pain     Psoriasis     patient denies    Rectocele     Renal cyst     Seizure disorder ([facility] [address]     none in 30 plus years---sees neurologist     Sjogren's disease ([facility] [address]     patient unsure    Thrombophlebitis     TIA (transient ischemic attack)     patient denies    Vaginal vault prolapse, posthysterectomy     Varicose veins      Chief Complaint for this Admission:   Chief Complaint   Patient presents with    Shortness of Breath     Patient transferred from [facility] for CHF exacerbation     Allergies: Codeine; Pcn [penicillins]; Tetanus toxoid, adsorbed; Tetanus vaccines and toxoid; and Vibramycin [doxycycline calcium]    Discharge Medications:   Current Discharge Medication List        START taking these medications    Details   carvedilol (COREG) 3.125 mg tablet Take 1 Tab by mouth two (2) times daily (with meals). Qty: 30 Tab, Refills: 0      lisinopril (PRINIVIL, ZESTRIL) 2.5 mg tablet Take 1 Tab by mouth daily. Qty: 30 Tab, Refills: 0           CONTINUE these medications which have CHANGED    Details   furosemide (LASIX) 40 mg tablet Take 2 Tabs by mouth every other day.  Pt takes 40 mg every other day and alternates with 80 mg.  Qty: 30 Tab, Refills: 0           CONTINUE these medications which have NOT CHANGED    Details   !! warfarin (COUMADIN) 2.5 mg tablet Take 2.5 mg by mouth two (2) days a week. Pt takes 5 mg Mon, Tues, Thurs, Fri and Sun. Pt takes 2.5 mg Wed and Sat.      levETIRAcetam (KEPPRA) 500 mg tablet TAKE 1 TABLET BY MOUTH TWICE DAILY  Qty: 180 Tab, Refills: 5    Associated Diagnoses: Complex partial seizure with impairment of consciousness ([address] Ataxia; Acute midline low back pain without sciatica; Atrial fibrillation with RVR (HCC)      acetaminophen (TYLENOL EXTRA STRENGTH) 500 mg tablet Take 500 mg by mouth every six (6) hours as needed for Pain. cranberry fruit extract (CRANBERRY PO) Take 1 Can by mouth daily. amitriptyline (ELAVIL) 10 mg tablet Take 10 mg by mouth nightly. cholecalciferol, vitamin D3, (VITAMIN D3) 2,000 unit tab Take  by mouth daily. psyllium seed, with dextrose, (FIBER PO) Take 1 Cap by mouth daily. !! warfarin (COUMADIN) 2.5 mg tablet Take 5 mg by mouth five (5) days a week. Pt takes 5 mg Mon, Tues, Thurs, Fri and Sun. Pt takes 2.5 mg Wed and Sat. VIT A/C/E AC/ZNOX/CUPRIC OXIDE (EYE VITAMIN AND MINERALS PO) Take 1 Tab by mouth two (2) times a day. ferrous sulfate (IRON) 325 mg (65 mg iron) tablet Take 325 mg by mouth two (2) times a day. DOCUSATE CALCIUM (STOOL SOFTENER PO) Take 100 mg by mouth as needed. 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 !! - Potential duplicate medications found. Please discuss with provider.         STOP taking these medications       nitrofurantoin, macrocrystal-monohydrate, (MACROBID) 100 mg capsule Comments:   Reason for Stopping:         trimethoprim-sulfamethoxazole (BACTRIM DS) 160-800 mg per tablet Comments:   Reason for Stopping:               The patient's chart, MAR and AVS were reviewed by Luz Polo, BRITT.    Discharging Provider: Dr. Pan Potts      Thank You,     DIALLO Fischer

## 2019-12-09 NOTE — PROGRESS NOTES
Bedside shift change report GIVEN TO Carmenza Blanco RN. Report included the following information SBAR and Kardex. SIGNIFICANT CHANGES DURING SHIFT:  None. CONCERNS TO ADDRESS WITH MD:  None. [facility] NURSING NOTE   Admission Date 12/6/2019   Admission Diagnosis CHF (congestive heart failure) ([facility] [address] [I50.9]   Consults IP CONSULT TO CARDIOLOGY      Cardiac Monitoring [x] Yes [] No      Purposeful Hourly Rounding [x] Yes    Corey Score Total Score: 3   Corey score 3 or > [x] Bed Alarm [] Avasys [] 1:1 sitter [] Patient refused (Signed refusal form in chart)   Magdaleno Score Magdaleno Score: 17   Magdaleno score 14 or < [] PMT consult [] Wound Care consult    []  Specialty bed  [] Nutrition consult      Influenza Vaccine Received Flu Vaccine for Current Season (usually Sept-March): Yes           Oxygen needs? [] Room air Oxygen @  [x]1L    []2L    []3L   []4L    []5L   []6L via  NC   Chronic home O2 use?  [] Yes [x] No  Perform O2 challenge test and document in progress note using smartphrase (.Homeoxygen)      Last bowel movement Last Bowel Movement Date: 12/08/19      Urinary Catheter [REMOVED] Urinary Catheter 12/06/19 2- way-Indications for Use: Accurate measurement of urinary output     [REMOVED] Urinary Catheter 12/06/19 2- way-Urine Output (mL): 250 ml  External Female Catheter 12/06/19-Urine Output (mL): (unmeasurable )     LDAs               Peripheral IV 12/06/19 Right Forearm (Active)   Site Assessment Clean, dry, & intact 12/9/2019  3:17 AM   Phlebitis Assessment 0 12/9/2019  3:17 AM   Infiltration Assessment 0 12/9/2019  3:17 AM   Dressing Status Clean, dry, & intact 12/9/2019  3:17 AM   Dressing Type Transparent;Tape 12/9/2019  3:17 AM   Hub Color/Line Status Flushed;Pink 12/9/2019  3:17 AM       Peripheral IV 12/06/19 Right Hand (Active)   Site Assessment Clean, dry, & intact 12/9/2019  3:17 AM   Phlebitis Assessment 0 12/9/2019  3:17 AM   Infiltration Assessment 0 12/9/2019  3:17 AM   Dressing Status Clean, dry, & intact 12/9/2019  3:17 AM   Dressing Type Transparent;Tape 12/9/2019  3:17 AM   Hub Color/Line Status Flushed;Blue 12/9/2019  3:17 AM          External Female Catheter 12/06/19 (Active)   Site Assessment Clean, dry, & intact 12/8/2019  4:02 PM   Repositioned Yes 12/8/2019  4:02 PM   Perineal Care Yes 12/8/2019  4:02 PM   Wick Changed Yes 12/8/2019  4:02 PM   Suction Canister/Tubing Changed No 12/8/2019  4:02 PM                   Readmission Risk Assessment Tool Score Low Risk            11       Total Score        5 Pt. Coverage (Medicare=5 , Medicaid, or Self-Pay=4)    6 Charlson Comorbidity Score (Age + Comorbid Conditions)        Criteria that do not apply:    Has Seen PCP in Last 6 Months (Yes=3, No=0)    . Living with Significant Other. Assisted Living. LTAC. SNF.  or   Rehab    Patient Length of Stay (>5 days = 3)    IP Visits Last 12 Months (1-3=4, 4=9, >4=11)       Expected Length of Stay - - -   Actual Length of Stay 3

## 2019-12-10 ENCOUNTER — PATIENT OUTREACH (OUTPATIENT)
Dept: INTERNAL MEDICINE CLINIC | Age: 84
End: 2019-12-10

## 2019-12-10 ENCOUNTER — TELEPHONE (OUTPATIENT)
Dept: INTERNAL MEDICINE CLINIC | Age: 84
End: 2019-12-10

## 2019-12-10 ENCOUNTER — HOME CARE VISIT (OUTPATIENT)
Dept: SCHEDULING | Facility: HOME HEALTH | Age: 84
End: 2019-12-10
Payer: MEDICARE

## 2019-12-10 VITALS
TEMPERATURE: 98.5 F | RESPIRATION RATE: 19 BRPM | DIASTOLIC BLOOD PRESSURE: 78 MMHG | SYSTOLIC BLOOD PRESSURE: 129 MMHG | OXYGEN SATURATION: 97 % | HEART RATE: 77 BPM

## 2019-12-10 VITALS
OXYGEN SATURATION: 95 % | SYSTOLIC BLOOD PRESSURE: 120 MMHG | DIASTOLIC BLOOD PRESSURE: 80 MMHG | HEART RATE: 83 BPM | RESPIRATION RATE: 16 BRPM | TEMPERATURE: 97 F

## 2019-12-10 DIAGNOSIS — R30.0 BURNING WITH URINATION: Primary | ICD-10-CM

## 2019-12-10 PROCEDURE — 3331090002 HH PPS REVENUE DEBIT

## 2019-12-10 PROCEDURE — G0299 HHS/HOSPICE OF RN EA 15 MIN: HCPCS

## 2019-12-10 PROCEDURE — 400013 HH SOC

## 2019-12-10 PROCEDURE — 3331090001 HH PPS REVENUE CREDIT

## 2019-12-10 PROCEDURE — G0151 HHCP-SERV OF PT,EA 15 MIN: HCPCS

## 2019-12-10 NOTE — PROGRESS NOTES
Goals      Attends follow-up appointments as directed. 12/10/19  -Daughter r/s AQUILINO with Dr. Luis Alberto Almodovar from 12/10/19 to 12/24/19. Declines earlier follow up appointment.  -Daughter to call Dr. Janet Pinon office to schedule cardiology follow up   -CTN to follow up in 1 week  Solitario Devi         Knowledge and adherence of prescribed medication (ie. action, side effects, missed dose, etc.).      12/10/19  Dr. Robson Calhoun contacted CTN via CC Ordered for UA and culture. After patient is seen by home health and urine is collected she may take macrobid 100mg BID for 5 days   -[facility] nurse aware and Daughter aware of plan    12/10/19  -CTN unable to verify all medications with Daughter who does not currently have medication with her. She reports she is at work  -Daughter reports that she will not give mother new Rx for Carvedilol or Lisinopril. She states that is not what she discussed with Dr. Sonido Tariq. Patient's B/ps generally run 100's/60's  -Daughter reports that patient takes alternating bactrim and Macrobid for chronic UTIs. She was not given medication during hospitalization and it was discontinued at discharge. Patient reports burning during urination and cloudy urine.  CTN sent PCP Dr. Sahu Gains a note stating above  -CTN to follow up in 1 day  Ellie MADSEN

## 2019-12-10 NOTE — PROGRESS NOTES
Hospital Discharge Follow-Up      Date/Time:  12/10/2019 10:27 AM    Patient was admitted to [facility] on 19 and discharged on 19 for SOB and edema. The physician discharge summary was available at the time of outreach. Patient was contacted within 1 business days of discharge. Top Challenges reviewed with the provider   -daughter declines giving new medications Lisinopril and Carvedilol due to consistently low blood pressure    -daughter declined initial AQUILINO date of 12/10/19 and rescheduled to 19    -Patient c/o of burning during urination after discontinuation of bacterium and Macrobid by hospitalist.     Advance Care Planning:   Does patient have an Advance Directive:  reviewed and current        Method of communication with provider :chart routing    Inpatient RRAT score: 15  Was this a readmission? no   Patient stated reason for the readmission: n/a    Care Transition Nurse (CTN) contacted the family by telephone to perform post hospital discharge assessment. Verified name and  with family as identifiers. Provided introduction to self, and explanation of the CTN role. Family received hospital discharge instructions. CTN reviewed discharge instructions and red flags with family who verbalized understanding. Family given an opportunity to ask questions and does not have any further questions or concerns at this time. The family agrees to contact the PCP office for questions related to their healthcare. CTN provided contact information for future reference. Disease Specific:   N/A    Patients top risk factors for readmission:  none noted at this time.     Home Health orders at discharge: PT, OT, [address]: [facility]  Date of initial visit: scheduled for 12/10/19    Durable Medical Equipment ordered at discharge: [address]: [address] Equipment received: yes    Medication(s):   New Medications at Discharge:             START taking these medications     Details   carvedilol (COREG) 3.125 mg tablet Take 1 Tab by mouth two (2) times daily (with meals). Qty: 30 Tab, Refills: 0       lisinopril (PRINIVIL, ZESTRIL) 2.5 mg tablet Take 1 Tab by mouth daily. Qty: 30 Tab, Refills: 0           Changed Medications at Discharge:   CONTINUE these medications which have CHANGED     Details   furosemide (LASIX) 40 mg tablet Take 2 Tabs by mouth every other day. Pt takes 40 mg every other day and alternates with 80 mg.  Qty: 30 Tab, Refills: 0       Discontinued Medications at Discharge:   STOP taking these medications         nitrofurantoin, macrocrystal-monohydrate, (MACROBID) 100 mg capsule Comments:   Reason for Stopping:            trimethoprim-sulfamethoxazole (BACTRIM DS) 160-800 mg per tablet Comments:   Reason for Stopping         Medication reconciliation was started with family, who verbalizes understanding of administration of home medications. There were no barriers to obtaining medications identified at this time. Referral to Pharm D needed: no     Current Outpatient Medications   Medication Sig    furosemide (LASIX) 40 mg tablet Take 2 Tabs by mouth every other day. Pt takes 40 mg every other day and alternates with 80 mg.    carvedilol (COREG) 3.125 mg tablet Take 1 Tab by mouth two (2) times daily (with meals).  lisinopril (PRINIVIL, ZESTRIL) 2.5 mg tablet Take 1 Tab by mouth daily.  warfarin (COUMADIN) 2.5 mg tablet Take 2.5 mg by mouth two (2) days a week. Pt takes 5 mg Mon, Tues, Thurs, Fri and Sun. Pt takes 2.5 mg Wed and Sat.  levETIRAcetam (KEPPRA) 500 mg tablet TAKE 1 TABLET BY MOUTH TWICE DAILY    acetaminophen (TYLENOL EXTRA STRENGTH) 500 mg tablet Take 500 mg by mouth every six (6) hours as needed for Pain.  cranberry fruit extract (CRANBERRY PO) Take 1 Can by mouth daily.  amitriptyline (ELAVIL) 10 mg tablet Take 10 mg by mouth nightly.     cholecalciferol, vitamin D3, (VITAMIN D3) 2,000 unit tab Take  by mouth daily.  psyllium seed, with dextrose, (FIBER PO) Take 1 Cap by mouth daily.  warfarin (COUMADIN) 2.5 mg tablet Take 5 mg by mouth five (5) days a week. Pt takes 5 mg Mon, Tues, Thurs, Fri and Sun. Pt takes 2.5 mg Wed and Sat.  VIT A/C/E AC/ZNOX/CUPRIC OXIDE (EYE VITAMIN AND MINERALS PO) Take 1 Tab by mouth two (2) times a day.  ferrous sulfate (IRON) 325 mg (65 mg iron) tablet Take 325 mg by mouth two (2) times a day.  DOCUSATE CALCIUM (STOOL SOFTENER PO) Take 100 mg by mouth as needed.  latanoprost (XALATAN) 0.005 % ophthalmic solution Administer 1 Drop to both eyes nightly. No current facility-administered medications for this visit. There are no discontinued medications. [facility] follow up appointment(s):   Future Appointments   Date Time Provider Manuel Arina   12/10/2019 To Be Determined [address]   12/24/2019 11:30 AM Nohemi Whitlock MD [address]   1/10/2020 11:00 AM Alma Rosa Goldstein  [facility]      Non-[facility] follow up appointment(s): none  [facility]:  out of service area       Goals      Attends follow-up appointments as directed. 12/10/19  -Daughter r/s AQUILINO with Dr. Jono Lopez from 12/10/19 to 12/24/19. Declines earlier follow up appointment.  -Daughter to call Dr. Carlos Jerez office to schedule cardiology follow up   -CTN to follow up in 1 week  Francoise Spence         Knowledge and adherence of prescribed medication (ie. action, side effects, missed dose, etc.).      12/10/19  -CTN unable to verify all medications with Daughter who does not currently have medication with her. She reports she is at work  -Daughter reports that she will not give mother new Rx for Carvedilol or Lisinopril. She states that is not what she discussed with Dr. Tayler Tipton. Patient's B/ps generally run 100's/60's  -Daughter reports that patient takes alternating bactrim and Macrobid for chronic UTIs. She was not given medication during hospitalization and it was discontinued at discharge. Patient reports burning during urination and cloudy urine.  CTN sent PCP Dr. Paul Contreras a note stating above  -CTN to follow up in 1 day  Ellie MADSEN

## 2019-12-11 PROCEDURE — 3331090002 HH PPS REVENUE DEBIT

## 2019-12-11 PROCEDURE — 3331090001 HH PPS REVENUE CREDIT

## 2019-12-12 ENCOUNTER — HOME CARE VISIT (OUTPATIENT)
Dept: SCHEDULING | Facility: HOME HEALTH | Age: 84
End: 2019-12-12
Payer: MEDICARE

## 2019-12-12 VITALS
TEMPERATURE: 98 F | OXYGEN SATURATION: 97 % | DIASTOLIC BLOOD PRESSURE: 80 MMHG | RESPIRATION RATE: 17 BRPM | SYSTOLIC BLOOD PRESSURE: 120 MMHG | HEART RATE: 78 BPM

## 2019-12-12 PROCEDURE — 3331090001 HH PPS REVENUE CREDIT

## 2019-12-12 PROCEDURE — 3331090002 HH PPS REVENUE DEBIT

## 2019-12-12 PROCEDURE — G0299 HHS/HOSPICE OF RN EA 15 MIN: HCPCS

## 2019-12-13 ENCOUNTER — HOME CARE VISIT (OUTPATIENT)
Dept: SCHEDULING | Facility: HOME HEALTH | Age: 84
End: 2019-12-13
Payer: MEDICARE

## 2019-12-13 VITALS
DIASTOLIC BLOOD PRESSURE: 58 MMHG | SYSTOLIC BLOOD PRESSURE: 103 MMHG | TEMPERATURE: 97.3 F | OXYGEN SATURATION: 99 % | HEART RATE: 66 BPM

## 2019-12-13 PROCEDURE — G0157 HHC PT ASSISTANT EA 15: HCPCS

## 2019-12-13 PROCEDURE — 3331090001 HH PPS REVENUE CREDIT

## 2019-12-13 PROCEDURE — 3331090002 HH PPS REVENUE DEBIT

## 2019-12-14 PROCEDURE — 3331090001 HH PPS REVENUE CREDIT

## 2019-12-14 PROCEDURE — 3331090002 HH PPS REVENUE DEBIT

## 2019-12-15 PROCEDURE — 3331090002 HH PPS REVENUE DEBIT

## 2019-12-15 PROCEDURE — 3331090001 HH PPS REVENUE CREDIT

## 2019-12-16 PROCEDURE — 3331090002 HH PPS REVENUE DEBIT

## 2019-12-16 PROCEDURE — 3331090001 HH PPS REVENUE CREDIT

## 2019-12-17 ENCOUNTER — HOME CARE VISIT (OUTPATIENT)
Dept: SCHEDULING | Facility: HOME HEALTH | Age: 84
End: 2019-12-17
Payer: MEDICARE

## 2019-12-17 ENCOUNTER — PATIENT OUTREACH (OUTPATIENT)
Dept: INTERNAL MEDICINE CLINIC | Age: 84
End: 2019-12-17

## 2019-12-17 VITALS
SYSTOLIC BLOOD PRESSURE: 115 MMHG | DIASTOLIC BLOOD PRESSURE: 70 MMHG | OXYGEN SATURATION: 96 % | TEMPERATURE: 98.3 F | HEART RATE: 81 BPM

## 2019-12-17 VITALS
OXYGEN SATURATION: 97 % | DIASTOLIC BLOOD PRESSURE: 70 MMHG | RESPIRATION RATE: 17 BRPM | SYSTOLIC BLOOD PRESSURE: 110 MMHG | TEMPERATURE: 98.9 F | HEART RATE: 89 BPM

## 2019-12-17 PROCEDURE — G0157 HHC PT ASSISTANT EA 15: HCPCS

## 2019-12-17 PROCEDURE — 3331090001 HH PPS REVENUE CREDIT

## 2019-12-17 PROCEDURE — G0299 HHS/HOSPICE OF RN EA 15 MIN: HCPCS

## 2019-12-17 PROCEDURE — 3331090002 HH PPS REVENUE DEBIT

## 2019-12-17 NOTE — PROGRESS NOTES
Goals      Attends follow-up appointments as directed. 12/10/19  -Daughter r/s AQUILINO with Dr. Juana Hale from 12/10/19 to 12/24/19. Declines earlier follow up appointment.  -Daughter to call Dr. Bekah Reddy office to schedule cardiology follow up   -CTN to follow up in 1 week  Humera Mathews         Knowledge and adherence of prescribed medication (ie. action, side effects, missed dose, etc.).      12/17/19  -CTN contacted [place] nurse at [facility] to receive status of UA. Teresa reports that she does think the UA was collected. She is going to check into results and call CTN back.  -Asuncion with [facility] returned call and reports that she saw the patient today and sent UA today. -CTN to follow up in 3 days with results  Humera Mathews    12/10/19  Dr. Toby Mcdowell contacted CTN via [address] [place] [address] Ordered for UA and culture. After patient is seen by home health and urine is collected she may take macrobid 100mg BID for 5 days   -[place] nurse aware and Daughter aware of plan    12/10/19  -CTN unable to verify all medications with Daughter who does not currently have medication with her. She reports she is at work  -Daughter reports that she will not give mother new Rx for Carvedilol or Lisinopril. She states that is not what she discussed with Dr. Toro Angel. Patient's B/ps generally run 100's/60's  -Daughter reports that patient takes alternating bactrim and Macrobid for chronic UTIs. She was not given medication during hospitalization and it was discontinued at discharge. Patient reports burning during urination and cloudy urine.  CTN sent PCP Dr. Juana Hale a note stating above  -CTN to follow up in 1 day  Ellie MADSEN

## 2019-12-18 PROCEDURE — 3331090002 HH PPS REVENUE DEBIT

## 2019-12-18 PROCEDURE — 3331090001 HH PPS REVENUE CREDIT

## 2019-12-19 ENCOUNTER — HOME CARE VISIT (OUTPATIENT)
Dept: SCHEDULING | Facility: HOME HEALTH | Age: 84
End: 2019-12-19
Payer: MEDICARE

## 2019-12-19 PROCEDURE — G0157 HHC PT ASSISTANT EA 15: HCPCS

## 2019-12-19 PROCEDURE — 3331090001 HH PPS REVENUE CREDIT

## 2019-12-19 PROCEDURE — 3331090002 HH PPS REVENUE DEBIT

## 2019-12-19 PROCEDURE — G0299 HHS/HOSPICE OF RN EA 15 MIN: HCPCS

## 2019-12-20 VITALS
TEMPERATURE: 97.9 F | HEART RATE: 70 BPM | DIASTOLIC BLOOD PRESSURE: 60 MMHG | OXYGEN SATURATION: 94 % | SYSTOLIC BLOOD PRESSURE: 139 MMHG

## 2019-12-20 VITALS
RESPIRATION RATE: 16 BRPM | SYSTOLIC BLOOD PRESSURE: 120 MMHG | DIASTOLIC BLOOD PRESSURE: 54 MMHG | HEART RATE: 78 BPM | OXYGEN SATURATION: 97 % | TEMPERATURE: 97.3 F

## 2019-12-20 LAB
APPEARANCE UR: ABNORMAL
BACTERIA #/AREA URNS HPF: ABNORMAL /[HPF]
BACTERIA UR CULT: NORMAL
BILIRUB UR QL STRIP: NEGATIVE
CASTS URNS MICRO: ABNORMAL
CASTS URNS QL MICRO: PRESENT /LPF
COLOR UR: YELLOW
CRYSTALS URNS MICRO: ABNORMAL
EPI CELLS #/AREA URNS HPF: ABNORMAL /HPF (ref 0–10)
GLUCOSE UR QL: NEGATIVE
HGB UR QL STRIP: NEGATIVE
KETONES UR QL STRIP: NEGATIVE
LEUKOCYTE ESTERASE UR QL STRIP: ABNORMAL
MICRO URNS: ABNORMAL
MUCOUS THREADS URNS QL MICRO: PRESENT
NITRITE UR QL STRIP: NEGATIVE
PH UR STRIP: 5 [PH] (ref 5–7.5)
PROT UR QL STRIP: NEGATIVE
RBC #/AREA URNS HPF: ABNORMAL /HPF (ref 0–2)
SP GR UR: 1.02 (ref 1–1.03)
UNIDENT CRYS URNS QL MICRO: PRESENT
UROBILINOGEN UR STRIP-MCNC: 0.2 MG/DL (ref 0.2–1)
WBC #/AREA URNS HPF: >30 /HPF (ref 0–5)

## 2019-12-20 PROCEDURE — 3331090002 HH PPS REVENUE DEBIT

## 2019-12-20 PROCEDURE — 3331090001 HH PPS REVENUE CREDIT

## 2019-12-21 PROCEDURE — 3331090001 HH PPS REVENUE CREDIT

## 2019-12-21 PROCEDURE — 3331090002 HH PPS REVENUE DEBIT

## 2019-12-22 PROCEDURE — 3331090002 HH PPS REVENUE DEBIT

## 2019-12-22 PROCEDURE — 3331090001 HH PPS REVENUE CREDIT

## 2019-12-23 PROCEDURE — 3331090002 HH PPS REVENUE DEBIT

## 2019-12-23 PROCEDURE — 3331090001 HH PPS REVENUE CREDIT

## 2019-12-24 ENCOUNTER — HOME CARE VISIT (OUTPATIENT)
Dept: SCHEDULING | Facility: HOME HEALTH | Age: 84
End: 2019-12-24
Payer: MEDICARE

## 2019-12-24 ENCOUNTER — HOME CARE VISIT (OUTPATIENT)
Dept: HOME HEALTH SERVICES | Facility: HOME HEALTH | Age: 84
End: 2019-12-24
Payer: MEDICARE

## 2019-12-24 ENCOUNTER — OFFICE VISIT (OUTPATIENT)
Dept: INTERNAL MEDICINE CLINIC | Age: 84
End: 2019-12-24

## 2019-12-24 VITALS
HEART RATE: 98 BPM | DIASTOLIC BLOOD PRESSURE: 63 MMHG | BODY MASS INDEX: 21.9 KG/M2 | HEIGHT: 61 IN | OXYGEN SATURATION: 97 % | TEMPERATURE: 97.4 F | RESPIRATION RATE: 18 BRPM | WEIGHT: 116 LBS | SYSTOLIC BLOOD PRESSURE: 103 MMHG

## 2019-12-24 DIAGNOSIS — J42 CHRONIC BRONCHITIS, UNSPECIFIED CHRONIC BRONCHITIS TYPE (HCC): ICD-10-CM

## 2019-12-24 DIAGNOSIS — J40 BRONCHITIS: ICD-10-CM

## 2019-12-24 DIAGNOSIS — I50.23 ACUTE ON CHRONIC SYSTOLIC CONGESTIVE HEART FAILURE (HCC): ICD-10-CM

## 2019-12-24 DIAGNOSIS — Z09 HOSPITAL DISCHARGE FOLLOW-UP: Primary | ICD-10-CM

## 2019-12-24 PROCEDURE — G0157 HHC PT ASSISTANT EA 15: HCPCS

## 2019-12-24 PROCEDURE — 3331090001 HH PPS REVENUE CREDIT

## 2019-12-24 PROCEDURE — 3331090002 HH PPS REVENUE DEBIT

## 2019-12-24 RX ORDER — AZITHROMYCIN 250 MG/1
TABLET, FILM COATED ORAL
COMMUNITY
Start: 2019-11-07 | End: 2020-01-10 | Stop reason: ALTCHOICE

## 2019-12-24 RX ORDER — IPRATROPIUM BROMIDE AND ALBUTEROL SULFATE 2.5; .5 MG/3ML; MG/3ML
SOLUTION RESPIRATORY (INHALATION)
COMMUNITY
Start: 2019-12-18 | End: 2019-12-24 | Stop reason: SDUPTHER

## 2019-12-24 RX ORDER — IPRATROPIUM BROMIDE AND ALBUTEROL SULFATE 2.5; .5 MG/3ML; MG/3ML
3 SOLUTION RESPIRATORY (INHALATION)
Qty: 30 NEBULE | Refills: 1 | Status: SHIPPED | OUTPATIENT
Start: 2019-12-24

## 2019-12-24 RX ORDER — PREDNISONE 20 MG/1
40 TABLET ORAL
Qty: 8 TAB | Refills: 0 | Status: SHIPPED | OUTPATIENT
Start: 2019-12-24 | End: 2020-01-10 | Stop reason: ALTCHOICE

## 2019-12-24 RX ORDER — AZITHROMYCIN 250 MG/1
TABLET, FILM COATED ORAL
COMMUNITY
Start: 2019-12-21 | End: 2020-01-10 | Stop reason: ALTCHOICE

## 2019-12-24 NOTE — PATIENT INSTRUCTIONS
Stop z pack Take prednisone for days Continue nebulizers Chest x ray today Office Policies Phone calls/patient messages: Please allow up to 24 hours for someone in the office to contact you about your call or message. Be mindful your provider may be out of the office or your message may require further review. We encourage you to use [employer] for your messages as this is a faster, more efficient way to communicate with our office Medication Refills: 
         
Prescription medications require 48-72 business hours to process. We encourage you to use [employer] for your refills. For controlled medications: Please allow 72 business hours to process. Certain medications may require you to  a written prescription at our office. NO narcotic/controlled medications will be prescribed after 4pm Monday through Friday or on weekends Form/Paperwork Completion: 
         
Please note a $25 fee may incur for all paperwork for completed by our providers. We ask that you allow 7-10 business days. Pre-payment is due prior to picking up/faxing the completed form. You may also download your forms to [employer] to have your doctor print off.

## 2019-12-24 NOTE — PROGRESS NOTES
Reviewed record in preparation for visit and have obtained necessary documentation. Identified pt with two pt identifiers(name and ). Chief Complaint   Patient presents with   [facility] Maintenance Due   Topic Date Due    Eye Exam  1934    DTaP/Tdap/Td  (1 - Tdap) 1935    Shingles Vaccine (1 of 2) 1974    Bone Mineral Density   1989    Cholesterol Test   2017    Diabetic Foot Care  2018    Albumin Urine Test  2018    Hemoglobin A1C    2019    Annual Well Visit  2019    Flu Vaccine  2019    Glaucoma Screening   11/10/2019       Ms. Renetta Sanchez has a reminder for a \"due or due soon\" health maintenance. I have asked that she discuss this further with her primary care provider for follow-up on this health maintenance. Coordination of Care Questionnaire:  :     1) Have you been to an emergency room, urgent care clinic since your last visit? no   Hospitalized since your last visit? no             2) Have you seen or consulted any other health care providers outside of [address] [place] [address] since your last visit? no  (Include any pap smears or colon screenings in this section.)    3) In the event something were to happen to you and you were unable to speak on your behalf, do you have an Advance Directive/ Living Will in place stating your wishes? NO    Do you have an Advance Directive on file? no    4) Are you interested in receiving information on Advance Directives? NO    Patient is accompanied by daughter I have received verbal consent from Mahi Burdick to discuss any/all medical information while they are present in the room.

## 2019-12-24 NOTE — PROGRESS NOTES
CC: Hospital Follow Up (chf)      HPI:    She is a 80 y.o. female who presents for evaluation of hospital follow up     Date of Admission: 12/6/2019  Date of Discharge: 12/9/2019       Dr Tanner Smith is cardiologist and has appointment coming up   Brief Admission History  80years old female with past medical history significant for hypertension, CHF, history of atrial fibrillation was transferred from her medical [facility] hospital for evaluation of worsening shortness of breath associated with bilateral leg swelling and orthopnea, patient has history of CHF, O2 sat was dropped to 80% on room air, chest x-ray was done on show pulmonary.      Brief Hospital Course reviewed    Acute hypoxic respiratory failure secondary to acute on chronic systolic heart failure  Hx of Atrial fib  HTN  Hx of SSS s/p PPM placement  Supra therapeutic INR, in the setting of abx use, resolved. Workup at [facility] showed CHF exacerbation and started on IV diuresis.  Ellison cath placed then, now removed. CXR here neg for acute abnormality.   Her probnp at [facility] was 4613, here it was 4949 .  she was diuresed with IV lasix and her O2 requirement was able to be weaned. She is requiring up to 2 LNC at discharge. She feels well and her family states she will do better at home. After discharge she felt increased cough she was seen at an urgent care and started on a Z-Zach. She still having pronounced cough. She feels the dyspnea is better. She denies swelling. Current taking Lasix 80 alternating with 40mg     She often misses Coreg and lisinopril due to low blood pressure. Her daughter holds the medication if systolic is less than 835.   She denies PND and orthopnea    ROS:  Constitutional: negative for fevers, chills, anorexia and weight loss  10 systems reviewed and negative other than HPI    Past Medical History:   Diagnosis Date    Adverse effect of anesthesia     slow to wake    Alopecia     Arrhythmia     atrial fib,?svt    Arthritis     Atrial fibrillation (HCC)     Benign essential HTN     Benign neoplasm of vulva     CAD (coronary artery disease)     Cholelithiases     Chronic kidney disease     followed by specialist    Chronic pain     Colon cancer (HCC)     Constipation     Dyspareunia     Glaucoma     Heart failure (HCC)     History of mixed connective tissue disease     Hx of viral pericarditis     Hyperactivity of bladder     Hypercholesterolemia     Ill-defined condition     macular degeneration    Ill-defined condition     recurrent UTI hx    Insomnia     Macular degeneration     Neck pain     Psoriasis     patient denies    Rectocele     Renal cyst     Seizure disorder (HCC)     none in 30 plus years---sees neurologist     Sjogren's disease ([facility] [address]     patient unsure    Thrombophlebitis     TIA (transient ischemic attack)     patient denies    Vaginal vault prolapse, posthysterectomy     Varicose veins        Current Outpatient Medications on File Prior to Visit   Medication Sig Dispense Refill    azithromycin (ZITHROMAX) 250 mg tablet Zithromax Z-Zach 250 mg tablet   TAKE 2 TABLETS (500 MG) BY ORAL ROUTE ONCE DAILY FOR 1 DAY THEN 1 TABLET (250 MG) BY ORAL ROUTE ONCE DAILY FOR 4 DAYS      azithromycin (ZITHROMAX) 250 mg tablet TAKE 2 TABLETS BY MOUTH ON DAY 1 AND THEN TAKE 1 TABLET BY MOUTH ONCE A DAY ON DAY 2 THROUGH DAY 5      guaifenesin/dextromethorphan (CORICIDIN HBP CHEST BRANDON-COUGH PO) Take 4 mg by mouth daily.  furosemide (LASIX) 40 mg tablet Take 2 Tabs by mouth every other day. Pt takes 40 mg every other day and alternates with 80 mg. 30 Tab 0    warfarin (COUMADIN) 2.5 mg tablet Take 2.5 mg by mouth two (2) days a week. Pt takes 5 mg Mon, Tues, Thurs, Fri and Sun. Pt takes 2.5 mg Wed and Sat.       levETIRAcetam (KEPPRA) 500 mg tablet TAKE 1 TABLET BY MOUTH TWICE DAILY 180 Tab 5    acetaminophen (TYLENOL EXTRA STRENGTH) 500 mg tablet Take 500 mg by mouth every six (6) hours as needed for Pain.  cranberry fruit extract (CRANBERRY PO) Take 1 Can by mouth daily.  amitriptyline (ELAVIL) 10 mg tablet Take 10 mg by mouth nightly.  cholecalciferol, vitamin D3, (VITAMIN D3) 2,000 unit tab Take 2,000 Units by mouth daily.  psyllium seed, with dextrose, (FIBER PO) Take 1 Cap by mouth daily.  warfarin (COUMADIN) 2.5 mg tablet Take 5 mg by mouth five (5) days a week. Pt takes 5 mg Mon, Tues, Thurs, Fri and Sun. Pt takes 2.5 mg Wed and Sat.  VIT A/C/E AC/ZNOX/CUPRIC OXIDE (EYE VITAMIN AND MINERALS PO) Take 1 Tab by mouth two (2) times a day.  ferrous sulfate (IRON) 325 mg (65 mg iron) tablet Take 325 mg by mouth two (2) times a day.  DOCUSATE CALCIUM (STOOL SOFTENER PO) Take 100 mg by mouth as needed for Other (constipation).  latanoprost (XALATAN) 0.005 % ophthalmic solution Administer 1 Drop to both eyes nightly.  carvedilol (COREG) 3.125 mg tablet Take 1 Tab by mouth two (2) times daily (with meals). 30 Tab 0    lisinopril (PRINIVIL, ZESTRIL) 2.5 mg tablet Take 1 Tab by mouth daily. 30 Tab 0     No current facility-administered medications on file prior to visit. Past Surgical History:   Procedure Laterality Date    CARDIAC SURG PROCEDURE UNLIST      AV ablation    HX BREAST LUMPECTOMY      left breast    HX CATARACT REMOVAL      HX COLECTOMY  1955    partial    HX HEMORRHOIDECTOMY      HX HERNIA REPAIR  08/01/2018    Open bilateral inguinal hernia repairs with mesh.     HX HYSTERECTOMY      HX PACEMAKER      left    HX SHOULDER ARTHROSCOPY      left       Family History   Problem Relation Age of Onset    Cancer Father     Inflammatory Bowel Dz Mother     Heart Disease Brother     Diabetes Brother      Reviewed and no changes     Social History     Socioeconomic History    Marital status:      Spouse name: Not on file    Number of children: Not on file    Years of education: Not on file    Highest education level: Not on file   Occupational History    Not on file   Social Needs    Financial resource strain: Not on file    Food insecurity:     Worry: Not on file     Inability: Not on file    Transportation needs:     Medical: Not on file     Non-medical: Not on file   Tobacco Use    Smoking status: Never Smoker    Smokeless tobacco: Never Used   Substance and Sexual Activity    Alcohol use: No    Drug use: No    Sexual activity: Never   Lifestyle    Physical activity:     Days per week: Not on file     Minutes per session: Not on file    Stress: Not on file   Relationships    Social connections:     Talks on phone: Not on file     Gets together: Not on file     Attends Anabaptism service: Not on file     Active member of club or organization: Not on file     Attends meetings of clubs or organizations: Not on file     Relationship status: Not on file    Intimate partner violence:     Fear of current or ex partner: Not on file     Emotionally abused: Not on file     Physically abused: Not on file     Forced sexual activity: Not on file   Other Topics Concern    Not on file   Social History Narrative    Not on file            Visit Vitals  /63 (BP 1 Location: Right arm, BP Patient Position: Sitting)   Pulse 98   Temp 97.4 °F (36.3 °C) (Oral)   Resp 18   Ht 5' 1\" (1.549 m)   Wt 116 lb (52.6 kg)   SpO2 97% Comment: 2 ox   BMI 21.92 kg/m²       Physical Examination:   General -chronically ill appearing female  HEENT - PERRL, TM no erythema/opacification, normal nasal turbinates, oropharynx no erythema or exudate, MMM  Neck - supple, no bruits, no TMG, no LAD  Pulm -not in respiratory distress, she does have bilateral occasional wheezing  Cardio - RRR, normal S1 S2, no murmur gallops or rubs  Abd - soft, nontender, no masses, no HSM  Extrem - no edema, +2 distal pulses  Psych - normal affect, appropriate mood    Lab Results   Component Value Date/Time    WBC 8.0 12/09/2019 02:02 AM    HGB 12.2 12/09/2019 02:02 AM Hematocrit (POC) 35 08/01/2018 07:27 AM    HCT 37.0 12/09/2019 02:02 AM    PLATELET 903 05/53/0748 02:02 AM    MCV 94.1 12/09/2019 02:02 AM     Lab Results   Component Value Date/Time    Sodium 133 (L) 12/09/2019 02:02 AM    Potassium 4.7 12/09/2019 02:02 AM    Chloride 100 12/09/2019 02:02 AM    CO2 29 12/09/2019 02:02 AM    Anion gap 4 (L) 12/09/2019 02:02 AM    Glucose 92 12/09/2019 02:02 AM    BUN 34 (H) 12/09/2019 02:02 AM    Creatinine 0.90 12/09/2019 02:02 AM    BUN/Creatinine ratio 38 (H) 12/09/2019 02:02 AM    GFR est AA >60 12/09/2019 02:02 AM    GFR est non-AA 58 (L) 12/09/2019 02:02 AM    Calcium 8.8 12/09/2019 02:02 AM     Lab Results   Component Value Date/Time    Cholesterol, total 196 09/12/2012 12:00 AM    HDL Cholesterol 64 09/12/2012 12:00 AM    LDL, calculated 104 (H) 09/12/2012 12:00 AM    VLDL, calculated 28 09/12/2012 12:00 AM    Triglyceride 142 09/12/2012 12:00 AM     Lab Results   Component Value Date/Time    TSH 4.340 11/09/2017 02:13 PM     No results found for: PSA, PSA2, PSAR1, Jostin Danis, PSAR3, [id number], [id number], PSALT  Lab Results   Component Value Date/Time    Hemoglobin A1c 5.6 06/09/2017 09:07 AM    Hemoglobin A1c (POC) 5.2 07/19/2018 03:42 PM     Lab Results   Component Value Date/Time    VITAMIN D, 25-HYDROXY 91.7 11/09/2017 02:13 PM       Lab Results   Component Value Date/Time    ALT (SGPT) 18 10/30/2017 11:55 PM    AST (SGOT) 21 10/30/2017 11:55 PM    Alk. phosphatase 91 10/30/2017 11:55 PM    Bilirubin, total 0.8 10/30/2017 11:55 PM           Assessment/Plan:    1. Hospital discharge follow-up  - XR CHEST PA LAT; Future    2. Bronchitis  -She is to finish the Z-Zach  - predniSONE (DELTASONE) 20 mg tablet; Take 40 mg by mouth daily (with breakfast). Dispense: 8 Tab; Refill: 0  - XR CHEST PA LAT; Future  - albuterol-ipratropium (DUO-NEB) 2.5 mg-0.5 mg/3 ml nebu; 3 mL by Nebulization route every six (6) hours as needed for Wheezing. Dispense: 30 Nebule;  Refill: 1    3. Acute on chronic systolic congestive heart failure (HCC)  - XR CHEST PA LAT; Future  -I discussed with daughter that it would be in her best benefit if she would take the Coreg and lisinopril. Would only hold the medications if her systolic is less than 90. I advised her to continue taking the Lasix as she is. And to follow-up with Dr. Toro Angel. Follow-up and Dispositions    · Return in about 3 months (around 3/24/2020).          Braulio Boland MD

## 2019-12-25 PROCEDURE — 3331090002 HH PPS REVENUE DEBIT

## 2019-12-25 PROCEDURE — 3331090001 HH PPS REVENUE CREDIT

## 2019-12-26 ENCOUNTER — HOME CARE VISIT (OUTPATIENT)
Dept: SCHEDULING | Facility: HOME HEALTH | Age: 84
End: 2019-12-26
Payer: MEDICARE

## 2019-12-26 VITALS
OXYGEN SATURATION: 97 % | DIASTOLIC BLOOD PRESSURE: 58 MMHG | HEART RATE: 85 BPM | TEMPERATURE: 97.7 F | SYSTOLIC BLOOD PRESSURE: 105 MMHG

## 2019-12-26 VITALS
DIASTOLIC BLOOD PRESSURE: 61 MMHG | HEART RATE: 85 BPM | SYSTOLIC BLOOD PRESSURE: 112 MMHG | TEMPERATURE: 97.9 F | OXYGEN SATURATION: 95 %

## 2019-12-26 PROCEDURE — 3331090002 HH PPS REVENUE DEBIT

## 2019-12-26 PROCEDURE — 3331090001 HH PPS REVENUE CREDIT

## 2019-12-26 PROCEDURE — G0157 HHC PT ASSISTANT EA 15: HCPCS

## 2019-12-27 PROCEDURE — 3331090002 HH PPS REVENUE DEBIT

## 2019-12-27 PROCEDURE — 3331090001 HH PPS REVENUE CREDIT

## 2019-12-28 PROCEDURE — 3331090002 HH PPS REVENUE DEBIT

## 2019-12-28 PROCEDURE — 3331090001 HH PPS REVENUE CREDIT

## 2019-12-29 PROCEDURE — 3331090002 HH PPS REVENUE DEBIT

## 2019-12-29 PROCEDURE — 3331090001 HH PPS REVENUE CREDIT

## 2019-12-30 PROCEDURE — 3331090002 HH PPS REVENUE DEBIT

## 2019-12-30 PROCEDURE — 3331090001 HH PPS REVENUE CREDIT

## 2019-12-31 PROCEDURE — 3331090002 HH PPS REVENUE DEBIT

## 2019-12-31 PROCEDURE — 3331090001 HH PPS REVENUE CREDIT

## 2020-01-01 ENCOUNTER — HOME CARE VISIT (OUTPATIENT)
Dept: HOME HEALTH SERVICES | Facility: HOME HEALTH | Age: 85
End: 2020-01-01
Payer: MEDICARE

## 2020-01-01 PROCEDURE — 3331090002 HH PPS REVENUE DEBIT

## 2020-01-01 PROCEDURE — 3331090001 HH PPS REVENUE CREDIT

## 2020-01-02 ENCOUNTER — HOME CARE VISIT (OUTPATIENT)
Dept: SCHEDULING | Facility: HOME HEALTH | Age: 85
End: 2020-01-02
Payer: MEDICARE

## 2020-01-02 VITALS
HEART RATE: 87 BPM | TEMPERATURE: 97.8 F | OXYGEN SATURATION: 95 % | DIASTOLIC BLOOD PRESSURE: 58 MMHG | SYSTOLIC BLOOD PRESSURE: 120 MMHG

## 2020-01-02 PROCEDURE — 3331090002 HH PPS REVENUE DEBIT

## 2020-01-02 PROCEDURE — 3331090001 HH PPS REVENUE CREDIT

## 2020-01-02 PROCEDURE — G0157 HHC PT ASSISTANT EA 15: HCPCS

## 2020-01-03 ENCOUNTER — HOME CARE VISIT (OUTPATIENT)
Dept: SCHEDULING | Facility: HOME HEALTH | Age: 85
End: 2020-01-03
Payer: MEDICARE

## 2020-01-03 ENCOUNTER — HOME CARE VISIT (OUTPATIENT)
Dept: HOME HEALTH SERVICES | Facility: HOME HEALTH | Age: 85
End: 2020-01-03
Payer: MEDICARE

## 2020-01-03 VITALS
TEMPERATURE: 98.6 F | HEART RATE: 70 BPM | SYSTOLIC BLOOD PRESSURE: 124 MMHG | DIASTOLIC BLOOD PRESSURE: 76 MMHG | OXYGEN SATURATION: 96 % | RESPIRATION RATE: 18 BRPM

## 2020-01-03 PROCEDURE — 3331090002 HH PPS REVENUE DEBIT

## 2020-01-03 PROCEDURE — G0300 HHS/HOSPICE OF LPN EA 15 MIN: HCPCS

## 2020-01-03 PROCEDURE — G0151 HHCP-SERV OF PT,EA 15 MIN: HCPCS

## 2020-01-03 PROCEDURE — 3331090001 HH PPS REVENUE CREDIT

## 2020-01-04 PROCEDURE — 3331090001 HH PPS REVENUE CREDIT

## 2020-01-04 PROCEDURE — 3331090002 HH PPS REVENUE DEBIT

## 2020-01-05 VITALS
TEMPERATURE: 97.8 F | SYSTOLIC BLOOD PRESSURE: 116 MMHG | RESPIRATION RATE: 18 BRPM | OXYGEN SATURATION: 96 % | DIASTOLIC BLOOD PRESSURE: 74 MMHG | HEART RATE: 84 BPM

## 2020-01-05 PROCEDURE — 3331090002 HH PPS REVENUE DEBIT

## 2020-01-05 PROCEDURE — 3331090001 HH PPS REVENUE CREDIT

## 2020-01-06 PROCEDURE — 3331090001 HH PPS REVENUE CREDIT

## 2020-01-06 PROCEDURE — 3331090002 HH PPS REVENUE DEBIT

## 2020-01-07 ENCOUNTER — HOME CARE VISIT (OUTPATIENT)
Dept: SCHEDULING | Facility: HOME HEALTH | Age: 85
End: 2020-01-07
Payer: MEDICARE

## 2020-01-07 VITALS
HEART RATE: 80 BPM | OXYGEN SATURATION: 97 % | RESPIRATION RATE: 17 BRPM | TEMPERATURE: 98.5 F | SYSTOLIC BLOOD PRESSURE: 118 MMHG | DIASTOLIC BLOOD PRESSURE: 78 MMHG

## 2020-01-07 PROCEDURE — G0299 HHS/HOSPICE OF RN EA 15 MIN: HCPCS

## 2020-01-07 PROCEDURE — 3331090001 HH PPS REVENUE CREDIT

## 2020-01-07 PROCEDURE — 3331090002 HH PPS REVENUE DEBIT

## 2020-01-08 PROCEDURE — 3331090001 HH PPS REVENUE CREDIT

## 2020-01-08 PROCEDURE — 3331090002 HH PPS REVENUE DEBIT

## 2020-01-09 PROCEDURE — 3331090001 HH PPS REVENUE CREDIT

## 2020-01-09 PROCEDURE — 3331090002 HH PPS REVENUE DEBIT

## 2020-01-10 ENCOUNTER — OFFICE VISIT (OUTPATIENT)
Dept: NEUROLOGY | Age: 85
End: 2020-01-10

## 2020-01-10 VITALS
OXYGEN SATURATION: 85 % | RESPIRATION RATE: 16 BRPM | HEIGHT: 61 IN | WEIGHT: 119 LBS | DIASTOLIC BLOOD PRESSURE: 58 MMHG | SYSTOLIC BLOOD PRESSURE: 118 MMHG | BODY MASS INDEX: 22.47 KG/M2 | HEART RATE: 92 BPM

## 2020-01-10 DIAGNOSIS — G40.209 COMPLEX PARTIAL SEIZURE WITH IMPAIRMENT OF CONSCIOUSNESS (HCC): Primary | ICD-10-CM

## 2020-01-10 DIAGNOSIS — R27.0 ATAXIA: ICD-10-CM

## 2020-01-10 DIAGNOSIS — M54.50 ACUTE MIDLINE LOW BACK PAIN WITHOUT SCIATICA: ICD-10-CM

## 2020-01-10 DIAGNOSIS — I48.91 ATRIAL FIBRILLATION WITH RVR (HCC): ICD-10-CM

## 2020-01-10 PROCEDURE — 3331090002 HH PPS REVENUE DEBIT

## 2020-01-10 PROCEDURE — 3331090001 HH PPS REVENUE CREDIT

## 2020-01-10 RX ORDER — LEVETIRACETAM 500 MG/1
TABLET ORAL
Qty: 180 TAB | Refills: 5 | Status: SHIPPED | OUTPATIENT
Start: 2020-01-10 | End: 2021-01-24

## 2020-01-10 NOTE — LETTER
1/10/20 Patient: Sandip Pena YOB: 1924 Date of Visit: 1/10/2020 Pascual Arango MD 
[address] [facility] [address] VIA In Basket Dear Pascual Arango MD, Thank you for referring Ms. Marsha Ackerman to [facility] for evaluation. My notes for this consultation are attached. Consult Subjective:  
 
Sandip Pena is a 80 y. o.right-handed  female seen for evaluation of seizures at the request of Dr Sabino Martinez, and the patient has not had any seizures since her last visit 1 year ago. She is currently on Keppra 500 mg twice a day tolerating it well without any side effects of the medications. She has a history of cardiac arrhythmias and atrial fibrillation is on chronic Coumadin therapy, and has back pain chronic from a previous burst fracture of L1. She cannot take anti-inflammatories because of her kidney disease. Her medications were refilled today a new prescription sent in for patient for the Zibby. She's had no other new neurological problems, no new focal weakness, her memory seems fairly good for her age of 80, she does have decreased vision and slightly unsteady gait because of the vision problem of macular degeneration and hip arthritis, and uses a walker at times when her daughters not at home. The patient had new onset of seizures in  and had apparently normal imaging of the brain and an EEG that showed a \"short circuit\" in her brain. She had no other clear cause for her seizures. There was no trauma, infection, stroke,  injury or family history of similar problems. She had several seizures and was treated with Neurontin and eventually started on Keppra 500 mg b.i.d which has controlled her seizures for years. She has remained free of seizures for 20+ years.  We talked about coming off the medication and what needed to be done, the patient would prefer to stay on the medication because she is stable and having no problems and doesn't want to risk recurrent seizures her daughter agrees. No seizure since last visit in June 2016. Patient's seizures were mainly manifest by brief spells of staring off and having slight confusion for several minutes afterwards but no generalized tonic-clonic seizures were seen. 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 Patient has had no recent falls. Patient has a history of multiple medical problems including atrial fibrillation and is on chronic Coumadin therapy. She also has degenerative arthritis, osteoporosis and osteoarthritis, glaucoma. She also has macular degeneration and an unsteady gait because she can't see and has more arthritis. Past Medical History:  
Diagnosis Date  Adverse effect of anesthesia   
 slow to wake  Alopecia  Arrhythmia   
 atrial fib,?svt  Arthritis  Atrial fibrillation ([address]  Benign essential HTN  Benign neoplasm of vulva  CAD (coronary artery disease)  Cholelithiases  Chronic kidney disease   
 followed by specialist  
 Chronic pain  Colon cancer ([address]  Constipation  Dyspareunia  Glaucoma  Heart failure ([address]  History of mixed connective tissue disease  Hx of viral pericarditis  Hyperactivity of bladder  Hypercholesterolemia  Ill-defined condition   
 macular degeneration  Ill-defined condition   
 recurrent UTI hx  
 Insomnia  Macular degeneration  Neck pain  Psoriasis   
 patient denies  Rectocele  Renal cyst   
 Seizure disorder ([address]   
 none in 30 plus years---sees neurologist   
 Sjogren's disease [facility])   
 patient unsure  Thrombophlebitis  TIA (transient ischemic attack)   
 patient denies  Vaginal vault prolapse, posthysterectomy  Varicose veins Past Surgical History:  
Procedure Laterality Date  CARDIAC SURG PROCEDURE UNLIST    
 AV ablation  HX BREAST LUMPECTOMY    
 left breast  
 HX CATARACT REMOVAL    
 HX COLECTOMY  1955  
 partial  
 HX HEMORRHOIDECTOMY  HX HERNIA REPAIR  08/01/2018 Open bilateral inguinal hernia repairs with mesh.  HX HYSTERECTOMY  HX PACEMAKER    
 left  HX SHOULDER ARTHROSCOPY    
 left Family History Problem Relation Age of Onset  Cancer Father  Inflammatory Bowel Dz Mother  Heart Disease Brother  Diabetes Brother Social History Tobacco Use  Smoking status: Never Smoker  Smokeless tobacco: Never Used Substance Use Topics  Alcohol use: No  
   
Current Outpatient Medications Medication Sig Dispense Refill  levETIRAcetam (KEPPRA) 500 mg tablet TAKE 1 TABLET BY MOUTH TWICE DAILY 180 Tab 5  
 albuterol-ipratropium (DUO-NEB) 2.5 mg-0.5 mg/3 ml nebu 3 mL by Nebulization route every six (6) hours as needed for Wheezing. 30 Nebule 1  
 OXYGEN-AIR DELIVERY SYSTEMS 2 L by Nasal route continuous.  guaifenesin/dextromethorphan (CORICIDIN HBP CHEST BRANDON-COUGH PO) Take 4 mg by mouth daily.  carvedilol (COREG) 3.125 mg tablet Take 1 Tab by mouth two (2) times daily (with meals). 30 Tab 0  
 lisinopril (PRINIVIL, ZESTRIL) 2.5 mg tablet Take 1 Tab by mouth daily. 30 Tab 0  
 furosemide (LASIX) 40 mg tablet Take 2 Tabs by mouth every other day. Pt takes 40 mg every other day and alternates with 80 mg. 30 Tab 0  
 warfarin (COUMADIN) 2.5 mg tablet Take 2.5 mg by mouth two (2) days a week. Pt takes 5 mg Mon, Tues, Thurs, Fri and Sun. Pt takes 2.5 mg Wed and Sat.  acetaminophen (TYLENOL EXTRA STRENGTH) 500 mg tablet Take 500 mg by mouth every six (6) hours as needed for Pain.  cranberry fruit extract (CRANBERRY PO) Take 1 Can by mouth daily.  amitriptyline (ELAVIL) 10 mg tablet Take 10 mg by mouth nightly.  cholecalciferol, vitamin D3, (VITAMIN D3) 2,000 unit tab Take 2,000 Units by mouth daily.  psyllium seed, with dextrose, (FIBER PO) Take 1 Cap by mouth daily.  warfarin (COUMADIN) 2.5 mg tablet Take 5 mg by mouth five (5) days a week. Pt takes 5 mg Mon, Tues, Thurs, Fri and Sun. Pt takes 2.5 mg Wed and Sat.  VIT A/C/E AC/ZNOX/CUPRIC OXIDE (EYE VITAMIN AND MINERALS PO) Take 1 Tab by mouth two (2) times a day.  ferrous sulfate (IRON) 325 mg (65 mg iron) tablet Take 325 mg by mouth two (2) times a day.  DOCUSATE CALCIUM (STOOL SOFTENER PO) Take 100 mg by mouth as needed for Other (constipation).  latanoprost (XALATAN) 0.005 % ophthalmic solution Administer 1 Drop to both eyes nightly. Allergies Allergen Reactions  Codeine Nausea Only  Pcn [Penicillins] Unknown (comments) Cant remember reaction as a child  Tetanus Toxoid, Adsorbed Other (comments)  Tetanus Vaccines And Toxoid Unknown (comments) Does not take tetanus due to tb hx  Vibramycin [Doxycycline Calcium] Rash Review of Systems: A comprehensive review of systems was negative except for: Constitutional: positive for fatigue and malaise Eyes: positive for glaucoma, visual disturbance and macular degeneration Cardiovascular: positive for dyspnea, palpitations, irregular heart beats, exertional chest pressure/discomfort Musculoskeletal: positive for myalgias, arthralgias, stiff joints, back pain and muscle weakness Neurological: positive for dizziness, seizures, coordination problems, gait problems and weakness Behvioral/Psych: positive for anxiety and depression Objective: I 
 
 
NEUROLOGICAL EXAM: 
 
Appearance: The patient is well developed, well nourished, provides a fair history and is in no acute distress. Mental Status: Oriented to time, place and person and the president.   Her mental status is amazing for 80year-old. Mood and affect depressed. Speech is fluent without aphasia or dysarthria Cranial Nerves:   Intact visual fields but her visual acuity is markedly decreased but she does have count fingers vision because of her macular degeneration. Fundi are poorly seen. Pupils minimally react due to previous surgery, EOM's full, no nystagmus, no ptosis. Facial sensation is normal. Corneal reflexes are not tested. Facial movement is symmetric. Hearing is reduced bilaterally. Palate is midline with normal sternocleidomastoid and trapezius muscles are normal. Tongue is midline. Neck supple without bruits or meningismus Temporal arteries are not tender or enlarged Motor:  4/5 strength in upper and lower proximal and distal muscles. reduced bulk and tone. No fasciculations. Reflexes:   Deep tendon reflexes 1+/4 and symmetrical. No Babinski or clonus present Sensory:   Abnormal to touch, pinprick and vibration and temperature in the feet bilaterally. Gait:  Abnormal gait due to arthritis and poor vision and patient has to use assistive devices of a rolling walker or Rollator. Tremor:   No tremor noted. Cerebellar:  Mildly abnormal Romberg and tandem cerebellar signs present. Neurovascular:  Normal heart sounds and regular rhythm, peripheral pulses decreased, and no carotid bruits. Assessment:  
 
1 Plan:  
 
 
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 Well controlled on Keppra Ataxia of gait due to a combination of arthritis, poor vision and senile gait apraxia and possible neuropathy Possible neuropathy of unclear cause, ruled out treatable causes, probably causing mild sensory ataxia Multiple medical problems including a fib on chronic Coumadin therapy as above. Severe visual loss secondary to macular degeneration Discussed with patient and daughter again, the possibility of coming off her medication but all agreed to stay on the medicine We'll continue the Keppra refills given the patient Patient advised to continue her vitamins and vitamin D every day and her B12 and other supplements and medications as prescribed. She was encouraged to remain mentally and physically active. We will see her again in 12 months time or earlier if needed. They will call if any problem. Signed By: Danitza Capps MD   
 January 10, 2020 This note will not be viewable in [address] If you have questions, please do not hesitate to call me. I look forward to following your patient along with you. Sincerely, Danitza Capps MD

## 2020-01-10 NOTE — PROGRESS NOTES
Consult    Subjective:     Shikha Yoo is a 80 y. o.right-handed  female seen for evaluation of seizures at the request of Dr Ciaran Pena, and the patient has not had any seizures since her last visit 1 year ago. She is currently on Keppra 500 mg twice a day tolerating it well without any side effects of the medications. She has a history of cardiac arrhythmias and atrial fibrillation is on chronic Coumadin therapy, and has back pain chronic from a previous burst fracture of L1. She cannot take anti-inflammatories because of her kidney disease. Her medications were refilled today a new prescription sent in for patient for the [employer]. She's had no other new neurological problems, no new focal weakness, her memory seems fairly good for her age of 80, she does have decreased vision and slightly unsteady gait because of the vision problem of macular degeneration and hip arthritis, and uses a walker at times when her daughters not at home. The patient had new onset of seizures in  and had apparently normal imaging of the brain and an EEG that showed a \"short circuit\" in her brain. She had no other clear cause for her seizures. There was no trauma, infection, stroke,  injury or family history of similar problems. She had several seizures and was treated with Neurontin and eventually started on Keppra 500 mg b.i.d which has controlled her seizures for years. She has remained free of seizures for 20+ years. We talked about coming off the medication and what needed to be done, the patient would prefer to stay on the medication because she is stable and having no problems and doesn't want to risk recurrent seizures her daughter agrees. No seizure since last visit in 2016. Patient's seizures were mainly manifest by brief spells of staring off and having slight confusion for several minutes afterwards but no generalized tonic-clonic seizures were seen.  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 Patient has had no recent falls. Patient has a history of multiple medical problems including atrial fibrillation and is on chronic Coumadin therapy. She also has degenerative arthritis, osteoporosis and osteoarthritis, glaucoma. She also has macular degeneration and an unsteady gait because she can't see and has more arthritis. Past Medical History:   Diagnosis Date    Adverse effect of anesthesia     slow to wake    Alopecia     Arrhythmia     atrial fib,?svt    Arthritis     Atrial fibrillation (HCC)     Benign essential HTN     Benign neoplasm of vulva     CAD (coronary artery disease)     Cholelithiases     Chronic kidney disease     followed by specialist    Chronic pain     Colon cancer (HCC)     Constipation     Dyspareunia     Glaucoma     Heart failure (HCC)     History of mixed connective tissue disease     Hx of viral pericarditis     Hyperactivity of bladder     Hypercholesterolemia     Ill-defined condition     macular degeneration    Ill-defined condition     recurrent UTI hx    Insomnia     Macular degeneration     Neck pain     Psoriasis     patient denies    Rectocele     Renal cyst     Seizure disorder (HCC)     none in 30 plus years---sees neurologist     Sjogren's disease ([facility] [address]     patient unsure    Thrombophlebitis     TIA (transient ischemic attack)     patient denies    Vaginal vault prolapse, posthysterectomy     Varicose veins       Past Surgical History:   Procedure Laterality Date    CARDIAC SURG PROCEDURE UNLIST      AV ablation    HX BREAST LUMPECTOMY      left breast    HX CATARACT REMOVAL      HX COLECTOMY  1955    partial    HX HEMORRHOIDECTOMY      HX HERNIA REPAIR  08/01/2018    Open bilateral inguinal hernia repairs with mesh.     HX HYSTERECTOMY      HX PACEMAKER      left    HX SHOULDER ARTHROSCOPY      left     Family History   Problem Relation Age of Onset    Cancer Father     Inflammatory Bowel Dz Mother     Heart Disease Brother     Diabetes Brother       Social History     Tobacco Use    Smoking status: Never Smoker    Smokeless tobacco: Never Used   Substance Use Topics    Alcohol use: No       Current Outpatient Medications   Medication Sig Dispense Refill    levETIRAcetam (KEPPRA) 500 mg tablet TAKE 1 TABLET BY MOUTH TWICE DAILY 180 Tab 5    albuterol-ipratropium (DUO-NEB) 2.5 mg-0.5 mg/3 ml nebu 3 mL by Nebulization route every six (6) hours as needed for Wheezing. 30 Nebule 1    OXYGEN-AIR DELIVERY SYSTEMS 2 L by Nasal route continuous.  guaifenesin/dextromethorphan (CORICIDIN HBP CHEST BRANDON-COUGH PO) Take 4 mg by mouth daily.  carvedilol (COREG) 3.125 mg tablet Take 1 Tab by mouth two (2) times daily (with meals). 30 Tab 0    lisinopril (PRINIVIL, ZESTRIL) 2.5 mg tablet Take 1 Tab by mouth daily. 30 Tab 0    furosemide (LASIX) 40 mg tablet Take 2 Tabs by mouth every other day. Pt takes 40 mg every other day and alternates with 80 mg. 30 Tab 0    warfarin (COUMADIN) 2.5 mg tablet Take 2.5 mg by mouth two (2) days a week. Pt takes 5 mg Mon, Tues, Thurs, Fri and Sun. Pt takes 2.5 mg Wed and Sat.  acetaminophen (TYLENOL EXTRA STRENGTH) 500 mg tablet Take 500 mg by mouth every six (6) hours as needed for Pain.  cranberry fruit extract (CRANBERRY PO) Take 1 Can by mouth daily.  amitriptyline (ELAVIL) 10 mg tablet Take 10 mg by mouth nightly.  cholecalciferol, vitamin D3, (VITAMIN D3) 2,000 unit tab Take 2,000 Units by mouth daily.  psyllium seed, with dextrose, (FIBER PO) Take 1 Cap by mouth daily.  warfarin (COUMADIN) 2.5 mg tablet Take 5 mg by mouth five (5) days a week. Pt takes 5 mg Mon, Tues, Thurs, Fri and Sun. Pt takes 2.5 mg Wed and Sat.       VIT A/C/E AC/ZNOX/CUPRIC OXIDE (EYE VITAMIN AND MINERALS PO) Take 1 Tab by mouth two (2) times a day.  ferrous sulfate (IRON) 325 mg (65 mg iron) tablet Take 325 mg by mouth two (2) times a day.  DOCUSATE CALCIUM (STOOL SOFTENER PO) Take 100 mg by mouth as needed for Other (constipation).  latanoprost (XALATAN) 0.005 % ophthalmic solution Administer 1 Drop to both eyes nightly. Allergies   Allergen Reactions    Codeine Nausea Only    Pcn [Penicillins] Unknown (comments)     Cant remember reaction as a child    Tetanus Toxoid, Adsorbed Other (comments)    Tetanus Vaccines And Toxoid Unknown (comments)     Does not take tetanus due to tb hx    Vibramycin [Doxycycline Calcium] Rash        Review of Systems:  A comprehensive review of systems was negative except for: Constitutional: positive for fatigue and malaise  Eyes: positive for glaucoma, visual disturbance and macular degeneration  Cardiovascular: positive for dyspnea, palpitations, irregular heart beats, exertional chest pressure/discomfort  Musculoskeletal: positive for myalgias, arthralgias, stiff joints, back pain and muscle weakness  Neurological: positive for dizziness, seizures, coordination problems, gait problems and weakness  Behvioral/Psych: positive for anxiety and depression     Objective:     I      NEUROLOGICAL EXAM:    Appearance: The patient is well developed, well nourished, provides a fair history and is in no acute distress. Mental Status: Oriented to time, place and person and the president. Her mental status is amazing for 80year-old. Mood and affect depressed. Speech is fluent without aphasia or dysarthria   Cranial Nerves:   Intact visual fields but her visual acuity is markedly decreased but she does have count fingers vision because of her macular degeneration. Fundi are poorly seen. Pupils minimally react due to previous surgery, EOM's full, no nystagmus, no ptosis. Facial sensation is normal. Corneal reflexes are not tested.  Facial movement is symmetric. Hearing is reduced bilaterally. Palate is midline with normal sternocleidomastoid and trapezius muscles are normal. Tongue is midline. Neck supple without bruits or meningismus   Temporal arteries are not tender or enlarged   Motor:  4/5 strength in upper and lower proximal and distal muscles. reduced bulk and tone. No fasciculations. Reflexes:   Deep tendon reflexes 1+/4 and symmetrical. No Babinski or clonus present   Sensory:   Abnormal to touch, pinprick and vibration and temperature in the feet bilaterally. Gait:  Abnormal gait due to arthritis and poor vision and patient has to use assistive devices of a rolling walker or Rollator. Tremor:   No tremor noted. Cerebellar:  Mildly abnormal Romberg and tandem cerebellar signs present. Neurovascular:  Normal heart sounds and regular rhythm, peripheral pulses decreased, and no carotid bruits. Assessment:     1  Plan:       History of seizures of probable partial complex and secondary generalized type. Well controlled on Keppra  Ataxia of gait due to a combination of arthritis, poor vision and senile gait apraxia and possible neuropathy  Possible neuropathy of unclear cause, ruled out treatable causes, probably causing mild sensory ataxia  Multiple medical problems including a fib on chronic Coumadin therapy as above. Severe visual loss secondary to macular degeneration  Discussed with patient and daughter again, the possibility of coming off her medication but all agreed to stay on the medicine  We'll continue the Keppra refills given the patient  Patient advised to continue her vitamins and vitamin D every day and her B12 and other supplements and medications as prescribed. She was encouraged to remain mentally and physically active. We will see her again in 12 months time or earlier if needed. They will call if any problem.     Signed By: Felicita Holloway MD     January 10, 2020       This note will not be viewable in MyChart.

## 2020-01-11 PROCEDURE — 3331090002 HH PPS REVENUE DEBIT

## 2020-01-11 PROCEDURE — 3331090001 HH PPS REVENUE CREDIT

## 2020-01-12 PROCEDURE — 3331090001 HH PPS REVENUE CREDIT

## 2020-01-12 PROCEDURE — 3331090002 HH PPS REVENUE DEBIT

## 2020-01-13 PROCEDURE — 3331090002 HH PPS REVENUE DEBIT

## 2020-01-13 PROCEDURE — 3331090001 HH PPS REVENUE CREDIT

## 2020-01-14 PROCEDURE — 3331090002 HH PPS REVENUE DEBIT

## 2020-01-14 PROCEDURE — 3331090001 HH PPS REVENUE CREDIT

## 2020-01-15 ENCOUNTER — HOME CARE VISIT (OUTPATIENT)
Dept: SCHEDULING | Facility: HOME HEALTH | Age: 85
End: 2020-01-15
Payer: MEDICARE

## 2020-01-15 VITALS
SYSTOLIC BLOOD PRESSURE: 122 MMHG | HEART RATE: 74 BPM | TEMPERATURE: 98.3 F | RESPIRATION RATE: 16 BRPM | OXYGEN SATURATION: 98 % | DIASTOLIC BLOOD PRESSURE: 70 MMHG

## 2020-01-15 PROCEDURE — 3331090002 HH PPS REVENUE DEBIT

## 2020-01-15 PROCEDURE — G0299 HHS/HOSPICE OF RN EA 15 MIN: HCPCS

## 2020-01-15 PROCEDURE — 3331090003 HH PPS REVENUE ADJ

## 2020-01-15 PROCEDURE — 3331090001 HH PPS REVENUE CREDIT

## 2020-01-16 PROCEDURE — 3331090001 HH PPS REVENUE CREDIT

## 2020-01-16 PROCEDURE — 3331090002 HH PPS REVENUE DEBIT

## 2020-02-05 ENCOUNTER — PATIENT OUTREACH (OUTPATIENT)
Dept: INTERNAL MEDICINE CLINIC | Age: 85
End: 2020-02-05

## 2020-02-27 ENCOUNTER — APPOINTMENT (OUTPATIENT)
Dept: GENERAL RADIOLOGY | Age: 85
End: 2020-02-27
Attending: EMERGENCY MEDICINE
Payer: MEDICARE

## 2020-02-27 ENCOUNTER — APPOINTMENT (OUTPATIENT)
Dept: CT IMAGING | Age: 85
End: 2020-02-27
Attending: EMERGENCY MEDICINE
Payer: MEDICARE

## 2020-02-27 ENCOUNTER — HOSPITAL ENCOUNTER (EMERGENCY)
Age: 85
Discharge: HOME | End: 2020-02-27
Attending: EMERGENCY MEDICINE
Payer: MEDICARE

## 2020-02-27 VITALS
BODY MASS INDEX: 23.16 KG/M2 | OXYGEN SATURATION: 91 % | HEIGHT: 60 IN | RESPIRATION RATE: 27 BRPM | SYSTOLIC BLOOD PRESSURE: 108 MMHG | TEMPERATURE: 98.3 F | WEIGHT: 118 LBS | DIASTOLIC BLOOD PRESSURE: 53 MMHG | HEART RATE: 80 BPM

## 2020-02-27 DIAGNOSIS — J81.0 ACUTE PULMONARY EDEMA (HCC): ICD-10-CM

## 2020-02-27 DIAGNOSIS — N39.0 ACUTE UTI: Primary | ICD-10-CM
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
## 2020-02-27 DIAGNOSIS — S40.011A CONTUSION OF RIGHT SHOULDER, INITIAL ENCOUNTER: ICD-10-CM

## 2020-02-27 LAB
ALBUMIN SERPL-MCNC: 2.9 G/DL (ref 3.5–5)
ALBUMIN/GLOB SERPL: 0.7 {RATIO} (ref 1.1–2.2)
ALP SERPL-CCNC: 122 U/L (ref 45–117)
ALT SERPL-CCNC: 20 U/L (ref 12–78)
ANION GAP SERPL CALC-SCNC: 6 MMOL/L (ref 5–15)
APPEARANCE UR: ABNORMAL
AST SERPL-CCNC: 18 U/L (ref 15–37)
BACTERIA URNS QL MICRO: ABNORMAL /HPF
BASOPHILS # BLD: 0 K/UL (ref 0–0.1)
BASOPHILS NFR BLD: 0 % (ref 0–1)
BILIRUB SERPL-MCNC: 0.9 MG/DL (ref 0.2–1)
BILIRUB UR QL: NEGATIVE
BUN SERPL-MCNC: 39 MG/DL (ref 6–20)
BUN/CREAT SERPL: 34 (ref 12–20)
CALCIUM SERPL-MCNC: 8.2 MG/DL (ref 8.5–10.1)
CHLORIDE SERPL-SCNC: 99 MMOL/L (ref 97–108)
CO2 SERPL-SCNC: 28 MMOL/L (ref 21–32)
COLOR UR: ABNORMAL
CREAT SERPL-MCNC: 1.16 MG/DL (ref 0.55–1.02)
DIFFERENTIAL METHOD BLD: ABNORMAL
EOSINOPHIL # BLD: 0 K/UL (ref 0–0.4)
EOSINOPHIL NFR BLD: 0 % (ref 0–7)
EPITH CASTS URNS QL MICRO: ABNORMAL /LPF
ERYTHROCYTE [DISTWIDTH] IN BLOOD BY AUTOMATED COUNT: 14.8 % (ref 11.5–14.5)
GLOBULIN SER CALC-MCNC: 4.4 G/DL (ref 2–4)
GLUCOSE SERPL-MCNC: 121 MG/DL (ref 65–100)
GLUCOSE UR STRIP.AUTO-MCNC: NEGATIVE MG/DL
HCT VFR BLD AUTO: 35.7 % (ref 35–47)
HGB BLD-MCNC: 12.1 G/DL (ref 11.5–16)
HGB UR QL STRIP: ABNORMAL
IMM GRANULOCYTES # BLD AUTO: 0 K/UL (ref 0–0.04)
IMM GRANULOCYTES NFR BLD AUTO: 0 % (ref 0–0.5)
KETONES UR QL STRIP.AUTO: NEGATIVE MG/DL
LEUKOCYTE ESTERASE UR QL STRIP.AUTO: ABNORMAL
LYMPHOCYTES # BLD: 0.7 K/UL (ref 0.8–3.5)
LYMPHOCYTES NFR BLD: 6 % (ref 12–49)
MCH RBC QN AUTO: 31.8 PG (ref 26–34)
MCHC RBC AUTO-ENTMCNC: 33.9 G/DL (ref 30–36.5)
MCV RBC AUTO: 93.7 FL (ref 80–99)
MONOCYTES # BLD: 0.7 K/UL (ref 0–1)
MONOCYTES NFR BLD: 6 % (ref 5–13)
NEUTS SEG # BLD: 10.1 K/UL (ref 1.8–8)
NEUTS SEG NFR BLD: 88 % (ref 32–75)
NITRITE UR QL STRIP.AUTO: POSITIVE
NRBC # BLD: 0 K/UL (ref 0–0.01)
NRBC BLD-RTO: 0 PER 100 WBC
PH UR STRIP: 5 [PH] (ref 5–8)
PLATELET # BLD AUTO: 104 K/UL (ref 150–400)
PMV BLD AUTO: 11 FL (ref 8.9–12.9)
POTASSIUM SERPL-SCNC: 3.8 MMOL/L (ref 3.5–5.1)
PROT SERPL-MCNC: 7.3 G/DL (ref 6.4–8.2)
PROT UR STRIP-MCNC: ABNORMAL MG/DL
RBC # BLD AUTO: 3.81 M/UL (ref 3.8–5.2)
RBC #/AREA URNS HPF: ABNORMAL /HPF
RBC MORPH BLD: ABNORMAL
SODIUM SERPL-SCNC: 133 MMOL/L (ref 136–145)
SP GR UR REFRACTOMETRY: 1.02 (ref 1–1.03)
UA: UC IF INDICATED,UAUC: ABNORMAL
UROBILINOGEN UR QL STRIP.AUTO: 1 EU/DL (ref 0.2–1)
WBC # BLD AUTO: 11.5 K/UL (ref 3.6–11)
WBC URNS QL MICRO: >100 /HPF

## 2020-02-27 PROCEDURE — 87086 URINE CULTURE/COLONY COUNT: CPT

## 2020-02-27 PROCEDURE — 99284 EMERGENCY DEPT VISIT MOD MDM: CPT

## 2020-02-27 PROCEDURE — 71046 X-RAY EXAM CHEST 2 VIEWS: CPT

## 2020-02-27 PROCEDURE — 36415 COLL VENOUS BLD VENIPUNCTURE: CPT

## 2020-02-27 PROCEDURE — 81001 URINALYSIS AUTO W/SCOPE: CPT

## 2020-02-27 PROCEDURE — 80053 COMPREHEN METABOLIC PANEL: CPT

## 2020-02-27 PROCEDURE — 87186 SC STD MICRODIL/AGAR DIL: CPT

## 2020-02-27 PROCEDURE — 73030 X-RAY EXAM OF SHOULDER: CPT

## 2020-02-27 PROCEDURE — 70450 CT HEAD/BRAIN W/O DYE: CPT

## 2020-02-27 PROCEDURE — 71045 X-RAY EXAM CHEST 1 VIEW: CPT

## 2020-02-27 PROCEDURE — 87077 CULTURE AEROBIC IDENTIFY: CPT

## 2020-02-27 PROCEDURE — 85025 COMPLETE CBC W/AUTO DIFF WBC: CPT

## 2020-02-27 RX ORDER — CEPHALEXIN 500 MG/1
500 CAPSULE ORAL 3 TIMES DAILY
Qty: 21 CAP | Refills: 0 | Status: SHIPPED | OUTPATIENT
Start: 2020-02-27 | End: 2020-03-05

## 2020-02-27 NOTE — ED PROVIDER NOTES
EMERGENCY DEPARTMENT HISTORY AND PHYSICAL EXAM      Date: 2/27/2020  Patient Name: Crist Aschoff    History of Presenting Illness     Chief Complaint   Patient presents with    Fall       History Provided By: Patient and Patient's Daughter    HPI: Crist Aschoff, 80 y.o. female with PMHx significant for TIA, CHF, CKD, CAD, HTN, A. fib on Coumadin who presents following a fall. Patient states she was trying to get from her potty chair back into her regular chair height make it and fell landing on the ground. She does think she hit her head and hit her right shoulder. She denies getting lightheaded prior to the episode or any kind of chest pain. Did not lose consciousness. Is complaining of some pain in her right shoulder. Also reports to me that she has just not felt well for about 2 weeks but cannot be more specific. No cough, shortness of breath, nausea, vomiting, diarrhea, dysuria. Patient does have home oxygen and she has been wearing 3 L recently. PCP: Tiny Coronado MD    There are no other complaints, changes, or physical findings at this time. Current Outpatient Medications   Medication Sig Dispense Refill    cephALEXin (KEFLEX) 500 mg capsule Take 1 Cap by mouth three (3) times daily for 7 days. 21 Cap 0    levETIRAcetam (KEPPRA) 500 mg tablet TAKE 1 TABLET BY MOUTH TWICE DAILY 180 Tab 5    albuterol-ipratropium (DUO-NEB) 2.5 mg-0.5 mg/3 ml nebu 3 mL by Nebulization route every six (6) hours as needed for Wheezing. 30 Nebule 1    OXYGEN-AIR DELIVERY SYSTEMS 2 L by Nasal route continuous.  guaifenesin/dextromethorphan (CORICIDIN HBP CHEST BRANDON-COUGH PO) Take 4 mg by mouth daily.  carvedilol (COREG) 3.125 mg tablet Take 1 Tab by mouth two (2) times daily (with meals). 30 Tab 0    lisinopril (PRINIVIL, ZESTRIL) 2.5 mg tablet Take 1 Tab by mouth daily. 30 Tab 0    furosemide (LASIX) 40 mg tablet Take 2 Tabs by mouth every other day.  Pt takes 40 mg every other day and alternates with 80 mg. 30 Tab 0    warfarin (COUMADIN) 2.5 mg tablet Take 2.5 mg by mouth two (2) days a week. Pt takes 5 mg Mon, Tues, Thurs, Fri and Sun. Pt takes 2.5 mg Wed and Sat.  acetaminophen (TYLENOL EXTRA STRENGTH) 500 mg tablet Take 500 mg by mouth every six (6) hours as needed for Pain.  cranberry fruit extract (CRANBERRY PO) Take 1 Can by mouth daily.  amitriptyline (ELAVIL) 10 mg tablet Take 10 mg by mouth nightly.  cholecalciferol, vitamin D3, (VITAMIN D3) 2,000 unit tab Take 2,000 Units by mouth daily.  psyllium seed, with dextrose, (FIBER PO) Take 1 Cap by mouth daily.  warfarin (COUMADIN) 2.5 mg tablet Take 5 mg by mouth five (5) days a week. Pt takes 5 mg Mon, Tues, Thurs, Fri and Sun. Pt takes 2.5 mg Wed and Sat.  VIT A/C/E AC/ZNOX/CUPRIC OXIDE (EYE VITAMIN AND MINERALS PO) Take 1 Tab by mouth two (2) times a day.  ferrous sulfate (IRON) 325 mg (65 mg iron) tablet Take 325 mg by mouth two (2) times a day.  DOCUSATE CALCIUM (STOOL SOFTENER PO) Take 100 mg by mouth as needed for Other (constipation).  latanoprost (XALATAN) 0.005 % ophthalmic solution Administer 1 Drop to both eyes nightly.        Past History     Past Medical History:  Past Medical History:   Diagnosis Date    Adverse effect of anesthesia     slow to wake    Alopecia     Arrhythmia     atrial fib,?svt    Arthritis     Atrial fibrillation (HCC)     Benign essential HTN     Benign neoplasm of vulva     CAD (coronary artery disease)     Cholelithiases     Chronic kidney disease     followed by specialist    Chronic pain     Colon cancer (HCC)     Constipation     Dyspareunia     Glaucoma     Heart failure (HCC)     History of mixed connective tissue disease     Hx of viral pericarditis     Hyperactivity of bladder     Hypercholesterolemia     Ill-defined condition     macular degeneration    Ill-defined condition     recurrent UTI hx    Insomnia     Macular degeneration     Neck pain     Psoriasis     patient denies    Rectocele     Renal cyst     Seizure disorder (HCC)     none in 30 plus years---sees neurologist     Sjogren's disease ([facility] [address]     patient unsure    Thrombophlebitis     TIA (transient ischemic attack)     patient denies    Vaginal vault prolapse, posthysterectomy     Varicose veins      Past Surgical History:  Past Surgical History:   Procedure Laterality Date    CARDIAC SURG PROCEDURE UNLIST      AV ablation    HX BREAST LUMPECTOMY      left breast    HX CATARACT REMOVAL      HX COLECTOMY  1955    partial    HX HEMORRHOIDECTOMY      HX HERNIA REPAIR  08/01/2018    Open bilateral inguinal hernia repairs with mesh.  HX HYSTERECTOMY      HX PACEMAKER      left    HX SHOULDER ARTHROSCOPY      left     Family History:  Family History   Problem Relation Age of Onset    Cancer Father     Inflammatory Bowel Dz Mother     Heart Disease Brother     Diabetes Brother      Social History:  Social History     Tobacco Use    Smoking status: Never Smoker    Smokeless tobacco: Never Used   Substance Use Topics    Alcohol use: No    Drug use: No     Allergies: Allergies   Allergen Reactions    Codeine Nausea Only    Pcn [Penicillins] Unknown (comments)     Cant remember reaction as a child    Tetanus Toxoid, Adsorbed Other (comments)    Tetanus Vaccines And Toxoid Unknown (comments)     Does not take tetanus due to tb hx    Vibramycin [Doxycycline Calcium] Rash     Review of Systems   Review of Systems   Constitutional: Positive for fatigue. Negative for chills and fever. HENT: Negative for congestion, rhinorrhea and sore throat. Respiratory: Negative for cough and shortness of breath. Cardiovascular: Negative for chest pain. Gastrointestinal: Negative for abdominal pain, nausea and vomiting. Genitourinary: Negative for dysuria and urgency. Musculoskeletal: Positive for arthralgias. Skin: Negative for rash. Neurological: Negative for dizziness, light-headedness and headaches. All other systems reviewed and are negative. Physical Exam   Physical Exam  Vitals signs and nursing note reviewed. Constitutional:       General: She is not in acute distress. Appearance: She is well-developed. HENT:      Head: Normocephalic and atraumatic. Eyes:      Conjunctiva/sclera: Conjunctivae normal.      Pupils: Pupils are equal, round, and reactive to light. Neck:      Musculoskeletal: Normal range of motion. Cardiovascular:      Rate and Rhythm: Normal rate and regular rhythm. Pulmonary:      Effort: Pulmonary effort is normal. No respiratory distress. Breath sounds: No stridor. Comments: Conversational dyspnea, scattered crackles diffusely  Abdominal:      General: There is no distension. Palpations: Abdomen is soft. Tenderness: There is no abdominal tenderness. Musculoskeletal: Normal range of motion. Comments: Abrasion over the left shoulder and right clavicle abrasion  Full range of motion of the bilateral hips without pain   Skin:     General: Skin is warm and dry. Neurological:      Mental Status: She is alert and oriented to person, place, and time. Diagnostic Study Results   Labs -     Recent Results (from the past 12 hour(s))   CBC WITH AUTOMATED DIFF    Collection Time: 02/27/20  7:02 PM   Result Value Ref Range    WBC 11.5 (H) 3.6 - 11.0 K/uL    RBC 3.81 3.80 - 5.20 M/uL    HGB 12.1 11.5 - 16.0 g/dL    HCT 35.7 35.0 - 47.0 %    MCV 93.7 80.0 - 99.0 FL    MCH 31.8 26.0 - 34.0 PG    MCHC 33.9 30.0 - 36.5 g/dL    RDW 14.8 (H) 11.5 - 14.5 %    PLATELET 059 (L) 519 - 400 K/uL    MPV 11.0 8.9 - 12.9 FL    NRBC 0.0 0  WBC    ABSOLUTE NRBC 0.00 0.00 - 0.01 K/uL    NEUTROPHILS 88 (H) 32 - 75 %    LYMPHOCYTES 6 (L) 12 - 49 %    MONOCYTES 6 5 - 13 %    EOSINOPHILS 0 0 - 7 %    BASOPHILS 0 0 - 1 %    IMMATURE GRANULOCYTES 0 0.0 - 0.5 %    ABS.  NEUTROPHILS 10.1 (H) 1.8 - 8.0 K/UL    ABS. LYMPHOCYTES 0.7 (L) 0.8 - 3.5 K/UL    ABS. MONOCYTES 0.7 0.0 - 1.0 K/UL    ABS. EOSINOPHILS 0.0 0.0 - 0.4 K/UL    ABS. BASOPHILS 0.0 0.0 - 0.1 K/UL    ABS. IMM. GRANS. 0.0 0.00 - 0.04 K/UL    DF AUTOMATED      RBC COMMENTS NORMOCYTIC, NORMOCHROMIC     METABOLIC PANEL, COMPREHENSIVE    Collection Time: 02/27/20  7:02 PM   Result Value Ref Range    Sodium 133 (L) 136 - 145 mmol/L    Potassium 3.8 3.5 - 5.1 mmol/L    Chloride 99 97 - 108 mmol/L    CO2 28 21 - 32 mmol/L    Anion gap 6 5 - 15 mmol/L    Glucose 121 (H) 65 - 100 mg/dL    BUN 39 (H) 6 - 20 MG/DL    Creatinine 1.16 (H) 0.55 - 1.02 MG/DL    BUN/Creatinine ratio 34 (H) 12 - 20      GFR est AA 53 (L) >60 ml/min/1.73m2    GFR est non-AA 43 (L) >60 ml/min/1.73m2    Calcium 8.2 (L) 8.5 - 10.1 MG/DL    Bilirubin, total 0.9 0.2 - 1.0 MG/DL    ALT (SGPT) 20 12 - 78 U/L    AST (SGOT) 18 15 - 37 U/L    Alk. phosphatase 122 (H) 45 - 117 U/L    Protein, total 7.3 6.4 - 8.2 g/dL    Albumin 2.9 (L) 3.5 - 5.0 g/dL    Globulin 4.4 (H) 2.0 - 4.0 g/dL    A-G Ratio 0.7 (L) 1.1 - 2.2     URINALYSIS W/ REFLEX CULTURE    Collection Time: 02/27/20  7:02 PM   Result Value Ref Range    Color YELLOW/STRAW      Appearance TURBID (A) CLEAR      Specific gravity 1.016 1.003 - 1.030      pH (UA) 5.0 5.0 - 8.0      Protein TRACE (A) NEG mg/dL    Glucose NEGATIVE  NEG mg/dL    Ketone NEGATIVE  NEG mg/dL    Bilirubin NEGATIVE  NEG      Blood LARGE (A) NEG      Urobilinogen 1.0 0.2 - 1.0 EU/dL    Nitrites POSITIVE (A) NEG      Leukocyte Esterase LARGE (A) NEG      WBC >100 /hpf    RBC 5-10 /hpf    Epithelial cells FEW /lpf    Bacteria 4+ /hpf    UA:UC IF INDICATED URINE CULTURE ORDERED         Radiologic Studies -   XR CHEST PORT   Final Result   IMPRESSION: Mild interstitial edema. Lung hyperinflation. XR SHOULDER RT AP/LAT MIN 2 V   Final Result   IMPRESSION: No acute abnormality. CT HEAD WO CONT   Final Result   IMPRESSION: No acute intracranial trauma.  White matter disease. Vertebrobasilar   calcification can be associated with vertebrobasilar insufficiency syndrome. Xr Shoulder Rt Ap/lat Min 2 V    Result Date: 2/27/2020  IMPRESSION: No acute abnormality. Ct Head Wo Cont    Result Date: 2/27/2020  IMPRESSION: No acute intracranial trauma. White matter disease. Vertebrobasilar calcification can be associated with vertebrobasilar insufficiency syndrome. Xr Chest Port    Result Date: 2/27/2020  IMPRESSION: Mild interstitial edema. Lung hyperinflation. Medical Decision Making   I am the first provider for this patient. I reviewed the vital signs, available nursing notes, past medical history, past surgical history, family history and social history. Vital Signs-Reviewed the patient's vital signs. Patient Vitals for the past 12 hrs:   Temp Pulse Resp BP SpO2   02/27/20 2100  80 27 108/53 91 %   02/27/20 1849 98.3 °F (36.8 °C) 80 16 118/41 92 %       Pulse Oximetry Analysis - 92% on 3L    Records Reviewed: Nursing Notes and Old Medical Records    Provider Notes (Medical Decision Making):   Patient presents following a fall complaining of just not feeling well. On exam, patient with mild conversational dyspnea and scattered crackles. Does have an abrasion to the right shoulder as well as an abrasion to the left clavicle however denies any pain to the left clavicle with palpation. Given that she has not been feeling well in general we will check some basic lab work as well as a urinalysis. Will check CT head, chest x-ray, and x-ray of the right shoulder. ED Course:   Initial assessment performed. The patients presenting problems have been discussed, and they are in agreement with the care plan formulated and outlined with them. I have encouraged them to ask questions as they arise throughout their visit. Urinalysis shows signs of urinary tract infection.   Daughter has arrived and report that the patient takes Macrobid every other day for 1 month and then takes Keflex every other day for 1 month chronically. Is currently on Macrobid. Given UTI while on this therapy will treat with Keflex. Daughter also reports that she has been managing the patient's Lasix at home (daughter is a nurse who works for nephrology). The patient is currently taking 80 mg in the morning and 40 in the evening. Did discuss the slight increase of patient's creatinine with the daughter. Suggested increasing the Lasix given the mild pulmonary edema on chest x-ray. Offered a dose of Lasix here, however daughter would prefer to dose the patient at home. She understands that she needs to follow-up closely to get creatinine rechecked. Return precautions including worsening shortness of breath, high fevers, and confusion were discussed with the patient and her daughter who expressed understanding    Critical Care:  none    Disposition:  Discharge Note:  8:57 PM  The patient has been re-evaluated and is ready for discharge. Reviewed available results with patient. Counseled patient on diagnosis and care plan. Patient has expressed understanding, and all questions have been answered. Patient agrees with plan and agrees to follow up as recommended, or to return to the ED if their symptoms worsen. Discharge instructions have been provided and explained to the patient, along with reasons to return to the ED. PLAN:  1. Discharge Medication List as of 2/27/2020  8:57 PM      START taking these medications    Details   cephALEXin (KEFLEX) 500 mg capsule Take 1 Cap by mouth three (3) times daily for 7 days. , Normal, Disp-21 Cap, R-0         CONTINUE these medications which have NOT CHANGED    Details   levETIRAcetam (KEPPRA) 500 mg tablet TAKE 1 TABLET BY MOUTH TWICE DAILY, Normal, Disp-180 Tab, R-5      albuterol-ipratropium (DUO-NEB) 2.5 mg-0.5 mg/3 ml nebu 3 mL by Nebulization route every six (6) hours as needed for Wheezing., Normal, Disp-30 Nebule, R-1 OXYGEN-AIR DELIVERY SYSTEMS 2 L by Nasal route continuous. , Historical Med      guaifenesin/dextromethorphan (CORICIDIN HBP CHEST BRANDON-COUGH PO) Take 4 mg by mouth daily. , Historical Med      carvedilol (COREG) 3.125 mg tablet Take 1 Tab by mouth two (2) times daily (with meals). , Normal, Disp-30 Tab, R-0      lisinopril (PRINIVIL, ZESTRIL) 2.5 mg tablet Take 1 Tab by mouth daily. , Normal, Disp-30 Tab, R-0      furosemide (LASIX) 40 mg tablet Take 2 Tabs by mouth every other day. Pt takes 40 mg every other day and alternates with 80 mg., Normal, Disp-30 Tab, R-0      !! warfarin (COUMADIN) 2.5 mg tablet Take 2.5 mg by mouth two (2) days a week. Pt takes 5 mg Mon, Tues, Thurs, Fri and Sun. Pt takes 2.5 mg Wed and Sat., Historical Med      acetaminophen (TYLENOL EXTRA STRENGTH) 500 mg tablet Take 500 mg by mouth every six (6) hours as needed for Pain., Historical Med      cranberry fruit extract (CRANBERRY PO) Take 1 Can by mouth daily. , Historical Med      amitriptyline (ELAVIL) 10 mg tablet Take 10 mg by mouth nightly., Historical Med      cholecalciferol, vitamin D3, (VITAMIN D3) 2,000 unit tab Take 2,000 Units by mouth daily. , Historical Med      psyllium seed, with dextrose, (FIBER PO) Take 1 Cap by mouth daily. , Historical Med      !! warfarin (COUMADIN) 2.5 mg tablet Take 5 mg by mouth five (5) days a week. Pt takes 5 mg Mon, Tues, Thurs, Fri and Sun. Pt takes 2.5 mg Wed and Sat., Historical Med      VIT A/C/E AC/ZNOX/CUPRIC OXIDE (EYE VITAMIN AND MINERALS PO) Take 1 Tab by mouth two (2) times a day., Historical Med      ferrous sulfate (IRON) 325 mg (65 mg iron) tablet Take 325 mg by mouth two (2) times a day., Historical Med      DOCUSATE CALCIUM (STOOL SOFTENER PO) Take 100 mg by mouth as needed for Other (constipation). , Historical Med      latanoprost (XALATAN) 0.005 % ophthalmic solution Administer 1 Drop to both eyes nightly., Historical Med       !! - Potential duplicate medications found.  Please discuss with provider. 2.   Follow-up Information     Follow up With Specialties Details Why Contact Info    Mireya Ni MD Internal Medicine Schedule an appointment as soon as possible for a visit  [address] [facility]  [phone]      [facility] [place] EM[facility] Emergency Medicine  As needed, If symptoms worsen [address]  [address] [facility]  [phone]        Return to ED if worse     Diagnosis     Clinical Impression:   1. Acute UTI    2. Fall, initial encounter    3. Contusion of right shoulder, initial encounter    4. Acute pulmonary edema (HCC)        This note will not be viewable in [employer]. Please note that this dictation was completed with [employer], the computer voice recognition software. Quite often unanticipated grammatical, syntax, homophones, and other interpretive errors are inadvertently transcribed by the computer software. Please disregard these errors.   Please excuse any errors that have escaped final proofreading

## 2020-02-27 NOTE — ED TRIAGE NOTES
Pt states she was trying to get off her potty chair when she fell. Pt states she remembers the fall and laying on ground waiting for help.  Pt uses [facility] at home as needed

## 2020-02-28 NOTE — DISCHARGE INSTRUCTIONS

## 2020-02-28 NOTE — ED NOTES
I have reviewed discharge instructions with the patient and caregiver. The patient and caregiver verbalized understanding.  Pt wheeled to lobby with daughter, no distress noted, no needs at time

## 2020-03-01 LAB
BACTERIA SPEC CULT: ABNORMAL
CC UR VC: ABNORMAL
SERVICE CMNT-IMP: ABNORMAL

## 2020-06-03 ENCOUNTER — TELEPHONE (OUTPATIENT)
Dept: INTERNAL MEDICINE CLINIC | Age: 85
End: 2020-06-03

## 2020-06-03 NOTE — TELEPHONE ENCOUNTER
Spoke with patients daughter Ernesto Lundberg that we have not received anything from the oxygen company for this patient. Cristhian Frias states that the company has been calling and faxing the wrong doctor.   Cristhian Frias states that she is going to have them fax her the form and she will scan it to the patients [facility] and send it to us that way

## 2020-06-03 NOTE — TELEPHONE ENCOUNTER
#[phone] daughter, Aditi Elkins states that the [employer] has sent over documents for re cert & CMN with no response. Now they have turned it over to Aditi Elkins to figure out, so she needs a call back yet today about this.

## 2020-06-03 NOTE — TELEPHONE ENCOUNTER
----- Message from Audra Saldana sent at 6/3/2020  3:37 PM EDT -----  Regarding: Dr. Claudia Young first and last name:  Trevor celaya [facility]  Reason for call:  f/up to see if paperwork was received for oxygen tanks  Callback required yes/no and why: yes   Best contact number(s): [phone] ext [phone], fax [phone]  Details to clarify the request:       Copy/paste envera

## 2020-06-04 NOTE — TELEPHONE ENCOUNTER
Spoke with Madeline Crain with [employer] that we have not received the form for patients O2. Madeline Crain to refax to [phone].

## 2020-06-05 ENCOUNTER — TELEPHONE (OUTPATIENT)
Dept: INTERNAL MEDICINE CLINIC | Age: 85
End: 2020-06-05

## 2020-06-05 NOTE — TELEPHONE ENCOUNTER
Caller's first and last name:Ruchi Manriquez/daughter       Reason for call:Daughter states she  has faxed over the paper work from the [id number] for pt oxygen.        Callback required yes/no and why:yes       Best contact number(s):[phone]       Details to clarify the request:       Xavi Collier 26

## 2020-06-05 NOTE — TELEPHONE ENCOUNTER
MD Jorge Lazo LPN   Caller: Unspecified (Today, 10:50 AM)             Yes patient has pulmonologist and I do not have a recent oxygen level to fill out form      [employer] message sent to Benny with the above message from Dr. August Lorenz

## 2021-01-01 ENCOUNTER — TELEPHONE (OUTPATIENT)
Dept: INTERNAL MEDICINE CLINIC | Age: 86
End: 2021-01-01

## 2021-01-01 ENCOUNTER — OFFICE VISIT (OUTPATIENT)
Dept: INTERNAL MEDICINE CLINIC | Age: 86
End: 2021-01-01
Payer: MEDICARE

## 2021-01-01 VITALS
WEIGHT: 111.6 LBS | HEIGHT: 60 IN | BODY MASS INDEX: 21.91 KG/M2 | SYSTOLIC BLOOD PRESSURE: 108 MMHG | HEART RATE: 86 BPM | RESPIRATION RATE: 20 BRPM | TEMPERATURE: 98.2 F | DIASTOLIC BLOOD PRESSURE: 64 MMHG

## 2021-01-01 DIAGNOSIS — I50.22 CHRONIC SYSTOLIC CONGESTIVE HEART FAILURE (HCC): Primary | ICD-10-CM

## 2021-01-01 DIAGNOSIS — M54.50 ACUTE MIDLINE LOW BACK PAIN WITHOUT SCIATICA: ICD-10-CM

## 2021-01-01 DIAGNOSIS — I48.91 ATRIAL FIBRILLATION WITH RVR (HCC): ICD-10-CM

## 2021-01-01 DIAGNOSIS — Z79.899 ON POTASSIUM WASTING DIURETIC THERAPY: ICD-10-CM

## 2021-01-01 DIAGNOSIS — G40.209 COMPLEX PARTIAL SEIZURE WITH IMPAIRMENT OF CONSCIOUSNESS (HCC): ICD-10-CM

## 2021-01-01 DIAGNOSIS — R27.0 ATAXIA: ICD-10-CM

## 2021-01-01 DIAGNOSIS — E55.9 VITAMIN D DEFICIENCY: ICD-10-CM

## 2021-01-01 DIAGNOSIS — J42 CHRONIC BRONCHITIS, UNSPECIFIED CHRONIC BRONCHITIS TYPE (HCC): ICD-10-CM

## 2021-01-01 DIAGNOSIS — Z00.00 MEDICARE ANNUAL WELLNESS VISIT, SUBSEQUENT: ICD-10-CM

## 2021-01-01 LAB
25(OH)D3 SERPL-MCNC: 36.9 NG/ML (ref 30–100)
ALBUMIN SERPL-MCNC: 3.7 G/DL (ref 3.5–5)
ALBUMIN/GLOB SERPL: 0.8 {RATIO} (ref 1.1–2.2)
ALP SERPL-CCNC: 119 U/L (ref 45–117)
ALT SERPL-CCNC: 22 U/L (ref 12–78)
ANION GAP SERPL CALC-SCNC: 3 MMOL/L (ref 5–15)
AST SERPL-CCNC: 18 U/L (ref 15–37)
BASOPHILS # BLD: 0 K/UL (ref 0–0.1)
BASOPHILS NFR BLD: 0 % (ref 0–1)
BILIRUB SERPL-MCNC: 1 MG/DL (ref 0.2–1)
BUN SERPL-MCNC: 30 MG/DL (ref 6–20)
BUN/CREAT SERPL: 28 (ref 12–20)
CALCIUM SERPL-MCNC: 9.8 MG/DL (ref 8.5–10.1)
CHLORIDE SERPL-SCNC: 107 MMOL/L (ref 97–108)
CO2 SERPL-SCNC: 34 MMOL/L (ref 21–32)
CREAT SERPL-MCNC: 1.06 MG/DL (ref 0.55–1.02)
DIFFERENTIAL METHOD BLD: ABNORMAL
EOSINOPHIL # BLD: 0.1 K/UL (ref 0–0.4)
EOSINOPHIL NFR BLD: 2 % (ref 0–7)
ERYTHROCYTE [DISTWIDTH] IN BLOOD BY AUTOMATED COUNT: 14.6 % (ref 11.5–14.5)
GLOBULIN SER CALC-MCNC: 4.4 G/DL (ref 2–4)
GLUCOSE SERPL-MCNC: 98 MG/DL (ref 65–100)
HCT VFR BLD AUTO: 41.3 % (ref 35–47)
HGB BLD-MCNC: 12.6 G/DL (ref 11.5–16)
IMM GRANULOCYTES # BLD AUTO: 0 K/UL (ref 0–0.04)
IMM GRANULOCYTES NFR BLD AUTO: 0 % (ref 0–0.5)
LYMPHOCYTES # BLD: 1.1 K/UL (ref 0.8–3.5)
LYMPHOCYTES NFR BLD: 21 % (ref 12–49)
MAGNESIUM SERPL-MCNC: 2.8 MG/DL (ref 1.6–2.4)
MCH RBC QN AUTO: 31.5 PG (ref 26–34)
MCHC RBC AUTO-ENTMCNC: 30.5 G/DL (ref 30–36.5)
MCV RBC AUTO: 103.3 FL (ref 80–99)
MONOCYTES # BLD: 0.4 K/UL (ref 0–1)
MONOCYTES NFR BLD: 7 % (ref 5–13)
NEUTS SEG # BLD: 3.9 K/UL (ref 1.8–8)
NEUTS SEG NFR BLD: 70 % (ref 32–75)
NRBC # BLD: 0 K/UL (ref 0–0.01)
NRBC BLD-RTO: 0 PER 100 WBC
PHOSPHATE SERPL-MCNC: 3.6 MG/DL (ref 2.6–4.7)
PLATELET # BLD AUTO: 128 K/UL (ref 150–400)
PMV BLD AUTO: 10.8 FL (ref 8.9–12.9)
POTASSIUM SERPL-SCNC: 5.1 MMOL/L (ref 3.5–5.1)
PROT SERPL-MCNC: 8.1 G/DL (ref 6.4–8.2)
RBC # BLD AUTO: 4 M/UL (ref 3.8–5.2)
SODIUM SERPL-SCNC: 144 MMOL/L (ref 136–145)
WBC # BLD AUTO: 5.5 K/UL (ref 3.6–11)

## 2021-01-01 PROCEDURE — G8420 CALC BMI NORM PARAMETERS: HCPCS | Performed by: INTERNAL MEDICINE

## 2021-01-01 PROCEDURE — G8510 SCR DEP NEG, NO PLAN REQD: HCPCS | Performed by: INTERNAL MEDICINE

## 2021-01-01 PROCEDURE — G0439 PPPS, SUBSEQ VISIT: HCPCS | Performed by: INTERNAL MEDICINE

## 2021-01-01 PROCEDURE — G8427 DOCREV CUR MEDS BY ELIG CLIN: HCPCS | Performed by: INTERNAL MEDICINE

## 2021-01-01 PROCEDURE — G8536 NO DOC ELDER MAL SCRN: HCPCS | Performed by: INTERNAL MEDICINE

## 2021-01-01 PROCEDURE — 1101F PT FALLS ASSESS-DOCD LE1/YR: CPT | Performed by: INTERNAL MEDICINE

## 2021-01-01 RX ORDER — LEVETIRACETAM 500 MG/1
TABLET ORAL
Qty: 180 TABLET | Refills: 0 | Status: SHIPPED | OUTPATIENT
Start: 2021-01-01 | End: 2022-01-01

## 2021-01-24 DIAGNOSIS — R27.0 ATAXIA: ICD-10-CM

## 2021-01-24 DIAGNOSIS — M54.50 ACUTE MIDLINE LOW BACK PAIN WITHOUT SCIATICA: ICD-10-CM

## 2021-01-24 DIAGNOSIS — I48.91 ATRIAL FIBRILLATION WITH RVR (HCC): ICD-10-CM

## 2021-01-24 DIAGNOSIS — G40.209 COMPLEX PARTIAL SEIZURE WITH IMPAIRMENT OF CONSCIOUSNESS (HCC): ICD-10-CM

## 2021-01-24 RX ORDER — LEVETIRACETAM 500 MG/1
TABLET ORAL
Qty: 180 TAB | Refills: 0 | Status: SHIPPED | OUTPATIENT
Start: 2021-01-24 | End: 2021-05-28

## 2021-05-28 DIAGNOSIS — I48.91 ATRIAL FIBRILLATION WITH RVR (HCC): ICD-10-CM

## 2021-05-28 DIAGNOSIS — M54.50 ACUTE MIDLINE LOW BACK PAIN WITHOUT SCIATICA: ICD-10-CM

## 2021-05-28 DIAGNOSIS — R27.0 ATAXIA: ICD-10-CM

## 2021-05-28 DIAGNOSIS — G40.209 COMPLEX PARTIAL SEIZURE WITH IMPAIRMENT OF CONSCIOUSNESS (HCC): ICD-10-CM

## 2021-05-28 RX ORDER — LEVETIRACETAM 500 MG/1
TABLET ORAL
Qty: 180 TABLET | Refills: 0 | Status: SHIPPED | OUTPATIENT
Start: 2021-05-28 | End: 2021-07-08

## 2021-07-08 DIAGNOSIS — I48.91 ATRIAL FIBRILLATION WITH RVR (HCC): ICD-10-CM

## 2021-07-08 DIAGNOSIS — G40.209 COMPLEX PARTIAL SEIZURE WITH IMPAIRMENT OF CONSCIOUSNESS (HCC): ICD-10-CM

## 2021-07-08 DIAGNOSIS — M54.50 ACUTE MIDLINE LOW BACK PAIN WITHOUT SCIATICA: ICD-10-CM

## 2021-07-08 DIAGNOSIS — R27.0 ATAXIA: ICD-10-CM

## 2021-07-08 RX ORDER — LEVETIRACETAM 500 MG/1
TABLET ORAL
Qty: 180 TABLET | Refills: 0 | Status: SHIPPED | OUTPATIENT
Start: 2021-07-08 | End: 2021-01-01

## 2021-08-03 PROBLEM — I48.91 A-FIB (HCC): Status: RESOLVED | Noted: 2017-10-15 | Resolved: 2021-08-03

## 2021-09-09 NOTE — PROGRESS NOTES
Problem: Patient Education: Go to Patient Education Activity  Goal: Patient/Family Education  Outcome: Progressing Towards Goal     Problem: Patient Education: Go to Patient Education Activity  Goal: Patient/Family Education  Outcome: Progressing Towards Goal     Problem: Falls - Risk of  Goal: *Absence of Falls  Description  Document Latha Carrel Fall Risk and appropriate interventions in the flowsheet. Outcome: Progressing Towards Goal  Note: Fall Risk Interventions:  Mobility Interventions: Bed/chair exit alarm, Patient to call before getting OOB         Medication Interventions: Bed/chair exit alarm, Patient to call before getting OOB, Teach patient to arise slowly    Elimination Interventions: Bed/chair exit alarm, Call light in reach              Problem: Patient Education: Go to Patient Education Activity  Goal: Patient/Family Education  Outcome: Progressing Towards Goal     Problem: Pressure Injury - Risk of  Goal: *Prevention of pressure injury  Description  Document Magdaleno Scale and appropriate interventions in the flowsheet.   Outcome: Progressing Towards Goal  Note: Pressure Injury Interventions:  Sensory Interventions: Assess changes in LOC    Moisture Interventions: Absorbent underpads    Activity Interventions: Increase time out of bed    Mobility Interventions: HOB 30 degrees or less, PT/OT evaluation    Nutrition Interventions: Document food/fluid/supplement intake, Offer support with meals,snacks and hydration    Friction and Shear Interventions: Apply protective barrier, creams and emollients hematoma/malpresentation

## 2021-11-12 NOTE — TELEPHONE ENCOUNTER
----- Message from Deya Nolasco sent at 11/12/2021 11:29 AM EST -----  Subject: Message to Provider    QUESTIONS  Information for Provider? Patient's daughter is calling back after missing   a call from clinical in regards to her mother. I called clinical and spoke   with Jolanta Peñaloza who stated that I need to send an encounter to clinical for   return call.   ---------------------------------------------------------------------------  --------------  [address]  What is the best way for the office to contact you? OK to leave message on   voicemail  Preferred Call Back Phone Number?  [phone]  ---------------------------------------------------------------------------  --------------  SCRIPT ANSWERS  undefined

## 2021-11-19 NOTE — PATIENT INSTRUCTIONS
Need records from Dr Светлана Melchor      Medicare Wellness Visit, Female     The best way to live healthy is to have a lifestyle where you eat a well-balanced diet, exercise regularly, limit alcohol use, and quit all forms of tobacco/nicotine, if applicable. Regular preventive services are another way to keep healthy. Preventive services (vaccines, screening tests, monitoring & exams) can help personalize your care plan, which helps you manage your own care. Screening tests can find health problems at the earliest stages, when they are easiest to treat. [facility] follows the current, evidence-based guidelines published by the [facility] Dhiraj Portillo ([facility]) when recommending preventive services for our patients. Because we follow these guidelines, sometimes recommendations change over time as research supports it. (For example, mammograms used to be recommended annually. Even though Medicare will still pay for an annual mammogram, the newer guidelines recommend a mammogram every two years for women of average risk). Of course, you and your doctor may decide to screen more often for some diseases, based on your risk and your co-morbidities (chronic disease you are already diagnosed with). Preventive services for you include:  - Medicare offers their members a free annual wellness visit, which is time for you and your primary care provider to discuss and plan for your preventive service needs. Take advantage of this benefit every year!  -All adults over the age of 72 should receive the recommended pneumonia vaccines. Current USPSTF guidelines recommend a series of two vaccines for the best pneumonia protection.   -All adults should have a flu vaccine yearly and a tetanus vaccine every 10 years.   -All adults age 48 and older should receive the shingles vaccines (series of two vaccines).       -All adults age 38-68 who are overweight should have a diabetes screening test once every three years.   -All adults born between [birth year] and 1965 should be screened once for Hepatitis C.  -Other screening tests and preventive services for persons with diabetes include: an eye exam to screen for diabetic retinopathy, a kidney function test, a foot exam, and stricter control over your cholesterol.   -Cardiovascular screening for adults with routine risk involves an electrocardiogram (ECG) at intervals determined by your doctor.   -Colorectal cancer screenings should be done for adults age 54-65 with no increased risk factors for colorectal cancer. There are a number of acceptable methods of screening for this type of cancer. Each test has its own benefits and drawbacks. Discuss with your doctor what is most appropriate for you during your annual wellness visit. The different tests include: colonoscopy (considered the best screening method), a fecal occult blood test, a fecal DNA test, and sigmoidoscopy.    -A bone mass density test is recommended when a woman turns 65 to screen for osteoporosis. This test is only recommended one time, as a screening. Some providers will use this same test as a disease monitoring tool if you already have osteoporosis. -Breast cancer screenings are recommended every other year for women of normal risk, age 54-69.  -Cervical cancer screenings for women over age 72 are only recommended with certain risk factors.      Here is a list of your current Health Maintenance items (your personalized list of preventive services) with a due date:  Health Maintenance Due   Topic Date Due                        COVID-19 Vaccine (3 - Booster for Clover Faustino series) 10/21/2021

## 2021-11-19 NOTE — PROGRESS NOTES
Identified pt with two pt identifiers(name and ). Reviewed record in preparation for visit and have obtained necessary documentation. Chief Complaint   Patient presents with    Annual Wellness Visit        Vitals:    21 1214   BP: 108/64   Pulse: 86   Resp: 20   Temp: 98.2 °F (36.8 °C)   TempSrc: Temporal   Weight: 111 lb 9.6 oz (50.6 kg)   Height: 5' (1.524 m)   PainSc:   0 - No pain       Health Maintenance Due   Topic    Eye Exam Retinal or Dilated     DTaP/Tdap/Td series (1 - Tdap)    Shingrix Vaccine Age 50> (1 of 2)    Bone Densitometry (Dexa) Screening     Foot Exam Q1     MICROALBUMIN Q1     Medicare Yearly Exam     COVID-19 Vaccine (2 - Moderna 3-dose booster series)       Coordination of Care Questionnaire:  :   1) Have you been to an emergency room, urgent care, or hospitalized since your last visit? If yes, where when, and reason for visit? yes       2. Have seen or consulted any other health care provider since your last visit? If yes, where when, and reason for visit? YES  Various appt over the last 3 years  Patient is accompanied by daughter I have received verbal consent from Gisella Infante to discuss any/all medical information while they are present in the room.

## 2021-11-19 NOTE — PROGRESS NOTES
Ms. Porfirio Hinton is presenting to follow up     CC: Annual Wellness Visit       HPI:      79 yo woman with a hx of CHF  EF 30 % last TTE in 2019, with AR, MR TR, recurrent UTIs, Afib s/p ablation and PPM, presenting to follow up on chronic medical issues    Patient has heart failure and has a EF of 30% on the echo in 2019. Patient follows with Dr. Tammy Neal I am requesting records. On Coreg 3.125 mg if blood pressure permits. Lasix 80 mg daily and PM dose if short winded    UTis recurrent taking macrobid every other day and alternating with keflex the following month and has not had any recurrent UTIs  Dr January Yousif reviewed notes  Estrace twice a week rubbing on labia which helps       Using oxygen at night saw pulmonary in APril 2021      Always using walker  Has care taker during the day and lives with family    Has difficulty with vision due to age but is listening to [address]. She is wearing bilateral hearing aids. She has good appetite.   Review of systems:  Constitutional: negative for fever, chills, weight loss, night sweats   10 systems reviewed and negative other than HPI    Past Medical History:   Diagnosis Date    Adverse effect of anesthesia     slow to wake    Alopecia     Arrhythmia     atrial fib,?svt    Arthritis     Atrial fibrillation (HCC)     Benign essential HTN     Benign neoplasm of vulva     CAD (coronary artery disease)     Cholelithiases     Chronic kidney disease     followed by specialist    Chronic pain     Colon cancer (HCC)     Constipation     Dyspareunia     Glaucoma     Heart failure (HCC)     History of mixed connective tissue disease     Hx of viral pericarditis     Hyperactivity of bladder     Hypercholesterolemia     Ill-defined condition     macular degeneration    Ill-defined condition     recurrent UTI hx    Insomnia     Macular degeneration     Neck pain     Psoriasis     patient denies    Rectocele     Renal cyst     Seizure disorder ([facility]     none in 30 plus years---sees neurologist     Sjogren's disease [facility])     patient unsure    Thrombophlebitis     TIA (transient ischemic attack)     patient denies    Vaginal vault prolapse, posthysterectomy     Varicose veins         Past Surgical History:   Procedure Laterality Date    HX BREAST LUMPECTOMY      left breast    HX CATARACT REMOVAL      HX HEMORRHOIDECTOMY      HX HERNIA REPAIR  08/01/2018    Open bilateral inguinal hernia repairs with mesh.  HX HYSTERECTOMY      HX PACEMAKER      left    HX SHOULDER ARTHROSCOPY      left    HX TOTAL COLECTOMY  1955    partial    CT CARDIAC SURG PROCEDURE UNLIST      AV ablation       Allergies   Allergen Reactions    Codeine Nausea Only    Pcn [Penicillins] Unknown (comments)     Cant remember reaction as a child    Tetanus Toxoid, Adsorbed Other (comments)    Tetanus Vaccines And Toxoid Unknown (comments)     Does not take tetanus due to tb hx    Vibramycin [Doxycycline Calcium] Rash       Current Outpatient Medications on File Prior to Visit   Medication Sig Dispense Refill    levETIRAcetam (KEPPRA) 500 mg tablet Take 1 tablet by mouth twice daily 180 Tablet 0    albuterol-ipratropium (DUO-NEB) 2.5 mg-0.5 mg/3 ml nebu 3 mL by Nebulization route every six (6) hours as needed for Wheezing. 30 Nebule 1    OXYGEN-AIR DELIVERY SYSTEMS 2 L by Nasal route continuous.  guaifenesin/dextromethorphan (CORICIDIN HBP CHEST BRANDON-COUGH PO) Take 4 mg by mouth daily.  furosemide (LASIX) 40 mg tablet Take 2 Tabs by mouth every other day. Pt takes 40 mg every other day and alternates with 80 mg. 30 Tab 0    acetaminophen (TYLENOL EXTRA STRENGTH) 500 mg tablet Take 500 mg by mouth every six (6) hours as needed for Pain.  cranberry fruit extract (CRANBERRY PO) Take 1 Can by mouth daily.  amitriptyline (ELAVIL) 10 mg tablet Take 10 mg by mouth nightly.  Per pt, she takes 2 tabs qhs      cholecalciferol, vitamin D3, (VITAMIN D3) 2,000 unit tab Take 2,000 Units by mouth daily.  psyllium seed, with dextrose, (FIBER PO) Take 1 Cap by mouth daily.  VIT A/C/E AC/ZNOX/CUPRIC OXIDE (EYE VITAMIN AND MINERALS PO) Take 1 Tab by mouth two (2) times a day.  ferrous sulfate (IRON) 325 mg (65 mg iron) tablet Take 325 mg by mouth two (2) times a day.  DOCUSATE CALCIUM (STOOL SOFTENER PO) Take 100 mg by mouth as needed for Other (constipation).  latanoprost (XALATAN) 0.005 % ophthalmic solution Administer 1 Drop to both eyes nightly.  carvedilol (COREG) 3.125 mg tablet Take 1 Tab by mouth two (2) times daily (with meals). (Patient not taking: Reported on 11/19/2021) 30 Tab 0    warfarin (COUMADIN) 2.5 mg tablet Take 5 mg by mouth five (5) days a week. Pt takes 5 mg Mon, Tues, Thurs, Fri and Sun. Pt takes 2.5 mg Sat. (as of 11/19/21)       No current facility-administered medications on file prior to visit. family history includes Cancer in her father; Diabetes in her brother; Heart Disease in her brother; Inflammatory Bowel Dz in her mother. Social History     Socioeconomic History    Marital status:      Spouse name: Not on file    Number of children: Not on file    Years of education: Not on file    Highest education level: Not on file   Occupational History    Not on file   Tobacco Use    Smoking status: Never Smoker    Smokeless tobacco: Never Used   Substance and Sexual Activity    Alcohol use: No    Drug use: No    Sexual activity: Never   Other Topics Concern    Not on file   Social History Narrative    Not on file     Social Determinants of Health     Financial Resource Strain:     Difficulty of Paying Living Expenses: Not on file   Food Insecurity:     Worried About Running Out of Food in the Last Year: Not on file    Yvrose of Food in the Last Year: Not on file   Transportation Needs:     Lack of Transportation (Medical): Not on file    Lack of Transportation (Non-Medical):  Not on Name band; file   Physical Activity:     Days of Exercise per Week: Not on file    Minutes of Exercise per Session: Not on file   Stress:     Feeling of Stress : Not on file   Social Connections:     Frequency of Communication with Friends and Family: Not on file    Frequency of Social Gatherings with Friends and Family: Not on file    Attends Yazdanism Services: Not on file    Active Member of [address] [place] [address] or Organizations: Not on file    Attends Club or Organization Meetings: Not on file    Marital Status: Not on file   Intimate Partner Violence:     Fear of Current or Ex-Partner: Not on file    Emotionally Abused: Not on file    Physically Abused: Not on file    Sexually Abused: Not on file   Housing Stability:     Unable to Pay for Housing in the Last Year: Not on file    Number of [place] in the Last Year: Not on file    Unstable Housing in the Last Year: Not on file       Visit Vitals  /64   Pulse 86   Temp 98.2 °F (36.8 °C) (Temporal)   Resp 20   Ht 5' (1.524 m)   Wt 111 lb 9.6 oz (50.6 kg)   BMI 21.80 kg/m²     General:  Well appearing female no acute distress  HEENT:   PERRL,normal conjunctiva. Wearing hearing aids bilaterally  Neck:  Supple. Thyroid normal size, nontender, without nodules. No carotid bruit. No masses or lymphadenopathy  Respiratory: no respiratory distress,  no wheezing, no rhonchi, no rales. No chest wall tenderness. Cardiovascular:  RRR, normal N4O8, systolic murmur present  Gastrointestinal: normal bowel sounds, soft, nontender, without masses. Extremities +2 pulses, no edema, normal sensation   Musculoskeletal: Kyphosis of back, wide stance gait. Able to ambulate well with walker  Skin: Stage I decubitus ulcer on sacral area that is healing, she has a epidermal cyst leg lesion on the lower buttock that is nontender. Neuro:  A and OX4, fluent speech, cranial nerves normal 2-12.     Psych:  Normal affect      Lab Results   Component Value Date/Time    WBC 11.5 (H) 02/27/2020 07:02 PM    HGB 12.1 02/27/2020 07:02 PM    Hematocrit (POC) 35 08/01/2018 07:27 AM    HCT 35.7 02/27/2020 07:02 PM    PLATELET 268 (L) 89/14/5137 07:02 PM    MCV 93.7 02/27/2020 07:02 PM     Lab Results   Component Value Date/Time    Sodium 133 (L) 02/27/2020 07:02 PM    Potassium 3.8 02/27/2020 07:02 PM    Chloride 99 02/27/2020 07:02 PM    CO2 28 02/27/2020 07:02 PM    Anion gap 6 02/27/2020 07:02 PM    Glucose 121 (H) 02/27/2020 07:02 PM    BUN 39 (H) 02/27/2020 07:02 PM    Creatinine 1.16 (H) 02/27/2020 07:02 PM    BUN/Creatinine ratio 34 (H) 02/27/2020 07:02 PM    GFR est AA 53 (L) 02/27/2020 07:02 PM    GFR est non-AA 43 (L) 02/27/2020 07:02 PM    Calcium 8.2 (L) 02/27/2020 07:02 PM     Lab Results   Component Value Date/Time    Cholesterol, total 196 09/12/2012 12:00 AM    HDL Cholesterol 64 09/12/2012 12:00 AM    LDL, calculated 104 (H) 09/12/2012 12:00 AM    VLDL, calculated 28 09/12/2012 12:00 AM    Triglyceride 142 09/12/2012 12:00 AM     Lab Results   Component Value Date/Time    TSH 4.340 11/09/2017 02:13 PM     Lab Results   Component Value Date/Time    Hemoglobin A1c 5.6 06/09/2017 09:07 AM    Hemoglobin A1c (POC) 5.2 07/19/2018 03:42 PM     Lab Results   Component Value Date/Time    VITAMIN D, 25-HYDROXY 91.7 11/09/2017 02:13 PM                   Assessment and Plan:     1. Chronic systolic congestive heart failure ([address]  This is followed by Dr. Tammy Neal and requesting records. Last echo on system is in 2019 she had an EF of 30% with aortic regurgitation, mitral regurgitation and tricuspid regurgitation (all moderate)  -She is euvolemic on exam she is taking 80 mg of Lasix in the morning and 40 mg in the afternoon as needed. She takes Coreg when her blood pressure permits. Blood pressure does not allow for ACE inhibitor or Entresto  - METABOLIC PANEL, COMPREHENSIVE; Future  - CBC WITH AUTOMATED DIFF; Future  - CBC WITH AUTOMATED DIFF  - METABOLIC PANEL, COMPREHENSIVE    2. Vitamin D deficiency  On 2000 units daily  - VITAMIN D, 25 HYDROXY; Future  - VITAMIN D, 25 HYDROXY    3. On potassium wasting diuretic therapy  - MAGNESIUM; Future  - PHOSPHORUS; Future  - PHOSPHORUS  - MAGNESIUM    4. Chronic bronchitis, unspecified chronic bronchitis type [facility])  She sees a pulmonologist.   No breathing issues today  5. Atrial fibrillation with RVR ([address]  She is sinus today she is status post ablation    6. Complex partial seizure with impairment of consciousness ([address]  No recent seizures she is on Keppra 500 mg twice a day and follows with neurology    7. Medicare annual wellness visit, subsequent      Follow-up and Dispositions    · Return in about 1 year (around 11/19/2022) for annual exam.          Bita Carrasquillo MD  This is the Subsequent Medicare Annual Wellness Exam, performed 12 months or more after the Initial AWV or the last Subsequent AWV    I have reviewed the patient's medical history in detail and updated the computerized patient record. Assessment/Plan   Education and counseling provided:  Are appropriate based on today's review and evaluation    1. Chronic systolic congestive heart failure (HCC)  -     METABOLIC PANEL, COMPREHENSIVE; Future  -     CBC WITH AUTOMATED DIFF; Future  2. Vitamin D deficiency  -     VITAMIN D, 25 HYDROXY; Future  3. On potassium wasting diuretic therapy  -     MAGNESIUM; Future  -     PHOSPHORUS; Future  4. Chronic bronchitis, unspecified chronic bronchitis type ([address]  5. Atrial fibrillation with RVR ([address]  6. 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  Medicare annual wellness visit, subsequent       Depression Risk Factor Screening     3 most recent PHQ Screens 11/19/2021   Little interest or pleasure in doing things Not at all   Feeling down, depressed, irritable, or hopeless Not at all   Total Score PHQ 2 0       Alcohol Risk Screen    Do you average more than 1 drink per night or more than 7 drinks a week:  No    On any one occasion in the past three months have you have had more than 3 drinks containing alcohol:  No        Functional Ability and Level of Safety    Hearing: Hearing is good. The patient wears hearing aids. Activities of Daily Living: The home contains: grab bars  Patient needs help with:  phone, transportation, shopping, preparing meals, laundry, housework, managing medications and managing money      Ambulation: with difficulty, uses a walker     Fall Risk:  Fall Risk Assessment, last 12 mths 11/19/2021   Able to walk? Yes   Fall in past 12 months? 0   Do you feel unsteady? 1   Are you worried about falling 0   Is the gait abnormal? 1   Number of falls in past 12 months 0   Fall with injury?  -      Abuse Screen:  Patient is not abused       Cognitive Screening    Has your family/caregiver stated any concerns about your memory: no     Cognitive Screening: Normal - Verbal Fluency Test    Health Maintenance Due     Health Maintenance Due   Topic Date Due    Eye Exam Retinal or Dilated  Never done    DTaP/Tdap/Td series (1 - Tdap) Never done    Bone Densitometry (Dexa) Screening  Never done    Foot Exam Q1  11/09/2018    COVID-19 Vaccine (3 - Booster for Moderna series) 10/21/2021       Patient Care Team   Patient Care Team:  Rob Castañeda MD as PCP - General (Internal Medicine)  Rob Castañeda MD as PCP - [facility] Empaneled Provider  Steffen Ray MD as Physician (Neurology)  Sophie Meza MD as Consulting Provider (Cardiology)  Kristyn King MD (Nephrology)  Keren Barrios MD as Surgeon (General Surgery)    Lizbeth Ring MD

## 2021-12-08 NOTE — PROGRESS NOTES
Electrolytes are in range  Vitamin D is normalSlightly decreased platelets improved from previous value/white blood cell count normalized.   We will repeat this at follow-up  Kidney function is stable mild kidney impairment CKD stage III

## 2022-01-01 ENCOUNTER — APPOINTMENT (OUTPATIENT)
Dept: GENERAL RADIOLOGY | Age: 87
DRG: 480 | End: 2022-01-01
Attending: EMERGENCY MEDICINE
Payer: MEDICARE

## 2022-01-01 ENCOUNTER — APPOINTMENT (OUTPATIENT)
Dept: CT IMAGING | Age: 87
DRG: 480 | End: 2022-01-01
Attending: EMERGENCY MEDICINE
Payer: MEDICARE

## 2022-01-01 ENCOUNTER — APPOINTMENT (OUTPATIENT)
Dept: GENERAL RADIOLOGY | Age: 87
DRG: 480 | End: 2022-01-01
Attending: INTERNAL MEDICINE
Payer: MEDICARE

## 2022-01-01 ENCOUNTER — ANESTHESIA (OUTPATIENT)
Dept: SURGERY | Age: 87
DRG: 480 | End: 2022-01-01
Payer: MEDICARE

## 2022-01-01 ENCOUNTER — HOME CARE VISIT (OUTPATIENT)
Dept: HOSPICE | Facility: HOSPICE | Age: 87
End: 2022-01-01
Payer: MEDICARE

## 2022-01-01 ENCOUNTER — HOSPITAL ENCOUNTER (INPATIENT)
Age: 87
LOS: 7 days | Discharge: HOSPICE | DRG: 480 | End: 2022-09-11
Attending: EMERGENCY MEDICINE | Admitting: STUDENT IN AN ORGANIZED HEALTH CARE EDUCATION/TRAINING PROGRAM
Payer: MEDICARE

## 2022-01-01 ENCOUNTER — HOSPICE ADMISSION (OUTPATIENT)
Dept: HOSPICE | Facility: HOSPICE | Age: 87
End: 2022-01-01
Payer: MEDICARE

## 2022-01-01 ENCOUNTER — APPOINTMENT (OUTPATIENT)
Dept: GENERAL RADIOLOGY | Age: 87
DRG: 480 | End: 2022-01-01
Attending: ORTHOPAEDIC SURGERY
Payer: MEDICARE

## 2022-01-01 ENCOUNTER — APPOINTMENT (OUTPATIENT)
Dept: NON INVASIVE DIAGNOSTICS | Age: 87
DRG: 480 | End: 2022-01-01
Attending: STUDENT IN AN ORGANIZED HEALTH CARE EDUCATION/TRAINING PROGRAM
Payer: MEDICARE

## 2022-01-01 ENCOUNTER — ANESTHESIA EVENT (OUTPATIENT)
Dept: SURGERY | Age: 87
DRG: 480 | End: 2022-01-01
Payer: MEDICARE

## 2022-01-01 ENCOUNTER — HOME CARE VISIT (OUTPATIENT)
Dept: SCHEDULING | Facility: HOME HEALTH | Age: 87
End: 2022-01-01
Payer: MEDICARE

## 2022-01-01 VITALS
TEMPERATURE: 98.5 F | HEIGHT: 60 IN | SYSTOLIC BLOOD PRESSURE: 102 MMHG | WEIGHT: 110 LBS | DIASTOLIC BLOOD PRESSURE: 41 MMHG | HEART RATE: 80 BPM | BODY MASS INDEX: 21.6 KG/M2 | OXYGEN SATURATION: 97 % | RESPIRATION RATE: 17 BRPM

## 2022-01-01 VITALS
DIASTOLIC BLOOD PRESSURE: 78 MMHG | SYSTOLIC BLOOD PRESSURE: 132 MMHG | OXYGEN SATURATION: 84 % | RESPIRATION RATE: 30 BRPM | HEART RATE: 101 BPM

## 2022-01-01 DIAGNOSIS — G40.209 COMPLEX PARTIAL SEIZURE WITH IMPAIRMENT OF CONSCIOUSNESS (HCC): ICD-10-CM

## 2022-01-01 DIAGNOSIS — M54.50 ACUTE MIDLINE LOW BACK PAIN WITHOUT SCIATICA: ICD-10-CM
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
## 2022-01-01 DIAGNOSIS — S72.141A CLOSED DISPLACED INTERTROCHANTERIC FRACTURE OF RIGHT FEMUR, INITIAL ENCOUNTER (HCC): Primary | ICD-10-CM

## 2022-01-01 DIAGNOSIS — D69.6 THROMBOCYTOPENIA (HCC): ICD-10-CM

## 2022-01-01 DIAGNOSIS — S42.031A CLOSED DISPLACED FRACTURE OF ACROMIAL END OF RIGHT CLAVICLE, INITIAL ENCOUNTER: ICD-10-CM

## 2022-01-01 DIAGNOSIS — S72.001A CLOSED FRACTURE OF RIGHT HIP, INITIAL ENCOUNTER (HCC): ICD-10-CM

## 2022-01-01 DIAGNOSIS — R27.0 ATAXIA: ICD-10-CM

## 2022-01-01 DIAGNOSIS — S01.01XA LACERATION OF SCALP, INITIAL ENCOUNTER: ICD-10-CM

## 2022-01-01 DIAGNOSIS — I48.91 ATRIAL FIBRILLATION WITH RVR (HCC): ICD-10-CM

## 2022-01-01 LAB
ABO + RH BLD: NORMAL
ALBUMIN SERPL-MCNC: 2.3 G/DL (ref 3.5–5)
ALBUMIN SERPL-MCNC: 2.4 G/DL (ref 3.5–5)
ALBUMIN SERPL-MCNC: 2.5 G/DL (ref 3.5–5)
ALBUMIN SERPL-MCNC: 3.4 G/DL (ref 3.5–5)
ALBUMIN/GLOB SERPL: 0.6 {RATIO} (ref 1.1–2.2)
ALBUMIN/GLOB SERPL: 0.8 {RATIO} (ref 1.1–2.2)
ALP SERPL-CCNC: 109 U/L (ref 45–117)
ALP SERPL-CCNC: 111 U/L (ref 45–117)
ALT SERPL-CCNC: 11 U/L (ref 12–78)
ALT SERPL-CCNC: 20 U/L (ref 12–78)
ANION GAP SERPL CALC-SCNC: 0 MMOL/L (ref 5–15)
ANION GAP SERPL CALC-SCNC: 1 MMOL/L (ref 5–15)
ANION GAP SERPL CALC-SCNC: 2 MMOL/L (ref 5–15)
ANION GAP SERPL CALC-SCNC: 2 MMOL/L (ref 5–15)
ANION GAP SERPL CALC-SCNC: 3 MMOL/L (ref 5–15)
ANION GAP SERPL CALC-SCNC: 5 MMOL/L (ref 5–15)
ANION GAP SERPL CALC-SCNC: 6 MMOL/L (ref 5–15)
APPEARANCE UR: ABNORMAL
AST SERPL-CCNC: 21 U/L (ref 15–37)
AST SERPL-CCNC: 27 U/L (ref 15–37)
ATRIAL RATE: 80 BPM
BACTERIA SPEC CULT: NORMAL
BACTERIA SPEC CULT: NORMAL
BACTERIA URNS QL MICRO: NEGATIVE /HPF
BASOPHILS # BLD: 0 K/UL (ref 0–0.1)
BASOPHILS NFR BLD: 0 % (ref 0–1)
BILIRUB SERPL-MCNC: 0.7 MG/DL (ref 0.2–1)
BILIRUB SERPL-MCNC: 1.7 MG/DL (ref 0.2–1)
BILIRUB UR QL: NEGATIVE
BLD PROD TYP BPU: NORMAL
BLOOD GROUP ANTIBODIES SERPL: NORMAL
BNP SERPL-MCNC: 7837 PG/ML
BNP SERPL-MCNC: ABNORMAL PG/ML
BPU ID: NORMAL
BUN SERPL-MCNC: 29 MG/DL (ref 6–20)
BUN SERPL-MCNC: 34 MG/DL (ref 6–20)
BUN SERPL-MCNC: 37 MG/DL (ref 6–20)
BUN SERPL-MCNC: 40 MG/DL (ref 6–20)
BUN SERPL-MCNC: 53 MG/DL (ref 6–20)
BUN SERPL-MCNC: 56 MG/DL (ref 6–20)
BUN SERPL-MCNC: 60 MG/DL (ref 6–20)
BUN/CREAT SERPL: 27 (ref 12–20)
BUN/CREAT SERPL: 33 (ref 12–20)
BUN/CREAT SERPL: 33 (ref 12–20)
BUN/CREAT SERPL: 37 (ref 12–20)
BUN/CREAT SERPL: 40 (ref 12–20)
BUN/CREAT SERPL: 49 (ref 12–20)
BUN/CREAT SERPL: 59 (ref 12–20)
CALCIUM SERPL-MCNC: 7.1 MG/DL (ref 8.5–10.1)
CALCIUM SERPL-MCNC: 8.2 MG/DL (ref 8.5–10.1)
CALCIUM SERPL-MCNC: 8.2 MG/DL (ref 8.5–10.1)
CALCIUM SERPL-MCNC: 8.5 MG/DL (ref 8.5–10.1)
CALCIUM SERPL-MCNC: 8.9 MG/DL (ref 8.5–10.1)
CALCIUM SERPL-MCNC: 9.3 MG/DL (ref 8.5–10.1)
CALCIUM SERPL-MCNC: 9.7 MG/DL (ref 8.5–10.1)
CALCULATED R AXIS, ECG10: -59 DEGREES
CALCULATED T AXIS, ECG11: 118 DEGREES
CHLORIDE SERPL-SCNC: 100 MMOL/L (ref 97–108)
CHLORIDE SERPL-SCNC: 101 MMOL/L (ref 97–108)
CHLORIDE SERPL-SCNC: 103 MMOL/L (ref 97–108)
CHLORIDE SERPL-SCNC: 107 MMOL/L (ref 97–108)
CHLORIDE SERPL-SCNC: 96 MMOL/L (ref 97–108)
CHLORIDE SERPL-SCNC: 96 MMOL/L (ref 97–108)
CHLORIDE SERPL-SCNC: 97 MMOL/L (ref 97–108)
CO2 SERPL-SCNC: 30 MMOL/L (ref 21–32)
CO2 SERPL-SCNC: 32 MMOL/L (ref 21–32)
CO2 SERPL-SCNC: 33 MMOL/L (ref 21–32)
CO2 SERPL-SCNC: 34 MMOL/L (ref 21–32)
CO2 SERPL-SCNC: 37 MMOL/L (ref 21–32)
COLOR UR: ABNORMAL
CREAT SERPL-MCNC: 0.88 MG/DL (ref 0.55–1.02)
CREAT SERPL-MCNC: 0.99 MG/DL (ref 0.55–1.02)
CREAT SERPL-MCNC: 1.02 MG/DL (ref 0.55–1.02)
CREAT SERPL-MCNC: 1.14 MG/DL (ref 0.55–1.02)
CREAT SERPL-MCNC: 1.23 MG/DL (ref 0.55–1.02)
CREAT SERPL-MCNC: 1.24 MG/DL (ref 0.55–1.02)
CREAT SERPL-MCNC: 1.34 MG/DL (ref 0.55–1.02)
CREAT UR-MCNC: 50.1 MG/DL
DIAGNOSIS, 93000: NORMAL
DIFFERENTIAL METHOD BLD: ABNORMAL
ECHO AO ROOT DIAM: 2.9 CM
ECHO AO ROOT INDEX: 2 CM/M2
ECHO AR MAX VEL PISA: 3.8 M/S
ECHO AV AREA PEAK VELOCITY: 2 CM2
ECHO AV AREA VTI: 2.4 CM2
ECHO AV AREA/BSA PEAK VELOCITY: 1.4 CM2/M2
ECHO AV AREA/BSA VTI: 1.7 CM2/M2
ECHO AV MEAN GRADIENT: 4 MMHG
ECHO AV MEAN VELOCITY: 1 M/S
ECHO AV PEAK GRADIENT: 8 MMHG
ECHO AV PEAK VELOCITY: 1.4 M/S
ECHO AV REGURGITANT PHT: 258.6 MILLISECOND
ECHO AV VELOCITY RATIO: 0.64
ECHO AV VTI: 24.7 CM
ECHO EST RA PRESSURE: 15 MMHG
ECHO LA DIAMETER INDEX: 2.69 CM/M2
ECHO LA DIAMETER: 3.9 CM
ECHO LA TO AORTIC ROOT RATIO: 1.34
ECHO LA VOL 4C: 84 ML (ref 22–52)
ECHO LA VOLUME INDEX A4C: 58 ML/M2 (ref 16–34)
ECHO LV E' LATERAL VELOCITY: 10 CM/S
ECHO LV E' SEPTAL VELOCITY: 3 CM/S
ECHO LV EDV A2C: 90 ML
ECHO LV EDV A4C: 79 ML
ECHO LV EDV BP: 86 ML (ref 56–104)
ECHO LV EDV INDEX A4C: 54 ML/M2
ECHO LV EDV INDEX BP: 59 ML/M2
ECHO LV EDV NDEX A2C: 62 ML/M2
ECHO LV EJECTION FRACTION A2C: 24 %
ECHO LV EJECTION FRACTION A4C: 22 %
ECHO LV EJECTION FRACTION BIPLANE: 20 % (ref 55–100)
ECHO LV ESV A2C: 68 ML
ECHO LV ESV A4C: 61 ML
ECHO LV ESV BP: 69 ML (ref 19–49)
ECHO LV ESV INDEX A2C: 47 ML/M2
ECHO LV ESV INDEX A4C: 42 ML/M2
ECHO LV ESV INDEX BP: 48 ML/M2
ECHO LV FRACTIONAL SHORTENING: 14 % (ref 28–44)
ECHO LV INTERNAL DIMENSION DIASTOLE INDEX: 2.97 CM/M2
ECHO LV INTERNAL DIMENSION DIASTOLIC MMODE: 4.5 CM (ref 3.9–5.3)
ECHO LV INTERNAL DIMENSION DIASTOLIC: 4.3 CM (ref 3.9–5.3)
ECHO LV INTERNAL DIMENSION SYSTOLIC INDEX: 2.55 CM/M2
ECHO LV INTERNAL DIMENSION SYSTOLIC MMODE: 3.3 CM
ECHO LV INTERNAL DIMENSION SYSTOLIC: 3.7 CM
ECHO LV IVSD: 1 CM (ref 0.6–0.9)
ECHO LV MASS 2D: 152.6 G (ref 67–162)
ECHO LV MASS INDEX 2D: 105.2 G/M2 (ref 43–95)
ECHO LV POSTERIOR WALL DIASTOLIC: 1.1 CM (ref 0.6–0.9)
ECHO LV RELATIVE WALL THICKNESS RATIO: 0.51
ECHO LVOT AREA: 3.1 CM2
ECHO LVOT AV VTI INDEX: 0.75
ECHO LVOT DIAM: 2 CM
ECHO LVOT MEAN GRADIENT: 2 MMHG
ECHO LVOT PEAK GRADIENT: 3 MMHG
ECHO LVOT PEAK VELOCITY: 0.9 M/S
ECHO LVOT STROKE VOLUME INDEX: 40.1 ML/M2
ECHO LVOT SV: 58.1 ML
ECHO LVOT VTI: 18.5 CM
ECHO MV A VELOCITY: 0.6 M/S
ECHO MV AREA VTI: 1.9 CM2
ECHO MV E DECELERATION TIME (DT): 125.9 MS
ECHO MV E VELOCITY: 1.13 M/S
ECHO MV E/A RATIO: 1.88
ECHO MV E/E' LATERAL: 11.3
ECHO MV E/E' RATIO (AVERAGED): 24.48
ECHO MV E/E' SEPTAL: 37.67
ECHO MV LVOT VTI INDEX: 1.62
ECHO MV MAX VELOCITY: 1.4 M/S
ECHO MV MEAN GRADIENT: 3 MMHG
ECHO MV MEAN VELOCITY: 0.9 M/S
ECHO MV PEAK GRADIENT: 8 MMHG
ECHO MV REGURGITANT PEAK GRADIENT: 125 MMHG
ECHO MV REGURGITANT PEAK VELOCITY: 5.6 M/S
ECHO MV REGURGITANT VELOCITY PISA: 5.4 M/S
ECHO MV REGURGITANT VTIA: 164.7 CM
ECHO MV VTI: 29.9 CM
ECHO PV MAX VELOCITY: 0.9 M/S
ECHO PV PEAK GRADIENT: 3 MMHG
ECHO RIGHT VENTRICULAR SYSTOLIC PRESSURE (RVSP): 66 MMHG
ECHO TV REGURGITANT MAX VELOCITY: 3.56 M/S
ECHO TV REGURGITANT PEAK GRADIENT: 51 MMHG
EOSINOPHIL # BLD: 0.1 K/UL (ref 0–0.4)
EOSINOPHIL NFR BLD: 1 % (ref 0–7)
EPITH CASTS URNS QL MICRO: ABNORMAL /LPF
ERYTHROCYTE [DISTWIDTH] IN BLOOD BY AUTOMATED COUNT: 14.5 % (ref 11.5–14.5)
ERYTHROCYTE [DISTWIDTH] IN BLOOD BY AUTOMATED COUNT: 14.6 % (ref 11.5–14.5)
ERYTHROCYTE [DISTWIDTH] IN BLOOD BY AUTOMATED COUNT: 14.7 % (ref 11.5–14.5)
ERYTHROCYTE [DISTWIDTH] IN BLOOD BY AUTOMATED COUNT: 14.8 % (ref 11.5–14.5)
ERYTHROCYTE [DISTWIDTH] IN BLOOD BY AUTOMATED COUNT: 14.8 % (ref 11.5–14.5)
ERYTHROCYTE [DISTWIDTH] IN BLOOD BY AUTOMATED COUNT: 15.1 % (ref 11.5–14.5)
ERYTHROCYTE [DISTWIDTH] IN BLOOD BY AUTOMATED COUNT: 15.5 % (ref 11.5–14.5)
GLOBULIN SER CALC-MCNC: 4 G/DL (ref 2–4)
GLOBULIN SER CALC-MCNC: 4.5 G/DL (ref 2–4)
GLUCOSE SERPL-MCNC: 115 MG/DL (ref 65–100)
GLUCOSE SERPL-MCNC: 129 MG/DL (ref 65–100)
GLUCOSE SERPL-MCNC: 130 MG/DL (ref 65–100)
GLUCOSE SERPL-MCNC: 144 MG/DL (ref 65–100)
GLUCOSE SERPL-MCNC: 156 MG/DL (ref 65–100)
GLUCOSE SERPL-MCNC: 91 MG/DL (ref 65–100)
GLUCOSE SERPL-MCNC: 91 MG/DL (ref 65–100)
GLUCOSE UR STRIP.AUTO-MCNC: NEGATIVE MG/DL
HCT VFR BLD AUTO: 22.3 % (ref 35–47)
HCT VFR BLD AUTO: 23.8 % (ref 35–47)
HCT VFR BLD AUTO: 24.2 % (ref 35–47)
HCT VFR BLD AUTO: 24.7 % (ref 35–47)
HCT VFR BLD AUTO: 24.7 % (ref 35–47)
HCT VFR BLD AUTO: 27.6 % (ref 35–47)
HCT VFR BLD AUTO: 28.9 % (ref 35–47)
HCT VFR BLD AUTO: 29.5 % (ref 35–47)
HCT VFR BLD AUTO: 40.6 % (ref 35–47)
HGB BLD-MCNC: 13.2 G/DL (ref 11.5–16)
HGB BLD-MCNC: 7 G/DL (ref 11.5–16)
HGB BLD-MCNC: 7.5 G/DL (ref 11.5–16)
HGB BLD-MCNC: 7.7 G/DL (ref 11.5–16)
HGB BLD-MCNC: 7.7 G/DL (ref 11.5–16)
HGB BLD-MCNC: 7.9 G/DL (ref 11.5–16)
HGB BLD-MCNC: 9 G/DL (ref 11.5–16)
HGB BLD-MCNC: 9.1 G/DL (ref 11.5–16)
HGB BLD-MCNC: 9.3 G/DL (ref 11.5–16)
HGB UR QL STRIP: ABNORMAL
IMM GRANULOCYTES # BLD AUTO: 0 K/UL (ref 0–0.04)
IMM GRANULOCYTES NFR BLD AUTO: 1 % (ref 0–0.5)
INR PPP: 1.3 (ref 0.9–1.1)
INR PPP: 1.4 (ref 0.9–1.1)
INR PPP: 1.9 (ref 0.9–1.1)
INR PPP: 2.6 (ref 0.9–1.1)
INR PPP: 3.8 (ref 0.9–1.1)
INR PPP: 3.9 (ref 0.9–1.1)
INR PPP: 5.1 (ref 0.9–1.1)
KETONES UR QL STRIP.AUTO: NEGATIVE MG/DL
LEUKOCYTE ESTERASE UR QL STRIP.AUTO: ABNORMAL
LYMPHOCYTES # BLD: 0.8 K/UL (ref 0.8–3.5)
LYMPHOCYTES NFR BLD: 15 % (ref 12–49)
MCH RBC QN AUTO: 32.6 PG (ref 26–34)
MCH RBC QN AUTO: 32.6 PG (ref 26–34)
MCH RBC QN AUTO: 32.7 PG (ref 26–34)
MCH RBC QN AUTO: 32.7 PG (ref 26–34)
MCH RBC QN AUTO: 32.9 PG (ref 26–34)
MCH RBC QN AUTO: 33.3 PG (ref 26–34)
MCH RBC QN AUTO: 33.9 PG (ref 26–34)
MCHC RBC AUTO-ENTMCNC: 31.2 G/DL (ref 30–36.5)
MCHC RBC AUTO-ENTMCNC: 31.4 G/DL (ref 30–36.5)
MCHC RBC AUTO-ENTMCNC: 31.5 G/DL (ref 30–36.5)
MCHC RBC AUTO-ENTMCNC: 31.5 G/DL (ref 30–36.5)
MCHC RBC AUTO-ENTMCNC: 32 G/DL (ref 30–36.5)
MCHC RBC AUTO-ENTMCNC: 32.5 G/DL (ref 30–36.5)
MCHC RBC AUTO-ENTMCNC: 32.6 G/DL (ref 30–36.5)
MCV RBC AUTO: 101.2 FL (ref 80–99)
MCV RBC AUTO: 102.2 FL (ref 80–99)
MCV RBC AUTO: 103.5 FL (ref 80–99)
MCV RBC AUTO: 104 FL (ref 80–99)
MCV RBC AUTO: 104.2 FL (ref 80–99)
MCV RBC AUTO: 104.7 FL (ref 80–99)
MCV RBC AUTO: 106 FL (ref 80–99)
MICROALBUMIN UR-MCNC: 6.29 MG/DL
MICROALBUMIN/CREAT UR-RTO: 126 MG/G (ref 0–30)
MONOCYTES # BLD: 0.3 K/UL (ref 0–1)
MONOCYTES NFR BLD: 6 % (ref 5–13)
NEUTS SEG # BLD: 4.4 K/UL (ref 1.8–8)
NEUTS SEG NFR BLD: 78 % (ref 32–75)
NITRITE UR QL STRIP.AUTO: NEGATIVE
NRBC # BLD: 0 K/UL (ref 0–0.01)
NRBC # BLD: 0.02 K/UL (ref 0–0.01)
NRBC # BLD: 0.18 K/UL (ref 0–0.01)
NRBC BLD-RTO: 0 PER 100 WBC
NRBC BLD-RTO: 0.3 PER 100 WBC
NRBC BLD-RTO: 2.6 PER 100 WBC
PH UR STRIP: 5.5 [PH] (ref 5–8)
PHOSPHATE SERPL-MCNC: 2.9 MG/DL (ref 2.6–4.7)
PHOSPHATE SERPL-MCNC: 3.6 MG/DL (ref 2.6–4.7)
PLATELET # BLD AUTO: 114 K/UL (ref 150–400)
PLATELET # BLD AUTO: 149 K/UL (ref 150–400)
PLATELET # BLD AUTO: 57 K/UL (ref 150–400)
PLATELET # BLD AUTO: 60 K/UL (ref 150–400)
PLATELET # BLD AUTO: 63 K/UL (ref 150–400)
PLATELET # BLD AUTO: 80 K/UL (ref 150–400)
PLATELET # BLD AUTO: 85 K/UL (ref 150–400)
PMV BLD AUTO: 10.3 FL (ref 8.9–12.9)
PMV BLD AUTO: 10.6 FL (ref 8.9–12.9)
PMV BLD AUTO: 11.1 FL (ref 8.9–12.9)
PMV BLD AUTO: 11.1 FL (ref 8.9–12.9)
PMV BLD AUTO: 11.3 FL (ref 8.9–12.9)
PMV BLD AUTO: 11.4 FL (ref 8.9–12.9)
PMV BLD AUTO: 11.8 FL (ref 8.9–12.9)
POTASSIUM SERPL-SCNC: 3.5 MMOL/L (ref 3.5–5.1)
POTASSIUM SERPL-SCNC: 4.2 MMOL/L (ref 3.5–5.1)
POTASSIUM SERPL-SCNC: 4.2 MMOL/L (ref 3.5–5.1)
POTASSIUM SERPL-SCNC: 4.7 MMOL/L (ref 3.5–5.1)
POTASSIUM SERPL-SCNC: 4.9 MMOL/L (ref 3.5–5.1)
POTASSIUM SERPL-SCNC: 5.5 MMOL/L (ref 3.5–5.1)
POTASSIUM SERPL-SCNC: 5.7 MMOL/L (ref 3.5–5.1)
PROT SERPL-MCNC: 6.4 G/DL (ref 6.4–8.2)
PROT SERPL-MCNC: 7.9 G/DL (ref 6.4–8.2)
PROT UR STRIP-MCNC: 100 MG/DL
PROTHROMBIN TIME: 13.1 SEC (ref 9–11.1)
PROTHROMBIN TIME: 13.5 SEC (ref 9–11.1)
PROTHROMBIN TIME: 13.7 SEC (ref 9–11.1)
PROTHROMBIN TIME: 14.2 SEC (ref 9–11.1)
PROTHROMBIN TIME: 18.7 SEC (ref 9–11.1)
PROTHROMBIN TIME: 25.6 SEC (ref 9–11.1)
PROTHROMBIN TIME: 36.6 SEC (ref 9–11.1)
PROTHROMBIN TIME: 37 SEC (ref 9–11.1)
PROTHROMBIN TIME: 47.9 SEC (ref 9–11.1)
Q-T INTERVAL, ECG07: 462 MS
QRS DURATION, ECG06: 160 MS
QTC CALCULATION (BEZET), ECG08: 532 MS
RBC # BLD AUTO: 2.14 M/UL (ref 3.8–5.2)
RBC # BLD AUTO: 2.33 M/UL (ref 3.8–5.2)
RBC # BLD AUTO: 2.36 M/UL (ref 3.8–5.2)
RBC # BLD AUTO: 2.7 M/UL (ref 3.8–5.2)
RBC # BLD AUTO: 2.78 M/UL (ref 3.8–5.2)
RBC # BLD AUTO: 2.85 M/UL (ref 3.8–5.2)
RBC # BLD AUTO: 4.01 M/UL (ref 3.8–5.2)
RBC #/AREA URNS HPF: ABNORMAL /HPF (ref 0–5)
SERVICE CMNT-IMP: NORMAL
SERVICE CMNT-IMP: NORMAL
SODIUM SERPL-SCNC: 132 MMOL/L (ref 136–145)
SODIUM SERPL-SCNC: 132 MMOL/L (ref 136–145)
SODIUM SERPL-SCNC: 133 MMOL/L (ref 136–145)
SODIUM SERPL-SCNC: 135 MMOL/L (ref 136–145)
SODIUM SERPL-SCNC: 135 MMOL/L (ref 136–145)
SODIUM SERPL-SCNC: 142 MMOL/L (ref 136–145)
SODIUM SERPL-SCNC: 142 MMOL/L (ref 136–145)
SP GR UR REFRACTOMETRY: 1.01
SPECIMEN EXP DATE BLD: NORMAL
STATUS OF UNIT,%ST: NORMAL
UA: UC IF INDICATED,UAUC: ABNORMAL
UNIT DIVISION, %UDIV: 0
UROBILINOGEN UR QL STRIP.AUTO: 1 EU/DL (ref 0.2–1)
VENTRICULAR RATE, ECG03: 80 BPM
WBC # BLD AUTO: 5.6 K/UL (ref 3.6–11)
WBC # BLD AUTO: 6.2 K/UL (ref 3.6–11)
WBC # BLD AUTO: 7 K/UL (ref 3.6–11)
WBC # BLD AUTO: 7.1 K/UL (ref 3.6–11)
WBC # BLD AUTO: 8 K/UL (ref 3.6–11)
WBC # BLD AUTO: 8 K/UL (ref 3.6–11)
WBC # BLD AUTO: 8.4 K/UL (ref 3.6–11)
WBC URNS QL MICRO: >100 /HPF (ref 0–4)

## 2022-01-01 PROCEDURE — 74011250637 HC RX REV CODE- 250/637: Performed by: INTERNAL MEDICINE

## 2022-01-01 PROCEDURE — 94760 N-INVAS EAR/PLS OXIMETRY 1: CPT

## 2022-01-01 PROCEDURE — 74011000250 HC RX REV CODE- 250: Performed by: PHYSICIAN ASSISTANT

## 2022-01-01 PROCEDURE — 3331090004 HSPC SERVICE INTENSITY ADD-ON

## 2022-01-01 PROCEDURE — 96374 THER/PROPH/DIAG INJ IV PUSH: CPT

## 2022-01-01 PROCEDURE — 74011250637 HC RX REV CODE- 250/637: Performed by: NURSE PRACTITIONER

## 2022-01-01 PROCEDURE — 0651 HSPC ROUTINE HOME CARE

## 2022-01-01 PROCEDURE — 74011250637 HC RX REV CODE- 250/637: Performed by: HOSPITALIST

## 2022-01-01 PROCEDURE — 65270000029 HC RM PRIVATE

## 2022-01-01 PROCEDURE — 36415 COLL VENOUS BLD VENIPUNCTURE: CPT

## 2022-01-01 PROCEDURE — 74011250637 HC RX REV CODE- 250/637: Performed by: ORTHOPAEDIC SURGERY

## 2022-01-01 PROCEDURE — 2709999900 HC NON-CHARGEABLE SUPPLY: Performed by: ORTHOPAEDIC SURGERY

## 2022-01-01 PROCEDURE — 97535 SELF CARE MNGMENT TRAINING: CPT

## 2022-01-01 PROCEDURE — 80069 RENAL FUNCTION PANEL: CPT

## 2022-01-01 PROCEDURE — 85610 PROTHROMBIN TIME: CPT

## 2022-01-01 PROCEDURE — 81001 URINALYSIS AUTO W/SCOPE: CPT

## 2022-01-01 PROCEDURE — 74011250637 HC RX REV CODE- 250/637: Performed by: STUDENT IN AN ORGANIZED HEALTH CARE EDUCATION/TRAINING PROGRAM

## 2022-01-01 PROCEDURE — 74011250636 HC RX REV CODE- 250/636: Performed by: PHYSICIAN ASSISTANT

## 2022-01-01 PROCEDURE — 77010033678 HC OXYGEN DAILY

## 2022-01-01 PROCEDURE — 94640 AIRWAY INHALATION TREATMENT: CPT

## 2022-01-01 PROCEDURE — 76010000149 HC OR TIME 1 TO 1.5 HR: Performed by: ORTHOPAEDIC SURGERY

## 2022-01-01 PROCEDURE — 74011250637 HC RX REV CODE- 250/637: Performed by: PHYSICIAN ASSISTANT

## 2022-01-01 PROCEDURE — 97161 PT EVAL LOW COMPLEX 20 MIN: CPT

## 2022-01-01 PROCEDURE — 74011250636 HC RX REV CODE- 250/636: Performed by: INTERNAL MEDICINE

## 2022-01-01 PROCEDURE — 77030008684 HC TU ET CUF COVD -B: Performed by: NURSE ANESTHETIST, CERTIFIED REGISTERED

## 2022-01-01 PROCEDURE — HOSPICE MEDICATION HC HH HOSPICE MEDICATION

## 2022-01-01 PROCEDURE — 74011000250 HC RX REV CODE- 250: Performed by: NURSE PRACTITIONER

## 2022-01-01 PROCEDURE — 73502 X-RAY EXAM HIP UNI 2-3 VIEWS: CPT

## 2022-01-01 PROCEDURE — 80048 BASIC METABOLIC PNL TOTAL CA: CPT

## 2022-01-01 PROCEDURE — 74011000250 HC RX REV CODE- 250: Performed by: STUDENT IN AN ORGANIZED HEALTH CARE EDUCATION/TRAINING PROGRAM

## 2022-01-01 PROCEDURE — 85027 COMPLETE CBC AUTOMATED: CPT

## 2022-01-01 PROCEDURE — 85025 COMPLETE CBC W/AUTO DIFF WBC: CPT

## 2022-01-01 PROCEDURE — 74011000258 HC RX REV CODE- 258: Performed by: PHYSICIAN ASSISTANT

## 2022-01-01 PROCEDURE — 51798 US URINE CAPACITY MEASURE: CPT

## 2022-01-01 PROCEDURE — 80053 COMPREHEN METABOLIC PANEL: CPT

## 2022-01-01 PROCEDURE — 3336500001 HSPC ELECTION

## 2022-01-01 PROCEDURE — 77030038269 HC DRN EXT URIN PURWCK BARD -A

## 2022-01-01 PROCEDURE — 77030026438 HC STYL ET INTUB CARD -A: Performed by: NURSE ANESTHETIST, CERTIFIED REGISTERED

## 2022-01-01 PROCEDURE — 97530 THERAPEUTIC ACTIVITIES: CPT

## 2022-01-01 PROCEDURE — 85018 HEMOGLOBIN: CPT

## 2022-01-01 PROCEDURE — 77030020788: Performed by: ORTHOPAEDIC SURGERY

## 2022-01-01 PROCEDURE — 76060000033 HC ANESTHESIA 1 TO 1.5 HR: Performed by: ORTHOPAEDIC SURGERY

## 2022-01-01 PROCEDURE — 93005 ELECTROCARDIOGRAM TRACING: CPT

## 2022-01-01 PROCEDURE — 74011250637 HC RX REV CODE- 250/637: Performed by: EMERGENCY MEDICINE

## 2022-01-01 PROCEDURE — 99223 1ST HOSP IP/OBS HIGH 75: CPT | Performed by: STUDENT IN AN ORGANIZED HEALTH CARE EDUCATION/TRAINING PROGRAM

## 2022-01-01 PROCEDURE — C1713 ANCHOR/SCREW BN/BN,TIS/BN: HCPCS | Performed by: ORTHOPAEDIC SURGERY

## 2022-01-01 PROCEDURE — 87040 BLOOD CULTURE FOR BACTERIA: CPT

## 2022-01-01 PROCEDURE — 74011000250 HC RX REV CODE- 250: Performed by: NURSE ANESTHETIST, CERTIFIED REGISTERED

## 2022-01-01 PROCEDURE — 77030008462 HC STPLR SKN PROX J&J -A: Performed by: ORTHOPAEDIC SURGERY

## 2022-01-01 PROCEDURE — G0299 HHS/HOSPICE OF RN EA 15 MIN: HCPCS

## 2022-01-01 PROCEDURE — 0QHB06Z INSERTION OF INTRAMEDULLARY INTERNAL FIXATION DEVICE INTO RIGHT LOWER FEMUR, OPEN APPROACH: ICD-10-PCS | Performed by: ORTHOPAEDIC SURGERY

## 2022-01-01 PROCEDURE — 77030040393 HC DRSG OPTIFOAM GENT MDII -B

## 2022-01-01 PROCEDURE — 97165 OT EVAL LOW COMPLEX 30 MIN: CPT

## 2022-01-01 PROCEDURE — 30233R1 TRANSFUSION OF NONAUTOLOGOUS PLATELETS INTO PERIPHERAL VEIN, PERCUTANEOUS APPROACH: ICD-10-PCS | Performed by: STUDENT IN AN ORGANIZED HEALTH CARE EDUCATION/TRAINING PROGRAM

## 2022-01-01 PROCEDURE — 99285 EMERGENCY DEPT VISIT HI MDM: CPT

## 2022-01-01 PROCEDURE — 74011250636 HC RX REV CODE- 250/636: Performed by: STUDENT IN AN ORGANIZED HEALTH CARE EDUCATION/TRAINING PROGRAM

## 2022-01-01 PROCEDURE — 71045 X-RAY EXAM CHEST 1 VIEW: CPT

## 2022-01-01 PROCEDURE — 83880 ASSAY OF NATRIURETIC PEPTIDE: CPT

## 2022-01-01 PROCEDURE — 77030016474 HC BIT DRL QC3 SYNT -C: Performed by: ORTHOPAEDIC SURGERY

## 2022-01-01 PROCEDURE — 72125 CT NECK SPINE W/O DYE: CPT

## 2022-01-01 PROCEDURE — 74011000250 HC RX REV CODE- 250: Performed by: INTERNAL MEDICINE

## 2022-01-01 PROCEDURE — 76000 FLUOROSCOPY <1 HR PHYS/QHP: CPT

## 2022-01-01 PROCEDURE — 71250 CT THORAX DX C-: CPT

## 2022-01-01 PROCEDURE — 93306 TTE W/DOPPLER COMPLETE: CPT

## 2022-01-01 PROCEDURE — 82043 UR ALBUMIN QUANTITATIVE: CPT

## 2022-01-01 PROCEDURE — C1769 GUIDE WIRE: HCPCS | Performed by: ORTHOPAEDIC SURGERY

## 2022-01-01 PROCEDURE — 86900 BLOOD TYPING SEROLOGIC ABO: CPT

## 2022-01-01 PROCEDURE — 74011250636 HC RX REV CODE- 250/636: Performed by: NURSE ANESTHETIST, CERTIFIED REGISTERED

## 2022-01-01 PROCEDURE — 77030014405 HC GD ROD RMR SYNT -C: Performed by: ORTHOPAEDIC SURGERY

## 2022-01-01 PROCEDURE — 77030031139 HC SUT VCRL2 J&J -A: Performed by: ORTHOPAEDIC SURGERY

## 2022-01-01 PROCEDURE — 73030 X-RAY EXAM OF SHOULDER: CPT

## 2022-01-01 PROCEDURE — P9073 PLATELETS PHERESIS PATH REDU: HCPCS

## 2022-01-01 PROCEDURE — 76210000017 HC OR PH I REC 1.5 TO 2 HR: Performed by: ORTHOPAEDIC SURGERY

## 2022-01-01 PROCEDURE — 70450 CT HEAD/BRAIN W/O DYE: CPT

## 2022-01-01 PROCEDURE — 74011250636 HC RX REV CODE- 250/636: Performed by: EMERGENCY MEDICINE

## 2022-01-01 PROCEDURE — 73552 X-RAY EXAM OF FEMUR 2/>: CPT

## 2022-01-01 PROCEDURE — 73501 X-RAY EXAM HIP UNI 1 VIEW: CPT

## 2022-01-01 PROCEDURE — 87086 URINE CULTURE/COLONY COUNT: CPT

## 2022-01-01 DEVICE — SCREW BNE L36MM DIA5MM NONSTERILE TIB LT GRN TI ST CANN LOK: Type: IMPLANTABLE DEVICE | Site: HIP | Status: FUNCTIONAL

## 2022-01-01 DEVICE — IMPLANTABLE DEVICE: Type: IMPLANTABLE DEVICE | Site: HIP | Status: FUNCTIONAL

## 2022-01-01 DEVICE — NAIL IM L235MM DIA10MM 130DEG SHT R PROX FEM GRN TI CANN: Type: IMPLANTABLE DEVICE | Site: HIP | Status: FUNCTIONAL

## 2022-01-01 RX ORDER — ONDANSETRON 2 MG/ML
4 INJECTION INTRAMUSCULAR; INTRAVENOUS
Status: ACTIVE | OUTPATIENT
Start: 2022-01-01 | End: 2022-01-01

## 2022-01-01 RX ORDER — LEVETIRACETAM 500 MG/1
500 TABLET ORAL 2 TIMES DAILY
Status: DISCONTINUED | OUTPATIENT
Start: 2022-01-01 | End: 2022-01-01

## 2022-01-01 RX ORDER — DEXAMETHASONE SODIUM PHOSPHATE 4 MG/ML
INJECTION, SOLUTION INTRA-ARTICULAR; INTRALESIONAL; INTRAMUSCULAR; INTRAVENOUS; SOFT TISSUE AS NEEDED
Status: DISCONTINUED | OUTPATIENT
Start: 2022-01-01 | End: 2022-01-01 | Stop reason: HOSPADM

## 2022-01-01 RX ORDER — CALCIUM CARBONATE 500(1250)
500 TABLET ORAL 2 TIMES DAILY
Status: DISCONTINUED | OUTPATIENT
Start: 2022-01-01 | End: 2022-01-01 | Stop reason: HOSPADM

## 2022-01-01 RX ORDER — ONDANSETRON 4 MG/1
4 TABLET, ORALLY DISINTEGRATING ORAL
Status: DISCONTINUED | OUTPATIENT
Start: 2022-01-01 | End: 2022-01-01 | Stop reason: HOSPADM

## 2022-01-01 RX ORDER — ACETAMINOPHEN 325 MG/1
650 TABLET ORAL
Status: COMPLETED | OUTPATIENT
Start: 2022-01-01 | End: 2022-01-01

## 2022-01-01 RX ORDER — POLYETHYLENE GLYCOL 3350 17 G/17G
17 POWDER, FOR SOLUTION ORAL DAILY PRN
Status: DISCONTINUED | OUTPATIENT
Start: 2022-01-01 | End: 2022-01-01 | Stop reason: HOSPADM

## 2022-01-01 RX ORDER — ACETAMINOPHEN 500 MG
1000 TABLET ORAL ONCE
Status: COMPLETED | OUTPATIENT
Start: 2022-01-01 | End: 2022-01-01

## 2022-01-01 RX ORDER — ACETAMINOPHEN 325 MG/1
650 TABLET ORAL EVERY 6 HOURS
Status: DISCONTINUED | OUTPATIENT
Start: 2022-01-01 | End: 2022-01-01 | Stop reason: HOSPADM

## 2022-01-01 RX ORDER — MORPHINE SULFATE 2 MG/ML
0.5 INJECTION, SOLUTION INTRAMUSCULAR; INTRAVENOUS
Status: COMPLETED | OUTPATIENT
Start: 2022-01-01 | End: 2022-01-01

## 2022-01-01 RX ORDER — ONDANSETRON 2 MG/ML
INJECTION INTRAMUSCULAR; INTRAVENOUS AS NEEDED
Status: DISCONTINUED | OUTPATIENT
Start: 2022-01-01 | End: 2022-01-01 | Stop reason: HOSPADM

## 2022-01-01 RX ORDER — CALCIUM GLUCONATE 94 MG/ML
1 INJECTION, SOLUTION INTRAVENOUS ONCE
Status: DISCONTINUED | OUTPATIENT
Start: 2022-01-01 | End: 2022-01-01

## 2022-01-01 RX ORDER — MORPHINE SULFATE 2 MG/ML
1 INJECTION, SOLUTION INTRAMUSCULAR; INTRAVENOUS
Status: ACTIVE | OUTPATIENT
Start: 2022-01-01 | End: 2022-01-01

## 2022-01-01 RX ORDER — OXYCODONE HYDROCHLORIDE 5 MG/1
5 TABLET ORAL
Status: DISCONTINUED | OUTPATIENT
Start: 2022-01-01 | End: 2022-01-01 | Stop reason: HOSPADM

## 2022-01-01 RX ORDER — AMITRIPTYLINE HYDROCHLORIDE 10 MG/1
20 TABLET, FILM COATED ORAL
Status: DISCONTINUED | OUTPATIENT
Start: 2022-01-01 | End: 2022-01-01 | Stop reason: HOSPADM

## 2022-01-01 RX ORDER — ONDANSETRON 2 MG/ML
4 INJECTION INTRAMUSCULAR; INTRAVENOUS
Status: DISCONTINUED | OUTPATIENT
Start: 2022-01-01 | End: 2022-01-01 | Stop reason: SDUPTHER

## 2022-01-01 RX ORDER — PHYTONADIONE 1 MG/.5ML
5 INJECTION, EMULSION INTRAMUSCULAR; INTRAVENOUS; SUBCUTANEOUS
Status: DISCONTINUED | OUTPATIENT
Start: 2022-01-01 | End: 2022-01-01

## 2022-01-01 RX ORDER — BALSAM PERU/CASTOR OIL
OINTMENT (GRAM) TOPICAL 2 TIMES DAILY
Status: DISCONTINUED | OUTPATIENT
Start: 2022-01-01 | End: 2022-01-01

## 2022-01-01 RX ORDER — CALCIUM GLUCONATE 20 MG/ML
1 INJECTION, SOLUTION INTRAVENOUS ONCE
Status: COMPLETED | OUTPATIENT
Start: 2022-01-01 | End: 2022-01-01

## 2022-01-01 RX ORDER — AMOXICILLIN 250 MG
1 CAPSULE ORAL 2 TIMES DAILY
Status: DISCONTINUED | OUTPATIENT
Start: 2022-01-01 | End: 2022-01-01 | Stop reason: SDUPTHER

## 2022-01-01 RX ORDER — FUROSEMIDE 10 MG/ML
40 INJECTION INTRAMUSCULAR; INTRAVENOUS 2 TIMES DAILY
Status: DISCONTINUED | OUTPATIENT
Start: 2022-01-01 | End: 2022-01-01

## 2022-01-01 RX ORDER — PHENYLEPHRINE HCL IN 0.9% NACL 0.4MG/10ML
SYRINGE (ML) INTRAVENOUS AS NEEDED
Status: DISCONTINUED | OUTPATIENT
Start: 2022-01-01 | End: 2022-01-01 | Stop reason: HOSPADM

## 2022-01-01 RX ORDER — AMOXICILLIN 250 MG
2 CAPSULE ORAL 2 TIMES DAILY
Status: DISCONTINUED | OUTPATIENT
Start: 2022-01-01 | End: 2022-01-01 | Stop reason: HOSPADM

## 2022-01-01 RX ORDER — MORPHINE SULFATE 2 MG/ML
0.5 INJECTION, SOLUTION INTRAMUSCULAR; INTRAVENOUS ONCE
Status: ACTIVE | OUTPATIENT
Start: 2022-01-01 | End: 2022-01-01

## 2022-01-01 RX ORDER — WARFARIN SODIUM 5 MG/1
5 TABLET ORAL ONCE
Status: COMPLETED | OUTPATIENT
Start: 2022-01-01 | End: 2022-01-01

## 2022-01-01 RX ORDER — SODIUM CHLORIDE 0.9 % (FLUSH) 0.9 %
5-40 SYRINGE (ML) INJECTION AS NEEDED
Status: DISCONTINUED | OUTPATIENT
Start: 2022-01-01 | End: 2022-01-01 | Stop reason: SDUPTHER

## 2022-01-01 RX ORDER — LEVETIRACETAM 500 MG/1
500 TABLET ORAL 2 TIMES DAILY
Status: DISCONTINUED | OUTPATIENT
Start: 2022-01-01 | End: 2022-01-01 | Stop reason: HOSPADM

## 2022-01-01 RX ORDER — SODIUM CHLORIDE 0.9 % (FLUSH) 0.9 %
5-40 SYRINGE (ML) INJECTION EVERY 8 HOURS
Status: DISCONTINUED | OUTPATIENT
Start: 2022-01-01 | End: 2022-01-01 | Stop reason: HOSPADM

## 2022-01-01 RX ORDER — LEVETIRACETAM 500 MG/1
TABLET ORAL
Qty: 180 TABLET | Refills: 0 | Status: SHIPPED | OUTPATIENT
Start: 2022-01-01 | End: 2022-01-01

## 2022-01-01 RX ORDER — ACETAMINOPHEN 650 MG/1
650 SUPPOSITORY RECTAL
Status: DISCONTINUED | OUTPATIENT
Start: 2022-01-01 | End: 2022-01-01 | Stop reason: HOSPADM

## 2022-01-01 RX ORDER — TRAMADOL HYDROCHLORIDE 50 MG/1
50 TABLET ORAL
Status: DISCONTINUED | OUTPATIENT
Start: 2022-01-01 | End: 2022-01-01 | Stop reason: HOSPADM

## 2022-01-01 RX ORDER — FACIAL-BODY WIPES
10 EACH TOPICAL DAILY PRN
Status: DISCONTINUED | OUTPATIENT
Start: 2022-01-01 | End: 2022-01-01 | Stop reason: HOSPADM

## 2022-01-01 RX ORDER — BUMETANIDE 0.25 MG/ML
1 INJECTION INTRAMUSCULAR; INTRAVENOUS ONCE
Status: COMPLETED | OUTPATIENT
Start: 2022-01-01 | End: 2022-01-01

## 2022-01-01 RX ORDER — WARFARIN 1 MG/1
1 TABLET ORAL
Status: COMPLETED | OUTPATIENT
Start: 2022-01-01 | End: 2022-01-01

## 2022-01-01 RX ORDER — SODIUM CHLORIDE 9 MG/ML
250 INJECTION, SOLUTION INTRAVENOUS AS NEEDED
Status: DISCONTINUED | OUTPATIENT
Start: 2022-01-01 | End: 2022-01-01 | Stop reason: ALTCHOICE

## 2022-01-01 RX ORDER — ONDANSETRON 4 MG/1
4 TABLET, ORALLY DISINTEGRATING ORAL
Status: DISCONTINUED | OUTPATIENT
Start: 2022-01-01 | End: 2022-01-01 | Stop reason: SDUPTHER

## 2022-01-01 RX ORDER — SUCCINYLCHOLINE CHLORIDE 20 MG/ML
INJECTION INTRAMUSCULAR; INTRAVENOUS AS NEEDED
Status: DISCONTINUED | OUTPATIENT
Start: 2022-01-01 | End: 2022-01-01 | Stop reason: HOSPADM

## 2022-01-01 RX ORDER — OXYCODONE HYDROCHLORIDE 5 MG/1
5 TABLET ORAL
Status: DISCONTINUED | OUTPATIENT
Start: 2022-01-01 | End: 2022-01-01

## 2022-01-01 RX ORDER — WARFARIN 2.5 MG/1
5 TABLET ORAL DAILY
COMMUNITY
End: 2022-01-01

## 2022-01-01 RX ORDER — OXYCODONE HYDROCHLORIDE 5 MG/1
2.5 TABLET ORAL
Status: DISCONTINUED | OUTPATIENT
Start: 2022-01-01 | End: 2022-01-01

## 2022-01-01 RX ORDER — LEVETIRACETAM 500 MG/1
TABLET ORAL
Qty: 180 TABLET | Refills: 0 | Status: SHIPPED | OUTPATIENT
Start: 2022-01-01

## 2022-01-01 RX ORDER — POTASSIUM CHLORIDE 750 MG/1
20 TABLET, FILM COATED, EXTENDED RELEASE ORAL DAILY
Status: COMPLETED | OUTPATIENT
Start: 2022-01-01 | End: 2022-01-01

## 2022-01-01 RX ORDER — IPRATROPIUM BROMIDE AND ALBUTEROL SULFATE 2.5; .5 MG/3ML; MG/3ML
3 SOLUTION RESPIRATORY (INHALATION)
Status: DISCONTINUED | OUTPATIENT
Start: 2022-01-01 | End: 2022-01-01 | Stop reason: HOSPADM

## 2022-01-01 RX ORDER — POLYETHYLENE GLYCOL 3350 17 G/17G
17 POWDER, FOR SOLUTION ORAL DAILY
Status: DISCONTINUED | OUTPATIENT
Start: 2022-01-01 | End: 2022-01-01 | Stop reason: HOSPADM

## 2022-01-01 RX ORDER — FENTANYL CITRATE 50 UG/ML
INJECTION, SOLUTION INTRAMUSCULAR; INTRAVENOUS AS NEEDED
Status: DISCONTINUED | OUTPATIENT
Start: 2022-01-01 | End: 2022-01-01 | Stop reason: HOSPADM

## 2022-01-01 RX ORDER — SODIUM CHLORIDE 9 MG/ML
75 INJECTION, SOLUTION INTRAVENOUS CONTINUOUS
Status: DISPENSED | OUTPATIENT
Start: 2022-01-01 | End: 2022-01-01

## 2022-01-01 RX ORDER — LORAZEPAM 0.5 MG/1
0.5 TABLET ORAL AS NEEDED
Status: DISCONTINUED | OUTPATIENT
Start: 2022-01-01 | End: 2022-01-01 | Stop reason: HOSPADM

## 2022-01-01 RX ORDER — LANOLIN ALCOHOL/MO/W.PET/CERES
3 CREAM (GRAM) TOPICAL
Status: DISCONTINUED | OUTPATIENT
Start: 2022-01-01 | End: 2022-01-01 | Stop reason: HOSPADM

## 2022-01-01 RX ORDER — OXYCODONE HYDROCHLORIDE 5 MG/1
5 TABLET ORAL
Qty: 7 TABLET | Refills: 0 | Status: SHIPPED | OUTPATIENT
Start: 2022-01-01 | End: 2022-09-14

## 2022-01-01 RX ORDER — ACETAMINOPHEN 325 MG/1
650 TABLET ORAL
Status: DISCONTINUED | OUTPATIENT
Start: 2022-01-01 | End: 2022-01-01 | Stop reason: HOSPADM

## 2022-01-01 RX ORDER — NALOXONE HYDROCHLORIDE 0.4 MG/ML
0.4 INJECTION, SOLUTION INTRAMUSCULAR; INTRAVENOUS; SUBCUTANEOUS AS NEEDED
Status: DISCONTINUED | OUTPATIENT
Start: 2022-01-01 | End: 2022-01-01 | Stop reason: SDUPTHER

## 2022-01-01 RX ORDER — HYDROMORPHONE HYDROCHLORIDE 1 MG/ML
0.25 INJECTION, SOLUTION INTRAMUSCULAR; INTRAVENOUS; SUBCUTANEOUS ONCE
Status: COMPLETED | OUTPATIENT
Start: 2022-01-01 | End: 2022-01-01

## 2022-01-01 RX ORDER — NALOXONE HYDROCHLORIDE 0.4 MG/ML
0.4 INJECTION, SOLUTION INTRAMUSCULAR; INTRAVENOUS; SUBCUTANEOUS AS NEEDED
Status: DISCONTINUED | OUTPATIENT
Start: 2022-01-01 | End: 2022-01-01 | Stop reason: HOSPADM

## 2022-01-01 RX ORDER — PROPOFOL 10 MG/ML
INJECTION, EMULSION INTRAVENOUS AS NEEDED
Status: DISCONTINUED | OUTPATIENT
Start: 2022-01-01 | End: 2022-01-01 | Stop reason: HOSPADM

## 2022-01-01 RX ORDER — BALSAM PERU/CASTOR OIL
OINTMENT (GRAM) TOPICAL 3 TIMES DAILY
Status: DISCONTINUED | OUTPATIENT
Start: 2022-01-01 | End: 2022-01-01 | Stop reason: HOSPADM

## 2022-01-01 RX ORDER — NITROFURANTOIN MACROCRYSTALS 50 MG/1
50 CAPSULE ORAL EVERY OTHER DAY
COMMUNITY
Start: 2022-01-01 | End: 2022-01-01

## 2022-01-01 RX ORDER — SODIUM CHLORIDE 0.9 % (FLUSH) 0.9 %
5-40 SYRINGE (ML) INJECTION EVERY 8 HOURS
Status: DISCONTINUED | OUTPATIENT
Start: 2022-01-01 | End: 2022-01-01 | Stop reason: SDUPTHER

## 2022-01-01 RX ORDER — SODIUM CHLORIDE, SODIUM LACTATE, POTASSIUM CHLORIDE, CALCIUM CHLORIDE 600; 310; 30; 20 MG/100ML; MG/100ML; MG/100ML; MG/100ML
INJECTION, SOLUTION INTRAVENOUS
Status: DISCONTINUED | OUTPATIENT
Start: 2022-01-01 | End: 2022-01-01 | Stop reason: HOSPADM

## 2022-01-01 RX ORDER — SODIUM CHLORIDE 9 MG/ML
100 INJECTION, SOLUTION INTRAVENOUS CONTINUOUS
Status: DISPENSED | OUTPATIENT
Start: 2022-01-01 | End: 2022-01-01

## 2022-01-01 RX ORDER — SODIUM CHLORIDE 0.9 % (FLUSH) 0.9 %
5-40 SYRINGE (ML) INJECTION AS NEEDED
Status: DISCONTINUED | OUTPATIENT
Start: 2022-01-01 | End: 2022-01-01 | Stop reason: HOSPADM

## 2022-01-01 RX ORDER — LIDOCAINE HYDROCHLORIDE 20 MG/ML
INJECTION, SOLUTION EPIDURAL; INFILTRATION; INTRACAUDAL; PERINEURAL AS NEEDED
Status: DISCONTINUED | OUTPATIENT
Start: 2022-01-01 | End: 2022-01-01 | Stop reason: HOSPADM

## 2022-01-01 RX ORDER — FERROUS SULFATE, DRIED 160(50) MG
1 TABLET, EXTENDED RELEASE ORAL
Status: DISCONTINUED | OUTPATIENT
Start: 2022-01-01 | End: 2022-01-01 | Stop reason: HOSPADM

## 2022-01-01 RX ORDER — MAG HYDROX/ALUMINUM HYD/SIMETH 200-200-20
30 SUSPENSION, ORAL (FINAL DOSE FORM) ORAL
Status: DISCONTINUED | OUTPATIENT
Start: 2022-01-01 | End: 2022-01-01 | Stop reason: HOSPADM

## 2022-01-01 RX ORDER — FUROSEMIDE 10 MG/ML
20 INJECTION INTRAMUSCULAR; INTRAVENOUS DAILY
Status: DISCONTINUED | OUTPATIENT
Start: 2022-01-01 | End: 2022-01-01

## 2022-01-01 RX ORDER — WARFARIN 2 MG/1
4 TABLET ORAL ONCE
Status: COMPLETED | OUTPATIENT
Start: 2022-01-01 | End: 2022-01-01

## 2022-01-01 RX ADMIN — POTASSIUM CHLORIDE 20 MEQ: 750 TABLET, FILM COATED, EXTENDED RELEASE ORAL at 08:57

## 2022-01-01 RX ADMIN — SODIUM CHLORIDE, PRESERVATIVE FREE 10 ML: 5 INJECTION INTRAVENOUS at 22:10

## 2022-01-01 RX ADMIN — ACETAMINOPHEN 650 MG: 325 TABLET ORAL at 17:05

## 2022-01-01 RX ADMIN — SUCCINYLCHOLINE CHLORIDE 100 MG: 20 INJECTION, SOLUTION INTRAMUSCULAR; INTRAVENOUS at 12:54

## 2022-01-01 RX ADMIN — SENNOSIDES AND DOCUSATE SODIUM 2 TABLET: 50; 8.6 TABLET ORAL at 09:23

## 2022-01-01 RX ADMIN — SODIUM CHLORIDE, PRESERVATIVE FREE 10 ML: 5 INJECTION INTRAVENOUS at 23:34

## 2022-01-01 RX ADMIN — SODIUM CHLORIDE, PRESERVATIVE FREE 10 ML: 5 INJECTION INTRAVENOUS at 15:30

## 2022-01-01 RX ADMIN — CASTOR OIL AND BALSAM, PERU: 788; 87 OINTMENT TOPICAL at 21:17

## 2022-01-01 RX ADMIN — IPRATROPIUM BROMIDE AND ALBUTEROL SULFATE 3 ML: .5; 3 SOLUTION RESPIRATORY (INHALATION) at 09:59

## 2022-01-01 RX ADMIN — Medication 1 ENEMA: at 20:00

## 2022-01-01 RX ADMIN — WARFARIN SODIUM 4 MG: 2 TABLET ORAL at 19:21

## 2022-01-01 RX ADMIN — FUROSEMIDE 40 MG: 10 INJECTION, SOLUTION INTRAMUSCULAR; INTRAVENOUS at 19:55

## 2022-01-01 RX ADMIN — CASTOR OIL AND BALSAM, PERU: 788; 87 OINTMENT TOPICAL at 12:18

## 2022-01-01 RX ADMIN — LEVETIRACETAM 500 MG: 500 TABLET, FILM COATED ORAL at 11:19

## 2022-01-01 RX ADMIN — SODIUM CHLORIDE 100 ML/HR: 9 INJECTION, SOLUTION INTRAVENOUS at 08:57

## 2022-01-01 RX ADMIN — CASTOR OIL AND BALSAM, PERU: 788; 87 OINTMENT TOPICAL at 03:49

## 2022-01-01 RX ADMIN — LEVETIRACETAM 500 MG: 500 TABLET, FILM COATED ORAL at 09:23

## 2022-01-01 RX ADMIN — LEVETIRACETAM 500 MG: 500 TABLET, FILM COATED ORAL at 22:27

## 2022-01-01 RX ADMIN — ACETAMINOPHEN 650 MG: 325 TABLET ORAL at 07:07

## 2022-01-01 RX ADMIN — BISACODYL 10 MG: 10 SUPPOSITORY RECTAL at 20:32

## 2022-01-01 RX ADMIN — CASTOR OIL AND BALSAM, PERU: 788; 87 OINTMENT TOPICAL at 08:04

## 2022-01-01 RX ADMIN — CASTOR OIL AND BALSAM, PERU: 788; 87 OINTMENT TOPICAL at 08:48

## 2022-01-01 RX ADMIN — ACETAMINOPHEN 650 MG: 325 TABLET ORAL at 11:40

## 2022-01-01 RX ADMIN — SENNOSIDES AND DOCUSATE SODIUM 2 TABLET: 50; 8.6 TABLET ORAL at 17:53

## 2022-01-01 RX ADMIN — SODIUM CHLORIDE, PRESERVATIVE FREE 10 ML: 5 INJECTION INTRAVENOUS at 05:51

## 2022-01-01 RX ADMIN — ONDANSETRON HYDROCHLORIDE 4 MG: 2 INJECTION, SOLUTION INTRAMUSCULAR; INTRAVENOUS at 13:44

## 2022-01-01 RX ADMIN — TRAMADOL HYDROCHLORIDE 50 MG: 50 TABLET, COATED ORAL at 20:36

## 2022-01-01 RX ADMIN — POTASSIUM CHLORIDE 20 MEQ: 750 TABLET, FILM COATED, EXTENDED RELEASE ORAL at 11:08

## 2022-01-01 RX ADMIN — FENTANYL CITRATE 50 MCG: 50 INJECTION, SOLUTION INTRAMUSCULAR; INTRAVENOUS at 12:54

## 2022-01-01 RX ADMIN — OXYCODONE 2.5 MG: 5 TABLET ORAL at 06:30

## 2022-01-01 RX ADMIN — SODIUM CHLORIDE, PRESERVATIVE FREE 10 ML: 5 INJECTION INTRAVENOUS at 22:31

## 2022-01-01 RX ADMIN — SODIUM CHLORIDE 1 G: 900 INJECTION INTRAVENOUS at 10:02

## 2022-01-01 RX ADMIN — SODIUM ZIRCONIUM CYCLOSILICATE 10 G: 10 POWDER, FOR SUSPENSION ORAL at 23:23

## 2022-01-01 RX ADMIN — Medication 1 AMPULE: at 20:37

## 2022-01-01 RX ADMIN — LEVETIRACETAM 500 MG: 500 TABLET, FILM COATED ORAL at 17:02

## 2022-01-01 RX ADMIN — LEVETIRACETAM 500 MG: 500 TABLET, FILM COATED ORAL at 21:51

## 2022-01-01 RX ADMIN — Medication 1 AMPULE: at 09:52

## 2022-01-01 RX ADMIN — FUROSEMIDE 40 MG: 10 INJECTION, SOLUTION INTRAMUSCULAR; INTRAVENOUS at 08:45

## 2022-01-01 RX ADMIN — PROPOFOL 30 MG: 10 INJECTION, EMULSION INTRAVENOUS at 12:55

## 2022-01-01 RX ADMIN — SODIUM CHLORIDE, PRESERVATIVE FREE 10 ML: 5 INJECTION INTRAVENOUS at 22:50

## 2022-01-01 RX ADMIN — WARFARIN SODIUM 6 MG: 5 TABLET ORAL at 18:23

## 2022-01-01 RX ADMIN — ACETAMINOPHEN 650 MG: 325 TABLET ORAL at 09:34

## 2022-01-01 RX ADMIN — LEVETIRACETAM 500 MG: 500 TABLET, FILM COATED ORAL at 20:32

## 2022-01-01 RX ADMIN — SENNOSIDES AND DOCUSATE SODIUM 2 TABLET: 50; 8.6 TABLET ORAL at 09:51

## 2022-01-01 RX ADMIN — IPRATROPIUM BROMIDE AND ALBUTEROL SULFATE 3 ML: .5; 3 SOLUTION RESPIRATORY (INHALATION) at 01:24

## 2022-01-01 RX ADMIN — SENNOSIDES AND DOCUSATE SODIUM 2 TABLET: 50; 8.6 TABLET ORAL at 08:45

## 2022-01-01 RX ADMIN — Medication 1 AMPULE: at 21:13

## 2022-01-01 RX ADMIN — POLYETHYLENE GLYCOL 3350 17 G: 17 POWDER, FOR SOLUTION ORAL at 11:20

## 2022-01-01 RX ADMIN — CASTOR OIL AND BALSAM, PERU: 788; 87 OINTMENT TOPICAL at 22:49

## 2022-01-01 RX ADMIN — SODIUM CHLORIDE, PRESERVATIVE FREE 10 ML: 5 INJECTION INTRAVENOUS at 16:57

## 2022-01-01 RX ADMIN — OXYCODONE 5 MG: 5 TABLET ORAL at 17:06

## 2022-01-01 RX ADMIN — SODIUM CHLORIDE 75 ML/HR: 9 INJECTION, SOLUTION INTRAVENOUS at 16:47

## 2022-01-01 RX ADMIN — Medication 1 AMPULE: at 09:25

## 2022-01-01 RX ADMIN — CALCIUM 500 MG: 500 TABLET ORAL at 17:05

## 2022-01-01 RX ADMIN — LIDOCAINE HYDROCHLORIDE 50 MG: 20 INJECTION, SOLUTION EPIDURAL; INFILTRATION; INTRACAUDAL; PERINEURAL at 12:54

## 2022-01-01 RX ADMIN — CASTOR OIL AND BALSAM, PERU: 788; 87 OINTMENT TOPICAL at 07:46

## 2022-01-01 RX ADMIN — ACETAMINOPHEN 650 MG: 325 TABLET ORAL at 08:47

## 2022-01-01 RX ADMIN — CALCIUM 500 MG: 500 TABLET ORAL at 17:30

## 2022-01-01 RX ADMIN — AMITRIPTYLINE HYDROCHLORIDE 20 MG: 10 TABLET, FILM COATED ORAL at 22:09

## 2022-01-01 RX ADMIN — Medication 1 TABLET: at 09:23

## 2022-01-01 RX ADMIN — CALCIUM 500 MG: 500 TABLET ORAL at 11:19

## 2022-01-01 RX ADMIN — FENTANYL CITRATE 50 MCG: 50 INJECTION, SOLUTION INTRAMUSCULAR; INTRAVENOUS at 13:05

## 2022-01-01 RX ADMIN — SODIUM CHLORIDE 1 G: 900 INJECTION INTRAVENOUS at 11:20

## 2022-01-01 RX ADMIN — ACETAMINOPHEN 650 MG: 325 TABLET ORAL at 21:16

## 2022-01-01 RX ADMIN — ACETAMINOPHEN 650 MG: 325 TABLET ORAL at 01:17

## 2022-01-01 RX ADMIN — SODIUM CHLORIDE, PRESERVATIVE FREE 10 ML: 5 INJECTION INTRAVENOUS at 11:38

## 2022-01-01 RX ADMIN — SODIUM CHLORIDE, PRESERVATIVE FREE 10 ML: 5 INJECTION INTRAVENOUS at 05:49

## 2022-01-01 RX ADMIN — LEVETIRACETAM 500 MG: 500 TABLET, FILM COATED ORAL at 08:57

## 2022-01-01 RX ADMIN — CEFAZOLIN 2 G: 1 INJECTION, POWDER, FOR SOLUTION INTRAMUSCULAR; INTRAVENOUS at 01:17

## 2022-01-01 RX ADMIN — LEVETIRACETAM 500 MG: 500 TABLET, FILM COATED ORAL at 21:13

## 2022-01-01 RX ADMIN — SODIUM CHLORIDE, PRESERVATIVE FREE 10 ML: 5 INJECTION INTRAVENOUS at 08:03

## 2022-01-01 RX ADMIN — CASTOR OIL AND BALSAM, PERU: 788; 87 OINTMENT TOPICAL at 11:41

## 2022-01-01 RX ADMIN — TRAMADOL HYDROCHLORIDE 50 MG: 50 TABLET, COATED ORAL at 11:36

## 2022-01-01 RX ADMIN — CEFAZOLIN 2 G: 1 INJECTION, POWDER, FOR SOLUTION INTRAMUSCULAR; INTRAVENOUS at 17:09

## 2022-01-01 RX ADMIN — SODIUM CHLORIDE, PRESERVATIVE FREE 10 ML: 5 INJECTION INTRAVENOUS at 16:58

## 2022-01-01 RX ADMIN — CASTOR OIL AND BALSAM, PERU: 788; 87 OINTMENT TOPICAL at 22:27

## 2022-01-01 RX ADMIN — FUROSEMIDE 40 MG: 10 INJECTION, SOLUTION INTRAMUSCULAR; INTRAVENOUS at 08:02

## 2022-01-01 RX ADMIN — LEVETIRACETAM 500 MG: 500 TABLET, FILM COATED ORAL at 17:05

## 2022-01-01 RX ADMIN — ACETAMINOPHEN 1000 MG: 500 TABLET ORAL at 09:27

## 2022-01-01 RX ADMIN — SODIUM CHLORIDE 1 G: 900 INJECTION INTRAVENOUS at 11:08

## 2022-01-01 RX ADMIN — FUROSEMIDE 40 MG: 10 INJECTION, SOLUTION INTRAMUSCULAR; INTRAVENOUS at 10:02

## 2022-01-01 RX ADMIN — CASTOR OIL AND BALSAM, PERU: 788; 87 OINTMENT TOPICAL at 22:30

## 2022-01-01 RX ADMIN — ACETAMINOPHEN 650 MG: 325 TABLET ORAL at 05:49

## 2022-01-01 RX ADMIN — Medication 5 MG: at 13:09

## 2022-01-01 RX ADMIN — POLYETHYLENE GLYCOL 3350 17 G: 17 POWDER, FOR SOLUTION ORAL at 09:52

## 2022-01-01 RX ADMIN — AMITRIPTYLINE HYDROCHLORIDE 20 MG: 10 TABLET, FILM COATED ORAL at 21:13

## 2022-01-01 RX ADMIN — ACETAMINOPHEN 650 MG: 325 TABLET ORAL at 15:15

## 2022-01-01 RX ADMIN — EPOETIN ALFA-EPBX 12000 UNITS: 10000 INJECTION, SOLUTION INTRAVENOUS; SUBCUTANEOUS at 17:06

## 2022-01-01 RX ADMIN — MORPHINE SULFATE 0.5 MG: 2 INJECTION, SOLUTION INTRAMUSCULAR; INTRAVENOUS at 22:25

## 2022-01-01 RX ADMIN — Medication 1 TABLET: at 08:57

## 2022-01-01 RX ADMIN — Medication 1 AMPULE: at 08:04

## 2022-01-01 RX ADMIN — Medication 1 TABLET: at 11:36

## 2022-01-01 RX ADMIN — BUMETANIDE 1 MG: 0.25 INJECTION, SOLUTION INTRAMUSCULAR; INTRAVENOUS at 07:45

## 2022-01-01 RX ADMIN — ACETAMINOPHEN 650 MG: 325 TABLET ORAL at 22:13

## 2022-01-01 RX ADMIN — SODIUM CHLORIDE, PRESERVATIVE FREE 5 ML: 5 INJECTION INTRAVENOUS at 21:13

## 2022-01-01 RX ADMIN — SODIUM CHLORIDE 1 G: 900 INJECTION INTRAVENOUS at 10:32

## 2022-01-01 RX ADMIN — AMITRIPTYLINE HYDROCHLORIDE 20 MG: 10 TABLET, FILM COATED ORAL at 21:51

## 2022-01-01 RX ADMIN — ACETAMINOPHEN 650 MG: 325 TABLET ORAL at 13:24

## 2022-01-01 RX ADMIN — ACETAMINOPHEN 650 MG: 325 TABLET ORAL at 23:33

## 2022-01-01 RX ADMIN — SODIUM ZIRCONIUM CYCLOSILICATE 10 G: 10 POWDER, FOR SUSPENSION ORAL at 22:48

## 2022-01-01 RX ADMIN — HYDROMORPHONE HYDROCHLORIDE 0.25 MG: 1 INJECTION, SOLUTION INTRAMUSCULAR; INTRAVENOUS; SUBCUTANEOUS at 08:51

## 2022-01-01 RX ADMIN — Medication 1 TABLET: at 19:22

## 2022-01-01 RX ADMIN — Medication 1 TABLET: at 11:40

## 2022-01-01 RX ADMIN — ACETAMINOPHEN 650 MG: 325 TABLET ORAL at 13:42

## 2022-01-01 RX ADMIN — SENNOSIDES AND DOCUSATE SODIUM 2 TABLET: 50; 8.6 TABLET ORAL at 17:06

## 2022-01-01 RX ADMIN — Medication 1 AMPULE: at 11:20

## 2022-01-01 RX ADMIN — ACETAMINOPHEN 650 MG: 325 TABLET ORAL at 21:51

## 2022-01-01 RX ADMIN — Medication 1 TABLET: at 11:19

## 2022-01-01 RX ADMIN — LORAZEPAM 0.5 MG: 0.5 TABLET ORAL at 12:45

## 2022-01-01 RX ADMIN — CASTOR OIL AND BALSAM, PERU: 788; 87 OINTMENT TOPICAL at 06:30

## 2022-01-01 RX ADMIN — AMITRIPTYLINE HYDROCHLORIDE 20 MG: 10 TABLET, FILM COATED ORAL at 21:59

## 2022-01-01 RX ADMIN — ACETAMINOPHEN 650 MG: 325 TABLET ORAL at 06:02

## 2022-01-01 RX ADMIN — SODIUM CHLORIDE, PRESERVATIVE FREE 10 ML: 5 INJECTION INTRAVENOUS at 17:30

## 2022-01-01 RX ADMIN — DEXAMETHASONE SODIUM PHOSPHATE 4 MG: 4 INJECTION, SOLUTION INTRAMUSCULAR; INTRAVENOUS at 12:58

## 2022-01-01 RX ADMIN — ALUMINUM HYDROXIDE, MAGNESIUM HYDROXIDE, AND SIMETHICONE 30 ML: 200; 200; 20 SUSPENSION ORAL at 22:35

## 2022-01-01 RX ADMIN — AMITRIPTYLINE HYDROCHLORIDE 20 MG: 10 TABLET, FILM COATED ORAL at 22:47

## 2022-01-01 RX ADMIN — SENNOSIDES AND DOCUSATE SODIUM 2 TABLET: 50; 8.6 TABLET ORAL at 17:04

## 2022-01-01 RX ADMIN — CASTOR OIL AND BALSAM, PERU: 788; 87 OINTMENT TOPICAL at 16:47

## 2022-01-01 RX ADMIN — IRON SUCROSE 200 MG: 20 INJECTION, SOLUTION INTRAVENOUS at 10:32

## 2022-01-01 RX ADMIN — ACETAMINOPHEN 650 MG: 325 TABLET ORAL at 07:58

## 2022-01-01 RX ADMIN — WARFARIN SODIUM 5 MG: 5 TABLET ORAL at 17:06

## 2022-01-01 RX ADMIN — AMITRIPTYLINE HYDROCHLORIDE 20 MG: 10 TABLET, FILM COATED ORAL at 22:27

## 2022-01-01 RX ADMIN — Medication 1 AMPULE: at 09:35

## 2022-01-01 RX ADMIN — SODIUM ZIRCONIUM CYCLOSILICATE 10 G: 10 POWDER, FOR SUSPENSION ORAL at 12:21

## 2022-01-01 RX ADMIN — CALCIUM 500 MG: 500 TABLET ORAL at 09:51

## 2022-01-01 RX ADMIN — SODIUM ZIRCONIUM CYCLOSILICATE 10 G: 10 POWDER, FOR SUSPENSION ORAL at 10:45

## 2022-01-01 RX ADMIN — ACETAMINOPHEN 650 MG: 325 TABLET ORAL at 19:22

## 2022-01-01 RX ADMIN — OXYCODONE 5 MG: 5 TABLET ORAL at 13:42

## 2022-01-01 RX ADMIN — BUMETANIDE 1 MG: 0.25 INJECTION, SOLUTION INTRAMUSCULAR; INTRAVENOUS at 21:42

## 2022-01-01 RX ADMIN — SODIUM CHLORIDE, PRESERVATIVE FREE 10 ML: 5 INJECTION INTRAVENOUS at 17:06

## 2022-01-01 RX ADMIN — MELATONIN 3 MG: at 21:53

## 2022-01-01 RX ADMIN — AMITRIPTYLINE HYDROCHLORIDE 20 MG: 10 TABLET, FILM COATED ORAL at 22:13

## 2022-01-01 RX ADMIN — SENNOSIDES AND DOCUSATE SODIUM 2 TABLET: 50; 8.6 TABLET ORAL at 17:31

## 2022-01-01 RX ADMIN — IPRATROPIUM BROMIDE AND ALBUTEROL SULFATE 3 ML: .5; 3 SOLUTION RESPIRATORY (INHALATION) at 18:46

## 2022-01-01 RX ADMIN — CASTOR OIL AND BALSAM, PERU: 788; 87 OINTMENT TOPICAL at 14:30

## 2022-01-01 RX ADMIN — ACETAMINOPHEN 650 MG: 325 TABLET ORAL at 20:13

## 2022-01-01 RX ADMIN — Medication 80 MCG: at 13:38

## 2022-01-01 RX ADMIN — SODIUM CHLORIDE, PRESERVATIVE FREE 10 ML: 5 INJECTION INTRAVENOUS at 11:09

## 2022-01-01 RX ADMIN — SODIUM CHLORIDE, PRESERVATIVE FREE 10 ML: 5 INJECTION INTRAVENOUS at 06:40

## 2022-01-01 RX ADMIN — CASTOR OIL AND BALSAM, PERU: 788; 87 OINTMENT TOPICAL at 23:32

## 2022-01-01 RX ADMIN — ACETAMINOPHEN 650 MG: 325 TABLET ORAL at 03:48

## 2022-01-01 RX ADMIN — LEVETIRACETAM 500 MG: 500 TABLET, FILM COATED ORAL at 22:08

## 2022-01-01 RX ADMIN — Medication 1 TABLET: at 17:05

## 2022-01-01 RX ADMIN — WARFARIN SODIUM 1 MG: 1 TABLET ORAL at 22:08

## 2022-01-01 RX ADMIN — OXYCODONE 5 MG: 5 TABLET ORAL at 05:58

## 2022-01-01 RX ADMIN — SODIUM CHLORIDE 75 ML/HR: 9 INJECTION, SOLUTION INTRAVENOUS at 05:56

## 2022-01-01 RX ADMIN — CALCIUM 500 MG: 500 TABLET ORAL at 19:22

## 2022-01-01 RX ADMIN — Medication 1 AMPULE: at 22:27

## 2022-01-01 RX ADMIN — Medication 1 AMPULE: at 23:33

## 2022-01-01 RX ADMIN — Medication 1 AMPULE: at 22:12

## 2022-01-01 RX ADMIN — FUROSEMIDE 40 MG: 10 INJECTION, SOLUTION INTRAMUSCULAR; INTRAVENOUS at 17:07

## 2022-01-01 RX ADMIN — CALCIUM 500 MG: 500 TABLET ORAL at 11:08

## 2022-01-01 RX ADMIN — CALCIUM 500 MG: 500 TABLET ORAL at 17:02

## 2022-01-01 RX ADMIN — CALCIUM GLUCONATE 1000 MG: 20 INJECTION, SOLUTION INTRAVENOUS at 08:32

## 2022-01-01 RX ADMIN — PROPOFOL 50 MG: 10 INJECTION, EMULSION INTRAVENOUS at 12:54

## 2022-01-01 RX ADMIN — ACETAMINOPHEN 650 MG: 325 TABLET ORAL at 17:04

## 2022-01-01 RX ADMIN — CALCIUM GLUCONATE 1000 MG: 20 INJECTION, SOLUTION INTRAVENOUS at 12:53

## 2022-01-01 RX ADMIN — Medication 1 TABLET: at 09:52

## 2022-01-01 RX ADMIN — LEVETIRACETAM 500 MG: 500 TABLET, FILM COATED ORAL at 17:53

## 2022-01-01 RX ADMIN — FUROSEMIDE 20 MG: 10 INJECTION, SOLUTION INTRAMUSCULAR; INTRAVENOUS at 12:45

## 2022-01-01 RX ADMIN — SENNOSIDES AND DOCUSATE SODIUM 2 TABLET: 50; 8.6 TABLET ORAL at 19:22

## 2022-01-01 RX ADMIN — SODIUM CHLORIDE, PRESERVATIVE FREE 10 ML: 5 INJECTION INTRAVENOUS at 06:39

## 2022-01-01 RX ADMIN — SODIUM CHLORIDE, POTASSIUM CHLORIDE, SODIUM LACTATE AND CALCIUM CHLORIDE: 600; 310; 30; 20 INJECTION, SOLUTION INTRAVENOUS at 12:47

## 2022-01-01 RX ADMIN — SENNOSIDES AND DOCUSATE SODIUM 2 TABLET: 50; 8.6 TABLET ORAL at 12:45

## 2022-01-01 RX ADMIN — Medication 80 MCG: at 12:54

## 2022-01-01 RX ADMIN — CASTOR OIL AND BALSAM, PERU: 788; 87 OINTMENT TOPICAL at 06:56

## 2022-01-01 RX ADMIN — LEVETIRACETAM 500 MG: 500 TABLET, FILM COATED ORAL at 08:45

## 2022-01-01 RX ADMIN — SODIUM CHLORIDE, PRESERVATIVE FREE 10 ML: 5 INJECTION INTRAVENOUS at 07:08

## 2022-01-01 RX ADMIN — SENNOSIDES AND DOCUSATE SODIUM 2 TABLET: 50; 8.6 TABLET ORAL at 08:57

## 2022-01-01 RX ADMIN — WARFARIN SODIUM 6 MG: 5 TABLET ORAL at 17:31

## 2022-01-01 RX ADMIN — POTASSIUM CHLORIDE 20 MEQ: 750 TABLET, FILM COATED, EXTENDED RELEASE ORAL at 17:13

## 2022-01-01 RX ADMIN — SENNOSIDES AND DOCUSATE SODIUM 2 TABLET: 50; 8.6 TABLET ORAL at 11:20

## 2022-01-01 RX ADMIN — FUROSEMIDE 40 MG: 10 INJECTION, SOLUTION INTRAMUSCULAR; INTRAVENOUS at 17:05

## 2022-01-01 RX ADMIN — SODIUM ZIRCONIUM CYCLOSILICATE 10 G: 10 POWDER, FOR SUSPENSION ORAL at 21:17

## 2022-01-01 RX ADMIN — LEVETIRACETAM 500 MG: 500 TABLET, FILM COATED ORAL at 09:34

## 2022-01-01 RX ADMIN — SODIUM CHLORIDE 1 G: 900 INJECTION INTRAVENOUS at 12:15

## 2022-01-01 RX ADMIN — CASTOR OIL AND BALSAM, PERU: 788; 87 OINTMENT TOPICAL at 11:37

## 2022-01-01 RX ADMIN — Medication 1 TABLET: at 17:30

## 2022-01-01 RX ADMIN — SENNOSIDES AND DOCUSATE SODIUM 2 TABLET: 50; 8.6 TABLET ORAL at 17:02

## 2022-01-01 RX ADMIN — CASTOR OIL AND BALSAM, PERU: 788; 87 OINTMENT TOPICAL at 12:30

## 2022-01-01 RX ADMIN — ACETAMINOPHEN 650 MG: 325 TABLET ORAL at 03:36

## 2022-01-01 RX ADMIN — LEVETIRACETAM 500 MG: 500 TABLET, FILM COATED ORAL at 09:51

## 2022-01-01 RX ADMIN — SODIUM ZIRCONIUM CYCLOSILICATE 10 G: 10 POWDER, FOR SUSPENSION ORAL at 08:28

## 2022-01-01 RX ADMIN — SODIUM CHLORIDE, PRESERVATIVE FREE 5 ML: 5 INJECTION INTRAVENOUS at 05:59

## 2022-03-18 PROBLEM — I50.9 CHF (CONGESTIVE HEART FAILURE) (HCC): Status: ACTIVE | Noted: 2019-12-06

## 2022-03-18 PROBLEM — I48.91 ATRIAL FIBRILLATION WITH RVR (HCC): Status: ACTIVE | Noted: 2017-08-05

## 2022-03-18 PROBLEM — N39.0 UTI (URINARY TRACT INFECTION): Status: ACTIVE | Noted: 2017-10-15

## 2022-03-18 PROBLEM — M54.50 ACUTE MIDLINE LOW BACK PAIN WITHOUT SCIATICA: Status: ACTIVE | Noted: 2020-01-10

## 2022-03-18 PROBLEM — K59.00 CONSTIPATION: Status: ACTIVE | Noted: 2017-06-01

## 2022-03-18 PROBLEM — M25.551 RIGHT HIP PAIN: Status: ACTIVE | Noted: 2017-06-02

## 2022-03-19 PROBLEM — K40.20 BILATERAL INGUINAL HERNIA: Status: ACTIVE | Noted: 2018-08-03
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
## 2022-03-19 PROBLEM — J81.1 PULMONARY EDEMA: Status: ACTIVE | Noted: 2018-08-03

## 2022-03-20 PROBLEM — M54.9 BACK PAIN: Status: ACTIVE | Noted: 2017-06-02

## 2022-03-20 PROBLEM — S32.010A COMPRESSION FRACTURE OF L1 LUMBAR VERTEBRA (HCC): Status: ACTIVE | Noted: 2017-06-01

## 2022-09-04 PROBLEM — S72.009A HIP FRACTURE (HCC): Status: ACTIVE | Noted: 2022-01-01

## 2022-09-04 NOTE — ED NOTES
Pt had a fall in the bathroom this AM. Reports that she fell against the shower and hit the right side of her ribs, right shoulder, right side of head, and right hip. Denies LOC. Pt is on coumadin. Bleeding controlled at this time. Pt has labored RR. Pt on 5L NC at this time.  Pt wears 2L NC at home

## 2022-09-04 NOTE — H&P
Hospitalist Admission Note    NAME: Anabela Nolan   :  1924   MRN:  [id number]     Date/Time:  2022 3:36 PM    Patient PCP: John Sanchez MD  ________________________________________________________________________    My assessment of this patient's clinical condition and my plan of care is as follows. Assessment / Plan:  Right hip fracture:  XR Acute right hip intratrochanteric fracture  Ortho evaluate the patient  We will keep the patient n.p.o. midnight  Hold warfarin for possible OR  Repeat INR in the morning currently 3.9    Preoperative risk assessment  Patient has RCRI score of 10% which corresponded to moderate risk for cardiac related death and MI within 30 days can proceed with the surgery with moderate risk    Fall:  Acute right distal clavicle fracture  Ortho on board    CT head right lateral scalp hematoma,  but otherwise unremarkable no intracranial bleed or hemorrhage        Chronic systolic heart failure  Continue with Lasix 40 daily  On oxygen(at home she is 4 L nasal cannula)  Repeat echo  Last echo in 2019 which she showed ejection fraction decrease    CKD stage III            Code Status: DNR  Surrogate Decision Maker:    DVT Prophylaxis: warfarin, held for OR  GI Prophylaxis: not indicated    Baseline: from home, lives with her daughter         Subjective:   CHIEF COMPLAINT: \"I fell today\"     HISTORY OF PRESENT ILLNESS:     Tracy Kaur is a 80 y.o.    female lives with her daughter woke up today in the early morning and fell in the bathroom after she said she just could not get up from the toilert, and her legs were weak, family heard her and came found her fell and there is some laceration and bleeding int he right forehead, family and patient not know if there is LOC or not  At baseline as per patient daughter patient able to do her ADLs, with some assistant as she is legally blind   history taken from the patient and her daughter at the bedside  Patient has regular follow-up with her PCP and cardiologist  We were asked to admit for work up and evaluation of the above problems. Past Medical History:   Diagnosis Date    Adverse effect of anesthesia     slow to wake    Alopecia     Arrhythmia     atrial fib,?svt    Arthritis     Atrial fibrillation (HCC)     Benign essential HTN     Benign neoplasm of vulva     CAD (coronary artery disease)     Cholelithiases     Chronic kidney disease     followed by specialist    Chronic pain     Colon cancer (HCC)     Constipation     Dyspareunia     Glaucoma     Heart failure (HCC)     History of mixed connective tissue disease     Hx of viral pericarditis     Hyperactivity of bladder     Hypercholesterolemia     Ill-defined condition     macular degeneration    Ill-defined condition     recurrent UTI hx    Insomnia     Macular degeneration     Neck pain     Psoriasis     patient denies    Rectocele     Renal cyst     Seizure disorder ([facility] [address]     none in 30 plus years---sees neurologist     Sjogren's disease ([facility] [address]     patient unsure    Thrombophlebitis     TIA (transient ischemic attack)     patient denies    Vaginal vault prolapse, posthysterectomy     Varicose veins         Past Surgical History:   Procedure Laterality Date    HX BREAST LUMPECTOMY      left breast    HX CATARACT REMOVAL      HX HEMORRHOIDECTOMY      HX HERNIA REPAIR  08/01/2018    Open bilateral inguinal hernia repairs with mesh.     HX HYSTERECTOMY      HX PACEMAKER      left    HX SHOULDER ARTHROSCOPY      left    HX TOTAL COLECTOMY  1955    partial    OK CARDIAC SURG PROCEDURE UNLIST      AV ablation       Social History     Tobacco Use    Smoking status: Never    Smokeless tobacco: Never   Substance Use Topics    Alcohol use: No        Family History   Problem Relation Age of Onset    Cancer Father     Inflammatory Bowel Dz Mother     Heart Disease Brother     Diabetes Brother      Allergies   Allergen Reactions    Codeine Nausea Only Pcn [Penicillins] Unknown (comments)     Cant remember reaction as a child    Tetanus Toxoid, Adsorbed Other (comments)    Tetanus Vaccines And Toxoid Unknown (comments)     Does not take tetanus due to tb hx    Vibramycin [Doxycycline Calcium] Rash        Prior to Admission medications    Medication Sig Start Date End Date Taking? Authorizing Provider   levETIRAcetam (KEPPRA) 500 mg tablet Take 1 tablet by mouth twice daily 8/7/22   Cliff Scruggs MD   albuterol-ipratropium (DUO-NEB) 2.5 mg-0.5 mg/3 ml nebu 3 mL by Nebulization route every six (6) hours as needed for Wheezing. 12/24/19   Nohemi Menchaca MD   OXYGEN-AIR DELIVERY SYSTEMS 2 L by Nasal route continuous. Provider, Historical   guaifenesin/dextromethorphan (CORICIDIN HBP CHEST BRANDON-COUGH PO) Take 4 mg by mouth daily. Provider, Historical   carvedilol (COREG) 3.125 mg tablet Take 1 Tab by mouth two (2) times daily (with meals). Patient not taking: Reported on 11/19/2021 12/9/19   Sarah Hernandez MD   furosemide (LASIX) 40 mg tablet Take 2 Tabs by mouth every other day. Pt takes 40 mg every other day and alternates with 80 mg. 12/9/19   Sarah Hernandez MD   acetaminophen (TYLENOL EXTRA STRENGTH) 500 mg tablet Take 500 mg by mouth every six (6) hours as needed for Pain. Provider, Historical   cranberry fruit extract (CRANBERRY PO) Take 1 Can by mouth daily. Provider, Historical   amitriptyline (ELAVIL) 10 mg tablet Take 10 mg by mouth nightly. Per pt, she takes 2 tabs qhs    Provider, Historical   cholecalciferol, vitamin D3, (VITAMIN D3) 2,000 unit tab Take 2,000 Units by mouth daily. Provider, Historical   psyllium seed, with dextrose, (FIBER PO) Take 1 Cap by mouth daily. Provider, Historical   VIT A/C/E AC/ZNOX/CUPRIC OXIDE (EYE VITAMIN AND MINERALS PO) Take 1 Tab by mouth two (2) times a day. Other, MD Yeny   ferrous sulfate (IRON) 325 mg (65 mg iron) tablet Take 325 mg by mouth two (2) times a day.     Kel, MD Yeny   DOCUSATE CALCIUM (STOOL SOFTENER PO) Take 100 mg by mouth as needed for Other (constipation). Provider, Historical   latanoprost (XALATAN) 0.005 % ophthalmic solution Administer 1 Drop to both eyes nightly. 6/5/17   Provider, Historical       REVIEW OF SYSTEMS:     I am not able to complete the review of systems because:    The patient is intubated and sedated    The patient has altered mental status due to his acute medical problems    The patient has baseline aphasia from prior stroke(s)    The patient has baseline dementia and is not reliable historian    The patient is in acute medical distress and unable to provide information           Total of 12 systems reviewed as follows:       POSITIVE= underlined text  Negative = text not underlined  General:  fever, chills, sweats, generalized weakness, weight loss/gain,      loss of appetite   Eyes:    blurred vision, eye pain, loss of vision, double vision  ENT:    rhinorrhea, pharyngitis   Respiratory:   cough, sputum production, SOB, SANTOS, wheezing, pleuritic pain   Cardiology:   chest pain, palpitations, orthopnea, PND, edema, syncope   Gastrointestinal:  abdominal pain , N/V, diarrhea, dysphagia, constipation, bleeding   Genitourinary:  frequency, urgency, dysuria, hematuria, incontinence   Muskuloskeletal :  arthralgia, myalgia, back pain  Hematology:  easy bruising, nose or gum bleeding, lymphadenopathy   Dermatological: rash, ulceration, pruritis, color change / jaundice  Endocrine:   hot flashes or polydipsia   Neurological:  headache, dizziness, confusion, focal weakness, paresthesia,     Speech difficulties, memory loss, gait difficulty  Psychological: Feelings of anxiety, depression, agitation    Objective:   VITALS:    Visit Vitals  BP (!) 115/47   Pulse 80   Temp 97.9 °F (36.6 °C)   Resp 18   Ht 5' (1.524 m)   Wt 49.9 kg (110 lb)   SpO2 96%   Breastfeeding No   BMI 21.48 kg/m²       PHYSICAL EXAM:  Physical Exam  HENT:      Head:      Comments: Laceration in the right forehead      Nose: Nose normal.      Mouth/Throat:      Mouth: Mucous membranes are moist.   Cardiovascular:      Rate and Rhythm: Normal rate. Pulmonary:      Effort: Respiratory distress present. Breath sounds: No wheezing. Comments: On 5 NC   Abdominal:      General: There is no distension. Tenderness: There is no abdominal tenderness. Musculoskeletal:      Cervical back: Neck supple. Right lower leg: No edema. Left lower leg: No edema. Comments: There is bony prominence with mild ecchymosis on the right anterior groin with tenderness   Skin:     General: Skin is warm and dry. Capillary Refill: Capillary refill takes less than 2 seconds. Neurological:      Mental Status: She is alert.          _______________________________________________________________________  Care Plan discussed with:    Comments   Patient x    Family  x    RN     Care Manager                    Consultant:      _______________________________________________________________________  Expected  Disposition:   Home with Family    HH/PT/OT/RN    SNF/LTC    LOW    ________________________________________________________________________  TOTAL TIME:  61 Minutes    Critical Care Provided     Minutes non procedure based      Comments   >50% of visit spent in counseling and coordination of care  Chart review  Discussion with patient and/or family and questions answered     ________________________________________________________________________  Nagi Caruso MD    Procedures: see electronic medical records for all procedures/Xrays and details which were not copied into this note but were reviewed prior to creation of Plan.     LAB DATA REVIEWED:    Recent Results (from the past 24 hour(s))   CBC WITH AUTOMATED DIFF    Collection Time: 09/04/22  8:27 AM   Result Value Ref Range    WBC 5.6 3.6 - 11.0 K/uL    RBC 4.01 3.80 - 5.20 M/uL    HGB 13.2 11.5 - 16.0 g/dL    HCT 40.6 35.0 - 47.0 %    .2 (H) 80.0 - 99.0 FL    MCH 32.9 26.0 - 34.0 PG    MCHC 32.5 30.0 - 36.5 g/dL    RDW 15.5 (H) 11.5 - 14.5 %    PLATELET 80 (L) 401 - 400 K/uL    MPV 11.1 8.9 - 12.9 FL    NRBC 0.0 0  WBC    ABSOLUTE NRBC 0.00 0.00 - 0.01 K/uL    NEUTROPHILS 78 (H) 32 - 75 %    LYMPHOCYTES 15 12 - 49 %    MONOCYTES 6 5 - 13 %    EOSINOPHILS 1 0 - 7 %    BASOPHILS 0 0 - 1 %    IMMATURE GRANULOCYTES 1 (H) 0.0 - 0.5 %    ABS. NEUTROPHILS 4.4 1.8 - 8.0 K/UL    ABS. LYMPHOCYTES 0.8 0.8 - 3.5 K/UL    ABS. MONOCYTES 0.3 0.0 - 1.0 K/UL    ABS. EOSINOPHILS 0.1 0.0 - 0.4 K/UL    ABS. BASOPHILS 0.0 0.0 - 0.1 K/UL    ABS. IMM. GRANS. 0.0 0.00 - 0.04 K/UL    DF AUTOMATED     METABOLIC PANEL, COMPREHENSIVE    Collection Time: 09/04/22  8:27 AM   Result Value Ref Range    Sodium 142 136 - 145 mmol/L    Potassium 4.2 3.5 - 5.1 mmol/L    Chloride 103 97 - 108 mmol/L    CO2 33 (H) 21 - 32 mmol/L    Anion gap 6 5 - 15 mmol/L    Glucose 91 65 - 100 mg/dL    BUN 34 (H) 6 - 20 MG/DL    Creatinine 1.24 (H) 0.55 - 1.02 MG/DL    BUN/Creatinine ratio 27 (H) 12 - 20      GFR est AA 49 (L) >60 ml/min/1.73m2    GFR est non-AA 40 (L) >60 ml/min/1.73m2    Calcium 8.9 8.5 - 10.1 MG/DL    Bilirubin, total 1.7 (H) 0.2 - 1.0 MG/DL    ALT (SGPT) 20 12 - 78 U/L    AST (SGOT) 27 15 - 37 U/L    Alk.  phosphatase 111 45 - 117 U/L    Protein, total 7.9 6.4 - 8.2 g/dL    Albumin 3.4 (L) 3.5 - 5.0 g/dL    Globulin 4.5 (H) 2.0 - 4.0 g/dL    A-G Ratio 0.8 (L) 1.1 - 2.2     TYPE & SCREEN    Collection Time: 09/04/22  8:27 AM   Result Value Ref Range    Crossmatch Expiration 09/07/2022,2359     ABO/Rh(D) Jelena Lopez POSITIVE     Antibody screen NEG    PROTHROMBIN TIME + INR    Collection Time: 09/04/22  8:32 AM   Result Value Ref Range    INR 3.9 (H) 0.9 - 1.1      Prothrombin time 37.0 (H) 9.0 - 11.1 sec   EKG, 12 LEAD, INITIAL    Collection Time: 09/04/22  8:35 AM   Result Value Ref Range    Ventricular Rate 80 BPM    Atrial Rate 80 BPM    QRS Duration 160 ms    Q-T Interval 462 ms QTC Calculation (Bezet) 532 ms    Calculated R Axis -59 degrees    Calculated T Axis 118 degrees    Diagnosis       Ventricular-paced rhythm  When compared with ECG of 06-DEC-2019 08:57,  No significant change was found     NT-PRO BNP    Collection Time: 09/04/22  3:05 PM   Result Value Ref Range    NT pro-BNP 7,837 (H) <450 PG/ML   URINALYSIS W/ REFLEX CULTURE    Collection Time: 09/04/22  3:06 PM    Specimen: Urine   Result Value Ref Range    Color DARK YELLOW      Appearance TURBID (A) CLEAR      Specific gravity 1.014      pH (UA) 5.5 5.0 - 8.0      Protein 100 (A) NEG mg/dL    Glucose Negative NEG mg/dL    Ketone Negative NEG mg/dL    Bilirubin Negative NEG      Blood LARGE (A) NEG      Urobilinogen 1.0 0.2 - 1.0 EU/dL    Nitrites Negative NEG      Leukocyte Esterase LARGE (A) NEG      WBC >100 (H) 0 - 4 /hpf    RBC 5-10 0 - 5 /hpf    Epithelial cells FEW FEW /lpf    Bacteria Negative NEG /hpf    UA:UC IF INDICATED URINE CULTURE ORDERED (A) CNI

## 2022-09-04 NOTE — ED NOTES
Pt has labored RR and RA sat was at 76%. Pt placed on 5L NC. C/o right hip pain and right sided rib pain and pain with inspiration.

## 2022-09-04 NOTE — ED PROVIDER NOTES
EMERGENCY DEPARTMENT HISTORY AND PHYSICAL EXAM      Date: 9/4/2022  Patient Name: Juwan Charles    History of Presenting Illness     Chief Complaint   Patient presents with    Fall     Via EMS from home. Silvio Cuevasils while on the toilet this morning. She has a lac to the right head and a skin tear to the right elbow. She has pain in her right posterior shoulder radiating through to the right chest.  She also has right hip pain with external rotation and shortening. Pt is legally blind. History Provided By: Patient, Patient's Daughter, and EMS    HPI: Juwan Charles, 80 y.o. female  presents to the ED with cc of right shoulder and right hip pain after fall. Patient fell in her bathroom at home. Family heard her yell and found her in the floor. She was having bleeding from her scalp. Unknown loss of consciousness. Patient is on warfarin anticoagulation with a history of A. fib. Patient states her right hip pain is the most severe.     Past History     Past Medical History:  Past Medical History:   Diagnosis Date    Adverse effect of anesthesia     slow to wake    Alopecia     Arrhythmia     atrial fib,?svt    Arthritis     Atrial fibrillation (HCC)     Benign essential HTN     Benign neoplasm of vulva     CAD (coronary artery disease)     Cholelithiases     Chronic kidney disease     followed by specialist    Chronic pain     Colon cancer (HCC)     Constipation     Dyspareunia     Glaucoma     Heart failure (HCC)     History of mixed connective tissue disease     Hx of viral pericarditis     Hyperactivity of bladder     Hypercholesterolemia     Ill-defined condition     macular degeneration    Ill-defined condition     recurrent UTI hx    Insomnia     Macular degeneration     Neck pain     Psoriasis     patient denies    Rectocele     Renal cyst     Seizure disorder ([facility] [address]     none in 30 plus years---sees neurologist     Sjogren's disease ([facility] [address]     patient unsure    Thrombophlebitis     TIA (transient ischemic attack)     patient denies    Vaginal vault prolapse, posthysterectomy     Varicose veins        Past Surgical History:  Past Surgical History:   Procedure Laterality Date    HX BREAST LUMPECTOMY      left breast    HX CATARACT REMOVAL      HX HEMORRHOIDECTOMY      HX HERNIA REPAIR  08/01/2018    Open bilateral inguinal hernia repairs with mesh. HX HYSTERECTOMY      HX PACEMAKER      left    HX SHOULDER ARTHROSCOPY      left    HX TOTAL COLECTOMY  1955    partial    TX CARDIAC SURG PROCEDURE UNLIST      AV ablation       Medications:  No current facility-administered medications on file prior to encounter. Current Outpatient Medications on File Prior to Encounter   Medication Sig Dispense Refill    levETIRAcetam (KEPPRA) 500 mg tablet Take 1 tablet by mouth twice daily 180 Tablet 0    albuterol-ipratropium (DUO-NEB) 2.5 mg-0.5 mg/3 ml nebu 3 mL by Nebulization route every six (6) hours as needed for Wheezing. 30 Nebule 1    OXYGEN-AIR DELIVERY SYSTEMS 2 L by Nasal route continuous. guaifenesin/dextromethorphan (CORICIDIN HBP CHEST BRANDON-COUGH PO) Take 4 mg by mouth daily. carvedilol (COREG) 3.125 mg tablet Take 1 Tab by mouth two (2) times daily (with meals). (Patient not taking: Reported on 11/19/2021) 30 Tab 0    furosemide (LASIX) 40 mg tablet Take 2 Tabs by mouth every other day. Pt takes 40 mg every other day and alternates with 80 mg. 30 Tab 0    acetaminophen (TYLENOL EXTRA STRENGTH) 500 mg tablet Take 500 mg by mouth every six (6) hours as needed for Pain. cranberry fruit extract (CRANBERRY PO) Take 1 Can by mouth daily. amitriptyline (ELAVIL) 10 mg tablet Take 10 mg by mouth nightly. Per pt, she takes 2 tabs qhs      cholecalciferol, vitamin D3, (VITAMIN D3) 2,000 unit tab Take 2,000 Units by mouth daily. psyllium seed, with dextrose, (FIBER PO) Take 1 Cap by mouth daily. VIT A/C/E AC/ZNOX/CUPRIC OXIDE (EYE VITAMIN AND MINERALS PO) Take 1 Tab by mouth two (2) times a day. ferrous sulfate (IRON) 325 mg (65 mg iron) tablet Take 325 mg by mouth two (2) times a day. DOCUSATE CALCIUM (STOOL SOFTENER PO) Take 100 mg by mouth as needed for Other (constipation). latanoprost (XALATAN) 0.005 % ophthalmic solution Administer 1 Drop to both eyes nightly. Family History:  Family History   Problem Relation Age of Onset    Cancer Father     Inflammatory Bowel Dz Mother     Heart Disease Brother     Diabetes Brother        Social History:  Social History     Tobacco Use    Smoking status: Never    Smokeless tobacco: Never   Substance Use Topics    Alcohol use: No    Drug use: No       Allergies: Allergies   Allergen Reactions    Codeine Nausea Only    Pcn [Penicillins] Unknown (comments)     Cant remember reaction as a child    Tetanus Toxoid, Adsorbed Other (comments)    Tetanus Vaccines And Toxoid Unknown (comments)     Does not take tetanus due to tb hx    Vibramycin [Doxycycline Calcium] Rash       All the above components of the past  history are auto-populated from the electronic record. They have been reviewed and the patient has been interviewed for any pertinent past history that pertains to the patient's chief complaint and reason for visit. Not all pre-populated components may be accurate at the time this note was generated. Review of Systems   Review of Systems   Constitutional:  Negative for chills and fever. HENT:  Negative for congestion, ear pain, rhinorrhea, sore throat and trouble swallowing. Eyes:  Negative for visual disturbance. Respiratory:  Negative for cough, chest tightness and shortness of breath. Cardiovascular:  Negative for chest pain and palpitations. Gastrointestinal:  Negative for abdominal pain, blood in stool, constipation, diarrhea, nausea and vomiting. Genitourinary:  Negative for decreased urine volume, difficulty urinating, dysuria and frequency. Musculoskeletal:  Negative for back pain and neck pain.         Right hip pain, right shoulder pain   Skin:  Negative for color change and rash. Right scalp wound, right elbow skin tear   Neurological:  Positive for headaches. Negative for dizziness, weakness and light-headedness. Physical Exam   Physical Exam  Vitals and nursing note reviewed. Constitutional:       General: She is in acute distress. Appearance: She is well-developed. She is not ill-appearing. HENT:      Head: Normocephalic. Eyes:      Conjunctiva/sclera: Conjunctivae normal.   Cardiovascular:      Rate and Rhythm: Normal rate and regular rhythm. Pulmonary:      Effort: Pulmonary effort is normal. No accessory muscle usage or respiratory distress. Abdominal:      General: There is no distension. Musculoskeletal:      Right shoulder: Tenderness and bony tenderness present. Decreased range of motion. Cervical back: Normal range of motion. Right hip: Deformity, tenderness and bony tenderness present. Decreased range of motion. Skin:     General: Skin is warm and dry. Neurological:      Mental Status: She is alert and oriented to person, place, and time. Diagnostic Study Results     Labs -     Recent Results (from the past 24 hour(s))   CBC WITH AUTOMATED DIFF    Collection Time: 09/04/22  8:27 AM   Result Value Ref Range    WBC 5.6 3.6 - 11.0 K/uL    RBC 4.01 3.80 - 5.20 M/uL    HGB 13.2 11.5 - 16.0 g/dL    HCT 40.6 35.0 - 47.0 %    .2 (H) 80.0 - 99.0 FL    MCH 32.9 26.0 - 34.0 PG    MCHC 32.5 30.0 - 36.5 g/dL    RDW 15.5 (H) 11.5 - 14.5 %    PLATELET 80 (L) 916 - 400 K/uL    MPV 11.1 8.9 - 12.9 FL    NRBC 0.0 0  WBC    ABSOLUTE NRBC 0.00 0.00 - 0.01 K/uL    NEUTROPHILS 78 (H) 32 - 75 %    LYMPHOCYTES 15 12 - 49 %    MONOCYTES 6 5 - 13 %    EOSINOPHILS 1 0 - 7 %    BASOPHILS 0 0 - 1 %    IMMATURE GRANULOCYTES 1 (H) 0.0 - 0.5 %    ABS. NEUTROPHILS 4.4 1.8 - 8.0 K/UL    ABS. LYMPHOCYTES 0.8 0.8 - 3.5 K/UL    ABS. MONOCYTES 0.3 0.0 - 1.0 K/UL    ABS. EOSINOPHILS 0.1 0.0 - 0.4 K/UL    ABS. BASOPHILS 0.0 0.0 - 0.1 K/UL    ABS. IMM. GRANS. 0.0 0.00 - 0.04 K/UL    DF AUTOMATED     METABOLIC PANEL, COMPREHENSIVE    Collection Time: 09/04/22  8:27 AM   Result Value Ref Range    Sodium 142 136 - 145 mmol/L    Potassium 4.2 3.5 - 5.1 mmol/L    Chloride 103 97 - 108 mmol/L    CO2 33 (H) 21 - 32 mmol/L    Anion gap 6 5 - 15 mmol/L    Glucose 91 65 - 100 mg/dL    BUN 34 (H) 6 - 20 MG/DL    Creatinine 1.24 (H) 0.55 - 1.02 MG/DL    BUN/Creatinine ratio 27 (H) 12 - 20      GFR est AA 49 (L) >60 ml/min/1.73m2    GFR est non-AA 40 (L) >60 ml/min/1.73m2    Calcium 8.9 8.5 - 10.1 MG/DL    Bilirubin, total 1.7 (H) 0.2 - 1.0 MG/DL    ALT (SGPT) 20 12 - 78 U/L    AST (SGOT) 27 15 - 37 U/L    Alk. phosphatase 111 45 - 117 U/L    Protein, total 7.9 6.4 - 8.2 g/dL    Albumin 3.4 (L) 3.5 - 5.0 g/dL    Globulin 4.5 (H) 2.0 - 4.0 g/dL    A-G Ratio 0.8 (L) 1.1 - 2.2     TYPE & SCREEN    Collection Time: 09/04/22  8:27 AM   Result Value Ref Range    Crossmatch Expiration 09/07/2022,2359     ABO/Rh(D) [place] POSITIVE     Antibody screen NEG    PROTHROMBIN TIME + INR    Collection Time: 09/04/22  8:32 AM   Result Value Ref Range    INR 3.9 (H) 0.9 - 1.1      Prothrombin time 37.0 (H) 9.0 - 11.1 sec   EKG, 12 LEAD, INITIAL    Collection Time: 09/04/22  8:35 AM   Result Value Ref Range    Ventricular Rate 80 BPM    Atrial Rate 80 BPM    QRS Duration 160 ms    Q-T Interval 462 ms    QTC Calculation (Bezet) 532 ms    Calculated R Axis -59 degrees    Calculated T Axis 118 degrees    Diagnosis       Ventricular-paced rhythm  When compared with ECG of 06-DEC-2019 08:57,  No significant change was found         Radiologic Studies -   CT SPINE CERV WO CONT   Final Result   1. No acute cervical spine fracture nor subluxation. 2.  Acute right distal clavicle fracture. 3.  CT chest dictated separately.       CT HEAD WO CONT   Final Result   No acute intracranial process identified   Right lateral scalp hematoma            CT CHEST WO CONT   Final Result   1. Redemonstrated acute right distal clavicle fracture. 2.  Diffuse septal thickening and groundglass, may reflect edema atypical   infectious or inflammatory process. Small right pleural effusion. 3.  8 mm and 7 mm right lung nodules. Follow-up CT at 6-12 months, then CT at   18-24 months recommended. XR HIP RT W OR WO PELV 2-3 VWS   Final Result   Acute right hip intratrochanteric fracture      XR SHOULDER RT AP/LAT MIN 2 V   Final Result   Acute right distal clavicle fracture   Osteopenic right shoulder   Mild interstitial edema      XR CHEST PORT   Final Result   Mild interstitial edema. Osteopenia. CT Results  (Last 48 hours)                 09/04/22 0916  CT SPINE CERV WO CONT Final result    Impression:  1. No acute cervical spine fracture nor subluxation. 2.  Acute right distal clavicle fracture. 3.  CT chest dictated separately. Narrative:  EXAM:  CT CERVICAL SPINE WITHOUT CONTRAST       INDICATION: fall, neck pain. COMPARISON: None. CONTRAST:  None. TECHNIQUE: Multislice helical CT of the cervical spine was performed without   intravenous contrast administration. Sagittal and coronal reformats were   generated. CT dose reduction was achieved through use of a standardized   protocol tailored for this examination and automatic exposure control for dose   modulation. FINDINGS:       The alignment is within normal limits. Reversal of the normal cervical lordosis. No definite acute cervical spine fracture. The odontoid process is intact. The   craniocervical junction is within normal limits. Multilevel degenerative   changes. Acute right distal clavicle fracture. Atherosclerosis. Partially visualized left pectoral pacemaker. Several   indeterminate subcentimeter hypodense bilateral thyroid nodules.                09/04/22 0916  CT HEAD WO CONT Final result    Impression:  No acute intracranial process identified   Right lateral scalp hematoma               Narrative:  EXAM: CT HEAD WO CONT       INDICATION: fall, head injury, on warfarin       COMPARISON: 2/27/2020. CONTRAST: None. TECHNIQUE: Unenhanced CT of the head was performed using 5 mm images. Brain and   bone windows were generated. Coronal and sagittal reformats. CT dose reduction   was achieved through use of a standardized protocol tailored for this   examination and automatic exposure control for dose modulation. FINDINGS:   The ventricles and sulci are normal in size, shape and configuration. . There is   moderate periventricular white matter hypodensity. There is no intracranial   hemorrhage, extra-axial collection, or mass effect. The basilar cisterns are   open. No CT evidence of acute infarct. There is a right parietal temporal soft   tissue scalp hematoma       The bone windows demonstrate no abnormalities. The visualized portions of the   paranasal sinuses and mastoid air cells are clear. Both lenses are calcified. 09/04/22 0916  CT CHEST WO CONT Final result    Impression:  1. Redemonstrated acute right distal clavicle fracture. 2.  Diffuse septal thickening and groundglass, may reflect edema atypical   infectious or inflammatory process. Small right pleural effusion. 3.  8 mm and 7 mm right lung nodules. Follow-up CT at 6-12 months, then CT at   18-24 months recommended. Narrative:  INDICATION: Fall, right chest pain       COMPARISON: Chest radiograph 9/4/2022       CONTRAST: None. TECHNIQUE:  5 mm axial images were obtained through the chest. Coronal and   sagittal reformats were generated. CT dose reduction was achieved through use   of a standardized protocol tailored for this examination and automatic exposure   control for dose modulation.        The absence of intravenous contrast reduces the sensitivity for evaluation of   the mediastinum, kaleigh, vasculature, and upper abdominal organs. FINDINGS:       CHEST WALL: No mass or axillary lymphadenopathy. THYROID: Several subcentimeter indeterminate hypodense nodules bilaterally. MEDIASTINUM: No mass or lymphadenopathy. DAVID: No mass or lymphadenopathy. THORACIC AORTA: No aneurysm. Atherosclerosis. MAIN PULMONARY ARTERY: Dilated up to 33 mm in caliber. TRACHEA/BRONCHI: Patent. ESOPHAGUS: Patulous. Large hiatal hernia. HEART: Cardiomegaly. Coronary artery calcifications. Aortic valve plane   calcifications. Left ventricular papillary muscle calcifications may reflect   sequela of old myocardial infarction. PLEURA: Small right pleural effusion. LUNGS: Diffuse septal thickening. Diffuse faint mosaic groundglass attenuation. Diffuse scattered areas of pleural-parenchymal scarring, some with associated   coarse calcifications. 8 mm right middle lobe nodule (303-63). 7 mm right upper   lobe subpleural nodule (303-43). INCIDENTALLY IMAGED UPPER ABDOMEN: No significant abnormality in the   incidentally imaged upper abdomen. BONES: Redemonstrated acute right distal clavicle fracture. Chronic severe L1   compression fracture. CXR Results  (Last 48 hours)                 09/04/22 0806  XR CHEST PORT Final result    Impression:  Mild interstitial edema. Osteopenia. Narrative:  EXAM: XR CHEST PORT       INDICATION: GLF with pain to right ribs and shoulder       COMPARISON: 2/27/2020       FINDINGS: A portable AP radiograph of the chest was obtained at 0 754 hours. The   patient is on a cardiac monitor. Cardiomediastinal silhouette is stable   posterior is diffuse mild interstitial edema. Underlying osteopenia. The   nondisplaced right distal clavicle fracture is redemonstrated. Old left lateral   rib deformities are redemonstrated.                      Medical Decision Making     I reviewed the vital signs, available nursing notes, past medical history, past surgical history, family history and social history. Vital Signs-I have reviewed the vital signs that have been made available during the patient's emergency department visit. The vital signs auto-populated below are obtained mostly by electronic means through monitoring devices that have been downloaded into the patient's chart by the nursing staff. Some vital signs are not downloaded into the chart until after the patient has been discharged and this note has been completed, therefore some vital signs may not be available to the physician for review prior to patient's discharge or admission. The physician has reviewed the patient's triage vital signs, monitored the electronic monitoring devices remotely for any significant vital sign abnormalities, and have reviewed vital signs prior to discharge. Some vital signs reviewed at bedside or remotely utilizing electronic monitoring devices may be different than the vital signs downloaded into the electronic medical record. Some vital signs may be erroneous and inaccurate since they are obtained by electronic monitoring devices, and not all vital signs are verified for accuracy by nursing staff prior to downloading into the patient's chart. Patient Vitals for the past 24 hrs:   Temp Pulse Resp BP SpO2   09/04/22 1045 -- 80 23 (!) 134/54 99 %   09/04/22 1030 -- 80 21 (!) 142/55 98 %   09/04/22 0945 -- 80 23 (!) 151/61 95 %   09/04/22 0923 -- 80 (!) 31 (!) 157/65 95 %   09/04/22 0920 -- 80 19 -- 95 %   09/04/22 0815 -- 80 22 (!) 146/67 (!) 86 %   09/04/22 0800 -- -- -- (!) 147/63 91 %   09/04/22 0747 -- -- -- -- 95 %   09/04/22 0744 98 °F (36.7 °C) 81 26 (!) 142/61 95 %         Records Reviewed: Nursing notes for today's visit have been reviewed. I have also reviewed most recent medical records pertinent to today's complaints, if available in our medical record system. I have also reviewed all labs and imaging results from previous results in comparison to results obtained today.   If an EKG was obtained today, it has been compared to previous EKGs, if available. If arriving via EMS, the EMS report has been reviewed if made available to us within the patient's time in the emergency department. ED Course and Medical Decision Making:       MDM  Number of Diagnoses or Management Options  Closed displaced fracture of acromial end of right clavicle, initial encounter  Closed displaced intertrochanteric fracture of right femur, initial encounter [facility])  Diagnosis management comments: Patient presents after ground-level fall. Imaging shows a right distal clavicle fracture with to be placed in a sling. She has a right intertrochanteric hip fracture. Orthopedics consulted for operative management. CT imaging of the head cervical spine and chest were obtained. No pneumothorax. No rib fractures. No cervical spine fracture. No intracranial hemorrhage. Admit to medicine for operative clearance. Patient is noted to have a supratherapeutic INR.        Amount and/or Complexity of Data Reviewed  Clinical lab tests: ordered and reviewed  Tests in the radiology section of CPT®: ordered and reviewed  Obtain history from someone other than the patient: yes  Review and summarize past medical records: yes  Discuss the patient with other providers: yes  Independent visualization of images, tracings, or specimens: yes    Risk of Complications, Morbidity, and/or Mortality  Presenting problems: moderate  Diagnostic procedures: moderate  Management options: moderate    Patient Progress  Patient progress: stable           Orders Placed This Encounter    MECHANICAL PROPHYLAXIS IS CONTRAINDICATED Other, please document Already on Anticoagulation    WOUND REPAIR    XR HIP RT W OR WO PELV 2-3 VWS    XR SHOULDER RT AP/LAT MIN 2 V    XR CHEST PORT    CT SPINE CERV WO CONT    CT HEAD WO CONT    CT CHEST WO CONT    CBC WITH AUTOMATED DIFF    COMPREHENSIVE METABOLIC PANEL    URINALYSIS W/ REFLEX CULTURE    PROTHROMBIN TIME + INR DIET NPO    VITAL SIGNS    BEDREST, COMPLETE    EKG NOTEWRITER(ASAP ONLY)    DO NOT RESUSCITATE    EKG, 12 LEAD, INITIAL    TYPE & SCREEN    SALINE LOCK IV ONE TIME STAT    acetaminophen (TYLENOL) tablet 1,000 mg    HYDROmorphone (DILAUDID) injection 0.25 mg    sodium chloride (NS) flush 5-40 mL    sodium chloride (NS) flush 5-40 mL    OR Linked Order Group     acetaminophen (TYLENOL) tablet 650 mg     acetaminophen (TYLENOL) suppository 650 mg    polyethylene glycol (MIRALAX) packet 17 g    OR Linked Order Group     ondansetron (ZOFRAN ODT) tablet 4 mg     ondansetron (ZOFRAN) injection 4 mg    IP CONSULT TO ORTHOPEDIC SURGERY    INITIAL PHYSICIAN ORDER: INPATIENT Orthopedics; 3. Patient receiving treatment that can only be provided in an inpatient setting (further clarification in H&P documentation)       EKG    Date/Time: 9/4/2022 8:35 AM  Performed by: Antoine Medrano MD  Authorized by: Antoine Medrano MD     ECG reviewed by ED Physician in the absence of a cardiologist: yes    Rate:     ECG rate:  80    ECG rate assessment: normal    Rhythm:     Rhythm: paced    Pacing:     Capture:  Complete    Type of pacing:  Ventricular  Wound Repair    Date/Time: 9/4/2022 10:40 AM  Performed by: attendingLocation details: scalp  Wound length:2.5 cm or less (2 cm)  Anesthesia: local infiltration    Anesthesia:  Local Anesthetic: lidocaine 2% with epinephrine  Skin closure: staples  Number of sutures: 3  Dressing: 4x4 and pressure dressing  Patient tolerance: patient tolerated the procedure well with no immediate complications  My total time at bedside, performing this procedure was 1-15 minutes. Critical Care Time:   0    Disposition:  Admit    Diagnosis     Clinical Impression:   1. Closed displaced intertrochanteric fracture of right femur, initial encounter ([facility] [address]    2. Closed displaced fracture of acromial end of right clavicle, initial encounter    3. Laceration of scalp, initial encounter    4.  Fall, initial encounter

## 2022-09-04 NOTE — CONSULTS
ORTHOPAEDIC CONSULT NOTE    Subjective:     Date of Consultation:  September 4, 2022      Harjinder Will is a 80 y.o. female who is being seen for right clavicle fracture and right intertroch fracture. Patient supposedly fell while on the toilet this morning and had an unwitnessed injury. Patient was unable to ambulate or get up out of the floor. At that time, the patient was yelling and bleeding from her scalp. Patient presented to the emergency department where further work-up was performed revealing the patient's right intertroch fracture as well as a right clavicle fracture. Patient primarily walks with the assistance of a walker. Patient has a history of being legally blind. Patient denies any pain beyond head pain, right hip pain, and right shoulder pain. She denies any loss of consciousness. Daughter at bedside.     Patient Active Problem List    Diagnosis Date Noted    Hip fracture ([facility] 09/04/2022    Acute midline low back pain without sciatica 01/10/2020    CHF (congestive heart failure) ([address] 12/06/2019    Bilateral inguinal hernia 08/03/2018    Pulmonary edema 08/03/2018    UTI (urinary tract infection) 10/15/2017    Atrial fibrillation with RVR ([address] 08/05/2017    Back pain 06/02/2017    Right hip pain 06/02/2017    Falls 06/02/2017    Constipation 06/01/2017    Compression fracture of L1 lumbar vertebra ([address] 06/01/2017    Complex partial seizure with impairment of consciousness ([facility] 06/15/2016    Unspecified hereditary and idiopathic peripheral neuropathy 07/01/2013    B12 deficiency 07/01/2013    Ataxia 12/31/2012    Osteoarthritis of hip 07/11/2012    PAF (paroxysmal atrial fibrillation) ([address] 07/11/2012     Family History   Problem Relation Age of Onset    Cancer Father     Inflammatory Bowel Dz Mother     Heart Disease Brother     Diabetes Brother       Social History     Tobacco Use    Smoking status: Never    Smokeless tobacco: Never   Substance Use Topics    Alcohol use: No     Past Medical History:   Diagnosis Date    Adverse effect of anesthesia     slow to wake    Alopecia     Arrhythmia     atrial fib,?svt    Arthritis     Atrial fibrillation (HCC)     Benign essential HTN     Benign neoplasm of vulva     CAD (coronary artery disease)     Cholelithiases     Chronic kidney disease     followed by specialist    Chronic pain     Colon cancer (HCC)     Constipation     Dyspareunia     Glaucoma     Heart failure (HCC)     History of mixed connective tissue disease     Hx of viral pericarditis     Hyperactivity of bladder     Hypercholesterolemia     Ill-defined condition     macular degeneration    Ill-defined condition     recurrent UTI hx    Insomnia     Macular degeneration     Neck pain     Psoriasis     patient denies    Rectocele     Renal cyst     Seizure disorder ([facility]     none in 30 plus years---sees neurologist     Sjogren's disease ([facility]     patient unsure    Thrombophlebitis     TIA (transient ischemic attack)     patient denies    Vaginal vault prolapse, posthysterectomy     Varicose veins       Past Surgical History:   Procedure Laterality Date    HX BREAST LUMPECTOMY      left breast    HX CATARACT REMOVAL      HX HEMORRHOIDECTOMY      HX HERNIA REPAIR  08/01/2018    Open bilateral inguinal hernia repairs with mesh. HX HYSTERECTOMY      HX PACEMAKER      left    HX SHOULDER ARTHROSCOPY      left    HX TOTAL COLECTOMY  1955    partial    OR CARDIAC SURG PROCEDURE UNLIST      AV ablation      Prior to Admission medications    Medication Sig Start Date End Date Taking? Authorizing Provider   levETIRAcetam (KEPPRA) 500 mg tablet Take 1 tablet by mouth twice daily 8/7/22   Liudmila Nolasco MD   albuterol-ipratropium (DUO-NEB) 2.5 mg-0.5 mg/3 ml nebu 3 mL by Nebulization route every six (6) hours as needed for Wheezing. 12/24/19   Apppanfilo Chiang MD   OXYGEN-AIR DELIVERY SYSTEMS 2 L by Nasal route continuous.     Provider, Historical   guaifenesin/dextromethorphan (CORICIDIN HBP CHEST BRANDON-COUGH PO) Take 4 mg by mouth daily. Provider, Historical   carvedilol (COREG) 3.125 mg tablet Take 1 Tab by mouth two (2) times daily (with meals). Patient not taking: Reported on 11/19/2021 12/9/19   Joselyn Jimenez MD   furosemide (LASIX) 40 mg tablet Take 2 Tabs by mouth every other day. Pt takes 40 mg every other day and alternates with 80 mg. 12/9/19   Joselyn Jimenez MD   acetaminophen (TYLENOL EXTRA STRENGTH) 500 mg tablet Take 500 mg by mouth every six (6) hours as needed for Pain. Provider, Historical   cranberry fruit extract (CRANBERRY PO) Take 1 Can by mouth daily. Provider, Historical   amitriptyline (ELAVIL) 10 mg tablet Take 10 mg by mouth nightly. Per pt, she takes 2 tabs qhs    Provider, Historical   cholecalciferol, vitamin D3, (VITAMIN D3) 2,000 unit tab Take 2,000 Units by mouth daily. Provider, Historical   psyllium seed, with dextrose, (FIBER PO) Take 1 Cap by mouth daily. Provider, Historical   VIT A/C/E AC/ZNOX/CUPRIC OXIDE (EYE VITAMIN AND MINERALS PO) Take 1 Tab by mouth two (2) times a day. Other, MD Yeny   ferrous sulfate (IRON) 325 mg (65 mg iron) tablet Take 325 mg by mouth two (2) times a day. Other, MD Yeny   DOCUSATE CALCIUM (STOOL SOFTENER PO) Take 100 mg by mouth as needed for Other (constipation). Provider, Historical   latanoprost (XALATAN) 0.005 % ophthalmic solution Administer 1 Drop to both eyes nightly.  6/5/17   Provider, Historical     Current Facility-Administered Medications   Medication Dose Route Frequency    sodium chloride (NS) flush 5-40 mL  5-40 mL IntraVENous Q8H    sodium chloride (NS) flush 5-40 mL  5-40 mL IntraVENous PRN    acetaminophen (TYLENOL) tablet 650 mg  650 mg Oral Q6H PRN    Or    acetaminophen (TYLENOL) suppository 650 mg  650 mg Rectal Q6H PRN    polyethylene glycol (MIRALAX) packet 17 g  17 g Oral DAILY PRN    ondansetron (ZOFRAN ODT) tablet 4 mg  4 mg Oral Q8H PRN    Or    ondansetron (ZOFRAN) injection 4 mg  4 mg IntraVENous Q6H PRN    sodium chloride (NS) flush 5-40 mL  5-40 mL IntraVENous PRN    oxyCODONE IR (ROXICODONE) tablet 2.5 mg  2.5 mg Oral Q4H PRN    oxyCODONE IR (ROXICODONE) tablet 5 mg  5 mg Oral Q4H PRN    naloxone (NARCAN) injection 0.4 mg  0.4 mg IntraVENous PRN    senna-docusate (PERICOLACE) 8.6-50 mg per tablet 2 Tablet  2 Tablet Oral BID    albuterol-ipratropium (DUO-NEB) 2.5 MG-0.5 MG/3 ML  3 mL Nebulization Q6H PRN    furosemide (LASIX) injection 20 mg  20 mg IntraVENous DAILY    levETIRAcetam (KEPPRA) tablet 500 mg  500 mg Oral BID    balsam peru-castor oiL (VENELEX) ointment   Topical BID      Allergies   Allergen Reactions    Codeine Nausea Only    Pcn [Penicillins] Unknown (comments)     Cant remember reaction as a child    Tetanus Toxoid, Adsorbed Other (comments)    Tetanus Vaccines And Toxoid Unknown (comments)     Does not take tetanus due to tb hx    Vibramycin [Doxycycline Calcium] Rash        Review of Systems:  A comprehensive review of systems was negative except for that written in the HPI. Mental Status: Alert and oriented. Objective:     Patient Vitals for the past 8 hrs:   BP Temp Pulse Resp SpO2   22 1549 (!) 115/47 97.9 °F (36.6 °C) 80 18 96 %   22 1155 (!) 131/49 97.7 °F (36.5 °C) 79 18 100 %   22 1115 (!) 121/54 -- 80 19 98 %   22 1100 (!) 131/50 -- 80 19 98 %   22 1045 (!) 134/54 -- 80 23 99 %   22 1030 (!) 142/55 -- 80 21 98 %   22 0945 (!) 151/61 -- 80 23 95 %   22 0923 (!) 157/65 -- 80 (!) 31 95 %   22 0920 -- -- 80 19 95 %   22 0815 (!) 146/67 -- 80 22 (!) 86 %   22 0800 (!) 147/63 -- -- -- 91 %     Temp (24hrs), Av.9 °F (36.6 °C), Min:97.7 °F (36.5 °C), Max:98 °F (36.7 °C)      Gen: Frail,  in no acute distress.    HEENT: Pink conjunctivae, hard of hearing, hearing aids in place, moist mucous membranes   Neck: Supple  Resp: No respiratory distress   Card: palpable distal pulse-equal bilaterally, brisk cap refill all distal digits   Abd: non-distended  Musc: Inspection of the bilateral lower extremity reveals a shortened and externally rotated right leg. No obvious acute abnormality to the right lower extremity. No skin tenting or abnormality noted of the right shoulder. Palpation elicits expected pain over the right clavicle and right hip. No pain to palpation of any other major joints. Active range of motion intact distal right upper extremity as well as bilateral distal lower extremity. Passive range of motion of the right hip elicits pain for the patient. 5/5 strength distal upper and lower extremities. Skin: No skin breakdown noted. Skin warm, pink, dry  Neuro: Cranial nerves are grossly intact, no focal motor weakness, follows commands appropriately. Sensation to light touch intact bilateral upper extremity and bilateral lower extremity. Psych: Good insight, oriented to person, place and time, alert    Imaging Review: EXAM: XR HIP RT W OR WO PELV 2-3 VWS     INDICATION: GLF with right leg external rotation and shortening. COMPARISON: None. FINDINGS: AP view of the pelvis and a frogleg lateral view of the right hip  demonstrate an acute right intertrochanteric fracture with overriding of  fracture fragments by 4 cm, medial displacement of the shaft, and and medial  rotation of the head. There is underlying osteopenia. IMPRESSION  Acute right hip intratrochanteric fracture        EXAM: XR SHOULDER RT AP/LAT MIN 2 V     INDICATION: stretcher. GLF with pain. .     COMPARISON: 2/27/2020. FINDINGS: Three views of the right shoulder demonstrate an acute right distal  clavicle oblique nondisplaced fracture. There is diffuse osteopenia. Underlying  mild interstitial edema is present.      IMPRESSION  Acute right distal clavicle fracture  Osteopenic right shoulder  Mild interstitial edema    Labs:   Recent Results (from the past 24 hour(s))   CBC WITH AUTOMATED DIFF    Collection Time: 09/04/22 8: 27 AM   Result Value Ref Range    WBC 5.6 3.6 - 11.0 K/uL    RBC 4.01 3.80 - 5.20 M/uL    HGB 13.2 11.5 - 16.0 g/dL    HCT 40.6 35.0 - 47.0 %    .2 (H) 80.0 - 99.0 FL    MCH 32.9 26.0 - 34.0 PG    MCHC 32.5 30.0 - 36.5 g/dL    RDW 15.5 (H) 11.5 - 14.5 %    PLATELET 80 (L) 113 - 400 K/uL    MPV 11.1 8.9 - 12.9 FL    NRBC 0.0 0  WBC    ABSOLUTE NRBC 0.00 0.00 - 0.01 K/uL    NEUTROPHILS 78 (H) 32 - 75 %    LYMPHOCYTES 15 12 - 49 %    MONOCYTES 6 5 - 13 %    EOSINOPHILS 1 0 - 7 %    BASOPHILS 0 0 - 1 %    IMMATURE GRANULOCYTES 1 (H) 0.0 - 0.5 %    ABS. NEUTROPHILS 4.4 1.8 - 8.0 K/UL    ABS. LYMPHOCYTES 0.8 0.8 - 3.5 K/UL    ABS. MONOCYTES 0.3 0.0 - 1.0 K/UL    ABS. EOSINOPHILS 0.1 0.0 - 0.4 K/UL    ABS. BASOPHILS 0.0 0.0 - 0.1 K/UL    ABS. IMM. GRANS. 0.0 0.00 - 0.04 K/UL    DF AUTOMATED     METABOLIC PANEL, COMPREHENSIVE    Collection Time: 09/04/22  8:27 AM   Result Value Ref Range    Sodium 142 136 - 145 mmol/L    Potassium 4.2 3.5 - 5.1 mmol/L    Chloride 103 97 - 108 mmol/L    CO2 33 (H) 21 - 32 mmol/L    Anion gap 6 5 - 15 mmol/L    Glucose 91 65 - 100 mg/dL    BUN 34 (H) 6 - 20 MG/DL    Creatinine 1.24 (H) 0.55 - 1.02 MG/DL    BUN/Creatinine ratio 27 (H) 12 - 20      GFR est AA 49 (L) >60 ml/min/1.73m2    GFR est non-AA 40 (L) >60 ml/min/1.73m2    Calcium 8.9 8.5 - 10.1 MG/DL    Bilirubin, total 1.7 (H) 0.2 - 1.0 MG/DL    ALT (SGPT) 20 12 - 78 U/L    AST (SGOT) 27 15 - 37 U/L    Alk.  phosphatase 111 45 - 117 U/L    Protein, total 7.9 6.4 - 8.2 g/dL    Albumin 3.4 (L) 3.5 - 5.0 g/dL    Globulin 4.5 (H) 2.0 - 4.0 g/dL    A-G Ratio 0.8 (L) 1.1 - 2.2     TYPE & SCREEN    Collection Time: 09/04/22  8:27 AM   Result Value Ref Range    Crossmatch Expiration 09/07/2022,2359     ABO/Rh(D) [place] POSITIVE     Antibody screen NEG    PROTHROMBIN TIME + INR    Collection Time: 09/04/22  8:32 AM   Result Value Ref Range    INR 3.9 (H) 0.9 - 1.1      Prothrombin time 37.0 (H) 9.0 - 11.1 sec   EKG, 12 LEAD, INITIAL Collection Time: 09/04/22  8:35 AM   Result Value Ref Range    Ventricular Rate 80 BPM    Atrial Rate 80 BPM    QRS Duration 160 ms    Q-T Interval 462 ms    QTC Calculation (Bezet) 532 ms    Calculated R Axis -59 degrees    Calculated T Axis 118 degrees    Diagnosis       Ventricular-paced rhythm  When compared with ECG of 06-DEC-2019 08:57,  No significant change was found     URINALYSIS W/ REFLEX CULTURE    Collection Time: 09/04/22  3:06 PM    Specimen: Urine   Result Value Ref Range    Color DARK YELLOW      Appearance TURBID (A) CLEAR      Specific gravity 1.014      pH (UA) 5.5 5.0 - 8.0      Protein 100 (A) NEG mg/dL    Glucose Negative NEG mg/dL    Ketone Negative NEG mg/dL    Bilirubin Negative NEG      Blood LARGE (A) NEG      Urobilinogen 1.0 0.2 - 1.0 EU/dL    Nitrites Negative NEG      Leukocyte Esterase LARGE (A) NEG      WBC PENDING /hpf    RBC PENDING /hpf    Epithelial cells PENDING /lpf    Bacteria PENDING /hpf    UA:UC IF INDICATED PENDING          Impression:     Patient Active Problem List    Diagnosis Date Noted    Hip fracture ([facility] 09/04/2022    Acute midline low back pain without sciatica 01/10/2020    CHF (congestive heart failure) ([address] 12/06/2019    Bilateral inguinal hernia 08/03/2018    Pulmonary edema 08/03/2018    UTI (urinary tract infection) 10/15/2017    Atrial fibrillation with RVR ([facility] [address] 08/05/2017    Back pain 06/02/2017    Right hip pain 06/02/2017    Falls 06/02/2017    Constipation 06/01/2017    Compression fracture of L1 lumbar vertebra ([address] 06/01/2017    Complex partial seizure with impairment of consciousness ([facility] [address] 06/15/2016    Unspecified hereditary and idiopathic peripheral neuropathy 07/01/2013    B12 deficiency 07/01/2013    Ataxia 12/31/2012    Osteoarthritis of hip 07/11/2012    PAF (paroxysmal atrial fibrillation) ([facility] [address] 07/11/2012     Active Problems:    Hip fracture ([facility] [address] (9/4/2022)        Plan:   Acute right intertrochanteric hip fracture  Acute right distal clavicle fracture    -  Medicine for Pre-Operative Clearence/ Post-Operative management.    -We will obtain full-length femur films of the right femur due to patient with history of cancer.  -Patient takes warfarin at home and INR is currently 3.9; hold anticoagulants for now if okay per medical service to bring INR to more appropriate level for surgical intervention.  -Sling right upper extremity  -Nonweightbearing right upper extremity  -Bedrest  -Obtain written consent; already discussed with the patient and her family member. Dr. Lois Tang and Kanu Plummer  aware and agree with plan as above.         ASHLEE Murphy  [employer]

## 2022-09-05 NOTE — PROGRESS NOTES
Hospitalist Progress Note    NAME: Bertha Clayton   :  1924   MRN:  [id number]       Assessment / Plan:  Right hip fracture:  XR Acute right hip intratrochanteric fracture  Ortho to plan for right hip intertrochanteric nail  n.p.o. midnight  Hold warfarin for OR in am  Repeat INR 5.1 will give Vitamin K and recheck in 5 hours  Consider FFP before surgery if INR persistently high     Preoperative risk assessment  Patient has RCRI score of 10% which corresponded to moderate risk for cardiac related death and MI within 30 days can proceed with the surgery with moderate risk     Fall:  Acute right distal clavicle fracture  Ortho on board     CT head right lateral scalp hematoma,  but otherwise unremarkable no intracranial bleed or hemorrhage    UTI  Cultures pending  Start rocephin    Hx of Afib  Not taking rate control medication at home  Hold warfarin for sx in morning     Acute Exacerbation of HFrEF  Continue with Lasix 40 daily  On oxygen(at home she is 4 L nasal cannula)  Bnp elevated 7,800  echo pending  Last echo in 2019 which she showed ejection fraction decrease    CKD stage III    18.5 - 24.9 Normal weight / Body mass index is 21.48 kg/m². Estimated discharge date:     Code status: DNR  Prophylaxis: SCD's  Recommended Disposition: Home w/Family     Subjective:     Chief Complaint / Reason for Physician Visit  \"fall\". Discussed with RN events overnight. Son at bedside, explained plan of care  All questions answered to satisfaction    Review of Systems:  Symptom Y/N Comments  Symptom Y/N Comments   Fever/Chills n   Chest Pain n    Poor Appetite    Edema     Cough n   Abdominal Pain n    Sputum    Joint Pain     SOB/SANTOS n   Pruritis/Rash     Nausea/vomit n   Tolerating PT/OT     Diarrhea    Tolerating Diet     Constipation    Other       Could NOT obtain due to:      Objective:     VITALS:   Last 24hrs VS reviewed since prior progress note.  Most recent are:  Patient Vitals for the past 24 hrs:   Temp Pulse Resp BP SpO2   09/05/22 1140 97.5 °F (36.4 °C) 81 20 114/86 98 %   09/05/22 0751 97 °F (36.1 °C) 80 20 (!) 125/45 98 %   09/05/22 0346 98.2 °F (36.8 °C) 81 20 (!) 129/47 92 %   09/05/22 0000 -- 81 -- (!) 116/45 --   09/04/22 1944 98.1 °F (36.7 °C) 79 18 (!) 120/47 96 %   09/04/22 1549 97.9 °F (36.6 °C) 80 18 (!) 115/47 96 %       Intake/Output Summary (Last 24 hours) at 9/5/2022 1222  Last data filed at 9/5/2022 0700  Gross per 24 hour   Intake --   Output 1400 ml   Net -1400 ml        I had a face to face encounter and independently examined this patient on 9/5/2022, as outlined below:  PHYSICAL EXAM:  General: Alert, cooperative, no acute distress    Resp:  Decreased bs BL, No accessory muscle use  CV:  Regular  rhythm,  No edema  GI:  Soft, Non distended, Non tender. +Bowel sounds  Neurologic:  Alert and oriented X 3, normal speech,   Psych:   Not anxious nor agitated      Reviewed most current lab test results and cultures  YES  Reviewed most current radiology test results   YES  Review and summation of old records today    NO  Reviewed patient's current orders and MAR    YES  PMH/ reviewed - no change compared to H&P  ________________________________________________________________________  Care Plan discussed with:    Comments   Patient x    Family      RN x    Care Manager     Consultant                        Multidiciplinary team rounds were held today with , nursing, pharmacist and clinical coordinator. Patient's plan of care was discussed; medications were reviewed and discharge planning was addressed.      ________________________________________________________________________  Total NON critical care TIME:  40   Minutes    Total CRITICAL CARE TIME Spent:   Minutes non procedure based      Comments   >50% of visit spent in counseling and coordination of care x    ________________________________________________________________________  Bandar Sosa MD     Procedures: see electronic medical records for all procedures/Xrays and details which were not copied into this note but were reviewed prior to creation of Plan. LABS:  I reviewed today's most current labs and imaging studies.   Pertinent labs include:  Recent Labs     09/05/22 0355 09/04/22 0827   WBC 6.2 5.6   HGB 9.3* 13.2   HCT 29.5* 40.6   PLT 60* 80*     Recent Labs     09/05/22 0355 09/04/22 0832 09/04/22 0827     --  142   K 3.5  --  4.2     --  103   CO2 30  --  33*   GLU 91  --  91   BUN 29*  --  34*   CREA 0.88  --  1.24*   CA 7.1*  --  8.9   ALB  --   --  3.4*   TBILI  --   --  1.7*   ALT  --   --  20   INR 5.1* 3.9*  --        Signed: Shasta Dyson MD

## 2022-09-05 NOTE — PROGRESS NOTES
End of Shift Note    Bedside shift change report given to Jennifer GUERRERO (oncoming nurse) by Osiris Darby RN (offgoing nurse). Report included the following information SBAR, Kardex, Intake/Output, MAR, and Recent Results    Shift worked:  day     Shift summary and any significant changes:     VS stable, bedrest ordered due to fractures, voiding via rosario catheter clear yellow urine, prn tylenol given for complaints of pain. Blood cultures done and sent per orders given. Vitamin K given orally per orders given. Concerns for physician to address:       Zone phone for oncoming shift:   [phone]       Activity:  Activity Level: Bed Rest  Number times ambulated in hallways past shift: 0  Number of times OOB to chair past shift: 0    Cardiac:   Cardiac Monitoring: No           Access:  Current line(s): PIV     Genitourinary:   Urinary status: rosario    Respiratory:   O2 Device: Nasal cannula  Chronic home O2 use?: YES  Incentive spirometer at bedside: YES       GI:     Current diet:  ADULT DIET Regular  DIET NPO  Passing flatus: YES  Tolerating current diet: YES       Pain Management:   Patient states pain is manageable on current regimen: YES    Skin:  Magdaleno Score: 15  Interventions: turn team, speciality bed, float heels, increase time out of bed, foam dressing, PT/OT consult, limit briefs, internal/external urinary devices, and nutritional support     Patient Safety:  Fall Score:  Total Score: 3  Interventions: bed/chair alarm, assistive device (walker, cane, etc), gripper socks, pt to call before getting OOB, stay with me (per policy), and gait belt  High Fall Risk: Yes    Length of Stay:  Expected LOS: - - -  Actual LOS: 1      Rubia Gallardo RN

## 2022-09-05 NOTE — PROGRESS NOTES
Dr. Moreno File via perfect serve of blood pressure 108/56. Order given to hold evening dose of IV lasix.

## 2022-09-05 NOTE — PROGRESS NOTES
End of Shift Note    Bedside shift change report given to YOLANDA Barkley (oncoming nurse) by Theresa Ridley (offgoing nurse). Report included the following information SBAR, Kardex, ED Summary, Intake/Output, MAR, Accordion, and Recent Results    Shift worked:  night     Shift summary and any significant changes:     Pt wearing sling to right arm, heels elevated. Rosario in place for expected surgery on 9/6. Pt with darker, non-blanchable around rectum/sacrum, venelex ordered. Concerns for physician to address:  Pain management. Family says \"stronger pain medication makes her confused. \" \"Tylenol would be her best friend. \"     Zone phone for oncoming shift:   [phone]       Activity:  Activity Level: Bed Rest  Number times ambulated in hallways past shift: 0  Number of times OOB to chair past shift: 0    Cardiac:   Cardiac Monitoring: No           Access:  Current line(s): PIV     Genitourinary:   Urinary status: rosario    Respiratory:   O2 Device: Nasal cannula  Chronic home O2 use?: YES  Incentive spirometer at bedside: YES       GI:     Current diet:  ADULT DIET Regular  DIET NPO  Passing flatus: YES  Tolerating current diet: YES       Pain Management:   Patient states pain is manageable on current regimen: YES    Skin:  Magdaleno Score: 15  Interventions: float heels    Patient Safety:  Fall Score:  Total Score: 3  Interventions: gripper socks  High Fall Risk: Yes    Length of Stay:  Expected LOS: - - -  Actual LOS: [facility]

## 2022-09-05 NOTE — PROGRESS NOTES
ORTHO - Progress Note      Jose Luis Morales     [id number]  female    80 y.o.    1924    Admit date:2022  Date of Surgery:2022   Procedures:Procedure(s):RIGHT FEMORAL INTERTROCHANTERIC NAIL INSERTION  Surgeon:Surgeon(s) and Role:   Mariama Vergara MD - Primary        SUBJECTIVE:     Jose Luis Morales is a 80 y.o. female resting in the bed. Patient has complaints of intermittent right hip pain. Pt's son at [facility]    OBJECTIVE:       Physical Exam:  General: alert, cooperative, no distress. Very hard of hearing, Baseline mentation per son  Gastrointestinal:  non-distended . Cardiovascular: equal pulses in the lower extremities,  Brisk cap refill in all distal extremities   Genitourinary: Ellison     Respiratory: No respiratory distress, + nasal cannula   Neurological:Neurovascular exam within normal limits. Senstion intact: LE bilat. Motor: + DF/PF/EHL. Musculoskeletal: Karthik's sign negative in bilateral lower extremities. Calves soft, supple, non-tender upon palpation or with passive stretch. Vital Signs:       Patient Vitals for the past 8 hrs:   BP Temp Pulse Resp SpO2   22 0751 (!) 125/45 97 °F (36.1 °C) 80 20 98 %   22 0346 (!) 129/47 98.2 °F (36.8 °C) 81 20 92 %                                          Temp (24hrs), Av.8 °F (36.6 °C), Min:97 °F (36.1 °C), Max:98.2 °F (36.8 °C)    Date 22 07 - 22 0659   Shift 1128-4691 6761-3576 5811-4351 24 Hour Total   INTAKE   Shift Total(mL/kg)       OUTPUT   Urine(mL/kg/hr) 1400   1400   Shift Total(mL/kg) 1400(28.1)   1400(28.1)   Weight (kg) 49.9 49.9 49.9 49.9     Labs:        Recent Labs     22  0355   HCT 29.5*   HGB 9.3*   INR 5.1*     PT/OT:              ASSESSMENT / PLAN:   Active Problems:    Hip fracture (HCC) (2022)       Acute right intertrochanteric hip fracture  Acute right distal clavicle fracture     -  Medicine for Pre-Operative Clearence/ Post-Operative management.          -  Bedrest  - Ellison      -  NPO after midnight  -  UTI noted on admission - culture pending- rocephin ordered  -  Pharmacy consulted for PCN allergy(as a child, likely intolerance)  -  We will obtain full-length femur films of the right femur due to patient with history of cancer.  -  Chronic anticoagulation on warfarin, INR is trending the wrong way 5. 1-- jarad this evening- FFP to correct if no improvement   -  Sling right upper extremity  -  Nonweightbearing right upper extremity  -  Obtain written consent for right hip intertrochanteric nail; discussed with the patient and her son                  Signed By: Valeriano Gutierres PA-C

## 2022-09-05 NOTE — PROGRESS NOTES
Problem: Pressure Injury - Risk of  Goal: *Prevention of pressure injury  Description: Document Magdaleno Scale and appropriate interventions in the flowsheet. Outcome: Progressing Towards Goal  Note: Pressure Injury Interventions:  Sensory Interventions: Assess changes in LOC         Activity Interventions: Assess need for specialty bed, Pressure redistribution bed/mattress(bed type), PT/OT evaluation    Mobility Interventions: Float heels, HOB 30 degrees or less, Pressure redistribution bed/mattress (bed type), PT/OT evaluation, Assess need for specialty bed    Nutrition Interventions: Document food/fluid/supplement intake    Friction and Shear Interventions: Apply protective barrier, creams and emollients, Feet elevated on foot rest, Foam dressings/transparent film/skin sealants, HOB 30 degrees or less, Minimize layers                Problem: Patient Education: Go to Patient Education Activity  Goal: Patient/Family Education  Outcome: Progressing Towards Goal     Problem: Falls - Risk of  Goal: *Absence of Falls  Description: Document Corey Fall Risk and appropriate interventions in the flowsheet.   Outcome: Progressing Towards Goal  Note: Fall Risk Interventions:            Medication Interventions: Bed/chair exit alarm, Patient to call before getting OOB    Elimination Interventions: Bed/chair exit alarm, Call light in reach, Patient to call for help with toileting needs    History of Falls Interventions: Bed/chair exit alarm         Problem: Patient Education: Go to Patient Education Activity  Goal: Patient/Family Education  Outcome: Progressing Towards Goal     Problem: Patient Education: Go to Patient Education Activity  Goal: Patient/Family Education  Outcome: Progressing Towards Goal     Problem: Hip Fracture:Day of Admission Pre-op  Goal: Off Pathway (Use only if patient is Off Pathway)  Outcome: Progressing Towards Goal  Goal: Activity/Safety  Outcome: Progressing Towards Goal  Goal: Consults, if ordered  Outcome: Progressing Towards Goal  Goal: Diagnostic Test/Procedures  Outcome: Progressing Towards Goal  Goal: Nutrition/Diet  Outcome: Progressing Towards Goal  Goal: Medications  Outcome: Progressing Towards Goal  Goal: Respiratory  Outcome: Progressing Towards Goal  Goal: Treatments/Interventions/Procedures  Outcome: Progressing Towards Goal  Goal: Psychosocial  Outcome: Progressing Towards Goal  Goal: *Hemodynamically stable  Outcome: Progressing Towards Goal     Problem: Hip Fracture:Day of Admission Pre-op  Goal: *Labs/Tests Within Defined Limits in Preparation for Surgery  Outcome: Not Met  Goal: *Optimal pain control at patient's stated goal  Outcome: Not Met

## 2022-09-05 NOTE — PROGRESS NOTES
Problem: Pressure Injury - Risk of  Goal: *Prevention of pressure injury  Description: Document Magdaleno Scale and appropriate interventions in the flowsheet. Outcome: Progressing Towards Goal  Note: Pressure Injury Interventions:  Sensory Interventions: Assess changes in LOC, Assess need for specialty bed, Avoid rigorous massage over bony prominences, Chair cushion, Check visual cues for pain, Discuss PT/OT consult with provider, Float heels, Keep linens dry and wrinkle-free, Maintain/enhance activity level, Minimize linen layers, Pad between skin to skin, Monitor skin under medical devices, Pressure redistribution bed/mattress (bed type)    Moisture Interventions: Absorbent underpads, Apply protective barrier, creams and emollients, Check for incontinence Q2 hours and as needed, Assess need for specialty bed, Contain wound drainage, Limit adult briefs, Maintain skin hydration (lotion/cream), Minimize layers, Moisture barrier, Offer toileting Q_hr    Activity Interventions: Assess need for specialty bed, Chair cushion, Increase time out of bed, Pressure redistribution bed/mattress(bed type), PT/OT evaluation    Mobility Interventions: Assess need for specialty bed, Chair cushion, Float heels, HOB 30 degrees or less, Pressure redistribution bed/mattress (bed type), PT/OT evaluation, Turn and reposition approx.  every two hours(pillow and wedges)    Nutrition Interventions: Document food/fluid/supplement intake, Discuss nutritional consult with provider, Offer support with meals,snacks and hydration    Friction and Shear Interventions: Apply protective barrier, creams and emollients, HOB 30 degrees or less, Feet elevated on foot rest, Foam dressings/transparent film/skin sealants, Lift team/patient mobility team, Lift sheet, Minimize layers                Problem: Patient Education: Go to Patient Education Activity  Goal: Patient/Family Education  Outcome: Progressing Towards Goal     Problem: Falls - Risk of  Goal: *Absence of Falls  Description: Document Heaven Sheehan Fall Risk and appropriate interventions in the flowsheet.   Outcome: Progressing Towards Goal  Note: Fall Risk Interventions:            Medication Interventions: Assess postural VS orthostatic hypotension, Evaluate medications/consider consulting pharmacy, Patient to call before getting OOB, Utilize gait belt for transfers/ambulation, Teach patient to arise slowly    Elimination Interventions: Call light in reach, Elevated toilet seat, Stay With Me (per policy), Toilet paper/wipes in reach, Patient to call for help with toileting needs, Toileting schedule/hourly rounds    History of Falls Interventions: Consult care management for discharge planning, Door open when patient unattended, Evaluate medications/consider consulting pharmacy, Room close to nurse's station, Utilize gait belt for transfer/ambulation, Assess for delayed presentation/identification of injury for 48 hrs (comment for end date), Vital signs minimum Q4HRs X 24 hrs (comment for end date)         Problem: Patient Education: Go to Patient Education Activity  Goal: Patient/Family Education  Outcome: Progressing Towards Goal     Problem: Patient Education: Go to Patient Education Activity  Goal: Patient/Family Education  Outcome: Progressing Towards Goal     Problem: Hip Fracture:Day of Admission Pre-op  Goal: Off Pathway (Use only if patient is Off Pathway)  Outcome: Progressing Towards Goal  Goal: Activity/Safety  Outcome: Progressing Towards Goal  Goal: Consults, if ordered  Outcome: Progressing Towards Goal  Goal: Diagnostic Test/Procedures  Outcome: Progressing Towards Goal  Goal: Nutrition/Diet  Outcome: Progressing Towards Goal  Goal: Medications  Outcome: Progressing Towards Goal  Goal: Respiratory  Outcome: Progressing Towards Goal  Goal: Treatments/Interventions/Procedures  Outcome: Progressing Towards Goal  Goal: Psychosocial  Outcome: Progressing Towards Goal  Goal: *Labs/Tests Within Defined Limits in Preparation for Surgery  Outcome: Progressing Towards Goal  Goal: *Optimal pain control at patient's stated goal  Outcome: Progressing Towards Goal  Goal: *Hemodynamically stable  Outcome: Progressing Towards Goal     Problem: Hip Fracture: Day of Surgery Post-op Care  Goal: Off Pathway (Use only if patient is Off Pathway)  Outcome: Progressing Towards Goal  Goal: Activity/Safety  Outcome: Progressing Towards Goal  Goal: Consults, if ordered  Outcome: Progressing Towards Goal  Goal: Diagnostic Test/Procedures  Outcome: Progressing Towards Goal  Goal: Nutrition/Diet  Outcome: Progressing Towards Goal  Goal: Medications  Outcome: Progressing Towards Goal  Goal: Respiratory  Outcome: Progressing Towards Goal  Goal: Treatments/Interventions/Procedures  Outcome: Progressing Towards Goal  Goal: Psychosocial  Outcome: Progressing Towards Goal  Goal: *Absence of skin breakdown  Outcome: Progressing Towards Goal  Goal: *Optimal pain control at patient's stated goal  Outcome: Progressing Towards Goal  Goal: *Hemodynamically stable  Outcome: Progressing Towards Goal     Problem: Hip Fracture: Post-Op Day 1  Goal: Off Pathway (Use only if patient is Off Pathway)  Outcome: Progressing Towards Goal  Goal: Activity/Safety  Outcome: Progressing Towards Goal  Goal: Diagnostic Test/Procedures  Outcome: Progressing Towards Goal  Goal: Nutrition/Diet  Outcome: Progressing Towards Goal  Goal: Medications  Outcome: Progressing Towards Goal  Goal: Respiratory  Outcome: Progressing Towards Goal  Goal: Treatments/Interventions/Procedures  Outcome: Progressing Towards Goal  Goal: Psychosocial  Outcome: Progressing Towards Goal  Goal: Discharge Planning  Outcome: Progressing Towards Goal  Goal: *Demonstrates progressive activity  Outcome: Progressing Towards Goal  Goal: *Optimal pain control at patient's stated goal  Outcome: Progressing Towards Goal  Goal: *Hemodynamically stable  Outcome: Progressing Towards Goal  Goal: *Discharge plan identified  Outcome: Progressing Towards Goal  Goal: *Absence of skin breakdown  Outcome: Progressing Towards Goal     Problem: Hip Fracture: Post-Op Day 2  Goal: Off Pathway (Use only if patient is Off Pathway)  Outcome: Progressing Towards Goal  Goal: Activity/Safety  Outcome: Progressing Towards Goal  Goal: Diagnostic Test/Procedures  Outcome: Progressing Towards Goal  Goal: Nutrition/Diet  Outcome: Progressing Towards Goal  Goal: Medications  Outcome: Progressing Towards Goal  Goal: Respiratory  Outcome: Progressing Towards Goal  Goal: Treatments/Interventions/Procedures  Outcome: Progressing Towards Goal  Goal: Psychosocial  Outcome: Progressing Towards Goal  Goal: Discharge Planning  Outcome: Progressing Towards Goal  Goal: *Optimal pain control at patient's stated goal  Outcome: Progressing Towards Goal  Goal: *Hemodynamically stable  Outcome: Progressing Towards Goal  Goal: *Adequate oxygenation  Outcome: Progressing Towards Goal  Goal: *Tolerates increased activity  Outcome: Progressing Towards Goal  Goal: *Tolerates nutrition therapy  Outcome: Progressing Towards Goal  Goal: *Absence of skin breakdown  Outcome: Progressing Towards Goal     Problem: Hip Fracture: Post-Op Day 3  Goal: Off Pathway (Use only if patient is Off Pathway)  Outcome: Progressing Towards Goal  Goal: Activity/Safety  Outcome: Progressing Towards Goal  Goal: Diagnostic Test/Procedures  Outcome: Progressing Towards Goal  Goal: Nutrition/Diet  Outcome: Progressing Towards Goal  Goal: Medications  Outcome: Progressing Towards Goal  Goal: Respiratory  Outcome: Progressing Towards Goal  Goal: Treatments/Interventions/Procedures  Outcome: Progressing Towards Goal  Goal: Psychosocial  Outcome: Progressing Towards Goal  Goal: Discharge Planning  Outcome: Progressing Towards Goal  Goal: *Met physical therapy criteria for discharge to next level of care  Outcome: Progressing Towards Goal  Goal: *Optimal pain control at patient's stated goal  Outcome: Progressing Towards Goal  Goal: *Hemodynamically stable  Outcome: Progressing Towards Goal  Goal: *Tolerating diet  Outcome: Progressing Towards Goal  Goal: *Active bowel function  Outcome: Progressing Towards Goal  Goal: *Adequate urinary output  Outcome: Progressing Towards Goal  Goal: *Absence of skin breakdown  Outcome: Progressing Towards Goal  Goal: *Patient verbalizes understanding of discharge instructions  Outcome: Progressing Towards Goal

## 2022-09-05 NOTE — CONSULTS
[address]  YOB: 1924     Assessment & Plan:     LESLIE  Proteinuria  Edema  Anemia - sig drop  Systolic Heart Failure - chronic -LVEF 30%  Valvular heart disease  Hypokalemia  Hypocalcemia    Plan:  Start KCl daily on iv lasix and K is already dropping   Start Calcium supplementation  Would follow Hgb trend closely  Check a UACR   Edema is certainly multifactorial in etiology  Continue lasix for now.                     Subjective:   CHIEF COMPLAINT: LESLIE and diuretic management   HPI:  - pt seen by our group in the past  - has CKD stage 3a at baseline  - came in with a fall   - has right hip fx and right distal clavicle fx   - going to OR tomorrow  - has a UA with proteinuria  - is on IV lasix with a negative balance  - CXR showed volume  - last TTE showed: LVEF in the 30%,  RV dysfunction, mod AR, TR with sev MR  - has issues with edema  - has compression stockings on now  - has some chest discomfort related to her clavicular fx  - denies syncope  - legs just gave out  - denies f/c/ns  - denies n/v/d/abd pain  - +fh of kidney disease     Review of Systems  See HPI    Past Medical History:   Diagnosis Date    Adverse effect of anesthesia     slow to wake    Alopecia     Arrhythmia     atrial fib,?svt    Arthritis     Atrial fibrillation (HCC)     Benign essential HTN     Benign neoplasm of vulva     CAD (coronary artery disease)     Cholelithiases     Chronic kidney disease     followed by specialist    Chronic pain     Colon cancer (HCC)     Constipation     Dyspareunia     Glaucoma     Heart failure ([address]     History of mixed connective tissue disease     Hx of viral pericarditis     Hyperactivity of bladder     Hypercholesterolemia     Ill-defined condition     macular degeneration    Ill-defined condition     recurrent UTI hx    Insomnia     Macular degeneration     Neck pain     Psoriasis     patient denies    Rectocele Renal cyst     Seizure disorder (HCC)     none in 30 plus years---sees neurologist     Sjogren's disease [facility])     patient unsure    Thrombophlebitis     TIA (transient ischemic attack)     patient denies    Vaginal vault prolapse, posthysterectomy     Varicose veins       Past Surgical History:   Procedure Laterality Date    HX BREAST LUMPECTOMY      left breast    HX CATARACT REMOVAL      HX HEMORRHOIDECTOMY      HX HERNIA REPAIR  08/01/2018    Open bilateral inguinal hernia repairs with mesh. HX HYSTERECTOMY      HX PACEMAKER      left    HX SHOULDER ARTHROSCOPY      left    HX TOTAL COLECTOMY  1955    partial    [place] CARDIAC SURG PROCEDURE UNLIST      AV ablation       Social History     Socioeconomic History    Marital status:      Spouse name: Not on file    Number of children: Not on file    Years of education: Not on file    Highest education level: Not on file   Occupational History    Not on file   Tobacco Use    Smoking status: Never    Smokeless tobacco: Never   Substance and Sexual Activity    Alcohol use: No    Drug use: No    Sexual activity: Never   Other Topics Concern    Not on file   Social History Narrative    Not on file     Social Determinants of Health     Financial Resource Strain: Not on file   Food Insecurity: Not on file   Transportation Needs: Not on file   Physical Activity: Not on file   Stress: Not on file   Social Connections: Not on file   Intimate Partner Violence: Not on file   Housing Stability: Not on file      Family History   Problem Relation Age of Onset    Cancer Father     Inflammatory Bowel Dz Mother     Heart Disease Brother     Diabetes Brother       Prior to Admission medications    Medication Sig Start Date End Date Taking? Authorizing Provider   nitrofurantoin (MACRODANTIN) 50 mg capsule Take 50 mg by mouth every other day.  Patient takes every odd day during odd months for recurrent uti   Yes Provider, Historical   levETIRAcetam (KEPPRA) 500 mg tablet Take 1 tablet by mouth twice daily 8/7/22  Yes Janette Hitchcock MD   albuterol-ipratropium (DUO-NEB) 2.5 mg-0.5 mg/3 ml nebu 3 mL by Nebulization route every six (6) hours as needed for Wheezing. 12/24/19   Nohemi Ansari MD   OXYGEN-AIR DELIVERY SYSTEMS 2 L by Nasal route continuous. Provider, Historical   guaifenesin/dextromethorphan (CORICIDIN HBP CHEST BRANDON-COUGH PO) Take 4 mg by mouth daily. Provider, Historical   carvedilol (COREG) 3.125 mg tablet Take 1 Tab by mouth two (2) times daily (with meals). Patient not taking: No sig reported 12/9/19   Agata Martinez MD   furosemide (LASIX) 40 mg tablet Take 2 Tabs by mouth every other day. Pt takes 40 mg every other day and alternates with 80 mg. 12/9/19   Agata Martinez MD   acetaminophen (TYLENOL EXTRA STRENGTH) 500 mg tablet Take 500 mg by mouth every six (6) hours as needed for Pain. Provider, Historical   cranberry fruit extract (CRANBERRY PO) Take 1 Can by mouth daily. Provider, Historical   amitriptyline (ELAVIL) 10 mg tablet Take 10 mg by mouth nightly. Per pt, she takes 2 tabs qhs    Provider, Historical   cholecalciferol, vitamin D3, (VITAMIN D3) 2,000 unit tab Take 2,000 Units by mouth daily. Provider, Historical   psyllium seed, with dextrose, (FIBER PO) Take 1 Cap by mouth daily. Provider, Historical   VIT A/C/E AC/ZNOX/CUPRIC OXIDE (EYE VITAMIN AND MINERALS PO) Take 1 Tab by mouth two (2) times a day. Other, MD Yeny   ferrous sulfate (IRON) 325 mg (65 mg iron) tablet Take 325 mg by mouth two (2) times a day. Other, MD Yeny   DOCUSATE CALCIUM (STOOL SOFTENER PO) Take 100 mg by mouth as needed for Other (constipation). Provider, Historical   latanoprost (XALATAN) 0.005 % ophthalmic solution Administer 1 Drop to both eyes nightly.  6/5/17   Provider, Historical     Allergies   Allergen Reactions    Codeine Nausea Only    Pcn [Penicillins] Unknown (comments)     Cant remember reaction as a child    Tetanus Toxoid, Adsorbed Other (comments)    Tetanus Vaccines And Toxoid Unknown (comments)     Does not take tetanus due to tb hx    Vibramycin [Doxycycline Calcium] Rash       Objective:     Vitals:  Blood pressure (!) 125/45, pulse 80, temperature 97 °F (36.1 °C), resp. rate 20, height 5' (1.524 m), weight 49.9 kg (110 lb), SpO2 98 %, not currently breastfeeding. Temp (24hrs), Av.8 °F (36.6 °C), Min:97 °F (36.1 °C), Max:98.2 °F (36.8 °C)      Intake and Output:  No intake/output data recorded.  1901 -  0700  In: -   Out: 1400 [Urine:1400]    Physical Exam:                Patient is intubated:  no    Physical Examination:   GENERAL ASSESSMENT: NAD  SKIN: dry  HEAD: NC  EYES: anicteric   NECK: no masses  CHEST: normal air exchange, no rales, no rhonchi, no wheezes, respiratory effort normal with no retractions  HEART: regular rate and rhythm  :Ellison: yes  EXTREMITY: comp stockings in place. No sig edema  NEURO: AxOx3      ECG/rhythm[de-identified] Rev:yes  Xray/CT/US/MRI REV:yes  Data Review   Recent Results (from the past 72 hour(s))   CBC WITH AUTOMATED DIFF    Collection Time: 22  8:27 AM   Result Value Ref Range    WBC 5.6 3.6 - 11.0 K/uL    RBC 4.01 3.80 - 5.20 M/uL    HGB 13.2 11.5 - 16.0 g/dL    HCT 40.6 35.0 - 47.0 %    .2 (H) 80.0 - 99.0 FL    MCH 32.9 26.0 - 34.0 PG    MCHC 32.5 30.0 - 36.5 g/dL    RDW 15.5 (H) 11.5 - 14.5 %    PLATELET 80 (L) 102 - 400 K/uL    MPV 11.1 8.9 - 12.9 FL    NRBC 0.0 0  WBC    ABSOLUTE NRBC 0.00 0.00 - 0.01 K/uL    NEUTROPHILS 78 (H) 32 - 75 %    LYMPHOCYTES 15 12 - 49 %    MONOCYTES 6 5 - 13 %    EOSINOPHILS 1 0 - 7 %    BASOPHILS 0 0 - 1 %    IMMATURE GRANULOCYTES 1 (H) 0.0 - 0.5 %    ABS. NEUTROPHILS 4.4 1.8 - 8.0 K/UL    ABS. LYMPHOCYTES 0.8 0.8 - 3.5 K/UL    ABS. MONOCYTES 0.3 0.0 - 1.0 K/UL    ABS. EOSINOPHILS 0.1 0.0 - 0.4 K/UL    ABS. BASOPHILS 0.0 0.0 - 0.1 K/UL    ABS. IMM.  GRANS. 0.0 0.00 - 0.04 K/UL    DF AUTOMATED     METABOLIC PANEL, COMPREHENSIVE    Collection Time: 09/04/22  8:27 AM   Result Value Ref Range    Sodium 142 136 - 145 mmol/L    Potassium 4.2 3.5 - 5.1 mmol/L    Chloride 103 97 - 108 mmol/L    CO2 33 (H) 21 - 32 mmol/L    Anion gap 6 5 - 15 mmol/L    Glucose 91 65 - 100 mg/dL    BUN 34 (H) 6 - 20 MG/DL    Creatinine 1.24 (H) 0.55 - 1.02 MG/DL    BUN/Creatinine ratio 27 (H) 12 - 20      GFR est AA 49 (L) >60 ml/min/1.73m2    GFR est non-AA 40 (L) >60 ml/min/1.73m2    Calcium 8.9 8.5 - 10.1 MG/DL    Bilirubin, total 1.7 (H) 0.2 - 1.0 MG/DL    ALT (SGPT) 20 12 - 78 U/L    AST (SGOT) 27 15 - 37 U/L    Alk.  phosphatase 111 45 - 117 U/L    Protein, total 7.9 6.4 - 8.2 g/dL    Albumin 3.4 (L) 3.5 - 5.0 g/dL    Globulin 4.5 (H) 2.0 - 4.0 g/dL    A-G Ratio 0.8 (L) 1.1 - 2.2     TYPE & SCREEN    Collection Time: 09/04/22  8:27 AM   Result Value Ref Range    Crossmatch Expiration 09/07/2022,2359     ABO/Rh(D) Matthew Wilsons POSITIVE     Antibody screen NEG    PROTHROMBIN TIME + INR    Collection Time: 09/04/22  8:32 AM   Result Value Ref Range    INR 3.9 (H) 0.9 - 1.1      Prothrombin time 37.0 (H) 9.0 - 11.1 sec   EKG, 12 LEAD, INITIAL    Collection Time: 09/04/22  8:35 AM   Result Value Ref Range    Ventricular Rate 80 BPM    Atrial Rate 80 BPM    QRS Duration 160 ms    Q-T Interval 462 ms    QTC Calculation (Bezet) 532 ms    Calculated R Axis -59 degrees    Calculated T Axis 118 degrees    Diagnosis       Ventricular-paced rhythm  When compared with ECG of 06-DEC-2019 08:57,  No significant change was found     NT-PRO BNP    Collection Time: 09/04/22  3:05 PM   Result Value Ref Range    NT pro-BNP 7,837 (H) <450 PG/ML   URINALYSIS W/ REFLEX CULTURE    Collection Time: 09/04/22  3:06 PM    Specimen: Urine   Result Value Ref Range    Color DARK YELLOW      Appearance TURBID (A) CLEAR      Specific gravity 1.014      pH (UA) 5.5 5.0 - 8.0      Protein 100 (A) NEG mg/dL    Glucose Negative NEG mg/dL    Ketone Negative NEG mg/dL    Bilirubin Negative NEG      Blood LARGE (A) NEG Urobilinogen 1.0 0.2 - 1.0 EU/dL    Nitrites Negative NEG      Leukocyte Esterase LARGE (A) NEG      WBC >100 (H) 0 - 4 /hpf    RBC 5-10 0 - 5 /hpf    Epithelial cells FEW FEW /lpf    Bacteria Negative NEG /hpf    UA:UC IF INDICATED URINE CULTURE ORDERED (A) CNI     PROTHROMBIN TIME + INR    Collection Time: 09/05/22  3:55 AM   Result Value Ref Range    INR 5.1 (HH) 0.9 - 1.1      Prothrombin time 47.9 (H) 9.0 - 11.1 sec   CBC W/O DIFF    Collection Time: 09/05/22  3:55 AM   Result Value Ref Range    WBC 6.2 3.6 - 11.0 K/uL    RBC 2.85 (L) 3.80 - 5.20 M/uL    HGB 9.3 (L) 11.5 - 16.0 g/dL    HCT 29.5 (L) 35.0 - 47.0 %    .5 (H) 80.0 - 99.0 FL    MCH 32.6 26.0 - 34.0 PG    MCHC 31.5 30.0 - 36.5 g/dL    RDW 15.1 (H) 11.5 - 14.5 %    PLATELET 60 (L) 782 - 400 K/uL    MPV 11.3 8.9 - 12.9 FL    NRBC 0.0 0  WBC    ABSOLUTE NRBC 0.00 0.00 - 0.32 K/uL   METABOLIC PANEL, BASIC    Collection Time: 09/05/22  3:55 AM   Result Value Ref Range    Sodium 142 136 - 145 mmol/L    Potassium 3.5 3.5 - 5.1 mmol/L    Chloride 107 97 - 108 mmol/L    CO2 30 21 - 32 mmol/L    Anion gap 5 5 - 15 mmol/L    Glucose 91 65 - 100 mg/dL    BUN 29 (H) 6 - 20 MG/DL    Creatinine 0.88 0.55 - 1.02 MG/DL    BUN/Creatinine ratio 33 (H) 12 - 20      GFR est AA >60 >60 ml/min/1.73m2    GFR est non-AA 59 (L) >60 ml/min/1.73m2    Calcium 7.1 (L) 8.5 - 10.1 MG/DL       Discussed with:    Patient  Thank you so much to allow us to participate in this patient's care. We will follow.  : Mago Hardy MD  9/5/2022      [facility]:  [email or web address]  [place] office:  [address] [address]., [address] [place] [address],[address]  [address]  Phone: [phone]  Fax :     [phone]    [place] office:  [address] [place] [address], [address] [place] [address]  Phone - [phone]  Fax - [phone]

## 2022-09-05 NOTE — PROGRESS NOTES
Pharmacy Consult    Reason: select antibiotic therapy for cystitis in the setting of penicillin allergy    Patient has recently tolerated several doses of cephalosporins and allergy indicates it was a long time ago as a child, likely a reaction to impurities in older penicillin formulations, not true allergy. Continue w/ ceftriaxone. Previous UTI's susceptible to this drug.

## 2022-09-05 NOTE — PROGRESS NOTES
Pharmacist Daily Dosing of Warfarin    Indication & Goal INR: AFib, INR Goal 2-3    PTA Warfarin Dose: 5 mg daily, 2.5 on Tues/Thurs    Notable concurrent conditions and medications: None    Labs:  Recent Labs     09/05/22  0355 09/04/22  0832 09/04/22  0827   INR 5.1* 3.9*  --    HGB 9.3*  --  13.2   PLT 60*  --  80*   TBILI  --   --  1.7*   ALB  --   --  3.4*       Impression/Plan:   Hold Warfarin 9/5  S/p vitamin k  Daily INR has been ordered  CBC w/o differential every other day has been ordered     Pharmacy will follow daily and adjust the dose as appropriate.     Thank you,  Prashant Lamb, PHARMD      Warfarin Protocol    Located on pharmacy Teams site: Clinical Practice -> Anticoagulation & Cardiology -> Anticoagulation Policies, Protocols, Guidance

## 2022-09-05 NOTE — PROGRESS NOTES
Aamir rom notified and request for specialty bed placed ([facility] bed). Confirmation number [id number] given.

## 2022-09-06 NOTE — PERIOP NOTES
Blood administration: unit of platelets scanned, verified and started with myself and Golden Yo RN @ [id number]. Called by PACU nurse stating no record in chart. Attempted to reverify, unable to without platelet label to re-scan. Unit completed by CRNA in [address]. Charito Wasserman  See blood administration form in chart

## 2022-09-06 NOTE — PROGRESS NOTES
9:30 PM  Dr. Radhika Rain called about patient's platelets being low, drop in hgb and elevated INR with regards to surgery planned tomorrow. Notified , order for CBC, BMP and PT/INR for now received.     10:46 PM  Notified Dr. Ranjan Mercado via [employer] that labs had resulted    10:53 PM  Dr. Ranjan Mercado requested PT/INR be drawn at 6AM    11:01 PM   notified of platelets of 59 via [employer] stated \"they should transfuse just before surgery if needed'

## 2022-09-06 NOTE — CONSULTS
[facility] at [address] [address] [address] [facility]  W: [phone] F: [phone]      Reason for Visit:   Darwin Marrufo is a 80 y.o. female who is seen in consultation at the request of Dr. Romario Early for evaluation of thrombocytopenia. Hematology / Oncology Treatment History:     Hematological/Oncological Diagnosis: Thrombocytopenia    Date of Diagnosis: 9/6/22      History of Present Illness:     80year old with right hip fracture, right distal clavicle fracture, found to have thrombocytopenia with PLT of 63. The patient was taking coumadin as outpatient for atrial fibrillation, on hold now. Last INR is 1.9. Cause of fall is mechanical fall in shower. Clinically, the patient denies any sources of active bleeding that she does have significant bruising on both forearms as well as her left leg. Labs reviewed, PLT trend as follows:       Review of Systems: A complete review of systems was obtained, negative except as described above.     Past Medical History:   Diagnosis Date    Adverse effect of anesthesia     slow to wake    Alopecia     Arrhythmia     atrial fib,?svt    Arthritis     Atrial fibrillation (HCC)     Benign essential HTN     Benign neoplasm of vulva     CAD (coronary artery disease)     Cholelithiases     Chronic kidney disease     followed by specialist    Chronic pain     Colon cancer (HCC)     Constipation     Dyspareunia     Glaucoma     Heart failure (HCC)     History of mixed connective tissue disease     Hx of viral pericarditis     Hyperactivity of bladder     Hypercholesterolemia     Ill-defined condition     macular degeneration    Ill-defined condition     recurrent UTI hx    Insomnia     Macular degeneration     Neck pain     Psoriasis     patient denies    Rectocele     Renal cyst     Seizure disorder ([facility]     none in 30 plus years---sees neurologist     Sjogren's disease ([facility]     patient unsure Thrombophlebitis     TIA (transient ischemic attack)     patient denies    Vaginal vault prolapse, posthysterectomy     Varicose veins       Past Surgical History:   Procedure Laterality Date    HX BREAST LUMPECTOMY      left breast    HX CATARACT REMOVAL      HX HEMORRHOIDECTOMY      HX HERNIA REPAIR  08/01/2018    Open bilateral inguinal hernia repairs with mesh.     HX HYSTERECTOMY      HX PACEMAKER      left    HX SHOULDER ARTHROSCOPY      left    HX TOTAL COLECTOMY  1955    partial    [place] CARDIAC SURG PROCEDURE UNLIST      AV ablation      Social History     Tobacco Use    Smoking status: Never    Smokeless tobacco: Never   Substance Use Topics    Alcohol use: No      Family History   Problem Relation Age of Onset    Cancer Father     Inflammatory Bowel Dz Mother     Heart Disease Brother     Diabetes Brother      Current Facility-Administered Medications   Medication Dose Route Frequency    0.9% sodium chloride infusion 250 mL  250 mL IntraVENous PRN    0.9% sodium chloride infusion 250 mL  250 mL IntraVENous PRN    0.9% sodium chloride infusion  75 mL/hr IntraVENous CONTINUOUS    alcohol 62% (NOZIN) nasal  1 Ampule  1 Ampule Topical Q12H    potassium chloride SR (KLOR-CON 10) tablet 20 mEq  20 mEq Oral DAILY    calcium carbonate (OS-CESAR) tablet 500 mg [elemental]  500 mg Oral BID    cefTRIAXone (ROCEPHIN) 1 g in 0.9% sodium chloride (MBP/ADV) 50 mL MBP  1 g IntraVENous Q24H    WARFARIN INFORMATION NOTE (COUMADIN)   Other QPM    sodium chloride (NS) flush 5-40 mL  5-40 mL IntraVENous Q8H    sodium chloride (NS) flush 5-40 mL  5-40 mL IntraVENous PRN    acetaminophen (TYLENOL) tablet 650 mg  650 mg Oral Q6H PRN    Or    acetaminophen (TYLENOL) suppository 650 mg  650 mg Rectal Q6H PRN    polyethylene glycol (MIRALAX) packet 17 g  17 g Oral DAILY PRN    ondansetron (ZOFRAN ODT) tablet 4 mg  4 mg Oral Q8H PRN    Or    ondansetron (ZOFRAN) injection 4 mg  4 mg IntraVENous Q6H PRN    sodium chloride (NS) flush 5-40 mL  5-40 mL IntraVENous PRN    naloxone (NARCAN) injection 0.4 mg  0.4 mg IntraVENous PRN    senna-docusate (PERICOLACE) 8.6-50 mg per tablet 2 Tablet  2 Tablet Oral BID    albuterol-ipratropium (DUO-NEB) 2.5 MG-0.5 MG/3 ML  3 mL Nebulization Q6H PRN    levETIRAcetam (KEPPRA) tablet 500 mg  500 mg Oral BID    balsam peru-castor oiL (VENELEX) ointment   Topical BID    furosemide (LASIX) injection 40 mg  40 mg IntraVENous BID    amitriptyline (ELAVIL) tablet 20 mg  20 mg Oral QHS      Allergies   Allergen Reactions    Codeine Nausea Only    Pcn [Penicillins] Unknown (comments)     Cant remember reaction as a child    Tetanus Toxoid, Adsorbed Other (comments)    Tetanus Vaccines And Toxoid Unknown (comments)     Does not take tetanus due to tb hx    Vibramycin [Doxycycline Calcium] Rash            Physical Exam:   Visit Vitals  BP (!) 124/42   Pulse 80   Temp 97.6 °F (36.4 °C)   Resp 17   Ht 5' (1.524 m)   Wt 110 lb (49.9 kg)   SpO2 98%   Breastfeeding No   BMI 21.48 kg/m²     ECOG PS: 3  General: alert, cooperative, no distress   Mental  status: normal mood, behavior, speech, dress, and thought processes, able to follow commands   HENT: NCAT   Neck: no visualized mass   Resp: no respiratory distress   Neuro: no gross deficits except that she has decreased visual acuity   Skin: Extensive bruising on upper and lower extremities   Psychiatric: normal affect, consistent with stated mood, no evidence of hallucinations           Results:     Lab Results   Component Value Date/Time    WBC 8.4 09/06/2022 05:55 AM    HGB 9.1 (L) 09/06/2022 05:55 AM    HCT 28.9 (L) 09/06/2022 05:55 AM    PLATELET 63 (L) 86/15/5982 05:55 AM    .0 (H) 09/06/2022 05:55 AM    ABS.  NEUTROPHILS 4.4 09/04/2022 08:27 AM    Hematocrit (POC) 35 08/01/2018 07:27 AM     Lab Results   Component Value Date/Time    Sodium 135 (L) 09/05/2022 10:10 PM    Potassium 4.2 09/05/2022 10:10 PM    Chloride 100 09/05/2022 10:10 PM    CO2 34 (H) 09/05/2022 10:10 PM    Glucose 144 (H) 09/05/2022 10:10 PM    BUN 37 (H) 09/05/2022 10:10 PM    Creatinine 0.99 09/05/2022 10:10 PM    GFR est AA >60 09/05/2022 10:10 PM    GFR est non-AA 52 (L) 09/05/2022 10:10 PM    Calcium 8.2 (L) 09/05/2022 10:10 PM    Sodium (POC) 140 08/01/2018 07:27 AM    Potassium (POC) 4.6 08/01/2018 07:27 AM    Chloride (POC) 101 08/01/2018 07:27 AM    Glucose (POC) 102 (H) 08/01/2018 07:27 AM    Glucose (POC) 107 (H) 07/04/2017 07:02 AM    BUN (POC) 26 (H) 08/01/2018 07:27 AM    Creatinine (POC) 0.8 08/01/2018 07:27 AM    Calcium, ionized (POC) 1.16 08/01/2018 07:27 AM     Lab Results   Component Value Date/Time    Bilirubin, total 1.7 (H) 09/04/2022 08:27 AM    ALT (SGPT) 20 09/04/2022 08:27 AM    Alk. phosphatase 111 09/04/2022 08:27 AM    Protein, total 7.9 09/04/2022 08:27 AM    Albumin 3.4 (L) 09/04/2022 08:27 AM    Globulin 4.5 (H) 09/04/2022 08:27 AM         Assessment and Recommendations:     # Thrombocytopenia  - likely secondary to peripheral consumption in the setting of heavy bruising.    - Recommend platelet transfusion to achieve PLT count of about 100 prior to surgery to reduce bleeding risk. - She should also have PLT on standby during surgery if hemostasis is difficult to achieve. # Elevated INR  - patient was on coumadin for atrial fibrillation  - Continue oral vitamin K in the perioperative period to achieve INR of <1.5.  When appropriate hemostasis has been achieved, the patient should have a discussion about the risks and benefits of restarting anticoagulation for atrial fibrillation with her cardiologist.     Signed By: Fatmata Benjamin MD      Attending Medical Oncologist   [facility]

## 2022-09-06 NOTE — PERIOP NOTES
Patient alert to name year and some situations. Aware of president but not aware of current location, thought she was at farm in [address] [place] [address]. .  Floor reported patient had a stage 2 decubitus sacral area covered with foam, daughter noted nursing applying cream to area without seeing ulcer. Daughter stated she placed foam dressing  on back where kyphoplasty was attempted. Visualization deferred to OR to to pain it would cause patient  with any movement. Bilateral arms bruised from hands to antecubital area. Foam dressing to right hand . Ellison catheter draining . Daughter at bedside.  No personal belongings in preop

## 2022-09-06 NOTE — PERIOP NOTES
TRANSFER - IN REPORT:    Verbal report received from [facility] on Jesus Manuel Mo  being received from [phone] for ordered procedure      Report consisted of patients Situation, Background, Assessment and   Recommendations(SBAR). Information from the following report(s) SBAR, ED Summary, Procedure Summary, Intake/Output, and MAR was reviewed with the receiving nurse. Opportunity for questions and clarification was provided. Assessment completed upon patients arrival to unit and care assumed.

## 2022-09-06 NOTE — PROGRESS NOTES
Chart reviewed. Patient with multiple comorbidities. Significant drop in Hgb since yesterday and worsening thrombocytopenia (platelets = 73W on 7/2/25). INR still supratherapeutic despite holding coumadin. These issues present significant risk for complications under general anesthesia and will need to be addressed prior to sugery on 9/6/22.

## 2022-09-06 NOTE — OP NOTES
Adriane Jeffers MD - Adult Reconstruction and Total Joint Replacement    Stephen Morales - MRN [id number] - : 1924 (80 y.o.)      Date: 2022    PREOPERATIVE DIAGNOSIS: Right intertrochanteric hip fracture    POSTOPERATIVE DIAGNOSIS: same    OPERATIVE PROCEDURE: Intramedullary nail fixation of  hip fracture    IMPLANTS USED:   Implant Name Type Inv. Item Serial No.  Lot No. LRB No. Used Action   NAIL IM [id number] [id number] 130DEG SHT R PROX FEM GRN TI DANIELLA - SNA  NAIL IM L235MM [id number] 130DEG SHT R PROX FEM GRN TI DANIELLA NA [employer]PU[employer]_WD [id number] Right 1 Implanted   SCREW BNE L100MM DIA10.35MM G TI DANIELLA PERF FOR PROX FEM - SNA  SCREW BNE L100MM DIA10.35MM G TI DANIELLA PERF FOR PROX FEM NA DEPUY [employer]_WD NA Right 1 Implanted   SCREW BNE L36MM DIA5MM NONSTERILE TIB LT GRN TI ST DANIELLA SHORTY - SNA  SCREW BNE L36MM DIA5MM NONSTERILE TIB LT GRN TI ST DANIELLA SHORTY NA DEPUY [employer]_WD NA Right 1 Implanted       SURGEON: Adriane Jeffers MD    ANESTHESIA: General    ESTIMATED BLOOD LOSS: 100. DRAINS: None. SPECIMENS: None. COMPLICATIONS: None. CONDITION: Stable to PACU. INDICATIONS: Hip fracture. The risks, benefits, and alternatives to IM nail fixation were explained to the patient and family and they wished to proceed. They understood no guarantees could be given about the outcome of the procedure. DESCRIPTION OF PROCEDURE: After being identified in the preoperative holding area and having their operative site marked, the patient was brought back to the operating room where the  underwent anesthesia to good effect. They were transferred to the fracture table. Preoperative antibiotics were administered. The operative extremity was placed in a traction boot set-up. The uninjured lower extremity was placed in the well-leg aviles, being sure to pad all bony prominences, paying special attention to the area of the peroneal nerve.  A preoperative reduction maneuver was performed under fluoroscopy. We then prepped and draped the operative extremity in the usual fashion using sterile technique. An appropriate time-out was performed. We began by marking the lateral trajectory of the femur using fluoroscopy and a guidewire. We then returned to the A/P view. A stab incision was made proximal to the tip of the greater trochanter. The curved cannulated awl was inserted into the proximal femur. The guidewire was passed distally. We then used a 1-step reamer. The nail was inserted over the guidewire to the appropriate depth. The stacked trocars were used to place the guide pin for the lag screw in the center/center position. The indirect measuring guide was used to determine lag screw length. We drilled for this and placed the lag screw. The compression feature was used. We then placed the set screw. We then removed the guide pin and used the stacked trocars for the distal interlock, which was placed in a static fashion. Final hardware placement was confirmed on orthogonal views. The wounds were then closed in layers with Vicryl. Final skin closure was performed with skin staples. Sterile compressive dressings were applied. The patient was moved to the stretcher and taken to the recovery room in stable condition. At the conclusion of the procedure all counts were correct. There were no immediate complications. Additional procedure:    Procedure performed: Intraoperative fluoroscopy for Procedure(s):  RIGHT FEMORAL INTERTROCHANTERIC NAIL INSERTION    CPT code: 53742    Anesthesia: Other    Surgeon(s) and Role:     Christopher Castrejon MD - Primary      This note describes the use of intraoperative fluoroscopy. Fluoroscopic imaging was utilized in orthogonal planes as well as using live technique for all phases of the procedure, described separately in the operative report, including fracture reduction and hardware placement.     Garett Carmona MD

## 2022-09-06 NOTE — PERIOP NOTES
1408- Handoff Report from Operating Room to PACU    Report received from Ronnie Sampson and KIP regarding Georgia Lora. Surgeon(s):  Brenda Farrell MD  And Procedure(s) (LRB):  RIGHT FEMORAL INTERTROCHANTERIC NAIL INSERTION (Right)  confirmed   with allergies and dressings discussed. Anesthesia type, drugs, patient history, complications, estimated blood loss, vital signs, intake and output, and last pain medication, lines, and temperature were reviewed. 1612- TRANSFER - OUT REPORT:    Verbal report given to MIKEL Bee RN(name) on Georgia Lora  being transferred to [facility](unit) for routine post - op       Report consisted of patients Situation, Background, Assessment and   Recommendations(SBAR). Information from the following report(s) OR Summary, Procedure Summary, Intake/Output, and MAR was reviewed with the receiving nurse. Lines:   Peripheral IV 09/04/22 Left Antecubital (Active)   Site Assessment Clean, dry, & intact 09/06/22 1408   Phlebitis Assessment 0 09/06/22 1408   Infiltration Assessment 0 09/06/22 1408   Dressing Status Clean, dry, & intact 09/06/22 1408   Dressing Type Transparent 09/06/22 1408   Hub Color/Line Status Pink;Capped 09/06/22 1408   Action Taken Open ports on tubing capped 09/05/22 0346   Alcohol Cap Used Yes 09/05/22 0346        Opportunity for questions and clarification was provided. Patient transported with:   Monitor  O2 @ 4l liters  Registered Nurse  Tech  Chart  Receiving RN made aware   1 unit of Platelet  given. Blood bank slip placed in chart.

## 2022-09-06 NOTE — ANESTHESIA POSTPROCEDURE EVALUATION
Post-Anesthesia Evaluation and Assessment    Patient: Casi Mendez MRN: [id number]  SSN: [id number]    YOB: 1924  Age: 80 y.o. Sex: female      I have evaluated the patient and they are stable and ready for discharge from the PACU. Cardiovascular Function/Vital Signs  Visit Vitals  BP (!) 114/33 (BP 1 Location: Left upper arm, BP Patient Position: At rest)   Pulse 80   Temp 36.4 °C (97.6 °F)   Resp 19   Ht 5' (1.524 m)   Wt 49.9 kg (110 lb)   SpO2 93%   Breastfeeding No   BMI 21.48 kg/m²       Patient is status post Other anesthesia for Procedure(s):  RIGHT FEMORAL INTERTROCHANTERIC NAIL INSERTION. Nausea/Vomiting: None    Postoperative hydration reviewed and adequate. Pain:  Pain Scale 1: Visual (09/06/22 1430)  Pain Intensity 1: 0 (09/06/22 1430)   Managed    Neurological Status:   Neuro (WDL): Exceptions to WDL (09/06/22 1408)  Neuro  Neurologic State: Sleeping (09/06/22 1408)  Orientation Level: Oriented to person (09/06/22 1408)  Cognition: Impaired decision making (09/06/22 1408)  LUE Motor Response: Purposeful;Weak (09/06/22 1408)  LLE Motor Response: Purposeful;Weak;Withdraws (contracted) (09/06/22 1408)  RUE Motor Response: Purposeful;Weak (09/06/22 1408)  RLE Motor Response: Purposeful;Weak (09/06/22 1408)   At baseline    Mental Status, Level of Consciousness: Alert and  oriented to person, place, and time    Pulmonary Status:   O2 Device: Nasal cannula (09/06/22 1430)   Adequate oxygenation and airway patent    Complications related to anesthesia: None    Post-anesthesia assessment completed.  No concerns    Signed By: Lindsey Dye MD     September 6, 2022

## 2022-09-06 NOTE — PROGRESS NOTES
Nephrology Progress Note  [facility] / [address], [address]  [place], [address]  Phone - [phone]  Fax - [phone]                 Patient: Vilma Wyman                   YOB: 1924        Date- 9/6/2022                      Admit Date: 9/4/2022  CC: Follow up for Edema          IMPRESSION & PLAN:   LESLIE ( resolved)  Proteinuria  Hyponatremia(suspect secondary to poor p.o. intake, SIADH)  Edema  Anemia - sig drop  Systolic Heart Failure - chronic -LVEF 30%  Valvular heart disease  Hypokalemia  Hypocalcemia    PLAN-  Continue to diurese. Creatinine at his baseline. Platelets ordered by heme-onc prior to surgery. Monitor sodium suspect hyponatremic from poor p.o. intake with possible SIADH. Daily labs. Spoke to daughter at bedside. Subjective: Interval History:   -Patient seen and examined in echo.  -Spoke to the daughter in room. Objective:   Vitals:    09/05/22 2005 09/05/22 2300 09/06/22 0542 09/06/22 0758   BP: (!) 129/54 (!) 119/42 (!) 132/42 (!) 119/43   Pulse: 79 81 81 80   Resp: 18 18 22 18   Temp: 98.6 °F (37 °C) 98.8 °F (37.1 °C) 98.5 °F (36.9 °C) 98.4 °F (36.9 °C)   SpO2: 98% 95% 97% 97%   Weight:       Height:          09/05 0701 - 09/06 0700  In: 50 [I.V.:50]  Out: 350 [Urine:350]  Last 3 Recorded Weights in this Encounter    09/04/22 0744   Weight: 49.9 kg (110 lb)      Physical exam:    GEN: Awake but confused  NECK- Supple, no mass  RESP: No wheezing, Clear b/l  CVS: S1,S2  RRR  NEURO: No deficit  EXT: 1-2+ edema      Chart reviewed. Pertinent Notes reviewed.      Data Review :  Recent Labs     09/05/22  2210 09/05/22  0355 09/04/22  0827   * 142 142   K 4.2 3.5 4.2    107 103   CO2 34* 30 33*   BUN 37* 29* 34*   CREA 0.99 0.88 1.24*   * 91 91   CA 8.2* 7.1* 8.9     Recent Labs     09/06/22  0555 09/05/22  2210 09/05/22  0355   WBC 8.4 8.0 6.2   HGB 9.1* 9. 0* 9.3*   HCT 28.9* 27.6* 29.5*   PLT 63* 57* 60*     No results for input(s): FE, TIBC, PSAT, FERR in the last 72 hours.    Medication list  reviewed  Current Facility-Administered Medications   Medication Dose Route Frequency    0.9% sodium chloride infusion 250 mL  250 mL IntraVENous PRN    0.9% sodium chloride infusion 250 mL  250 mL IntraVENous PRN    potassium chloride SR (KLOR-CON 10) tablet 20 mEq  20 mEq Oral DAILY    calcium carbonate (OS-CESAR) tablet 500 mg [elemental]  500 mg Oral BID    cefTRIAXone (ROCEPHIN) 1 g in 0.9% sodium chloride (MBP/ADV) 50 mL MBP  1 g IntraVENous Q24H    WARFARIN INFORMATION NOTE (COUMADIN)   Other QPM    sodium chloride (NS) flush 5-40 mL  5-40 mL IntraVENous Q8H    sodium chloride (NS) flush 5-40 mL  5-40 mL IntraVENous PRN    acetaminophen (TYLENOL) tablet 650 mg  650 mg Oral Q6H PRN    Or    acetaminophen (TYLENOL) suppository 650 mg  650 mg Rectal Q6H PRN    polyethylene glycol (MIRALAX) packet 17 g  17 g Oral DAILY PRN    ondansetron (ZOFRAN ODT) tablet 4 mg  4 mg Oral Q8H PRN    Or    ondansetron (ZOFRAN) injection 4 mg  4 mg IntraVENous Q6H PRN    sodium chloride (NS) flush 5-40 mL  5-40 mL IntraVENous PRN    naloxone (NARCAN) injection 0.4 mg  0.4 mg IntraVENous PRN    senna-docusate (PERICOLACE) 8.6-50 mg per tablet 2 Tablet  2 Tablet Oral BID    albuterol-ipratropium (DUO-NEB) 2.5 MG-0.5 MG/3 ML  3 mL Nebulization Q6H PRN    levETIRAcetam (KEPPRA) tablet 500 mg  500 mg Oral BID    balsam peru-castor oiL (VENELEX) ointment   Topical BID    furosemide (LASIX) injection 40 mg  40 mg IntraVENous BID    amitriptyline (ELAVIL) tablet 20 mg  20 mg Oral QHS        Kelsey Merrill MD  9/6/2022

## 2022-09-06 NOTE — PROGRESS NOTES
End of Shift Note    Bedside shift change report given to Reinaldo PAYAN (oncoming nurse) by Edson Mohan (offgoing nurse). Report included the following information SBAR, Kardex, Intake/Output, MAR, Recent Results, and Med Rec Status    Shift worked:  [id number]     Shift summary and any significant changes:     Labs drawn at 10 pm, see note, redrawn at 6am. No new orders regarding the labs per Dr. Maryellen Zepeda. Patient confused overnight, Daughter at bedside. Tylenol given  CHG completed. See note     Concerns for physician to address:  Low platelets, HGB, repeat bmp?? Needs ECHO before surgery     Zone phone for oncoming shift:   [id number]         Activity:  Activity Level: Bed Rest  Number times ambulated in hallways past shift: 0  Number of times OOB to chair past shift: 0    Cardiac:   Cardiac Monitoring: No           Access:  Current line(s): PIV     Genitourinary:   Urinary status: rosario    Respiratory:   O2 Device: Nasal cannula  Chronic home O2 use?: YES  Incentive spirometer at bedside: YES       GI:     Current diet:  DIET NPO  Passing flatus: YES  Tolerating current diet: YES       Pain Management:   Patient states pain is manageable on current regimen: NO    Skin:  Magdaleno Score: 15  Interventions: turn team, speciality bed, float heels, and foam dressing    Patient Safety:  Fall Score:  Total Score: 3  Interventions: bed/chair alarm, assistive device (walker, cane, etc), gripper socks, pt to call before getting OOB, and stay with me (per policy)  High Fall Risk: Yes    Length of Stay:  Expected LOS: - - -  Actual LOS: 2      Edson Mohan

## 2022-09-06 NOTE — WOUND CARE
Wound Care attempted to see this patient today for Wound Care consult:  Chart reviewed. Pt. Is in the OR at this time for hip surgery. Wound flowsheet for a kyphoplasty healing wound and for the skin tear on the hand. Wound care orders written for the skin tear. It is much better to use a non-adherent dressing for 80year old skin and not a dry dressing for any reason except for staples or sutures. Plan: Will see patient tomorrow for her sacrum wound. For now she will need to be turned / repositioned to off load the bony prominence on both sides of the body. Consider a specialty bed - low air loss bed [facility] with air module. [employer] for the Air module unit to be delivered to the room. Nursing will need to hook it up to the mattress and Plug in / turn the machine on. FLOAT the heels.    Zuleima Mas RN, BSN, [employer]

## 2022-09-06 NOTE — PROGRESS NOTES
Hospitalist Progress Note    NAME: Umm Fraire   :  1924   MRN:  [id number]       Assessment / Plan:  Right hip fracture:  XR Acute right hip intratrochanteric fracture  Ortho to plan for right hip intertrochanteric nail  Hold warfarin OR today  INR 5.1 -->1.9     Preoperative risk assessment  Patient has RCRI score of 10% which corresponded to moderate risk for cardiac related death and MI within 30 days can proceed with the surgery with moderate risk     Fall:  Acute right distal clavicle fracture  Ortho on board     CT head right lateral scalp hematoma,  but otherwise unremarkable no intracranial bleed or hemorrhage    UTI  UA >100 wbc, Cultures pending  Cont. rocephin    Hx of Afib  Not taking rate control medication at home  Hold warfarin for sx      Acute Exacerbation of HFrEF  Continue with Lasix 40 daily  On oxygen(at home she is 4 L nasal cannula)  Bnp elevated 7,800  echo pending  Last echo in 2019 which she showed ejection fraction decrease    CKD stage III    18.5 - 24.9 Normal weight / Body mass index is 21.48 kg/m². Estimated discharge date:     Code status: DNR  Prophylaxis: SCD's  Recommended Disposition: Home w/Family     Subjective:     Chief Complaint / Reason for Physician Visit  \"fall\". Discussed with RN events overnight. Family at bedside, patient did not sleep well  But does not sleep well at home as well  All questions answered to satisfaction    Review of Systems:  Symptom Y/N Comments  Symptom Y/N Comments   Fever/Chills n   Chest Pain n    Poor Appetite    Edema     Cough n   Abdominal Pain n    Sputum    Joint Pain     SOB/SANTOS n   Pruritis/Rash     Nausea/vomit n   Tolerating PT/OT     Diarrhea    Tolerating Diet     Constipation    Other       Could NOT obtain due to:      Objective:     VITALS:   Last 24hrs VS reviewed since prior progress note.  Most recent are:  Patient Vitals for the past 24 hrs:   Temp Pulse Resp BP SpO2   22 0758 98.4 °F (36.9 °C) 80 18 (!) 119/43 97 %   09/06/22 0542 98.5 °F (36.9 °C) 81 22 (!) 132/42 97 %   09/05/22 2300 98.8 °F (37.1 °C) 81 18 (!) 119/42 95 %   09/05/22 2005 98.6 °F (37 °C) 79 18 (!) 129/54 98 %   09/05/22 1952 98.2 °F (36.8 °C) 78 20 (!) 125/43 95 %   09/05/22 1540 98.5 °F (36.9 °C) 80 20 (!) 108/56 94 %   09/05/22 1140 97.5 °F (36.4 °C) 81 20 114/86 98 %         Intake/Output Summary (Last 24 hours) at 9/6/2022 0953  Last data filed at 9/5/2022 1549  Gross per 24 hour   Intake 50 ml   Output 350 ml   Net -300 ml          I had a face to face encounter and independently examined this patient on 9/6/2022, as outlined below:  PHYSICAL EXAM:  General: Alert, cooperative, no acute distress    Resp:  Decreased bs BL, No accessory muscle use  CV:  Regular  rhythm,  No edema  GI:  Soft, Non distended, Non tender. +Bowel sounds  Neurologic:  Alert and oriented X 3, normal speech,   Psych:   Not anxious nor agitated      Reviewed most current lab test results and cultures  YES  Reviewed most current radiology test results   YES  Review and summation of old records today    NO  Reviewed patient's current orders and MAR    YES  PMH/ reviewed - no change compared to H&P  ________________________________________________________________________  Care Plan discussed with:    Comments   Patient x    Family  x    RN x    Care Manager     Consultant                       x Multidiciplinary team rounds were held today with , nursing, pharmacist and clinical coordinator. Patient's plan of care was discussed; medications were reviewed and discharge planning was addressed.      ________________________________________________________________________  Total NON critical care TIME:  40   Minutes    Total CRITICAL CARE TIME Spent:   Minutes non procedure based      Comments   >50% of visit spent in counseling and coordination of care x    ________________________________________________________________________  Alaina Lockwood MD Procedures: see electronic medical records for all procedures/Xrays and details which were not copied into this note but were reviewed prior to creation of Plan. LABS:  I reviewed today's most current labs and imaging studies. Pertinent labs include:  Recent Labs     09/06/22 0555 09/05/22 2210 09/05/22 0355   WBC 8.4 8.0 6.2   HGB 9.1* 9.0* 9.3*   HCT 28.9* 27.6* 29.5*   PLT 63* 57* 60*       Recent Labs     09/06/22 0555 09/05/22 2210 09/05/22  1707 09/05/22 0355 09/04/22  0832 09/04/22  0827   NA  --  135*  --  142  --  142   K  --  4.2  --  3.5  --  4.2   CL  --  100  --  107  --  103   CO2  --  34*  --  30  --  33*   GLU  --  144*  --  91  --  91   BUN  --  37*  --  29*  --  34*   CREA  --  0.99  --  0.88  --  1.24*   CA  --  8.2*  --  7.1*  --  8.9   ALB  --   --   --   --   --  3.4*   TBILI  --   --   --   --   --  1.7*   ALT  --   --   --   --   --  20   INR 1.9* 2.6* 3.8* 5.1*   < >  --     < > = values in this interval not displayed.          Signed: Carmenza Pham MD

## 2022-09-06 NOTE — PROGRESS NOTES
Ortho:    INR 1.9 this morning(down from 5.1 yesterday) after receiving Vit K  Platelets low avg approx 60K    Patient with multiple fractures  OR today for Right hip ORIF  1 unit of platelets ordered for transfusion just prior to surgery today    Mahalia Kanner, PA-C

## 2022-09-06 NOTE — ANESTHESIA PREPROCEDURE EVALUATION
Anesthetic History   No history of anesthetic complications            Review of Systems / Medical History  Patient summary reviewed, nursing notes reviewed and pertinent labs reviewed    Pulmonary                Comments: On home O2 - 4L NC   Neuro/Psych     seizures (Complex partial seizure with impairment of consciousness ): poorly controlled  CVA  TIA    Comments: Macular degeneration    Hereditary and idiopathic peripheral neuropathy    Hx Compression fracture of L1 lumbar vertebra  Cardiovascular    Hypertension      CHF  Dysrhythmias (PAF (paroxysmal atrial fibrillation)) : atrial fibrillation  Pacemaker, CAD and hyperlipidemia    Exercise tolerance: <4 METS  Comments: Hx AV Ablation    Coumadin held (INR=1.7 on 9/5/22)    ECG (9/4/22): Ventricular-paced rhythm   When compared with ECG of 06-DEC-2019 08:57,   No significant change was found      Left Ventricle: Severely reduced left ventricular systolic function with a visually estimated EF of 20 - 25%. Left ventricle size is normal. Normal wall thickness. Global hypokinesis present. Normal diastolic function.   Right Ventricle: Reduced systolic function.   Mitral Valve: Moderate regurgitation.   Tricuspid Valve: Moderate to severe regurgitation.   Left Atrium: Left atrium is dilated.   Right Atrium: Right atrium is dilated. Elevated BNP       GI/Hepatic/Renal         Renal disease (CRI, Stage III): CRI      Comments: UTI (9/5/22)    Hx Open bilateral inguinal hernia repairs with mesh (8/1/22)    Hx colon cancer s/p partial colectomy (1955) Endo/Other        Arthritis, cancer (Hx colon cancer s/p partial colectomy (1955)) and anemia (Hgb = 9.3 on 9/5/22, down from 11.2 on 9/4/22)    Comments:  Thrombocytopenia (Platelets = 92Y on 7/4/37, down from 80K on 9/4/22)    Hx Hysterectomy Other Findings   Comments: Right hip fracture    Acute right distal clavicle fracture         Physical Exam    Airway  Mallampati: II  TM Distance: 4 - 6 cm  Neck ROM: decreased range of motion   Mouth opening: Normal     Cardiovascular    Rhythm: irregular  Rate: normal         Dental    Dentition: Poor dentition     Pulmonary      Decreased breath sounds: bilateral           Abdominal  GI exam deferred       Other Findings            Anesthetic Plan    ASA: 4  Anesthesia type: general    Monitoring Plan: BIS      Induction: Intravenous  Anesthetic plan and risks discussed with: Patient and Family

## 2022-09-07 NOTE — WOUND CARE
Wound care consult for the mid back chronic wound (POA) and the skin tear on the hand. Pt. Had a fall at home and has a right hip fracture. This was repaired yesterday in the OR with an intertrochanteric intramedullary nail fixation. Her daughter takes care of her and is a nurse. Pt. Is a retired nurse. Assessment: With help from nursing the patient stood up and the sacrum was examined and the mid back wound. Pt. Has kyphosis with a very bony prominence and has a DTI (deep tissue injuury) to the mid spine. This was present on admission. Skin tears on the hands and there are bruises from the fall as well. Pt. Has incontinence dermatitis and can be treated with the floor stock zinc oxide cream and manage the incontinence with frequent checks to the skin. WOUND POA CONDITIONS    Wound Spine Mid old, healing wound to spine 09/04/22 (Active)   Wound Image   09/07/22 1042   Wound Etiology Deep Tissue/Injury 09/07/22 1042   Dressing Status New dressing applied 09/07/22 1042   Dressing/Treatment Pharmaceutical agent (see MAR); Foam 09/07/22 1042   Dressing Change Due 09/07/22 09/07/22 1042   Wound Length (cm) 4 cm 09/07/22 1042   Wound Width (cm) 2.5 cm 09/07/22 1042   Wound Surface Area (cm^2) 10 cm^2 09/07/22 1042   Wound Assessment Purple/maroon 09/07/22 1042   Drainage Amount Small 09/07/22 1042   Drainage Description Serosanguinous 09/07/22 1042   Wound Odor None 09/07/22 1042   Sherri-Wound/Incision Assessment Blanchable erythema 09/07/22 1042   Wound Thickness Description Full thickness 09/07/22 1042   Number of days: 3     Treatment: increase the use of the Venelex to three times per day with a foam dressing applied (can use the same foam dressing all day). Foam dressing will help pad the bony prominence (Kyphosis). Use zinc oxide cream on the sacrum and buttocks for incontinence care. Turn patient every two hours when in the bed. Float the heels.    Wound care orders written for the skin tears yesterday.    Lelon Siemens, RN, BSN, [facility]

## 2022-09-07 NOTE — PROGRESS NOTES
Pharmacist Daily Dosing of Warfarin    Indication & Goal INR: AFib, INR Goal 2-3    PTA Warfarin Dose: 5 mg daily, 2.5 on Tues/Thurs    Notable concurrent conditions and medications: None    Labs:  Recent Labs     09/07/22  0135 09/06/22  0555 09/05/22  2210   INR 1.3* 1.9* 2.6*   HGB 7.7* 9.1* 9.0*   PLT 85* 63* 57*       Date                      INR                  Warfarin  9/6                         1.9                    1mg  9/7/22                    1.3                    5mg    Impression/Plan:   Dr. Louis Kowalski confirmed that Warfarin is resumed via [employer]  Warfarin 5mg this PM  S/p vitamin k  Daily INR has been ordered  CBC w/o differential every other day has been ordered     Pharmacy will follow daily and adjust the dose as appropriate.     Thank you,  Srinivasan Feliciano, [address]

## 2022-09-07 NOTE — PROGRESS NOTES
Problem: Mobility Impaired (Adult and Pediatric)  Goal: *Acute Goals and Plan of Care (Insert Text)  Description: FUNCTIONAL STATUS PRIOR TO ADMISSION: Patient was modified independent using a rolling walker for functional mobility. HOME SUPPORT PRIOR TO ADMISSION: The patient lived with daughter and required minimal assistance/contact guard assist for ADLs. Physical Therapy Goals  Initiated 9/7/2022  1. Patient will move from supine to sit and sit to supine  in bed with moderate assistance within 5 day(s). 2.  Patient will transfer from bed to chair and chair to bed with moderate assistance using the least restrictive device within 5 day(s). 3.  Patient will perform sit to stand with moderate assistance  within 5 day(s). 4.  Patient will ambulate with moderate assistance  for 10 feet with the least restrictive device within 5 day(s). Outcome: Progressing Towards Goal    PHYSICAL THERAPY EVALUATION  Patient: Edilma cMclendon (58 y.o. female)  Date: 9/7/2022  Primary Diagnosis: Hip fracture ([facility] [address] [S72.009A]  Procedure(s) (LRB):  RIGHT FEMORAL INTERTROCHANTERIC NAIL INSERTION (Right) 1 Day Post-Op   Precautions:   Fall, WBAT, NWB R(UE)    ASSESSMENT  Based on the objective data described below, the patient presents with decreased independence with functional mobility post-op day 1 R IM nail. Patient also sustained R clavicular fx requiring use of sling and is to be NWB. Patient is legally blind. She demonstrates the need for Max Ax2 for supine to sit. Patient requires Max A for to scoot forward and achieve foot flat. She demonstrates fair sitting balance provided one UE support. Patient repeatedly attempts to use R UE for sit<>stand transition. She is ultimately provided L UE support and trunk support to stand with Max A. She demonstrates the ability to take short bilateral steps with assistance for weight shifting. Patient is left in bedside chair with all needs met.  R UE is supported on pillow. Current Level of Function Impacting Discharge (mobility/balance): Max A    Functional Outcome Measure: The patient scored 25/100 on the Barthel outcome measure. Other factors to consider for discharge: medical stability, does not have 24/7, inability to ambulate or transfer without Max A at this time     Patient will benefit from skilled therapy intervention to address the above noted impairments. PLAN :  Recommendations and Planned Interventions: bed mobility training, transfer training, gait training, therapeutic exercises, neuromuscular re-education, edema management/control, patient and family training/education, and therapeutic activities      Frequency/Duration: Patient will be followed by physical therapy:  daily to address goals. Recommendation for discharge: (in order for the patient to meet his/her long term goals)  Home with 24/7 v. Therapy 3 hours per day 5-7 days per week- family request HUSAM    This discharge recommendation:  Has not yet been discussed the attending provider and/or case management    IF patient discharges home will need the following DME: to be determined (TBD)         SUBJECTIVE:   Patient stated I would prefer to go home .     OBJECTIVE DATA SUMMARY:   HISTORY:    Past Medical History:   Diagnosis Date    Adverse effect of anesthesia     slow to wake    Alopecia     Arrhythmia     atrial fib,?svt    Arthritis     Atrial fibrillation (HCC)     Benign essential HTN     Benign neoplasm of vulva     CAD (coronary artery disease)     Cholelithiases     Chronic kidney disease     followed by specialist    Chronic pain     Colon cancer (HCC)     Constipation     Dyspareunia     Glaucoma     Heart failure ([address]     History of mixed connective tissue disease     Hx of viral pericarditis     Hyperactivity of bladder     Hypercholesterolemia     Ill-defined condition     macular degeneration    Ill-defined condition     recurrent UTI hx    Insomnia     Macular degeneration     Neck pain     Psoriasis     patient denies    Rectocele     Renal cyst     Seizure disorder ([facility]     none in 30 plus years---sees neurologist     Sjogren's disease ([facility] [address]     patient unsure    Thrombophlebitis     TIA (transient ischemic attack)     patient denies    Vaginal vault prolapse, posthysterectomy     Varicose veins      Past Surgical History:   Procedure Laterality Date    HX BREAST LUMPECTOMY      left breast    HX CATARACT REMOVAL      HX HEMORRHOIDECTOMY      HX HERNIA REPAIR  08/01/2018    Open bilateral inguinal hernia repairs with mesh. HX HYSTERECTOMY      HX PACEMAKER      left    HX SHOULDER ARTHROSCOPY      left    HX TOTAL COLECTOMY  1955    partial    [place] CARDIAC SURG PROCEDURE UNLIST      AV ablation       Personal factors and/or comorbidities impacting plan of care: please see above    Home Situation  Home Environment: Private residence  # Steps to Enter: 0  One/Two Story Residence: One story  Living Alone: No  Support Systems: Child(jaiden), Other Family Member(s)  Patient Expects to be Discharged to[de-identified] Skilled nursing facility  Current DME Used/Available at Home: Wheelchair, Commode, bedside, Walker, rolling, Shower chair, Grab bars  Tub or Shower Type: Shower    EXAMINATION/PRESENTATION/DECISION MAKING:   Critical Behavior:  Neurologic State: Alert  Orientation Level: Oriented to person, Oriented to time, Disoriented to situation, Disoriented to place  Cognition: Decreased attention/concentration, Follows commands     Hearing:   Auditory  Auditory Impairment: Hard of hearing, bilateral  Hearing Aids/Status: Sent home  Skin:    Edema:   Range Of Motion:  AROM: Generally decreased, functional           PROM: Generally decreased, functional           Strength:    Strength: Generally decreased, functional                    Tone & Sensation:   Tone: Normal                              Coordination:  Coordination: Generally decreased, functional  Vision:      Functional Mobility:  Bed Mobility:     Supine to Sit: Maximum assistance;Assist x2     Scooting: Maximum assistance  Transfers:  Sit to Stand: Maximum assistance  Stand to Sit: Maximum assistance  Stand Pivot Transfers: Maximum assistance                    Balance:   Sitting: Impaired; With support  Sitting - Static: Fair (occasional)  Sitting - Dynamic: Fair (occasional)  Standing: Impaired; With support  Standing - Static: Constant support; Fair  Standing - Dynamic : Constant support; Fair  Ambulation/Gait Training:                    Right Side Weight Bearing: As tolerated                                    Stairs: Therapeutic Exercises:       Functional Measure:  Barthel Index:    Bathin  Bladder: 5  Bowels: 5  Groomin  Dressin  Feedin  Mobility: 5  Stairs: 0  Toilet Use: 0  Transfer (Bed to Chair and Back): 5  Total: 25/100       The Barthel ADL Index: Guidelines  1. The index should be used as a record of what a patient does, not as a record of what a patient could do. 2. The main aim is to establish degree of independence from any help, physical or verbal, however minor and for whatever reason. 3. The need for supervision renders the patient not independent. 4. A patient's performance should be established using the best available evidence. Asking the patient, friends/relatives and nurses are the usual sources, but direct observation and common sense are also important. However direct testing is not needed. 5. Usually the patient's performance over the preceding 24-48 hours is important, but occasionally longer periods will be relevant. 6. Middle categories imply that the patient supplies over 50 per cent of the effort. 7. Use of aids to be independent is allowed. Score Interpretation (from [address] [place] [address])    Independent   60-79 Minimally independent   40-59 Partially dependent   20-39 Very dependent   <20 Totally dependent     -Michael Covarrubias., Barthel, D.W. (1965).  Functional evaluation: the Barthel Index. [facility] ([address] (1997). The Barthel activities of daily living index: self-reporting versus actual performance in the old (> or = 75 years). Journal of [address] [place] [address] 45(7), [facility], KASHIF, Mikael Green., Brigitte Corona. (1999). Measuring the change in disability after inpatient rehabilitation; comparison of the responsiveness of the Barthel Index and Functional [place] Measure. Journal of Neurology, Neurosurgery, and Psychiatry, 66(4), 575-515. NARCISA Gastelum, CARISSA Pascual, & Mandie Quiles M.A. (2004) Assessment of post-stroke quality of life in cost-effectiveness studies: The usefulness of the Barthel Index and the EuroQoL-5D. Quality of Life Research, 15, 102-16            Physical Therapy Evaluation Charge Determination   History Examination Presentation Decision-Making   HIGH Complexity :3+ comorbidities / personal factors will impact the outcome/ POC  LOW Complexity : 1-2 Standardized tests and measures addressing body structure, function, activity limitation and / or participation in recreation  MEDIUM Complexity : Evolving with changing characteristics  Other outcome measures Barthel  HIGH       Based on the above components, the patient evaluation is determined to be of the following complexity level: HIGH     Pain Rating:      Activity Tolerance:   Fair    After treatment patient left in no apparent distress:   Sitting in chair, Call bell within reach, Bed / chair alarm activated, and Caregiver / family present    COMMUNICATION/EDUCATION:   The patients plan of care was discussed with: Occupational therapist and Registered nurse. Fall prevention education was provided and the patient/caregiver indicated understanding., Patient/family have participated as able in goal setting and plan of care. , and Patient/family agree to work toward stated goals and plan of care.     Thank you for this referral.  Skip Roa, PT   Time Calculation: 35 mins

## 2022-09-07 NOTE — PROGRESS NOTES
[facility] at [address] [facility] [address] [facility]  W: [phone] F: [phone]      Reason for Visit:   Casi Mendez is a 80 y.o. female who is seen in consultation at the request of Dr. Jordyn Baptiste for evaluation of thrombocytopenia. Hematology / Oncology Treatment History:     Hematological/Oncological Diagnosis: Thrombocytopenia    Date of Diagnosis: 9/6/22      History of Present Illness:     80year old with right hip fracture, right distal clavicle fracture, found to have thrombocytopenia with PLT of 63. The patient was taking coumadin as outpatient for atrial fibrillation, on hold now. Last INR is 1.9. Cause of fall is mechanical fall in shower. Clinically, the patient denies any sources of active bleeding that she does have significant bruising on both forearms as well as her left leg. Labs reviewed, PLT trend as follows:       Interval History:     9/7/2022 Patient seen at bedside, she was getting up to chair with PT. Son was at bedside, discussed lab results and no further interventions planned by hematology. Review of Systems: A complete review of systems was obtained, negative except as described above.     Past Medical History:   Diagnosis Date    Adverse effect of anesthesia     slow to wake    Alopecia     Arrhythmia     atrial fib,?svt    Arthritis     Atrial fibrillation (HCC)     Benign essential HTN     Benign neoplasm of vulva     CAD (coronary artery disease)     Cholelithiases     Chronic kidney disease     followed by specialist    Chronic pain     Colon cancer (HCC)     Constipation     Dyspareunia     Glaucoma     Heart failure (HCC)     History of mixed connective tissue disease     Hx of viral pericarditis     Hyperactivity of bladder     Hypercholesterolemia     Ill-defined condition     macular degeneration    Ill-defined condition     recurrent UTI hx    Insomnia     Macular degeneration Neck pain     Psoriasis     patient denies    Rectocele     Renal cyst     Seizure disorder ([facility]     none in 30 plus years---sees neurologist     Sjogren's disease ([facility]     patient unsure    Thrombophlebitis     TIA (transient ischemic attack)     patient denies    Vaginal vault prolapse, posthysterectomy     Varicose veins       Past Surgical History:   Procedure Laterality Date    HX BREAST LUMPECTOMY      left breast    HX CATARACT REMOVAL      HX HEMORRHOIDECTOMY      HX HERNIA REPAIR  08/01/2018    Open bilateral inguinal hernia repairs with mesh.     HX HYSTERECTOMY      HX PACEMAKER      left    HX SHOULDER ARTHROSCOPY      left    HX TOTAL COLECTOMY  1955    partial    [place] CARDIAC SURG PROCEDURE UNLIST      AV ablation      Social History     Tobacco Use    Smoking status: Never    Smokeless tobacco: Never   Substance Use Topics    Alcohol use: No      Family History   Problem Relation Age of Onset    Cancer Father     Inflammatory Bowel Dz Mother     Heart Disease Brother     Diabetes Brother      Current Facility-Administered Medications   Medication Dose Route Frequency    epoetin kristin-epbx (RETACRIT) 12,000 Units combo injection  12,000 Units SubCUTAneous ONCE    balsam peru-castor oiL (VENELEX) ointment   Topical TID    0.9% sodium chloride infusion 250 mL  250 mL IntraVENous PRN    0.9% sodium chloride infusion 250 mL  250 mL IntraVENous PRN    sodium chloride (NS) flush 5-40 mL  5-40 mL IntraVENous Q8H    sodium chloride (NS) flush 5-40 mL  5-40 mL IntraVENous PRN    acetaminophen (TYLENOL) tablet 650 mg  650 mg Oral Q6H    naloxone (NARCAN) injection 0.4 mg  0.4 mg IntraVENous PRN    ondansetron (ZOFRAN ODT) tablet 4 mg  4 mg Oral Q4H PRN    ondansetron (ZOFRAN) injection 4 mg  4 mg IntraVENous Q4H PRN    calcium-vitamin D (OS-CESAR +D3) 500 mg-200 unit per tablet 1 Tablet  1 Tablet Oral TID WITH MEALS    polyethylene glycol (MIRALAX) packet 17 g  17 g Oral DAILY    [START ON 9/8/2022] bisacodyL (DULCOLAX) suppository 10 mg  10 mg Rectal DAILY PRN    traMADoL (ULTRAM) tablet 50 mg  50 mg Oral Q6H PRN    morphine injection 1 mg  1 mg IntraVENous Q4H PRN    alcohol 62% (NOZIN) nasal  1 Ampule  1 Ampule Topical Q12H    levETIRAcetam (KEPPRA) tablet 500 mg  500 mg Oral BID    calcium carbonate (OS-CESAR) tablet 500 mg [elemental]  500 mg Oral BID    cefTRIAXone (ROCEPHIN) 1 g in 0.9% sodium chloride (MBP/ADV) 50 mL MBP  1 g IntraVENous Q24H    WARFARIN INFORMATION NOTE (COUMADIN)   Other QPM    sodium chloride (NS) flush 5-40 mL  5-40 mL IntraVENous Q8H    acetaminophen (TYLENOL) tablet 650 mg  650 mg Oral Q6H PRN    Or    acetaminophen (TYLENOL) suppository 650 mg  650 mg Rectal Q6H PRN    polyethylene glycol (MIRALAX) packet 17 g  17 g Oral DAILY PRN    naloxone (NARCAN) injection 0.4 mg  0.4 mg IntraVENous PRN    senna-docusate (PERICOLACE) 8.6-50 mg per tablet 2 Tablet  2 Tablet Oral BID    albuterol-ipratropium (DUO-NEB) 2.5 MG-0.5 MG/3 ML  3 mL Nebulization Q6H PRN    furosemide (LASIX) injection 40 mg  40 mg IntraVENous BID    amitriptyline (ELAVIL) tablet 20 mg  20 mg Oral QHS      Allergies   Allergen Reactions    Codeine Nausea Only    Pcn [Penicillins] Unknown (comments)     Cant remember reaction as a child    Tetanus Toxoid, Adsorbed Other (comments)    Tetanus Vaccines And Toxoid Unknown (comments)     Does not take tetanus due to tb hx    Vibramycin [Doxycycline Calcium] Rash            Physical Exam:   Visit Vitals  BP (!) 121/50   Pulse 75   Temp 98.2 °F (36.8 °C)   Resp 18   Ht 5' (1.524 m)   Wt 110 lb (49.9 kg)   SpO2 95%   Breastfeeding No   BMI 21.48 kg/m²     ECOG PS: 3  General: alert, cooperative, no distress   Mental  status: normal mood, behavior, speech, dress, and thought processes, able to follow commands   HENT: NCAT   Neck: no visualized mass   Resp: no respiratory distress   Neuro: no gross deficits except that she has decreased visual acuity   Skin: Extensive bruising on upper and lower extremities   Psychiatric: normal affect, consistent with stated mood, no evidence of hallucinations           Results:     Lab Results   Component Value Date/Time    WBC 7.1 09/07/2022 01:35 AM    HGB 7.7 (L) 09/07/2022 01:35 AM    HCT 24.7 (L) 09/07/2022 01:35 AM    PLATELET 85 (L) 07/40/3343 01:35 AM    .7 (H) 09/07/2022 01:35 AM    ABS. NEUTROPHILS 4.4 09/04/2022 08:27 AM    Hematocrit (POC) 35 08/01/2018 07:27 AM     Lab Results   Component Value Date/Time    Sodium 135 (L) 09/07/2022 01:35 AM    Potassium 4.9 09/07/2022 01:35 AM    Chloride 101 09/07/2022 01:35 AM    CO2 32 09/07/2022 01:35 AM    Glucose 156 (H) 09/07/2022 01:35 AM    BUN 40 (H) 09/07/2022 01:35 AM    Creatinine 1.23 (H) 09/07/2022 01:35 AM    GFR est AA 49 (L) 09/07/2022 01:35 AM    GFR est non-AA 40 (L) 09/07/2022 01:35 AM    Calcium 8.2 (L) 09/07/2022 01:35 AM    Sodium (POC) 140 08/01/2018 07:27 AM    Potassium (POC) 4.6 08/01/2018 07:27 AM    Chloride (POC) 101 08/01/2018 07:27 AM    Glucose (POC) 102 (H) 08/01/2018 07:27 AM    Glucose (POC) 107 (H) 07/04/2017 07:02 AM    BUN (POC) 26 (H) 08/01/2018 07:27 AM    Creatinine (POC) 0.8 08/01/2018 07:27 AM    Calcium, ionized (POC) 1.16 08/01/2018 07:27 AM     Lab Results   Component Value Date/Time    Bilirubin, total 1.7 (H) 09/04/2022 08:27 AM    ALT (SGPT) 20 09/04/2022 08:27 AM    Alk. phosphatase 111 09/04/2022 08:27 AM    Protein, total 7.9 09/04/2022 08:27 AM    Albumin 3.4 (L) 09/04/2022 08:27 AM    Globulin 4.5 (H) 09/04/2022 08:27 AM         Assessment and Recommendations:     # Thrombocytopenia  - likely secondary to peripheral consumption in the setting of heavy bruising.    - Platelets are 83 today in post op setting   - Continue to follow platelet levels, for now would not recommend further transfusion unless levels drop significantly or patient develops bleeding/worsening ecchymosis.      # Elevated INR  - patient was on coumadin for atrial fibrillation  - INR stable today, resume anticoagulation at discretion of Ortho  - Recommend discussion regarding risks/benefits with advanced age, high risk of falls before resuming AC. May need cardiology input as patient is on [facility] for atrial fibrillation     Thank you for allowing us to participate in the care of Ms. Kanchan Smith. Will now sign off, please call with questions.       Signed By: Orestes Hansen NP      [facility]

## 2022-09-07 NOTE — PROGRESS NOTES
End of Shift Note    Bedside shift change report given to  RN (oncoming nurse) by Kathia Conteh RN (offgoing nurse). Report included the following information SBAR, Kardex, ED Summary, Intake/Output, MAR, Accordion, and Recent Results    Shift worked:  7p-7a     Shift summary and any significant changes:     No significant shift events. Pt denies pain. Vitals stable. Concerns for physician to address:  None      Zone phone for oncoming shift:   [phone]       Activity:  Activity Level: Bed Rest  Number times ambulated in hallways past shift: 0  Number of times OOB to chair past shift: 0    Cardiac:   Cardiac Monitoring: No      Cardiac Rhythm: Atrial Paced    Access:  Current line(s): PIV     Genitourinary:   Urinary status: rosario    Respiratory:   O2 Device: Nasal cannula  Chronic home O2 use?: YES  Incentive spirometer at bedside: YES  Actual Volume (ml): 500 ml    GI:     Current diet:  ADULT DIET Regular  DIET ONE TIME MESSAGE  Passing flatus: YES  Tolerating current diet: YES       Pain Management:   Patient states pain is manageable on current regimen: YES    Skin:  Magdaleno Score: 16  Interventions: float heels    Patient Safety:  Fall Score:  Total Score: 5  Interventions: gripper socks  High Fall Risk: Yes    Length of Stay:  Expected LOS: - - -  Actual LOS: 3      Kathia Conteh RN

## 2022-09-07 NOTE — PROGRESS NOTES
Pod1 hip  fracture, appropriate PO pain   Hgb 7.7  No pt yet    Patient Vitals for the past 24 hrs:   Temp Pulse Resp BP SpO2   09/07/22 1520 (P) 98.2 °F (36.8 °C) (P) 78 (P) 18 (!) (P) 103/35 (!) (P) 32 %   09/07/22 1144 98.2 °F (36.8 °C) 75 18 (!) 121/50 95 %   09/07/22 0733 98.3 °F (36.8 °C) 82 18 (!) 108/35 100 %   09/07/22 0310 97.9 °F (36.6 °C) 80 18 (!) 116/40 94 %     Dressing clean and dry    Musculoskeletal: Karthik's sign negative in bilateral lower extremities. Calves soft, supple, non-tender upon palpation or with passive stretch. P  ASSESSMENT  Based on the objective data described below, the patient presents with decreased independence with functional mobility post-op day 1 R IM nail. Patient also sustained R clavicular fx requiring use of sling and is to be NWB. Patient is legally blind. She demonstrates the need for Max Ax2 for supine to sit. Patient requires Max A for to scoot forward and achieve foot flat. She demonstrates fair sitting balance provided one UE support. Patient repeatedly attempts to use R UE for sit<>stand transition. She is ultimately provided L UE support and trunk support to stand with Max A. She demonstrates the ability to take short bilateral steps with assistance for weight shifting. Patient is left in bedside chair with all needs met. R UE is supported on pillow. Current Level of Function Impacting Discharge (mobility/balance): Max A     Functional Outcome Measure: The patient scored 25/100 on the Barthel outcome measure. Other factors to consider for discharge: medical stability, does not have 24/7, inability to ambulate or transfer without Max A at this time     Patient will benefit from skilled therapy intervention to address the above noted impairments.         PLAN :  Recommendations and Planned Interventions: bed mobility training, transfer training, gait training, therapeutic exercises, neuromuscular re-education, edema management/control, patient and family training/education, and therapeutic activities       Frequency/Duration: Patient will be followed by physical therapy:  daily to address goals. Recommendation for discharge: (in order for the patient to meet his/her long term goals)  Home with 24/7 v.  Therapy 3 hours per day 5-7 days per week- family request HUSAM     This discharge recommendation:  Has not yet been discussed the attending provider and/or case management     IF patient discharges home will need the following DME: to be determined (TBD)     Oob with pt  Wbat  Coumadin for dvt  Dc plan - snf

## 2022-09-07 NOTE — PROGRESS NOTES
Physical Therapy Note    Patient is evaluated and tx. Full documentation to follow. Recommend D/C home with 24/7 v. IPR (per family request). Full documentation to follow.

## 2022-09-07 NOTE — PROGRESS NOTES
Problem: Self Care Deficits Care Plan (Adult)  Goal: *Acute Goals and Plan of Care (Insert Text)  Description: FUNCTIONAL STATUS PRIOR TO ADMISSION: Patient was modified independent using a rolling walker for functional mobility. HOME SUPPORT: The patient lived with her daughter and received CGA-min A for ADLs. Occupational Therapy Goals  Initiated 9/7/2022  1. Patient will perform self-feeding with supervision/set-up within 7 day(s). 2.  Patient will perform grooming with supervision/set-up in sitting within 7 day(s). 3.  Patient will perform upper body dressing with moderate assistance  within 7 day(s). 4.  Patient will perform toilet transfers with moderate assistance  within 7 day(s). 5.  Patient will perform all aspects of toileting with moderate assistance  within 7 day(s). 6.  Patient will participate in upper extremity therapeutic exercise/activities with supervision/set-up for 5 minutes within 7 day(s). Outcome: Progressing Towards Goal   OCCUPATIONAL THERAPY EVALUATION  Patient: Marco Kaba (14 y.o. female)  Date: 9/7/2022  Primary Diagnosis: Hip fracture ([address] [S72.009A]  Procedure(s) (LRB):  RIGHT FEMORAL INTERTROCHANTERIC NAIL INSERTION (Right) 1 Day Post-Op   Precautions:  Fall, WBAT, NWB    ASSESSMENT  Based on the objective data described below, the patient presents with pain, generalized weakness, decreased activity tolerance, impaired balance and functional mobility, NWB RUE in sling and WBAT RLE following admission for R hip and R clavicular fractures, POD 1 R femoral intertrochanteric nailing. Pt also legally blind at baseline. She was received long sitting in bed, had recently returned there from chair. She performed grooming and feeding ADLs with setup-min A using L hand. Required max A for all repositioning in bed. Vitals monitored and stable on 4 L O2 (O2 sats 96-97%), pt uses 2 L O2 at home.  Pt declined additional activity at that time due to fatigue and remained in bed with needs met and family present. Pt is functioning well below her baseline at this time and will benefit from skilled therapy intervention to address the above noted impairments. Recommend SNF rehab at discharge. Current Level of Function Impacting Discharge (ADLs/self-care): min-total A ADLs    Functional Outcome Measure: The patient scored 25/100 on the Barthel Index outcome measure. Other factors to consider for discharge: fall risk, NWB RUE, below PLOF        PLAN :  Recommendations and Planned Interventions: self care training, functional mobility training, therapeutic exercise, balance training, therapeutic activities, endurance activities, patient education, home safety training, and family training/education    Frequency/Duration: Patient will be followed by occupational therapy 5 times a week to address goals. Recommendation for discharge: (in order for the patient to meet his/her long term goals)  Therapy up to 5 days/week in SNF setting    This discharge recommendation:  Has been made in collaboration with the attending provider and/or case management    IF patient discharges home will need the following DME: TBD       SUBJECTIVE:   Patient stated I'm pretty tired.     OBJECTIVE DATA SUMMARY:   HISTORY:   Past Medical History:   Diagnosis Date    Adverse effect of anesthesia     slow to wake    Alopecia     Arrhythmia     atrial fib,?svt    Arthritis     Atrial fibrillation (HCC)     Benign essential HTN     Benign neoplasm of vulva     CAD (coronary artery disease)     Cholelithiases     Chronic kidney disease     followed by specialist    Chronic pain     Colon cancer (HCC)     Constipation     Dyspareunia     Glaucoma     Heart failure ([facility]     History of mixed connective tissue disease     Hx of viral pericarditis     Hyperactivity of bladder     Hypercholesterolemia     Ill-defined condition     macular degeneration    Ill-defined condition     recurrent UTI hx    Insomnia Macular degeneration     Neck pain     Psoriasis     patient denies    Rectocele     Renal cyst     Seizure disorder ([facility] [address]     none in 30 plus years---sees neurologist     Sjogren's disease ([facility] [address] [facility]     patient unsure    Thrombophlebitis     TIA (transient ischemic attack)     patient denies    Vaginal vault prolapse, posthysterectomy     Varicose veins      Past Surgical History:   Procedure Laterality Date    HX BREAST LUMPECTOMY      left breast    HX CATARACT REMOVAL      HX HEMORRHOIDECTOMY      HX HERNIA REPAIR  08/01/2018    Open bilateral inguinal hernia repairs with mesh. HX HYSTERECTOMY      HX PACEMAKER      left    HX SHOULDER ARTHROSCOPY      left    HX TOTAL COLECTOMY  1955    partial    IN CARDIAC SURG PROCEDURE UNLIST      AV ablation       Expanded or extensive additional review of patient history:     Home Situation  Home Environment: Private residence  # Steps to Enter: 0  One/Two Story Residence: One story  Living Alone: No  Support Systems: Child(jaiden)  Patient Expects to be Discharged to[de-identified] Skilled nursing facility  Current DME Used/Available at Home: Wheelchair, Commode, bedside, Walker, rolling, Shower chair, Grab bars  Tub or Shower Type: Shower    Hand dominance: Right    EXAMINATION OF PERFORMANCE DEFICITS:  Cognitive/Behavioral Status:  Neurologic State: Alert  Orientation Level: Oriented to person;Oriented to time;Disoriented to situation;Disoriented to place  Cognition: Decreased attention/concentration; Follows commands         Hearing: Auditory  Auditory Impairment: Hard of hearing, bilateral  Hearing Aids/Status: Sent home    Vision/Perceptual:              Acuity: Impaired near vision; Impaired far vision         Range of Motion:  AROM: Generally decreased, functional  PROM: Generally decreased, functional       Strength:  Strength: Generally decreased, functional       Coordination:  Coordination: Generally decreased, functional  Fine Motor Skills-Upper: Left Intact; Right Impaired Gross Motor Skills-Upper: Left Intact; Right Impaired    Tone & Sensation:  Tone: Normal          Balance:  Sitting: Impaired; With support  Sitting - Static: Fair (occasional)  Sitting - Dynamic: Fair (occasional)  Standing: Impaired; With support  Standing - Static: Constant support; Fair  Standing - Dynamic : Constant support; Fair    Functional Mobility and Transfers for ADLs:  Bed Mobility:  Scooting: Max A    ADL Assessment:  Feeding: Minimum assistance; Moderate assistance    Oral Facial Hygiene/Grooming: Minimum assistance; Moderate assistance    Bathing: Maximum assistance      Upper Body Dressing: Total assistance    Lower Body Dressing: Total assistance    Toileting: Total assistance       ADL Intervention and task modifications:  Feeding  Drink to Mouth: Set-up; Minimum assistance    Grooming  Washing Face: Set-up  Washing Hands: Minimum assistance       Functional Measure:    Barthel Index:  Bathin  Bladder: 5  Bowels: 5  Groomin  Dressin  Feedin  Mobility: 5  Stairs: 0  Toilet Use: 0  Transfer (Bed to Chair and Back): 5  Total: 25/100      The Barthel ADL Index: Guidelines  1. The index should be used as a record of what a patient does, not as a record of what a patient could do. 2. The main aim is to establish degree of independence from any help, physical or verbal, however minor and for whatever reason. 3. The need for supervision renders the patient not independent. 4. A patient's performance should be established using the best available evidence. Asking the patient, friends/relatives and nurses are the usual sources, but direct observation and common sense are also important. However direct testing is not needed. 5. Usually the patient's performance over the preceding 24-48 hours is important, but occasionally longer periods will be relevant. 6. Middle categories imply that the patient supplies over 50 per cent of the effort. 7. Use of aids to be independent is allowed.     Score Interpretation (from [facility])    Independent   60-79 Minimally independent   40-59 Partially dependent   20-39 Very dependent   <20 Totally dependent     -Michael Covarrubias., Barthel, D.W. (1965). Functional evaluation: the Barthel Index. [address] ([address] (1997). The Barthel activities of daily living index: self-reporting versus actual performance in the old (> or = 75 years). Journal [address] 45(7), [facility], J.J.HUONGF, Vaughn Gonzalez., Silvia Urbina (1999). Measuring the change in disability after inpatient rehabilitation; comparison of the responsiveness of the Barthel Index and Functional [place] Measure. Journal of Neurology, Neurosurgery, and Psychiatry, 66(4), 724-515. NARCISA Alatorre, CARISSA Pascual, & Carmelo Echevarria, MCarsonA. (2004) Assessment of post-stroke quality of life in cost-effectiveness studies: The usefulness of the Barthel Index and the EuroQoL-5D. Quality of Life Research, 15, 307-40        Occupational Therapy Evaluation Charge Determination   History Examination Decision-Making   LOW Complexity : Brief history review  MEDIUM Complexity : 3-5 performance deficits relating to physical, cognitive , or psychosocial skils that result in activity limitations and / or participation restrictions MEDIUM Complexity : Patient may present with comorbidities that affect occupational performnce.  Miniml to moderate modification of tasks or assistance (eg, physical or verbal ) with assesment(s) is necessary to enable patient to complete evaluation       Based on the above components, the patient evaluation is determined to be of the following complexity level: LOW   Pain Rating:  Pt reported no pain at rest    Activity Tolerance:   Fair    After treatment patient left in no apparent distress:    Supine in bed, Call bell within reach, Bed / chair alarm activated, Caregiver / family present, and Side rails x 3    COMMUNICATION/EDUCATION:   The patients plan of care was discussed with: Physical therapist and Registered nurse. Home safety education was provided and the patient/caregiver indicated understanding., Patient/family have participated as able in goal setting and plan of care. , and Patient/family agree to work toward stated goals and plan of care. This patients plan of care is appropriate for delegation to [facility].     Thank you for this referral.  Marques Pace, OT  Time Calculation: 23 mins

## 2022-09-07 NOTE — PROGRESS NOTES
Hospitalist Progress Note    NAME: Umm Fraire   :  1924   MRN:  [id number]       Assessment / Plan:  Acute right hip fracture:  XR Acute right hip intratrochanteric fracture  Status post intramedullary nail fixation of the right hip on   Warfarin was on hold, resume  INR 1.3, goal 2-3    Mechanical fall:  Acute right distal clavicle fracture  Ortho on board     CT head right lateral scalp hematoma,  but otherwise unremarkable no intracranial bleed or hemorrhage    UTI, POA  UA >100 wbc  Cont. rocephin to complete total of 3 days    Hx of Afib  Not taking rate control medication at home  On warfarin, to be resumed today  Will have discussion with patient and her family about risks, benefits and alternative warfarin     Acute Exacerbation of HFrEF, systolic heart failure  Continue with Lasix 40 daily  On oxygen(at home she is 4 L nasal cannula)  Bnp elevated 7,800  Echo with EF 20 to 25%    CKD stage III    Mild old healing wound to the spine, POA  - Wound care input is appreciated    Macular degeneration, chronic  - Patient does not see very well because of that    18.5 - 24.9 Normal weight / Body mass index is 21.48 kg/m². Estimated discharge date:   Barriers PT OT recommending home with supervision 24 hours versus inpatient rehab    Code status: DNR  Prophylaxis: SCD's  Recommended Disposition: Home w/Family     Subjective:   Patient was seen and examined. No acute events overnight. Discussed with RN overnight events. All patient's questions were answered. Patient's son and grandson at bedside, all questions were answered.     Review of Systems:  Symptom Y/N Comments  Symptom Y/N Comments   Fever/Chills n   Chest Pain n    Poor Appetite    Edema     Cough n   Abdominal Pain n    Sputum    Joint Pain     SOB/SANTOS n   Pruritis/Rash     Nausea/vomit n   Tolerating PT/OT     Diarrhea    Tolerating Diet     Constipation    Other       Could NOT obtain due to: Objective:     VITALS:   Last 24hrs VS reviewed since prior progress note. Most recent are:  Patient Vitals for the past 24 hrs:   Temp Pulse Resp BP SpO2   09/07/22 1144 98.2 °F (36.8 °C) 75 18 (!) 121/50 95 %   09/07/22 0733 98.3 °F (36.8 °C) 82 18 (!) 108/35 100 %   09/07/22 0310 97.9 °F (36.6 °C) 80 18 (!) 116/40 94 %   09/06/22 1830 97.8 °F (36.6 °C) 80 18 (!) 125/56 99 %   09/06/22 1726 97.8 °F (36.6 °C) 80 18 (!) 125/53 96 %   09/06/22 1628 97.8 °F (36.6 °C) 80 20 (!) 128/52 93 %   09/06/22 1600 98.1 °F (36.7 °C) 80 20 (!) 119/39 97 %   09/06/22 1545 -- 80 17 (!) 124/42 98 %   09/06/22 1530 -- 80 17 (!) 114/41 97 %   09/06/22 1515 -- 80 15 (!) 135/35 97 %   09/06/22 1500 -- 84 18 (!) 113/36 95 %         Intake/Output Summary (Last 24 hours) at 9/7/2022 1453  Last data filed at 9/7/2022 1216  Gross per 24 hour   Intake --   Output 1570 ml   Net -1570 ml          I had a face to face encounter and independently examined this patient on 9/7/2022, as outlined below:  PHYSICAL EXAM:  General: Alert, cooperative, no acute distress    Resp:  Decreased bs BL, No accessory muscle use  CV:  Regular  rhythm,  No edema  GI:  Soft, Non distended, Non tender. +Bowel sounds  Neurologic:  EOMs intact. No facial asymmetry. No aphasia or slurred speech. Symmetrical strength, Sensation grossly intact.  Alert and oriented X 4.     Psych:   Not anxious nor agitated  Skin           Reviewed most current lab test results and cultures  YES  Reviewed most current radiology test results   YES  Review and summation of old records today    NO  Reviewed patient's current orders and MAR    YES  PMH/SH reviewed - no change compared to H&P  ________________________________________________________________________  Care Plan discussed with:    Comments   Patient x    Family  x    RN x    Care Manager     Consultant                       x Multidiciplinary team rounds were held today with , nursing, pharmacist and clinical coordinator. Patient's plan of care was discussed; medications were reviewed and discharge planning was addressed. ________________________________________________________________________  Total NON critical care TIME:  40   Minutes    Total CRITICAL CARE TIME Spent:   Minutes non procedure based      Comments   >50% of visit spent in counseling and coordination of care x    ________________________________________________________________________  Jairon Lee MD     Procedures: see electronic medical records for all procedures/Xrays and details which were not copied into this note but were reviewed prior to creation of Plan. LABS:  I reviewed today's most current labs and imaging studies. Pertinent labs include:  Recent Labs     09/07/22  0135 09/06/22  0555 09/05/22  2210   WBC 7.1 8.4 8.0   HGB 7.7* 9.1* 9.0*   HCT 24.7* 28.9* 27.6*   PLT 85* 63* 57*       Recent Labs     09/07/22  0135 09/06/22  0555 09/05/22  2210 09/05/22  1707 09/05/22  0355   *  --  135*  --  142   K 4.9  --  4.2  --  3.5     --  100  --  107   CO2 32  --  34*  --  30   *  --  144*  --  91   BUN 40*  --  37*  --  29*   CREA 1.23*  --  0.99  --  0.88   CA 8.2*  --  8.2*  --  7.1*   INR 1.3* 1.9* 2.6*   < > 5.1*    < > = values in this interval not displayed.          Signed: Jairon Lee MD

## 2022-09-07 NOTE — PROGRESS NOTES
Nephrology Progress Note  [facility] / [address], [address], [address]  Phone - [phone]  Fax - [phone]                 Patient: Umm Fraire                   YOB: 1924        Date- 9/7/2022                      Admit Date: 9/4/2022  CC: Follow up for Edema          IMPRESSION & PLAN:   LESLIE   Right intertrochanteric hip fracture S/P Intramedullary nail fixation 09/06/2022  Proteinuria  Hyponatremia(suspect secondary to poor p.o. intake)  Edema  Anemia - significant drop  Systolic Heart Failure - chronic -LVEF 30%  Valvular heart disease  Hypokalemia  Hypocalcemia    PLAN-  Continue to diurese. Creatinine mildly elevated. Continue with IV fluids for 1 day  Platelets being monitored by heme-onc  Monitor sodium suspect hyponatremic from poor p.o. intake. Already on IV fluids. Daily labs. Started on Epogen and ordered for one-time dose of IV iron  Monitor need for packed RBC transfusion if hemoglobin continues to drop     Subjective: Interval History:   -Patient seen and examined   -Status post right hip repair  -Noted low hemoglobin    Objective:   Vitals:    09/06/22 1726 09/06/22 1830 09/07/22 0310 09/07/22 0733   BP: (!) 125/53 (!) 125/56 (!) 116/40 (!) 108/35   Pulse: 80 80 80 82   Resp: 18 18 18 18   Temp: 97.8 °F (36.6 °C) 97.8 °F (36.6 °C) 97.9 °F (36.6 °C) 98.3 °F (36.8 °C)   SpO2: 96% 99% 94% 100%   Weight:       Height:          09/06 0701 - 09/07 0700  In: 400 [I.V.:400]  Out: 2300 [Urine:2300]  Last 3 Recorded Weights in this Encounter    09/04/22 0744   Weight: 49.9 kg (110 lb)      Physical exam:    GEN: Awake but confused  NECK- Supple, no mass  RESP: No wheezing, Clear b/l  CVS: S1,S2  RRR  NEURO: No deficit  EXT: 1-2+ edema      Chart reviewed. Pertinent Notes reviewed.      Data Review :  Recent Labs     09/07/22  0135 09/05/22  2210 09/05/22  0355   * 135* 142 K 4.9 4.2 3.5    100 107   CO2 32 34* 30   BUN 40* 37* 29*   CREA 1.23* 0.99 0.88   * 144* 91   CA 8.2* 8.2* 7.1*       Recent Labs     09/07/22  0135 09/06/22  0555 09/05/22  2210   WBC 7.1 8.4 8.0   HGB 7.7* 9.1* 9.0*   HCT 24.7* 28.9* 27.6*   PLT 85* 63* 57*       No results for input(s): FE, TIBC, PSAT, FERR in the last 72 hours.    Medication list  reviewed  Current Facility-Administered Medications   Medication Dose Route Frequency    epoetin kristin-epbx (RETACRIT) 12,000 Units combo injection  12,000 Units SubCUTAneous ONCE    0.9% sodium chloride infusion 250 mL  250 mL IntraVENous PRN    0.9% sodium chloride infusion 250 mL  250 mL IntraVENous PRN    0.9% sodium chloride infusion  75 mL/hr IntraVENous CONTINUOUS    sodium chloride (NS) flush 5-40 mL  5-40 mL IntraVENous Q8H    sodium chloride (NS) flush 5-40 mL  5-40 mL IntraVENous PRN    acetaminophen (TYLENOL) tablet 650 mg  650 mg Oral Q6H    naloxone (NARCAN) injection 0.4 mg  0.4 mg IntraVENous PRN    ondansetron (ZOFRAN ODT) tablet 4 mg  4 mg Oral Q4H PRN    ondansetron (ZOFRAN) injection 4 mg  4 mg IntraVENous Q4H PRN    calcium-vitamin D (OS-CESAR +D3) 500 mg-200 unit per tablet 1 Tablet  1 Tablet Oral TID WITH MEALS    polyethylene glycol (MIRALAX) packet 17 g  17 g Oral DAILY    [START ON 9/8/2022] bisacodyL (DULCOLAX) suppository 10 mg  10 mg Rectal DAILY PRN    traMADoL (ULTRAM) tablet 50 mg  50 mg Oral Q6H PRN    morphine injection 1 mg  1 mg IntraVENous Q4H PRN    alcohol 62% (NOZIN) nasal  1 Ampule  1 Ampule Topical Q12H    levETIRAcetam (KEPPRA) tablet 500 mg  500 mg Oral BID    calcium carbonate (OS-CESAR) tablet 500 mg [elemental]  500 mg Oral BID    cefTRIAXone (ROCEPHIN) 1 g in 0.9% sodium chloride (MBP/ADV) 50 mL MBP  1 g IntraVENous Q24H    WARFARIN INFORMATION NOTE (COUMADIN)   Other QPM    sodium chloride (NS) flush 5-40 mL  5-40 mL IntraVENous Q8H    sodium chloride (NS) flush 5-40 mL  5-40 mL IntraVENous PRN acetaminophen (TYLENOL) tablet 650 mg  650 mg Oral Q6H PRN    Or    acetaminophen (TYLENOL) suppository 650 mg  650 mg Rectal Q6H PRN    polyethylene glycol (MIRALAX) packet 17 g  17 g Oral DAILY PRN    sodium chloride (NS) flush 5-40 mL  5-40 mL IntraVENous PRN    naloxone (NARCAN) injection 0.4 mg  0.4 mg IntraVENous PRN    senna-docusate (PERICOLACE) 8.6-50 mg per tablet 2 Tablet  2 Tablet Oral BID    albuterol-ipratropium (DUO-NEB) 2.5 MG-0.5 MG/3 ML  3 mL Nebulization Q6H PRN    balsam peru-castor oiL (VENELEX) ointment   Topical BID    furosemide (LASIX) injection 40 mg  40 mg IntraVENous BID    amitriptyline (ELAVIL) tablet 20 mg  20 mg Oral QHS          Pérez Pérez MD  9/7/2022

## 2022-09-07 NOTE — PROGRESS NOTES
Transition of Care Plan:  RUR: 16%   Disposition: SNF- pending choices    Follow up appointments: ortho  DME needed: n/a to go to snf  Transportation at Discharge: Macrina Emmanuel or means to access home: n/a       IM Medicare Letter: to be provided  Caregiver Contact: daughter jason singleton [phone]  Discharge Caregiver contacted prior to discharge? To be contacted  Care Conference needed?: no                  Reason for Admission:   right hip fracture                   RUR Score:  16%               PCP: First and Last name:   Jose Juan Noble MD   Name of Practice: [facility]    Are you a current patient: Yes/No: yes   Approximate date of last visit: 11/19/21   Can you participate in a virtual visit if needed: no    Do you (patient/family) have any concerns for transition/discharge? Not at this time                   Plan for utilizing home health: per recommendation       Current Advanced Directive/Advance Care Plan:  confirmed has ACP at home       Healthcare Decision Maker:   Click here to complete [address] including selection of the Healthcare Decision Maker Relationship (ie \"Primary\")            Primary Decision Maker: Jonathan De La Paz - [phone]    Secondary Decision Maker: Lizz Silva - Son - [phone]    Transition of Care Plan:          CM made room visit with patient and son at bedside. Pt's son confirmed demographics, insurance, and emergency contact on file. Pt, daughter, and son in law live in a 2 story home, pt only uses main level, with ramp to enter. Pt has a RW that she uses during all ambulation and a wheelchair that is used for outside of the home. Pt also has home O2 (2L), bedside commode, and shower chair. At baseline pt is able to dress self but needs assistance with bathing. Pt has no hx of rehab but has used HH in the past. SNF list provided to pt's son and requested 3 choices by tomorrow. Son to review with pt's daughter tonight.      Care Management Interventions  PCP Verified by CM:  Yes  Mode of Transport at Discharge: BLS  Transition of Care Consult (CM Consult): Discharge Planning  Discharge Durable Medical Equipment: No  Physical Therapy Consult: Yes  Occupational Therapy Consult: Yes  Speech Therapy Consult: No  Support Systems: Child(jaiden)  Confirm Follow Up Transport: Family  The Plan for Transition of Care is Related to the Following Treatment Goals : snf  The Patient and/or Patient Representative was Provided with a Choice of Provider and Agrees with the Discharge Plan?: Yes  Freedom of Choice List was Provided with Basic Dialogue that Supports the Patient's Individualized Plan of Care/Goals, Treatment Preferences and Shares the Quality Data Associated with the Providers?: Yes  Discharge Location  Patient Expects to be Discharged to[de-identified] [address], [address] [facility]

## 2022-09-08 NOTE — PROGRESS NOTES
Problem: Mobility Impaired (Adult and Pediatric)  Goal: *Acute Goals and Plan of Care (Insert Text)  Description: FUNCTIONAL STATUS PRIOR TO ADMISSION: Patient was modified independent using a rolling walker for functional mobility. HOME SUPPORT PRIOR TO ADMISSION: The patient lived with daughter and required minimal assistance/contact guard assist for ADLs. Physical Therapy Goals  Initiated 9/7/2022  1. Patient will move from supine to sit and sit to supine  in bed with moderate assistance within 5 day(s). 2.  Patient will transfer from bed to chair and chair to bed with moderate assistance using the least restrictive device within 5 day(s). 3.  Patient will perform sit to stand with moderate assistance  within 5 day(s). 4.  Patient will ambulate with moderate assistance  for 10 feet with the least restrictive device within 5 day(s). Outcome: Not Progressing Towards Goal   PHYSICAL THERAPY TREATMENT  Patient: Georgia Lora (22 y.o. female)  Date: 9/8/2022  Diagnosis: Hip fracture ([facility] [address] [S72.009A] <principal problem not specified>  Procedure(s) (LRB):  RIGHT FEMORAL INTERTROCHANTERIC NAIL INSERTION (Right) 2 Days Post-Op  Precautions: Fall, WBAT, NWB (R UE in sling), WBAT B LEs, Blind  Chart, physical therapy assessment, plan of care and goals were reviewed. ASSESSMENT  Patient continues with skilled PT services and is not progressing towards goals. Pt received for PT session supine in bed, son at bedside, agreeable to PT session with encouragement. She demonstrated bed mobility with total A x2, difficulty maintaining NWB status on R UE despite sling. Sitting balance poor edge of bed with hard lean posterior and to R. Cues for pt to lean to L and improved orientation to midline with intermittent Min A. Pt tried sit <> stand with total A x2, difficulty participating in transfer. Total A x2 for stand-pivot bed <> BSC for voiding attempt.  Pt sat on BSC >10min for attempt at voiding without success, required total A x2 for stand-pivot back to bed. Total Ax2 to return to supine in bed while maintaining NWB status R UE. Pt continues to be far below her mobility baseline and will require further PT services upon DC. Recommend DC to SNF rehab at this time due to continued medical and mobility needs. Will continue to follow . Current Level of Function Impacting Discharge (mobility/balance): Total A x2    Other factors to consider for discharge: will have son/daughter support upon DC. Pt is blind and very kyphotic. PLAN :  Patient continues to benefit from skilled intervention to address the above impairments. Continue treatment per established plan of care. to address goals. Recommendation for discharge: (in order for the patient to meet his/her long term goals)  Therapy up to 5 days/week in SNF setting    This discharge recommendation:  Has been made in collaboration with the attending provider and/or case management    IF patient discharges home will need the following DME: rolling walker       SUBJECTIVE:   Patient stated Lovenia Brandon are we going? (Seated EOB).     OBJECTIVE DATA SUMMARY:   Critical Behavior:  Neurologic State: Alert  Orientation Level: Oriented X4  Cognition: Follows commands  Safety/Judgement: Not assessed  Functional Mobility Training:  Bed Mobility:  Rolling: Maximum assistance; Total assistance;Assist x2  Supine to Sit: Total assistance;Maximum assistance;Assist x2  Sit to Supine: Total assistance;Assist x2           Transfers:  Sit to Stand: Total assistance;Assist x2  Stand to Sit: Total assistance;Assist x2                             Balance:  Sitting: Impaired; With support  Sitting - Static: Poor (constant support)  Sitting - Dynamic: Poor (constant support)  Standing: Impaired; With support  Standing - Static: Poor  Standing - Dynamic : Poor  Ambulation/Gait Training:  Distance (ft): 2 Feet (ft)  Assistive Device: Gait belt  Ambulation - Level of Assistance: Assist x2;Total assistance        Gait Abnormalities: Decreased step clearance;Shuffling gait        Base of Support: Widened     Speed/Samantha: Slow;Shuffled  Step Length: Right shortened;Left shortened             Pain Rating:  Unable to quantify. Pain behaviors noted with mobility    Activity Tolerance:   Poor and requires frequent rest breaks    After treatment patient left in no apparent distress:   Supine in bed, Call bell within reach, Caregiver / family present, and Side rails x 3    COMMUNICATION/COLLABORATION:   The patients plan of care was discussed with: Occupational therapist, Registered nurse, and Case management.      Edmar Sherwood, PT, DPT, NCS   Time Calculation: 33 mins

## 2022-09-08 NOTE — PROGRESS NOTES
Nephrology Progress Note  [facility] / [address] [facility] [address]  [place], [address]  Phone - [phone]  Fax - [phone]                 Patient: Juwan Charles                   YOB: 1924        Date- 9/8/2022                      Admit Date: 9/4/2022  CC: Follow up for Edema          IMPRESSION & PLAN:   LESLIE   Right intertrochanteric hip fracture S/P Intramedullary nail fixation 09/06/2022  Proteinuria  Hyponatremia(suspect secondary to poor p.o. intake)  Edema  Anemia - significant drop  Systolic Heart Failure - chronic -LVEF 30%  Valvular heart disease  Hypokalemia  Hypocalcemia    PLAN-  DC Lasix  Continue on IV fluids  Creatinine mildly elevated. Ordered for one-time dose of Lokelma  Platelets being monitored by heme-onc  Monitor sodium suspect hyponatremic from poor p.o. intake. Already on IV fluids. Daily labs. Continue Neupogen  Monitor need for packed RBC transfusion if hemoglobin continues to drop. Spoke with daughter     Subjective: Interval History:   -Patient seen and examined   -Status post right hip repair  -Noted elevated creatinine and potassium    Objective:   Vitals:    09/08/22 0036 09/08/22 0442 09/08/22 0901 09/08/22 1206   BP: (!) 114/39 (!) 124/59 (!) 120/36 (!) 103/55   Pulse: 81 80 81 80   Resp: 18 18 18 18   Temp: 98.1 °F (36.7 °C) 97.9 °F (36.6 °C) 98.1 °F (36.7 °C) 98.1 °F (36.7 °C)   SpO2: 94% 95% 96% 95%   Weight:       Height:          09/07 0701 - 09/08 0700  In: -   Out: 26 [Urine:520]  Last 3 Recorded Weights in this Encounter    09/04/22 0744   Weight: 49.9 kg (110 lb)      Physical exam:    GEN: Awake but confused  NECK- Supple, no mass  RESP: No wheezing, Clear b/l  CVS: S1,S2  RRR  NEURO: No deficit  EXT: 1-2+ edema      Chart reviewed. Pertinent Notes reviewed.      Data Review :  Recent Labs     09/08/22  0500 09/07/22  0135 09/05/22  2210   * 135* 135* K 5.5* 4.9 4.2   CL 96* 101 100   CO2 33* 32 34*   BUN 53* 40* 37*   CREA 1.34* 1.23* 0.99   * 156* 144*   CA 8.5 8.2* 8.2*       Recent Labs     09/08/22  1431 09/08/22  0500 09/07/22  0135 09/06/22  0555   WBC  --  8.0 7.1 8.4   HGB 7.5* 7.0* 7.7* 9.1*   HCT 23.8* 22.3* 24.7* 28.9*   PLT  --  114* 85* 63*       No results for input(s): FE, TIBC, PSAT, FERR in the last 72 hours.    Medication list  reviewed  Current Facility-Administered Medications   Medication Dose Route Frequency    0.9% sodium chloride infusion  100 mL/hr IntraVENous CONTINUOUS    sodium zirconium cyclosilicate (LOKELMA) powder packet 10 g  10 g Oral BID    balsam peru-castor oiL (VENELEX) ointment   Topical TID    alum-mag hydroxide-simeth (MYLANTA) oral suspension 30 mL  30 mL Oral Q4H PRN    0.9% sodium chloride infusion 250 mL  250 mL IntraVENous PRN    0.9% sodium chloride infusion 250 mL  250 mL IntraVENous PRN    sodium chloride (NS) flush 5-40 mL  5-40 mL IntraVENous Q8H    sodium chloride (NS) flush 5-40 mL  5-40 mL IntraVENous PRN    acetaminophen (TYLENOL) tablet 650 mg  650 mg Oral Q6H    naloxone (NARCAN) injection 0.4 mg  0.4 mg IntraVENous PRN    ondansetron (ZOFRAN ODT) tablet 4 mg  4 mg Oral Q4H PRN    calcium-vitamin D (OS-CESAR +D3) 500 mg-200 unit per tablet 1 Tablet  1 Tablet Oral TID WITH MEALS    polyethylene glycol (MIRALAX) packet 17 g  17 g Oral DAILY    bisacodyL (DULCOLAX) suppository 10 mg  10 mg Rectal DAILY PRN    traMADoL (ULTRAM) tablet 50 mg  50 mg Oral Q6H PRN    alcohol 62% (NOZIN) nasal  1 Ampule  1 Ampule Topical Q12H    levETIRAcetam (KEPPRA) tablet 500 mg  500 mg Oral BID    calcium carbonate (OS-CESAR) tablet 500 mg [elemental]  500 mg Oral BID    cefTRIAXone (ROCEPHIN) 1 g in 0.9% sodium chloride (MBP/ADV) 50 mL MBP  1 g IntraVENous Q24H    WARFARIN INFORMATION NOTE (COUMADIN)   Other QPM    sodium chloride (NS) flush 5-40 mL  5-40 mL IntraVENous Q8H    acetaminophen (TYLENOL) tablet 650 mg 650 mg Oral Q6H PRN    Or    acetaminophen (TYLENOL) suppository 650 mg  650 mg Rectal Q6H PRN    polyethylene glycol (MIRALAX) packet 17 g  17 g Oral DAILY PRN    naloxone (NARCAN) injection 0.4 mg  0.4 mg IntraVENous PRN    senna-docusate (PERICOLACE) 8.6-50 mg per tablet 2 Tablet  2 Tablet Oral BID    albuterol-ipratropium (DUO-NEB) 2.5 MG-0.5 MG/3 ML  3 mL Nebulization Q6H PRN    amitriptyline (ELAVIL) tablet 20 mg  20 mg Oral QHS          Dianna Triplett MD  9/8/2022

## 2022-09-08 NOTE — PROGRESS NOTES
Problem: Pressure Injury - Risk of  Goal: *Prevention of pressure injury  Description: Document Magdaleno Scale and appropriate interventions in the flowsheet. Outcome: Progressing Towards Goal  Note: Pressure Injury Interventions:  Sensory Interventions: Assess changes in LOC    Moisture Interventions: Absorbent underpads, Internal/External urinary devices    Activity Interventions: Increase time out of bed    Mobility Interventions: Float heels, HOB 30 degrees or less, Pressure redistribution bed/mattress (bed type), PT/OT evaluation    Nutrition Interventions: Document food/fluid/supplement intake    Friction and Shear Interventions: HOB 30 degrees or less, Minimize layers                Problem: Patient Education: Go to Patient Education Activity  Goal: Patient/Family Education  Outcome: Progressing Towards Goal     Problem: Falls - Risk of  Goal: *Absence of Falls  Description: Document Corey Fall Risk and appropriate interventions in the flowsheet.   Outcome: Progressing Towards Goal  Note: Fall Risk Interventions:  Mobility Interventions: Bed/chair exit alarm    Mentation Interventions: Adequate sleep, hydration, pain control    Medication Interventions: Bed/chair exit alarm    Elimination Interventions: Call light in reach    History of Falls Interventions: Bed/chair exit alarm         Problem: Patient Education: Go to Patient Education Activity  Goal: Patient/Family Education  Outcome: Progressing Towards Goal     Problem: Patient Education: Go to Patient Education Activity  Goal: Patient/Family Education  Outcome: Progressing Towards Goal     Problem: Hip Fracture: Post-Op Day 1  Goal: Off Pathway (Use only if patient is Off Pathway)  Outcome: Progressing Towards Goal  Goal: Activity/Safety  Outcome: Progressing Towards Goal  Goal: Diagnostic Test/Procedures  Outcome: Progressing Towards Goal  Goal: Nutrition/Diet  Outcome: Progressing Towards Goal  Goal: Medications  Outcome: Progressing Towards Goal  Goal: Respiratory  Outcome: Progressing Towards Goal  Goal: Treatments/Interventions/Procedures  Outcome: Progressing Towards Goal  Goal: Psychosocial  Outcome: Progressing Towards Goal  Goal: Discharge Planning  Outcome: Progressing Towards Goal  Goal: *Demonstrates progressive activity  Outcome: Progressing Towards Goal  Goal: *Optimal pain control at patient's stated goal  Outcome: Progressing Towards Goal  Goal: *Hemodynamically stable  Outcome: Progressing Towards Goal  Goal: *Discharge plan identified  Outcome: Progressing Towards Goal  Goal: *Absence of skin breakdown  Outcome: Progressing Towards Goal     Problem: Hip Fracture:Day of Admission Pre-op  Goal: Off Pathway (Use only if patient is Off Pathway)  Outcome: Resolved/Met  Goal: Activity/Safety  Outcome: Resolved/Met  Goal: Consults, if ordered  Outcome: Resolved/Met  Goal: Diagnostic Test/Procedures  Outcome: Resolved/Met  Goal: Nutrition/Diet  Outcome: Resolved/Met  Goal: Medications  Outcome: Resolved/Met  Goal: Respiratory  Outcome: Resolved/Met  Goal: Treatments/Interventions/Procedures  Outcome: Resolved/Met  Goal: Psychosocial  Outcome: Resolved/Met  Goal: *Labs/Tests Within Defined Limits in Preparation for Surgery  Outcome: Resolved/Met  Goal: *Optimal pain control at patient's stated goal  Outcome: Resolved/Met  Goal: *Hemodynamically stable  Outcome: Resolved/Met     Problem: Hip Fracture: Day of Surgery Post-op Care  Goal: Off Pathway (Use only if patient is Off Pathway)  Outcome: Resolved/Met  Goal: Activity/Safety  Outcome: Resolved/Met  Goal: Consults, if ordered  Outcome: Resolved/Met  Goal: Diagnostic Test/Procedures  Outcome: Resolved/Met  Goal: Nutrition/Diet  Outcome: Resolved/Met  Goal: Medications  Outcome: Resolved/Met  Goal: Respiratory  Outcome: Resolved/Met  Goal: Treatments/Interventions/Procedures  Outcome: Resolved/Met  Goal: Psychosocial  Outcome: Resolved/Met  Goal: *Absence of skin breakdown  Outcome: Resolved/Met  Goal: *Optimal pain control at patient's stated goal  Outcome: Resolved/Met  Goal: *Hemodynamically stable  Outcome: Resolved/Met

## 2022-09-08 NOTE — PROGRESS NOTES
Problem: Self Care Deficits Care Plan (Adult)  Goal: *Acute Goals and Plan of Care (Insert Text)  Description: FUNCTIONAL STATUS PRIOR TO ADMISSION: Patient was modified independent using a rolling walker for functional mobility. HOME SUPPORT: The patient lived with her daughter and received CGA-min A for ADLs. Occupational Therapy Goals  Initiated 9/7/2022  1. Patient will perform self-feeding with supervision/set-up within 7 day(s). 2.  Patient will perform grooming with supervision/set-up in sitting within 7 day(s). 3.  Patient will perform upper body dressing with moderate assistance  within 7 day(s). 4.  Patient will perform toilet transfers with moderate assistance  within 7 day(s). 5.  Patient will perform all aspects of toileting with moderate assistance  within 7 day(s). 6.  Patient will participate in upper extremity therapeutic exercise/activities with supervision/set-up for 5 minutes within 7 day(s). Outcome: Not Progressing Towards Goal   OCCUPATIONAL THERAPY TREATMENT  Patient: Bertha Clayton (61 y.o. female)  Date: 9/8/2022  Diagnosis: Hip fracture ([address] Alexandra Loya <principal problem not specified>  Procedure(s) (LRB):  RIGHT FEMORAL INTERTROCHANTERIC NAIL INSERTION (Right) 2 Days Post-Op  Precautions: Fall, WBAT, NWB  Chart, occupational therapy assessment, plan of care, and goals were reviewed. ASSESSMENT  Patient continues with skilled OT services and is not progressing towards goals. Pt was supine in bed and very fearful of falling and did not have her sling on right UE and had to merry sling and she continued to push with her right arm after being educated on no wt bearing. Pt is blind and was needing max VC for bed mobility and was total for bed mobility, and total for transfer to [facility] and back to bed and pt was left on right side to keep pressure off her back wound. Pts family was present in room during tx.       Current Level of Function Impacting Discharge (ADLs): total for ADLs              PLAN :  Patient continues to benefit from skilled intervention to address the above impairments. Continue treatment per established plan of care to address goals. Recommend with staff: HOB elevated     Recommend next OT session: work on simple grooming in bed or sitting on EOb     Recommendation for discharge: (in order for the patient to meet his/her long term goals)  To be determined: pending progress and at this time family would like to take pt home if able, home VS rehab at SNF    This discharge recommendation:  Has been made in collaboration with the attending provider and/or case management    IF patient discharges home will need the following DME: tbd       SUBJECTIVE:   Patient stated I dont want to fall. Where are we going now? [place]    OBJECTIVE DATA SUMMARY:   Cognitive/Behavioral Status:  Neurologic State: Alert  Orientation Level: Oriented X4  Cognition: Follows commands  Perception:  (blind)     Safety/Judgement: Not assessed    Functional Mobility and Transfers for ADLs:  Bed Mobility:  Rolling: Maximum assistance; Total assistance;Assist x2  Supine to Sit: Total assistance;Maximum assistance;Assist x2  Sit to Supine: Total assistance;Assist x2    Transfers:  Sit to Stand: Total assistance;Assist [id number] of 2          Balance:  Sitting: Impaired; With support  Sitting - Static: Poor (constant support)  Sitting - Dynamic: Poor (constant support)  Standing: Impaired; With support  Standing - Static: Poor  Standing - Dynamic : Poor    ADL Intervention:  Feeding  Feeding Assistance: Total assistance (dependent)    Grooming  Grooming Assistance: Total assistance(dependent)  Position Performed: Long sitting on bed    Upper Body Bathing  Bathing Assistance: Total assistance(dependent)  Position Performed: Long sitting on bed         Lower Body Bathing  Bathing Assistance:  Total assistance(dependent)  Perineal  : Total assistance (dependent)  Position Performed: Supine Toileting  Toileting Assistance: Total assistance(dependent)  Bladder Hygiene: Total assistance (dependent)  Bowel Hygiene: Total assistance (dependent)    Cognitive Retraining  Safety/Judgement: Not assessed          Pain:  Pt did not rate pain     Activity Tolerance:   Poor    After treatment patient left in no apparent distress:   Supine in bed, Call bell within reach, Bed / chair alarm activated, and Caregiver / family present    COMMUNICATION/COLLABORATION:   The patients plan of care was discussed with: Physical therapist and Registered nurse.      Sylwia Miller OT  Time Calculation: 34 mins

## 2022-09-08 NOTE — PROGRESS NOTES
4350 - Pt's diastolic BP has been low this evening, 102/33. BP rechecked manually it is 110/35. Provider was notified. 2105 - Provider's plan is to keep monitoring BP, if MAP drops lower than 60, she will order some albumin.

## 2022-09-08 NOTE — PROGRESS NOTES
Received notification from bedside RN about patient with regards to: low diastolic pressure, asymptomatic with good urine output  VS: /35, HR 80, RR 18, O2 sat 95% on NC 4 L    Intervention given: Monitor for now, will order Albumin if MAP drops < 60 or patient becomes symptomatic. CMP added to AM labs.     2230: Requesting medication for reflux symptoms    - Mylanta PO PRN ordered

## 2022-09-08 NOTE — PROGRESS NOTES
Transition of Care Plan:  RUR: 16%   Disposition: SNF- referrals sent   Follow up appointments: ortho  DME needed: n/a to go to snf  Transportation at Discharge: [place] or means to access home: n/a       IM Medicare Letter: to be provided  Caregiver Contact: daughter jason singleton [phone]  Discharge Caregiver contacted prior to discharge? To be contacted  Care Conference needed?: no      CM spoke with pt's son who provided 4 SNF choices: [facility] (1st choice), [address] [place] [address] (2nd choice), [facility] (3rd choice), and [facility] ([facility]). Referrals sent and waiting for response. Pt's son also noted that he and family have not fully decided if they want patient to d/c to a SNF or if they want to take patient home.      [id number] [address] [place] [address] 53, [facility]

## 2022-09-08 NOTE — PROGRESS NOTES
End of Shift Note    Bedside shift change report given to MERLIN MARTEL RN (oncoming nurse) by Sandra Singh (offgoing nurse). Report included the following information SBAR, Kardex, Procedure Summary, Intake/Output, MAR, and Recent Results    Shift worked:  night     Shift summary and any significant changes:     Pt's BP is low (see previous note), the rest of her vitals are stable. Pain is controlled with scheduled tylenol. Concerns for physician to address:  none     Zone phone for oncoming shift:   [phone]       Activity:  Activity Level: Up with Assistance  Number times ambulated in hallways past shift: 0  Number of times OOB to chair past shift: 0    Cardiac:   Cardiac Monitoring: No      Cardiac Rhythm: Atrial Paced    Access:  Current line(s): PIV     Genitourinary:   Urinary status: voiding and external catheter    Respiratory:   O2 Device: Nasal cannula  Chronic home O2 use?: YES  Incentive spirometer at bedside: YES  Actual Volume (ml): 500 ml    GI:     Current diet:  ADULT DIET Regular  DIET ONE TIME MESSAGE  ADULT ORAL NUTRITION SUPPLEMENT Breakfast, Lunch, Dinner; Standard High Calorie/High Protein  DIET ONE TIME MESSAGE  Passing flatus: YES  Tolerating current diet: YES       Pain Management:   Patient states pain is manageable on current regimen: YES    Skin:  Magdaleno Score: 15  Interventions: turn team, speciality bed, float heels, increase time out of bed, foam dressing, and internal/external urinary devices    Patient Safety:  Fall Score:  Total Score: 5  Interventions: bed/chair alarm, gripper socks, pt to call before getting OOB, and stay with me (per policy)  High Fall Risk: Yes    Length of Stay:  Expected LOS: - - -  Actual LOS: [facility]

## 2022-09-08 NOTE — PROGRESS NOTES
ORTHO - Progress Note  Post Op day: 2 Days [address]     [id number]  female    80 y.o.    1924    Admit date:2022  Date of Surgery:2022   Procedures:Procedure(s):  RIGHT FEMORAL INTERTROCHANTERIC NAIL INSERTION  Surgeon:Surgeon(s) and Role:     Zelda Mckeon MD - Primary        SUBJECTIVE:     Kurtis Stevens is a 80 y.o. female resting in the bed  Patient has no complaints. Appears sleepy. Son at bedside states she did not sleep well last night. Denies F/C, nausea, vomiting, dizziness, lightheadedness, chest pain, or shortness of breath. OBJECTIVE:       Physical Exam:  General: alert, cooperative, no distress. Gastrointestinal:  non-distended . Cardiovascular: equal pulses in the upper/ lower extremities,  Brisk cap refill in all distal extremities   Genitourinary: Voiding independently   Respiratory: No respiratory distress   Neurological:Neurovascular exam within normal limits. Senstion intact: LE bilat. Motor: + DF/PF/EHL. Musculoskeletal: Karthik's sign negative in bilateral lower extremities. Calves soft, supple, non-tender upon palpation or with passive stretch. No sign of DVT   Dressing/Wound: Sling RUE;right hip dressing Dry bloody drainage noted on proximal bandage, dry and intact. No significant erythema or swelling.     Vital Signs:       Patient Vitals for the past 8 hrs:   BP Temp Pulse Resp SpO2   22 1206 (!) 103/55 98.1 °F (36.7 °C) 80 18 95 %   22 0901 (!) 120/36 98.1 °F (36.7 °C) 81 18 96 %                                          Temp (24hrs), Av.1 °F (36.7 °C), Min:97.9 °F (36.6 °C), Max:98.2 °F (36.8 °C)      Labs:        Recent Labs     22  0500   HCT 22.3*   HGB 7.0*   INR 1.3*     PT/OT:              ASSESSMENT / PLAN:   Active Problems:    Hip fracture (HCC) (2022)       - HGB 7; Will monitor  -  Continue PT/OT WBAT RLE  - Sling RUE  -  DVT prophylaxis- SCD w/ Warfarin  -  DC planning - Pending (SNF vs H W/ HH/PT)    Signed By: ASHLEE Dawn

## 2022-09-09 NOTE — PROGRESS NOTES
Comprehensive Nutrition Assessment    Type and Reason for Visit: Initial, RD nutrition re-screen/LOS    Nutrition Recommendations/Plan:   Continue diet as ordered with K+ restriction. RD adjusted oral nutritional supplement from Ensure Plus to Nepro given recent elevated K+ levels. Please document % meals and supplements consumed in flowsheet I/O's under intake. Nutrition Assessment:    9/9: Chart reviewed; med noted for hip fracture, s/p fixation. Hx of osteoporosis. Pt currently receiving a 2gm Na+ diet and a K+ restriction was added given increased levels. Pt had Ensure Plus ordered so RD switched to Nepro given recent elevated K+ levels. No data found. Last Weight Metric  Weight Loss Metrics 9/4/2022 11/19/2021 2/27/2020 1/10/2020 12/24/2019 12/9/2019 9/28/2018   Today's Wt 110 lb 111 lb 9.6 oz 118 lb 119 lb 116 lb 116 lb 12.8 oz 132 lb 7.9 oz   BMI 21.48 kg/m2 21.8 kg/m2 23.05 kg/m2 22.48 kg/m2 21.92 kg/m2 22.07 kg/m2 22.05 kg/m2      Nutrition Related Findings:    Labs: K+ 5.7, H/H 7.7/24.2; Meds: Pericolace, Os-Leander Wound Type: None    Current Nutrition Intake & Therapies:        DIET ONE TIME MESSAGE  DIET ONE TIME MESSAGE  ADULT DIET Regular; Low Sodium (2 gm); Low Potassium (Less than 3000 mg/day)  ADULT ORAL NUTRITION SUPPLEMENT Breakfast, Dinner; Renal Supplement    Anthropometric Measures:  Height: 5' (152.4 cm)  Ideal Body Weight (IBW): 100 lbs (45 kg)     Current Body Wt:  49.9 kg (110 lb 0.2 oz), 110 % IBW. Current BMI (kg/m2): 21.5                          BMI Category: Underweight (BMI less than 22) age over 72    Estimated Daily Nutrient Needs:  Energy Requirements Based On: Formula  Weight Used for Energy Requirements: Current  Energy (kcal/day): 1300 (BMR x 1. 3AF) + 250 kcals  Weight Used for Protein Requirements: Current  Protein (g/day): 50-60 (1.0 - 1.2 g/kg bw)  Method Used for Fluid Requirements: 1 ml/kcal  Fluid (ml/day): 1300 ml/day    Nutrition Diagnosis:   No nutrition diagnosis at this time     Nutrition Interventions:   Food and/or Nutrient Delivery: Continue current diet  Nutrition Education/Counseling: No recommendations at this time  Coordination of Nutrition Care: Continue to monitor while inpatient       Goals:     Goals: PO intake 50% or greater, by next RD assessment       Nutrition Monitoring and Evaluation:   Behavioral-Environmental Outcomes: None identified  Food/Nutrient Intake Outcomes: Food and nutrient intake  Physical Signs/Symptoms Outcomes: Biochemical data, Skin, Weight    Discharge Planning:    Continue current diet    Connie Talbert RD  Contact:

## 2022-09-09 NOTE — PROGRESS NOTES
End of Shift Note    Bedside shift change report given to (oncoming nurse) by Simba Verduzco RN (offgoing nurse). Report included the following information SBAR, Kardex, Procedure Summary, Intake/Output, MAR, and Recent Results    Shift worked:  Day     Shift summary and any significant changes:     Potassium elevated, Nephrology aware. Meds ordered and given for hyperkalemia. Increased O2 to 5L due to desaturation- MD aware. STAT CXR ordered and completed. Melatonin added to help her sleep. Concerns for physician to address:  Elevated potassium, kidney function. Zone phone for oncoming shift:         Activity:  Activity Level: Up with Assistance  Number times ambulated in hallways past shift: 0  Number of times OOB to chair past shift: 1    Cardiac:   Cardiac Monitoring: No      Cardiac Rhythm: Atrial Paced    Access:  Current line(s): PIV     Genitourinary:   Urinary status: voiding and external catheter    Respiratory:   O2 Device: Nasal cannula  Chronic home O2 use?: YES  Incentive spirometer at bedside: YES  Actual Volume (ml): 500 ml    GI:     Current diet:  DIET ONE TIME MESSAGE  DIET ONE TIME MESSAGE  ADULT DIET Regular; Low Sodium (2 gm); Low Potassium (Less than 3000 mg/day)  ADULT ORAL NUTRITION SUPPLEMENT Breakfast, Dinner; Renal Supplement  Passing flatus: YES  Tolerating current diet: YES       Pain Management:   Patient states pain is manageable on current regimen: YES    Skin:  Magdaleno Score: 15  Interventions: turn team, speciality bed, float heels, increase time out of bed, foam dressing, and internal/external urinary devices    Patient Safety:  Fall Score:  Total Score: 5  Interventions: bed/chair alarm, gripper socks, pt to call before getting OOB, and stay with me (per policy)  High Fall Risk: Yes    Length of Stay:  Expected LOS: 6d 4h  Actual LOS: Hermilo Shin, RN

## 2022-09-09 NOTE — PROGRESS NOTES
Hospitalist Progress Note    NAME: Georgia Lora   :  1924   MRN:  [id number]       Assessment / Plan:  Acute right hip fracture:  XR Acute right hip intratrochanteric fracture  Status post intramedullary nail fixation of the right hip on   Warfarin was on hold, resume  INR 1.3, goal 2-3  - PT OT recommending SNF however patient's family are hesitant about SNF they want to discuss today to take final decision tomorrow    Mechanical fall:  Acute right distal clavicle fracture  Ortho on board     CT head right lateral scalp hematoma,  but otherwise unremarkable no intracranial bleed or hemorrhage    UTI, POA  UA >100 wbc  Cont. rocephin to complete total of 3 days    Hx of Afib  Not taking rate control medication at home  On warfarin, to be resumed today  Will have discussion with patient and her family about risks, benefits and alternative warfarin  - I had long discussion with patient, patient's son at bedside, patient's daughter over the phone about risks, benefits and alternatives of her warfarin. I discussed with them the risk that the patient is very high risk for falls and intracranial bleed bleed from warfarin however family wanted to continue warfarin for now understanding risks, benefits and alternatives including fatal bleed. Acute Exacerbation of HFrEF, systolic heart failure  Was on Lasix 40 mg daily, nephrology recommended stop Lasix for now continue IV fluids given her LESLIE  On oxygen(at home she is 4 L nasal cannula)  Bnp elevated 7,800  Echo with EF 20 to 25%    LESLIE on CKD stage III  - Creatinine 1.34 today  - Continue IV fluids  - Continue holding Lasix and other nephrotoxic agents    Mild old healing wound to the spine, POA  - Wound care input is appreciated    Macular degeneration, chronic  - Patient does not see very well because of that    18.5 - 24.9 Normal weight / Body mass index is 21.48 kg/m².     Estimated discharge date: September 10  Barriers PT OT recommending home with supervision 24 hours versus inpatient rehab    Code status: DNR  Prophylaxis: SCD's  Recommended Disposition: Home w/Family     Subjective:   Patient was seen and examined. No acute events overnight. Discussed with RN overnight events. All patient's questions were answered. Patient's son and grandson at bedside, all questions were answered. I talked to patient's daughter over the phone, all questions were answered. \"Doing okay\"    Review of Systems:  Symptom Y/N Comments  Symptom Y/N Comments   Fever/Chills n   Chest Pain n    Poor Appetite    Edema     Cough n   Abdominal Pain n    Sputum    Joint Pain     SOB/SANTOS n   Pruritis/Rash     Nausea/vomit n   Tolerating PT/OT     Diarrhea    Tolerating Diet     Constipation    Other       Could NOT obtain due to:      Objective:     VITALS:   Last 24hrs VS reviewed since prior progress note. Most recent are:  Patient Vitals for the past 24 hrs:   Temp Pulse Resp BP SpO2   09/09/22 0727 97.6 °F (36.4 °C) 79 18 (!) 118/44 91 %   09/09/22 0441 97.1 °F (36.2 °C) 80 18 113/68 93 %   09/08/22 2320 97.4 °F (36.3 °C) 82 20 103/77 93 %   09/08/22 2018 98.3 °F (36.8 °C) 83 22 (!) 157/82 --   09/08/22 1601 97.7 °F (36.5 °C) 82 20 (!) 153/70 92 %   09/08/22 1206 98.1 °F (36.7 °C) 80 18 (!) 103/55 95 %   09/08/22 0901 98.1 °F (36.7 °C) 81 18 (!) 120/36 96 %         Intake/Output Summary (Last 24 hours) at 9/9/2022 0800  Last data filed at 9/8/2022 1816  Gross per 24 hour   Intake --   Output 500 ml   Net -500 ml          I had a face to face encounter and independently examined this patient on 9/9/2022, as outlined below:  PHYSICAL EXAM:  General: Alert, cooperative, no acute distress    Resp:  Decreased bs BL, No accessory muscle use  CV:  Regular  rhythm,  No edema  GI:  Soft, Non distended, Non tender. +Bowel sounds  Neurologic:  EOMs intact. No facial asymmetry. No aphasia or slurred speech. Symmetrical strength, Sensation grossly intact.  Alert and oriented X 4.     Psych:   Not anxious nor agitated  Skin           Reviewed most current lab test results and cultures  YES  Reviewed most current radiology test results   YES  Review and summation of old records today    NO  Reviewed patient's current orders and MAR    YES  PMH/SH reviewed - no change compared to H&P  ________________________________________________________________________  Care Plan discussed with:    Comments   Patient x    Family  x    RN x    Care Manager     Consultant                       x Multidiciplinary team rounds were held today with , nursing, pharmacist and clinical coordinator. Patient's plan of care was discussed; medications were reviewed and discharge planning was addressed. ___________________________________________________________      Comments   >50% of visit spent in counseling and coordination of care x    ________________________________________________________________________  Blade Easley MD     Procedures: see electronic medical records for all procedures/Xrays and details which were not copied into this note but were reviewed prior to creation of Plan. LABS:  I reviewed today's most current labs and imaging studies.   Pertinent labs include:  Recent Labs     09/08/22  1431 09/08/22  0500 09/07/22  0135   WBC  --  8.0 7.1   HGB 7.5* 7.0* 7.7*   HCT 23.8* 22.3* 24.7*   PLT  --  114* 85*       Recent Labs     09/09/22  0110 09/08/22  0500 09/07/22  0135   * 132* 135*   K 5.7* 5.5* 4.9   CL 97 96* 101   CO2 33* 33* 32   * 129* 156*   BUN 56* 53* 40*   CREA 1.14* 1.34* 1.23*   CA 9.3 8.5 8.2*   PHOS 2.9  --   --    ALB 2.5* 2.4*  --    TBILI  --  0.7  --    ALT  --  11*  --    INR 1.3* 1.3* 1.3*         Signed: Blade Easley MD

## 2022-09-09 NOTE — PROGRESS NOTES
Problem: Pressure Injury - Risk of  Goal: *Prevention of pressure injury  Description: Document Magdaleno Scale and appropriate interventions in the flowsheet. Outcome: Progressing Towards Goal  Note: Pressure Injury Interventions:  Sensory Interventions: Assess changes in LOC    Moisture Interventions: Absorbent underpads    Activity Interventions: Increase time out of bed    Mobility Interventions: Float heels, HOB 30 degrees or less, Pressure redistribution bed/mattress (bed type), PT/OT evaluation    Nutrition Interventions: Document food/fluid/supplement intake    Friction and Shear Interventions: Apply protective barrier, creams and emollients, Feet elevated on foot rest, Foam dressings/transparent film/skin sealants, HOB 30 degrees or less, Minimize layers                Problem: Patient Education: Go to Patient Education Activity  Goal: Patient/Family Education  Outcome: Progressing Towards Goal     Problem: Falls - Risk of  Goal: *Absence of Falls  Description: Document Corey Fall Risk and appropriate interventions in the flowsheet.   Outcome: Progressing Towards Goal  Note: Fall Risk Interventions:  Mobility Interventions: Bed/chair exit alarm    Mentation Interventions: Adequate sleep, hydration, pain control    Medication Interventions: Bed/chair exit alarm    Elimination Interventions: Call light in reach    History of Falls Interventions: Bed/chair exit alarm         Problem: Patient Education: Go to Patient Education Activity  Goal: Patient/Family Education  Outcome: Progressing Towards Goal     Problem: Patient Education: Go to Patient Education Activity  Goal: Patient/Family Education  Outcome: Progressing Towards Goal     Problem: Hip Fracture: Post-Op Day 3  Goal: Off Pathway (Use only if patient is Off Pathway)  Outcome: Progressing Towards Goal  Goal: Activity/Safety  Outcome: Progressing Towards Goal  Goal: Nutrition/Diet  Outcome: Progressing Towards Goal  Goal: Medications  Outcome: Progressing Towards Goal  Goal: Treatments/Interventions/Procedures  Outcome: Progressing Towards Goal  Goal: Psychosocial  Outcome: Progressing Towards Goal  Goal: Discharge Planning  Outcome: Progressing Towards Goal  Goal: *Met physical therapy criteria for discharge to next level of care  Outcome: Progressing Towards Goal  Goal: *Optimal pain control at patient's stated goal  Outcome: Progressing Towards Goal  Goal: *Hemodynamically stable  Outcome: Progressing Towards Goal  Goal: *Tolerating diet  Outcome: Progressing Towards Goal  Goal: *Active bowel function  Outcome: Progressing Towards Goal  Goal: *Adequate urinary output  Outcome: Progressing Towards Goal  Goal: *Absence of skin breakdown  Outcome: Progressing Towards Goal  Goal: *Patient verbalizes understanding of discharge instructions  Outcome: Progressing Towards Goal     Problem: Hip Fracture: Post-Op Day 3  Goal: Diagnostic Test/Procedures  Outcome: Not Progressing Towards Goal  Goal: Respiratory  Outcome: Not Progressing Towards Goal     Problem: Hip Fracture: Post-Op Day 1  Goal: Respiratory  Outcome: Not Met  Goal: *Demonstrates progressive activity  Outcome: Not Met     Problem: Hip Fracture: Post-Op Day 2  Goal: Diagnostic Test/Procedures  Outcome: Not Met  Goal: Respiratory  Outcome: Not Met  Goal: *Adequate oxygenation  Outcome: Not Met  Goal: *Tolerates increased activity  Outcome: Not Met     Problem: Hip Fracture: Post-Op Day 1  Goal: Off Pathway (Use only if patient is Off Pathway)  Outcome: Resolved/Met  Goal: Activity/Safety  Outcome: Resolved/Met  Goal: Diagnostic Test/Procedures  Outcome: Resolved/Met  Goal: Nutrition/Diet  Outcome: Resolved/Met  Goal: Medications  Outcome: Resolved/Met  Goal: Treatments/Interventions/Procedures  Outcome: Resolved/Met  Goal: Psychosocial  Outcome: Resolved/Met  Goal: Discharge Planning  Outcome: Resolved/Met  Goal: *Optimal pain control at patient's stated goal  Outcome: Resolved/Met  Goal: *Hemodynamically stable  Outcome: Resolved/Met  Goal: *Discharge plan identified  Outcome: Resolved/Met  Goal: *Absence of skin breakdown  Outcome: Resolved/Met     Problem: Hip Fracture: Post-Op Day 2  Goal: Off Pathway (Use only if patient is Off Pathway)  Outcome: Resolved/Met  Goal: Activity/Safety  Outcome: Resolved/Met  Goal: Nutrition/Diet  Outcome: Resolved/Met  Goal: Medications  Outcome: Resolved/Met  Goal: Treatments/Interventions/Procedures  Outcome: Resolved/Met  Goal: Psychosocial  Outcome: Resolved/Met  Goal: Discharge Planning  Outcome: Resolved/Met  Goal: *Optimal pain control at patient's stated goal  Outcome: Resolved/Met  Goal: *Hemodynamically stable  Outcome: Resolved/Met  Goal: *Tolerates nutrition therapy  Outcome: Resolved/Met  Goal: *Absence of skin breakdown  Outcome: Resolved/Met

## 2022-09-09 NOTE — PROGRESS NOTES
ORTHO - Progress Note  Post Op day: 3 Days [address]     [id number]  female    80 y.o.    1924    Admit date:2022  Date of Surgery:2022   Procedures:Procedure(s):  RIGHT FEMORAL INTERTROCHANTERIC NAIL INSERTION  Surgeon:Surgeon(s) and Role:     Veronica Pugh MD - Primary        SUBJECTIVE:     Danii Zuniga is a 80 y.o. female resting in the bed  Patient has no complaints. Appears sleepy. Family at bedside. Per family, she started complaining of knee pain today. Denies F/C, nausea, vomiting, dizziness, lightheadedness, chest pain, or shortness of breath. OBJECTIVE:       Physical Exam:  General: alert, cooperative, no distress. Gastrointestinal:  non-distended . Cardiovascular: equal pulses in the upper/ lower extremities,  Brisk cap refill in all distal extremities   Genitourinary: Voiding independently   Respiratory: No respiratory distress   Neurological:Neurovascular exam within normal limits. Senstion intact: LE bilat. Motor: + DF/PF/EHL. Musculoskeletal: Karthik's sign negative in bilateral lower extremities. Calves soft, supple, non-tender upon palpation or with passive stretch. No sign of DVT. Mild effusion noted right knee. Tolerating passive range of motion approximately 50 degrees worth with minimal pain. Dressing/Wound: Sling RUE;right hip dressing Dry bloody drainage noted on proximal bandage, dry and intact. No significant erythema or swelling.     Vital Signs:       Patient Vitals for the past 8 hrs:   BP Temp Pulse Resp SpO2 Height   22 1548 (!) (P) 132/35 (P) 97.8 °F (36.6 °C) (P) 75 (P) 18 (P) 95 % --   22 1300 -- -- -- -- -- 5' (1.524 m)   22 1231 (!) 124/36 98 °F (36.7 °C) 79 18 97 % --                                          Temp (24hrs), Av.7 °F (36.5 °C), Min:97.1 °F (36.2 °C), Max:98.3 °F (36.8 °C)      Labs:        Recent Labs     22  1011 22  0110   HCT 24.2*  --    HGB 7.7*  --    INR  --  1.3* PT/OT:        Gait  Base of Support: Widened  Speed/Samantha: Slow, Shuffled  Step Length: Right shortened, Left shortened  Gait Abnormalities: Decreased step clearance, Shuffling gait  Ambulation - Level of Assistance: Assist x2, Total assistance  Distance (ft): 2 Feet (ft)  Assistive Device: Gait belt     ASSESSMENT / PLAN:   Active Problems:    Hip fracture (HCC) (9/4/2022)     - HGB 7.7  -  Continue PT/OT WBAT RLE  - Sling RUE  - Ace bandage and ice for right knee pain for now. Likely arthritic flare. Was not hurting until today and femur films do not show acute abnormality. If no improvement overnight, plain films may be indicated.   -  DVT prophylaxis- SCD w/ Warfarin  -  DC planning - Pending (SNF vs H W/ HH/PT)    Signed By: ASHLEE Louise

## 2022-09-09 NOTE — PROGRESS NOTES
Transition of Care Plan:  RUR: 16%   Disposition: SNF- referrals sent vs home with [facility]  Follow up appointments: ortho  DME needed: n/a to go to snf  Transportation at Discharge: [place] [facility] or means to access home: n/a       IM Medicare Letter: to be provided  Caregiver Contact: daughter jason singleton [phone]  Discharge Caregiver contacted prior to discharge? To be contacted  Care Conference needed?: no      4:24 p.m. CM followed up with pt's son and daughter who said they toured [place] but will not be able to [address] until Monday. They are still deciding about taking pt home vs SNF. CM will continue to follow. CM met with pt's son and daughter at bedside to discuss d/c plan. CM informed them pt has been accepted to [facility]. They are going to go attempt to tour both facilities and they are also still contemplating whether they want to take pt home with [facility]. CM asked them to let her know today about which facility they want to move forward with so a plan A & B can be established. If they elect to take pt home, they want to use [facility] and requested a smith lift.      Nikhil Vizcarra, MSW  Care Management, [address]

## 2022-09-09 NOTE — PROGRESS NOTES
End of Shift Note    Bedside shift change report given to Basilia Knox RN (oncoming nurse) by Demond Koo RN (offgoing nurse). Report included the following information SBAR, Kardex, Procedure Summary, Intake/Output, MAR, and Recent Results    Shift worked:  night     Shift summary and any significant changes:     Pt's vital signs are stable. Pt's morning labs showed elevated potassium and kidney function. Concerns for physician to address:  Elevated potassium, kidney function. Zone phone for oncoming shift:   [phone]       Activity:  Activity Level: Up with Assistance  Number times ambulated in hallways past shift: 0  Number of times OOB to chair past shift: 0    Cardiac:   Cardiac Monitoring: No      Cardiac Rhythm: Atrial Paced    Access:  Current line(s): PIV     Genitourinary:   Urinary status: voiding and external catheter    Respiratory:   O2 Device: Nasal cannula  Chronic home O2 use?: YES  Incentive spirometer at bedside: YES  Actual Volume (ml): 500 ml    GI:     Current diet:  ADULT DIET Regular  DIET ONE TIME MESSAGE  ADULT ORAL NUTRITION SUPPLEMENT Breakfast, Lunch, Dinner; Standard High Calorie/High Protein  DIET ONE TIME MESSAGE  Passing flatus: YES  Tolerating current diet: YES       Pain Management:   Patient states pain is manageable on current regimen: YES    Skin:  Magdaleno Score: 15  Interventions: turn team, speciality bed, float heels, increase time out of bed, foam dressing, and internal/external urinary devices    Patient Safety:  Fall Score:  Total Score: 5  Interventions: bed/chair alarm, gripper socks, pt to call before getting OOB, and stay with me (per policy)  High Fall Risk: Yes    Length of Stay:  Expected LOS: 6d 4h  Actual LOS: [address], RN

## 2022-09-09 NOTE — PROGRESS NOTES
Pharmacist Daily Dosing of Warfarin    Indication & Goal INR: AFib, INR Goal 2-3    PTA Warfarin Dose: 5 mg daily, 2.5 on Tues/Thurs    Notable concurrent conditions and medications: None    Labs:  Recent Labs     09/09/22  1011 09/09/22  0110 09/08/22  1431 09/08/22  0500 09/07/22  0135 09/07/22  0135   INR  --  1.3*  --  1.3*  --  1.3*   HGB 7.7*  --    < > 7.0*  --  7.7*   PLT  --   --   --  114*  --  85*   TBILI  --   --   --  0.7  --   --    ALB  --  2.5*  --  2.4*   < >  --     < > = values in this interval not displayed. Date                      INR                  Warfarin  9/6                         1.9                    1 mg  9/7/22                    1.3                    5 mg  9/8                         1.3                    4 mg  9/9                         1.3                    6 mg    Impression/Plan:   INR 1.3  Warfarin 6mg x1  S/p vitamin K x1 on 9/5/22  Daily INR has been ordered  CBC w/o differential every other day has been ordered     Pharmacy will follow daily and adjust the dose as appropriate.     Thank you,  Homer Durant, [facility]

## 2022-09-09 NOTE — PROGRESS NOTES
Physician Progress Note      Fatemeh Abarca  CSN #:                  [id number]  :                       1924  ADMIT DATE:       2022 7:20 AM  DISCH DATE:  RESPONDING  PROVIDER #:        Loli Moore MD          QUERY TEXT:    Pt admitted with hip fx and has anemia documented. If possible, please document in progress notes and discharge summary further specificity regarding the acuity and type of anemia:    The medical record reflects the following:  Risk Factors: Status post intramedullary nail fixation of the right hip  Clinical Indicators:  pn-Anemia - significant drop.  hgb 7 (13)  Treatment:1u prbc, Epogen  [place] Ysabel Cosme RN/CDI   Options provided:  -- Anemia due to acute blood loss  -- Anemia due to chronic blood loss  -- Anemia due to acute on chronic blood loss  -- Anemia due to postoperative blood loss  -- Anemia due to CKD  -- Dilutional anemia  -- Other - I will add my own diagnosis  -- Disagree - Not applicable / Not valid  -- Disagree - Clinically unable to determine / Unknown  -- Refer to Clinical Documentation Reviewer    PROVIDER RESPONSE TEXT:    This patient has acute blood loss anemia. Query created by: Sona Woods on 2022 9:54 AM      QUERY TEXT:    Patient admitted with hip fx. Noted documentation of vielka (Nephro) and  CKD3 ( pn). If possible, please document in progress notes and discharge summary if you are evaluating and /or treating any of the following:     The medical record reflects the following:  Risk Factors: afib, chf  Clinical Indicators: gfr 37-30, nephrology-vielka,  pn-ckd3  Treatment: nephro cx, labs, ivfs  [place] Ysabel Cosme RN/CDI   Options provided:  -- vielka confirmed ckd3 ruled out  -- ckd3 confirmed and vielka ruled out  -- vielka and ckd3 confirmed  -- Other - I will add my own diagnosis  -- Disagree - Not applicable / Not valid  -- Disagree - Clinically unable to determine / Unknown  -- Refer to Clinical Documentation Reviewer    PROVIDER RESPONSE TEXT:    After study, vielka and ckd3 confirmed.     Query created by: Alex Fletcher on 9/8/2022 10:02 AM      Electronically signed by:  Everett Holt MD 9/9/2022 3:44 PM

## 2022-09-09 NOTE — PROGRESS NOTES
Hospitalist Progress Note    NAME: Vilma Wyman   :  1924   MRN:  [id number]       Assessment / Plan:  Acute right hip fracture:  XR Acute right hip intratrochanteric fracture  Status post intramedullary nail fixation of the right hip on   Warfarin was on hold, resume  INR 1.3, goal 2-3  - PT OT recommending SNF however patient's family are hesitant about SNF. Referral sent to SNF facilities    Mechanical fall:  Acute right distal clavicle fracture  Ortho on board     CT head right lateral scalp hematoma,  but otherwise unremarkable no intracranial bleed or hemorrhage    UTI, POA  UA >100 wbc  Cont. rocephin to complete total of 5 days    Hx of Afib  Not taking rate control medication at home  Warfarin was resumed after the OR    -On  I had long discussion with patient, patient's son at bedside, patient's daughter over the phone about risks, benefits and alternatives of her warfarin. I discussed with them the risk that the patient is very high risk for falls and intracranial bleed bleed from warfarin however family wanted to continue warfarin for now understanding risks, benefits and alternatives including fatal bleed.   - Watch hemoglobin very closely, 7.7 today     Acute Exacerbation of HFrEF, systolic heart failure  Acute respiratory distress  Was on Lasix 40 mg daily, nephrology recommended stop Lasix for now continue IV fluids given her LESLIE  On oxygen(at home she is 4 L nasal cannula)  Bnp elevated 7,800  Echo with EF 20 to 25%  Will obtain chest x-ray given patient is on higher oxygen requirement right now, we may need to reassess the need for Lasix    LESLIE on CKD stage III  Hyperkalemia  - Creatinine 1.14  -Kalpesh Chapa was initiated  - Status post calcium gluconate as per nephrology recommendations  -Continue holding nephrotoxic agents    Mild old healing wound to the spine, POA  - Wound care input is appreciated    Macular degeneration, chronic  - Patient does not see very well because of that    18.5 - 24.9 Normal weight / Body mass index is 21.48 kg/m². Estimated discharge date: September 10  Barriers hyperkalemia, respiratory distress    Code status: DNR  Prophylaxis: SCD's  Recommended Disposition: Home w/Family     Subjective:   Patient was seen and examined. No acute events overnight. Discussed with RN overnight events. All patient's questions were answered. Patient's daughter at bedside, questions were answered. \"Doing okay\"    Review of Systems:  Symptom Y/N Comments  Symptom Y/N Comments   Fever/Chills n   Chest Pain n    Poor Appetite    Edema     Cough n   Abdominal Pain n    Sputum    Joint Pain     SOB/SANTOS n   Pruritis/Rash     Nausea/vomit n   Tolerating PT/OT     Diarrhea    Tolerating Diet     Constipation    Other       Could NOT obtain due to:      Objective:     VITALS:   Last 24hrs VS reviewed since prior progress note. Most recent are:  Patient Vitals for the past 24 hrs:   Temp Pulse Resp BP SpO2   09/09/22 1231 98 °F (36.7 °C) 79 18 (!) 124/36 97 %   09/09/22 0727 97.6 °F (36.4 °C) 79 18 (!) 118/44 91 %   09/09/22 0441 97.1 °F (36.2 °C) 80 18 113/68 93 %   09/08/22 2320 97.4 °F (36.3 °C) 82 20 103/77 93 %   09/08/22 2018 98.3 °F (36.8 °C) 83 22 (!) 157/82 --   09/08/22 1601 97.7 °F (36.5 °C) 82 20 (!) 153/70 92 %         Intake/Output Summary (Last 24 hours) at 9/9/2022 1523  Last data filed at 9/8/2022 1816  Gross per 24 hour   Intake --   Output 500 ml   Net -500 ml          I had a face to face encounter and independently examined this patient on 9/9/2022, as outlined below:  PHYSICAL EXAM:  General: Alert, cooperative, no acute distress    Resp:  Decreased bs BL, No accessory muscle use  CV:  Regular  rhythm,  No edema  GI:  Soft, Non distended, Non tender. +Bowel sounds  Neurologic:  EOMs intact. No facial asymmetry. No aphasia or slurred speech. Symmetrical strength, Sensation grossly intact.  Alert and oriented X 4.     Psych:   Not anxious nor agitated  Skin           Reviewed most current lab test results and cultures  YES  Reviewed most current radiology test results   YES  Review and summation of old records today    NO  Reviewed patient's current orders and MAR    YES  PMH/SH reviewed - no change compared to H&P  ________________________________________________________________________  Care Plan discussed with:    Comments   Patient x    Family  x    RN x    Care Manager     Consultant                       x Multidiciplinary team rounds were held today with , nursing, pharmacist and clinical coordinator. Patient's plan of care was discussed; medications were reviewed and discharge planning was addressed. ___________________________________________________________      Comments   >50% of visit spent in counseling and coordination of care x    ________________________________________________________________________  Efrain Ennis MD     Procedures: see electronic medical records for all procedures/Xrays and details which were not copied into this note but were reviewed prior to creation of Plan. LABS:  I reviewed today's most current labs and imaging studies.   Pertinent labs include:  Recent Labs     09/09/22  1011 09/08/22  1431 09/08/22  0500 09/07/22  0135   WBC  --   --  8.0 7.1   HGB 7.7* 7.5* 7.0* 7.7*   HCT 24.2* 23.8* 22.3* 24.7*   PLT  --   --  114* 85*       Recent Labs     09/09/22  0110 09/08/22  0500 09/07/22  0135   * 132* 135*   K 5.7* 5.5* 4.9   CL 97 96* 101   CO2 33* 33* 32   * 129* 156*   BUN 56* 53* 40*   CREA 1.14* 1.34* 1.23*   CA 9.3 8.5 8.2*   PHOS 2.9  --   --    ALB 2.5* 2.4*  --    TBILI  --  0.7  --    ALT  --  11*  --    INR 1.3* 1.3* 1.3*         Signed: Efrain Ennis MD

## 2022-09-09 NOTE — PROGRESS NOTES
Spiritual Care Assessment/Progress Note  [facility]      NAME: Saundra Clark      MRN: [id number]  AGE: 80 y.o. SEX: female  Amish Affiliation: Presbyterian   Language: English     9/9/2022     Total Time (in minutes): 12     Spiritual Assessment begun in [facility] through conversation with:         [x]Patient        [x] Family    [] Friend(s)        Reason for Consult: Initial/Spiritual assessment, patient floor     Spiritual beliefs: (Please include comment if needed)     [x] Identifies with a yogesh tradition:         [] Supported by a yogesh community:            [] Claims no spiritual orientation:           [] Seeking spiritual identity:                [] Adheres to an individual form of spirituality:           [] Not able to assess:                           Identified resources for coping:      [] Prayer                               [] Music                  [] Guided Imagery     [x] Family/friends                 [] Pet visits     [] Devotional reading                         [] Unknown     [] Other:                                               Interventions offered during this visit: (See comments for more details)    Patient Interventions: Initial visit, Affirmation of emotions/emotional suffering, Prayer (assurance of)     Family/Friend(s):  Affirmation of yogesh, Initial Assessment, Affirmation of emotions/emotional suffering, Normalization of emotional/spiritual concerns, Prayer (assurance of)     Plan of Care:     [x] Support spiritual and/or cultural needs    [] Support AMD and/or advance care planning process      [] Support grieving process   [] Coordinate Rites and/or Rituals    [] Coordination with community clergy   [] No spiritual needs identified at this time   [] Detailed Plan of Care below (See Comments)  [] Make referral to Music Therapy  [] Make referral to Pet Therapy     [] Make referral to Addiction services  [] Make referral to [facility]  [] Make referral to Spiritual Care Partner  [] No future visits requested        [x] Contact Spiritual Care for further referrals     Comments:  reviewed the patient's chart prior to the visit. Ms. Soniya Haji was receiving medical care when the  entered her room.  waited outside the door. Her son advised the  to wait. He stated a visit would be good for his mother.  entered the room again and talked briefly with Ms. Soniya Haji and her daughter. Her daughter stated she is a nurse at a different hospital. Staff entered the room to continue their care of the patient.  will visit a again at another time. The family thanked the  for the visit. Rev. SANTANA Borrego.  Robert   Paging Service [phone])

## 2022-09-09 NOTE — PROGRESS NOTES
Problem: Mobility Impaired (Adult and Pediatric)  Goal: *Acute Goals and Plan of Care (Insert Text)  Description: FUNCTIONAL STATUS PRIOR TO ADMISSION: Patient was modified independent using a rolling walker for functional mobility. HOME SUPPORT PRIOR TO ADMISSION: The patient lived with daughter and required minimal assistance/contact guard assist for ADLs. Physical Therapy Goals  Initiated 9/7/2022  1. Patient will move from supine to sit and sit to supine  in bed with moderate assistance within 5 day(s). 2.  Patient will transfer from bed to chair and chair to bed with moderate assistance using the least restrictive device within 5 day(s). 3.  Patient will perform sit to stand with moderate assistance  within 5 day(s). 4.  Patient will ambulate with moderate assistance  for 10 feet with the least restrictive device within 5 day(s). Outcome: Progressing Towards Goal   PHYSICAL THERAPY TREATMENT  Patient: Marco Kaba (73 y.o. female)  Date: 9/9/2022  Diagnosis: Hip fracture ([facility] [address] [S72.009A] <principal problem not specified>  Procedure(s) (LRB):  RIGHT FEMORAL INTERTROCHANTERIC NAIL INSERTION (Right) 3 Days Post-Op  Precautions: Fall, WBAT, NWB  Chart, physical therapy assessment, plan of care and goals were reviewed. ASSESSMENT  Patient continues with skilled PT services and is not progressing towards goals. She reports increased pain and request to go to bedside chair. Patient is assisted with bringing legs to EOB. She is unable to tolerate R knee flexion past 30* and yells out in pain. Second attempt is made at transitioning patient but she is unable to tolerate increased R knee flexion. She requests to stay in bed and repositioned. Patient is total A for [facility]. She is Total A for turning R and L to complete [facility]. Patient performs supine R active assisted knee flexion to about 40* 2x5. She performs quad sets x5 .      Current Level of Function Impacting Discharge (mobility/balance): Total A     Other factors to consider for discharge: medical stability, decreased strength/endurance, inability to ambulate or transfer without Ax2 at this time         PLAN :  Patient continues to benefit from skilled intervention to address the above impairments. Continue treatment per established plan of care. to address goals. Recommendation for discharge: (in order for the patient to meet his/her long term goals)  Therapy up to 5 days/week in SNF setting v.  with 24/7     This discharge recommendation:  Has been made in collaboration with the attending provider and/or case management    IF patient discharges home will need the following DME: hospital bed and mechanical lift       SUBJECTIVE:   Patient stated Just reposition me in bed.     OBJECTIVE DATA SUMMARY:   Critical Behavior:  Neurologic State: Alert  Orientation Level: Oriented X4  Cognition: Appropriate for age attention/concentration, Follows commands  Safety/Judgement: Not assessed  Functional Mobility Training:  Bed Mobility:  Rolling: Total assistance        Scooting: Total assistance        Transfers:                                   Balance:     Ambulation/Gait Training:                                                        Stairs: Therapeutic Exercises:     Pain Rating:      Activity Tolerance:   Fair    After treatment patient left in no apparent distress:   Supine in bed, Heels elevated for pressure relief, Patient positioned in right sidelying for pressure relief, Call bell within reach, Caregiver / family present, and Side rails x 3    COMMUNICATION/COLLABORATION:   The patients plan of care was discussed with: Occupational therapist and Registered nurse.      Juana Flores, PT   Time Calculation: 32 mins

## 2022-09-10 NOTE — PROGRESS NOTES
Bedside and Verbal shift change report given to Trent Land (oncoming nurse) by HAMMAD Cavazos RN (offgoing nurse). Report given with SBAR, Kardex, Intake/Output, MAR and Recent Results.

## 2022-09-10 NOTE — PROGRESS NOTES
Hospitalist Progress Note    NAME: Marco Kaba   :  1924   MRN:  [id number]       Assessment / Plan:  Acute right hip fracture:  XR Acute right hip intratrochanteric fracture  Status post intramedullary nail fixation of the right hip on   Warfarin was on hold, resume  INR 1.4, goal 2-3  - PT OT recommending SNF however patient's family are hesitant about SNF. Referral sent to SNF facilities  - I met with patient's daughter and son at bedside this morning and they informed me that they would like home hospice. I answered all their questions and they said their mom told him that her wishes is to go home and they do want her to suffer. I consulted hospice care team.  - We will continue supportive care    Mechanical fall:  Acute right distal clavicle fracture  Ortho on board     CT head right lateral scalp hematoma,  but otherwise unremarkable no intracranial bleed or hemorrhage    UTI, POA  UA >100 wbc  Cont. rocephin to complete total of 5 days    Hx of Afib  Not taking rate control medication at home  Warfarin was resumed after the OR    -On  I had long discussion with patient, patient's son at bedside, patient's daughter over the phone about risks, benefits and alternatives of her warfarin. I discussed with them the risk that the patient is very high risk for falls and intracranial bleed bleed from warfarin however family wanted to continue warfarin for now understanding risks, benefits and alternatives including fatal bleed. - Watch hemoglobin very closely, 7.7 today     Acute Exacerbation of HFrEF, systolic heart failure  Acute respiratory distress  Was on Lasix 40 mg daily, nephrology recommended stop Lasix due to LESLIE  - Received 1 dose of Bumex today as per nephrology recommendations   On nonrebreather 15 L  Bnp elevated 7,800  Echo with EF 20 to 25%  Chest x-ray yesterday with diffuse airspace disease could represent pulmonary edema or diffuse pneumonia.   Patient is not coughing, afebrile, no leukocytosis. Most likely this is representing pulmonary edema    LESLIE on CKD stage III  Hyperkalemia  - Creatinine 1. 02 back to baseline now  -Jojo Padron was initiated  - Status post calcium gluconate as per nephrology recommendations  -Continue holding nephrotoxic agents as possible    Mild old healing wound to the spine, POA  - Wound care input is appreciated    Macular degeneration, chronic  - Patient does not see very well because of that    18.5 - 24.9 Normal weight / Body mass index is 21.48 kg/m². Estimated discharge date: September 11  Barriers awaiting eval for home hospice    Code status: DNR  Prophylaxis: SCD's  Recommended Disposition: Home w/Family     Subjective:   Patient was seen and examined. No acute events overnight. Discussed with RN overnight events. All patient's questions were answered. Patient is more sleepy today. Patient son and daughter at bedside, all questions were answered. They would like to proceed with home hospice. Review of Systems:  Symptom Y/N Comments  Symptom Y/N Comments   Fever/Chills n   Chest Pain n    Poor Appetite    Edema     Cough n   Abdominal Pain n    Sputum    Joint Pain     SOB/SANTOS n   Pruritis/Rash     Nausea/vomit n   Tolerating PT/OT     Diarrhea    Tolerating Diet     Constipation    Other       Could NOT obtain due to:      Objective:     VITALS:   Last 24hrs VS reviewed since prior progress note.  Most recent are:  Patient Vitals for the past 24 hrs:   Temp Pulse Resp BP SpO2   09/10/22 1000 -- -- -- -- 92 %   09/10/22 0717 97.7 °F (36.5 °C) 79 20 (!) 139/56 96 %   09/10/22 0337 97.5 °F (36.4 °C) 80 20 (!) 135/48 98 %   09/09/22 2314 97.9 °F (36.6 °C) 80 22 (!) 123/45 99 %   09/09/22 2142 -- 80 -- (!) 138/54 --   09/09/22 1959 98.1 °F (36.7 °C) 80 18 (!) 116/44 90 %   09/09/22 1548 (P) 97.8 °F (36.6 °C) (P) 75 (P) 18 (!) (P) 132/35 (P) 95 %   09/09/22 1231 98 °F (36.7 °C) 79 18 (!) 124/36 97 %         Intake/Output Summary (Last 24 hours) at 9/10/2022 1125  Last data filed at 9/10/2022 1014  Gross per 24 hour   Intake --   Output 2750 ml   Net -2750 ml          I had a face to face encounter and independently examined this patient on 9/10/2022, as outlined below:  PHYSICAL EXAM:  General: Alert, cooperative, no acute distress    Resp:  Decreased bs BL, No accessory muscle use  CV:  Regular  rhythm,  No edema  GI:  Soft, Non distended, Non tender. +Bowel sounds  Neurologic:  EOMs intact. No facial asymmetry. No aphasia or slurred speech more sleepy today. Symmetrical strength, Sensation grossly intact. Alert but not answering orientation questions    Psych:   Not anxious nor agitated  Skin           Reviewed most current lab test results and cultures  YES  Reviewed most current radiology test results   YES  Review and summation of old records today    NO  Reviewed patient's current orders and MAR    YES  PMH/SH reviewed - no change compared to H&P  ________________________________________________________________________  Care Plan discussed with:    Comments   Patient x    Family  x    RN     Care Manager     Consultant                        Multidiciplinary team rounds were held today with , nursing, pharmacist and clinical coordinator. Patient's plan of care was discussed; medications were reviewed and discharge planning was addressed. ___________________________________________________________      Comments   >50% of visit spent in counseling and coordination of care x    ________________________________________________________________________  Sandy Sandifer, MD     Procedures: see electronic medical records for all procedures/Xrays and details which were not copied into this note but were reviewed prior to creation of Plan. LABS:  I reviewed today's most current labs and imaging studies.   Pertinent labs include:  Recent Labs     09/10/22  0352 09/09/22  1011 09/08/22  1431 09/08/22  0500   WBC 7.0 --   --  8.0   HGB 7.9* 7.7* 7.5* 7.0*   HCT 24.7* 24.2* 23.8* 22.3*   *  --   --  114*       Recent Labs     09/10/22  0352 09/09/22  0110 09/08/22  0500   * 132* 132*   K 4.7 5.7* 5.5*   CL 96* 97 96*   CO2 37* 33* 33*   * 130* 129*   BUN 60* 56* 53*   CREA 1.02 1.14* 1.34*   CA 9.7 9.3 8.5   PHOS 3.6 2.9  --    ALB 2.3* 2.5* 2.4*   TBILI  --   --  0.7   ALT  --   --  11*   INR 1.4* 1.3* 1.3*         Signed: Kelvin Clark MD

## 2022-09-10 NOTE — PROGRESS NOTES
Transition of Care Plan:  RUR: 16%   Disposition: Home with [facility]   Follow up appointments: PCP as needed  DME needed: TBD by Hospice  Transportation at Discharge: [facility] @ 1:00PM tomorrow (pending confirmation)  Keys or means to access home: n/a       IM Medicare Letter: to be provided  Caregiver Contact: jana singleton [phone]  Discharge Caregiver contacted prior to discharge? To be contacted  Care Conference needed?: no      11:08AM UPDATE  CM received call from Nursing that pt is requesting to have [address] for possible home hospice. Referral placed in CC. Pending at this time. 3:43PM UPDATE  CM received call from [address] [place] [address] [phone]) confirming hospice meeting held today. Pt/family agreeable to home hospice admission for tomorrow afternoon. Requesting [facility] transport to be scheduled for 1:00PM pick-up tomorrow. Pt is currently on 10LPM. Has been using non-rebreather which hospice and RN will provide for pt at d/c tomorrow. Referral submitted to [facility] to arrange for 1:00PM BLS transport tomorrow via [employer], awaiting response at this time.      Mireya Mcdonough. Hugh Lopez  Manager  [phone]

## 2022-09-10 NOTE — PROGRESS NOTES
Nephrology Progress Note  [facility] / [address] [place] [address] Driss Dominguez, [address]  Phone - [phone]  Fax - [phone]                 Patient: Nav Ellison                   YOB: 1924        Date- 9/10/2022                      Admit Date: 9/4/2022  CC: Follow up for Edema          IMPRESSION & PLAN:   LESLIE   Right intertrochanteric hip fracture S/P Intramedullary nail fixation 09/06/2022  Proteinuria  Hyponatremia(suspect secondary to poor p.o. intake)  Edema  Anemia - significant drop  Systolic Heart Failure - chronic -LVEF 30%  Valvular heart disease  Hyperkalemia  Hypocalcemia    PLAN-  Bumex 1mg IV x 1 this AM  Low K diet  Continue lokelma for today and tomorrow  Daily labs and I/Os     Subjective: Interval History:   -Patient seen and examined . On NRB. Had diuretics overnight, stable UOP. Confused,  hard of hearing. Mother at bedside. K normal, Cr 1.    Objective:   Vitals:    09/09/22 1959 09/09/22 2142 09/09/22 2314 09/10/22 0337   BP: (!) 116/44 (!) 138/54 (!) 123/45 (!) 135/48   Pulse: 80 80 80 80   Resp: 18  22 20   Temp: 98.1 °F (36.7 °C)  97.9 °F (36.6 °C) 97.5 °F (36.4 °C)   SpO2: 90%  99% 98%   Weight:       Height:          09/09 0701 - 09/10 0700  In: -   Out: 800 [Urine:800]  Last 3 Recorded Weights in this Encounter    09/04/22 0744   Weight: 49.9 kg (110 lb)      Physical exam:    GEN: Awake but confused  NECK- Supple, no mass  RESP: bibasilar crackles  CVS: S1,S2  RRR  NEURO: No deficit  EXT: 1-2+ edema      Chart reviewed. Pertinent Notes reviewed.      Data Review :  Recent Labs     09/10/22  0352 09/09/22  0110 09/08/22  0500   * 132* 132*   K 4.7 5.7* 5.5*   CL 96* 97 96*   CO2 37* 33* 33*   BUN 60* 56* 53*   CREA 1.02 1.14* 1.34*   * 130* 129*   CA 9.7 9.3 8.5   PHOS 3.6 2.9  --        Recent Labs     09/10/22  0352 09/09/22  1011 09/08/22  1431 09/08/22  3949 WBC 7.0  --   --  8.0   HGB 7.9* 7.7* 7.5* 7.0*   HCT 24.7* 24.2* 23.8* 22.3*   *  --   --  114*       No results for input(s): FE, TIBC, PSAT, FERR in the last 72 hours.    Medication list  reviewed  Current Facility-Administered Medications   Medication Dose Route Frequency    sodium zirconium cyclosilicate (LOKELMA) powder packet 10 g  10 g Oral BID    melatonin tablet 3 mg  3 mg Oral QHS PRN    balsam peru-castor oiL (VENELEX) ointment   Topical TID    alum-mag hydroxide-simeth (MYLANTA) oral suspension 30 mL  30 mL Oral Q4H PRN    sodium chloride (NS) flush 5-40 mL  5-40 mL IntraVENous Q8H    sodium chloride (NS) flush 5-40 mL  5-40 mL IntraVENous PRN    acetaminophen (TYLENOL) tablet 650 mg  650 mg Oral Q6H    naloxone (NARCAN) injection 0.4 mg  0.4 mg IntraVENous PRN    ondansetron (ZOFRAN ODT) tablet 4 mg  4 mg Oral Q4H PRN    calcium-vitamin D (OS-CESAR +D3) 500 mg-200 unit per tablet 1 Tablet  1 Tablet Oral TID WITH MEALS    polyethylene glycol (MIRALAX) packet 17 g  17 g Oral DAILY    bisacodyL (DULCOLAX) suppository 10 mg  10 mg Rectal DAILY PRN    traMADoL (ULTRAM) tablet 50 mg  50 mg Oral Q6H PRN    alcohol 62% (NOZIN) nasal  1 Ampule  1 Ampule Topical Q12H    levETIRAcetam (KEPPRA) tablet 500 mg  500 mg Oral BID    calcium carbonate (OS-CESAR) tablet 500 mg [elemental]  500 mg Oral BID    WARFARIN INFORMATION NOTE (COUMADIN)   Other QPM    acetaminophen (TYLENOL) tablet 650 mg  650 mg Oral Q6H PRN    Or    acetaminophen (TYLENOL) suppository 650 mg  650 mg Rectal Q6H PRN    polyethylene glycol (MIRALAX) packet 17 g  17 g Oral DAILY PRN    naloxone (NARCAN) injection 0.4 mg  0.4 mg IntraVENous PRN    senna-docusate (PERICOLACE) 8.6-50 mg per tablet 2 Tablet  2 Tablet Oral BID    albuterol-ipratropium (DUO-NEB) 2.5 MG-0.5 MG/3 ML  3 mL Nebulization Q6H PRN    amitriptyline (ELAVIL) tablet 20 mg  20 mg Oral QHS          Joyce Martinez MD  9/10/2022

## 2022-09-10 NOTE — PROGRESS NOTES
Physical Therapy note    Pt chart reviewed and attempted to see for therapy services. Pt and family present upon arrival and reports feeling very fatigued, in discomfort/pain despite recently receiving pain medication, and politely declining therapy services to allow pt to rest and remain comfortable. Noted pt and family pending hospice consult. Will defer and continue to follow.     Fransisca Tavares, PTA

## 2022-09-10 NOTE — PROGRESS NOTES
End of Shift Note    Bedside shift change report given to [facility] (oncoming nurse) by Roney Everett RN (offgoing nurse). Report included the following information SBAR, OR Summary, Intake/Output, MAR, Recent Results, Alarm Parameters , and Quality Measures    Shift worked:  7 am-7 :30 pm   Shift summary and any significant changes:    Constipation and patient to be discharge at 1300 9- ambulance picking up at 1300 and home with hospice      Concerns for physician to address:  Dr. Junior Hinton notified and fleets enema given     Zone phone for oncoming shift:   [phone]       Activity:  Activity Level: Bed Rest  Number times ambulated in hallways past shift: 1  Number of times OOB to chair past shift: 1    Cardiac:   Cardiac Monitoring: No      Cardiac Rhythm: Atrial Paced    Access:  Current line(s): PIV     Genitourinary:   Urinary status: voiding and external catheter    Respiratory:   O2 Device: Nasal cannula  Chronic home O2 use?: YES  Incentive spirometer at bedside: NO  Actual Volume (ml): 500 ml    GI:     Current diet:  DIET ONE TIME MESSAGE  DIET ONE TIME MESSAGE  ADULT DIET Regular; Low Sodium (2 gm); Low Potassium (Less than 3000 mg/day)  ADULT ORAL NUTRITION SUPPLEMENT Breakfast, Dinner; Renal Supplement  Passing flatus: YES  Tolerating current diet: YES       Pain Management:   Patient states pain is manageable on current regimen: YES    Skin:  Magdaleno Score: 15  Interventions: speciality bed, float heels, increase time out of bed, foam dressing, PT/OT consult, limit briefs, internal/external urinary devices, and nutritional support     Patient Safety:  Fall Score:  Total Score: 4  Interventions: bed/chair alarm, assistive device (walker, cane, etc), gripper socks, pt to call before getting OOB, stay with me (per policy), and gait belt  High Fall Risk: Yes    Length of Stay:  Expected LOS: 6d 4h  Actual LOS: 231 Millie Cedeno RN

## 2022-09-10 NOTE — PROGRESS NOTES
Spoke with Dr Dawn Hernandez regarding pt's renal function panel, labs reviewed with doc no new orders at this time.

## 2022-09-10 NOTE — PROGRESS NOTES
Received notification from bedside RN about patient with regards to: increasing O2 requirement  - recent CXR: Moderate diffuse airspace disease could represent pulmonary edema or diffuse pneumonia.  Clinical correlation needed  VS: /44, HR 80, RR 18, o2 sat 90% on NRM    Intervention given: pro BNP, Bumex 1 mg IV x 1 dose ordered

## 2022-09-10 NOTE — PROGRESS NOTES
Pharmacist Daily Dosing of Warfarin    Indication & Goal INR: AFib, INR Goal 2-3    PTA Warfarin Dose: 5 mg daily, 2.5 on Tues/Thurs    Notable concurrent conditions and medications: None    Labs:  Recent Labs     09/10/22  0352 09/09/22  1011 09/09/22  0110 09/08/22  1431 09/08/22  0500   INR 1.4*  --  1.3*  --  1.3*   HGB 7.9*   < >  --    < > 7.0*   *  --   --   --  114*   TBILI  --   --   --   --  0.7   ALB 2.3*  --  2.5*  --  2.4*    < > = values in this interval not displayed. Date                      INR                  Warfarin  9/6                         1.9                    1 mg  9/7/22                    1.3                    5 mg  9/8                         1.3                    4 mg  9/9                         1.3                    6 mg  9/10                       1.4                    6mg    Impression/Plan:   INR 1.4  Warfarin 6mg x1  S/p vitamin K x1 on 9/5/22  Daily INR has been ordered  CBC w/o differential every other day has been ordered     Pharmacy will follow daily and adjust the dose as appropriate.     Thank you,  Geoffery Schwab, [facility]

## 2022-09-10 NOTE — HOSPICE
[facility]  [phone]    Nursing Note   Patient Name: Lien Ocasio  YOB: 1924  Age: 80 y.o. [address] [facility] RN Note:      Hospice consult noted. Chart reviewed. Will see patient today and contact family if they are not present. If there are any questions, please call the main [address] number at [phone]. Thank you for the opportunity to be of service to this patient and her family. 1530: In to meet with Inés Mayo and Camilo Hanson/son/MPOA 2.  discussed Hospice philosophy, general plan of care, levels of care, services and on call procedures. Family information packet provided and reviewed. Family would like to take patient home with hospice services. DMEs will be delivered tomorrow morning between 9am-12pm and medications have been ordered from [facility] to be delivered Sunday before patient arrives home. Yasmine Garber is working on transport with [facility] for [employer].

## 2022-09-11 PROBLEM — S72.009A HIP FRACTURE (HCC): Status: RESOLVED | Noted: 2022-01-01 | Resolved: 2022-01-01

## 2022-09-11 NOTE — PROGRESS NOTES
Transition of Care Plan:  RUR: 16%   Disposition: Home with [facility]   Follow up appointments: PCP as needed  DME needed: TBD by Hospice  Transportation at Discharge: [facility] @ 1:00PM today   Keys or means to access home: n/a       IM Medicare Letter: to be provided  Caregiver Contact: jana singleton [phone]  Discharge Caregiver contacted prior to discharge? To be contacted  Care Conference needed?: no      8:23AM UPDATE  CM called [facility] ([phone]) to follow-up with pending 1:00PM BLS transport home today for [facility] admission. Confirmed transport, will complete PCS paperwork for bedside chart. 9:57AM UPDATE  [facility] [phone][facility] requesting to move up transport from 1:00PM to 12:00PM. CM called back [facility], informed they cannot accommodate request and will need to leave transport at 1:00PM pick-up. CM called back and left confidential VM with Cornelius Kelly. PCS paperwork completed, placed on bedside chart. Care Management Interventions  PCP Verified by CM:  Yes  Palliative Care Criteria Met (RRAT>21 & CHF Dx)?: No  Mode of Transport at Discharge: [facility] Transport Time of Discharge: 1300  Transition of Care Consult (CM Consult): Slade: Yes  Discharge Durable Medical Equipment: No  Health Maintenance Reviewed: Yes  Physical Therapy Consult: Yes  Occupational Therapy Consult: Yes  Speech Therapy Consult: No  Support Systems: Child(jaiden)  Confirm Follow Up Transport: Family  The Plan for Transition of Care is Related to the Following Treatment Goals : Home with Hospice  The Patient and/or Patient Representative was Provided with a Choice of Provider and Agrees with the Discharge Plan?: Yes  Name of the Patient Representative Who was Provided with a Choice of Provider and Agrees with the Discharge Plan: Willian Hearn (pt)  [place] of Choice List was Provided with Basic Dialogue that Supports the Patient's Individualized Plan of Care/Goals, Treatment Preferences and Shares the Quality Data Associated with the Providers?: Yes  Discharge Location  Patient Expects to be Discharged to[de-identified] Home with hospice    Mireya Marshall. Hugh Lopez [address] Manager  [phone]

## 2022-09-11 NOTE — PROGRESS NOTES
Hospice nurse asking to put rosario in before being discharged. Explained we needed doctors order to be put in. Perfect serve Dr. Mata Tabares to ask for orders. Orders placed.

## 2022-09-11 NOTE — PROGRESS NOTES
Problem: Pressure Injury - Risk of  Goal: *Prevention of pressure injury  Description: Document Magdaleno Scale and appropriate interventions in the flowsheet. Outcome: Resolved/Met  Note: Pressure Injury Interventions:  Sensory Interventions: Assess changes in LOC, Assess need for specialty bed, Keep linens dry and wrinkle-free, Maintain/enhance activity level    Moisture Interventions: Absorbent underpads, Contain wound drainage, Internal/External urinary devices, Limit adult briefs, Maintain skin hydration (lotion/cream)    Activity Interventions: Assess need for specialty bed, Pressure redistribution bed/mattress(bed type)    Mobility Interventions: Assess need for specialty bed, Pressure redistribution bed/mattress (bed type), Turn and reposition approx. every two hours(pillow and wedges)    Nutrition Interventions: Document food/fluid/supplement intake, Discuss nutritional consult with provider, Offer support with meals,snacks and hydration    Friction and Shear Interventions: HOB 30 degrees or less, Lift team/patient mobility team, Transferring/repositioning devices                Problem: Patient Education: Go to Patient Education Activity  Goal: Patient/Family Education  Outcome: Resolved/Met     Problem: Falls - Risk of  Goal: *Absence of Falls  Description: Document Corey Fall Risk and appropriate interventions in the flowsheet.   Outcome: Resolved/Met  Note: Fall Risk Interventions:  Mobility Interventions: Bed/chair exit alarm, OT consult for ADLs, Patient to call before getting OOB, PT Consult for mobility concerns    Mentation Interventions: Bed/chair exit alarm, Familiar objects from home, Family/sitter at bedside, Increase mobility, More frequent rounding, Reorient patient, Room close to nurse's station    Medication Interventions: Bed/chair exit alarm    Elimination Interventions: Bed/chair exit alarm, Call light in reach, Elevated toilet seat, Patient to call for help with toileting needs, Stay With Me (per policy), Toilet paper/wipes in reach    History of Falls Interventions: Bed/chair exit alarm, Room close to nurse's station         Problem: Patient Education: Go to Patient Education Activity  Goal: Patient/Family Education  Outcome: Resolved/Met     Problem: Patient Education: Go to Patient Education Activity  Goal: Patient/Family Education  Outcome: Resolved/Met     Problem: Hip Fracture: Post-Op Day 1  Goal: Respiratory  Outcome: Resolved/Met  Goal: *Demonstrates progressive activity  Outcome: Resolved/Met     Problem: Hip Fracture: Post-Op Day 2  Goal: Diagnostic Test/Procedures  Outcome: Resolved/Met  Goal: Respiratory  Outcome: Resolved/Met  Goal: *Adequate oxygenation  Outcome: Resolved/Met  Goal: *Tolerates increased activity  Outcome: Resolved/Met     Problem: Hip Fracture: Post-Op Day 3  Goal: Off Pathway (Use only if patient is Off Pathway)  Outcome: Resolved/Met  Goal: Activity/Safety  Outcome: Resolved/Met  Goal: Diagnostic Test/Procedures  Outcome: Resolved/Met  Goal: Nutrition/Diet  Outcome: Resolved/Met  Goal: Medications  Outcome: Resolved/Met  Goal: Respiratory  Outcome: Resolved/Met  Goal: Treatments/Interventions/Procedures  Outcome: Resolved/Met  Goal: Psychosocial  Outcome: Resolved/Met  Goal: Discharge Planning  Outcome: Resolved/Met  Goal: *Met physical therapy criteria for discharge to next level of care  Outcome: Resolved/Met  Goal: *Optimal pain control at patient's stated goal  Outcome: Resolved/Met  Goal: *Hemodynamically stable  Outcome: Resolved/Met  Goal: *Tolerating diet  Outcome: Resolved/Met  Goal: *Active bowel function  Outcome: Resolved/Met  Goal: *Adequate urinary output  Outcome: Resolved/Met  Goal: *Absence of skin breakdown  Outcome: Resolved/Met  Goal: *Patient verbalizes understanding of discharge instructions  Outcome: Resolved/Met     Problem: Hip Fracture: Post-Op Day 4  Goal: Off Pathway (Use only if patient is Off Pathway)  Outcome: Resolved/Met  Goal: Activity/Safety  Outcome: Resolved/Met  Goal: Diagnostic Test/Procedures  Outcome: Resolved/Met  Goal: Nutrition/Diet  Outcome: Resolved/Met  Goal: Medications  Outcome: Resolved/Met  Goal: Respiratory  Outcome: Resolved/Met  Goal: Treatments/Interventions/Procedures  Outcome: Resolved/Met  Goal: Psychosocial  Outcome: Resolved/Met  Goal: Discharge Planning  Outcome: Resolved/Met  Goal: *Met physical therapy criteria for discharge to next level of care  Outcome: Resolved/Met  Goal: *Optimal pain control at patient's stated goal  Outcome: Resolved/Met  Goal: *Hemodynamically stable  Outcome: Resolved/Met  Goal: *Tolerating diet  Outcome: Resolved/Met  Goal: *Active bowel function  Outcome: Resolved/Met  Goal: *Adequate urinary output  Outcome: Resolved/Met  Goal: *Absence of skin breakdown  Outcome: Resolved/Met  Goal: *Patient verbalizes understanding of discharge instructions  Outcome: Resolved/Met     Problem: Patient Education: Go to Patient Education Activity  Goal: Patient/Family Education  Outcome: Resolved/Met     Problem: Patient Education: Go to Patient Education Activity  Goal: Patient/Family Education  Outcome: Resolved/Met

## 2022-09-11 NOTE — DISCHARGE SUMMARY
Hospitalist Discharge Summary     Patient ID:  Delilah Hartman  [id number]  80 y.o.  11/16/1924 9/4/2022    PCP on record: Donnell Pemberton MD    Admit date: 9/4/2022  Discharge date and time: 9/11/2022    DISCHARGE DIAGNOSIS:    Acute right hip fracture:    Mechanical fall:  Acute right distal clavicle fracture    UTI, POA       Hx of Afib       Acute Exacerbation of HFrEF, systolic heart failure  Acute respiratory distress      LESLIE on CKD stage III  Hyperkalemia       Mild old healing wound to the spine, POA    Macular degeneration, chronic      CONSULTATIONS:  IP CONSULT TO ORTHOPEDIC SURGERY  IP CONSULT TO NEPHROLOGY  IP CONSULT TO HEMATOLOGY    Excerpted HPI from H&P of Carolynn Sosa MD:    Toya Moody is a 80 y.o.  female lives with her daughter woke up today in the early morning and fell in the bathroom after she said she just could not get up from the toilert, and her legs were weak, family heard her and came found her fell and there is some laceration and bleeding int he right forehead, family and patient not know if there is LOC or not  At baseline as per patient daughter patient able to do her ADLs, with some assistant as she is legally blind   history taken from the patient and her daughter at the bedside  Patient has regular follow-up with her PCP and cardiologist  We were asked to admit for work up and evaluation of the above problems. ______________________________________________________________________  DISCHARGE SUMMARY/HOSPITAL COURSE:  for full details see H&P, daily progress notes, labs, consult notes. The patient is 80years old woman who was admitted to the hospital due to mechanical fall and acute right distal clavicle fracture, acute right hip fracture. Orthopedic surgery was consulted and she had intramedullary nail fixation on the right hip on September 7.   Patient also was found to have UTI on admission for which she was started on antibiotics, LESLIE for which nephrology was consulted. She was placed on oxygen initially due to acute on chronic exacerbation of heart failure reduced ejection fraction/systolic heart failure. Patient also has been chronically on warfarin for history of A. fib that was held on the day of surgery and resume the day after. Her hospital stay was significant for fluctuation her oxygen requirement due to pulmonary edema and her systolic heart failure for which diuretics had to be held and resumed multiple times as per nephrology recommendations. Patient's family including daughter and son were very involved in the care and they decided to proceed with hospice care team evaluation. Hospice care team evaluated the patient and recommended home hospice. Also before discharge, hospice care team requested Ellison catheter to be placed for easier management and care of patient. On the day of discharge patient's son was at bedside, all questions were answered. _______________________________________________________________________  Patient seen and examined by me on discharge day. Pertinent Findings:  Gen:    Not in distress  Chest: Clear lungs  CVS:   Regular rhythm. No edema  Abd:  Soft, not distended, not tender  Neuro:  Alert, following simple commands, moving 4 extremities  _______________________________________________________________________  DISCHARGE MEDICATIONS:   Current Discharge Medication List        START taking these medications    Details   oxyCODONE IR (ROXICODONE) 5 mg immediate release tablet Take 1 Tablet by mouth every four (4) hours as needed for Pain for up to 3 days. Max Daily Amount: 30 mg.  Qty: 7 Tablet, Refills: 0  Start date: 9/11/2022, End date: 9/14/2022    Associated Diagnoses: [facility] of right [facility]           CONTINUE these medications which have NOT CHANGED    Details   warfarin (COUMADIN) 2.5 mg tablet Take 5 mg by mouth daily.  Take 5 mg daily except Saturday take 2.5 mg      nitrofurantoin (MACRODANTIN) 50 mg capsule Take 50 mg by mouth every other day. 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)      albuterol-ipratropium (DUO-NEB) 2.5 mg-0.5 mg/3 ml nebu 3 mL by Nebulization route every six (6) hours as needed for Wheezing. Qty: 30 Nebule, Refills: 1    Associated Diagnoses: Chronic bronchitis, unspecified chronic bronchitis type ([facility])      OXYGEN-AIR DELIVERY SYSTEMS 2 L by Nasal route continuous. guaifenesin/dextromethorphan (CORICIDIN HBP CHEST BRANDON-COUGH PO) Take 4 mg by mouth daily. furosemide (LASIX) 40 mg tablet Take 2 Tabs by mouth every other day. Pt takes 40 mg every other day and alternates with 80 mg.  Qty: 30 Tab, Refills: 0      acetaminophen (TYLENOL) 500 mg tablet Take 500 mg by mouth every six (6) hours as needed for Pain. cranberry fruit extract (CRANBERRY PO) Take 1 Can by mouth daily. amitriptyline (ELAVIL) 10 mg tablet Take 10 mg by mouth nightly. Per pt, she takes 2 tabs qhs      cholecalciferol, vitamin D3, 50 mcg (2,000 unit) tab Take 2,000 Units by mouth daily. psyllium seed, with dextrose, (FIBER PO) Take 1 Cap by mouth daily. VIT A/C/E AC/ZNOX/CUPRIC OXIDE (EYE VITAMIN AND MINERALS PO) Take 1 Tab by mouth two (2) times a day. ferrous sulfate 325 mg (65 mg iron) tablet Take 325 mg by mouth two (2) times a day. DOCUSATE CALCIUM (STOOL SOFTENER PO) Take 100 mg by mouth as needed for Other (constipation). 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       carvedilol (COREG) 3.125 mg tablet Take 1 Tab by mouth two (2) times daily (with meals). Qty: 30 Tab, Refills: 0               Patient Follow Up Instructions:    Activity: Activity as tolerated  Diet: Resume previous diet      Follow-up Information       Follow up With Specialties Details Why Contact Genaro Herbert MD Internal Medicine Physician   [address] [place], [place] [address]  [phone]            ________________________________________________________________    Risk of deterioration: Moderate    Condition at Discharge:  Stable  __________________________________________________________________    Disposition  Home with hospice services    ____________________________________________________________________    Code Status: DNR/DNI  ___________________________________________________________________      Total time in minutes spent coordinating this discharge (includes going over instructions, follow-up, prescriptions, and preparing report for sign off to her PCP) :  >30 minutes    Signed:  Ruby Murillo MD

## 2022-09-11 NOTE — PROGRESS NOTES
Pharmacist Daily Dosing of Warfarin    Indication & Goal INR: AFib, INR Goal 2-3    PTA Warfarin Dose: 5 mg daily, 2.5 on Tues/Thurs    Notable concurrent conditions and medications: None    Labs:  Recent Labs     09/10/22  0352 09/09/22  1011 09/09/22  0110   INR 1.4*  --  1.3*   HGB 7.9*   < >  --    *  --   --    ALB 2.3*  --  2.5*    < > = values in this interval not displayed. Date                      INR                  Warfarin  9/6                         1.9                    1 mg  9/7/22                    1.3                    5 mg  9/8                         1.3                    4 mg  9/9                         1.3                    6 mg  9/10                       1.4                    6mg  9/11                                                6mg    Impression/Plan:   INR 1.4  Warfarin 6mg x1, patient discharging today  S/p vitamin K x1 on 9/5/22  Daily INR has been ordered  CBC w/o differential every other day has been ordered     Pharmacy will follow daily and adjust the dose as appropriate.     Thank you,  DIALLO Mantilla

## 2022-09-11 NOTE — HOSPICE
[facility]  [facility]  [phone]  Patient Name: Ragena Meigs  YOB: 1924  Age:  80 yoF                                                         Principle Hospice Diagnosis:  Congestive heart failure  Diagnoses RELATED to the terminal prognosis: Acute respiratory failure, acute exacerbation of heart failure, acute kidney injury, acute right hip fracture s/p intramedullary nail fixation, acute right clavicle fracture, atrial fibrillation, coronary artery disease, wound to spine, urinary tract infection-resolved. Unrelated Diagnosis: Arthritis, benign hypertension, benign neoplasm of vulva, chronic kidney disease stage III, hx colon cancer, glaucoma, hx mixed connective tissue disease, hypercholesterolemia, macular degeneration, psoriasis, vaginal vault prolapse s/p hysterectomy. Date of Hospice Admission: 2022  Hospice Medical Directory giving CTI: Jose Rivers MD  Hospice Attending Elected by Patient: Tova Villar MD        Admitting RN:  Aditya White RN  Nurse CM:  Eloy Velasquez RN  : HERMAN Almaraz  :  Gabriela Pollack[address] DNR:  Pito Welsh Service: N/a       Home: Need to clarify. Wants patient to be transported to [place] at time of death    Direct Observation:  The patient is a 80 yoF with a principle hospice diagnosis of congestive heart failure. The patient has comorbidities of acute respiratory failure, acute on chronic kidney disease III, acute CHF exacerbation, right hip fx s/p intramedullary nail fixation, acute right clavicle fx, atrial fibrillation, CAD, pacemaker, non-healing wound to thoracic spine, chronic UTIs. The patient's hospice diagnosis has resulted in the following declines: respiratory failure/dyspnea, altered mental status, debility/bedbound, fluid volume overload poorly responsive to diuretics, and pain.  On assessment the patient is lethargic, oriented to self, disoriented to time/place/situation, can answer yes/no to simple questions, terminal agitation/hallucinations. Bilateral basilar crackles, dyspnea at rest, increased WOB/accessory muscle use, RR 30-32, spO2 84 percent on 6L oxygen by simple mask. Holosystolic murmur grade III, HR is in the 100s but is usually paced rhythm at 80 per minute, trace BLE edema. Active bowel sounds, no s/sx's nausea/vomiting. Urinary catheter is patent, clear yellow urine output to dependent bag. Right hip surgical incision with dressing C/D/I, unstageable pressure ulcer to thoracic spine with dressing C/D/I, stage I pressure ulcer to sacrum. 3/10 on dementia pain scale. Palliative Performance Scale:  20 percent         ER Visits/ Hospitalizations in past year:  1  Onset Date of Hospice Diagnosis:  Sept 2022  Summary of Disease Progression Leading to Hospice Diagnosis: excerpted from Dr Aleksandra Batista discharge summary/hospital course dtd 9/11/2022 MAUREEN Peace is a 80 y.o.  female lives with her daughter woke up today in the early morning and fell in the bathroom after she said she just could not get up from the toilert, and her legs were weak, family heard her and came found her fell and there is some laceration and bleeding int he right forehead, family and patient not know if there is LOC or not At baseline as per patient daughter patient able to do her ADLs, with some assistant as she is legally blind  history taken from the patient and her daughter at the bedside Patient has regular follow-up with her PCP and cardiologist We were asked to admit for work up and evaluation of the above problems. Hospital course The patient is 80years old woman who was admitted to the hospital due to mechanical fall and acute right distal clavicle fracture, acute right hip fracture. Orthopedic surgery was consulted and she had intramedullary nail fixation on the right hip on September 7.  Patient also was found to have UTI on admission for which she was started on antibiotics, LESLIE for which nephrology was consulted. She was placed on oxygen initially due to acute on chronic exacerbation of heart failure reduced ejection fraction/systolic heart failure. Patient also has been chronically on warfarin for history of A. fib that was held on the day of surgery and resume the day after. Her hospital stay was significant for fluctuation her oxygen requirement due to pulmonary edema and her systolic heart failure for which diuretics had to be held and resumed multiple times as per nephrology recommendations. Patient's family including daughter and son were very involved in the care and they decided to proceed with hospice care team evaluation. Hospice care team evaluated the patient and recommended home hospice. Also before discharge, hospice care team requested Ellison catheter to be placed for easier management and care of patient. On the day of discharge patient's son was at bedside, all questions were answered.    Labs/Diagnostics: Pro-BNP:  24,801  Echocardiogram: Result status: Final result    Left Ventricle: Severely reduced left ventricular systolic function with a visually estimated EF of 20 - 25%. Left ventricle size is normal. Normal wall thickness. Global hypokinesis present. Normal diastolic function.  Right Ventricle: Reduced systolic function.  Aortic Valve: Tricuspid valve. Mild to moderate regurgitation.  Mitral Valve: Moderate regurgitation.  Tricuspid Valve: Moderate regurgitation. The estimated RVSP is 66 mmHg. Use LCD Guidelines and list features:   Patients will be considered to be in the terminal stage of heart disease (life expectancy of six months or less) if they meet the following criteria. (1 and 2 should be present. Factors from 3 will add supporting documentation.):    ____X____  1.  At the time of initial certification or recertification for hospice, the patient is or has been                           already optimally treated for heart disease or is not a candidate for a surgical procedure or                           has declined a procedure. (Optimally treated means that patients who are not on                           vasodilators have a medical reason for refusing these drugs, e.g., hypotension or renal                           disease.)  _____X___  2. The patient is classified as New York Heart Association (NYHA) Class IV and may have                           significant symptoms of heart failure or angina at rest. (Class IV patients with heart disease                           have an inability to carry on any physical activity without discomfort. Symptoms of heart                           failure or of the anginal syndrome may be present even at rest. If any physical activity is                           undertaken, discomfort is increased.) Significant congestive heart failure may be                          documented by an ejection fraction of ?20%, but is not required if not already available.  ________  3. Documentation of the following factors will support but is not required to establish                           eligibility for hospice care:                            ________  a. Treatment resistant symptomatic supraventricular or ventricular arrhythmias;                          ________  b. History of cardiac arrest or resuscitation;                          ________  c. History of unexplained syncope;                          ________  d. Brain embolism of cardiac origin;                          ________  e. Concomitant HIV disease. SPIRITUAL/Social/Emotional/psych:     Dr. Leo Casper on behalf of Dr Bernadine Glasgow    contacted, agrees to serve as attending provider for hospice and provided verbal certification of terminal illness with prognosis of 6 months or less life expectancy. Orders for hospice admission, medications and plan of treatment received.  Medication reconciliation completed. Currently this patient has:  Supplemental O2:  YES  Peripheral IV:           Fistula:   PICC:                        PORT:           Ellison Catheter: YES  NG Tube:   PEG Tube:                Ostomy:        AICD: Has ICD been deactivated?   Yes:_____   No:_____   (If no, please identify ex ([address]; [address] [facility] etc)

## 2022-09-11 NOTE — HOSPICE
[facility]  [phone]    Hospice Nursing PRE-Admission   Discharge Summary  Patient Name: Anabela Nolan  YOB: 1924  Age: 80 y.o. Date of PLANNED Hospice Admission: 2022  Hospice Attending: Tacos Granda MD  Primary Care Physician: John Sanchez MD     Home Hospice Address:   [address] [place] [address], [address] [place] [address]    Primary Contact and Phone:  Cesar Farias [phone]      ADVANCE CARE PLANNING    Code Status: DNR  Durable DNR: [x]  Yes  []  No  Advance Care Planning 2022   Patient's Healthcare Decision Maker is: Legal Next of Kin   Primary Decision Maker Name -   Primary Decision Maker Phone Number -   Primary Decision Maker Relationship to Patient -   Confirm Advance Directive Yes, not on file   Does the patient have other document types Do Not Resuscitate       Voodoo: PRESBYTERIAN   Home: TBD    HOSPICE SUMMARY     Verbal CTI of terminal diagnosis with life expectancy of 6 months or less received from: Bushra Live MD    For the Hospice Diagnosis of: CHF    NCD: Requested/Declined (reviewed with family/verify at admission)      CLINICAL INFORMATION   Allergies: Allergies   Allergen Reactions    Codeine Nausea Only    Pcn [Penicillins] Unknown (comments)     Cant remember reaction as a child    Tetanus Toxoid, Adsorbed Other (comments)    Tetanus Vaccines And Toxoid Unknown (comments)     Does not take tetanus due to tb hx    Vibramycin [Doxycycline Calcium] Rash         Currently this patient has:  [x] Supplemental O2 [x] Peripheral IV  [] PICC    [] PORT   [x] External Catheter [] NG Tube   [] PEG Tube [] Ostomy    [] AICD: Has ICD been deactivated? [] Yes [] Wounds      COVID Screening: no recent screening per chart review      ASSESSMENT & PLAN     1. Symptom Issues Identified: pain r/t recent hip surgery    2. Spiritual Issues  Identified: not at this time    3.   Psych/ Social/ Emotional Issues Identified: Jeremías Manriquez/daughter/MPOA1 has been patient caregiver for the past 12 years and is a nurse. Nikolas Thomas lives in [place] but plans to stay locally for awhile to support his sister in caring for patient. Family very supportive of each other. CARE COORDINATION           Hospice Consents: completed and scanned along with DDNR    2. DME Ordered/Company/Delivery Plan: being delivered Sunday morning btw 9am-12pm via [employer] (bed with full rails, gel overlay,10L concentrator  and presently pt is on 6L/nc, OBT      3. Unique home needs for safety: Bariatric patient - no    4. Symptom Kit and other Medications Needs: ordered through [employer] for delivery Chon morning (morphine, ativan, hyoscyamine, haldol) and SRK no morphine ordered through [facility] to be mailed out. 5. Home Admission Reservation: Sunday PM    6. Transportation by: [facility] 1pm      7. Verbal Report/Handoff given to:   Charley Sifuentes via this email     8. Phone number to JEFFERSON ABEBE [phone]    9.  Supplies/Wound Care: some supplies acquired at bedside

## 2022-09-11 NOTE — PROGRESS NOTES
DISCHARGE NOTE FROM [facility]    Patient determined to be stable for discharge by attending provider. I have reviewed the discharge instructions with the son. They verbalized understanding and all questions were answered to their satisfaction. No complaints or further questions were expressed. Medications sent to pharmacy. Appropriate educational materials and medication side effect teaching were provided. Pt discharged home with hospice with rosario catheter    Rosario catheter care provided to patient. Teach back method utilized. Personal items and valuables accounted for at discharge by patient and/or family: YES    Post-op patient: Yes- Patient given post-op discharge kit and instructed on use.        Goldie Manzanares RN

## 2022-09-13 NOTE — HOSPICE
Patient pronounced by this writer at 8021 on 9/13/2022 surrounded by loved ones. Post mortem care provided by writer, catheter removed. Medications wasted per policy by this writer witnessed by daughter [employer]. Family low risk for bereavement. declined SW or  services at this time. Rishi's FH contacted to retrieve remains. Patient will be flown to [facility] with claim remains. Dr. Alyssa Campoverde is listed as attending provider. Notified by CC message as well as Dr Domonique Whiteside.

## 2022-09-14 VITALS
HEART RATE: 79 BPM | DIASTOLIC BLOOD PRESSURE: 73 MMHG | SYSTOLIC BLOOD PRESSURE: 97 MMHG | RESPIRATION RATE: 20 BRPM | OXYGEN SATURATION: 95 %

## 2022-09-14 NOTE — HOSPICE
Patient received in hospital bed. She is mostly unresponsive but will respond to tactile stimulation. Pt sleeping. She is on 6L via NC with SPO2 95%. Her daughter states she has had sips only and took her medications with applesauce. She has wounds to sacrum and mid back as well as a surgical wound to her right hip. Dressings dry and intact. She has bruising to her right hip and shoulder from fracture. RNCM and daughter discussed EOL signs symptoms. Daughter is a nurse and states understanding. Encouraged to call with any questions or concerns. Medications reviewed. No refills needed at this time.

## 2022-09-16 ENCOUNTER — TELEPHONE (OUTPATIENT)
Dept: NEUROLOGY | Age: 87
End: 2022-09-16

## 2022-09-16 ENCOUNTER — HOME CARE VISIT (OUTPATIENT)
Dept: HOSPICE | Facility: HOSPICE | Age: 87
End: 2022-09-16
Payer: MEDICARE

## 2022-09-16 NOTE — TELEPHONE ENCOUNTER
Spoke with patient's daughter, Brittanie Migue  Verified patient with two patient identifiers. Gave our condolences for Mom's passing on 9-. Will inform Dr. Dawn Hernandez also.

## 2022-09-16 NOTE — TELEPHONE ENCOUNTER
Voicemail:    Patient's daughter, Roni Petty, called to inform Dr. Heber Cole that Patient passed away on 9/13.

## 2024-01-17 NOTE — PROGRESS NOTES
End of Shift Note    Bedside shift change report given to Anat GUERRERO (oncoming nurse) by Sonido Singh RN (offgoing nurse). Report included the following information SBAR, Kardex, ED Summary, Intake/Output, MAR, Accordion, and Recent Results    Shift worked:  7a-7p     Shift summary and any significant changes:     Admit from ED. Pt wearing sling to right arm, heels elevated, rosario placed, dual skin completed. Pt with darker, non-blanchable around rectum/sacrum, venelex ordered. Plan for OR Tues. Family at bedside and supportive. Concerns for physician to address:  Pt and family do not want pt to go to [place] for SNF at d/c. Zone phone for oncoming shift:   [phone]       Activity:  Activity Level: Bed Rest  Number times ambulated in hallways past shift: 0  Number of times OOB to chair past shift: 0    Cardiac:   Cardiac Monitoring: No           Access:  Current line(s): PIV     Genitourinary:   Urinary status: rosario    Respiratory:   O2 Device: Nasal cannula  Chronic home O2 use?: YES  Incentive spirometer at bedside: YES       GI:     Current diet:  ADULT DIET Regular  Passing flatus: YES  Tolerating current diet: YES       Pain Management:   Patient states pain is manageable on current regimen: YES    Skin:  Magdaleno Score: 16  Interventions: float heels    Patient Safety:  Fall Score:  Total Score: 5  Interventions: gripper socks  High Fall Risk: Yes    Length of Stay:  Expected LOS: - - -  Actual LOS: 0      Sonido Singh RN Statement Selected

## (undated) DEVICE — 1200 GUARD II KIT W/5MM TUBE W/O VAC TUBE: Brand: GUARDIAN

## (undated) DEVICE — NEEDLE HYPO 25GA L1.5IN BVL ORIENTED ECLIPSE

## (undated) DEVICE — INFECTION CONTROL KIT SYS

## (undated) DEVICE — Z DISCONTINUEDSOLUTION PREP 2OZ 10% POVIDONE IOD SCR CAP BTL

## (undated) DEVICE — GLOVE ORTHO 8   MSG9480

## (undated) DEVICE — MICRODISSECTION NEEDLE STRAIGHT SLEEVE: Brand: COLORADO

## (undated) DEVICE — SYR 10ML LUER LOK 1/5ML GRAD --

## (undated) DEVICE — GLOVE SURG SZ 8 L12IN FNGR THK79MIL GRN LTX FREE

## (undated) DEVICE — SPONGE: SPECIALTY PEANUT XR 100/CS: Brand: MEDICAL ACTION INDUSTRIES

## (undated) DEVICE — 3.2MM GUIDE WIRE 400MM

## (undated) DEVICE — 3M™ DURAPORE™ SURGICAL TAPE 1538-3, 3 INCH X 10 YARD (7,5CM X 9,1M), 4 ROLLS/BOX: Brand: 3M™ DURAPORE™

## (undated) DEVICE — GAUZE SPONGES,12 PLY: Brand: CURITY

## (undated) DEVICE — HOOK RETRCT L5MM E SHRP SELF RET SYS LONE STAR

## (undated) DEVICE — SUTURE PDS II SZ 2-0 L27IN ABSRB VLT SH L26MM 1/2 CIR Z317H

## (undated) DEVICE — TOWEL SURG W17XL27IN STD BLU COT NONFENESTRATED PREWASHED

## (undated) DEVICE — COVER,TABLE,HEAVY DUTY,77"X90",STRL: Brand: MEDLINE

## (undated) DEVICE — GOWN,SIRUS,FABRNF,XL,20/CS: Brand: MEDLINE

## (undated) DEVICE — SUTURE VCRL SZ 2-0 L27IN ABSRB VLT L26MM SH 1/2 CIR J317H

## (undated) DEVICE — HANDLE LT SNAP ON ULT DURABLE LENS FOR TRUMPF ALC DISPOSABLE

## (undated) DEVICE — KENDALL SCD EXPRESS SLEEVES, KNEE LENGTH, MEDIUM: Brand: KENDALL SCD

## (undated) DEVICE — DEVON™ KNEE AND BODY STRAP 60" X 3" (1.5 M X 7.6 CM): Brand: DEVON

## (undated) DEVICE — REM POLYHESIVE ADULT PATIENT RETURN ELECTRODE: Brand: VALLEYLAB

## (undated) DEVICE — ROD RMR L950MM DIA2.5MM W/ EXTN BALL TIP

## (undated) DEVICE — SUTURE MCRYL SZ 4-0 L27IN ABSRB UD L19MM PS-2 1/2 CIR PRIM Y426H

## (undated) DEVICE — SUTURE VCRL SZ 2-0 L27IN ABSRB UD L26MM SH 1/2 CIR J417H

## (undated) DEVICE — GOWN,SIRUS,NONRNF,SETINSLV,XL,20/CS: Brand: MEDLINE

## (undated) DEVICE — SURGICAL PROCEDURE PACK BASIN MAJ SET CUST NO CAUT

## (undated) DEVICE — Device

## (undated) DEVICE — KIT POS FOAM HANA TBL

## (undated) DEVICE — SOLUTION IV 1000ML 0.9% SOD CHL

## (undated) DEVICE — STERILE POLYISOPRENE POWDER-FREE SURGICAL GLOVES: Brand: PROTEXIS

## (undated) DEVICE — APPLICATOR BNDG 1MM ADH PREMIERPRO EXOFIN

## (undated) DEVICE — SURGIFOAM SPNG SZ 100

## (undated) DEVICE — PREP SKN PREVAIL 40ML APPL --

## (undated) DEVICE — SUTURE VCRL SZ 0 L36IN ABSRB UD L36MM CT-1 1/2 CIR J946H

## (undated) DEVICE — INSULATED BLADE ELECTRODE: Brand: EDGE

## (undated) DEVICE — SUT SLK 3-0 30IN SH BLK --

## (undated) DEVICE — DBD-PACK,LAPAROTOMY,2 REINFORCED GOWNS: Brand: MEDLINE

## (undated) DEVICE — CATH URETH FOL 2W SH 12FRX5ML -- CONVERT TO ITEM 363070

## (undated) DEVICE — ABDOMINAL PAD: Brand: DERMACEA

## (undated) DEVICE — SUT CHRMC 3-0 27IN SH BRN --

## (undated) DEVICE — OPTIFOAM GENTLE SA, POSTOP, 4X8: Brand: MEDLINE

## (undated) DEVICE — (D)PREP SKN CHLRAPRP APPL 26ML -- CONVERT TO ITEM 371833

## (undated) DEVICE — POOLE SUCTION INSTRUMENT WITH REMOVABLE SHEATH AND PREATTACHED 6' (1.8 M) CLEAR PLASTIC TUBING: Brand: POOLE

## (undated) DEVICE — 6619 2 PTNT ISO SYS INCISE AREA&LT;(&GT;&&LT;)&GT;P: Brand: STERI-DRAPE™ IOBAN™ 2

## (undated) DEVICE — 3M™ IOBAN™ 2 ANTIMICROBIAL INCISE DRAPE 6640EZ: Brand: IOBAN™ 2

## (undated) DEVICE — SUT SLK 2-0SH 30IN BLK --

## (undated) DEVICE — NEEDLE HYPO 22GA L1.5IN BLK S STL HUB POLYPR SHLD REG BVL

## (undated) DEVICE — BLADE ASSEMB CLP HAIR FINE --

## (undated) DEVICE — ROCKER SWITCH PENCIL BLADE ELECTRODE, HOLSTER: Brand: EDGE

## (undated) DEVICE — GOWN,SIRUS,NONRNF,SETINSLV,2XL,18/CS: Brand: MEDLINE

## (undated) DEVICE — SOLUTION IRRIG 1000ML STRL H2O USP PLAS POUR BTL

## (undated) DEVICE — MAJOR LAPAROTOMY-MRMC: Brand: MEDLINE INDUSTRIES, INC.

## (undated) DEVICE — SOLUTION IRRIG 1000ML 0.9% SOD CHL USP POUR PLAS BTL

## (undated) DEVICE — JELLY,LUBE,STERILE,FLIP TOP,TUBE,4-OZ: Brand: MEDLINE

## (undated) DEVICE — SOLUTION IRRIG 1000ML H2O STRL BLT

## (undated) DEVICE — 4.2MM THREE-FLUTED DRILL BIT QC/330MM/100MM CALIBRATION

## (undated) DEVICE — SUT VCRL 3-0 18IN TIE MP VIO --

## (undated) DEVICE — SUTURE VCRL SZ 3-0 L27IN ABSRB UD L26MM SH 1/2 CIR J416H